# Patient Record
Sex: MALE | Race: ASIAN | NOT HISPANIC OR LATINO
[De-identification: names, ages, dates, MRNs, and addresses within clinical notes are randomized per-mention and may not be internally consistent; named-entity substitution may affect disease eponyms.]

---

## 2018-05-17 ENCOUNTER — RESULT REVIEW (OUTPATIENT)
Age: 73
End: 2018-05-17

## 2019-05-15 ENCOUNTER — INPATIENT (INPATIENT)
Facility: HOSPITAL | Age: 74
LOS: 3 days | Discharge: HOME | End: 2019-05-19
Attending: HOSPITALIST | Admitting: HOSPITALIST
Payer: MEDICARE

## 2019-05-15 VITALS
TEMPERATURE: 103 F | OXYGEN SATURATION: 96 % | RESPIRATION RATE: 18 BRPM | SYSTOLIC BLOOD PRESSURE: 130 MMHG | HEART RATE: 125 BPM | DIASTOLIC BLOOD PRESSURE: 80 MMHG

## 2019-05-15 DIAGNOSIS — A41.9 SEPSIS, UNSPECIFIED ORGANISM: ICD-10-CM

## 2019-05-15 LAB
ALBUMIN SERPL ELPH-MCNC: 4.4 G/DL — SIGNIFICANT CHANGE UP (ref 3.3–5)
ALP SERPL-CCNC: 56 U/L — SIGNIFICANT CHANGE UP (ref 40–120)
ALT FLD-CCNC: 23 U/L — SIGNIFICANT CHANGE UP (ref 4–41)
ANION GAP SERPL CALC-SCNC: 13 MMO/L — SIGNIFICANT CHANGE UP (ref 7–14)
APPEARANCE UR: CLEAR — SIGNIFICANT CHANGE UP
APTT BLD: 35.2 SEC — SIGNIFICANT CHANGE UP (ref 27.5–36.3)
AST SERPL-CCNC: 24 U/L — SIGNIFICANT CHANGE UP (ref 4–40)
B PERT DNA SPEC QL NAA+PROBE: NOT DETECTED — SIGNIFICANT CHANGE UP
BACTERIA # UR AUTO: NEGATIVE — SIGNIFICANT CHANGE UP
BASE EXCESS BLDV CALC-SCNC: -2.2 MMOL/L — SIGNIFICANT CHANGE UP
BASE EXCESS BLDV CALC-SCNC: 1.4 MMOL/L — SIGNIFICANT CHANGE UP
BASOPHILS # BLD AUTO: 0.05 K/UL — SIGNIFICANT CHANGE UP (ref 0–0.2)
BASOPHILS NFR BLD AUTO: 0.4 % — SIGNIFICANT CHANGE UP (ref 0–2)
BILIRUB SERPL-MCNC: 0.9 MG/DL — SIGNIFICANT CHANGE UP (ref 0.2–1.2)
BILIRUB UR-MCNC: NEGATIVE — SIGNIFICANT CHANGE UP
BLOOD GAS VENOUS - CREATININE: 1.19 MG/DL — SIGNIFICANT CHANGE UP (ref 0.5–1.3)
BLOOD GAS VENOUS - CREATININE: 1.25 MG/DL — SIGNIFICANT CHANGE UP (ref 0.5–1.3)
BLOOD UR QL VISUAL: SIGNIFICANT CHANGE UP
BUN SERPL-MCNC: 28 MG/DL — HIGH (ref 7–23)
C PNEUM DNA SPEC QL NAA+PROBE: NOT DETECTED — SIGNIFICANT CHANGE UP
CALCIUM SERPL-MCNC: 9.3 MG/DL — SIGNIFICANT CHANGE UP (ref 8.4–10.5)
CHLORIDE BLDV-SCNC: 106 MMOL/L — SIGNIFICANT CHANGE UP (ref 96–108)
CHLORIDE BLDV-SCNC: 110 MMOL/L — HIGH (ref 96–108)
CHLORIDE SERPL-SCNC: 104 MMOL/L — SIGNIFICANT CHANGE UP (ref 98–107)
CO2 SERPL-SCNC: 23 MMOL/L — SIGNIFICANT CHANGE UP (ref 22–31)
COLOR SPEC: YELLOW — SIGNIFICANT CHANGE UP
CREAT SERPL-MCNC: 1.26 MG/DL — SIGNIFICANT CHANGE UP (ref 0.5–1.3)
EOSINOPHIL # BLD AUTO: 0.11 K/UL — SIGNIFICANT CHANGE UP (ref 0–0.5)
EOSINOPHIL NFR BLD AUTO: 0.9 % — SIGNIFICANT CHANGE UP (ref 0–6)
FLUAV H1 2009 PAND RNA SPEC QL NAA+PROBE: NOT DETECTED — SIGNIFICANT CHANGE UP
FLUAV H1 RNA SPEC QL NAA+PROBE: NOT DETECTED — SIGNIFICANT CHANGE UP
FLUAV H3 RNA SPEC QL NAA+PROBE: NOT DETECTED — SIGNIFICANT CHANGE UP
FLUAV SUBTYP SPEC NAA+PROBE: NOT DETECTED — SIGNIFICANT CHANGE UP
FLUBV RNA SPEC QL NAA+PROBE: NOT DETECTED — SIGNIFICANT CHANGE UP
GAS PNL BLDV: 135 MMOL/L — LOW (ref 136–146)
GAS PNL BLDV: 137 MMOL/L — SIGNIFICANT CHANGE UP (ref 136–146)
GLUCOSE BLDV-MCNC: 177 MG/DL — HIGH (ref 70–99)
GLUCOSE BLDV-MCNC: 210 MG/DL — HIGH (ref 70–99)
GLUCOSE SERPL-MCNC: 175 MG/DL — HIGH (ref 70–99)
GLUCOSE UR-MCNC: >1000 — HIGH
HADV DNA SPEC QL NAA+PROBE: NOT DETECTED — SIGNIFICANT CHANGE UP
HCO3 BLDV-SCNC: 23 MMOL/L — SIGNIFICANT CHANGE UP (ref 20–27)
HCO3 BLDV-SCNC: 26 MMOL/L — SIGNIFICANT CHANGE UP (ref 20–27)
HCOV PNL SPEC NAA+PROBE: SIGNIFICANT CHANGE UP
HCT VFR BLD CALC: 49.6 % — SIGNIFICANT CHANGE UP (ref 39–50)
HCT VFR BLDV CALC: 43.6 % — SIGNIFICANT CHANGE UP (ref 39–51)
HCT VFR BLDV CALC: 49.2 % — SIGNIFICANT CHANGE UP (ref 39–51)
HGB BLD-MCNC: 16.4 G/DL — SIGNIFICANT CHANGE UP (ref 13–17)
HGB BLDV-MCNC: 14.2 G/DL — SIGNIFICANT CHANGE UP (ref 13–17)
HGB BLDV-MCNC: 16.1 G/DL — SIGNIFICANT CHANGE UP (ref 13–17)
HMPV RNA SPEC QL NAA+PROBE: NOT DETECTED — SIGNIFICANT CHANGE UP
HPIV1 RNA SPEC QL NAA+PROBE: NOT DETECTED — SIGNIFICANT CHANGE UP
HPIV2 RNA SPEC QL NAA+PROBE: NOT DETECTED — SIGNIFICANT CHANGE UP
HPIV3 RNA SPEC QL NAA+PROBE: NOT DETECTED — SIGNIFICANT CHANGE UP
HPIV4 RNA SPEC QL NAA+PROBE: NOT DETECTED — SIGNIFICANT CHANGE UP
HYALINE CASTS # UR AUTO: NEGATIVE — SIGNIFICANT CHANGE UP
IMM GRANULOCYTES NFR BLD AUTO: 0.3 % — SIGNIFICANT CHANGE UP (ref 0–1.5)
INR BLD: 1.04 — SIGNIFICANT CHANGE UP (ref 0.88–1.17)
KETONES UR-MCNC: NEGATIVE — SIGNIFICANT CHANGE UP
LACTATE BLDV-MCNC: 1.3 MMOL/L — SIGNIFICANT CHANGE UP (ref 0.5–2)
LACTATE BLDV-MCNC: 2.2 MMOL/L — HIGH (ref 0.5–2)
LEUKOCYTE ESTERASE UR-ACNC: SIGNIFICANT CHANGE UP
LYMPHOCYTES # BLD AUTO: 0.45 K/UL — LOW (ref 1–3.3)
LYMPHOCYTES # BLD AUTO: 3.5 % — LOW (ref 13–44)
MCHC RBC-ENTMCNC: 29.2 PG — SIGNIFICANT CHANGE UP (ref 27–34)
MCHC RBC-ENTMCNC: 33.1 % — SIGNIFICANT CHANGE UP (ref 32–36)
MCV RBC AUTO: 88.4 FL — SIGNIFICANT CHANGE UP (ref 80–100)
MONOCYTES # BLD AUTO: 0.62 K/UL — SIGNIFICANT CHANGE UP (ref 0–0.9)
MONOCYTES NFR BLD AUTO: 4.8 % — SIGNIFICANT CHANGE UP (ref 2–14)
NEUTROPHILS # BLD AUTO: 11.63 K/UL — HIGH (ref 1.8–7.4)
NEUTROPHILS NFR BLD AUTO: 90.1 % — HIGH (ref 43–77)
NITRITE UR-MCNC: NEGATIVE — SIGNIFICANT CHANGE UP
NRBC # FLD: 0 K/UL — SIGNIFICANT CHANGE UP (ref 0–0)
PCO2 BLDV: 37 MMHG — LOW (ref 41–51)
PCO2 BLDV: 38 MMHG — LOW (ref 41–51)
PH BLDV: 7.39 PH — SIGNIFICANT CHANGE UP (ref 7.32–7.43)
PH BLDV: 7.44 PH — HIGH (ref 7.32–7.43)
PH UR: 5.5 — SIGNIFICANT CHANGE UP (ref 5–8)
PLATELET # BLD AUTO: 200 K/UL — SIGNIFICANT CHANGE UP (ref 150–400)
PMV BLD: 10.4 FL — SIGNIFICANT CHANGE UP (ref 7–13)
PO2 BLDV: 53 MMHG — HIGH (ref 35–40)
PO2 BLDV: 69 MMHG — HIGH (ref 35–40)
POTASSIUM BLDV-SCNC: 3.6 MMOL/L — SIGNIFICANT CHANGE UP (ref 3.4–4.5)
POTASSIUM BLDV-SCNC: 4 MMOL/L — SIGNIFICANT CHANGE UP (ref 3.4–4.5)
POTASSIUM SERPL-MCNC: 4.4 MMOL/L — SIGNIFICANT CHANGE UP (ref 3.5–5.3)
POTASSIUM SERPL-SCNC: 4.4 MMOL/L — SIGNIFICANT CHANGE UP (ref 3.5–5.3)
PROT SERPL-MCNC: 7.4 G/DL — SIGNIFICANT CHANGE UP (ref 6–8.3)
PROT UR-MCNC: 10 — SIGNIFICANT CHANGE UP
PROTHROM AB SERPL-ACNC: 11.9 SEC — SIGNIFICANT CHANGE UP (ref 9.8–13.1)
RBC # BLD: 5.61 M/UL — SIGNIFICANT CHANGE UP (ref 4.2–5.8)
RBC # FLD: 13.5 % — SIGNIFICANT CHANGE UP (ref 10.3–14.5)
RBC CASTS # UR COMP ASSIST: SIGNIFICANT CHANGE UP (ref 0–?)
RSV RNA SPEC QL NAA+PROBE: NOT DETECTED — SIGNIFICANT CHANGE UP
RV+EV RNA SPEC QL NAA+PROBE: DETECTED — HIGH
SAO2 % BLDV: 86.9 % — HIGH (ref 60–85)
SAO2 % BLDV: 92.6 % — HIGH (ref 60–85)
SODIUM SERPL-SCNC: 140 MMOL/L — SIGNIFICANT CHANGE UP (ref 135–145)
SP GR SPEC: 1.03 — SIGNIFICANT CHANGE UP (ref 1–1.04)
SQUAMOUS # UR AUTO: SIGNIFICANT CHANGE UP
UROBILINOGEN FLD QL: NORMAL — SIGNIFICANT CHANGE UP
WBC # BLD: 12.9 K/UL — HIGH (ref 3.8–10.5)
WBC # FLD AUTO: 12.9 K/UL — HIGH (ref 3.8–10.5)
WBC UR QL: HIGH (ref 0–?)

## 2019-05-15 PROCEDURE — 99223 1ST HOSP IP/OBS HIGH 75: CPT | Mod: GC

## 2019-05-15 PROCEDURE — 71046 X-RAY EXAM CHEST 2 VIEWS: CPT | Mod: 26

## 2019-05-15 RX ORDER — PIPERACILLIN AND TAZOBACTAM 4; .5 G/20ML; G/20ML
3.38 INJECTION, POWDER, LYOPHILIZED, FOR SOLUTION INTRAVENOUS ONCE
Refills: 0 | Status: COMPLETED | OUTPATIENT
Start: 2019-05-15 | End: 2019-05-15

## 2019-05-15 RX ORDER — SODIUM CHLORIDE 9 MG/ML
2000 INJECTION INTRAMUSCULAR; INTRAVENOUS; SUBCUTANEOUS ONCE
Refills: 0 | Status: COMPLETED | OUTPATIENT
Start: 2019-05-15 | End: 2019-05-15

## 2019-05-15 RX ORDER — ACETAMINOPHEN 500 MG
650 TABLET ORAL ONCE
Refills: 0 | Status: COMPLETED | OUTPATIENT
Start: 2019-05-15 | End: 2019-05-15

## 2019-05-15 RX ORDER — VANCOMYCIN HCL 1 G
1000 VIAL (EA) INTRAVENOUS ONCE
Refills: 0 | Status: COMPLETED | OUTPATIENT
Start: 2019-05-15 | End: 2019-05-15

## 2019-05-15 RX ADMIN — SODIUM CHLORIDE 2000 MILLILITER(S): 9 INJECTION INTRAMUSCULAR; INTRAVENOUS; SUBCUTANEOUS at 21:02

## 2019-05-15 RX ADMIN — Medication 650 MILLIGRAM(S): at 21:01

## 2019-05-15 RX ADMIN — Medication 250 MILLIGRAM(S): at 21:35

## 2019-05-15 RX ADMIN — PIPERACILLIN AND TAZOBACTAM 200 GRAM(S): 4; .5 INJECTION, POWDER, LYOPHILIZED, FOR SOLUTION INTRAVENOUS at 21:02

## 2019-05-15 NOTE — ED PROVIDER NOTE - OBJECTIVE STATEMENT
73M with pertinent PMH of HTN, DM, TIA on AC p/w rigors and subjective fevers that began 2 hours prior to arrival. Underwent prostate biopsy 2 days ago for a nodule. Finished a 3 day course of prophylactic ciprofloxacin yesterday. Denies any other symptoms including HA, neck stiffness, cough, runny nose, chest pain, SOB, abdominal pain, N/V/D, dysuria, increased urinary frequency, hematuria, sick contacts, or travel history.

## 2019-05-15 NOTE — ED PROVIDER NOTE - PHYSICAL EXAMINATION
Gen: AAOx3, non-toxic  Head: NCAT  HEENT: EOMI, oral mucosa moist, normal conjunctiva  Lung: CTAB, no respiratory distress, no wheezes/rhonchi/rales B/L, speaking in full sentences  CV: RRR, no murmurs, rubs or gallops  Abd: soft, NTND, no guarding, no CVA tenderness  MSK: no visible deformities  Neuro: No focal sensory or motor deficits, normal CN exam   Skin: Warm, well perfused, no rash  Psych: normal affect.     ~Juan J Rodriguez PGY1

## 2019-05-15 NOTE — ED PROVIDER NOTE - ATTENDING CONTRIBUTION TO CARE
73M presents with fever and rigors. Had a prostate biopsy 2d ago, took cipro on days surrounding procedure. Symptoms started approx 2hrs prior to arrival. On exam well appearing, nad, mmm, reg tachycardia, lungs clear, abd soft NT/ND, 2+ pulses, no edema, no rash, alert, speech clear. Concern for bacteremia, given broad spectrum abx, IVF for tachycardia. Will admit.

## 2019-05-15 NOTE — ED ADULT NURSE REASSESSMENT NOTE - NS ED NURSE REASSESS COMMENT FT1
Received report from EDNA Meraz, AA&OX4. Pt currently denies any pain or discomfort. Pt in no acute distress. Will continue to monitor.

## 2019-05-15 NOTE — ED PROVIDER NOTE - CLINICAL SUMMARY MEDICAL DECISION MAKING FREE TEXT BOX
Fever with rigors in setting of recent prostate biopsy. No other symptoms. Pt well appearing and normotensive but tachy to 130 and febrile to 103. Full sepsis evaluation/treatment and admit.

## 2019-05-15 NOTE — ED ADULT TRIAGE NOTE - CHIEF COMPLAINT QUOTE
Pt. c/o trembling that started today. States he had a prostate biopsy 2 days ago. Febrile and tachy in triage. Denies any cp, sob, cough, n/v/d. Pmxh: HTN, DM

## 2019-05-15 NOTE — ED ADULT NURSE NOTE - OBJECTIVE STATEMENT
Break Shift RN: Pt received to rm 10 hx diabetes and HTN presenting with fever and trembling that began about 2hours ago. pt a&ox4 and ambulatory at baseline, skin intact, respirations even and unlabored, abd soft and non-distended, non-tender to palpation. Pt reports having biopsy of prostate done 2 days ago to check out "nodule". pt denies CP, SOB, dysuria, palpitations, n/v, cough, sick contacts, recent travel, or any other symptoms. 16g placed in left arm by EDNA Seay. Will continue to monitor. Break Shift RN: Pt received to rm 10 hx diabetes and HTN presenting with fever and trembling that began about 2hours ago. pt a&ox4 and ambulatory at baseline, skin intact, respirations even and unlabored, abd soft and non-distended, non-tender to palpation. Pt reports having biopsy of prostate done 2 days ago to check out "nodule" and reports having been on 3 days of Cipro beginning Sunday and finishing yesterday. pt denies CP, SOB, dysuria, palpitations, n/v, cough, sick contacts, recent travel, or any other symptoms. 16g placed in left arm by EDNA Seay. Will continue to monitor.

## 2019-05-15 NOTE — ED PROVIDER NOTE - NS ED ROS FT
GENERAL: +fever and shaking chills  EYES: no change in vision  HEENT: no trouble swallowing or speaking  CARDIAC: no chest pain or palpitations   PULMONARY: no cough or SOB  GI: no abdominal pain, nausea, vomiting, diarrhea, or constipation   : No changes in urination  SKIN: no rashes  NEURO: no headache, numbness, or weakness  MSK: No joint pain     ~Juan J Rodriguez PGY1

## 2019-05-15 NOTE — ED ADULT NURSE NOTE - NSIMPLEMENTINTERV_GEN_ALL_ED
Implemented All Universal Safety Interventions:  [place] to call system. Call bell, personal items and telephone within reach. Instruct patient to call for assistance. Room bathroom lighting operational. Non-slip footwear when patient is off stretcher. Physically safe environment: no spills, clutter or unnecessary equipment. Stretcher in lowest position, wheels locked, appropriate side rails in place.

## 2019-05-16 DIAGNOSIS — Z29.9 ENCOUNTER FOR PROPHYLACTIC MEASURES, UNSPECIFIED: ICD-10-CM

## 2019-05-16 DIAGNOSIS — B34.1 ENTEROVIRUS INFECTION, UNSPECIFIED: ICD-10-CM

## 2019-05-16 DIAGNOSIS — E11.65 TYPE 2 DIABETES MELLITUS WITH HYPERGLYCEMIA: ICD-10-CM

## 2019-05-16 DIAGNOSIS — E86.0 DEHYDRATION: ICD-10-CM

## 2019-05-16 DIAGNOSIS — I10 ESSENTIAL (PRIMARY) HYPERTENSION: ICD-10-CM

## 2019-05-16 DIAGNOSIS — A41.89 OTHER SPECIFIED SEPSIS: ICD-10-CM

## 2019-05-16 LAB
ANION GAP SERPL CALC-SCNC: 9 MMO/L — SIGNIFICANT CHANGE UP (ref 7–14)
ANISOCYTOSIS BLD QL: SLIGHT — SIGNIFICANT CHANGE UP
BASOPHILS # BLD AUTO: 0.05 K/UL — SIGNIFICANT CHANGE UP (ref 0–0.2)
BASOPHILS NFR BLD AUTO: 0.4 % — SIGNIFICANT CHANGE UP (ref 0–2)
BASOPHILS NFR SPEC: 0 % — SIGNIFICANT CHANGE UP (ref 0–2)
BLASTS # FLD: 0 % — SIGNIFICANT CHANGE UP (ref 0–0)
BUN SERPL-MCNC: 20 MG/DL — SIGNIFICANT CHANGE UP (ref 7–23)
CALCIUM SERPL-MCNC: 8 MG/DL — LOW (ref 8.4–10.5)
CHLORIDE SERPL-SCNC: 105 MMOL/L — SIGNIFICANT CHANGE UP (ref 98–107)
CO2 SERPL-SCNC: 22 MMOL/L — SIGNIFICANT CHANGE UP (ref 22–31)
CREAT SERPL-MCNC: 1.12 MG/DL — SIGNIFICANT CHANGE UP (ref 0.5–1.3)
EOSINOPHIL # BLD AUTO: 0.09 K/UL — SIGNIFICANT CHANGE UP (ref 0–0.5)
EOSINOPHIL NFR BLD AUTO: 0.6 % — SIGNIFICANT CHANGE UP (ref 0–6)
EOSINOPHIL NFR FLD: 0 % — SIGNIFICANT CHANGE UP (ref 0–6)
GIANT PLATELETS BLD QL SMEAR: PRESENT — SIGNIFICANT CHANGE UP
GLUCOSE SERPL-MCNC: 149 MG/DL — HIGH (ref 70–99)
HCT VFR BLD CALC: 45.2 % — SIGNIFICANT CHANGE UP (ref 39–50)
HGB BLD-MCNC: 14.8 G/DL — SIGNIFICANT CHANGE UP (ref 13–17)
IMM GRANULOCYTES NFR BLD AUTO: 0.4 % — SIGNIFICANT CHANGE UP (ref 0–1.5)
LYMPHOCYTES # BLD AUTO: 0.88 K/UL — LOW (ref 1–3.3)
LYMPHOCYTES # BLD AUTO: 6.2 % — LOW (ref 13–44)
LYMPHOCYTES NFR SPEC AUTO: 2.6 % — LOW (ref 13–44)
MACROCYTES BLD QL: SLIGHT — SIGNIFICANT CHANGE UP
MAGNESIUM SERPL-MCNC: 2.1 MG/DL — SIGNIFICANT CHANGE UP (ref 1.6–2.6)
MCHC RBC-ENTMCNC: 29.7 PG — SIGNIFICANT CHANGE UP (ref 27–34)
MCHC RBC-ENTMCNC: 32.7 % — SIGNIFICANT CHANGE UP (ref 32–36)
MCV RBC AUTO: 90.8 FL — SIGNIFICANT CHANGE UP (ref 80–100)
METAMYELOCYTES # FLD: 0 % — SIGNIFICANT CHANGE UP (ref 0–1)
METHOD TYPE: SIGNIFICANT CHANGE UP
MONOCYTES # BLD AUTO: 1.74 K/UL — HIGH (ref 0–0.9)
MONOCYTES NFR BLD AUTO: 12.3 % — SIGNIFICANT CHANGE UP (ref 2–14)
MONOCYTES NFR BLD: 11.5 % — HIGH (ref 2–9)
MYELOCYTES NFR BLD: 0 % — SIGNIFICANT CHANGE UP (ref 0–0)
NEUTROPHIL AB SER-ACNC: 84.1 % — HIGH (ref 43–77)
NEUTROPHILS # BLD AUTO: 11.33 K/UL — HIGH (ref 1.8–7.4)
NEUTROPHILS NFR BLD AUTO: 80.1 % — HIGH (ref 43–77)
NEUTS BAND # BLD: 0.9 % — SIGNIFICANT CHANGE UP (ref 0–6)
NRBC # FLD: 0 K/UL — SIGNIFICANT CHANGE UP (ref 0–0)
ORGANISM # SPEC MICROSCOPIC CNT: SIGNIFICANT CHANGE UP
ORGANISM # SPEC MICROSCOPIC CNT: SIGNIFICANT CHANGE UP
OTHER - HEMATOLOGY %: 0 — SIGNIFICANT CHANGE UP
PHOSPHATE SERPL-MCNC: 3.3 MG/DL — SIGNIFICANT CHANGE UP (ref 2.5–4.5)
PLATELET # BLD AUTO: 162 K/UL — SIGNIFICANT CHANGE UP (ref 150–400)
PLATELET COUNT - ESTIMATE: NORMAL — SIGNIFICANT CHANGE UP
PMV BLD: 10.1 FL — SIGNIFICANT CHANGE UP (ref 7–13)
POTASSIUM SERPL-MCNC: 4.1 MMOL/L — SIGNIFICANT CHANGE UP (ref 3.5–5.3)
POTASSIUM SERPL-SCNC: 4.1 MMOL/L — SIGNIFICANT CHANGE UP (ref 3.5–5.3)
PROMYELOCYTES # FLD: 0 % — SIGNIFICANT CHANGE UP (ref 0–0)
RBC # BLD: 4.98 M/UL — SIGNIFICANT CHANGE UP (ref 4.2–5.8)
RBC # FLD: 13.7 % — SIGNIFICANT CHANGE UP (ref 10.3–14.5)
SODIUM SERPL-SCNC: 136 MMOL/L — SIGNIFICANT CHANGE UP (ref 135–145)
SPECIMEN SOURCE: SIGNIFICANT CHANGE UP
SPECIMEN SOURCE: SIGNIFICANT CHANGE UP
VARIANT LYMPHS # BLD: 0.9 % — SIGNIFICANT CHANGE UP
WBC # BLD: 14.15 K/UL — HIGH (ref 3.8–10.5)
WBC # FLD AUTO: 14.15 K/UL — HIGH (ref 3.8–10.5)

## 2019-05-16 PROCEDURE — 12345: CPT | Mod: NC,GC

## 2019-05-16 RX ORDER — DEXTROSE 50 % IN WATER 50 %
25 SYRINGE (ML) INTRAVENOUS ONCE
Refills: 0 | Status: DISCONTINUED | OUTPATIENT
Start: 2019-05-16 | End: 2019-05-19

## 2019-05-16 RX ORDER — DEXTROSE 50 % IN WATER 50 %
15 SYRINGE (ML) INTRAVENOUS ONCE
Refills: 0 | Status: DISCONTINUED | OUTPATIENT
Start: 2019-05-16 | End: 2019-05-19

## 2019-05-16 RX ORDER — INSULIN LISPRO 100/ML
VIAL (ML) SUBCUTANEOUS AT BEDTIME
Refills: 0 | Status: DISCONTINUED | OUTPATIENT
Start: 2019-05-16 | End: 2019-05-19

## 2019-05-16 RX ORDER — LOSARTAN POTASSIUM 100 MG/1
100 TABLET, FILM COATED ORAL DAILY
Refills: 0 | Status: DISCONTINUED | OUTPATIENT
Start: 2019-05-16 | End: 2019-05-16

## 2019-05-16 RX ORDER — ASPIRIN AND DIPYRIDAMOLE 25; 200 MG/1; MG/1
1 CAPSULE, EXTENDED RELEASE ORAL
Refills: 0 | Status: DISCONTINUED | OUTPATIENT
Start: 2019-05-16 | End: 2019-05-19

## 2019-05-16 RX ORDER — INSULIN GLARGINE 100 [IU]/ML
20 INJECTION, SOLUTION SUBCUTANEOUS AT BEDTIME
Refills: 0 | Status: DISCONTINUED | OUTPATIENT
Start: 2019-05-17 | End: 2019-05-19

## 2019-05-16 RX ORDER — INSULIN LISPRO 100/ML
VIAL (ML) SUBCUTANEOUS
Refills: 0 | Status: DISCONTINUED | OUTPATIENT
Start: 2019-05-16 | End: 2019-05-19

## 2019-05-16 RX ORDER — SODIUM CHLORIDE 9 MG/ML
1000 INJECTION, SOLUTION INTRAVENOUS
Refills: 0 | Status: DISCONTINUED | OUTPATIENT
Start: 2019-05-16 | End: 2019-05-19

## 2019-05-16 RX ORDER — INSULIN GLARGINE 100 [IU]/ML
20 INJECTION, SOLUTION SUBCUTANEOUS ONCE
Refills: 0 | Status: COMPLETED | OUTPATIENT
Start: 2019-05-16 | End: 2019-05-16

## 2019-05-16 RX ORDER — ENOXAPARIN SODIUM 100 MG/ML
40 INJECTION SUBCUTANEOUS EVERY 24 HOURS
Refills: 0 | Status: DISCONTINUED | OUTPATIENT
Start: 2019-05-16 | End: 2019-05-19

## 2019-05-16 RX ORDER — ACETAMINOPHEN 500 MG
650 TABLET ORAL EVERY 6 HOURS
Refills: 0 | Status: DISCONTINUED | OUTPATIENT
Start: 2019-05-16 | End: 2019-05-19

## 2019-05-16 RX ORDER — INSULIN LISPRO 100/ML
10 VIAL (ML) SUBCUTANEOUS
Refills: 0 | Status: DISCONTINUED | OUTPATIENT
Start: 2019-05-16 | End: 2019-05-19

## 2019-05-16 RX ORDER — SODIUM CHLORIDE 9 MG/ML
1000 INJECTION INTRAMUSCULAR; INTRAVENOUS; SUBCUTANEOUS
Refills: 0 | Status: DISCONTINUED | OUTPATIENT
Start: 2019-05-16 | End: 2019-05-19

## 2019-05-16 RX ORDER — ATORVASTATIN CALCIUM 80 MG/1
40 TABLET, FILM COATED ORAL AT BEDTIME
Refills: 0 | Status: DISCONTINUED | OUTPATIENT
Start: 2019-05-16 | End: 2019-05-19

## 2019-05-16 RX ORDER — PIPERACILLIN AND TAZOBACTAM 4; .5 G/20ML; G/20ML
3.38 INJECTION, POWDER, LYOPHILIZED, FOR SOLUTION INTRAVENOUS EVERY 8 HOURS
Refills: 0 | Status: DISCONTINUED | OUTPATIENT
Start: 2019-05-16 | End: 2019-05-18

## 2019-05-16 RX ORDER — GLUCAGON INJECTION, SOLUTION 0.5 MG/.1ML
1 INJECTION, SOLUTION SUBCUTANEOUS ONCE
Refills: 0 | Status: DISCONTINUED | OUTPATIENT
Start: 2019-05-16 | End: 2019-05-19

## 2019-05-16 RX ORDER — DEXTROSE 50 % IN WATER 50 %
12.5 SYRINGE (ML) INTRAVENOUS ONCE
Refills: 0 | Status: DISCONTINUED | OUTPATIENT
Start: 2019-05-16 | End: 2019-05-19

## 2019-05-16 RX ADMIN — ASPIRIN AND DIPYRIDAMOLE 1 CAPSULE(S): 25; 200 CAPSULE, EXTENDED RELEASE ORAL at 08:08

## 2019-05-16 RX ADMIN — ASPIRIN AND DIPYRIDAMOLE 1 CAPSULE(S): 25; 200 CAPSULE, EXTENDED RELEASE ORAL at 18:16

## 2019-05-16 RX ADMIN — Medication 10 UNIT(S): at 13:24

## 2019-05-16 RX ADMIN — Medication 10 UNIT(S): at 18:17

## 2019-05-16 RX ADMIN — PIPERACILLIN AND TAZOBACTAM 25 GRAM(S): 4; .5 INJECTION, POWDER, LYOPHILIZED, FOR SOLUTION INTRAVENOUS at 18:16

## 2019-05-16 RX ADMIN — INSULIN GLARGINE 20 UNIT(S): 100 INJECTION, SOLUTION SUBCUTANEOUS at 04:35

## 2019-05-16 RX ADMIN — ATORVASTATIN CALCIUM 40 MILLIGRAM(S): 80 TABLET, FILM COATED ORAL at 22:36

## 2019-05-16 RX ADMIN — Medication 2: at 13:24

## 2019-05-16 RX ADMIN — Medication 650 MILLIGRAM(S): at 18:17

## 2019-05-16 RX ADMIN — Medication 10 UNIT(S): at 09:02

## 2019-05-16 RX ADMIN — ENOXAPARIN SODIUM 40 MILLIGRAM(S): 100 INJECTION SUBCUTANEOUS at 07:01

## 2019-05-16 RX ADMIN — PIPERACILLIN AND TAZOBACTAM 25 GRAM(S): 4; .5 INJECTION, POWDER, LYOPHILIZED, FOR SOLUTION INTRAVENOUS at 10:59

## 2019-05-16 RX ADMIN — SODIUM CHLORIDE 75 MILLILITER(S): 9 INJECTION INTRAMUSCULAR; INTRAVENOUS; SUBCUTANEOUS at 07:01

## 2019-05-16 NOTE — PROGRESS NOTE ADULT - PROBLEM SELECTOR PLAN 5
Reduced dose of Lantus and Humalog regimen while inpt:  Lantus 20 u (offset 3 hrs every day to return to the pt's schedule - takes at [facility])  Humalog 10u TID  A1c in am  DM diet Reduced dose of Lantus and Humalog regimen while inpt:  Lantus 20 u (offset 3 hrs every day to return to the pt's schedule - takes at MN)  Humalog 10u TID  f/u A1c in am  DM diet

## 2019-05-16 NOTE — H&P ADULT - NEGATIVE GENERAL GENITOURINARY SYMPTOMS
normal urinary frequency/no flank pain R/no dysuria/no flank pain L/no hematuria/no urinary hesitancy

## 2019-05-16 NOTE — H&P ADULT - PROBLEM SELECTOR PLAN 3
s/p Zosyn IV, and Vancomycin IV in ED  Digital prostate exam: not TTP, firm, not boggy  UA weakly (+) with trace of LE --> No urinary symptoms   Completed 3 day course of Cipro BID ppx post biopsy   Low suspicion of acute post procedural prostatitis given the above  Hold Abx for now  f/u UCx, BCx  Consider  c/s

## 2019-05-16 NOTE — PROGRESS NOTE ADULT - PROBLEM SELECTOR PLAN 1
Leukocytosis 12.9K, Tmax 103.2; Tachycardia 120s; Lactate 2.2-->1.3  Likely due to Rhino/Enterovirus infection; Low suspicion of acute prostatitis post-procedural;   monitor vs q4h  f/u BCx, UCx  Hold Abx Leukocytosis 12.9K, Tmax 103.2; Tachycardia 120s; Lactate 2.2-->1.3  Likely due to Rhino/Enterovirus infection; Low suspicion of acute prostatitis post-procedural however will need to r/o  monitor vs q4h  - blood cultures growing Gram negative rods   f/u BCx, UCx  - will start

## 2019-05-16 NOTE — H&P ADULT - PROBLEM SELECTOR PLAN 5
Reduced dose of Lantus and Humalog regimen while inpt:  Lantus 20 u (offset 3 hrs every day to return to the pt's schedule - takes at [facility])  Humalog 10u TID  A1c in am  DM diet

## 2019-05-16 NOTE — PROGRESS NOTE ADULT - PROBLEM SELECTOR PLAN 4
BUN: Scr ratio >20; Dry MM;  s/p 2L NS bolus in ED BUN: Scr ratio >20; Dry MM;  s/p 2L NS bolus in ED  - c/w NS @ 75

## 2019-05-16 NOTE — PROGRESS NOTE ADULT - SUBJECTIVE AND OBJECTIVE BOX
Andrew Powers MD REYMUNDO  Internal Medicine PGY-1  Pager [facility]: [phone]; [facility] [id number]    Patient is a 73y old  Male who presents with a chief complaint of Chills (16 May 2019 04:09)    SUBJECTIVE/OVERNIGHT EVENTS   No acute events overnight. Patient tolerating meals, without n/v. Reports having daily BM. Denies fevers, chills, SOB. ROS otherwise negative.     OBJECTIVE  Vital Signs Last 24 Hrs  T(C): 37.1 (16 May 2019 06:59), Max: 39.6 (15 May 2019 20:01)  T(F): 98.8 (16 May 2019 06:59), Max: 103.2 (15 May 2019 20:01)  HR: 84 (16 May 2019 06:59) (82 - 127)  BP: 120/61 (16 May 2019 06:59) (105/52 - 136/64)  BP(mean): --  RR: 17 (16 May 2019 06:59) (17 - 18)  SpO2: 97% (16 May 2019 06:59) (96% - 99%)    PHYSICAL EXAM:  GENERAL: NAD, well-developed  HEAD:  Atraumatic, Normocephalic  EYES: EOMI, conjunctiva and sclera clear  NECK: Supple, No JVD  CHEST/LUNG: Clear to auscultation bilaterally; No wheeze  HEART: Regular rate and rhythm; No murmurs, rubs, or gallops  ABDOMEN: Soft, Nontender, Nondistended; Bowel sounds present  EXTREMITIES:  2+ Peripheral Pulses, No clubbing, cyanosis, or edema  PSYCH: AAOx3  NEUROLOGY: non-focal  SKIN: No rashes or lesions    LABS                        14.8   14.15 )-----------( 162      ( 16 May 2019 05:45 )             45.2                         16.4   12.90 )-----------( 200      ( 15 May 2019 20:41 )             49.6     05-16    136  |  105  |  20  ----------------------------<  149<H>  4.1   |  22  |  1.12  05-15    140  |  104  |  28<H>  ----------------------------<  175<H>  4.4   |  23  |  1.26    Ca    8.0<L>      16 May 2019 05:45  Ca    9.3      15 May 2019 20:41  Phos  3.3       Mg     2.1         TPro  7.4  /  Alb  4.4  /  TBili  0.9  /  DBili  x   /  AST  24  /  ALT  23  /  AlkPhos  56  05-15    CAPILLARY BLOOD GLUCOSE  POCT Blood Glucose.: 149 mg/dL (16 May 2019 04:30)    PT/INR - ( 15 May 2019 20:41 )   PT: 11.9 SEC;   INR: 1.04     PTT - ( 15 May 2019 20:41 )  PTT:35.2 SEC    05-15-19 @ 22:23 - VBG - pH: 7.39  | pCO2: 37    | pO2: 69    | Lactate: 1.3    05-15-19 @ 20:41 - VBG - pH: 7.44  | pCO2: 38    | pO2: 53    | Lactate: 2.2      Urinalysis Basic - ( 15 May 2019 20:30 )  Color: YELLOW / Appearance: CLEAR / S.034 / pH: 5.5  Gluc: >1000 / Ketone: NEGATIVE  / Bili: NEGATIVE / Urobili: NORMAL   Blood: SMALL / Protein: 10 / Nitrite: NEGATIVE   Leuk Esterase: TRACE / RBC: 3-5 / WBC 11-25   Sq Epi: OCC / Non Sq Epi: x / Bacteria: NEGATIVE    MEDICATIONS  (STANDING):  atorvastatin 40 milliGRAM(s) Oral at bedtime  dextrose 5%. 1000 milliLiter(s) (50 mL/Hr) IV Continuous <Continuous>  dextrose 50% Injectable 12.5 Gram(s) IV Push once  dextrose 50% Injectable 25 Gram(s) IV Push once  dextrose 50% Injectable 25 Gram(s) IV Push once  dipyridamole 200 mG/aspirin 25 mG 1 Capsule(s) Oral two times a day  enoxaparin Injectable 40 milliGRAM(s) SubCutaneous every 24 hours  insulin lispro (HumaLOG) corrective regimen sliding scale   SubCutaneous three times a day before meals  insulin lispro (HumaLOG) corrective regimen sliding scale   SubCutaneous at bedtime  insulin lispro Injectable (HumaLOG) 10 Unit(s) SubCutaneous three times a day before meals  sodium chloride 0.9%. 1000 milliLiter(s) (75 mL/Hr) IV Continuous <Continuous>    MEDICATIONS  (PRN):  acetaminophen   Tablet .. 650 milliGRAM(s) Oral every 6 hours PRN Temp greater or equal to 38C (100.4F)  dextrose 40% Gel 15 Gram(s) Oral once PRN Blood Glucose LESS THAN 70 milliGRAM(s)/deciliter  glucagon  Injectable 1 milliGRAM(s) IntraMuscular once PRN Glucose LESS THAN 70 milligrams/deciliter Andrew Powers MD REYMUNDO  Internal Medicine PGY-1  Pager [facility]: [phone]; [facility] [id number]    Patient is a 73y old  Male who presents with a chief complaint of Chills (16 May 2019 04:09)    SUBJECTIVE/OVERNIGHT EVENTS   No acute events overnight. No reports nasal congestion, no cough, no dysuria no back pain. Denies fevers, chills, SOB. ROS otherwise negative.     OBJECTIVE  Vital Signs Last 24 Hrs  T(C): 37.1 (16 May 2019 06:59), Max: 39.6 (15 May 2019 20:01)  T(F): 98.8 (16 May 2019 06:59), Max: 103.2 (15 May 2019 20:01)  HR: 84 (16 May 2019 06:59) (82 - 127)  BP: 120/61 (16 May 2019 06:59) (105/52 - 136/64)  BP(mean): --  RR: 17 (16 May 2019 06:59) (17 - 18)  SpO2: 97% (16 May 2019 06:59) (96% - 99%)    PHYSICAL EXAM:  GENERAL: NAD, well-developed  HEAD:  Atraumatic, Normocephalic  EYES: EOMI, conjunctiva and sclera clear  NECK: Supple, No JVD  CHEST/LUNG: Clear to auscultation bilaterally; No wheeze  HEART: Regular rate and rhythm; No murmurs, rubs, or gallops  ABDOMEN: Soft, Nontender, Nondistended; Bowel sounds present  EXTREMITIES:  2+ Peripheral Pulses, No clubbing, cyanosis, or edema  PSYCH: AAOx3  NEUROLOGY: non-focal  SKIN: No rashes or lesions    LABS                        14.8   14.15 )-----------( 162      ( 16 May 2019 05:45 )             45.2                         16.4   12.90 )-----------( 200      ( 15 May 2019 20:41 )             49.6     05-16    136  |  105  |  20  ----------------------------<  149<H>  4.1   |  22  |  1.12  05-15    140  |  104  |  28<H>  ----------------------------<  175<H>  4.4   |  23  |  1.26    Ca    8.0<L>      16 May 2019 05:45  Ca    9.3      15 May 2019 20:41  Phos  3.3       Mg     2.1         TPro  7.4  /  Alb  4.4  /  TBili  0.9  /  DBili  x   /  AST  24  /  ALT  23  /  AlkPhos  56  05-15    CAPILLARY BLOOD GLUCOSE  POCT Blood Glucose.: 149 mg/dL (16 May 2019 04:30)    PT/INR - ( 15 May 2019 20:41 )   PT: 11.9 SEC;   INR: 1.04     PTT - ( 15 May 2019 20:41 )  PTT:35.2 SEC    05-15-19 @ 22:23 - VBG - pH: 7.39  | pCO2: 37    | pO2: 69    | Lactate: 1.3    05-15-19 @ 20:41 - VBG - pH: 7.44  | pCO2: 38    | pO2: 53    | Lactate: 2.2      Urinalysis Basic - ( 15 May 2019 20:30 )  Color: YELLOW / Appearance: CLEAR / S.034 / pH: 5.5  Gluc: >1000 / Ketone: NEGATIVE  / Bili: NEGATIVE / Urobili: NORMAL   Blood: SMALL / Protein: 10 / Nitrite: NEGATIVE   Leuk Esterase: TRACE / RBC: 3-5 / WBC 11-25   Sq Epi: OCC / Non Sq Epi: x / Bacteria: NEGATIVE    MEDICATIONS  (STANDING):  atorvastatin 40 milliGRAM(s) Oral at bedtime  dextrose 5%. 1000 milliLiter(s) (50 mL/Hr) IV Continuous <Continuous>  dextrose 50% Injectable 12.5 Gram(s) IV Push once  dextrose 50% Injectable 25 Gram(s) IV Push once  dextrose 50% Injectable 25 Gram(s) IV Push once  dipyridamole 200 mG/aspirin 25 mG 1 Capsule(s) Oral two times a day  enoxaparin Injectable 40 milliGRAM(s) SubCutaneous every 24 hours  insulin lispro (HumaLOG) corrective regimen sliding scale   SubCutaneous three times a day before meals  insulin lispro (HumaLOG) corrective regimen sliding scale   SubCutaneous at bedtime  insulin lispro Injectable (HumaLOG) 10 Unit(s) SubCutaneous three times a day before meals  sodium chloride 0.9%. 1000 milliLiter(s) (75 mL/Hr) IV Continuous <Continuous>    MEDICATIONS  (PRN):  acetaminophen   Tablet .. 650 milliGRAM(s) Oral every 6 hours PRN Temp greater or equal to 38C (100.4F)  dextrose 40% Gel 15 Gram(s) Oral once PRN Blood Glucose LESS THAN 70 milliGRAM(s)/deciliter  glucagon  Injectable 1 milliGRAM(s) IntraMuscular once PRN Glucose LESS THAN 70 milligrams/deciliter

## 2019-05-16 NOTE — H&P ADULT - PROBLEM SELECTOR PLAN 1
Leukocytosis 12.9K, Tmax 103.2; Tachycardia 120s; Lactate 2.2-->1.3  Likely due to Rhino/Enterovirus infection; Low suspicion of acute prostatitis post-procedural;   monitor vs q4h  f/u BCx, UCx  Hold Abx

## 2019-05-16 NOTE — PROGRESS NOTE ADULT - PROBLEM SELECTOR PLAN 3
s/p Zosyn IV, and Vancomycin IV in ED  Digital prostate exam: not TTP, firm, not boggy  UA weakly (+) with trace of LE --> No urinary symptoms   Completed 3 day course of Cipro BID ppx post biopsy   Low suspicion of acute post procedural prostatitis given the above  Hold Abx for now  f/u UCx, BCx  Consider  c/s s/p Zosyn IV, and Vancomycin IV in ED  Digital prostate exam: not TTP, firm, not boggy  UA weakly (+) with trace of LE --> No urinary symptoms   Completed 3 day course of Cipro BID ppx post biopsy   Initially low suspicion of acute post procedural prostatitis given the above  - however now with gram negative rods in blood  f/u UCx, BCx for speciation   Consider  c/s

## 2019-05-16 NOTE — H&P ADULT - NSICDXFAMILYHX_GEN_ALL_CORE_FT
FAMILY HISTORY:  Family history of prostate cancer in father, father  Family history of renal cancer, mother

## 2019-05-16 NOTE — ED ADULT NURSE REASSESSMENT NOTE - NS ED NURSE REASSESS COMMENT FT1
Report given to [facility]. Pt in no acute distress. [facility]

## 2019-05-16 NOTE — H&P ADULT - ASSESSMENT
74yo Male, ambulatory without assistive devices, with pertinent PMH of HTN, DM Type 2, TIA, recent prostate biopsy for a nodule confirmed by MRI a/w viral sepsis due to Rhino/Enterovirus, dehydration;

## 2019-05-16 NOTE — PROGRESS NOTE ADULT - ASSESSMENT
72yo Male, ambulatory without assistive devices, with pertinent PMH of HTN, DM Type 2, TIA, recent prostate biopsy for a nodule confirmed by MRI a/w viral sepsis due to Rhino/Enterovirus, dehydration; 72yo Male, ambulatory without assistive devices, with pertinent PMH of HTN, DM Type 2, TIA, recent prostate biopsy for a nodule confirmed by MRI, presenting with fevers, rigors a/w viral sepsis due to Rhino/Enterovirus, dehydration

## 2019-05-16 NOTE — H&P ADULT - NSHPSOCIALHISTORY_GEN_ALL_CORE
Former smoker, 7 years x 3 cigarettes/day   Social alcohol   No illicit drug use     Lives with wife  Retired

## 2019-05-17 ENCOUNTER — TRANSCRIPTION ENCOUNTER (OUTPATIENT)
Age: 74
End: 2019-05-17

## 2019-05-17 DIAGNOSIS — A41.51 SEPSIS DUE TO ESCHERICHIA COLI [E. COLI]: ICD-10-CM

## 2019-05-17 LAB
ANION GAP SERPL CALC-SCNC: 11 MMO/L — SIGNIFICANT CHANGE UP (ref 7–14)
BUN SERPL-MCNC: 19 MG/DL — SIGNIFICANT CHANGE UP (ref 7–23)
CALCIUM SERPL-MCNC: 7.9 MG/DL — LOW (ref 8.4–10.5)
CHLORIDE SERPL-SCNC: 106 MMOL/L — SIGNIFICANT CHANGE UP (ref 98–107)
CO2 SERPL-SCNC: 20 MMOL/L — LOW (ref 22–31)
CREAT SERPL-MCNC: 1.06 MG/DL — SIGNIFICANT CHANGE UP (ref 0.5–1.3)
GLUCOSE SERPL-MCNC: 134 MG/DL — HIGH (ref 70–99)
HBA1C BLD-MCNC: 7.2 % — HIGH (ref 4–5.6)
HCT VFR BLD CALC: 42.8 % — SIGNIFICANT CHANGE UP (ref 39–50)
HCV AB S/CO SERPL IA: 0.14 S/CO — SIGNIFICANT CHANGE UP (ref 0–0.99)
HCV AB SERPL-IMP: SIGNIFICANT CHANGE UP
HGB BLD-MCNC: 13.8 G/DL — SIGNIFICANT CHANGE UP (ref 13–17)
MAGNESIUM SERPL-MCNC: 1.9 MG/DL — SIGNIFICANT CHANGE UP (ref 1.6–2.6)
MCHC RBC-ENTMCNC: 28.9 PG — SIGNIFICANT CHANGE UP (ref 27–34)
MCHC RBC-ENTMCNC: 32.2 % — SIGNIFICANT CHANGE UP (ref 32–36)
MCV RBC AUTO: 89.5 FL — SIGNIFICANT CHANGE UP (ref 80–100)
NRBC # FLD: 0 K/UL — SIGNIFICANT CHANGE UP (ref 0–0)
ORGANISM # SPEC MICROSCOPIC CNT: SIGNIFICANT CHANGE UP
PHOSPHATE SERPL-MCNC: 2.3 MG/DL — LOW (ref 2.5–4.5)
PLATELET # BLD AUTO: 160 K/UL — SIGNIFICANT CHANGE UP (ref 150–400)
PMV BLD: 10.6 FL — SIGNIFICANT CHANGE UP (ref 7–13)
POTASSIUM SERPL-MCNC: 3.9 MMOL/L — SIGNIFICANT CHANGE UP (ref 3.5–5.3)
POTASSIUM SERPL-SCNC: 3.9 MMOL/L — SIGNIFICANT CHANGE UP (ref 3.5–5.3)
RBC # BLD: 4.78 M/UL — SIGNIFICANT CHANGE UP (ref 4.2–5.8)
RBC # FLD: 13.3 % — SIGNIFICANT CHANGE UP (ref 10.3–14.5)
SODIUM SERPL-SCNC: 137 MMOL/L — SIGNIFICANT CHANGE UP (ref 135–145)
SPECIMEN SOURCE: SIGNIFICANT CHANGE UP
WBC # BLD: 12.11 K/UL — HIGH (ref 3.8–10.5)
WBC # FLD AUTO: 12.11 K/UL — HIGH (ref 3.8–10.5)

## 2019-05-17 PROCEDURE — 99233 SBSQ HOSP IP/OBS HIGH 50: CPT | Mod: GC

## 2019-05-17 RX ORDER — SODIUM,POTASSIUM PHOSPHATES 278-250MG
1 POWDER IN PACKET (EA) ORAL
Refills: 0 | Status: COMPLETED | OUTPATIENT
Start: 2019-05-17 | End: 2019-05-17

## 2019-05-17 RX ADMIN — ASPIRIN AND DIPYRIDAMOLE 1 CAPSULE(S): 25; 200 CAPSULE, EXTENDED RELEASE ORAL at 06:38

## 2019-05-17 RX ADMIN — PIPERACILLIN AND TAZOBACTAM 25 GRAM(S): 4; .5 INJECTION, POWDER, LYOPHILIZED, FOR SOLUTION INTRAVENOUS at 11:04

## 2019-05-17 RX ADMIN — Medication 1 TABLET(S): at 09:38

## 2019-05-17 RX ADMIN — Medication 10 UNIT(S): at 09:39

## 2019-05-17 RX ADMIN — Medication 10 UNIT(S): at 13:30

## 2019-05-17 RX ADMIN — ENOXAPARIN SODIUM 40 MILLIGRAM(S): 100 INJECTION SUBCUTANEOUS at 06:38

## 2019-05-17 RX ADMIN — PIPERACILLIN AND TAZOBACTAM 25 GRAM(S): 4; .5 INJECTION, POWDER, LYOPHILIZED, FOR SOLUTION INTRAVENOUS at 02:08

## 2019-05-17 RX ADMIN — ASPIRIN AND DIPYRIDAMOLE 1 CAPSULE(S): 25; 200 CAPSULE, EXTENDED RELEASE ORAL at 18:34

## 2019-05-17 RX ADMIN — PIPERACILLIN AND TAZOBACTAM 25 GRAM(S): 4; .5 INJECTION, POWDER, LYOPHILIZED, FOR SOLUTION INTRAVENOUS at 19:43

## 2019-05-17 RX ADMIN — Medication 1 TABLET(S): at 13:30

## 2019-05-17 RX ADMIN — Medication 1 TABLET(S): at 22:01

## 2019-05-17 RX ADMIN — Medication 1 TABLET(S): at 18:34

## 2019-05-17 RX ADMIN — ATORVASTATIN CALCIUM 40 MILLIGRAM(S): 80 TABLET, FILM COATED ORAL at 22:00

## 2019-05-17 RX ADMIN — Medication 2: at 13:30

## 2019-05-17 RX ADMIN — Medication 2: at 18:34

## 2019-05-17 RX ADMIN — INSULIN GLARGINE 20 UNIT(S): 100 INJECTION, SOLUTION SUBCUTANEOUS at 22:00

## 2019-05-17 RX ADMIN — Medication 10 UNIT(S): at 18:34

## 2019-05-17 NOTE — PROGRESS NOTE ADULT - PROBLEM SELECTOR PLAN 5
Reduced dose of Lantus and Humalog regimen while inpt:  Lantus 20 u   Humalog 10u TID  ISS   A1c 7.2  DM diet

## 2019-05-17 NOTE — PROGRESS NOTE ADULT - ASSESSMENT
72yo Male, ambulatory without assistive devices, with pertinent PMH of HTN, DM Type 2, TIA, recent prostate biopsy for a nodule confirmed by MRI, presenting with fevers, rigors a/w viral sepsis due to Rhino/Enterovirus, dehydration, found to have E. coli bacteremia

## 2019-05-17 NOTE — PROGRESS NOTE ADULT - PROBLEM SELECTOR PLAN 3
s/p Zosyn IV, and Vancomycin IV in ED  Digital prostate exam: not TTP, firm, not boggy  UA weakly (+) with trace of LE --> No urinary symptoms   Completed 3 day course of Cipro BID ppx post biopsy   Initially low suspicion of acute post procedural prostatitis given the above  - however now with E.Coli in blood   f/u UCx, BCx for sensitivities   Consider  c/s

## 2019-05-17 NOTE — PROGRESS NOTE ADULT - SUBJECTIVE AND OBJECTIVE BOX
Andrew Powers MD REYMUNDO  Internal Medicine PGY-1  Pager [facility]: [phone]; [facility] [id number]    Patient is a 73y old  Male who presents with a chief complaint of Chills (16 May 2019 07:51)      SUBJECTIVE/OVERNIGHT EVENTS   Febrile overnight. Patient tolerating meals, without n/v. Reports having daily BM. Denies fevers, chills, SOB. ROS otherwise negative.     OBJECTIVE  Vital Signs Last 24 Hrs  T(C): 36.8 (17 May 2019 06:36), Max: 38.8 (16 May 2019 18:04)  T(F): 98.3 (17 May 2019 06:36), Max: 101.9 (16 May 2019 18:04)  HR: 85 (17 May 2019 06:36) (81 - 94)  BP: 127/62 (17 May 2019 06:36) (115/68 - 138/82)  BP(mean): --  RR: 18 (17 May 2019 06:36) (18 - 22)  SpO2: 98% (17 May 2019 06:36) (98% - 100%)    I&O's Summary      PHYSICAL EXAM:  GENERAL: NAD, well-developed  HEAD:  Atraumatic, Normocephalic  EYES: EOMI, conjunctiva and sclera clear  NECK: Supple, No JVD  CHEST/LUNG: Clear to auscultation bilaterally; No wheeze  HEART: Regular rate and rhythm; No murmurs, rubs, or gallops  ABDOMEN: Soft, Nontender, Nondistended; Bowel sounds present  EXTREMITIES:  2+ Peripheral Pulses, No clubbing, cyanosis, or edema  PSYCH: AAOx3  NEUROLOGY: non-focal  SKIN: No rashes or lesions    LABS                        13.8   12.11 )-----------( 160      ( 17 May 2019 05:41 )             42.8                         14.8   14.15 )-----------( 162      ( 16 May 2019 05:45 )             45.2         137  |  106  |  19  ----------------------------<  134<H>  3.9   |  20<L>  |  1.06  05-16    136  |  105  |  20  ----------------------------<  149<H>  4.1   |  22  |  1.12    Ca    7.9<L>      17 May 2019 05:41  Ca    8.0<L>      16 May 2019 05:45  Phos  2.3       Mg     1.9         TPro  7.4  /  Alb  4.4  /  TBili  0.9  /  DBili  x   /  AST  24  /  ALT  23  /  AlkPhos  56  05-15    CAPILLARY BLOOD GLUCOSE      POCT Blood Glucose.: 158 mg/dL (16 May 2019 22:33)  POCT Blood Glucose.: 103 mg/dL (16 May 2019 18:07)  POCT Blood Glucose.: 196 mg/dL (16 May 2019 12:40)  POCT Blood Glucose.: 134 mg/dL (16 May 2019 08:53)    PT/INR - ( 15 May 2019 20:41 )   PT: 11.9 SEC;   INR: 1.04          PTT - ( 15 May 2019 20:41 )  PTT:35.2 SEC              Urinalysis Basic - ( 15 May 2019 20:30 )    Color: YELLOW / Appearance: CLEAR / S.034 / pH: 5.5  Gluc: >1000 / Ketone: NEGATIVE  / Bili: NEGATIVE / Urobili: NORMAL   Blood: SMALL / Protein: 10 / Nitrite: NEGATIVE   Leuk Esterase: TRACE / RBC: 3-5 / WBC 11-25   Sq Epi: OCC / Non Sq Epi: x / Bacteria: NEGATIVE        RADIOLOGY & ADDITIONAL TESTS:    MEDICATIONS  (STANDING):  atorvastatin 40 milliGRAM(s) Oral at bedtime  dextrose 5%. 1000 milliLiter(s) (50 mL/Hr) IV Continuous <Continuous>  dextrose 50% Injectable 12.5 Gram(s) IV Push once  dextrose 50% Injectable 25 Gram(s) IV Push once  dextrose 50% Injectable 25 Gram(s) IV Push once  dipyridamole 200 mG/aspirin 25 mG 1 Capsule(s) Oral two times a day  enoxaparin Injectable 40 milliGRAM(s) SubCutaneous every 24 hours  insulin glargine Injectable (LANTUS) 20 Unit(s) SubCutaneous at bedtime  insulin lispro (HumaLOG) corrective regimen sliding scale   SubCutaneous three times a day before meals  insulin lispro (HumaLOG) corrective regimen sliding scale   SubCutaneous at bedtime  insulin lispro Injectable (HumaLOG) 10 Unit(s) SubCutaneous three times a day before meals  piperacillin/tazobactam IVPB. 3.375 Gram(s) IV Intermittent every 8 hours  potassium acid phosphate/sodium acid phosphate tablet (K-PHOS No. 2) 1 Tablet(s) Oral four times a day with meals  sodium chloride 0.9%. 1000 milliLiter(s) (75 mL/Hr) IV Continuous <Continuous>    MEDICATIONS  (PRN):  acetaminophen   Tablet .. 650 milliGRAM(s) Oral every 6 hours PRN Temp greater or equal to 38C (100.4F)  dextrose 40% Gel 15 Gram(s) Oral once PRN Blood Glucose LESS THAN 70 milliGRAM(s)/deciliter  glucagon  Injectable 1 milliGRAM(s) IntraMuscular once PRN Glucose LESS THAN 70 milligrams/deciliter Andrew Powers MD REYMUNDO  Internal Medicine PGY-1  Pager [facility]: [phone]; [facility] [id number]    Patient is a 73y old  Male who presents with a chief complaint of Chills (16 May 2019 07:51)    SUBJECTIVE/OVERNIGHT EVENTS   Febrile overnight. Patient tolerating meals, without n/v. Reports having daily BM. Denies fevers, chills, SOB. ROS otherwise negative.     OBJECTIVE  Vital Signs Last 24 Hrs  T(C): 36.8 (17 May 2019 06:36), Max: 38.8 (16 May 2019 18:04)  T(F): 98.3 (17 May 2019 06:36), Max: 101.9 (16 May 2019 18:04)  HR: 85 (17 May 2019 06:36) (81 - 94)  BP: 127/62 (17 May 2019 06:36) (115/68 - 138/82)  BP(mean): --  RR: 18 (17 May 2019 06:36) (18 - 22)  SpO2: 98% (17 May 2019 06:36) (98% - 100%)    PHYSICAL EXAM:  GENERAL: NAD, well-developed  HEAD:  Atraumatic, Normocephalic  EYES: EOMI, conjunctiva and sclera clear  NECK: Supple, No JVD  CHEST/LUNG: Clear to auscultation bilaterally; No wheeze  HEART: Regular rate and rhythm; No murmurs, rubs, or gallops  ABDOMEN: Soft, Nontender, Nondistended; Bowel sounds present  EXTREMITIES:  2+ Peripheral Pulses, No clubbing, cyanosis, or edema  PSYCH: AAOx3  NEUROLOGY: non-focal  SKIN: No rashes or lesions    LABS                        13.8   12.11 )-----------( 160      ( 17 May 2019 05:41 )             42.8                         14.8   14.15 )-----------( 162      ( 16 May 2019 05:45 )             45.2     -    137  |  106  |  19  ----------------------------<  134<H>  3.9   |  20<L>  |  1.06  -16    136  |  105  |  20  ----------------------------<  149<H>  4.1   |  22  |  1.12    Ca    7.9<L>      17 May 2019 05:41  Ca    8.0<L>      16 May 2019 05:45  Phos  2.3       Mg     1.9         TPro  7.4  /  Alb  4.4  /  TBili  0.9  /  DBili  x   /  AST  24  /  ALT  23  /  AlkPhos  56  05-15    CAPILLARY BLOOD GLUCOSE  POCT Blood Glucose.: 158 mg/dL (16 May 2019 22:33)  POCT Blood Glucose.: 103 mg/dL (16 May 2019 18:07)  POCT Blood Glucose.: 196 mg/dL (16 May 2019 12:40)  POCT Blood Glucose.: 134 mg/dL (16 May 2019 08:53)    PT/INR - ( 15 May 2019 20:41 )   PT: 11.9 SEC;   INR: 1.04     PTT - ( 15 May 2019 20:41 )  PTT:35.2 SEC    Culture - Blood (05.15.19 @ 21:07)    Culture - Blood:   EC^Escherichia coli    Specimen Source: BLOOD PERIPHERAL    Gram Stain Blood:   ***** CRITICAL RESULT *****  PERSON CALLED / READ-BACK: SATNAM MCALLISTER RN./Y  DATE / TIME CALLED: [id number]  CALLED BY: IZABEL HAHN^Gram Neg Rods  AFTER: 11 HOURS INCUBATION  BOTTLE: ANAEROBIC BOTTLE    Urinalysis Basic - ( 15 May 2019 20:30 )    Color: YELLOW / Appearance: CLEAR / S.034 / pH: 5.5  Gluc: >1000 / Ketone: NEGATIVE  / Bili: NEGATIVE / Urobili: NORMAL   Blood: SMALL / Protein: 10 / Nitrite: NEGATIVE   Leuk Esterase: TRACE / RBC: 3-5 / WBC 11-25   Sq Epi: OCC / Non Sq Epi: x / Bacteria: NEGATIVE    MEDICATIONS  (STANDING):  atorvastatin 40 milliGRAM(s) Oral at bedtime  dextrose 5%. 1000 milliLiter(s) (50 mL/Hr) IV Continuous <Continuous>  dextrose 50% Injectable 12.5 Gram(s) IV Push once  dextrose 50% Injectable 25 Gram(s) IV Push once  dextrose 50% Injectable 25 Gram(s) IV Push once  dipyridamole 200 mG/aspirin 25 mG 1 Capsule(s) Oral two times a day  enoxaparin Injectable 40 milliGRAM(s) SubCutaneous every 24 hours  insulin glargine Injectable (LANTUS) 20 Unit(s) SubCutaneous at bedtime  insulin lispro (HumaLOG) corrective regimen sliding scale   SubCutaneous three times a day before meals  insulin lispro (HumaLOG) corrective regimen sliding scale   SubCutaneous at bedtime  insulin lispro Injectable (HumaLOG) 10 Unit(s) SubCutaneous three times a day before meals  piperacillin/tazobactam IVPB. 3.375 Gram(s) IV Intermittent every 8 hours  potassium acid phosphate/sodium acid phosphate tablet (K-PHOS No. 2) 1 Tablet(s) Oral four times a day with meals  sodium chloride 0.9%. 1000 milliLiter(s) (75 mL/Hr) IV Continuous <Continuous>    MEDICATIONS  (PRN):  acetaminophen   Tablet .. 650 milliGRAM(s) Oral every 6 hours PRN Temp greater or equal to 38C (100.4F)  dextrose 40% Gel 15 Gram(s) Oral once PRN Blood Glucose LESS THAN 70 milliGRAM(s)/deciliter  glucagon  Injectable 1 milliGRAM(s) IntraMuscular once PRN Glucose LESS THAN 70 milligrams/deciliter

## 2019-05-17 NOTE — DISCHARGE NOTE PROVIDER - NSDCCPCAREPLAN_GEN_ALL_CORE_FT
PRINCIPAL DISCHARGE DIAGNOSIS  Diagnosis: Escherichia coli sepsis  Assessment and Plan of Treatment: You presented with fevers and chills due to an infection of your blood. This is a complicated related to your prostate biopsy. We treated you with antibiotics. Please continue to take your antibiotics as prescribed. and follow up with your doctor.

## 2019-05-17 NOTE — DISCHARGE NOTE PROVIDER - CARE PROVIDER_API CALL
Jamshid Rosales)  Internal Medicine  [id number] [place] [address]  Phone: [phone]  Fax: [phone]  Follow Up Time: 1 week

## 2019-05-17 NOTE — PROGRESS NOTE ADULT - PROBLEM SELECTOR PLAN 1
Leukocytosis 12.9K, Tmax 103.2; Tachycardia 120s; Lactate 2.2-->1.3  Found to have E.coli growing in BCx, continues to have fevers  suspicion of acute prostatitis post-procedural   monitor vs q4h  - blood cultures growing Gram negative rods   - f/u BCx, UCx for sensitivites  - if continues to spike fevers will broaden with meropenem

## 2019-05-17 NOTE — DISCHARGE NOTE PROVIDER - HOSPITAL COURSE
74yo Male PMH of HTN, DM Type 2, TIA p/w rigors and subjective fevers that began 2 hours prior to arrival. Underwent prostate biopsy 2 days prior to presentation for a nodule confirmed by MRI. The pt completed 6 doses of 500mg PO Cipro (3 days) for prophylactic measures. Reported no increased urinary frequency, hematuria or burning on urination and no straining to urinate.          On presentation:    Leukocytosis 12.9K, Tmax 103.2; Tachycardia 120s received 2L NR, with improvement in HR. Also received one dose of Vancomycin and Zosyn in the ED. The patient was admitted for workup of sepsis. Rectal exam was benign no prostatic tenderness. BCx then became positive for Gram neg rods, placed on Zosyn. The patient's course was complicated by recurrent fevers, Urine culutre and blood cultures both grew Ecoli sensitive to__. 72yo Male PMH of HTN, DM Type 2, TIA p/w rigors and subjective fevers that began 2 hours prior to arrival. Underwent prostate biopsy 2 days prior to presentation for a nodule confirmed by MRI. The pt completed 6 doses of 500mg PO Cipro (3 days) for prophylactic measures. Reported no increased urinary frequency, hematuria or burning on urination and no straining to urinate.          On presentation:    Leukocytosis 12.9K, Tmax 103.2; Tachycardia 120s received 2L NR, with improvement in HR. Also received one dose of Vancomycin and Zosyn in the ED. The patient was admitted for workup of sepsis. Rectal exam was benign no prostatic tenderness. BCx then became positive for Gram neg rods, placed on Zosyn. The patient's course was complicated by recurrent fevers, Urine culutre and blood cultures both grew Ecoli sensitive to ceftriaxone. The patient was switched to ceftriaxone for one day then discharged on Ceftin, once blood cultures were neg for 48 hours.

## 2019-05-18 LAB
-  AMIKACIN: SIGNIFICANT CHANGE UP
-  AMIKACIN: SIGNIFICANT CHANGE UP
-  AMPICILLIN/SULBACTAM: SIGNIFICANT CHANGE UP
-  AMPICILLIN/SULBACTAM: SIGNIFICANT CHANGE UP
-  AMPICILLIN: SIGNIFICANT CHANGE UP
-  AMPICILLIN: SIGNIFICANT CHANGE UP
-  AZTREONAM: SIGNIFICANT CHANGE UP
-  AZTREONAM: SIGNIFICANT CHANGE UP
-  CEFAZOLIN: SIGNIFICANT CHANGE UP
-  CEFAZOLIN: SIGNIFICANT CHANGE UP
-  CEFEPIME: SIGNIFICANT CHANGE UP
-  CEFEPIME: SIGNIFICANT CHANGE UP
-  CEFOXITIN: SIGNIFICANT CHANGE UP
-  CEFOXITIN: SIGNIFICANT CHANGE UP
-  CEFTAZIDIME: SIGNIFICANT CHANGE UP
-  CEFTAZIDIME: SIGNIFICANT CHANGE UP
-  CEFTRIAXONE: SIGNIFICANT CHANGE UP
-  CEFTRIAXONE: SIGNIFICANT CHANGE UP
-  CIPROFLOXACIN: SIGNIFICANT CHANGE UP
-  CIPROFLOXACIN: SIGNIFICANT CHANGE UP
-  ERTAPENEM: SIGNIFICANT CHANGE UP
-  ERTAPENEM: SIGNIFICANT CHANGE UP
-  GENTAMICIN: SIGNIFICANT CHANGE UP
-  GENTAMICIN: SIGNIFICANT CHANGE UP
-  IMIPENEM: SIGNIFICANT CHANGE UP
-  IMIPENEM: SIGNIFICANT CHANGE UP
-  LEVOFLOXACIN: SIGNIFICANT CHANGE UP
-  LEVOFLOXACIN: SIGNIFICANT CHANGE UP
-  MEROPENEM: SIGNIFICANT CHANGE UP
-  MEROPENEM: SIGNIFICANT CHANGE UP
-  NITROFURANTOIN: SIGNIFICANT CHANGE UP
-  PIPERACILLIN/TAZOBACTAM: SIGNIFICANT CHANGE UP
-  PIPERACILLIN/TAZOBACTAM: SIGNIFICANT CHANGE UP
-  TIGECYCLINE: SIGNIFICANT CHANGE UP
-  TIGECYCLINE: SIGNIFICANT CHANGE UP
-  TOBRAMYCIN: SIGNIFICANT CHANGE UP
-  TOBRAMYCIN: SIGNIFICANT CHANGE UP
-  TRIMETHOPRIM/SULFAMETHOXAZOLE: SIGNIFICANT CHANGE UP
-  TRIMETHOPRIM/SULFAMETHOXAZOLE: SIGNIFICANT CHANGE UP
ANION GAP SERPL CALC-SCNC: 11 MMO/L — SIGNIFICANT CHANGE UP (ref 7–14)
BACTERIA BLD CULT: SIGNIFICANT CHANGE UP
BACTERIA BLD CULT: SIGNIFICANT CHANGE UP
BACTERIA UR CULT: SIGNIFICANT CHANGE UP
BASOPHILS # BLD AUTO: 0.04 K/UL — SIGNIFICANT CHANGE UP (ref 0–0.2)
BASOPHILS NFR BLD AUTO: 0.5 % — SIGNIFICANT CHANGE UP (ref 0–2)
BUN SERPL-MCNC: 13 MG/DL — SIGNIFICANT CHANGE UP (ref 7–23)
CALCIUM SERPL-MCNC: 8.3 MG/DL — LOW (ref 8.4–10.5)
CHLORIDE SERPL-SCNC: 107 MMOL/L — SIGNIFICANT CHANGE UP (ref 98–107)
CO2 SERPL-SCNC: 21 MMOL/L — LOW (ref 22–31)
CREAT SERPL-MCNC: 0.94 MG/DL — SIGNIFICANT CHANGE UP (ref 0.5–1.3)
E COLI DNA BLD POS QL NAA+NON-PROBE: SIGNIFICANT CHANGE UP
EOSINOPHIL # BLD AUTO: 0.32 K/UL — SIGNIFICANT CHANGE UP (ref 0–0.5)
EOSINOPHIL NFR BLD AUTO: 4 % — SIGNIFICANT CHANGE UP (ref 0–6)
GLUCOSE SERPL-MCNC: 133 MG/DL — HIGH (ref 70–99)
HCT VFR BLD CALC: 42.8 % — SIGNIFICANT CHANGE UP (ref 39–50)
HGB BLD-MCNC: 14.3 G/DL — SIGNIFICANT CHANGE UP (ref 13–17)
IMM GRANULOCYTES NFR BLD AUTO: 0.2 % — SIGNIFICANT CHANGE UP (ref 0–1.5)
LYMPHOCYTES # BLD AUTO: 0.95 K/UL — LOW (ref 1–3.3)
LYMPHOCYTES # BLD AUTO: 11.7 % — LOW (ref 13–44)
MAGNESIUM SERPL-MCNC: 2.1 MG/DL — SIGNIFICANT CHANGE UP (ref 1.6–2.6)
MCHC RBC-ENTMCNC: 29.2 PG — SIGNIFICANT CHANGE UP (ref 27–34)
MCHC RBC-ENTMCNC: 33.4 % — SIGNIFICANT CHANGE UP (ref 32–36)
MCV RBC AUTO: 87.5 FL — SIGNIFICANT CHANGE UP (ref 80–100)
METHOD TYPE: SIGNIFICANT CHANGE UP
METHOD TYPE: SIGNIFICANT CHANGE UP
MONOCYTES # BLD AUTO: 1.2 K/UL — HIGH (ref 0–0.9)
MONOCYTES NFR BLD AUTO: 14.8 % — HIGH (ref 2–14)
NEUTROPHILS # BLD AUTO: 5.56 K/UL — SIGNIFICANT CHANGE UP (ref 1.8–7.4)
NEUTROPHILS NFR BLD AUTO: 68.8 % — SIGNIFICANT CHANGE UP (ref 43–77)
NRBC # FLD: 0 K/UL — SIGNIFICANT CHANGE UP (ref 0–0)
ORGANISM # SPEC MICROSCOPIC CNT: SIGNIFICANT CHANGE UP
ORGANISM # SPEC MICROSCOPIC CNT: SIGNIFICANT CHANGE UP
PHOSPHATE SERPL-MCNC: 2.9 MG/DL — SIGNIFICANT CHANGE UP (ref 2.5–4.5)
PLATELET # BLD AUTO: 170 K/UL — SIGNIFICANT CHANGE UP (ref 150–400)
PMV BLD: 10.4 FL — SIGNIFICANT CHANGE UP (ref 7–13)
POTASSIUM SERPL-MCNC: 4 MMOL/L — SIGNIFICANT CHANGE UP (ref 3.5–5.3)
POTASSIUM SERPL-SCNC: 4 MMOL/L — SIGNIFICANT CHANGE UP (ref 3.5–5.3)
RBC # BLD: 4.89 M/UL — SIGNIFICANT CHANGE UP (ref 4.2–5.8)
RBC # FLD: 13.2 % — SIGNIFICANT CHANGE UP (ref 10.3–14.5)
SODIUM SERPL-SCNC: 139 MMOL/L — SIGNIFICANT CHANGE UP (ref 135–145)
SPECIMEN SOURCE: SIGNIFICANT CHANGE UP
SPECIMEN SOURCE: SIGNIFICANT CHANGE UP
WBC # BLD: 8.09 K/UL — SIGNIFICANT CHANGE UP (ref 3.8–10.5)
WBC # FLD AUTO: 8.09 K/UL — SIGNIFICANT CHANGE UP (ref 3.8–10.5)

## 2019-05-18 PROCEDURE — 99233 SBSQ HOSP IP/OBS HIGH 50: CPT | Mod: GC

## 2019-05-18 RX ORDER — CEFTRIAXONE 500 MG/1
2 INJECTION, POWDER, FOR SOLUTION INTRAMUSCULAR; INTRAVENOUS EVERY 24 HOURS
Refills: 0 | Status: DISCONTINUED | OUTPATIENT
Start: 2019-05-19 | End: 2019-05-19

## 2019-05-18 RX ORDER — CEFTRIAXONE 500 MG/1
INJECTION, POWDER, FOR SOLUTION INTRAMUSCULAR; INTRAVENOUS
Refills: 0 | Status: DISCONTINUED | OUTPATIENT
Start: 2019-05-18 | End: 2019-05-19

## 2019-05-18 RX ORDER — CEFTRIAXONE 500 MG/1
2 INJECTION, POWDER, FOR SOLUTION INTRAMUSCULAR; INTRAVENOUS ONCE
Refills: 0 | Status: COMPLETED | OUTPATIENT
Start: 2019-05-18 | End: 2019-05-18

## 2019-05-18 RX ADMIN — Medication 10 UNIT(S): at 18:21

## 2019-05-18 RX ADMIN — ASPIRIN AND DIPYRIDAMOLE 1 CAPSULE(S): 25; 200 CAPSULE, EXTENDED RELEASE ORAL at 05:43

## 2019-05-18 RX ADMIN — ATORVASTATIN CALCIUM 40 MILLIGRAM(S): 80 TABLET, FILM COATED ORAL at 22:01

## 2019-05-18 RX ADMIN — PIPERACILLIN AND TAZOBACTAM 25 GRAM(S): 4; .5 INJECTION, POWDER, LYOPHILIZED, FOR SOLUTION INTRAVENOUS at 05:43

## 2019-05-18 RX ADMIN — CEFTRIAXONE 100 GRAM(S): 500 INJECTION, POWDER, FOR SOLUTION INTRAMUSCULAR; INTRAVENOUS at 12:32

## 2019-05-18 RX ADMIN — Medication 10 UNIT(S): at 09:21

## 2019-05-18 RX ADMIN — ENOXAPARIN SODIUM 40 MILLIGRAM(S): 100 INJECTION SUBCUTANEOUS at 06:45

## 2019-05-18 RX ADMIN — Medication 10 UNIT(S): at 13:20

## 2019-05-18 RX ADMIN — INSULIN GLARGINE 20 UNIT(S): 100 INJECTION, SOLUTION SUBCUTANEOUS at 22:01

## 2019-05-18 RX ADMIN — ASPIRIN AND DIPYRIDAMOLE 1 CAPSULE(S): 25; 200 CAPSULE, EXTENDED RELEASE ORAL at 18:21

## 2019-05-18 RX ADMIN — Medication 2: at 13:20

## 2019-05-18 NOTE — PROGRESS NOTE ADULT - PROBLEM SELECTOR PLAN 1
P/w fever, leukocytosis, tachycardia  Found to have E.coli growing in BCx, now afebrile  suspicion of acute prostatitis post-procedural   - blood cultures growing E Coli,  - f/u BCx, UCx for sensitivites, likely will result today  - if continues to spike fevers will broaden with meropenem P/w fever, leukocytosis, tachycardia  Found to have E.coli growing in BCx, now afebrile  suspicion of acute prostatitis post-procedural   - blood cultures growing E Coli,  - BCx, UCx for sensitivites back, will switch CTX 2 g q24 and switch to ceftin 250 mg BID for 7 days after - BCx (5/17)

## 2019-05-18 NOTE — PROGRESS NOTE ADULT - PROBLEM SELECTOR PLAN 4
BUN: Scr ratio >20; Dry MM;  s/p 2L NS bolus in ED  c/w oral hydration Reduced dose of Lantus and Humalog regimen while inpt:  Lantus 20 u   Humalog 10u TID  ISS   A1c 7.2  DM diet

## 2019-05-18 NOTE — PROGRESS NOTE ADULT - PROBLEM SELECTOR PLAN 3
s/p Zosyn IV, and Vancomycin IV in ED  Digital prostate exam: not TTP, firm, not boggy  UA weakly (+) with trace of LE --> No urinary symptoms   Completed 3 day course of Cipro BID ppx post biopsy   Initially low suspicion of acute post procedural prostatitis given the above  - however now with E.Coli in blood   f/u UCx, BCx for sensitivities   Consider  c/s, likely just outpatient f/u

## 2019-05-18 NOTE — PROGRESS NOTE ADULT - PROBLEM SELECTOR PLAN 5
Reduced dose of Lantus and Humalog regimen while inpt:  Lantus 20 u   Humalog 10u TID  ISS   A1c 7.2  DM diet Hold Irbesartan due to soft SBP  will continue to hold pt normotensive

## 2019-05-18 NOTE — PROGRESS NOTE ADULT - SUBJECTIVE AND OBJECTIVE BOX
John Graff MD PGY-2  Contact/Pager - [phone]      Patient is a 73y old  Male who presents with a chief complaint of Chills (17 May 2019 15:52)        SUBJECTIVE / OVERNIGHT EVENTS:      MEDICATIONS  (STANDING):  atorvastatin 40 milliGRAM(s) Oral at bedtime  dextrose 5%. 1000 milliLiter(s) (50 mL/Hr) IV Continuous <Continuous>  dextrose 50% Injectable 12.5 Gram(s) IV Push once  dextrose 50% Injectable 25 Gram(s) IV Push once  dextrose 50% Injectable 25 Gram(s) IV Push once  dipyridamole 200 mG/aspirin 25 mG 1 Capsule(s) Oral two times a day  enoxaparin Injectable 40 milliGRAM(s) SubCutaneous every 24 hours  insulin glargine Injectable (LANTUS) 20 Unit(s) SubCutaneous at bedtime  insulin lispro (HumaLOG) corrective regimen sliding scale   SubCutaneous three times a day before meals  insulin lispro (HumaLOG) corrective regimen sliding scale   SubCutaneous at bedtime  insulin lispro Injectable (HumaLOG) 10 Unit(s) SubCutaneous three times a day before meals  piperacillin/tazobactam IVPB. 3.375 Gram(s) IV Intermittent every 8 hours  sodium chloride 0.9%. 1000 milliLiter(s) (75 mL/Hr) IV Continuous <Continuous>    MEDICATIONS  (PRN):  acetaminophen   Tablet .. 650 milliGRAM(s) Oral every 6 hours PRN Temp greater or equal to 38C (100.4F)  dextrose 40% Gel 15 Gram(s) Oral once PRN Blood Glucose LESS THAN 70 milliGRAM(s)/deciliter  glucagon  Injectable 1 milliGRAM(s) IntraMuscular once PRN Glucose LESS THAN 70 milligrams/deciliter      Allergies    No Known Allergies    Intolerances          Vital Signs Last 24 Hrs  T(C): 36.8 (18 May 2019 05:31), Max: 37.2 (17 May 2019 21:08)  T(F): 98.3 (18 May 2019 05:31), Max: 98.9 (17 May 2019 21:08)  HR: 70 (18 May 2019 05:31) (70 - 85)  BP: 129/67 (18 May 2019 05:31) (123/61 - 154/73)  BP(mean): --  RR: 17 (18 May 2019 05:31) (17 - 18)  SpO2: 99% (18 May 2019 05:31) (99% - 100%)  CAPILLARY BLOOD GLUCOSE      POCT Blood Glucose.: 166 mg/dL (17 May 2019 21:58)  POCT Blood Glucose.: 157 mg/dL (17 May 2019 17:49)  POCT Blood Glucose.: 184 mg/dL (17 May 2019 12:54)  POCT Blood Glucose.: 121 mg/dL (17 May 2019 08:42)    I&O's Summary        PHYSICAL EXAM:  GENERAL: NAD, well-developed  HEAD:  AT, NC  EYES: EOMI, PERRLA, conjunctiva and sclera clear  ENMT: Airway patent. MMM. Good dentition, no lesions.  NECK: Supple, No JVD  CHEST/LUNG: CTABL; No wheezing, rhonci, or rales  HEART: RRR; Normal S1, S2. No murmurs, rubs, or gallops  ABDOMEN: Soft, NT, ND; Bowel sounds present. No organomegaly  EXTREMITIES:  2+ Peripheral Pulses, No clubbing, cyanosis, or edema  PSYCH: AAOx3  NEUROLOGY: non-focal  SKIN: Warm, dry, intact; No rashes or lesions      LABS:                        14.3   8.09  )-----------( 170      ( 18 May 2019 05:35 )             42.8     05-18    139  |  107  |  13  ----------------------------<  133<H>  4.0   |  21<L>  |  0.94    Ca    8.3<L>      18 May 2019 05:35  Phos  2.9     05-18  Mg     2.1     05-18                    Culture - Urine (collected 15 May 2019 21:10)  Source: URINE MIDSTREAM  Preliminary Report (17 May 2019 13:45):    EC^Escherichia coli    COLONY COUNT: 10,000-49,000 CFU/mL    Culture - Blood (collected 15 May 2019 21:07)  Source: BLOOD PERIPHERAL  Preliminary Report (17 May 2019 11:06):    EC^Escherichia coli    Culture - Blood (collected 15 May 2019 21:07)  Source: BLOOD VENOUS  Preliminary Report (16 May 2019 12:15):    ***Blood Panel PCR results on this specimen are available    approximately 3 hours after the Gram stain result***    Gram stain, PCR, and/or culture results may not always    correspond due to difference in methodologies    ------------------------------------------------------------    This PCR assay was performed using [employer].  The    following targets are tested for:  Enterococcus, vancomycin    resistant enterococci, Listeria monocytogenes,  coagulase    negative staphylococci, S. aureus, methicillin resistant S.    aureus, Streptococcus agalactiae (Group B), S. pneumoniae,    S. pyogenes (Group A), Acinetobacter baumannii, Enterobacter    cloacae, E. coli, Klebsiella oxytoca, K. pneumoniae, Proteus    sp., Serratia marcescens, Haemophilus influenzae, Neisseria    meningitidis, Pseudomonas aeruginosa, Candida albicans, C.    glabrata, C. krusei, C. parapsilosis, C. tropicalis and the    KPC resistance gene.    **NOTE: Due to technical problems, Proteus sp. will NOT be    reported as part of the BCID paneluntil further notice.  Organism: BLOOD CULTURE PCR  Escherichia coli (17 May 2019 11:03)  Organism: Escherichia coli (17 May 2019 11:03)  Organism: BLOOD CULTURE PCR (16 May 2019 12:15)          RADIOLOGY & ADDITIONAL TESTS:    Imaging Personally Reviewed:     Consultant(s) Notes Reviewed:     Care Discussed with Consultants/Other Providers: John Graff MD PGY-2  Contact/Pager - [phone]      Patient is a 73y old  Male who presents with a chief complaint of Chills (17 May 2019 15:52)        SUBJECTIVE / OVERNIGHT EVENTS: No acute events ON. Pt feels well, no acute symptoms. Denies abdominal pain, fevers/chills, N/V/dysuria.       MEDICATIONS  (STANDING):  atorvastatin 40 milliGRAM(s) Oral at bedtime  dextrose 5%. 1000 milliLiter(s) (50 mL/Hr) IV Continuous <Continuous>  dextrose 50% Injectable 12.5 Gram(s) IV Push once  dextrose 50% Injectable 25 Gram(s) IV Push once  dextrose 50% Injectable 25 Gram(s) IV Push once  dipyridamole 200 mG/aspirin 25 mG 1 Capsule(s) Oral two times a day  enoxaparin Injectable 40 milliGRAM(s) SubCutaneous every 24 hours  insulin glargine Injectable (LANTUS) 20 Unit(s) SubCutaneous at bedtime  insulin lispro (HumaLOG) corrective regimen sliding scale   SubCutaneous three times a day before meals  insulin lispro (HumaLOG) corrective regimen sliding scale   SubCutaneous at bedtime  insulin lispro Injectable (HumaLOG) 10 Unit(s) SubCutaneous three times a day before meals  piperacillin/tazobactam IVPB. 3.375 Gram(s) IV Intermittent every 8 hours  sodium chloride 0.9%. 1000 milliLiter(s) (75 mL/Hr) IV Continuous <Continuous>    MEDICATIONS  (PRN):  acetaminophen   Tablet .. 650 milliGRAM(s) Oral every 6 hours PRN Temp greater or equal to 38C (100.4F)  dextrose 40% Gel 15 Gram(s) Oral once PRN Blood Glucose LESS THAN 70 milliGRAM(s)/deciliter  glucagon  Injectable 1 milliGRAM(s) IntraMuscular once PRN Glucose LESS THAN 70 milligrams/deciliter      Allergies    No Known Allergies    Intolerances          Vital Signs Last 24 Hrs  T(C): 36.8 (18 May 2019 05:31), Max: 37.2 (17 May 2019 21:08)  T(F): 98.3 (18 May 2019 05:31), Max: 98.9 (17 May 2019 21:08)  HR: 70 (18 May 2019 05:31) (70 - 85)  BP: 129/67 (18 May 2019 05:31) (123/61 - 154/73)  BP(mean): --  RR: 17 (18 May 2019 05:31) (17 - 18)  SpO2: 99% (18 May 2019 05:31) (99% - 100%)  CAPILLARY BLOOD GLUCOSE      POCT Blood Glucose.: 166 mg/dL (17 May 2019 21:58)  POCT Blood Glucose.: 157 mg/dL (17 May 2019 17:49)  POCT Blood Glucose.: 184 mg/dL (17 May 2019 12:54)  POCT Blood Glucose.: 121 mg/dL (17 May 2019 08:42)    I&O's Summary        PHYSICAL EXAM:  GENERAL: NAD, well-developed  HEAD:  AT, NC  EYES: EOMI, PERRLA, conjunctiva and sclera clear  ENMT: Airway patent. MMM. Good dentition, no lesions.  NECK: Supple, No JVD  CHEST/LUNG: CTABL; No wheezing, rhonci, or rales  HEART: RRR; Normal S1, S2. No murmurs, rubs, or gallops  ABDOMEN: Soft, NT, ND; Bowel sounds present. No organomegaly  EXTREMITIES:  2+ Peripheral Pulses, No clubbing, cyanosis, or edema  PSYCH: AAOx3  NEUROLOGY: non-focal  SKIN: Warm, dry, intact; No rashes or lesions      LABS:                        14.3   8.09  )-----------( 170      ( 18 May 2019 05:35 )             42.8     05-18    139  |  107  |  13  ----------------------------<  133<H>  4.0   |  21<L>  |  0.94    Ca    8.3<L>      18 May 2019 05:35  Phos  2.9     05-18  Mg     2.1     05-18                    Culture - Urine (collected 15 May 2019 21:10)  Source: URINE MIDSTREAM  Preliminary Report (17 May 2019 13:45):    EC^Escherichia coli    COLONY COUNT: 10,000-49,000 CFU/mL    Culture - Blood (collected 15 May 2019 21:07)  Source: BLOOD PERIPHERAL  Preliminary Report (17 May 2019 11:06):    EC^Escherichia coli    Culture - Blood (collected 15 May 2019 21:07)  Source: BLOOD VENOUS  Preliminary Report (16 May 2019 12:15):    ***Blood Panel PCR results on this specimen are available    approximately 3 hours after the Gram stain result***    Gram stain, PCR, and/or culture results may not always    correspond due to difference in methodologies    ------------------------------------------------------------    This PCR assay was performed using [employer].  The    following targets are tested for:  Enterococcus, vancomycin    resistant enterococci, Listeria monocytogenes,  coagulase    negative staphylococci, S. aureus, methicillin resistant S.    aureus, Streptococcus agalactiae (Group B), S. pneumoniae,    S. pyogenes (Group A), Acinetobacter baumannii, Enterobacter    cloacae, E. coli, Klebsiella oxytoca, K. pneumoniae, Proteus    sp., Serratia marcescens, Haemophilus influenzae, Neisseria    meningitidis, Pseudomonas aeruginosa, Candida albicans, C.    glabrata, C. krusei, C. parapsilosis, C. tropicalis and the    KPC resistance gene.    **NOTE: Due to technical problems, Proteus sp. will NOT be    reported as part of the BCID paneluntil further notice.  Organism: BLOOD CULTURE PCR  Escherichia coli (17 May 2019 11:03)  Organism: Escherichia coli (17 May 2019 11:03)  Organism: BLOOD CULTURE PCR (16 May 2019 12:15)          RADIOLOGY & ADDITIONAL TESTS:    Imaging Personally Reviewed:     Consultant(s) Notes Reviewed:     Care Discussed with Consultants/Other Providers:

## 2019-05-18 NOTE — PROGRESS NOTE ADULT - PROBLEM SELECTOR PLAN 6
Hold Irbesartan due to soft SBP  will continue to hold pt normotensive Lovenox sc DVT ppx   - Dispo Home

## 2019-05-19 ENCOUNTER — TRANSCRIPTION ENCOUNTER (OUTPATIENT)
Age: 74
End: 2019-05-19

## 2019-05-19 VITALS
DIASTOLIC BLOOD PRESSURE: 70 MMHG | SYSTOLIC BLOOD PRESSURE: 149 MMHG | OXYGEN SATURATION: 100 % | RESPIRATION RATE: 18 BRPM | TEMPERATURE: 98 F | HEART RATE: 82 BPM

## 2019-05-19 LAB
ANION GAP SERPL CALC-SCNC: 10 MMO/L — SIGNIFICANT CHANGE UP (ref 7–14)
BASOPHILS # BLD AUTO: 0.04 K/UL — SIGNIFICANT CHANGE UP (ref 0–0.2)
BASOPHILS NFR BLD AUTO: 0.6 % — SIGNIFICANT CHANGE UP (ref 0–2)
BUN SERPL-MCNC: 15 MG/DL — SIGNIFICANT CHANGE UP (ref 7–23)
CALCIUM SERPL-MCNC: 8.7 MG/DL — SIGNIFICANT CHANGE UP (ref 8.4–10.5)
CHLORIDE SERPL-SCNC: 105 MMOL/L — SIGNIFICANT CHANGE UP (ref 98–107)
CO2 SERPL-SCNC: 24 MMOL/L — SIGNIFICANT CHANGE UP (ref 22–31)
CREAT SERPL-MCNC: 0.86 MG/DL — SIGNIFICANT CHANGE UP (ref 0.5–1.3)
EOSINOPHIL # BLD AUTO: 0.44 K/UL — SIGNIFICANT CHANGE UP (ref 0–0.5)
EOSINOPHIL NFR BLD AUTO: 6.9 % — HIGH (ref 0–6)
GLUCOSE SERPL-MCNC: 138 MG/DL — HIGH (ref 70–99)
HCT VFR BLD CALC: 43.7 % — SIGNIFICANT CHANGE UP (ref 39–50)
HGB BLD-MCNC: 14.8 G/DL — SIGNIFICANT CHANGE UP (ref 13–17)
IMM GRANULOCYTES NFR BLD AUTO: 0.3 % — SIGNIFICANT CHANGE UP (ref 0–1.5)
LYMPHOCYTES # BLD AUTO: 0.97 K/UL — LOW (ref 1–3.3)
LYMPHOCYTES # BLD AUTO: 15.3 % — SIGNIFICANT CHANGE UP (ref 13–44)
MAGNESIUM SERPL-MCNC: 1.9 MG/DL — SIGNIFICANT CHANGE UP (ref 1.6–2.6)
MCHC RBC-ENTMCNC: 29.4 PG — SIGNIFICANT CHANGE UP (ref 27–34)
MCHC RBC-ENTMCNC: 33.9 % — SIGNIFICANT CHANGE UP (ref 32–36)
MCV RBC AUTO: 86.7 FL — SIGNIFICANT CHANGE UP (ref 80–100)
MONOCYTES # BLD AUTO: 0.85 K/UL — SIGNIFICANT CHANGE UP (ref 0–0.9)
MONOCYTES NFR BLD AUTO: 13.4 % — SIGNIFICANT CHANGE UP (ref 2–14)
NEUTROPHILS # BLD AUTO: 4.02 K/UL — SIGNIFICANT CHANGE UP (ref 1.8–7.4)
NEUTROPHILS NFR BLD AUTO: 63.5 % — SIGNIFICANT CHANGE UP (ref 43–77)
NRBC # FLD: 0 K/UL — SIGNIFICANT CHANGE UP (ref 0–0)
PHOSPHATE SERPL-MCNC: 3.1 MG/DL — SIGNIFICANT CHANGE UP (ref 2.5–4.5)
PLATELET # BLD AUTO: 187 K/UL — SIGNIFICANT CHANGE UP (ref 150–400)
PMV BLD: 9.9 FL — SIGNIFICANT CHANGE UP (ref 7–13)
POTASSIUM SERPL-MCNC: 4 MMOL/L — SIGNIFICANT CHANGE UP (ref 3.5–5.3)
POTASSIUM SERPL-SCNC: 4 MMOL/L — SIGNIFICANT CHANGE UP (ref 3.5–5.3)
RBC # BLD: 5.04 M/UL — SIGNIFICANT CHANGE UP (ref 4.2–5.8)
RBC # FLD: 12.9 % — SIGNIFICANT CHANGE UP (ref 10.3–14.5)
SODIUM SERPL-SCNC: 139 MMOL/L — SIGNIFICANT CHANGE UP (ref 135–145)
WBC # BLD: 6.34 K/UL — SIGNIFICANT CHANGE UP (ref 3.8–10.5)
WBC # FLD AUTO: 6.34 K/UL — SIGNIFICANT CHANGE UP (ref 3.8–10.5)

## 2019-05-19 PROCEDURE — 99239 HOSP IP/OBS DSCHRG MGMT >30: CPT

## 2019-05-19 RX ADMIN — CEFTRIAXONE 100 GRAM(S): 500 INJECTION, POWDER, FOR SOLUTION INTRAMUSCULAR; INTRAVENOUS at 11:33

## 2019-05-19 RX ADMIN — Medication 4: at 13:16

## 2019-05-19 RX ADMIN — Medication 10 UNIT(S): at 09:19

## 2019-05-19 RX ADMIN — ENOXAPARIN SODIUM 40 MILLIGRAM(S): 100 INJECTION SUBCUTANEOUS at 06:13

## 2019-05-19 RX ADMIN — Medication 10 UNIT(S): at 13:17

## 2019-05-19 RX ADMIN — ASPIRIN AND DIPYRIDAMOLE 1 CAPSULE(S): 25; 200 CAPSULE, EXTENDED RELEASE ORAL at 06:14

## 2019-05-19 NOTE — PROGRESS NOTE ADULT - SUBJECTIVE AND OBJECTIVE BOX
Andrew Powers MD REYMUNDO  Internal Medicine PGY-1  Pager [facility]: [phone]; [facility] [id number]    Patient is a 73y old  Male who presents with a chief complaint of Chills (18 May 2019 08:07)    SUBJECTIVE/OVERNIGHT EVENTS   No acute events overnight. Patient tolerating meals, without n/v. Reports having daily BM. Denies fevers, chills, SOB. ROS otherwise negative.     OBJECTIVE  Vital Signs Last 24 Hrs  T(C): 36.9 (19 May 2019 06:11), Max: 36.9 (19 May 2019 06:11)  T(F): 98.4 (19 May 2019 06:11), Max: 98.4 (19 May 2019 06:11)  HR: 77 (19 May 2019 06:11) (72 - 82)  BP: 132/77 (19 May 2019 06:11) (132/77 - 149/81)  BP(mean): --  RR: 17 (19 May 2019 06:11) (16 - 18)  SpO2: 100% (19 May 2019 06:11) (100% - 100%)    PHYSICAL EXAM:  GENERAL: NAD, well-developed  HEAD:  Atraumatic, Normocephalic  EYES: EOMI, conjunctiva and sclera clear  NECK: Supple, No JVD  CHEST/LUNG: Clear to auscultation bilaterally; No wheeze  HEART: Regular rate and rhythm; No murmurs, rubs, or gallops  ABDOMEN: Soft, Nontender, Nondistended; Bowel sounds present  EXTREMITIES:  2+ Peripheral Pulses, No clubbing, cyanosis, or edema  PSYCH: AAOx3  NEUROLOGY: non-focal  SKIN: No rashes or lesions    LABS                        14.8   6.34  )-----------( 187      ( 19 May 2019 05:51 )             43.7                         14.3   8.09  )-----------( 170      ( 18 May 2019 05:35 )             42.8     05-19    139  |  105  |  15  ----------------------------<  138<H>  4.0   |  24  |  0.86  05-18    139  |  107  |  13  ----------------------------<  133<H>  4.0   |  21<L>  |  0.94    Ca    8.7      19 May 2019 05:51  Ca    8.3<L>      18 May 2019 05:35  Phos  3.1     05-19  Mg     1.9     05-19    CAPILLARY BLOOD GLUCOSE  POCT Blood Glucose.: 127 mg/dL (19 May 2019 08:41)  POCT Blood Glucose.: 203 mg/dL (18 May 2019 22:00)  POCT Blood Glucose.: 97 mg/dL (18 May 2019 18:02)  POCT Blood Glucose.: 188 mg/dL (18 May 2019 12:41)    MEDICATIONS  (STANDING):  atorvastatin 40 milliGRAM(s) Oral at bedtime  cefTRIAXone   IVPB      cefTRIAXone   IVPB 2 Gram(s) IV Intermittent every 24 hours  dextrose 5%. 1000 milliLiter(s) (50 mL/Hr) IV Continuous <Continuous>  dextrose 50% Injectable 12.5 Gram(s) IV Push once  dextrose 50% Injectable 25 Gram(s) IV Push once  dextrose 50% Injectable 25 Gram(s) IV Push once  dipyridamole 200 mG/aspirin 25 mG 1 Capsule(s) Oral two times a day  enoxaparin Injectable 40 milliGRAM(s) SubCutaneous every 24 hours  insulin glargine Injectable (LANTUS) 20 Unit(s) SubCutaneous at bedtime  insulin lispro (HumaLOG) corrective regimen sliding scale   SubCutaneous three times a day before meals  insulin lispro (HumaLOG) corrective regimen sliding scale   SubCutaneous at bedtime  insulin lispro Injectable (HumaLOG) 10 Unit(s) SubCutaneous three times a day before meals  sodium chloride 0.9%. 1000 milliLiter(s) (75 mL/Hr) IV Continuous <Continuous>    MEDICATIONS  (PRN):  acetaminophen   Tablet .. 650 milliGRAM(s) Oral every 6 hours PRN Temp greater or equal to 38C (100.4F)  dextrose 40% Gel 15 Gram(s) Oral once PRN Blood Glucose LESS THAN 70 milliGRAM(s)/deciliter  glucagon  Injectable 1 milliGRAM(s) IntraMuscular once PRN Glucose LESS THAN 70 milligrams/deciliter

## 2019-05-19 NOTE — PROGRESS NOTE ADULT - ATTENDING COMMENTS
73M h/o HTN, DM2, TIA, recent prostate bx 2 days ago, p/w fever/rigor, sepsis d/t GNR bacteremia likely related to prostate bx, also has URI (rhino/enterovirus on RVP).   Pt feels better today, states he took cipro after the prostate biopsy for few days.  Pt was febrile to 101.9F last night, afebrile this am    Assessment/plan:  # Sepsis likely related to prostate biopsy: s/p fluids, blood cx (5/15) E. coli, f/u sensitivity, started zosyn, repeat blood cx today   outpt f/u private urologist   if continues to spike fever on zosyn, will switch abx to meropenem or ertapenem  # URI: rhino/enterovirus, supportive care  # HTN/DM: c/w home meds  # dispo: 1-2 days pending culture result and sens study
73M h/o HTN, DM2, TIA, recent prostate bx 2 days ago, p/w fever/rigor, sepsis d/t e.coli bacteremia likely related to prostate bx, also has URI (rhino/enterovirus on RVP).   Pt feels well, no fever.    Assessment/plan:  # Sepsis likely related to prostate biopsy: s/p fluids, blood cx (5/15) E. coli, resistant to cipro but sensitive to ceftriaxone,   d/c home on ceftin 250 mg bid x7 more days  repeat blood cx 5/17 neg x48h, outpt f/u private urologist   # URI: rhino/enterovirus, supportive care  # HTN/DM: c/w home meds  # dispo:  d/c home today  Time spent on discharge 35 minutes coordinating discharge plan and discussing with patient and family.
73M h/o HTN, DM2, TIA, recent prostate bx 2 days ago, p/w fever/rigor, sepsis d/t GNR bacteremia likely related to prostate bx, also has URI (rhino/enterovirus on RVP).   Pt feels better today, states he took cipro after the prostate biopsy for few days.    Assessment/plan:  # Sepsis likely related to prostate biopsy: s/p fluids, blood cx (5/15) E. coli, f/u sensitivity, start zosyn, outpt f/u private urologist   # URI: rhino/enterovirus, supportive care  # HTN/DM: c/w home meds  # dispo: 1-2 days pending culture result and sens study
73M h/o HTN, DM2, TIA, recent prostate bx 2 days ago, p/w fever/rigor, sepsis d/t e.coli bacteremia likely related to prostate bx, also has URI (rhino/enterovirus on RVP).   Pt feels well, no fever.    Assessment/plan:  # Sepsis likely related to prostate biopsy: s/p fluids, blood cx (5/15) E. coli, resistant to cipro but sensitive to ceftriaxone,   deescalate abx from zosyn to Ceftriaxone, repeat blood cx 5/17 neg x24h  if remains negative by tomorrow, discharge home on ceftin 250 mg bid x7 more days, outpt f/u private urologist   # URI: rhino/enterovirus, supportive care  # HTN/DM: c/w home meds  # dispo:  d/c home tomorrow if repeat blood cx remains neg

## 2019-05-19 NOTE — PROGRESS NOTE ADULT - PROBLEM SELECTOR PLAN 1
P/w fever, leukocytosis, tachycardia  Found to have E.coli growing in BCx, now afebrile  suspicion of acute prostatitis post-procedural   - blood cultures growing E Coli,  - BCx, UCx for sensitivites back, will switch CTX 2 g q24 and switch to ceftin 250 mg BID for 7 days after - BCx (5/17) P/w fever, leukocytosis, tachycardia  Found to have E.coli growing in BCx, now afebrile  suspicion of acute prostatitis post-procedural   - blood cultures growing E Coli,  - BCx, UCx for sensitivites back, switch CTX 2 g q24 and switch to ceftin 250 mg BID for 7 days after - BCx (5/17)

## 2019-05-19 NOTE — DISCHARGE NOTE NURSING/CASE MANAGEMENT/SOCIAL WORK - NSDCDPATPORTLINK_GEN_ALL_CORE
You can access the [facility] Portal, offered by [facility], by registering with the following website: [email or web address]

## 2019-05-19 NOTE — PROGRESS NOTE ADULT - PROBLEM SELECTOR PLAN 2
Supportive care  Tylenol PRN

## 2019-05-20 RX ORDER — CEFUROXIME AXETIL 250 MG
1 TABLET ORAL
Qty: 14 | Refills: 0
Start: 2019-05-20 | End: 2019-05-26

## 2019-05-22 LAB
BACTERIA BLD CULT: SIGNIFICANT CHANGE UP
BACTERIA BLD CULT: SIGNIFICANT CHANGE UP

## 2019-06-23 ENCOUNTER — EMERGENCY (EMERGENCY)
Facility: HOSPITAL | Age: 74
LOS: 1 days | Discharge: HOME | End: 2019-06-23
Attending: EMERGENCY MEDICINE | Admitting: EMERGENCY MEDICINE
Payer: MEDICARE

## 2019-06-23 VITALS
SYSTOLIC BLOOD PRESSURE: 152 MMHG | HEART RATE: 86 BPM | RESPIRATION RATE: 17 BRPM | TEMPERATURE: 98 F | DIASTOLIC BLOOD PRESSURE: 70 MMHG | OXYGEN SATURATION: 100 %

## 2019-06-23 PROCEDURE — 99283 EMERGENCY DEPT VISIT LOW MDM: CPT | Mod: GC

## 2019-06-23 RX ORDER — CEPHALEXIN 500 MG
500 CAPSULE ORAL ONCE
Refills: 0 | Status: COMPLETED | OUTPATIENT
Start: 2019-06-23 | End: 2019-06-23

## 2019-06-23 RX ORDER — CEPHALEXIN 500 MG
1 CAPSULE ORAL
Qty: 21 | Refills: 0
Start: 2019-06-23 | End: 2019-06-29

## 2019-06-23 RX ORDER — TETANUS TOXOID, REDUCED DIPHTHERIA TOXOID AND ACELLULAR PERTUSSIS VACCINE, ADSORBED 5; 2.5; 8; 8; 2.5 [IU]/.5ML; [IU]/.5ML; UG/.5ML; UG/.5ML; UG/.5ML
0.5 SUSPENSION INTRAMUSCULAR ONCE
Refills: 0 | Status: DISCONTINUED | OUTPATIENT
Start: 2019-06-23 | End: 2019-06-27

## 2019-06-23 RX ADMIN — Medication 500 MILLIGRAM(S): at 16:08

## 2019-06-23 NOTE — ED ADULT TRIAGE NOTE - CHIEF COMPLAINT QUOTE
Pt was cutting meat yesterday and took off a part of his skin to 3rd digit to Lt hand, denies pain, no active bleeding, no ac use.

## 2019-06-23 NOTE — ED PROVIDER NOTE - ATTENDING CONTRIBUTION TO CARE
Dr. Jean: I have personally performed a face to face bedside history and physical examination of this patient. I have discussed the history, examination, review of systems, assessment and plan of management with the resident. I have reviewed the electronic medical record and amended it to reflect my history, review of systems, physical exam, assessment and plan.     Attending Exam - Dr. Jean: GEN: well appearing, NAD  HEENT: +PERRL, EOMI  RESP: CTAB, no signs of respiratory distress CV: s1s2 RRR ABD: soft/non tender/non distended  MSK: no deformities / swelling, normal range of motion, spine grossly normal NEURO: alert, non focal exam SKIN: normal color / temperature / condition,  L 3rd digit with 1cm oval avulsion to dorsal aspect of distal finger. Skin previously reattached and appears to be anchored. Good cap refill. No erythema/edema.

## 2019-06-23 NOTE — ED PROVIDER NOTE - NSFOLLOWUPINSTRUCTIONS_ED_ALL_ED_FT
- Follow up with your doctor within 2-3 days.   - Follow up with Dr Mcdonald within next 3 days.   - Take keflex 500mg three times a day for 7 days.   - Return to the ED for any new or worsening symptoms.     Laceration    A laceration is a cut that goes through all of the layers of the skin and into the tissue that is right under the skin. Some lacerations heal on their own. Others need to be closed with skin adhesive strips, skin glue, stitches (sutures), or staples. Proper laceration care minimizes the risk of infection and helps the laceration to heal better.  If non-absorbable stitches or staples have been placed, they must be taken out within the time frame instructed by your healthcare provider.    SEEK IMMEDIATE MEDICAL CARE IF YOU HAVE ANY OF THE FOLLOWING SYMPTOMS: swelling around the wound, worsening pain, drainage from the wound, red streaking going away from your wound, inability to move finger or toe near the laceration, or discoloration of skin near the laceration.

## 2019-06-23 NOTE — ED PROVIDER NOTE - PHYSICAL EXAMINATION
Gen: Well appearing in NAD  Head: NC/AT  Neck: trachea midline  Resp:  No distress  Ext: no deformities  Neuro:  A&O appears non focal  Skin:  L 3rd digit with 1cm oval laceration to dorsal aspect of distal finger. Skin reattached. Good cap refill. No erythema/edema.   Psych:  Normal affect and mood

## 2019-06-23 NOTE — ED PROVIDER NOTE - OBJECTIVE STATEMENT
73M h/o HTN, DM, TIA p/w laceration to L middle finger. Was cutting raw chicken last night and sliced the skin off his finger. Pt placed the skin back and put a bandaid on, skin has reattached. Denies fever, drainage. Pt came bec he was recently septic and was concerned.

## 2019-06-23 NOTE — ED PROVIDER NOTE - CARE PROVIDER_API CALL
Jose Mcdonald)  Orthopaedic Surgery; Surgery of the Hand  [address], [address]  [place] [address]  Phone: [phone]  Fax: [phone]  Follow Up Time: 1-3 Days

## 2020-01-21 ENCOUNTER — APPOINTMENT (OUTPATIENT)
Dept: UROLOGY | Facility: CLINIC | Age: 75
End: 2020-01-21

## 2020-06-22 ENCOUNTER — EMERGENCY (EMERGENCY)
Facility: HOSPITAL | Age: 75
LOS: 1 days | Discharge: HOME | End: 2020-06-22
Admitting: EMERGENCY MEDICINE
Payer: MEDICARE

## 2020-06-22 VITALS
SYSTOLIC BLOOD PRESSURE: 154 MMHG | OXYGEN SATURATION: 100 % | TEMPERATURE: 99 F | RESPIRATION RATE: 18 BRPM | DIASTOLIC BLOOD PRESSURE: 83 MMHG | HEART RATE: 80 BPM

## 2020-06-22 PROCEDURE — 71046 X-RAY EXAM CHEST 2 VIEWS: CPT | Mod: 26

## 2020-06-22 PROCEDURE — 99283 EMERGENCY DEPT VISIT LOW MDM: CPT

## 2020-06-22 NOTE — ED PROVIDER NOTE - SKIN NEGATIVE STATEMENT, MLM
Anesthesia Orthopedics Orthopedics Orthopedics Orthopedics no abrasions, no jaundice, no lesions, no pruritis, and no rashes.

## 2020-06-22 NOTE — ED PROVIDER NOTE - CLINICAL SUMMARY MEDICAL DECISION MAKING FREE TEXT BOX
Patient presenting with episode of blood tinged mucous after coughing violently in attempt to expel foreign body in throat. patient will appearing, talking in full sentences, no signs of respiratory distress, vital stable. symptoms likely do to rupture of small capillaries for violent coughing. plan for reassurance and cxr to r/o other pathology

## 2020-06-22 NOTE — ED ADULT NURSE NOTE - CHIEF COMPLAINT QUOTE
pt c/o " feeling something in my throat while I was sleeping. i tried to pulled it out with my finger but nothing was there. I started coughing and only blood came up. I don't recall putting anything in my mouth". respirations equal and unlabored. pt able to complete full sentences without difficulty. no bleeding noted. PMH DM,HTN

## 2020-06-22 NOTE — ED PROVIDER NOTE - OBJECTIVE STATEMENT
Patient is a 73 y/o M with hx of HTN, DM presenting with c/o foreign body sensation and episode of coughing blood. He reports waking from his sleep feeling as if something was stuck is his throat, he placed to fingers in his mouth in attempt to remove object, but was unsuccessful because the object was slippery and he swallowed it. He then attempted to cough violently to expel object when he saw a spec of blood in his saliva. He denies pain with swallowing, sore throat, cough, nasal congestion, chest pain, shortness of breath, abdominal pain, n/v, URI like symptoms

## 2020-06-22 NOTE — ED ADULT TRIAGE NOTE - CHIEF COMPLAINT QUOTE
pt c/o " feeling something in my throat while I was sleeping. i tried to pulled it out but nothing was there. I started coughing and only blood came up. I don't recall putting anything in my mouth". respirations equal and unlabored. pt able to complete full sentences without difficulty. no bleeding noted. PMH DM,HTN pt c/o " feeling something in my throat while I was sleeping. i tried to pulled it out with my finger but nothing was there. I started coughing and only blood came up. I don't recall putting anything in my mouth". respirations equal and unlabored. pt able to complete full sentences without difficulty. no bleeding noted. PMH DM,HTN

## 2020-06-22 NOTE — ED PROVIDER NOTE - ENMT, MLM
Airway patent, Nasal mucosa clear. Mouth with normal mucosa. Throat has no vesicles, no oropharyngeal exudates and uvula is midline. no foreign body in thought

## 2020-06-22 NOTE — ED PROVIDER NOTE - NSFOLLOWUPINSTRUCTIONS_ED_ALL_ED_FT
You were evaluated for coughing up blood. Your chest xray is negative for any abnormal findings and your symptoms were likely due to from violent coughing. You are clinically stable for discharge. Return to the ED if your symptoms worsen or do not improve. Please follow up with your PCP within 1 week.

## 2020-06-22 NOTE — ED ADULT NURSE NOTE - OBJECTIVE STATEMENT
Received patient to intake room 7 c/o foreign body in throat. pt a&ox4, ambulatory with limp noted at baseline, stating he was sleeping and thought something got stuck in his throat but coughed up blood after sticking finger down throat to see if anything was stuck. Patient speaking in full sentences, does not appears in any respiratory distress at this time. PA Smartt at bedside for evaluation, patient voice not hoarse at this time. Breathing equal and unlabored, pt walked to x-ray. awaiting disposition, no IV access at this time.

## 2020-06-22 NOTE — ED PROVIDER NOTE - MDM ORDERS SUBMITTED SELECTION
Patient presented with Tmax: 103 HR: 119 RR: 20, WBC: 15.2 with cellulitis/osteomyelitis of 2nd right digit. Lactate normal. Foot xray shows **. Unclear if purulent cellulitis vs osteomyelitis.  S/p Vanc/Zosyn and Clindamycin x1 in ED.  s/p 3.5 L IV NS (30 CC/KG)  -F/u Podiatry recs  -F/u Blood culture, swab culture, abscess culture.  -F/u ESR, CRP  -Trend WBC  -Monitor hemodynamics Imaging Studies Likely due to infection of toe. Patient presented with Tmax: 103 HR: 119 RR: 20, WBC: 15.2 with cellulitis/osteomyelitis of 2nd right digit. Lactate normal. 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 Third digit abnormality (possible fracture?). Hyperlucency of 2nd middle phalange. Must consider purulent cellulitis vs osteomyelitis along with nec fasc. S/p Vanc/Zosyn and Clindamycin x1 in ED.  -Continue Vanc 1750mg q12h/Zosyn 4.5 q6h/Clindamycin 600mg q8h. Please get vancomcyin trough before the 4th dose. s/p 3.5 L IV NS (30 CC/KG).   -F/u Podiatry recs. Possible OR tonight or tomorrow, they will make the decision based on clinical status.  -F/u Blood culture, swab culture, abscess culture.  -F/u CRP. ESR: 86  -Trend WBC  -Monitor hemodynamics holding home lisinopril given sepsis. Restart as tolerated.

## 2020-06-22 NOTE — ED PROVIDER NOTE - PATIENT PORTAL LINK FT
You can access the FollowMyHealth Patient Portal offered by [facility] by registering at the following website: [email or web address]. By joining [employer]’s FollowMyHealth portal, you will also be able to view your health information using other applications (apps) compatible with our system.

## 2021-09-03 ENCOUNTER — APPOINTMENT (OUTPATIENT)
Dept: ULTRASOUND IMAGING | Facility: CLINIC | Age: 76
End: 2021-09-03
Payer: MEDICARE

## 2021-09-03 ENCOUNTER — OUTPATIENT (OUTPATIENT)
Dept: OUTPATIENT SERVICES | Facility: HOSPITAL | Age: 76
LOS: 1 days | End: 2021-09-03
Payer: MEDICARE

## 2021-09-03 DIAGNOSIS — K30 FUNCTIONAL DYSPEPSIA: ICD-10-CM

## 2021-09-03 PROCEDURE — 76700 US EXAM ABDOM COMPLETE: CPT | Mod: 26

## 2021-09-03 PROCEDURE — 76857 US EXAM PELVIC LIMITED: CPT | Mod: 26

## 2021-09-03 PROCEDURE — 76700 US EXAM ABDOM COMPLETE: CPT

## 2021-09-03 PROCEDURE — 76857 US EXAM PELVIC LIMITED: CPT

## 2021-09-06 ENCOUNTER — TRANSCRIPTION ENCOUNTER (OUTPATIENT)
Age: 76
End: 2021-09-06

## 2022-05-17 NOTE — PROGRESS NOTE ADULT - PROBLEM SELECTOR PLAN 7
Social Determinants of Health     Financial Resource Strain:     Difficulty of Paying Living Expenses: Not on file   Food Insecurity:     Worried About Running Out of Food in the Last Year: Not on file    Sindi of Food in the Last Year: Not on file   Transportation Needs:     Lack of Transportation (Medical): Not on file    Lack of Transportation (Non-Medical):  Not on file   Physical Activity:     Days of Exercise per Week: Not on file    Minutes of Exercise per Session: Not on file   Stress:     Feeling of Stress : Not on file   Social Connections:     Frequency of Communication with Friends and Family: Not on file    Frequency of Social Gatherings with Friends and Family: Not on file    Attends Jain Services: Not on file    Active Member of [address] [place] [address] [employer] or Organizations: Not on file    Attends Club or Organization Meetings: Not on file    Marital Status: Not on file   Intimate Partner Violence:     Fear of Current or Ex-Partner: Not on file    Emotionally Abused: Not on file    Physically Abused: Not on file    Sexually Abused: Not on file   Housing Stability:     Unable to Pay for Housing in the Last Year: Not on file    Number of [place] in the Last Year: Not on file    Unstable Housing in the Last Year: Not on file     Family History:       Problem Relation Age of Onset    High Blood Pressure Mother     Miscarriages / Stillbirths Mother     Anemia Sister     High Blood Pressure Maternal Uncle     Arthritis Maternal Grandmother     High Cholesterol Maternal Grandfather     Diabetes Paternal Grandfather      Medications Prior to Admission:  Medications Prior to Admission: Prenatal MV-Min-Fe Fum-FA-DHA (PRENATAL 1 PO), Take 1 each by mouth daily  aspirin 81 MG EC tablet, Take 81 mg by mouth daily    REVIEW OF SYSTEMS:    CONSTITUTIONAL:  negative  RESPIRATORY:  negative  CARDIOVASCULAR:  negative  GASTROINTESTINAL:  negative  ALLERGIC/IMMUNOLOGIC:  negative  NEUROLOGICAL: negative  BEHAVIOR/PSYCH:  negative    PHYSICAL EXAM:  Blood pressure 139/73, pulse 106, temperature 98.3 °F (36.8 °C), temperature source Oral, resp. rate 16, height 5' 4\" (1.626 m), weight 243 lb (110.2 kg), SpO2 98 %. General appearance:  awake, alert, cooperative, no apparent distress, and appears stated age  Neurologic:  Awake, alert, oriented to name, place and time. Lungs:  No increased work of breathing, good air exchange  Abdomen:  Soft, non tender, gravid, consistent with her gestational age,   Fetal heart rate:    Baseline Heart Rate:  140        Accelerations:  present       Long Term Variability:  moderate       Decelerations:  absent       Pelvis:  Adequate pelvis  Cervix: not checked    Contraction frequency:  Not beth    Membranes:  Intact    Lab Results   Component Value Date    WBC 6.6 2022    HGB 11.3 (L) 2022    HCT 34.4 (L) 2022    MCV 90.1 2022     2022     Lab Results   Component Value Date     (L) 2022    K 3.8 2022     2022    CO2 19 (L) 2022    BUN 5 (L) 2022    CREATININE 0.4 (L) 2022    GLUCOSE 107 (H) 2022    CALCIUM 8.7 2022    PROT 5.6 (L) 2022    LABALBU 3.4 2022    BILITOT 0.9 2022    ALKPHOS 121 2022    AST 19 2022    ALT 10 2022    LABGLOM >60 2022    GFRAA >60 2022       Lab Results   Component Value Date    URICACID 3.9 2022     Protein / creatinine ratio, urine [[id number]] (Abnormal)    Collected: 22 1119    Updated: 22 1226    Specimen Source: Urine, clean catch     Urine Total Protein 9.0 MG/DL    Creatinine, Ur 35.1 MG/DL    Prot/Creat Ratio, Ur 0.3 High     Comment: NORMAL:    <0.2   NEPHROTIC: >3.5             ASSESSMENT AND PLAN:    22 yo  @ 36w5d  1. Preeclampsia: one severe range BP in office, however repeats are mostly normal with one mild range so far.  Diagnosis is based on at least 2 mild range BPs 5 days apart in the office and a UPCR of 0.3. I am uncertain if the severe range BP in the office was true or not. Will admit for steroids, serial BPs, 24 hour urine. If patient has any severe range BPs will deliver. If she remains in the normal to mild range with BPs will plan on delivery @ 37 wks.    2. Cat 1 [place] MD Lovenox sc DVT ppx  - Dispo Home Lovenox sc DVT ppx   - Dispo Home

## 2022-08-04 ENCOUNTER — NON-APPOINTMENT (OUTPATIENT)
Age: 77
End: 2022-08-04

## 2022-08-04 ENCOUNTER — APPOINTMENT (OUTPATIENT)
Dept: OPHTHALMOLOGY | Facility: CLINIC | Age: 77
End: 2022-08-04

## 2022-08-04 ENCOUNTER — EMERGENCY (EMERGENCY)
Facility: HOSPITAL | Age: 77
LOS: 1 days | Discharge: HOME | End: 2022-08-04
Attending: EMERGENCY MEDICINE | Admitting: EMERGENCY MEDICINE

## 2022-08-04 VITALS
OXYGEN SATURATION: 100 % | RESPIRATION RATE: 18 BRPM | HEART RATE: 98 BPM | SYSTOLIC BLOOD PRESSURE: 132 MMHG | HEIGHT: 65 IN | DIASTOLIC BLOOD PRESSURE: 77 MMHG | TEMPERATURE: 98 F

## 2022-08-04 LAB
ALBUMIN SERPL ELPH-MCNC: 4 G/DL — SIGNIFICANT CHANGE UP (ref 3.3–5)
ALP SERPL-CCNC: 70 U/L — SIGNIFICANT CHANGE UP (ref 40–120)
ALT FLD-CCNC: 28 U/L — SIGNIFICANT CHANGE UP (ref 4–41)
ANION GAP SERPL CALC-SCNC: 12 MMOL/L — SIGNIFICANT CHANGE UP (ref 7–14)
AST SERPL-CCNC: 25 U/L — SIGNIFICANT CHANGE UP (ref 4–40)
BILIRUB SERPL-MCNC: 0.4 MG/DL — SIGNIFICANT CHANGE UP (ref 0.2–1.2)
BUN SERPL-MCNC: 21 MG/DL — SIGNIFICANT CHANGE UP (ref 7–23)
CALCIUM SERPL-MCNC: 9.6 MG/DL — SIGNIFICANT CHANGE UP (ref 8.4–10.5)
CHLORIDE SERPL-SCNC: 103 MMOL/L — SIGNIFICANT CHANGE UP (ref 98–107)
CO2 SERPL-SCNC: 27 MMOL/L — SIGNIFICANT CHANGE UP (ref 22–31)
CREAT SERPL-MCNC: 1.07 MG/DL — SIGNIFICANT CHANGE UP (ref 0.5–1.3)
CRP SERPL-MCNC: 4 MG/L — SIGNIFICANT CHANGE UP
EGFR: 72 ML/MIN/1.73M2 — SIGNIFICANT CHANGE UP
ERYTHROCYTE [SEDIMENTATION RATE] IN BLOOD: 20 MM/HR — HIGH (ref 1–15)
GLUCOSE SERPL-MCNC: 227 MG/DL — HIGH (ref 70–99)
POTASSIUM SERPL-MCNC: 4.5 MMOL/L — SIGNIFICANT CHANGE UP (ref 3.5–5.3)
POTASSIUM SERPL-SCNC: 4.5 MMOL/L — SIGNIFICANT CHANGE UP (ref 3.5–5.3)
PROT SERPL-MCNC: 7.7 G/DL — SIGNIFICANT CHANGE UP (ref 6–8.3)
SODIUM SERPL-SCNC: 142 MMOL/L — SIGNIFICANT CHANGE UP (ref 135–145)

## 2022-08-04 PROCEDURE — 92250 FUNDUS PHOTOGRAPHY W/I&R: CPT | Mod: PD

## 2022-08-04 PROCEDURE — 92004 COMPRE OPH EXAM NEW PT 1/>: CPT | Mod: PD

## 2022-08-04 PROCEDURE — 92286 ANT SGM IMG I&R SPECLR MIC: CPT

## 2022-08-04 PROCEDURE — 99220: CPT | Mod: FS

## 2022-08-04 NOTE — ED CDU PROVIDER INITIAL DAY NOTE - OBJECTIVE STATEMENT
76-year-old male history of hypertension hyperlipidemia and diabetes presents with acute on chronicRight-sided headache as well as eye pain.  Patient was evaluated Dr. Bryson and sent to the emergency department for concern of giant cell arteritis.  Patient denies any visual changes states he has a history of a pinched nerve is treated with some cream has been improving his symptoms.As per patient's ophthalmologistSent in for empiric treatment MRI/MRA imaging.  Patient otherwise well-appearing in no acute changes no double vision no diplopia no no eye pain no worsening pain in dark room.  Patient otherwise hemodynamically stable.    CDU NANETTE Huff Note----  ED Provider HPI as above reviewed.  Pt is a 75 yo male, PMH DM, TIA, HTN, hyperlipidemia, presenting to the ED at direction of outpatient ophthalmologist Dr. Bryson for further evaluation of right sided headaches / eye pain x 1 month.  In the ED, VSS, pt afebrile.  ESR 20, CRP 4.0; WBC 10.17; CMP mildly hemolyzed but grossly unremarkable; serum glucose 227.  COVID: NotDetected.  Ophthalmology was consulted; advised MRI head/orbits; pt was dispo'd to CDU accordingly.

## 2022-08-04 NOTE — ED CDU PROVIDER INITIAL DAY NOTE - NSICDXPASTMEDICALHX_GEN_ALL_CORE_FT
PAST MEDICAL HISTORY:  Diabetes     HTN (hypertension)     Hyperlipidemia     TIA (transient ischemic attack)

## 2022-08-04 NOTE — ED PROVIDER NOTE - CLINICAL SUMMARY MEDICAL DECISION MAKING FREE TEXT BOX
Patient presents with concern for giant cell arteritis as per patient's ophthalmologist.  Patient valid by ophthalmology here and do not agree with assessment and discussed care with Dr. Bryson.  Recommend MRI/MRA and observation unit.  Patient as per ophthalmology team no recommendation for steroids at this time.  Patient to be evaluated by ophthalmology in the morning after imaging.

## 2022-08-04 NOTE — ED ADULT NURSE NOTE - NS_NURSE_RECEIVING_PHYS_ED_ALL_ED_FT
Patient Name: Deja Benton  Specialist or PCP: Ronen Choe  Symptoms: Severe ankle swelling and skin peeling due to possible allergic reaction to medication  Best Availability: Transfer to RN  Callback Number: [phone]    Thank you,  Eryn Loaiza     MD Mackey

## 2022-08-04 NOTE — ED PROVIDER NOTE - PROGRESS NOTE DETAILS
Evaluated by ophthalmology negative for concern for GSA secondary to no tenderness over temporal no vision changes as well as negative for inflammatory markers recommend MRI MRAs for possible ischemia of orbits.  Patient medically stable no vision changes stable for CDU. Ophthalmology recommends neurology for headaches patients do not usually have standard of care to get evaluated by Ortho neuro for headaches as long as nonconcerning for acute headache thunderclap or concern for ischemia.  MRI/MRA as well answer questions on inpatient side for any possible need for neurology at that time.  To be reassessed

## 2022-08-04 NOTE — CHART NOTE - NSCHARTNOTEFT_GEN_A_CORE
75yo male PMH/POH DM, HTN, stroke, CE/IOL OU sent in by Dr. Noble for further evaluation of right sided headache with surrounding eye pain of one month duration.   Per Dr. Noble's note:  VA 20/20 OD, 20/25 OS  Disc edema with hyperemia and disc hemorrhages on fundus exam OD. CDR 0.3.    ESR and CRP wnl.     Ddx: NAION vs. mass. Less likely AAION/GCA given normal inflammatory markers.     Recommend  - MRI brain and orbits w/w/o con  - neurology consult for headaches    Discussed with Dr. Cam, neuro ophthalmology attending. 75yo male PMH/POH DM, HTN, stroke, CE/IOL OU sent in by Dr. Noble for further evaluation of right sided headache with surrounding eye pain of one month duration.   Per Dr. Noble's note:  VA 20/20 OD, 20/25 OS  Disc edema with hyperemia and disc hemorrhages on fundus exam OD. CDR 0.3.    ESR and CRP wnl.     Ddx: NAION vs. mass. Less likely AAION/GCA given normal inflammatory markers.     Recommend  - MRI brain and orbits w/w/o con  - neurology consult for headaches    Discussed with Dr. Cam, neuro ophthalmology attending.  Discussed with Dr. Noble. 75yo male PMH/POH DM, HTN, stroke, CE/IOL OU sent in by Dr. Noble for further evaluation of right sided headache with surrounding eye pain of one month duration.   Per Dr. Noble's note:  VA 20/20 OD, 20/25 OS  Disc edema with hyperemia and disc hemorrhages on fundus exam OD. CDR 0.3 OS.    ESR and CRP wnl.     Ddx: NAION vs. mass. Less likely AAION/GCA given normal inflammatory markers.     Recommend  - MRI brain and orbits w/w/o con  - neurology consult for headaches    Discussed with Dr. Cam, neuro ophthalmology attending.  Discussed with Dr. Noble. 75yo male PMH/POH DM, HTN, stroke, CE/IOL OU sent in by Dr. Noble for further evaluation of right sided headache with surrounding eye pain of one month duration. Rest of GCA symptoms negative.   Per Dr. Noble's note:  VA 20/20 OD, 20/25 OS  No APD. CVF full.   Disc edema with hyperemia and disc hemorrhages on fundus exam OD. CDR 0.3 OS.    ESR and CRP wnl.     Ddx: NAION vs. mass. Less likely AAION/GCA given normal inflammatory markers.     Recommend  - MRI brain and orbits w/w/o con  - neurology consult for headaches    Discussed with Dr. Cam, neuro ophthalmology attending.  Discussed with Dr. Noble.

## 2022-08-04 NOTE — ED PROVIDER NOTE - OBJECTIVE STATEMENT
76-year-old male history of hypertension hyperlipidemia and diabetes presents with acute on chronicRight-sided headache as well as eye pain.  Patient was evaluated Dr. Bryson and sent to the emergency department for concern of giant cell arteritis.  Patient denies any visual changes states he has a history of a pinched nerve is treated with some cream has been improving his symptoms.As per patient's ophthalmologistSent in for empiric treatment MRI/MRA imaging.  Patient otherwise well-appearing in no acute changes no double vision no diplopia no no eye pain no worsening pain in dark room.  Patient otherwise hemodynamically stable.

## 2022-08-04 NOTE — ED ADULT NURSE NOTE - OBJECTIVE STATEMENT
Pt AOX4 c/o pain in Right temple area and around right eye. No trauma to area, no vision changes reported. Pt walks at home with a cane. Labs drawn and sent, 20G IV  placed Right AC.

## 2022-08-04 NOTE — ED CDU PROVIDER INITIAL DAY NOTE - PHYSICAL EXAMINATION
CONSTITUTIONAL:  Well appearing, awake, alert, oriented to person, place, time/situation and in no apparent distress.  Pt. is objectively comfortable appearing and verbalizing in full, clear, effortless sentences.  ENMT: NC/AT.  Airway patent.  Nasal mucosa clear.  Moist mucous membranes.  Neck supple.  EYES:  Clear OU.  Pt s/p full exam by Optho in ED.  CARDIAC:  Normal rate, regular rhythm.  Heart sounds S1 S2.  No murmurs, gallops, or rubs.  RESPIRATORY:  Breath sounds clear and equal bilaterally.  No wheezes, no rales, no rhonchi.  GASTROINTESTINAL:  Abdomen soft, non-distended, non-tender.  No rebound, no guarding.  NEUROLOGICAL:  Alert and oriented to person/place/time/situation.  No focal deficits; no tremors noted.   MUSCULOSKELETAL:  Range of motion is not limited.  Gait stable.    SKIN:  Skin color unremarkable.  Skin warm, dry, and intact.    PSYCHIATRIC:  Alert and oriented to person/place/time/situation.  Mood and affect WNL.  No apparent risk to self or others.

## 2022-08-04 NOTE — ED CDU PROVIDER INITIAL DAY NOTE - MEDICAL DECISION MAKING DETAILS
MRI head/orbits as per Ophthalmology recommendations, supportive care, general observation care / monitoring.

## 2022-08-04 NOTE — ED CDU PROVIDER INITIAL DAY NOTE - ATTENDING APP SHARED VISIT CONTRIBUTION OF CARE
I have made the decision to admit this patient to the CDU as documented in my Provider note I have reviewed the note  written by Ashley Huff  [facility], on that visit day. I have supervised and participated as necessary in the performance of procedures indicated for patient management and was available at all phases of the patient´s visit when needed.    Please see my provider note for the details of my decision to admit  Vital Signs Stable     PE unchanged at time of admission  Patient admitted for  MRI head/orbits as per Ophthalmology recommendations  Symptomatic tx   reassess in AM

## 2022-08-04 NOTE — ED ADULT TRIAGE NOTE - CHIEF COMPLAINT QUOTE
PT sent in for eval from opthomolagist office right sided headache and right eye pain x 1 month. Denies visual changes, trauma, injury to area. PHX: DM2, HTN, TIA, BPH.

## 2022-08-05 ENCOUNTER — INPATIENT (INPATIENT)
Facility: HOSPITAL | Age: 77
LOS: 4 days | Discharge: NURSING, REHAB OR LONG-TERM CARE | DRG: 20 | End: 2022-08-10
Attending: NEUROLOGICAL SURGERY | Admitting: NEUROLOGICAL SURGERY
Payer: MEDICARE

## 2022-08-05 ENCOUNTER — APPOINTMENT (OUTPATIENT)
Dept: NEUROSURGERY | Facility: HOSPITAL | Age: 77
End: 2022-08-05

## 2022-08-05 VITALS
SYSTOLIC BLOOD PRESSURE: 154 MMHG | RESPIRATION RATE: 18 BRPM | HEART RATE: 83 BPM | OXYGEN SATURATION: 97 % | HEIGHT: 65 IN | TEMPERATURE: 98 F | DIASTOLIC BLOOD PRESSURE: 83 MMHG | WEIGHT: 149.91 LBS

## 2022-08-05 VITALS
TEMPERATURE: 98 F | HEART RATE: 78 BPM | RESPIRATION RATE: 16 BRPM | DIASTOLIC BLOOD PRESSURE: 98 MMHG | OXYGEN SATURATION: 100 % | SYSTOLIC BLOOD PRESSURE: 155 MMHG

## 2022-08-05 DIAGNOSIS — I10 ESSENTIAL (PRIMARY) HYPERTENSION: ICD-10-CM

## 2022-08-05 DIAGNOSIS — S06.5X9A TRAUMATIC SUBDURAL HEMORRHAGE WITH LOSS OF CONSCIOUSNESS OF UNSPECIFIED DURATION, INITIAL ENCOUNTER: ICD-10-CM

## 2022-08-05 DIAGNOSIS — E11.9 TYPE 2 DIABETES MELLITUS WITHOUT COMPLICATIONS: ICD-10-CM

## 2022-08-05 LAB
ANION GAP SERPL CALC-SCNC: 9 MMOL/L — SIGNIFICANT CHANGE UP (ref 7–14)
APTT BLD: 33.6 SEC — SIGNIFICANT CHANGE UP (ref 27.5–35.5)
BASOPHILS # BLD AUTO: 0.05 K/UL — SIGNIFICANT CHANGE UP (ref 0–0.2)
BASOPHILS NFR BLD AUTO: 0.5 % — SIGNIFICANT CHANGE UP (ref 0–2)
BLD GP AB SCN SERPL QL: NEGATIVE — SIGNIFICANT CHANGE UP
BUN SERPL-MCNC: 23 MG/DL — SIGNIFICANT CHANGE UP (ref 7–23)
CALCIUM SERPL-MCNC: 9.2 MG/DL — SIGNIFICANT CHANGE UP (ref 8.4–10.5)
CHLORIDE SERPL-SCNC: 106 MMOL/L — SIGNIFICANT CHANGE UP (ref 98–107)
CHOLEST SERPL-MCNC: 125 MG/DL — SIGNIFICANT CHANGE UP
CO2 SERPL-SCNC: 28 MMOL/L — SIGNIFICANT CHANGE UP (ref 22–31)
CREAT SERPL-MCNC: 0.91 MG/DL — SIGNIFICANT CHANGE UP (ref 0.5–1.3)
EGFR: 87 ML/MIN/1.73M2 — SIGNIFICANT CHANGE UP
EOSINOPHIL # BLD AUTO: 0.13 K/UL — SIGNIFICANT CHANGE UP (ref 0–0.5)
EOSINOPHIL NFR BLD AUTO: 1.3 % — SIGNIFICANT CHANGE UP (ref 0–6)
FLUAV AG NPH QL: SIGNIFICANT CHANGE UP
FLUBV AG NPH QL: SIGNIFICANT CHANGE UP
GLUCOSE BLDC GLUCOMTR-MCNC: 135 MG/DL — HIGH (ref 70–99)
GLUCOSE SERPL-MCNC: 146 MG/DL — HIGH (ref 70–99)
HCT VFR BLD CALC: 49.1 % — SIGNIFICANT CHANGE UP (ref 39–50)
HDLC SERPL-MCNC: 47 MG/DL — SIGNIFICANT CHANGE UP
HGB BLD-MCNC: 15.7 G/DL — SIGNIFICANT CHANGE UP (ref 13–17)
IANC: 7.36 K/UL — SIGNIFICANT CHANGE UP (ref 1.8–7.4)
IMM GRANULOCYTES NFR BLD AUTO: 0.4 % — SIGNIFICANT CHANGE UP (ref 0–1.5)
INR BLD: 1.08 RATIO — SIGNIFICANT CHANGE UP (ref 0.88–1.16)
LIPID PNL WITH DIRECT LDL SERPL: 62 MG/DL — SIGNIFICANT CHANGE UP
LYMPHOCYTES # BLD AUTO: 1.43 K/UL — SIGNIFICANT CHANGE UP (ref 1–3.3)
LYMPHOCYTES # BLD AUTO: 14.1 % — SIGNIFICANT CHANGE UP (ref 13–44)
MCHC RBC-ENTMCNC: 29.6 PG — SIGNIFICANT CHANGE UP (ref 27–34)
MCHC RBC-ENTMCNC: 32 GM/DL — SIGNIFICANT CHANGE UP (ref 32–36)
MCV RBC AUTO: 92.5 FL — SIGNIFICANT CHANGE UP (ref 80–100)
MONOCYTES # BLD AUTO: 1.1 K/UL — HIGH (ref 0–0.9)
MONOCYTES NFR BLD AUTO: 10.9 % — SIGNIFICANT CHANGE UP (ref 2–14)
NEUTROPHILS # BLD AUTO: 7.36 K/UL — SIGNIFICANT CHANGE UP (ref 1.8–7.4)
NEUTROPHILS NFR BLD AUTO: 72.8 % — SIGNIFICANT CHANGE UP (ref 43–77)
NON HDL CHOLESTEROL: 78 MG/DL — SIGNIFICANT CHANGE UP
NRBC # BLD: 0 /100 WBCS — SIGNIFICANT CHANGE UP
NRBC # FLD: 0 K/UL — SIGNIFICANT CHANGE UP
PA ADP PRP-ACNC: 212 PRU — SIGNIFICANT CHANGE UP (ref 194–417)
PLATELET # BLD AUTO: 272 K/UL — SIGNIFICANT CHANGE UP (ref 150–400)
PLATELET RESPONSE ASPIRIN RESULT: 521 ARU — SIGNIFICANT CHANGE UP (ref 350–700)
POTASSIUM SERPL-MCNC: 4.8 MMOL/L — SIGNIFICANT CHANGE UP (ref 3.5–5.3)
POTASSIUM SERPL-SCNC: 4.8 MMOL/L — SIGNIFICANT CHANGE UP (ref 3.5–5.3)
PROTHROM AB SERPL-ACNC: 12.5 SEC — SIGNIFICANT CHANGE UP (ref 10.5–13.4)
RBC # BLD: 5.31 M/UL — SIGNIFICANT CHANGE UP (ref 4.2–5.8)
RBC # FLD: 13.1 % — SIGNIFICANT CHANGE UP (ref 10.3–14.5)
RH IG SCN BLD-IMP: POSITIVE — SIGNIFICANT CHANGE UP
RSV RNA NPH QL NAA+NON-PROBE: SIGNIFICANT CHANGE UP
SARS-COV-2 RNA SPEC QL NAA+PROBE: SIGNIFICANT CHANGE UP
SARS-COV-2 RNA SPEC QL NAA+PROBE: SIGNIFICANT CHANGE UP
SODIUM SERPL-SCNC: 143 MMOL/L — SIGNIFICANT CHANGE UP (ref 135–145)
TRIGL SERPL-MCNC: 81 MG/DL — SIGNIFICANT CHANGE UP
WBC # BLD: 10.11 K/UL — SIGNIFICANT CHANGE UP (ref 3.8–10.5)
WBC # FLD AUTO: 10.11 K/UL — SIGNIFICANT CHANGE UP (ref 3.8–10.5)

## 2022-08-05 PROCEDURE — 93010 ELECTROCARDIOGRAM REPORT: CPT

## 2022-08-05 PROCEDURE — 99223 1ST HOSP IP/OBS HIGH 75: CPT

## 2022-08-05 PROCEDURE — 36223 PLACE CATH CAROTID/INOM ART: CPT | Mod: RT

## 2022-08-05 PROCEDURE — 75894 X-RAYS TRANSCATH THERAPY: CPT | Mod: 26

## 2022-08-05 PROCEDURE — 36227 PLACE CATH XTRNL CAROTID: CPT | Mod: LT

## 2022-08-05 PROCEDURE — 99291 CRITICAL CARE FIRST HOUR: CPT | Mod: FT,25

## 2022-08-05 PROCEDURE — 61624 TCAT PERM OCCLS/EMBOLJ CNS: CPT

## 2022-08-05 PROCEDURE — 70543 MRI ORBT/FAC/NCK W/O &W/DYE: CPT | Mod: 26,ME

## 2022-08-05 PROCEDURE — 99217: CPT

## 2022-08-05 PROCEDURE — 36224 PLACE CATH CAROTD ART: CPT | Mod: LT

## 2022-08-05 PROCEDURE — 70450 CT HEAD/BRAIN W/O DYE: CPT | Mod: 26,MA

## 2022-08-05 PROCEDURE — G1004: CPT

## 2022-08-05 PROCEDURE — 70553 MRI BRAIN STEM W/O & W/DYE: CPT | Mod: 26,MD

## 2022-08-05 RX ORDER — ASPIRIN AND DIPYRIDAMOLE 25; 200 MG/1; MG/1
1 CAPSULE, EXTENDED RELEASE ORAL
Qty: 0 | Refills: 0 | DISCHARGE

## 2022-08-05 RX ORDER — INSULIN LISPRO 100/ML
VIAL (ML) SUBCUTANEOUS EVERY 6 HOURS
Refills: 0 | Status: DISCONTINUED | OUTPATIENT
Start: 2022-08-05 | End: 2022-08-06

## 2022-08-05 RX ORDER — ATORVASTATIN CALCIUM 80 MG/1
1 TABLET, FILM COATED ORAL
Qty: 0 | Refills: 0 | DISCHARGE

## 2022-08-05 RX ORDER — METOCLOPRAMIDE HCL 10 MG
10 TABLET ORAL ONCE
Refills: 0 | Status: COMPLETED | OUTPATIENT
Start: 2022-08-05 | End: 2022-08-05

## 2022-08-05 RX ORDER — ACETAMINOPHEN 500 MG
650 TABLET ORAL EVERY 6 HOURS
Refills: 0 | Status: DISCONTINUED | OUTPATIENT
Start: 2022-08-05 | End: 2022-08-10

## 2022-08-05 RX ORDER — POLYETHYLENE GLYCOL 3350 17 G/17G
17 POWDER, FOR SOLUTION ORAL DAILY
Refills: 0 | Status: DISCONTINUED | OUTPATIENT
Start: 2022-08-05 | End: 2022-08-10

## 2022-08-05 RX ORDER — LEVETIRACETAM 250 MG/1
500 TABLET, FILM COATED ORAL EVERY 12 HOURS
Refills: 0 | Status: DISCONTINUED | OUTPATIENT
Start: 2022-08-05 | End: 2022-08-06

## 2022-08-05 RX ORDER — SODIUM CHLORIDE 9 MG/ML
1000 INJECTION INTRAMUSCULAR; INTRAVENOUS; SUBCUTANEOUS
Refills: 0 | Status: DISCONTINUED | OUTPATIENT
Start: 2022-08-05 | End: 2022-08-06

## 2022-08-05 RX ORDER — INSULIN LISPRO 100/ML
15 VIAL (ML) SUBCUTANEOUS
Qty: 0 | Refills: 0 | DISCHARGE

## 2022-08-05 RX ORDER — ACETAMINOPHEN 500 MG
1000 TABLET ORAL ONCE
Refills: 0 | Status: COMPLETED | OUTPATIENT
Start: 2022-08-05 | End: 2022-08-05

## 2022-08-05 RX ORDER — ATORVASTATIN CALCIUM 80 MG/1
20 TABLET, FILM COATED ORAL AT BEDTIME
Refills: 0 | Status: DISCONTINUED | OUTPATIENT
Start: 2022-08-05 | End: 2022-08-10

## 2022-08-05 RX ORDER — ATORVASTATIN CALCIUM 80 MG/1
40 TABLET, FILM COATED ORAL AT BEDTIME
Refills: 0 | Status: DISCONTINUED | OUTPATIENT
Start: 2022-08-05 | End: 2022-08-05

## 2022-08-05 RX ORDER — ALBUTEROL 90 UG/1
2 AEROSOL, METERED ORAL EVERY 6 HOURS
Refills: 0 | Status: DISCONTINUED | OUTPATIENT
Start: 2022-08-05 | End: 2022-08-10

## 2022-08-05 RX ORDER — SENNA PLUS 8.6 MG/1
1 TABLET ORAL DAILY
Refills: 0 | Status: DISCONTINUED | OUTPATIENT
Start: 2022-08-05 | End: 2022-08-10

## 2022-08-05 RX ORDER — LOSARTAN POTASSIUM 100 MG/1
100 TABLET, FILM COATED ORAL DAILY
Refills: 0 | Status: DISCONTINUED | OUTPATIENT
Start: 2022-08-05 | End: 2022-08-10

## 2022-08-05 RX ORDER — EMPAGLIFLOZIN 10 MG/1
1 TABLET, FILM COATED ORAL
Qty: 0 | Refills: 0 | DISCHARGE

## 2022-08-05 RX ADMIN — Medication 1000 MILLIGRAM(S): at 06:30

## 2022-08-05 RX ADMIN — Medication 400 MILLIGRAM(S): at 06:04

## 2022-08-05 RX ADMIN — Medication 10 MILLIGRAM(S): at 10:00

## 2022-08-05 RX ADMIN — SODIUM CHLORIDE 75 MILLILITER(S): 9 INJECTION INTRAMUSCULAR; INTRAVENOUS; SUBCUTANEOUS at 11:46

## 2022-08-05 RX ADMIN — SODIUM CHLORIDE 75 MILLILITER(S): 9 INJECTION INTRAMUSCULAR; INTRAVENOUS; SUBCUTANEOUS at 22:30

## 2022-08-05 RX ADMIN — LEVETIRACETAM 400 MILLIGRAM(S): 250 TABLET, FILM COATED ORAL at 11:46

## 2022-08-05 NOTE — ED PROVIDER NOTE - PROGRESS NOTE DETAILS
neurosurgery PA at bedside d/w nsgx PA, will send plt response labs and start on Keppra. CTH reportedly similar to MRI at [facility], pending nsgx PA discussion with attending to determine floor vs ICU admission and if DDAVP recommended. -Bandar Ryan PA-C neurosurgery resident at bedside, not advising DDAVP at this time, requesting admission to the neurosurgical floor. pt understands and aware of plan, all questions answered from ED perspective, pt GCS 15, no focal neuro deficits on exam. -Bandar Ryan PA-C

## 2022-08-05 NOTE — H&P ADULT - NSHPPHYSICALEXAM_GEN_ALL_CORE
T(C): 36.6 (08-05-22 @ 14:24), Max: 37 (08-04-22 @ 22:24)  HR: 77 (08-05-22 @ 14:24) (77 - 98)  BP: 145/91 (08-05-22 @ 14:24) (130/90 - 157/99)  RR: 16 (08-05-22 @ 14:24) (16 - 19)  SpO2: 99% (08-05-22 @ 14:24) (97% - 100%)    CONSTITUTIONAL: Well groomed, no apparent distress    EYES: PERRLA and symmetric, EOMI, No conjunctival or scleral injection, non-icteric    ENMT: Oral mucosa with moist membranes. No external nasal lesions; nasal mucosa not inflamed; normal dentition; no pharyngeal injection or exudates.  	NECK: Supple, symmetric and without tracheal deviation; thyroid gland not enlarged and without palpable masses    RESPIRATORY: No respiratory distress, no use of accessory muscles; CTA b/l, no wheezes, rales or rhonchi, no dullness or hyperresonance to percussion, no tactile fremitus, no subcutaneous emphysema    CARDIOVASCULAR: RRRR, +S1S2, no murmurs, no rubs, no gallops; no JVD; no peripheral edema  	Vascular: no carotid bruits; no abdominal bruit; carotid pulse palpable, radial pulse palpable, femoral pulse palpable, dorsalis pedis pulse palpable, posterior tibialis pulse palpable    GASTROINTESTINAL: Soft, non tender, distended, no rebound, no guarding; No palpable masses; no hepatosplenomegaly; no hernia palpated;    LYMPHATIC: No cervical LAD or tenderness; no axillary LAD or tenderness; no inguinal LAD or tenderness    SKIN: No rashes or ulcers noted; no subcutaneous nodules or induration palpable    NEUROLOGIC: AOX3, PERRL, EOMI, following commands, no drifts, uppers 5/5, lowers 5/5

## 2022-08-05 NOTE — ED PROVIDER NOTE - OBJECTIVE STATEMENT
76y Male with PMHx of HTN, DM, HLD, TIA, on Aggrenox transferred to [facility] from [facility] for subdural hematoma. As per chart review pt presented to [facility] ER from outpatient optho Dr. Bryson for right sided headache and eye pain x 1 month. In ER, labs within normal limits. Patient seen by optho, concern for optic neuritis - plan for MR in ER and CDU for results and neuro consult. Patient symptomatic until over night, neuro consulted at [facility], and MR showing "There is evidence of bilateral subdural hematomas identified. This is more prominent on the left side than the right. The left subdural hematoma involves the left temporal frontal parietal region and measures  approximately 1.3 cm widest diameter. The subdural hematoma on the right side involves right temporal region measures approximately 0.7 cm widest diameter. There is localized mass effect seen involving the left cerebral hemisphere. Mass effect on the left lateral ventricle is seen." Pt transferred to [facility] for neurosurgical intervention.

## 2022-08-05 NOTE — H&P ADULT - ASSESSMENT
ASSESSMENT AND PLAN: 75yo RT handed M with hx of DM-II with Neuropathy, TIAs, HTN, and HLD presents to [facility] ED for rt sided headaches for the past 1 month. Patient was followed by Opthalmology Dr. Bowers as an outpatient for papilledema and was referred to come to the [facility] ED for further evaluation. Optho states it is less likely GCA given lack of clinical symptoms and negative inflammatory markers. Patient had MRI in CDU [facility] and was noted to have acute on chronic Left sided SDH and Right frontotemporal SDH. Patient was follows with outpatient Neurologist and was seen last week for trigger point injections. Patient denies numbness, tingling, fever and chills.     Patient transferred to [facility] for possible MMAE with Dr. Isabel.     NEURO:   - Admit to [facility] under Dr. Isabel  - Poss MMAE, keep NPO for pre-op (Hospitalist called for clearance)  - CT Brain done arrival in the ED - reveals unchanged left side subacute SDH  - Keppra for seizure ppx  - Pain control w/ tylenol prn  - Was on Aggrenox per TIA which he last took 2 days ago as a daily dose, NOT BID - will hold in the setting of SDH (will get TEG and ARU)  - PT/OT evaluation    PULM:   - On room air, O2Sat>97%  - Incentive spirometry  - Was on duonebs at home, will continue while inpatient as needed    CV:  - EKG done, Hospitalist for clearance  - -150  - Was on home dose irbesartan, will start Losartan     ENDO:   - Hx of DM2, A1c 6.8  - ISS q 6 while NPO, CCD diet when able to eat    HEME/ONC:             8/5 CBC stable         DVT ppx: Hold in the setting of SDH, SCDs, [place] - P    RENAL:   - NS@75  - 8/5 BMP stable    ID:   - Afebrile  - 8/4 COVID neg    GI:    - NPO for poss MMAE  - Senna and Miralax for bowel regimen    Spoke with wife Glory Guallpa at the bedside, answered questions from both patient and family

## 2022-08-05 NOTE — CONSULT NOTE ADULT - ASSESSMENT
77yo M with hx of DM-II with Neuropathy, TIAs, HTN, and HLD was sent to ED by his ophthalmologist for papilledema and HA x 1 month for further evaluation. Patient had MRI in CDU [facility] and was noted to have acute on chronic Left sided SDH and Right frontotemporal SDH.

## 2022-08-05 NOTE — ED CDU PROVIDER SUBSEQUENT DAY NOTE - ATTENDING APP SHARED VISIT CONTRIBUTION OF CARE
Patient initially admitted for control of presumed HA of opthalmologic origin, but MRI results show MR Bilateral SDH without precedent trauma; no change in patients clinical status. No focal neuro deficit.   Continue current care BP symptom control patient awaits  transfer to [facility] for neurosurgical intervention.

## 2022-08-05 NOTE — CONSULT NOTE ADULT - PROBLEM SELECTOR RECOMMENDATION 2
if NPO tonight, can start Lantus 15 units. Takes 26 at home. Once he starts on a diet, will need to add Humalog 15 units before each meal, 3x/day.

## 2022-08-05 NOTE — H&P ADULT - ATTENDING COMMENTS
Chronic SDH, presenting with headaches, enrolled into [facility]. Randomized into MMA embo group. Of note, patient has chronic gait instability and lower extremity numbness/tingling likely due to long-standing diabetic neuropathy. These two issues are most likely not related to the SDH.

## 2022-08-05 NOTE — ED PROVIDER NOTE - ATTENDING APP SHARED VISIT CONTRIBUTION OF CARE
pt is 77 y/o male transferred from [facility] for sdh seen on mri brain in their cdu getting a work up for headaches and visual changes. NEUROLOGICAL: Alert and oriented, no focal deficits, no motor or sensory deficits. CN II-XII intact, 5/5 strength. Gait intact, FTN intact, tandem gait with difficulty. [place] neg. No nystagmus. gcs 15, seen by NS and myself for CT head now if unchanged from mri NS floor bed, bp 150s, load on keppra, no reversal at this time.

## 2022-08-05 NOTE — ED CDU PROVIDER SUBSEQUENT DAY NOTE - MEDICAL DECISION MAKING DETAILS
76y Male with PMHx of HTN, DM, HLD, TIA presents to the ER from outpatient optho Dr. Bryson for right sided headache and eye pain x 1 month. In ER, labs within normal limits. Patient seen by optho, concern for optic neuritis - plan for MR in ER and CDU for results and neuro consult. Patient symptomatic until over night, spoke with Neuro, MR showing Bilateral subdural hematomas are identified as described above. Denies trauma. No focal neuro deficit. Plan for transfer to [facility] for neurosurgical intervention.

## 2022-08-05 NOTE — CONSULT NOTE ADULT - SUBJECTIVE AND OBJECTIVE BOX
75yo RT handed M with hx of DM-II with Neuropathy, TIAs, HTN, and HLD presents to [facility] ED for rt sided headaches for the past 1 month. Patient was followed by Opthalmology Dr. Bowers as an outpatient for papilledema and was referred to come to the ED for further evaluation. Optho states it is less likely GCA given lack of clinical symptoms and negative inflammatory markers. Patient had MRI in CDU and was noted to have acute on chronic Left sided SDH and Right frontotemporal SDH. Patient was follows with outpatient Neurologist and was seen last week for trigger point injections. Patient denies numbness, tingling, fever and chills.         PAST MEDICAL & SURGICAL HISTORY:  Diabetes      TIA (transient ischemic attack)      HTN (hypertension)      Hyperlipidemia      No significant past surgical history        FAMILY HISTORY:  Family history of renal cancer  mother    Family history of prostate cancer in father  father      Home Medications:  aspirin-dipyridamole 25 mg-200 mg oral capsule, extended release: 1 cap(s) orally 2 times a day (05 Aug 2022 02:57)  atorvastatin 40 mg oral tablet: 1 tab(s) orally once a day (05 Aug 2022 02:57)  HumaLOG 100 units/mL injectable solution: 15 unit(s) injectable 3 times a day (with meals) (05 Aug 2022 02:57)  irbesartan 300 mg oral tablet: 1 tab(s) orally once a day (05 Aug 2022 02:57)  Jardiance 25 mg oral tablet: 1 tab(s) orally once a day (in the morning) (05 Aug 2022 02:57)  Lantus 100 units/mL subcutaneous solution: 26 unit(s) subcutaneous once a day (at bedtime) (05 Aug 2022 02:57)      MEDICATIONS  (STANDING):    MEDICATIONS  (PRN):    Vital Signs Last 24 Hrs  T(C): 36.8 (05 Aug 2022 05:30), Max: 37 (04 Aug 2022 22:24)  T(F): 98.2 (05 Aug 2022 05:30), Max: 98.6 (04 Aug 2022 22:24)  HR: 80 (05 Aug 2022 05:30) (80 - 98)  BP: 130/90 (05 Aug 2022 05:30) (130/90 - 157/99)  BP(mean): --  RR: 16 (05 Aug 2022 05:30) (16 - 19)  SpO2: 100% (05 Aug 2022 05:30) (99% - 100%)    Parameters below as of 05 Aug 2022 05:30  Patient On (Oxygen Delivery Method): room air        PHYSICAL EXAM:  Awake Alert Oriented x 3 No distress, Speech is clear  PERRL, EOMI, Tongue midline, No facial drop  Motor:             RUE 5/5        LUE 5/5             RLE 5/5         LLE 5/5  Sensory intac to light touch  No dysmetria  No drift    LABS:                            15.7   10.11 )-----------( 272      ( 05 Aug 2022 06:37 )             49.1     08-05    143  |  106  |  23  ----------------------------<  146<H>  4.8   |  28  |  0.91    Ca    9.2      05 Aug 2022 06:37    TPro  7.7  /  Alb  4.0  /  TBili  0.4  /  DBili  x   /  AST  25  /  ALT  28  /  AlkPhos  70  08-04      RADIOLOGY:  < from: MR Head w/wo IV Cont (08.05.22 @ 02:10) >      INTERPRETATION:  LEFT convexity subdural hemorrhage measures up to 13 mm   in maximum depth with mild mass effect on the left cerebral hemisphrere.  RIGHT frontotemporal subdural hemorrhage measures up to 4 mm in maximum   depth.    Rightward midline shiftup to 3 mm.    No acute infarct. No acute parenchymal hemorrhage.    Dr. Ritchie discussed these findings with Ashley FITZPATRICK at 5:45 AM   on 8/5/2022.    < end of copied text >

## 2022-08-05 NOTE — CONSULT NOTE ADULT - SUBJECTIVE AND OBJECTIVE BOX
MRN-[id number]  Patient is a 76y old  Male who presents with a chief complaint of   HPI:  Patient is a 77yo RT handed M with hx of DM-II, TIAs, HTN, and HLD presents to [facility] ED for rt sided headaches for the past 1 month. Patient was followed by Opthalmology as an outpatient for papilledema and was referred to come to the ED for further evaluation. Optho states it is less likely GCA given lack of clinical symptoms and negative inflammatory markers. Patient had MRI in CDU and was noted to have acute on chronic Left sided SDH and Right frontotemporal SDH.     PAST MEDICAL & SURGICAL HISTORY:  Diabetes      TIA (transient ischemic attack)      HTN (hypertension)      Hyperlipidemia      No significant past surgical history        FAMILY HISTORY:  Family history of renal cancer  mother    Family history of prostate cancer in father  father      Social Hx:  Nonsmoker, no drug or alcohol use    Home Medications:  aspirin-dipyridamole 25 mg-200 mg oral capsule, extended release: 1 cap(s) orally 2 times a day (05 Aug 2022 02:57)  atorvastatin 40 mg oral tablet: 1 tab(s) orally once a day (05 Aug 2022 02:57)  HumaLOG 100 units/mL injectable solution: 15 unit(s) injectable 3 times a day (with meals) (05 Aug 2022 02:57)  irbesartan 300 mg oral tablet: 1 tab(s) orally once a day (05 Aug 2022 02:57)  Jardiance 25 mg oral tablet: 1 tab(s) orally once a day (in the morning) (05 Aug 2022 02:57)  Lantus 100 units/mL subcutaneous solution: 26 unit(s) subcutaneous once a day (at bedtime) (05 Aug 2022 02:57)    MEDICATIONS  (STANDING):  acetaminophen   IVPB .. 1000 milliGRAM(s) IV Intermittent once    MEDICATIONS  (PRN):    Allergies  No Known Allergies    Intolerances      REVIEW OF SYSTEMS  General:		  Ophthalmologic:  Respiratory and Thorax:	  Cardiovascular:	  Gastrointestinal:		  Musculoskeletal:	  Neurological:		    ROS: Pertinent positives in HPI, all other ROS were reviewed and are negative.      Vital Signs Last 24 Hrs  T(C): 36.8 (05 Aug 2022 01:57), Max: 37 (04 Aug 2022 22:24)  T(F): 98.3 (05 Aug 2022 01:57), Max: 98.6 (04 Aug 2022 22:24)  HR: 85 (05 Aug 2022 01:57) (85 - 98)  BP: 157/99 (05 Aug 2022 01:57) (132/77 - 157/99)  BP(mean): --  RR: 19 (05 Aug 2022 01:57) (18 - 19)  SpO2: 100% (05 Aug 2022 01:57) (99% - 100%)    Parameters below as of 05 Aug 2022 01:57  Patient On (Oxygen Delivery Method): room air        GENERAL EXAM:  Constitutional: awake and alert. NAD  HEENT: PERRL, EOMI  Musculoskeletal: no joint swelling/tenderness, no abnormal movements  Skin: no rashes    NEUROLOGICAL EXAM:  MS: AAOX3, fluent, attends b/l; recent and remote memory intact; normal attention, language and fund of knowledge.   CN: VFF, EOMI, PERRL, no DG, no APD,  V1-3 intact, no facial asymmetry, t/p midline, SCM/trap intact.  Eyes-Fundi: no papilledema.  Motor: Strength: 5/5 4x. Tone: normal. Bulk: normal. DTR 2+ symm.  Plantar flex b/l. Sensation: intact to LT/PP/Vibration/Position/Temperature 4x.   Coordination: intact 4x.   Gait:  Romberg negative, pull test negative; walks with narrow base, pivots in 2 steps.    NIHSS  mRS    Labs:   cbc                      15.8   10.17 )-----------( 277      ( 04 Aug 2022 18:30 )             49.0     [id number]    142  |  103  |  21  ----------------------------<  227<H>  4.5   |  27  |  1.07    Ca    9.6      04 Aug 2022 18:30    TPro  7.7  /  Alb  4.0  /  TBili  0.4  /  DBili  x   /  AST  25  /  ALT  28  /  AlkPhos  70  08-04      LFTsLIVER FUNCTIONS - ( 04 Aug 2022 18:30 )  Alb: 4.0 g/dL / Pro: 7.7 g/dL / ALK PHOS: 70 U/L / ALT: 28 U/L / AST: 25 U/L / GGT: x             Radiology:  MRI head prelim read:   INTERPRETATION: LEFT convexity subdural hemorrhage measures up to 13 mm in maximum depth with mild mass effect on the left cerebral hemisphrere.  RIGHT frontotemporal subdural hemorrhage measures up to 4 mm in maximum depth.    Rightward midline shift up to 3 mm.    No acute infarct. No acute parenchymal hemorrhage.     MRN-[id number]  Patient is a 76y old  Male who presents with a chief complaint of   HPI:  Patient is a 75yo RT handed M with hx of DM-II with Neuropathy, TIAs, HTN, and HLD presents to [facility] ED for rt sided headaches for the past 1 month. Patient was followed by Opthalmology Dr. Bowers as an outpatient for papilledema and was referred to come to the ED for further evaluation. Optho states it is less likely GCA given lack of clinical symptoms and negative inflammatory markers. Patient had MRI in CDU and was noted to have acute on chronic Left sided SDH and Right frontotemporal SDH. Patient was follows with outpatient Neurologist and was seen last week for trigger point injections. Patient denies numbness, tingling, fever and chills.   NIHSS 0   MRS 0     PAST MEDICAL & SURGICAL HISTORY:  Diabetes      TIA (transient ischemic attack)      HTN (hypertension)      Hyperlipidemia      No significant past surgical history        FAMILY HISTORY:  Family history of renal cancer  mother    Family history of prostate cancer in father  father      Social Hx:  Nonsmoker, no drug or alcohol use    Home Medications:  aspirin-dipyridamole 25 mg-200 mg oral capsule, extended release: 1 cap(s) orally 2 times a day (05 Aug 2022 02:57)  atorvastatin 40 mg oral tablet: 1 tab(s) orally once a day (05 Aug 2022 02:57)  HumaLOG 100 units/mL injectable solution: 15 unit(s) injectable 3 times a day (with meals) (05 Aug 2022 02:57)  irbesartan 300 mg oral tablet: 1 tab(s) orally once a day (05 Aug 2022 02:57)  Jardiance 25 mg oral tablet: 1 tab(s) orally once a day (in the morning) (05 Aug 2022 02:57)  Lantus 100 units/mL subcutaneous solution: 26 unit(s) subcutaneous once a day (at bedtime) (05 Aug 2022 02:57)    MEDICATIONS  (STANDING):  acetaminophen   IVPB .. 1000 milliGRAM(s) IV Intermittent once    MEDICATIONS  (PRN):    Allergies  No Known Allergies    Intolerances      REVIEW OF SYSTEMS  General: Denies fevers and chills		  Ophthalmologic: Denies blurred vision   Respiratory and Thorax:	Denies sob   Cardiovascular:	Denies CP  Gastrointestinal:	Denies N, V 	  Musculoskeletal:	Denies muscle pain   Neurological:	Mentions headaches 	    ROS: Pertinent positives in HPI, all other ROS were reviewed and are negative.      Vital Signs Last 24 Hrs  T(C): 36.8 (05 Aug 2022 01:57), Max: 37 (04 Aug 2022 22:24)  T(F): 98.3 (05 Aug 2022 01:57), Max: 98.6 (04 Aug 2022 22:24)  HR: 85 (05 Aug 2022 01:57) (85 - 98)  BP: 157/99 (05 Aug 2022 01:57) (132/77 - 157/99)  BP(mean): --  RR: 19 (05 Aug 2022 01:57) (18 - 19)  SpO2: 100% (05 Aug 2022 01:57) (99% - 100%)    Parameters below as of 05 Aug 2022 01:57  Patient On (Oxygen Delivery Method): room air        GENERAL EXAM:  Constitutional: awake and alert. NAD  HEENT: PERRL, EOMI  Musculoskeletal: no joint swelling/tenderness, no abnormal movements  Skin: no rashes    NEUROLOGICAL EXAM:  MS: AAOX3, fluent, attends b/l; recent and remote memory intact; normal attention, language and fund of knowledge.   CN: VFF, EOMI, PERRL,   V1-3 intact, no facial asymmetry, t/p midline, SCM/trap intact.  Motor: Strength: 5/5 4x.   Tone: normal. Bulk: normal.    Plantar flex b/l.   Sensation: intact  4x.   Coordination: intact 4x.   Gait:  Deferred    Labs:   cbc                      15.8   10.17 )-----------( 277      ( 04 Aug 2022 18:30 )             49.0     [id number]    142  |  103  |  21  ----------------------------<  227<H>  4.5   |  27  |  1.07    Ca    9.6      04 Aug 2022 18:30    TPro  7.7  /  Alb  4.0  /  TBili  0.4  /  DBili  x   /  AST  25  /  ALT  28  /  AlkPhos  70  08-04      LFTsLIVER FUNCTIONS - ( 04 Aug 2022 18:30 )  Alb: 4.0 g/dL / Pro: 7.7 g/dL / ALK PHOS: 70 U/L / ALT: 28 U/L / AST: 25 U/L / GGT: x             Radiology:  MRI head prelim read:   INTERPRETATION: LEFT convexity subdural hemorrhage measures up to 13 mm in maximum depth with mild mass effect on the left cerebral hemisphrere.  RIGHT frontotemporal subdural hemorrhage measures up to 4 mm in maximum depth.    Rightward midline shift up to 3 mm.    No acute infarct. No acute parenchymal hemorrhage.

## 2022-08-05 NOTE — PATIENT PROFILE ADULT - FUNCTIONAL ASSESSMENT - BASIC MOBILITY 6.
4-calculated by average/Not able to assess (calculate score using [facility] averaging method)

## 2022-08-05 NOTE — H&P ADULT - NSHPPOAPRESSUREULCER_GEN_ALL_CORE
77 year old female with PMH of ADHD, anxiety, asthma, GERD, hypothyroidism, fibromyalgia POD #11 s/p laparoscopic cholecystectomy and extensive lysis of adhesions.  Post-GI ERCP for stent placement of suspected duct of Luschka.  Post-IR drain placement in RUQ for drainage of biloma.  Repeat CT on 3/6 revealed persistent fluid collections. Post-IR drain placement in RLQ on 3/8 - cultures sent.  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    - Continue low fat diet  - Continue pain control  - Continue IVF  - Encourage OOB/ambulation with assistance  - Encourage incentive spirometry  - Monitor bowel function/serial abdominal exams/monitor ostomy output & care  - Daily labs  - Monitor drain outputs  - DVT Prophylaxis with Lovenox  - Follow up cultures  - Above to be discussed with .     Surgical Team  Spectralink: [id number] 77 year old female with PMH of ADHD, anxiety, asthma, GERD, hypothyroidism, fibromyalgia POD #11 s/p laparoscopic cholecystectomy and extensive lysis of adhesions.  Post-GI ERCP for stent placement of suspected duct of Luschka.  Post-IR drain placement in RUQ for drainage of biloma.  Repeat CT on 3/6 revealed persistent fluid collections. Post-IR drain placement in RLQ on 3/8 - cultures sent.  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     no

## 2022-08-05 NOTE — ED CDU PROVIDER SUBSEQUENT DAY NOTE - NS ED ROS FT
Constitutional: (-) Fever, (-) Chills  Skin: (-) Rashes  Eyes: (+)Pain, (-) Visual changes, (-) Discharge, (-) Redness  Ears: (-) Hearing loss, (-)Tinnitus, (-) Ear pain  Nose: (-) Nasal congestion, (-) Runny nose  Mouth/Throat: (-) Sore throat  CV: (-) Chest pain  Resp: (-) Cough, (-) Shortness of breath, (-) Dyspnea on Exertion, (-) Wheezing  GI: (-) Abdominal pain, (-) Nausea, (-) Vomiting, (-) Diarrhea  : (-) Dysuria   MSK: (-) Myalgias  Neuro: (+) Headache, (-)Confusion, (-)LOC

## 2022-08-05 NOTE — ED PROVIDER NOTE - NS ED ATTENDING STATEMENT MOD
This was a shared visit with the CHELA. I reviewed and verified the documentation and independently performed the documented: I have personally provided the amount of critical care time documented below excluding time spent on separate procedures.

## 2022-08-05 NOTE — ED CDU PROVIDER DISPOSITION NOTE - ATTENDING CONTRIBUTION TO CARE
Patient currently without change in status symptomatic treatment provided for HA awaits transfer to [facility]

## 2022-08-05 NOTE — ED ADULT NURSE NOTE - OBJECTIVE STATEMENT
Pt presented to ed transferred to [facility] from [facility] for c/o subdural hematoma. pt reports having RT eye pain for past 1 month. pt denies nausea, vomiting, fever, chills, sob, chest pain, dizziness. Will continue to monitor.

## 2022-08-05 NOTE — ED PROVIDER NOTE - CRITICAL CARE ATTENDING CONTRIBUTION TO CARE
pt is 75 y/o male transferred from [facility] for sdh seen on mri brain in their cdu getting a work up for headaches and visual changes. NEUROLOGICAL: Alert and oriented, no focal deficits, no motor or sensory deficits. CN II-XII intact, 5/5 strength. Gait intact, FTN intact, tandem gait with difficulty. Phoenix-Hallpike neg. No nystagmus. gcs 15, seen by NS and myself for CT head now if unchanged from mri NS floor bed, bp 150s, load on keppra, no reversal at this time.    I agree with the plan and work up of the ACP on this case.

## 2022-08-05 NOTE — CONSULT NOTE ADULT - ASSESSMENT
Patient is a 77yo RT handed M with hx of DM-II with Neuropathy, TIAs, HTN, and HLD presents to [facility] ED for rt sided headaches for the past 1 month. Patient was followed by Opthalmology Dr. Santamaria as an outpatient for papilledema and was referred to come to the ED for further evaluation. Patient was evaluated by in house optho and was evaluated to be less likely GCA symptoms given clinical findings. MRI head completed noted to show Rt Frontal SDH and Left cerebral hemisphere SDH with 3mm midline shift. Headaches will improve once blood is reabsorbed.       Impression:   Rt sided headaches 2/2 to acute on chronic Rt frontal SDH and LEft cerebellar hemisphere SDH with midline shift.   Recommendations:   Keep SBP less than 160   Avoid AC/AP   Correction of electrolytes as needed   Headache- IV tylenol,  2mg IV tylenol   may consider 15mg Toradol, if headache does not improve.   Neurochecks  [facility] following   Neurosurgery consulted    Case to be discussed with Neurology Attending, Dr. Smart.    Patient is a 77yo RT handed M with hx of DM-II with Neuropathy, TIAs, HTN, and HLD presents to [facility] ED for rt sided headaches for the past 1 month. Patient was followed by Opthalmology Dr. Santamaria as an outpatient for papilledema and was referred to come to the ED for further evaluation. Patient was evaluated by in house optho and was evaluated to be less likely GCA symptoms given clinical findings. MRI head completed noted to show Rt Frontal SDH and Left cerebral hemisphere SDH with 3mm midline shift. Headaches will improve once blood is reabsorbed.       Impression:   Rt sided headaches 2/2 to acute on chronic Rt frontal SDH and LEft cerebellar hemisphere SDH with midline shift.   Recommendations:   Keep SBP less than 160   Avoid AC/AP   Correction of electrolytes as needed   Headache- IV tylenol,  2mg IV magnesium  Neurochecks  Optho following   Neurosurgery consulted    Case to be discussed with Neurology Attending, Dr. Smart.    Patient is a 75yo RT handed M with hx of DM-II with Neuropathy, TIAs, HTN, and HLD presents to [facility] ED for rt sided headaches for the past 1 month. Patient was followed by Opthalmology Dr. Santamaria as an outpatient for papilledema and was referred to come to the ED for further evaluation. Patient was evaluated by in house optho and was evaluated to be less likely GCA symptoms given clinical findings. MRI head completed noted to show Rt Frontal SDH and Left cerebral hemisphere SDH with 3mm midline shift. Headaches will improve once blood is reabsorbed.       Impression:   Rt sided headaches 2/2 to acute on chronic Rt frontal SDH and LEft cerebellar hemisphere SDH with midline shift.   Recommendations:   Keep SBP less than 160   Avoid AC/AP   Correction of electrolytes as needed   Headache- IV tylenol,  2mg IV magnesium  Neurochecks  Optho following   Neurosurgery consulted    Case to be discussed with Neurology Attending, Dr. Edward.

## 2022-08-05 NOTE — CONSULT NOTE ADULT - PROBLEM SELECTOR RECOMMENDATION 9
pt is medically optimized for planned procedure. no nsaids, herbs, vit d/w pt. monitor clinically per neurosurg

## 2022-08-05 NOTE — CONSULT NOTE ADULT - SUBJECTIVE AND OBJECTIVE BOX
Dewayne Durangosiapricila  contact via pager or TEAMS  Office [phone]    HPI:  77yo RT handed M with hx of DM-II with Neuropathy, TIAs, HTN, and HLD presents [facility] ED for rt sided headaches for the past 1 month. Patient was followed by Opthalmology Dr. Bowers as an outpatient for papilledema and was referred to come to the [facility] ED for further evaluation. Optho states it is less likely GCA given lack of clinical symptoms and negative inflammatory markers. 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 Patient was follows with outpatient Neurologist and was seen last week for trigger point injections. Patient denies numbness, tingling, fever and chills.     Patient transferred to [facility] for possible MMAE with Dr. Isabel. Per patient he takes Aggrenox but only once a day, and last taken 2 days ago for history of TIA. He states that he does not take any other AP/AC, and this is confirmed by his wife Glory at the bedside.  (05 Aug 2022 11:21)      PAST MEDICAL & SURGICAL HISTORY:  Diabetes      TIA (transient ischemic attack)      HTN (hypertension)      Hyperlipidemia      No significant past surgical history          Review of Systems:   CONSTITUTIONAL: No fever, weight loss, or fatigue  EYES: No eye pain, visual disturbances, or discharge  ENMT:  No difficulty hearing, tinnitus, vertigo; No sinus or throat pain  NECK: No pain or stiffness  BREASTS: No pain, masses, or nipple discharge  RESPIRATORY: No cough, wheezing, chills or hemoptysis; No shortness of breath  CARDIOVASCULAR: No chest pain, palpitations, dizziness, or leg swelling  GASTROINTESTINAL: No abdominal or epigastric pain. No nausea, vomiting, or hematemesis; No diarrhea or constipation. No melena or hematochezia.  GENITOURINARY: No dysuria, frequency, hematuria, or incontinence  NEUROLOGICAL: No headaches, memory loss, loss of strength, numbness, or tremors  SKIN: No itching, burning, rashes, or lesions   LYMPH NODES: No enlarged glands  ENDOCRINE: No heat or cold intolerance; No hair loss  MUSCULOSKELETAL: No joint pain or swelling; No muscle, back, or extremity pain  PSYCHIATRIC: No depression, anxiety, mood swings, or difficulty sleeping  HEME/LYMPH: No easy bruising, or bleeding gums  ALLERY AND IMMUNOLOGIC: No hives or eczema    Allergies    No Known Allergies    Intolerances        Social History:     FAMILY HISTORY:  Family history of renal cancer  mother    Family history of prostate cancer in father  father        MEDICATIONS  (STANDING):  levETIRAcetam  IVPB 500 milliGRAM(s) IV Intermittent every 12 hours  sodium chloride 0.9%. 1000 milliLiter(s) (75 mL/Hr) IV Continuous <Continuous>    MEDICATIONS  (PRN):  acetaminophen     Tablet .. 650 milliGRAM(s) Oral every 6 hours PRN Temp greater or equal to 38C (100.4F), Mild Pain (1 - 3)      Vital Signs Last 24 Hrs  T(C): 36.4 (05 Aug 2022 11:22), Max: 37 (04 Aug 2022 22:24)  T(F): 97.6 (05 Aug 2022 11:22), Max: 98.6 (04 Aug 2022 22:24)  HR: 78 (05 Aug 2022 11:22) (78 - 98)  BP: 132/84 (05 Aug 2022 11:22) (130/90 - 157/99)  BP(mean): --  RR: 18 (05 Aug 2022 11:22) (16 - 19)  SpO2: 98% (05 Aug 2022 11:22) (97% - 100%)  CAPILLARY BLOOD GLUCOSE      POCT Blood Glucose.: 136 mg/dL (05 Aug 2022 07:39)  POCT Blood Glucose.: 217 mg/dL (05 Aug 2022 00:37)  POCT Blood Glucose.: 236 mg/dL (04 Aug 2022 16:25)    I&O's Summary      PHYSICAL EXAM:  GENERAL: NAD, well-developed  HEAD:  Atraumatic, Normocephalic  EYES: EOMI, PERRLA, conjunctiva and sclera clear  NECK: Supple, No JVD  CHEST/LUNG: Clear to auscultation bilaterally; No wheeze  HEART: Regular rate and rhythm; No murmurs, rubs, or gallops  ABDOMEN: Soft, Nontender, Nondistended; Bowel sounds present  EXTREMITIES:  2+ Peripheral Pulses, No clubbing, cyanosis, or edema  PSYCH: AAOx3  NEUROLOGY: non-focal  SKIN: No rashes or lesions    LABS:                        15.7   10.11 )-----------( 272      ( 05 Aug 2022 06:37 )             49.1     08-05    143  |  106  |  23  ----------------------------<  146<H>  4.8   |  28  |  0.91    Ca    9.2      05 Aug 2022 06:37    TPro  7.7  /  Alb  4.0  /  TBili  0.4  /  DBili  x   /  AST  25  /  ALT  28  /  AlkPhos  70  08-04    PT/INR - ( 05 Aug 2022 11:40 )   PT: 12.5 sec;   INR: 1.08 ratio         PTT - ( 05 Aug 2022 11:40 )  PTT:33.6 sec          RADIOLOGY & ADDITIONAL TESTS:    Imaging Personally Reviewed:    Consultant(s) Notes Reviewed:      Care Discussed with Consultants/Other Providers:   Dewayne Duranjohannajavi  contact via pager or TEAMS  Office [phone]    HPI:  77yo M with hx of DM-II with Neuropathy, TIAs, HTN, and HLD was sent to ED by his ophthalmologist for papilledema and HA x 1 month for further evaluation. Patient had MRI in [facility] and was noted to have acute on chronic Left sided SDH and Right frontotemporal SDH. Patient transferred to [facility] for possible MMAE with Dr. Isabel. Per patient he takes Aggrenox but only once a day, and last taken 2 days ago for history of TIA. Pt reports that he fell 1 mo ago after tripping over something- landed onto his buttocks with no head trauma. Never had cp/sob/light-headedness or LOC. No f/c/r.     PAST MEDICAL & SURGICAL HISTORY:  Diabetes      TIA (transient ischemic attack)      HTN (hypertension)      Hyperlipidemia      Cervical disc disease          Review of Systems:   CONSTITUTIONAL: No fever, weight loss, or fatigue  EYES: No eye pain, visual disturbances, or discharge  ENMT:  No difficulty hearing, tinnitus, vertigo; No sinus or throat pain  NECK: No pain or stiffness  BREASTS: No pain, masses, or nipple discharge  RESPIRATORY: No cough, wheezing, chills or hemoptysis; No shortness of breath  CARDIOVASCULAR: No chest pain, palpitations, dizziness, or leg swelling  GASTROINTESTINAL: No abdominal or epigastric pain. No nausea, vomiting, or hematemesis; No diarrhea or constipation. No melena or hematochezia.  GENITOURINARY: No dysuria, frequency, hematuria, or incontinence  NEUROLOGICAL: No headaches, memory loss, loss of strength, numbness, or tremors  SKIN: No itching, burning, rashes, or lesions   LYMPH NODES: No enlarged glands  ENDOCRINE: No heat or cold intolerance; No hair loss  MUSCULOSKELETAL: No joint pain or swelling; No muscle, back, or extremity pain  PSYCHIATRIC: No depression, anxiety, mood swings, or difficulty sleeping  HEME/LYMPH: No easy bruising, or bleeding gums  ALLERY AND IMMUNOLOGIC: No hives or eczema    Allergies    No Known Allergies    Intolerances        Social History:     FAMILY HISTORY:  Family history of renal cancer  mother    Family history of prostate cancer in father  father        MEDICATIONS  (STANDING):  levETIRAcetam  IVPB 500 milliGRAM(s) IV Intermittent every 12 hours  sodium chloride 0.9%. 1000 milliLiter(s) (75 mL/Hr) IV Continuous <Continuous>    MEDICATIONS  (PRN):  acetaminophen     Tablet .. 650 milliGRAM(s) Oral every 6 hours PRN Temp greater or equal to 38C (100.4F), Mild Pain (1 - 3)      Vital Signs Last 24 Hrs  T(C): 36.4 (05 Aug 2022 11:22), Max: 37 (04 Aug 2022 22:24)  T(F): 97.6 (05 Aug 2022 11:22), Max: 98.6 (04 Aug 2022 22:24)  HR: 78 (05 Aug 2022 11:22) (78 - 98)  BP: 132/84 (05 Aug 2022 11:22) (130/90 - 157/99)  BP(mean): --  RR: 18 (05 Aug 2022 11:22) (16 - 19)  SpO2: 98% (05 Aug 2022 11:22) (97% - 100%)  CAPILLARY BLOOD GLUCOSE      POCT Blood Glucose.: 136 mg/dL (05 Aug 2022 07:39)  POCT Blood Glucose.: 217 mg/dL (05 Aug 2022 00:37)  POCT Blood Glucose.: 236 mg/dL (04 Aug 2022 16:25)    I&O's Summary      PHYSICAL EXAM:  GENERAL: NAD, well-developed  HEAD:  Atraumatic, Normocephalic  EYES: EOMI, PERRLA, conjunctiva and sclera clear  NECK: Supple, No JVD  CHEST/LUNG: Clear to auscultation bilaterally; No wheeze  HEART: Regular rate and rhythm; No murmurs, rubs, or gallops  ABDOMEN: Soft, Nontender, Nondistended; Bowel sounds present  EXTREMITIES:  2+ Peripheral Pulses, No clubbing, cyanosis, or edema  PSYCH: AAOx3  NEUROLOGY: non-focal  SKIN: No rashes or lesions    LABS:                        15.7   10.11 )-----------( 272      ( 05 Aug 2022 06:37 )             49.1     08-05    143  |  106  |  23  ----------------------------<  146<H>  4.8   |  28  |  0.91    Ca    9.2      05 Aug 2022 06:37    TPro  7.7  /  Alb  4.0  /  TBili  0.4  /  DBili  x   /  AST  25  /  ALT  28  /  AlkPhos  70  08-04    PT/INR - ( 05 Aug 2022 11:40 )   PT: 12.5 sec;   INR: 1.08 ratio         PTT - ( 05 Aug 2022 11:40 )  PTT:33.6 sec          RADIOLOGY & ADDITIONAL TESTS:    Imaging Personally Reviewed:    Consultant(s) Notes Reviewed:      Care Discussed with Consultants/Other Providers:   Dewayne Dooley  contact via pager or TEAMS  Office [phone]    HPI:  77yo M with hx of DM-II with Neuropathy, TIAs, HTN, and HLD was sent to ED by his ophthalmologist for papilledema and HA x 1 month for further evaluation. Patient had MRI in [facility] and was noted to have acute on chronic Left sided SDH and Right frontotemporal SDH. Patient transferred to [facility] for possible MMAE with Dr. Isabel. Per patient he takes Aggrenox but only once a day, and last taken 2 days ago for history of TIA. Pt reports that he fell 1 mo ago after tripping over something- landed onto his buttocks with no head trauma. Never had cp/sob/light-headedness or LOC. No f/c/r. Pt go walk up >2 flights of stairs with no typical or atypical isch sx.    PAST MEDICAL & SURGICAL HISTORY:  Diabetes      TIA (transient ischemic attack)      HTN (hypertension)      Hyperlipidemia      Cervical disc disease          Review of Systems:   CONSTITUTIONAL: No fever, weight loss, or fatigue  EYES: No eye pain, visual disturbances, or discharge  ENMT:  No difficulty hearing, tinnitus, vertigo; No sinus or throat pain  NECK: No pain or stiffness  BREASTS: No pain, masses, or nipple discharge  RESPIRATORY: No cough, wheezing, chills or hemoptysis; No shortness of breath  CARDIOVASCULAR: No chest pain, palpitations, dizziness, or leg swelling  GASTROINTESTINAL: No abdominal or epigastric pain. No nausea, vomiting, or hematemesis; No diarrhea or constipation. No melena or hematochezia.  GENITOURINARY: No dysuria, frequency, hematuria, or incontinence  NEUROLOGICAL: No headaches, memory loss, loss of strength, numbness, or tremors  SKIN: No itching, burning, rashes, or lesions   LYMPH NODES: No enlarged glands  ENDOCRINE: No heat or cold intolerance; No hair loss  MUSCULOSKELETAL: No joint pain or swelling; No muscle, back, or extremity pain  PSYCHIATRIC: No depression, anxiety, mood swings, or difficulty sleeping  HEME/LYMPH: No easy bruising, or bleeding gums  ALLERY AND IMMUNOLOGIC: No hives or eczema    Allergies    No Known Allergies      Social History:   no smoking, etoh or illegal drugs    FAMILY HISTORY:  Mother  of "old age" at 92.    Father  of prostate ca at 86.        MEDICATIONS  (STANDING):  levETIRAcetam  IVPB 500 milliGRAM(s) IV Intermittent every 12 hours  sodium chloride 0.9%. 1000 milliLiter(s) (75 mL/Hr) IV Continuous <Continuous>    MEDICATIONS  (PRN):  acetaminophen     Tablet .. 650 milliGRAM(s) Oral every 6 hours PRN Temp greater or equal to 38C (100.4F), Mild Pain (1 - 3)      Vital Signs Last 24 Hrs  T(C): 36.4 (05 Aug 2022 11:22), Max: 37 (04 Aug 2022 22:24)  T(F): 97.6 (05 Aug 2022 11:22), Max: 98.6 (04 Aug 2022 22:24)  HR: 78 (05 Aug 2022 11:) (78 - 98)  BP: 132/84 (05 Aug 2022 11:22) (130/90 - 157/99)  BP(mean): --  RR: 18 (05 Aug 2022 11:22) (16 - 19)  SpO2: 98% (05 Aug 2022 11:) (97% - 100%)  CAPILLARY BLOOD GLUCOSE      POCT Blood Glucose.: 136 mg/dL (05 Aug 2022 07:39)  POCT Blood Glucose.: 217 mg/dL (05 Aug 2022 00:37)  POCT Blood Glucose.: 236 mg/dL (04 Aug 2022 16:25)    I&O's Summary      PHYSICAL EXAM:  GENERAL: NAD, well-developed  HEAD:  Atraumatic, Normocephalic  EYES: EOMI, PERRLA, conjunctiva and sclera clear  NECK: Supple, No JVD  CHEST/LUNG: Clear to auscultation bilaterally; No wheeze  HEART: Regular rate and rhythm; No murmurs, rubs, or gallops  ABDOMEN: Soft, Nontender, Nondistended; Bowel sounds present  EXTREMITIES:  2+ Peripheral Pulses, No clubbing, cyanosis, or edema  PSYCH: AAOx3  NEUROLOGY: non-focal  SKIN: No rashes or lesions    LABS:                        15.7   10.11 )-----------( 272      ( 05 Aug 2022 06:37 )             49.1     08-05    143  |  106  |  23  ----------------------------<  146<H>  4.8   |  28  |  0.91    Ca    9.2      05 Aug 2022 06:37    TPro  7.7  /  Alb  4.0  /  TBili  0.4  /  DBili  x   /  AST  25  /  ALT  28  /  AlkPhos  70  08-04    PT/INR - ( 05 Aug 2022 11:40 )   PT: 12.5 sec;   INR: 1.08 ratio         PTT - ( 05 Aug 2022 11:40 )  PTT:33.6 sec      EKG reviewed by me: DOMINIC velez RBNAHUN.    RADIOLOGY & ADDITIONAL TESTS:     Imaging Personally Reviewed: < from: MR Head w/wo IV Cont (22 @ 02:10) >  IMPRESSION: Bilateral subdural hematomas are identified as described   above.    < end of copied text >      Consultant(s) Notes Reviewed:      Care Discussed with Consultants/Other Providers: neurosurg

## 2022-08-05 NOTE — ED CDU PROVIDER DISPOSITION NOTE - CLINICAL COURSE
76y Male with PMHx of HTN, DM, HLD, TIA presents to the ER from outpatient optho Dr. Bryson for right sided headache and eye pain x 1 month. In ER, labs within normal limits. Patient seen by optho, concern for optic neuritis - plan for MR in ER and [facility]U for results and neuro consult. Patient symptomatic until over night, spoke with Neuro, MR showing Bilateral subdural hematomas are identified as described   above. Denies trauma. No focal neuro deficit. Plan for transfer to [facility] for neurosurgical intervention.

## 2022-08-05 NOTE — H&P ADULT - NSHPLABSRESULTS_GEN_ALL_CORE
15.7   10.11 )-----------( 272      ( 05 Aug 2022 06:37 )             49.1   08-05    143  |  106  |  23  ----------------------------<  146<H>  4.8   |  28  |  0.91    Ca    9.2      05 Aug 2022 06:37    TPro  7.7  /  Alb  4.0  /  TBili  0.4  /  DBili  x   /  AST  25  /  ALT  28  /  AlkPhos  70  08-04  PT/INR - ( 05 Aug 2022 11:40 )   PT: 12.5 sec;   INR: 1.08 ratio         PTT - ( 05 Aug 2022 11:40 )  PTT:33.6 sec    < from: CT Head No Cont (08.05.22 @ 10:51) >    INTERPRETATION:  Clinical indication: Follow-up subdural hematoma    Multiple axial sections were performed from base of vertex without   contrast enhancement. Coronal and sagittal destructions were performed as   well    This exam compared prior head CT performed on January 11, 2015 and prior   brain MRI performed on August 15, 2022.    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 This finding was   present on the prior MRI and currently measures approximately 1.3 cm and   widest diameter.    There is localized mass effect seen consisting of sulcal effacement. Mass   effect on left lateral ventricle is seen.    No significant shift or herniation is seen    Evaluation of the osseous structures with the appropriate window appears   normal    The visualized paraspinous sinuses mastoid and middle ear regions appear   clear.    Impression: Left-sided subdural hematoma is again identified .    --- End of Report ---    SARAH NEWBERRY MD; Attending Radiologist  This document has been electronically signed. Aug  5 2022 11:16AM    < end of copied text >

## 2022-08-05 NOTE — CONSULT NOTE ADULT - ATTENDING COMMENTS
Chronic SDH will transfer to [facility] to discuss MMA embolization
VASCULAR NEUROLOGY ATTENDING  The patient is seen and examined the history and imaging are reviewed. I agree with the resident note unless otherwise noted. L>R acute on chronic SDH. Neuro exam unremarkable. Mild HA. Possible prior trauma. Serial CT. No objection to MMA evaluation. Pain control. Discussed with patient at length.

## 2022-08-05 NOTE — H&P ADULT - HISTORY OF PRESENT ILLNESS
75yo RT handed M with hx of DM-II with Neuropathy, TIAs, HTN, and HLD presents to [facility] ED for rt sided headaches for the past 1 month. Patient was followed by Opthalmology Dr. Bowers as an outpatient for papilledema and was referred to come to the [facility] ED for further evaluation. Optho states it is less likely GCA given lack of clinical symptoms and negative inflammatory markers. Patient had MRI in [facility] and was noted to have acute on chronic Left sided SDH and Right frontotemporal SDH. Patient was follows with outpatient Neurologist and was seen last week for trigger point injections. Patient denies numbness, tingling, fever and chills.     Patient transferred to [facility] for possible MMAE with Dr. Isabel. Per patient he takes Aggrenox but only once a day, and last taken 2 days ago for history of TIA. He states that he does not take any other AP/AC, and this is confirmed by his wife Glory at the bedside.  75yo RT handed M with hx of DM-II with Neuropathy, TIAs, HTN, and HLD presents to [facility] ED for rt sided headaches for the past 1 month. Patient was followed by Opthalmology Dr. Bowers as an outpatient for papilledema and was referred to come to the [facility] ED for further evaluation. Optho states it is less likely GCA given lack of clinical symptoms and negative inflammatory markers. Patient had MRI in [facility] and was noted to have acute on chronic Left sided SDH and Right frontotemporal SDH. Patient was follows with outpatient Neurologist and was seen last week for trigger point injections. Patient denies numbness, tingling, fever and chills.     Patient transferred to [facility] for possible MMAE with Dr. Isabel. Per patient he takes Aggrenox but only once a day, and last taken 2 days ago for history of TIA. He states that he does not take any other AP/AC, and this is confirmed by his wife Glory at the bedside.     Exam on arrival: AOx3, pupil 3mm reactive b/l, speech fluent, following commands, no drifts, uppers 5/5, lowers 5/5

## 2022-08-05 NOTE — CHART NOTE - NSCHARTNOTEFT_GEN_A_CORE
Interventional Neuro- Radiology   Procedure Note      Procedure: Selective Cerebral Angiography and MMA Embolization  Pre- Procedure Diagnosis: L SDH  Post- Procedure Diagnosis: bk embolization of L MMA    : Dr. Chalo MD  Fellow: Dr. Peña  Physician Assistant: Breanna Moreno PA-C    RN: Estrella Abrams: Vitor    Anesthesia: Dr. Parvez MD   (general anesthesia)    I/Os:  Fluids: 400 cc  [place]ano: DTV  Contrast: 57 cc  Estimated Blood Loss: <10cc    Preliminary Report:  Under general anesthesia, using a 5Fr 90 arrowflex sheath to the right/ left/ bilateral groin/right radial artery examination of left vertebral artery/ left internal carotid artery/ left external carotid artey/ right vertebral artery/ right internal carotid artery/ right external carotid artery via selective cerebral angiography demonstrates bk embolization of L MMA. (Official note to follow).    Patient tolerated procedure well, vital signs stable, hemodynamically stable, no change in neurological status compared to baseline. Results discussed with neurosurgery/ patient and their family. Groin sheath d/c'ed, manual compression held to hemostasis, no active bleeding, no hematoma, vascade closure device applied, quick clot and safeguard balloon dressing applied at 18:30h. Patient transferred to IR recovery for further care/ monitoring.     Breanna Moreno PA-C

## 2022-08-05 NOTE — CHART NOTE - NSCHARTNOTEFT_GEN_A_CORE
Interventional Neuro Radiology  Pre-Procedure Note    This is a 76y ____ hand dominant Male      HPI:  77yo RT handed M with hx of DM-II with Neuropathy, TIAs, HTN, and HLD presents to [facility] ED for rt sided headaches for the past 1 month. Patient was followed by Opthalmology Dr. Bowers as an outpatient for papilledema and was referred to come to the [facility] ED for further evaluation. Optho states it is less likely GCA given lack of clinical symptoms and negative inflammatory markers. 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 Patient was follows with outpatient Neurologist and was seen last week for trigger point injections. Patient denies numbness, tingling, fever and chills.     Patient transferred to [facility] for possible MMAE with Dr. Isabel. Per patient he takes Aggrenox but only once a day, and last taken 2 days ago for history of TIA. He states that he does not take any other AP/AC, and this is confirmed by his wife Glory at the bedside.     Exam on arrival: AOx3, pupil 3mm reactive b/l, speech fluent, following commands, no drifts, uppers 5/5, lowers 5/5 (05 Aug 2022 11:21)      Neuro Exam: Awake and alert, oriented x3, fluent, normal naming and repetition, follows 3 step commands. Extraocular movements intact, no nystagmus, visual fields full, face symmetric, tongue midline. No drift, 5/5 power x 4 extremities. Normal sensation to LT. Normal finger-to-nose and rapid alternating movements.    PAST MEDICAL & SURGICAL HISTORY:  Diabetes      TIA (transient ischemic attack)      HTN (hypertension)      Hyperlipidemia      No significant past surgical history          Social History:   Denies tobacco use    FAMILY HISTORY:  Family history of renal cancer  mother    Family history of prostate cancer in father  father      No pertinent family history    Allergies: No Known Allergies      Current Medications: acetaminophen     Tablet .. 650 milliGRAM(s) Oral every 6 hours PRN  ALBUTerol    90 MICROgram(s) HFA Inhaler 2 Puff(s) Inhalation every 6 hours PRN  atorvastatin 20 milliGRAM(s) Oral at bedtime  insulin lispro (ADMELOG) corrective regimen sliding scale   SubCutaneous every 6 hours  levETIRAcetam  IVPB 500 milliGRAM(s) IV Intermittent every 12 hours  losartan 100 milliGRAM(s) Oral daily  polyethylene glycol 3350 17 Gram(s) Oral daily  senna 1 Tablet(s) Oral daily  sodium chloride 0.9%. 1000 milliLiter(s) IV Continuous <Continuous>      Labs:                         15.7   10.11 )-----------( 272      ( 05 Aug 2022 06:37 )             49.1       08-05    143  |  106  |  23  ----------------------------<  146<H>  4.8   |  28  |  0.91    Ca    9.2      05 Aug 2022 06:37    TPro  7.7  /  Alb  4.0  /  TBili  0.4  /  DBili  x   /  AST  25  /  ALT  28  /  AlkPhos  70  08-04        HCG:     Blood Bank: 08-05-22  A  --  Positive      Assessment/Plan:   This is a 76y ____ hand dominant Male  presents with ______. Patient presents to neuro-IR for selective cerebral angiography. Procedure/ risks/ benefits/ goals/ alternatives were explained. Risks include but are not limited to stroke/ vessel injury/ hemorrhage/ groin hematoma. All questions answered. Informed content obtained from patient____. Consent placed in chart. Interventional Neuro Radiology  Pre-Procedure Note    This is a 75yo RT handed M with hx of DM-II with Neuropathy, TIAs, HTN, and HLD presents to [facility] ED for rt sided headaches for the past 1 month. Patient was followed by Opthalmology Dr. Bowers as an outpatient for papilledema and was referred to come to the [facility] ED for further evaluation. Optho states it is less likely GCA given lack of clinical symptoms and negative inflammatory markers. 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 Patient was follows with outpatient Neurologist and was seen last week for trigger point injections. Patient denies numbness, tingling, fever and chills.     Patient transferred to [facility] for possible MMAE with Dr. Isabel. Per patient he takes Aggrenox but only once a day, and last taken 2 days ago for history of TIA. He states that he does not take any other AP/AC, and this is confirmed by his wife Glory at the bedside.         Neuro Exam: AOx3, pupil 3mm reactive b/l, speech fluent, following commands, no drifts, uppers 5/5, lowers 5/5    PAST MEDICAL & SURGICAL HISTORY:  Diabetes      TIA (transient ischemic attack)      HTN (hypertension)      Hyperlipidemia      No significant past surgical history          Social History:   Denies tobacco use    FAMILY HISTORY:  Family history of renal cancer  mother    Family history of prostate cancer in father  father      No pertinent family history    Allergies: No Known Allergies      Current Medications: acetaminophen     Tablet .. 650 milliGRAM(s) Oral every 6 hours PRN  ALBUTerol    90 MICROgram(s) HFA Inhaler 2 Puff(s) Inhalation every 6 hours PRN  atorvastatin 20 milliGRAM(s) Oral at bedtime  insulin lispro (ADMELOG) corrective regimen sliding scale   SubCutaneous every 6 hours  levETIRAcetam  IVPB 500 milliGRAM(s) IV Intermittent every 12 hours  losartan 100 milliGRAM(s) Oral daily  polyethylene glycol 3350 17 Gram(s) Oral daily  senna 1 Tablet(s) Oral daily  sodium chloride 0.9%. 1000 milliLiter(s) IV Continuous <Continuous>      Labs:                         15.7   10.11 )-----------( 272      ( 05 Aug 2022 06:37 )             49.1       08-05    143  |  106  |  23  ----------------------------<  146<H>  4.8   |  28  |  0.91    Ca    9.2      05 Aug 2022 06:37    TPro  7.7  /  Alb  4.0  /  TBili  0.4  /  DBili  x   /  AST  25  /  ALT  28  /  AlkPhos  70  08-04          Blood Bank: 08-05-22  A  --  Positive      Assessment/Plan:   This is a 77y/o Male who presents with SDH. Patient presents to neuro-IR for selective cerebral angiography and MMA embolization. Procedure/ risks/ benefits/ goals/ alternatives were explained. Risks include but are not limited to stroke/ vessel injury/ hemorrhage/ groin hematoma. All questions answered. Informed content obtained from patient. Consent placed in chart.    Breanna Moreno PA-C

## 2022-08-05 NOTE — CHART NOTE - NSCHARTNOTEFT_GEN_A_CORE
CAPRINI SCORE [CLOT] Score on Admission for     AGE RELATED RISK FACTORS                                                       MOBILITY RELATED FACTORS  [ ] Age 41-60 years                                            (1 Point)                  [ ] Bed rest                                                        (1 Point)  [ ] Age: 61-74 years                                           (2 Points)                 [ ] Plaster cast                                                   (2 Points)  [x ] Age= 75 years                                              (3 Points)                 [ ] Bed bound for more than 72 hours                 (2 Points)    DISEASE RELATED RISK FACTORS                                               GENDER SPECIFIC FACTORS  [ ] Edema in the lower extremities                       (1 Point)                  [ ] Pregnancy                                                     (1 Point)  [ ] Varicose veins                                               (1 Point)                  [ ] Post-partum < 6 weeks                                   (1 Point)             [ x] BMI > 25 Kg/m2                                            (1 Point)                  [ ] Hormonal therapy  or oral contraception          (1 Point)                 [ ] Sepsis (in the previous month)                        (1 Point)                  [ ] History of pregnancy complications                 (1 point)  [ ] Pneumonia or serious lung disease                                               [ ] Unexplained or recurrent                     (1 Point)           (in the previous month)                               (1 Point)  [ ] Abnormal pulmonary function test                     (1 Point)                 SURGERY RELATED RISK FACTORS (include planned surgeries)  [ ] Acute myocardial infarction                              (1 Point)                 [ ]  Section                                             (1 Point)  [ ] Congestive heart failure (in the previous month)  (1 Point)               [ ] Minor surgery                                                  (1 Point)   [ ] Inflammatory bowel disease                             (1 Point)                 [ ] Arthroscopic surgery                                        (2 Points)  [ ] Central venous access                                      (2 Points)                [ ] General surgery lasting more than 45 minutes   (2 Points)       [ ] Stroke (in the previous month)                          (5 Points)               [ ] Elective arthroplasty                                         (5 Points)            [ ] Current or past malignancy                                (2 Points)                                                                                                     HEMATOLOGY RELATED FACTORS                                                 TRAUMA RELATED RISK FACTORS  [ ] Prior episodes of VTE                                     (3 Points)                [ ] Fracture of the hip, pelvis, or leg                       (5 Points)  [ ] Positive family history for VTE                         (3 Points)                 [ ] Acute spinal cord injury (in the previous month)  (5 Points)  [ ] Prothrombin 48674 A                                     (3 Points)                 [ ] Paralysis  (less than 1 month)                             (5 Points)  [ ] Factor V Leiden                                             (3 Points)                  [ ] Multiple Trauma within 1 month                        (5 Points)  [ ] Lupus anticoagulants                                     (3 Points)                                                           [ ] Anticardiolipin antibodies                               (3 Points)                                                       [ ] High homocysteine in the blood                      (3 Points)                                             [ ] Other congenital or acquired thrombophilia      (3 Points)                                                [ ] Heparin induced thrombocytopenia                  (3 Points)                                          Total Score [       4   ]    Risk:  Very low 0   Low 1 to 2   Moderate 3 to 4   High =5       VTE Prophylasix Recommednations:  [ x] mechanical pneumatic compression devices                                      [ ] contraindicated: _____________________  [ ] chemo prophylasix                                                                                   [ ] contraindicated _____________________    **** HIGH LIKELIHOOD DVT PRESENT ON ADMISSION  [x ] (please order LE dopplers within 24 hours of admission)

## 2022-08-05 NOTE — ED CDU PROVIDER SUBSEQUENT DAY NOTE - HISTORY
77 yo male, PMH DM, TIA, HTN, hyperlipidemia, presenting to the ED at direction of outpatient ophthalmologist Dr. Bryson for further evaluation of right sided headaches / eye pain x 1 month.  In the ED, VSS, pt afebrile.  ESR 20, CRP 4.0; WBC 10.17; CMP mildly hemolyzed but grossly unremarkable; serum glucose 227.  COVID: NotDetected.  Ophthalmology was consulted; advised MRI head/orbits; pt was dispo'd to CDU accordingly.  In the interim, pt objectively noted to be resting comfortably; pt has been clinically stable; no issues until ~0525 am when pt c/o headache, consistent with same headaches he has been having x 1 month.  Tylenol IV ordered and Neuro consult called (Neuro was not called in ED as per ED attg. d/t unremarkable Neuro exam with no active headache); Neuro resident was scanning through MRI images as case was being discussed (MRI head/orbits was still pending radiology read (prelim or official) at that time); stated concern for subdural hemorrhage on imaging.  I immediately called radiology and informed the radiology resident who promptly spoke with radiology attending and called me back with prelim verbal read of LEFT subdural hemorrhage; I immediately discussed this with Neurology and promptly called Neurosurgery to consult as well.  Shortly thereafter, prelim radiology read was posted commenting on both RIGHT AND LEFT subdural hemorrhages.  Neurology actively at bedside discussing findings with pt; pt medically / neurologically stable at this time.

## 2022-08-05 NOTE — ED PROVIDER NOTE - NEUROLOGICAL, MLM
Assessment/Plan   Diagnoses and all orders for this visit:    Acute pain of right knee    Effusion of right knee    Locking of right knee    - MRI right knee - attn lateral meniscus  - Ice, NSAIDs as needed  - Follow up with Dr Norm Arredondo or Dr Aelxa Joyce after the MRI      Subjective   Patient ID: Bria Moulton is a 34 y o  male  Vitals:    03/04/21 0803   BP: 130/75   Pulse: 80     28yo male comes in for an evaluation of his right knee  He has been having pain for 2 weeks with no specific injury  Yesterday, while trying to climb a ladder, his knee locked 3 times  The knee locked in a fully flexed position and he had to hop down the ladder  He was able to keep moving it until he heard a pop and then had motion again  This locking and subsequent popping happened 2 more times after that  He has had swelling and pain since then  He has has a history of a meniscus tear in the past that gave him the same symptoms  He has a history of a arthroscopic meniscal surger (unknown medial or lateral), by Dr Stephany Maharaj in 2011  The following portions of the patient's history were reviewed and updated as appropriate: allergies, current medications, past family history, past medical history, past social history, past surgical history and problem list     Review of Systems  Ortho Exam  History reviewed  No pertinent past medical history    Past Surgical History:   Procedure Laterality Date    ANKLE SURGERY Bilateral     KNEE ARTHROSCOPY Right     KNEE SURGERY Right     NV REVISE MEDIAN N/CARPAL TUNNEL SURG Right 9/20/2018    Procedure: RELEASE CARPAL TUNNEL;  Surgeon: Tommy Ladd MD;  Location:  MAIN OR;  Service: Orthopedics    SYNOVECTOMY Right 9/20/2018    Procedure: TENOSYNOVECTOMY Flexor digitorum superficialis to index, long, ring and small finger; Flexor digitorum profundus to index, long, ring and small finger; Flexor pollicis longus;  Surgeon: Tommy Ladd MD;  Location: [facility];  Service: Orthopedics    TONSILLECTOMY AND ADENOIDECTOMY       Family History   Problem Relation Age of Onset    No Known Problems Mother     No Known Problems Father      Social History     Occupational History    Not on file   Tobacco Use    Smoking status: Never Smoker    Smokeless tobacco: Never Used   Substance and Sexual Activity    Alcohol use: Yes     Alcohol/week: 3 0 standard drinks     Types: 3 Cans of beer per week     Comment: social    Drug use: No    Sexual activity: Not on file       Review of Systems   Constitutional: Negative  HENT: Negative  Eyes: Negative  Respiratory: Negative  Cardiovascular: Negative  Gastrointestinal: Negative  Endocrine: Negative  Genitourinary: Negative  Musculoskeletal: As below      Allergic/Immunologic: Negative  Neurological: Negative  Hematological: Negative  Psychiatric/Behavioral: Negative  Objective   Physical Exam    · Constitutional: Awake, Alert, Oriented  · Eyes: EOMI  · Psych: Mood and affect appropriate  · Heart: regular rate and rhythm  · Lungs: No audible wheezing  · Abdomen: soft  · Lymph: no lymphedema   right Knee:  - Appearance   Swelling: mild, no discoloration, no deformity, no ecchymosis and no erythema  - Effusion   moderate  - Palpation   + Tenderness lateral joint line  and No tenderness about the medial / lateral joint line, patella, patellar tendon, MCL, LCL, hamstrings, or medial / lateral tibial plateau   - ROM   Extension: 5 and Flexion: 90  - Special Tests   Positive Sneha test   Lachman's Test negative, Anterior Drawer Test negative, Posterior Drawer Test negative, Valgus Stress Test negative, Varus Stress Test negative and Patellar apprehension negative  - Motor   normal 5/5 in all planes  - NVI distally    I have personally reviewed pertinent films in PACS and my interpretation is no acute displaced fracture  Alert and oriented, no focal deficits, no motor or sensory deficits.

## 2022-08-05 NOTE — CONSULT NOTE ADULT - ASSESSMENT
77yo RT handed M with hx of DM-II with Neuropathy, TIAs, HTN, and HLD presents to [facility] ED for rt sided headaches for the past 1 month. Patient was followed by Opthalmology Dr. Santamaria as an outpatient for papilledema and was referred to come to the ED for further evaluation. Patient was evaluated by in house optho and was evaluated to be less likely GCA symptoms given clinical findings. MRI head completed noted to show Rt Frontal SDH and Left cerebral hemisphere SDH with 3mm midline shift.   Opathlmology consult showed right sided papilledema     Impression:   Rt sided headaches 2/2 to acute on chronic Rt frontal SDH and LEft cerebellar hemisphere SDH with midline shift.         Plan:    - No acute neurosurgical intervention  - Case d/w Dr. Isabel, transfer to [facility] for MMA embolization and further workup  - Hold Aggrenox   - D/w Patient

## 2022-08-06 LAB
GLUCOSE BLDC GLUCOMTR-MCNC: 146 MG/DL — HIGH (ref 70–99)
GLUCOSE BLDC GLUCOMTR-MCNC: 147 MG/DL — HIGH (ref 70–99)
GLUCOSE BLDC GLUCOMTR-MCNC: 193 MG/DL — HIGH (ref 70–99)
GLUCOSE BLDC GLUCOMTR-MCNC: 243 MG/DL — HIGH (ref 70–99)

## 2022-08-06 PROCEDURE — 70450 CT HEAD/BRAIN W/O DYE: CPT | Mod: 26

## 2022-08-06 PROCEDURE — 93970 EXTREMITY STUDY: CPT | Mod: 26

## 2022-08-06 PROCEDURE — 99232 SBSQ HOSP IP/OBS MODERATE 35: CPT

## 2022-08-06 PROCEDURE — 99232 SBSQ HOSP IP/OBS MODERATE 35: CPT | Mod: FS

## 2022-08-06 RX ORDER — INSULIN LISPRO 100/ML
VIAL (ML) SUBCUTANEOUS
Refills: 0 | Status: DISCONTINUED | OUTPATIENT
Start: 2022-08-06 | End: 2022-08-10

## 2022-08-06 RX ORDER — LEVETIRACETAM 250 MG/1
500 TABLET, FILM COATED ORAL
Refills: 0 | Status: DISCONTINUED | OUTPATIENT
Start: 2022-08-06 | End: 2022-08-10

## 2022-08-06 RX ORDER — SODIUM CHLORIDE 9 MG/ML
1000 INJECTION, SOLUTION INTRAVENOUS
Refills: 0 | Status: DISCONTINUED | OUTPATIENT
Start: 2022-08-06 | End: 2022-08-10

## 2022-08-06 RX ORDER — INSULIN LISPRO 100/ML
4 VIAL (ML) SUBCUTANEOUS
Refills: 0 | Status: DISCONTINUED | OUTPATIENT
Start: 2022-08-06 | End: 2022-08-09

## 2022-08-06 RX ORDER — DEXTROSE 50 % IN WATER 50 %
25 SYRINGE (ML) INTRAVENOUS ONCE
Refills: 0 | Status: DISCONTINUED | OUTPATIENT
Start: 2022-08-06 | End: 2022-08-10

## 2022-08-06 RX ORDER — DEXTROSE 50 % IN WATER 50 %
12.5 SYRINGE (ML) INTRAVENOUS ONCE
Refills: 0 | Status: DISCONTINUED | OUTPATIENT
Start: 2022-08-06 | End: 2022-08-10

## 2022-08-06 RX ORDER — INSULIN GLARGINE 100 [IU]/ML
10 INJECTION, SOLUTION SUBCUTANEOUS ONCE
Refills: 0 | Status: COMPLETED | OUTPATIENT
Start: 2022-08-06 | End: 2022-08-06

## 2022-08-06 RX ORDER — DEXTROSE 50 % IN WATER 50 %
15 SYRINGE (ML) INTRAVENOUS ONCE
Refills: 0 | Status: DISCONTINUED | OUTPATIENT
Start: 2022-08-06 | End: 2022-08-10

## 2022-08-06 RX ORDER — GLUCAGON INJECTION, SOLUTION 0.5 MG/.1ML
1 INJECTION, SOLUTION SUBCUTANEOUS ONCE
Refills: 0 | Status: DISCONTINUED | OUTPATIENT
Start: 2022-08-06 | End: 2022-08-10

## 2022-08-06 RX ORDER — INSULIN LISPRO 100/ML
VIAL (ML) SUBCUTANEOUS AT BEDTIME
Refills: 0 | Status: DISCONTINUED | OUTPATIENT
Start: 2022-08-06 | End: 2022-08-10

## 2022-08-06 RX ORDER — INSULIN GLARGINE 100 [IU]/ML
10 INJECTION, SOLUTION SUBCUTANEOUS AT BEDTIME
Refills: 0 | Status: DISCONTINUED | OUTPATIENT
Start: 2022-08-06 | End: 2022-08-09

## 2022-08-06 RX ADMIN — LOSARTAN POTASSIUM 100 MILLIGRAM(S): 100 TABLET, FILM COATED ORAL at 05:53

## 2022-08-06 RX ADMIN — ATORVASTATIN CALCIUM 20 MILLIGRAM(S): 80 TABLET, FILM COATED ORAL at 21:19

## 2022-08-06 RX ADMIN — LEVETIRACETAM 500 MILLIGRAM(S): 250 TABLET, FILM COATED ORAL at 18:56

## 2022-08-06 RX ADMIN — Medication 1: at 16:33

## 2022-08-06 RX ADMIN — SENNA PLUS 1 TABLET(S): 8.6 TABLET ORAL at 12:23

## 2022-08-06 RX ADMIN — ATORVASTATIN CALCIUM 20 MILLIGRAM(S): 80 TABLET, FILM COATED ORAL at 01:40

## 2022-08-06 RX ADMIN — LEVETIRACETAM 400 MILLIGRAM(S): 250 TABLET, FILM COATED ORAL at 05:46

## 2022-08-06 RX ADMIN — Medication 4 UNIT(S): at 12:20

## 2022-08-06 RX ADMIN — Medication 4 UNIT(S): at 17:00

## 2022-08-06 RX ADMIN — INSULIN GLARGINE 10 UNIT(S): 100 INJECTION, SOLUTION SUBCUTANEOUS at 21:20

## 2022-08-06 RX ADMIN — POLYETHYLENE GLYCOL 3350 17 GRAM(S): 17 POWDER, FOR SOLUTION ORAL at 12:23

## 2022-08-06 RX ADMIN — INSULIN GLARGINE 10 UNIT(S): 100 INJECTION, SOLUTION SUBCUTANEOUS at 12:21

## 2022-08-06 NOTE — PHYSICAL THERAPY INITIAL EVALUATION ADULT - IMPAIRMENTS CONTRIBUTING IMPAIRED BED MOBILITY, REHAB EVAL
impaired balance/impaired coordination/impaired postural control/decreased sensation/impaired sensory feedback/decreased strength

## 2022-08-06 NOTE — PHYSICAL THERAPY INITIAL EVALUATION ADULT - STRENGTHENING, PT EVAL
[Feeling Tired] : feeling tired [As Noted in HPI] : as noted in HPI [Shortness Of Breath] : shortness of breath [Negative] : Heme/Lymph [FreeTextEntry2] : Probable snoring [FreeTextEntry5] : Shortness of breath, being investigated for cardiac amyloidosis [FreeTextEntry9] : Chronic low back pain, recently exacerbated [de-identified] : Pruritus  [de-identified] : Episodes of fatigue, mental fogginess and hunger, related to "reactive hypoglycemia" GOAL: pt will be able to perform all functional mobility independently within 4 weeks

## 2022-08-06 NOTE — PHYSICAL THERAPY INITIAL EVALUATION ADULT - ADDITIONAL COMMENTS
Pt lives with his wife in a private home, 6 entry steps (+ rail), 14 steps inside (+ rail). Prior to admission pt was independent with all functional mobility including community ambulation with straight cane. Pt can perform most ADL's independently (requires assist from spouse for bathing). Pt has a tub/shower combo, no grab bars, fixed shower head, & standard toilet seat height. Pt endorses 8 falls in the last 6 months.

## 2022-08-06 NOTE — PHYSICAL THERAPY INITIAL EVALUATION ADULT - BALANCE DISTURBANCE, IDENTIFIED IMPAIRMENT CONTRIBUTE, REHAB EVAL
impaired coordination/impaired postural control/decreased sensation/impaired sensory feedback/decreased strength

## 2022-08-06 NOTE — PROGRESS NOTE ADULT - PROBLEM SELECTOR PLAN 2
A1c 6.8, and has required minimal insulin (albeit has  been npo), overall diabetes seem to be well controlled based off a1c.   -would start low with insulin, agree with lantus 10 and humalog 4 TID. will uptitrate accordingly  -if patient is to be discharged on 8/6, would discharge on lantus 18 (takes 26 at home) and humalog 6 TID (takes 20 at home) with followup with PCP within 5-7 days  -can continue ozempic at home

## 2022-08-06 NOTE — PROGRESS NOTE ADULT - ASSESSMENT
Patient seen and examined at bedside s/p cerebral angiogram for Left middle meningeal artery embolization, recovering well  -PACU Floor, cleared after 4 hours.  -Q4 neurochecks  -Q4 vitals  -Pain control  -Advance diet as tolerated  -PT/OT  -CT in AM  -CANNOT GET STEROIDS

## 2022-08-06 NOTE — PROGRESS NOTE ADULT - ASSESSMENT
77yo M with hx of DM-II with Neuropathy, TIAs, HTN, and HLD was sent to ED by his ophthalmologist for papilledema and HA x 1 month for further evaluation. Patient had MRI in [facility] and was noted to have acute on chronic Left sided SDH and Right frontotemporal SDH.

## 2022-08-06 NOTE — PROGRESS NOTE ADULT - SUBJECTIVE AND OBJECTIVE BOX
[facility] Division of Hospital Medicine  Lico Randhawa MD  Available via MS Teams  Pager: [phone]    SUBJECTIVE / OVERNIGHT EVENTS:  -No acute events overnight, denies any abdominal pain, nausea, vomiting. Is in good spirits and wishes to go home.     MEDICATIONS  (STANDING):  atorvastatin 20 milliGRAM(s) Oral at bedtime  dextrose 5%. 1000 milliLiter(s) (50 mL/Hr) IV Continuous <Continuous>  dextrose 5%. 1000 milliLiter(s) (100 mL/Hr) IV Continuous <Continuous>  dextrose 50% Injectable 25 Gram(s) IV Push once  dextrose 50% Injectable 12.5 Gram(s) IV Push once  dextrose 50% Injectable 25 Gram(s) IV Push once  glucagon  Injectable 1 milliGRAM(s) IntraMuscular once  insulin glargine Injectable (LANTUS) 10 Unit(s) SubCutaneous at bedtime  insulin glargine Injectable (LANTUS) 10 Unit(s) SubCutaneous once  insulin lispro (ADMELOG) corrective regimen sliding scale   SubCutaneous three times a day before meals  insulin lispro (ADMELOG) corrective regimen sliding scale   SubCutaneous at bedtime  insulin lispro Injectable (ADMELOG) 4 Unit(s) SubCutaneous three times a day before meals  levETIRAcetam 500 milliGRAM(s) Oral two times a day  losartan 100 milliGRAM(s) Oral daily  polyethylene glycol 3350 17 Gram(s) Oral daily  senna 1 Tablet(s) Oral daily    MEDICATIONS  (PRN):  acetaminophen     Tablet .. 650 milliGRAM(s) Oral every 6 hours PRN Temp greater or equal to 38C (100.4F), Mild Pain (1 - 3)  ALBUTerol    90 MICROgram(s) HFA Inhaler 2 Puff(s) Inhalation every 6 hours PRN Shortness of Breath and/or Wheezing  dextrose Oral Gel 15 Gram(s) Oral once PRN Blood Glucose LESS THAN 70 milliGRAM(s)/deciliter      I&O's Summary      PHYSICAL EXAM:  Vital Signs Last 24 Hrs  T(C): 36.7 (06 Aug 2022 05:46), Max: 36.8 (05 Aug 2022 23:00)  T(F): 98 (06 Aug 2022 05:46), Max: 98.2 (05 Aug 2022 23:00)  HR: 87 (06 Aug 2022 05:46) (77 - 90)  BP: 149/81 (06 Aug 2022 05:46) (122/68 - 169/91)  BP(mean): 105 (05 Aug 2022 22:00) (97 - 122)  RR: 18 (06 Aug 2022 05:46) (12 - 18)  SpO2: 98% (06 Aug 2022 05:46) (97% - 100%)    Parameters below as of 06 Aug 2022 05:46  Patient On (Oxygen Delivery Method): room air      PHYSICAL EXAM:  GENERAL: NAD, well-developed  HEAD:  Atraumatic, Normocephalic  EYES: EOMI, PERRLA, conjunctiva and sclera clear  NECK: Supple, No JVD  CHEST/LUNG: Clear to auscultation bilaterally; No wheeze  HEART: Regular rate and rhythm; No murmurs, rubs, or gallops  ABDOMEN: Soft, Nontender, Nondistended; Bowel sounds present  EXTREMITIES:  2+ Peripheral Pulses, No clubbing, cyanosis, or edema  PSYCH: AAOx3  NEUROLOGY: non-focal  SKIN: No rashes or lesions    LABS:                        15.7   10.11 )-----------( 272      ( 05 Aug 2022 06:37 )             49.1     08-05    143  |  106  |  23  ----------------------------<  146<H>  4.8   |  28  |  0.91    Ca    9.2      05 Aug 2022 06:37    TPro  7.7  /  Alb  4.0  /  TBili  0.4  /  DBili  x   /  AST  25  /  ALT  28  /  AlkPhos  70  08-04    PT/INR - ( 05 Aug 2022 11:40 )   PT: 12.5 sec;   INR: 1.08 ratio         PTT - ( 05 Aug 2022 11:40 )  PTT:33.6 sec          COVID-19 PCR: NotDetec (04 Aug 2022 19:10)      RADIOLOGY & ADDITIONAL TESTS:  New Results Reviewed Today:   New Imaging Personally Reviewed Today:  New Electrocardiogram Personally Reviewed Today:  Prior or Outpatient Records Reviewed Today:    COMMUNICATION:  Care Discussed with Consultants/Other Providers and Details of Discussion: NSGY  Discussions with Patient/Family:  PCP Communication:

## 2022-08-06 NOTE — PHYSICAL THERAPY INITIAL EVALUATION ADULT - IMPAIRMENTS CONTRIBUTING TO GAIT DEVIATIONS, PT EVAL
ataxic/impaired balance/impaired coordination/impaired postural control/decreased sensation/impaired sensory feedback/decreased strength

## 2022-08-06 NOTE — PHYSICAL THERAPY INITIAL EVALUATION ADULT - PERTINENT HX OF CURRENT PROBLEM, REHAB EVAL
Pt is a 77yo M originally presented to [facility] for R sided headaches x 1 month. 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 Pt transferred to [facility] for possible IR embolization. Pt is now s/p middle meningeal artery embolization on 8/5. No post-op complications.

## 2022-08-06 NOTE — PHYSICAL THERAPY INITIAL EVALUATION ADULT - BALANCE TRAINING, PT EVAL
GOAL: pt will be able to independently sit at edge of bed while performing UE manipulation without loss of balance within 4 weeks. pt will be able to independently ambulate 300ft with use of straight cane without loss of balance within 4 weeks

## 2022-08-06 NOTE — PROGRESS NOTE ADULT - SUBJECTIVE AND OBJECTIVE BOX
Patient seen and examined s/p cerebral angiogram for Left middle meningeal artery embolization.     AOx3, PERRL, EOMI, VFF, no facial/drift, MUHAMMAD 5/5, SILT, Right groin soft    T(C): 36.7 (08-06-22 @ 00:00), Max: 36.8 (08-05-22 @ 01:57)  HR: 87 (08-06-22 @ 00:00) (77 - 90)  BP: 164/88 (08-06-22 @ 00:00) (130/90 - 169/91)  RR: 18 (08-06-22 @ 00:00) (12 - 19)  SpO2: 98% (08-06-22 @ 00:00) (97% - 100%)                          15.7   10.11 )-----------( 272      ( 05 Aug 2022 06:37 )             49.1     08-05    143  |  106  |  23  ----------------------------<  146<H>  4.8   |  28  |  0.91    Ca    9.2      05 Aug 2022 06:37    TPro  7.7  /  Alb  4.0  /  TBili  0.4  /  DBili  x   /  AST  25  /  ALT  28  /  AlkPhos  70  08-04    PT/INR - ( 05 Aug 2022 11:40 )   PT: 12.5 sec;   INR: 1.08 ratio         PTT - ( 05 Aug 2022 11:40 )  PTT:33.6 sec        CAPILLARY BLOOD GLUCOSE      POCT Blood Glucose.: 135 mg/dL (05 Aug 2022 19:38)  POCT Blood Glucose.: 136 mg/dL (05 Aug 2022 07:39)

## 2022-08-06 NOTE — PHYSICAL THERAPY INITIAL EVALUATION ADULT - DIAGNOSIS, PT EVAL
Impaired coordination, impaired sensation, impaired proprioception, decreased strength, impaired balance, impaired functional mobility

## 2022-08-06 NOTE — PROGRESS NOTE ADULT - SUBJECTIVE AND OBJECTIVE BOX
SUBJECTIVE:   Doing well. No new complaints. Multiple falls at home   OVERNIGHT EVENTS: none    Vital Signs Last 24 Hrs  T(C): 36.7 (06 Aug 2022 05:46), Max: 36.8 (05 Aug 2022 23:00)  T(F): 98 (06 Aug 2022 05:46), Max: 98.2 (05 Aug 2022 23:00)  HR: 87 (06 Aug 2022 05:46) (77 - 90)  BP: 149/81 (06 Aug 2022 05:46) (122/68 - 169/91)  BP(mean): 105 (05 Aug 2022 22:00) (97 - 122)  RR: 18 (06 Aug 2022 05:46) (12 - 18)  SpO2: 98% (06 Aug 2022 05:46) (97% - 100%)    Parameters below as of 06 Aug 2022 05:46  Patient On (Oxygen Delivery Method): room air        PHYSICAL EXAM:    Constitutional: No Acute Distress     Neurological: AOx3, Speech clear Following Commands, Moving all Extremities 5/5 No drift R groin no swelling or eccymosis        Pulmonary: Clear to Auscultation, No rales, No rhonchi, No wheezes     Cardiovascular: S1, S2, Regular rate and rhythm     Gastrointestinal: Soft, Non-tender, Non-distended     Extremities: No calf tenderness         LABS:                        15.7   10.11 )-----------( 272      ( 05 Aug 2022 06:37 )             49.1    08-05    143  |  106  |  23  ----------------------------<  146<H>  4.8   |  28  |  0.91    Ca    9.2      05 Aug 2022 06:37    TPro  7.7  /  Alb  4.0  /  TBili  0.4  /  DBili  x   /  AST  25  /  ALT  28  /  AlkPhos  70  08-04  PT/INR - ( 05 Aug 2022 11:40 )   PT: 12.5 sec;   INR: 1.08 ratio         PTT - ( 05 Aug 2022 11:40 )  PTT:33.6 sec    IMAGIN/5/22 CT head New left sided middle meningeal artery embolization material since 2022. No change in left frontal parietal subacute to chronic subdural hematoma with mass effect and mild midline shift to the right.  8/5 LE duplex lower extremities negative for DVT     MEDICATIONS:    acetaminophen     Tablet .. 650 milliGRAM(s) Oral every 6 hours PRN Temp greater or equal to 38C (100.4F), Mild Pain (1 - 3)  levETIRAcetam 500 milliGRAM(s) Oral two times a day  losartan 100 milliGRAM(s) Oral daily  ALBUTerol    90 MICROgram(s) HFA Inhaler 2 Puff(s) Inhalation every 6 hours PRN Shortness of Breath and/or Wheezing  polyethylene glycol 3350 17 Gram(s) Oral daily  senna 1 Tablet(s) Oral daily  atorvastatin 20 milliGRAM(s) Oral at bedtime  insulin lispro (ADMELOG) corrective regimen sliding scale   SubCutaneous every 6 hours      DIET:

## 2022-08-06 NOTE — PHYSICAL THERAPY INITIAL EVALUATION ADULT - IMPAIRMENTS FOUND, PT EVAL
bed mobility, transfers/gait, locomotion, and balance/muscle strength/neuromotor development and sensory integration

## 2022-08-07 LAB
GLUCOSE BLDC GLUCOMTR-MCNC: 128 MG/DL — HIGH (ref 70–99)
GLUCOSE BLDC GLUCOMTR-MCNC: 144 MG/DL — HIGH (ref 70–99)
GLUCOSE BLDC GLUCOMTR-MCNC: 159 MG/DL — HIGH (ref 70–99)
GLUCOSE BLDC GLUCOMTR-MCNC: 198 MG/DL — HIGH (ref 70–99)
GLUCOSE BLDC GLUCOMTR-MCNC: 211 MG/DL — HIGH (ref 70–99)

## 2022-08-07 PROCEDURE — 99232 SBSQ HOSP IP/OBS MODERATE 35: CPT

## 2022-08-07 PROCEDURE — 72141 MRI NECK SPINE W/O DYE: CPT | Mod: 26

## 2022-08-07 RX ADMIN — LOSARTAN POTASSIUM 100 MILLIGRAM(S): 100 TABLET, FILM COATED ORAL at 05:49

## 2022-08-07 RX ADMIN — Medication 1: at 11:45

## 2022-08-07 RX ADMIN — Medication 2: at 17:13

## 2022-08-07 RX ADMIN — Medication 4 UNIT(S): at 08:00

## 2022-08-07 RX ADMIN — ATORVASTATIN CALCIUM 20 MILLIGRAM(S): 80 TABLET, FILM COATED ORAL at 22:04

## 2022-08-07 RX ADMIN — Medication 4 UNIT(S): at 17:13

## 2022-08-07 RX ADMIN — LEVETIRACETAM 500 MILLIGRAM(S): 250 TABLET, FILM COATED ORAL at 05:49

## 2022-08-07 RX ADMIN — Medication 4 UNIT(S): at 11:45

## 2022-08-07 RX ADMIN — Medication 5 MILLIGRAM(S): at 17:14

## 2022-08-07 RX ADMIN — INSULIN GLARGINE 10 UNIT(S): 100 INJECTION, SOLUTION SUBCUTANEOUS at 22:04

## 2022-08-07 RX ADMIN — LEVETIRACETAM 500 MILLIGRAM(S): 250 TABLET, FILM COATED ORAL at 17:13

## 2022-08-07 NOTE — OCCUPATIONAL THERAPY INITIAL EVALUATION ADULT - PERTINENT HX OF CURRENT PROBLEM, REHAB EVAL
Patient is a 76 year old M with hx of DM-II with Neuropathy, TIAs, HTN, and HLD was sent to ED by his ophthalmologist for papilledema and HA x 1 month for further evaluation. Patient had MRI in [facility] and was noted to have acute on chronic Left sided SDH and Right frontotemporal SDH, s/p cerebral angiogram for Left middle meningeal artery embolization.

## 2022-08-07 NOTE — PROGRESS NOTE ADULT - ASSESSMENT
76yr  RT handed M with hx of DM-II with Neuropathy, TIAs on Aggrenox , HTN, and HLD presents to [facility] ED for rt sided headaches for the past 1 month. Patient was followed by Opthalmology Dr. Bowers as an outpatient for papilledema and was referred to come to the [facility] ED for further evaluation. Optho states it is less likely GCA given lack of clinical symptoms and negative inflammatory markers. Patient had MRI in [facility] and was noted to have acute on chronic Left sided SDH and Right frontotemporal SDH. Patient was follows with outpatient Neurologist and was seen last week for C5-C6  trigger point injections. 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     Plan    Neuro stable. CT head today reviewed By Dr Isabel. Hold off Aggrenox until cleared by Dr Isabel outpatient  Vitals stable On Losartan 100   Type 2 DM- Start Lantus 10U now & 4U premeal. Change to diabetic diet, HgBA1C 6.8. Will d/w Hospiatlist discharge recs for home   PT eval  Possible d/c home later today         NEURO:  CARDIOVASCULAR:  PULMONARY:  RENAL:  GI:  HEME:  ID:  ENDO:    DVT PROPHYLAXIS:  [] Venodynes                                [] Heparin/Lovenox    FALL RISK:  [] Low Risk                                    [] Impulsive 76yr  RT handed M with hx of DM-II with Neuropathy, TIAs on Aggrenox , HTN, and HLD presents to [facility] ED for rt sided headaches for the past 1 month. Patient was followed by Opthalmology Dr. Bowers as an outpatient for papilledema and was referred to come to the [facility] ED for further evaluation. Optho states it is less likely GCA given lack of clinical symptoms and negative inflammatory markers. Patient had MRI in [facility] and was noted to have acute on chronic Left sided SDH and Right frontotemporal SDH. Patient was follows with outpatient Neurologist and was seen last week for C5-C6  trigger point injections. He is post procedure day # 2 s/p cerebral angio/ bk embolization of Left  MMA 8/5/22.     Plan    Neuro stable.  C/w neuro checks q 4 hrs  C/w Keppra 500 mg Bid   CT head today reviewed By Dr Isabel. Hold off Aggrenox until cleared by Dr Isabel outpatient  F/u C-spine MRI today for cervical stenosis    CV  Vitals stable On Losartan 100   C/w Atorvastatin    Pulmonary   RA  C/w Albuterol  IS as tolerated    GI  Tolerating CCD diet  Bowel regimen: Miralax/Senna    RENAL:  IVL  Voiding     HEME:  H/H stable    ID:  Afebrile    ENDO:  Type 2 DM- Start Lantus 10U now & 4U premeal. Change to diabetic diet,   HgBA1C 6.8. Will d/w Hospiatlist discharge recs for home     DVT PROPHYLAXIS:  [x] Venodynes                                [x] Heparin/Lovenox    FALL RISK:  [x] Low Risk                                    [] Impulsive    Will discuss with Dr Chalo Isabel [phone]

## 2022-08-07 NOTE — OCCUPATIONAL THERAPY INITIAL EVALUATION ADULT - BALANCE TRAINING, PT EVAL
Pt will display good static/dynamic balance while standing at the sink for Activities of Daily Living within 2 weeks.

## 2022-08-07 NOTE — PROGRESS NOTE ADULT - SUBJECTIVE AND OBJECTIVE BOX
77yo RT handed M with hx of DM-II with Neuropathy, TIAs, HTN, and HLD presents to [facility] ED for rt sided headaches for the past 1 month. Patient was followed by Opthalmology Dr. Bowers as an outpatient for papilledema and was referred to come to the [facility] ED for further evaluation. Optho states it is less likely GCA given lack of clinical symptoms and negative inflammatory markers. Patient had MRI in [facility] and was noted to have acute on chronic Left sided SDH and Right frontotemporal SDH. Patient was follows with outpatient Neurologist and was seen last week for trigger point injections. Patient denies numbness, tingling, fever and chills.     Patient transferred to [facility] for possible MMAE with Dr. Isabel. Per patient he takes Aggrenox but only once a day, and last taken 2 days ago for history of TIA. He states that he does not take any other AP/AC, and this is confirmed by his wife Glory at the bedside.      Post=-op day # 2 s/p cerebral angio/ bk embolization of Left  MMA            PAST MEDICAL & SURGICAL HISTORY:  Diabetes      TIA (transient ischemic attack)      HTN (hypertension)      Hyperlipidemia      No significant past surgical history        Vital Signs Last 24 Hrs  T(C): 37.2 (07 Aug 2022 04:58), Max: 37.2 (07 Aug 2022 04:58)  T(F): 98.9 (07 Aug 2022 04:58), Max: 98.9 (07 Aug 2022 04:58)  HR: 93 (07 Aug 2022 04:58) (76 - 100)  BP: 145/81 (07 Aug 2022 04:58) (138/85 - 148/79)  BP(mean): --  RR: 18 (07 Aug 2022 04:58) (18 - 18)  SpO2: 97% (07 Aug 2022 04:58) (96% - 98%)    Parameters below as of 07 Aug 2022 04:58  Patient On (Oxygen Delivery Method): room air             PT/INR - ( 05 Aug 2022 11:40 )   PT: 12.5 sec;   INR: 1.08 ratio         PTT - ( 05 Aug 2022 11:40 )  PTT:33.6 sec   Stroke Core Measures      DRAIN OUTPUT:     NEUROIMAGING:     PHYSICAL EXAM:    General: No Acute Distress     Neurological: Awake, alert oriented to person, place and time, Following Commands, PERRL, EOMI, Face Symmetrical, Speech Fluent, Moving all extremities, Muscle Strength normal in all four extremities, No Drift, Sensation to Light Touch Intact    Pulmonary: Clear to Auscultation, No Rales, No Rhonchi, No Wheezes     Cardiovascular: S1, S2, Regular Rate and Rhythm     Gastrointestinal: Soft, Nontender, Nondistended     Incision:     MEDICATIONS:   Antibiotics:    Neuro:  acetaminophen     Tablet .. 650 milliGRAM(s) Oral every 6 hours PRN Temp greater or equal to 38C (100.4F), Mild Pain (1 - 3)  levETIRAcetam 500 milliGRAM(s) Oral two times a day    Anticoagulation:    Cardiology:  losartan 100 milliGRAM(s) Oral daily    Endo:   atorvastatin 20 milliGRAM(s) Oral at bedtime  dextrose 50% Injectable 25 Gram(s) IV Push once  dextrose 50% Injectable 12.5 Gram(s) IV Push once  dextrose 50% Injectable 25 Gram(s) IV Push once  dextrose Oral Gel 15 Gram(s) Oral once PRN  glucagon  Injectable 1 milliGRAM(s) IntraMuscular once  insulin glargine Injectable (LANTUS) 10 Unit(s) SubCutaneous at bedtime  insulin lispro (ADMELOG) corrective regimen sliding scale   SubCutaneous three times a day before meals  insulin lispro (ADMELOG) corrective regimen sliding scale   SubCutaneous at bedtime  insulin lispro Injectable (ADMELOG) 4 Unit(s) SubCutaneous three times a day before meals    Pulm:  ALBUTerol    90 MICROgram(s) HFA Inhaler 2 Puff(s) Inhalation every 6 hours PRN    GI/:  polyethylene glycol 3350 17 Gram(s) Oral daily  senna 1 Tablet(s) Oral daily    Other:  dextrose 5%. 1000 milliLiter(s) IV Continuous <Continuous>  dextrose 5%. 1000 milliLiter(s) IV Continuous <Continuous>  ALBUTerol    90 MICROgram(s) HFA Inhaler 2 Puff(s) Inhalation every 6 hours PRN   polyethylene glycol 3350 17 Gram(s) Oral daily  senna 1 Tablet(s) Oral daily         75yo RT handed M with hx of DM-II with Neuropathy, TIAs, HTN, and HLD presents to [facility] ED for rt sided headaches for the past 1 month. Patient was followed by Opthalmology Dr. Bowers as an outpatient for papilledema and was referred to come to the [facility] ED for further evaluation. Optho states it is less likely GCA given lack of clinical symptoms and negative inflammatory markers. Patient had MRI in [facility] and was noted to have acute on chronic Left sided SDH and Right frontotemporal SDH. Patient was follows with outpatient Neurologist and was seen last week for trigger point injections. Patient denies numbness, tingling, fever and chills.     Patient transferred to [facility] for possible MMAE with Dr. Isabel. Per patient he takes Aggrenox but only once a day, and last taken 2 days ago for history of TIA. He states that he does not take any other AP/AC, and this is confirmed by his wife Glory at the bedside.     Post- op day # 2 s/p cerebral angio/ bk embolization of Left  MMA      Vital Signs Last 24 Hrs  T(C): 37.2 (07 Aug 2022 04:58), Max: 37.2 (07 Aug 2022 04:58)  T(F): 98.9 (07 Aug 2022 04:58), Max: 98.9 (07 Aug 2022 04:58)  HR: 93 (07 Aug 2022 04:58) (76 - 100)  BP: 145/81 (07 Aug 2022 04:58) (138/85 - 148/79)  BP(mean): --  RR: 18 (07 Aug 2022 04:58) (18 - 18)  SpO2: 97% (07 Aug 2022 04:58) (96% - 98%)    Parameters below as of 07 Aug 2022 04:58  Patient On (Oxygen Delivery Method): room air             PT/INR - ( 05 Aug 2022 11:40 )   PT: 12.5 sec;   INR: 1.08 ratio         PTT - ( 05 Aug 2022 11:40 )  PTT:33.6 sec   Stroke Core Measures      DRAIN OUTPUT:     NEUROIMAGING:     PHYSICAL EXAM:    General: No Acute Distress     Neurological: Awake, alert oriented to person, place and time, Following Commands, PERRL, EOMI, Face Symmetrical, Speech Fluent, Moving all extremities, Muscle Strength normal in all four extremities, No Drift, Sensation to Light Touch Intact    Pulmonary: Clear to Auscultation, No Rales, No Rhonchi, No Wheezes     Cardiovascular: S1, S2, Regular Rate and Rhythm     Gastrointestinal: Soft, Nontender, Nondistended     Incision:     MEDICATIONS:   Antibiotics:    Neuro:  acetaminophen     Tablet .. 650 milliGRAM(s) Oral every 6 hours PRN Temp greater or equal to 38C (100.4F), Mild Pain (1 - 3)  levETIRAcetam 500 milliGRAM(s) Oral two times a day    Anticoagulation:    Cardiology:  losartan 100 milliGRAM(s) Oral daily    Endo:   atorvastatin 20 milliGRAM(s) Oral at bedtime  dextrose 50% Injectable 25 Gram(s) IV Push once  dextrose 50% Injectable 12.5 Gram(s) IV Push once  dextrose 50% Injectable 25 Gram(s) IV Push once  dextrose Oral Gel 15 Gram(s) Oral once PRN  glucagon  Injectable 1 milliGRAM(s) IntraMuscular once  insulin glargine Injectable (LANTUS) 10 Unit(s) SubCutaneous at bedtime  insulin lispro (ADMELOG) corrective regimen sliding scale   SubCutaneous three times a day before meals  insulin lispro (ADMELOG) corrective regimen sliding scale   SubCutaneous at bedtime  insulin lispro Injectable (ADMELOG) 4 Unit(s) SubCutaneous three times a day before meals    Pulm:  ALBUTerol    90 MICROgram(s) HFA Inhaler 2 Puff(s) Inhalation every 6 hours PRN    GI/:  polyethylene glycol 3350 17 Gram(s) Oral daily  senna 1 Tablet(s) Oral daily    Other:  dextrose 5%. 1000 milliLiter(s) IV Continuous <Continuous>  dextrose 5%. 1000 milliLiter(s) IV Continuous <Continuous>  ALBUTerol    90 MICROgram(s) HFA Inhaler 2 Puff(s) Inhalation every 6 hours PRN   polyethylene glycol 3350 17 Gram(s) Oral daily  senna 1 Tablet(s) Oral daily

## 2022-08-07 NOTE — PROGRESS NOTE ADULT - PROBLEM SELECTOR PLAN 2
A1c 6.8, and has required minimal insulin (albeit has  been npo), overall diabetes seem to be well controlled based off a1c.   -c/w lantus 10 and humalog 4 TID. will uptitrate accordingly  -home insulin regimen to be determined  -followup with PCP within 5-7 days (Dr. Sven Avalos) and Endocrinologist (Dr. Boss)  -can continue ozempic at home

## 2022-08-07 NOTE — PROGRESS NOTE ADULT - SUBJECTIVE AND OBJECTIVE BOX
[facility] Division of Hospital Medicine  Lico Randhawa MD  Available via MS Teams  Pager: [phone]    SUBJECTIVE / OVERNIGHT EVENTS:  -No acute events overnight, denies abdominal pain, nausea, vomiting, headaches, chest pain. Wishes to go home soon.     MEDICATIONS  (STANDING):  atorvastatin 20 milliGRAM(s) Oral at bedtime  dextrose 5%. 1000 milliLiter(s) (50 mL/Hr) IV Continuous <Continuous>  dextrose 5%. 1000 milliLiter(s) (100 mL/Hr) IV Continuous <Continuous>  dextrose 50% Injectable 25 Gram(s) IV Push once  dextrose 50% Injectable 12.5 Gram(s) IV Push once  dextrose 50% Injectable 25 Gram(s) IV Push once  glucagon  Injectable 1 milliGRAM(s) IntraMuscular once  insulin glargine Injectable (LANTUS) 10 Unit(s) SubCutaneous at bedtime  insulin lispro (ADMELOG) corrective regimen sliding scale   SubCutaneous three times a day before meals  insulin lispro (ADMELOG) corrective regimen sliding scale   SubCutaneous at bedtime  insulin lispro Injectable (ADMELOG) 4 Unit(s) SubCutaneous three times a day before meals  levETIRAcetam 500 milliGRAM(s) Oral two times a day  losartan 100 milliGRAM(s) Oral daily  polyethylene glycol 3350 17 Gram(s) Oral daily  senna 1 Tablet(s) Oral daily    MEDICATIONS  (PRN):  acetaminophen     Tablet .. 650 milliGRAM(s) Oral every 6 hours PRN Temp greater or equal to 38C (100.4F), Mild Pain (1 - 3)  ALBUTerol    90 MICROgram(s) HFA Inhaler 2 Puff(s) Inhalation every 6 hours PRN Shortness of Breath and/or Wheezing  dextrose Oral Gel 15 Gram(s) Oral once PRN Blood Glucose LESS THAN 70 milliGRAM(s)/deciliter      I&O's Summary    07 Aug 2022 07:01  -  07 Aug 2022 10:54  --------------------------------------------------------  IN: 120 mL / OUT: 0 mL / NET: 120 mL        PHYSICAL EXAM:  Vital Signs Last 24 Hrs  T(C): 36.8 (07 Aug 2022 09:18), Max: 37.2 (07 Aug 2022 04:58)  T(F): 98.2 (07 Aug 2022 09:18), Max: 98.9 (07 Aug 2022 04:58)  HR: 93 (07 Aug 2022 09:18) (76 - 100)  BP: 119/70 (07 Aug 2022 09:18) (119/70 - 148/79)  BP(mean): --  RR: 17 (07 Aug 2022 09:18) (17 - 18)  SpO2: 97% (07 Aug 2022 09:18) (96% - 98%)    Parameters below as of 07 Aug 2022 09:18  Patient On (Oxygen Delivery Method): room air      PHYSICAL EXAM:  GENERAL: NAD, well-developed  HEAD:  Atraumatic, Normocephalic  EYES: EOMI, PERRLA, conjunctiva and sclera clear  NECK: Supple, No JVD  CHEST/LUNG: Clear to auscultation bilaterally; No wheeze  HEART: Regular rate and rhythm; No murmurs, rubs, or gallops  ABDOMEN: Soft, Nontender, Nondistended; Bowel sounds present  EXTREMITIES:  2+ Peripheral Pulses, No clubbing, cyanosis, or edema  PSYCH: AAOx3  NEUROLOGY: non-focal  SKIN: No rashes or lesions      LABS:  PT/INR - ( 05 Aug 2022 11:40 )   PT: 12.5 sec;   INR: 1.08 ratio         PTT - ( 05 Aug 2022 11:40 )  PTT:33.6 sec          COVID-19 PCR: NotDetec (04 Aug 2022 19:10)      RADIOLOGY & ADDITIONAL TESTS:  New Results Reviewed Today:   New Imaging Personally Reviewed Today:  New Electrocardiogram Personally Reviewed Today:  Prior or Outpatient Records Reviewed Today:    COMMUNICATION:  Care Discussed with Consultants/Other Providers and Details of Discussion:  Discussions with Patient/Family:  PCP Communication:

## 2022-08-07 NOTE — PROGRESS NOTE ADULT - ASSESSMENT
75yo M with hx of DM-II with Neuropathy, TIAs, HTN, and HLD was sent to ED by his ophthalmologist for papilledema and HA x 1 month for further evaluation. Patient had MRI in [facility] and was noted to have acute on chronic Left sided SDH and Right frontotemporal SDH, s/p cerebral angiogram for Left middle meningeal artery embolization.

## 2022-08-08 ENCOUNTER — TRANSCRIPTION ENCOUNTER (OUTPATIENT)
Age: 77
End: 2022-08-08

## 2022-08-08 LAB
GLUCOSE BLDC GLUCOMTR-MCNC: 166 MG/DL — HIGH (ref 70–99)
GLUCOSE BLDC GLUCOMTR-MCNC: 174 MG/DL — HIGH (ref 70–99)
GLUCOSE BLDC GLUCOMTR-MCNC: 224 MG/DL — HIGH (ref 70–99)
GLUCOSE BLDC GLUCOMTR-MCNC: 260 MG/DL — HIGH (ref 70–99)
SARS-COV-2 RNA SPEC QL NAA+PROBE: SIGNIFICANT CHANGE UP

## 2022-08-08 PROCEDURE — 99232 SBSQ HOSP IP/OBS MODERATE 35: CPT

## 2022-08-08 PROCEDURE — 99222 1ST HOSP IP/OBS MODERATE 55: CPT

## 2022-08-08 PROCEDURE — 99232 SBSQ HOSP IP/OBS MODERATE 35: CPT | Mod: FS

## 2022-08-08 RX ADMIN — Medication 1: at 17:04

## 2022-08-08 RX ADMIN — Medication 4 UNIT(S): at 08:13

## 2022-08-08 RX ADMIN — LEVETIRACETAM 500 MILLIGRAM(S): 250 TABLET, FILM COATED ORAL at 17:04

## 2022-08-08 RX ADMIN — Medication 4 UNIT(S): at 12:06

## 2022-08-08 RX ADMIN — ATORVASTATIN CALCIUM 20 MILLIGRAM(S): 80 TABLET, FILM COATED ORAL at 21:46

## 2022-08-08 RX ADMIN — LOSARTAN POTASSIUM 100 MILLIGRAM(S): 100 TABLET, FILM COATED ORAL at 05:15

## 2022-08-08 RX ADMIN — Medication 3: at 12:06

## 2022-08-08 RX ADMIN — INSULIN GLARGINE 10 UNIT(S): 100 INJECTION, SOLUTION SUBCUTANEOUS at 21:46

## 2022-08-08 RX ADMIN — Medication 4 UNIT(S): at 17:05

## 2022-08-08 RX ADMIN — LEVETIRACETAM 500 MILLIGRAM(S): 250 TABLET, FILM COATED ORAL at 05:15

## 2022-08-08 RX ADMIN — Medication 1: at 08:12

## 2022-08-08 RX ADMIN — Medication 5 MILLIGRAM(S): at 05:16

## 2022-08-08 NOTE — DIETITIAN INITIAL EVALUATION ADULT - PERTINENT LABORATORY DATA
CAPILLARY BLOOD GLUCOSE  POCT Blood Glucose.: 260 mg/dL (08 Aug 2022 11:22)  POCT Blood Glucose.: 174 mg/dL (08 Aug 2022 07:30)  POCT Blood Glucose.: 159 mg/dL (07 Aug 2022 21:08)  POCT Blood Glucose.: 211 mg/dL (07 Aug 2022 16:40)    A1C with Estimated Average Glucose Result: 6.8 % (08-04-22 @ 18:30)

## 2022-08-08 NOTE — DIETITIAN INITIAL EVALUATION ADULT - NSFNSGIIOFT_GEN_A_CORE
Denies nausea, vomiting, constipation, diarrhea. Reports last BM 8/8. Pt currently on bowel regimen (miralax, dulcolax, senna).

## 2022-08-08 NOTE — DIETITIAN INITIAL EVALUATION ADULT - ADD RECOMMEND
1) Continue Consistent Carbohydrate diet.   2) Recommend Glucerna 2x/day.  3) Monitor PO intake, GI tolerance, skin integrity, labs, weight, and bowel movement regularity.   4) Honor food preferences as feasible. Assist with meals PRN and encourage PO intake.  5) RD remains available upon request and will follow-up per protocol.

## 2022-08-08 NOTE — DIETITIAN INITIAL EVALUATION ADULT - NSFNSADHERENCEPTAFT_GEN_A_CORE
Pt noted with hx of DM2. Reports taking Farxiga, Lantus, and Humalog PTA. Reports checking his blood glucose 4x/day with typical range 130-160 mg/dl. A1c 6.8% indicates good glycemic control.

## 2022-08-08 NOTE — PROGRESS NOTE ADULT - ASSESSMENT
75yo M with hx of DM-II with Neuropathy, TIAs, HTN, and HLD was sent to ED by his ophthalmologist for papilledema and HA x 1 month for further evaluation. Patient had MRI in [facility] and was noted to have acute on chronic Left sided SDH and Right frontotemporal SDH, s/p cerebral angiogram for Left middle meningeal artery embolization.

## 2022-08-08 NOTE — DISCHARGE NOTE PROVIDER - NSDCMRMEDTOKEN_GEN_ALL_CORE_FT
aspirin-dipyridamole 25 mg-200 mg oral capsule, extended release: 1 cap(s) orally once a day  atorvastatin 20 mg oral tablet: 1 tab(s) orally once a day  Farxiga 10 mg oral tablet: 1 tab(s) orally once a day  HumaLOG 100 units/mL injectable solution: 20 unit(s) injectable 3 times a day (with meals)  irbesartan 300 mg oral tablet: 1 tab(s) orally once a day  Lantus 100 units/mL subcutaneous solution: 26 unit(s) subcutaneous once a day (at bedtime)  Ozempic:    acetaminophen 325 mg oral tablet: 2 tab(s) orally every 6 hours, As needed, T Mild- moderate  Pain   atorvastatin 20 mg oral tablet: 1 tab(s) orally once a day  HumaLOG 100 units/mL injectable solution: 5 unit(s) injectable 3 times a day (with meals)  insulin glargine 100 units/mL subcutaneous solution: 12 unit(s) subcutaneous once a day (at bedtime)  irbesartan 300 mg oral tablet: 1 tab(s) orally once a day  levETIRAcetam 500 mg oral tablet: 1 tab(s) orally 2 times a day  polyethylene glycol 3350 oral powder for reconstitution: 17 gram(s) orally once a day

## 2022-08-08 NOTE — DISCHARGE NOTE PROVIDER - HOSPITAL COURSE
76yr  RT handed M with hx of DM-II with Neuropathy, TIAs on Aggrenox , HTN, and HLD presents to [facility] ED for Right sided headaches for the past 1 month. Patient was followed by Opthalmology Dr. Bowers as an outpatient for papilledema and was referred to come to the [facility] ED for further evaluation. Opthalmology  evaluated for GCA, Right disc edema and peripapillary hemorrhages less likely  given lack of clinical symptoms and negative inflammatory markers. MRI orbits neg for orbital mass or abnormal optic nerve signal. MRI brain  noted to have acute on chronic Left sided SDH and Right frontotemporal SDH. Patient was follows with outpatient Neurologist and was seen last week for C5-C6  trigger point injections. 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 Post procedure CT head 8/6/22 stable.  Patient with h/o multiple falls at home/ ataxic gait MRI C spine 8/7/22 MR C spine- C5- C6 mild spinal canal stenosis with ventral cord impingement. There is mild flattening of the ventral cord at C5-C6, without anibal cord compression, cord edema . Outpatient follow up with spine surgeon . Evaluated by PT/OT & recommended for acute rehab. Discharged to rehab  in stable condition

## 2022-08-08 NOTE — DISCHARGE NOTE PROVIDER - CARE PROVIDER_API CALL
Jamal Isabel (MD; MS)  Unallocated  [address] [address]  Phone: [phone]  Fax: [phone]  Follow Up Time:     Mele Powers)  [facility]  [address] [place] [address]  Phone: [phone]  Fax: [phone]  Follow Up Time:

## 2022-08-08 NOTE — DIETITIAN INITIAL EVALUATION ADULT - OTHER INFO
Reports  lbs for the past 20 years. Denies recent wt changes.   Dosing wt: 150 lbs (08-05)  Wt history per chart: 152 lb (12/29/21), 151 lbs (10/27/21), 150 lbs (07/26/21). Wt appears stable, consistent with pt report. RD to continue to monitor weight trends as able/available.     Per chart, pt currently ordered for lantus, admelog SSI, and atorvastatin in-house.    Pt made aware RD remains available.

## 2022-08-08 NOTE — DISCHARGE NOTE PROVIDER - NSDCCPCAREPLAN_GEN_ALL_CORE_FT
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  Hold Aggrenox for now  Repeat CT head & follow up with Dr Isabel in 10-14 days   Discuss with Dr Isabel timing to restart Asggrenox on follow up visit   Daily PT/ ambulation. DVT chemo prophylaxis held         SECONDARY DISCHARGE DIAGNOSES  Diagnosis: HTN (hypertension)  Assessment and Plan of Treatment: Fanny Luo    Diagnosis: Diabetes mellitus, type 2  Assessment and Plan of Treatment: HgB A1C 6.8. Decreased insulin doses. Discharge from rehab on Lantus 15Units Q HS, Premeal Humalog 5u TID & Ozempic   IF FINGER STICK BLOOD SUAGR <100 or 180 < consecutively call endocrinologist office for insulin adjustment

## 2022-08-08 NOTE — PROGRESS NOTE ADULT - ASSESSMENT
76yr  RT handed M with hx of DM-II with Neuropathy, TIAs on Aggrenox , HTN, and HLD presents to [facility] ED for rt sided headaches for the past 1 month. Patient was followed by Opthalmology Dr. Bowers as an outpatient for papilledema and was referred to come to the [facility] ED for further evaluation. Optho states it is less likely GCA given lack of clinical symptoms and negative inflammatory markers. Patient had MRI in [facility] and was noted to have acute on chronic Left sided SDH and Right frontotemporal SDH. Patient was follows with outpatient Neurologist and was seen last week for C5-C6  trigger point injections. He is post procedure day # 2 s/p cerebral angio/ bk embolization of Left  MMA 8/5/22.     Plan    Neuro stable.  C/w neuro checks q 4 hrs  C/w Keppra 500 mg Bid   CT head today reviewed By Dr Isabel. Hold off Aggrenox until cleared by Dr Isabel outpatient  F/u C-spine MRI today for cervical stenosis    CV  Vitals stable On Losartan 100   C/w Atorvastatin    Pulmonary   RA  C/w Albuterol  IS as tolerated    GI  Tolerating CCD diet  Bowel regimen: Miralax/Senna    RENAL:  IVL  Voiding     HEME:  H/H stable    ID:  Afebrile    ENDO:  Type 2 DM- Start Lantus 10U now & 4U premeal. Change to diabetic diet,   HgBA1C 6.8. Will d/w Hospiatlist discharge recs for home     DVT PROPHYLAXIS:  [x] Venodynes                                [x] Heparin/Lovenox    FALL RISK:  [x] Low Risk                                    [] Impulsive    Will discuss with Dr Chalo Isabel [id number] 76yr  RT handed M with hx of DM-II with Neuropathy, TIAs on Aggrenox , HTN, and HLD presents to [facility] ED for rt sided headaches for the past 1 month. Patient was followed by Opthalmology Dr. Bowers as an outpatient for papilledema and was referred to come to the [facility] ED for further evaluation. Optho states it is less likely GCA given lack of clinical symptoms and negative inflammatory markers. Patient had MRI in [facility] and was noted to have acute on chronic Left sided SDH and Right frontotemporal SDH. Patient was follows with outpatient Neurologist and was seen last week for C5-C6  trigger point injections. He is post procedure day # 3 s/p cerebral angio/ bk embolization of Left  MMA 8/5/22.     Plan    Neuro stable.  C/w neuro checks q 4 hrs  C/w Keppra 500 mg Bid   CT head today reviewed By Dr Isabel. Hold off Aggrenox until cleared by Dr Isabel outpatient visit  F/u C-spine MRI result reviewed, patient will be followed by Dr Powers as out patient     CV  Vitals stable On Losartan 100   C/w Atorvastatin    Pulmonary   RA  C/w Albuterol  IS as tolerated    GI  Tolerating CCD diet  Bowel regimen: Miralax/Senna    RENAL:  IVL  Voiding     HEME:  H/H stable    ID:  Afebrile    ENDO:  Type 2 DM- Start Lantus 10U now & 4U premeal. Change to diabetic diet,   HgBA1C 6.8.    Will d/c on current regimen    DVT PROPHYLAXIS:  [x] Venodynes                                [x] Heparin/Lovenox    FALL RISK:  [x] Low Risk                                    [] Impulsive    Will discuss with Dr Chalo Isabel [phone]

## 2022-08-08 NOTE — DIETITIAN INITIAL EVALUATION ADULT - PERTINENT MEDS FT
MEDICATIONS  (STANDING):  atorvastatin 20 milliGRAM(s) Oral at bedtime  bisacodyl 5 milliGRAM(s) Oral every 12 hours  dextrose 5%. 1000 milliLiter(s) (50 mL/Hr) IV Continuous <Continuous>  dextrose 5%. 1000 milliLiter(s) (100 mL/Hr) IV Continuous <Continuous>  dextrose 50% Injectable 25 Gram(s) IV Push once  dextrose 50% Injectable 12.5 Gram(s) IV Push once  dextrose 50% Injectable 25 Gram(s) IV Push once  glucagon  Injectable 1 milliGRAM(s) IntraMuscular once  insulin glargine Injectable (LANTUS) 10 Unit(s) SubCutaneous at bedtime  insulin lispro (ADMELOG) corrective regimen sliding scale   SubCutaneous three times a day before meals  insulin lispro (ADMELOG) corrective regimen sliding scale   SubCutaneous at bedtime  insulin lispro Injectable (ADMELOG) 4 Unit(s) SubCutaneous three times a day before meals  levETIRAcetam 500 milliGRAM(s) Oral two times a day  losartan 100 milliGRAM(s) Oral daily  polyethylene glycol 3350 17 Gram(s) Oral daily  senna 1 Tablet(s) Oral daily    MEDICATIONS  (PRN):  acetaminophen     Tablet .. 650 milliGRAM(s) Oral every 6 hours PRN Temp greater or equal to 38C (100.4F), Mild Pain (1 - 3)  ALBUTerol    90 MICROgram(s) HFA Inhaler 2 Puff(s) Inhalation every 6 hours PRN Shortness of Breath and/or Wheezing  dextrose Oral Gel 15 Gram(s) Oral once PRN Blood Glucose LESS THAN 70 milliGRAM(s)/deciliter

## 2022-08-08 NOTE — DISCHARGE NOTE PROVIDER - NSDCFUSCHEDAPPT_GEN_ALL_CORE_FT
Seven Vargas  [facility]  VASCULAR [address]  Scheduled Appointment: 08/30/2022    [facility]  VASCULAR [address]  Scheduled Appointment: 08/30/2022    JADE OJEDA  [facility]  OPHTHALM [phone]  Scheduled Appointment: 09/01/2022

## 2022-08-08 NOTE — PROGRESS NOTE ADULT - SUBJECTIVE AND OBJECTIVE BOX
HPI:  75yo RT handed M with hx of DM-II with Neuropathy, TIAs, HTN, and HLD presents to [facility] ED for rt sided headaches for the past 1 month. Patient was followed by Opthalmology Dr. Bowers as an outpatient for papilledema and was referred to come to the [facility] ED for further evaluation. Optho states it is less likely GCA given lack of clinical symptoms and negative inflammatory markers. Patient had MRI in [facility] and was noted to have acute on chronic Left sided SDH and Right frontotemporal SDH. Patient was follows with outpatient Neurologist and was seen last week for trigger point injections. Patient denies numbness, tingling, fever and chills.     Patient transferred to [facility] for possible MMAE with Dr. Isabel. Per patient he takes Aggrenox but only once a day, and last taken 2 days ago for history of TIA. He states that he does not take any other AP/AC, and this is confirmed by his wife Glory at the bedside.     Exam on arrival: AOx3, pupil 3mm reactive b/l, speech fluent, following commands, no drifts, uppers 5/5, lowers 5/5 (05 Aug 2022 11:21)      PAST MEDICAL & SURGICAL HISTORY:  Diabetes      TIA (transient ischemic attack)      HTN (hypertension)      Hyperlipidemia      No significant past surgical history        Vital Signs Last 24 Hrs  T(C): 37.2 (08 Aug 2022 05:00), Max: 37.2 (07 Aug 2022 17:22)  T(F): 98.9 (08 Aug 2022 05:00), Max: 98.9 (07 Aug 2022 17:22)  HR: 87 (08 Aug 2022 05:00) (83 - 97)  BP: 138/86 (08 Aug 2022 05:00) (119/70 - 147/85)  BP(mean): --  RR: 18 (08 Aug 2022 05:00) (17 - 18)  SpO2: 98% (08 Aug 2022 05:00) (96% - 98%)    Parameters below as of 08 Aug 2022 05:00  Patient On (Oxygen Delivery Method): room air                Stroke Core Measures      DRAIN OUTPUT:     NEUROIMAGING:     PHYSICAL EXAM:    General: No Acute Distress     Neurological: Awake, alert oriented to person, place and time, Following Commands, PERRL, EOMI, Face Symmetrical, Speech Fluent, Moving all extremities, Muscle Strength normal in all four extremities, No Drift, Sensation to Light Touch Intact    Pulmonary: Clear to Auscultation, No Rales, No Rhonchi, No Wheezes     Cardiovascular: S1, S2, Regular Rate and Rhythm     Gastrointestinal: Soft, Nontender, Nondistended     Incision:     MEDICATIONS:   Antibiotics:    Neuro:  acetaminophen     Tablet .. 650 milliGRAM(s) Oral every 6 hours PRN Temp greater or equal to 38C (100.4F), Mild Pain (1 - 3)  levETIRAcetam 500 milliGRAM(s) Oral two times a day    Anticoagulation:    Cardiology:  losartan 100 milliGRAM(s) Oral daily    Endo:   atorvastatin 20 milliGRAM(s) Oral at bedtime  dextrose 50% Injectable 25 Gram(s) IV Push once  dextrose 50% Injectable 12.5 Gram(s) IV Push once  dextrose 50% Injectable 25 Gram(s) IV Push once  dextrose Oral Gel 15 Gram(s) Oral once PRN  glucagon  Injectable 1 milliGRAM(s) IntraMuscular once  insulin glargine Injectable (LANTUS) 10 Unit(s) SubCutaneous at bedtime  insulin lispro (ADMELOG) corrective regimen sliding scale   SubCutaneous three times a day before meals  insulin lispro (ADMELOG) corrective regimen sliding scale   SubCutaneous at bedtime  insulin lispro Injectable (ADMELOG) 4 Unit(s) SubCutaneous three times a day before meals    Pulm:  ALBUTerol    90 MICROgram(s) HFA Inhaler 2 Puff(s) Inhalation every 6 hours PRN    GI/:  bisacodyl 5 milliGRAM(s) Oral every 12 hours  polyethylene glycol 3350 17 Gram(s) Oral daily  senna 1 Tablet(s) Oral daily    Other:  dextrose 5%. 1000 milliLiter(s) IV Continuous <Continuous>  dextrose 5%. 1000 milliLiter(s) IV Continuous <Continuous>  ALBUTerol    90 MICROgram(s) HFA Inhaler 2 Puff(s) Inhalation every 6 hours PRN   bisacodyl 5 milliGRAM(s) Oral every 12 hours  polyethylene glycol 3350 17 Gram(s) Oral daily  senna 1 Tablet(s) Oral daily            75yo RT handed M with hx of DM-II with Neuropathy, TIAs, HTN, and HLD presents to [facility] ED for rt sided headaches for the past 1 month. Patient was followed by Opthalmology Dr. Bowers as an outpatient for papilledema and was referred to come to the [facility] ED for further evaluation. Optho states it is less likely GCA given lack of clinical symptoms and negative inflammatory markers. Patient had MRI in [facility] and was noted to have acute on chronic Left sided SDH and Right frontotemporal SDH. Patient was follows with outpatient Neurologist and was seen last week for trigger point injections. Patient denies numbness, tingling, fever and chills.     Patient transferred to [facility] for possible MMAE with Dr. Isabel. Per patient he takes Aggrenox but only once a day, and last taken 2 days ago for history of TIA. He states that he does not take any other AP/AC, and this is confirmed by his wife Glory at the bedside.     Post- op day # 3 s/p cerebral angio/ bk embolization of Left  MMA          Vital Signs Last 24 Hrs  T(C): 37.2 (08 Aug 2022 05:00), Max: 37.2 (07 Aug 2022 17:22)  T(F): 98.9 (08 Aug 2022 05:00), Max: 98.9 (07 Aug 2022 17:22)  HR: 87 (08 Aug 2022 05:00) (83 - 97)  BP: 138/86 (08 Aug 2022 05:00) (119/70 - 147/85)  BP(mean): --  RR: 18 (08 Aug 2022 05:00) (17 - 18)  SpO2: 98% (08 Aug 2022 05:00) (96% - 98%)    Parameters below as of 08 Aug 2022 05:00  Patient On (Oxygen Delivery Method): room air                Stroke Core Measures      DRAIN OUTPUT:     NEUROIMAGING: < from: MR Cervical Spine No Cont (08.07.22 @ 15:59) >  No MRI evidence of traumatic injury to the cervical spinal column or   cervical cord.    Multilevel cervical spondylosis and degenerative disc disease.    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  There is mild flattening of the   ventral cord at C5-C6, without anibal cord compression, cord edema,   myelomalacia or other focal cord lesion.         PHYSICAL EXAM:    General: No Acute Distress     Neurological: Awake, alert oriented to person, place and time, Following Commands, PERRL, EOMI, Face Symmetrical, Speech Fluent, Moving all extremities, Muscle Strength normal in all four extremities, No Drift, Sensation to Light Touch Intact    Pulmonary: Clear to Auscultation, No Rales, No Rhonchi, No Wheezes     Cardiovascular: S1, S2, Regular Rate and Rhythm     Gastrointestinal: Soft, Nontender, Nondistended     Incision:     MEDICATIONS:   Antibiotics:    Neuro:  acetaminophen     Tablet .. 650 milliGRAM(s) Oral every 6 hours PRN Temp greater or equal to 38C (100.4F), Mild Pain (1 - 3)  levETIRAcetam 500 milliGRAM(s) Oral two times a day    Anticoagulation:    Cardiology:  losartan 100 milliGRAM(s) Oral daily    Endo:   atorvastatin 20 milliGRAM(s) Oral at bedtime  dextrose 50% Injectable 25 Gram(s) IV Push once  dextrose 50% Injectable 12.5 Gram(s) IV Push once  dextrose 50% Injectable 25 Gram(s) IV Push once  dextrose Oral Gel 15 Gram(s) Oral once PRN  glucagon  Injectable 1 milliGRAM(s) IntraMuscular once  insulin glargine Injectable (LANTUS) 10 Unit(s) SubCutaneous at bedtime  insulin lispro (ADMELOG) corrective regimen sliding scale   SubCutaneous three times a day before meals  insulin lispro (ADMELOG) corrective regimen sliding scale   SubCutaneous at bedtime  insulin lispro Injectable (ADMELOG) 4 Unit(s) SubCutaneous three times a day before meals    Pulm:  ALBUTerol    90 MICROgram(s) HFA Inhaler 2 Puff(s) Inhalation every 6 hours PRN    GI/:  bisacodyl 5 milliGRAM(s) Oral every 12 hours  polyethylene glycol 3350 17 Gram(s) Oral daily  senna 1 Tablet(s) Oral daily    Other:  dextrose 5%. 1000 milliLiter(s) IV Continuous <Continuous>  dextrose 5%. 1000 milliLiter(s) IV Continuous <Continuous>  ALBUTerol    90 MICROgram(s) HFA Inhaler 2 Puff(s) Inhalation every 6 hours PRN   bisacodyl 5 milliGRAM(s) Oral every 12 hours  polyethylene glycol 3350 17 Gram(s) Oral daily  senna 1 Tablet(s) Oral daily            75yo RT handed M with hx of DM-II with Neuropathy, TIAs, HTN, and HLD presents to [facility] ED for rt sided headaches for the past 1 month. Patient was followed by Opthalmology Dr. Bowers as an outpatient for papilledema and was referred to come to the [facility] ED for further evaluation. Optho states it is less likely GCA given lack of clinical symptoms and negative inflammatory markers. Patient had MRI in [facility] and was noted to have acute on chronic Left sided SDH and Right frontotemporal SDH. Patient was follows with outpatient Neurologist and was seen last week for trigger point injections. Patient denies numbness, tingling, fever and chills.     Patient transferred to [facility] for possible MMAE with Dr. Isabel. Per patient he takes Aggrenox but only once a day, and last taken 2 days ago for history of TIA. He states that he does not take any other AP/AC, and this is confirmed by his wife Glory at the bedside.     Post- op day # 3 s/p cerebral angio/ bk embolization of Left  MMA          Vital Signs Last 24 Hrs  T(C): 37.2 (08 Aug 2022 05:00), Max: 37.2 (07 Aug 2022 17:22)  T(F): 98.9 (08 Aug 2022 05:00), Max: 98.9 (07 Aug 2022 17:22)  HR: 87 (08 Aug 2022 05:00) (83 - 97)  BP: 138/86 (08 Aug 2022 05:00) (119/70 - 147/85)  BP(mean): --  RR: 18 (08 Aug 2022 05:00) (17 - 18)  SpO2: 98% (08 Aug 2022 05:00) (96% - 98%)    Parameters below as of 08 Aug 2022 05:00  Patient On (Oxygen Delivery Method): room air                Stroke Core Measures      DRAIN OUTPUT:     NEUROIMAGING: < from: MR Cervical Spine No Cont (08.07.22 @ 15:59) >  No MRI evidence of traumatic injury to the cervical spinal column or   cervical cord.    Multilevel cervical spondylosis and degenerative disc disease.    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  There is mild flattening of the   ventral cord at C5-C6, without anibal cord compression, cord edema,   myelomalacia or other focal cord lesion.         PHYSICAL EXAM:    General: No Acute Distress     Neurological: Awake, alert oriented to person, place and time, Following Commands, PERRL, EOMI, Face Symmetrical, Speech Fluent, Moving all extremities, Muscle Strength normal in all four extremities, No Drift, Sensation to Light Touch Intact    Pulmonary: Clear to Auscultation, No Rales, No Rhonchi, No Wheezes     Cardiovascular: S1, S2, Regular Rate and Rhythm     Gastrointestinal: Soft, Nontender, Nondistended     Incision:     MEDICATIONS:   Antibiotics:    Neuro:  acetaminophen     Tablet .. 650 milliGRAM(s) Oral every 6 hours PRN Temp greater or equal to 38C (100.4F), Mild Pain (1 - 3)  levETIRAcetam 500 milliGRAM(s) Oral two times a day    Anticoagulation:    Cardiology:  losartan 100 milliGRAM(s) Oral daily    Endo:   atorvastatin 20 milliGRAM(s) Oral at bedtime  dextrose 50% Injectable 25 Gram(s) IV Push once  dextrose 50% Injectable 12.5 Gram(s) IV Push once  dextrose 50% Injectable 25 Gram(s) IV Push once  dextrose Oral Gel 15 Gram(s) Oral once PRN  glucagon  Injectable 1 milliGRAM(s) IntraMuscular once  insulin glargine Injectable (LANTUS) 10 Unit(s) SubCutaneous at bedtime  insulin lispro (ADMELOG) corrective regimen sliding scale   SubCutaneous three times a day before meals  insulin lispro (ADMELOG) corrective regimen sliding scale   SubCutaneous at bedtime  insulin lispro Injectable (ADMELOG) 4 Unit(s) SubCutaneous three times a day before meals    Pulm:  ALBUTerol    90 MICROgram(s) HFA Inhaler 2 Puff(s) Inhalation every 6 hours PRN    GI/:  bisacodyl 5 milliGRAM(s) Oral every 12 hours  polyethylene glycol 3350 17 Gram(s) Oral daily  senna 1 Tablet(s) Oral daily    Other:  dextrose 5%. 1000 milliLiter(s) IV Continuous <Continuous>  dextrose 5%. 1000 milliLiter(s) IV Continuous <Continuous>  ALBUTerol    90 MICROgram(s) HFA Inhaler 2 Puff(s) Inhalation every 6 hours PRN   bisacodyl 5 milliGRAM(s) Oral every 12 hours  polyethylene glycol 3350 17 Gram(s) Oral daily  senna 1 Tablet(s) Oral daily

## 2022-08-08 NOTE — DIETITIAN INITIAL EVALUATION ADULT - REASON
Nutrition-focused physical examination not indicated at this time as pt reporting good PO intake in-house/PTA and stable wt hx. No overt signs of fat/muscle wasting visually observed.

## 2022-08-08 NOTE — PROGRESS NOTE ADULT - SUBJECTIVE AND OBJECTIVE BOX
Dewayne Dooley   contact via pager or TEAMS        CC: Patient is a 76y old  Male who presents with a chief complaint of left sided acute on chronic SDH (08 Aug 2022 10:03)      SUBJECTIVE / OVERNIGHT EVENTS:    MEDICATIONS  (STANDING):  atorvastatin 20 milliGRAM(s) Oral at bedtime  bisacodyl 5 milliGRAM(s) Oral every 12 hours  dextrose 5%. 1000 milliLiter(s) (50 mL/Hr) IV Continuous <Continuous>  dextrose 5%. 1000 milliLiter(s) (100 mL/Hr) IV Continuous <Continuous>  dextrose 50% Injectable 25 Gram(s) IV Push once  dextrose 50% Injectable 12.5 Gram(s) IV Push once  dextrose 50% Injectable 25 Gram(s) IV Push once  glucagon  Injectable 1 milliGRAM(s) IntraMuscular once  insulin glargine Injectable (LANTUS) 10 Unit(s) SubCutaneous at bedtime  insulin lispro (ADMELOG) corrective regimen sliding scale   SubCutaneous three times a day before meals  insulin lispro (ADMELOG) corrective regimen sliding scale   SubCutaneous at bedtime  insulin lispro Injectable (ADMELOG) 4 Unit(s) SubCutaneous three times a day before meals  levETIRAcetam 500 milliGRAM(s) Oral two times a day  losartan 100 milliGRAM(s) Oral daily  polyethylene glycol 3350 17 Gram(s) Oral daily  senna 1 Tablet(s) Oral daily    MEDICATIONS  (PRN):  acetaminophen     Tablet .. 650 milliGRAM(s) Oral every 6 hours PRN Temp greater or equal to 38C (100.4F), Mild Pain (1 - 3)  ALBUTerol    90 MICROgram(s) HFA Inhaler 2 Puff(s) Inhalation every 6 hours PRN Shortness of Breath and/or Wheezing  dextrose Oral Gel 15 Gram(s) Oral once PRN Blood Glucose LESS THAN 70 milliGRAM(s)/deciliter      Vital Signs Last 24 Hrs  T(C): 37.1 (08 Aug 2022 08:01), Max: 37.2 (07 Aug 2022 17:22)  T(F): 98.7 (08 Aug 2022 08:01), Max: 98.9 (07 Aug 2022 17:22)  HR: 80 (08 Aug 2022 08:01) (80 - 97)  BP: 132/82 (08 Aug 2022 08:01) (128/75 - 147/85)  BP(mean): --  RR: 18 (08 Aug 2022 08:01) (18 - 18)  SpO2: 98% (08 Aug 2022 08:01) (96% - 98%)  CAPILLARY BLOOD GLUCOSE      POCT Blood Glucose.: 174 mg/dL (08 Aug 2022 07:30)  POCT Blood Glucose.: 159 mg/dL (07 Aug 2022 21:08)  POCT Blood Glucose.: 211 mg/dL (07 Aug 2022 16:40)  POCT Blood Glucose.: 198 mg/dL (07 Aug 2022 11:11)    I&O's Summary    07 Aug 2022 07:01  -  08 Aug 2022 07:00  --------------------------------------------------------  IN: 480 mL / OUT: 0 mL / NET: 480 mL      tele:    PHYSICAL EXAM:    GENERAL: NAD   HEENT: EOMI, PERRL  PULM: Clear to auscultation bilaterally  CV: Regular rate and rhythm; nl S1, S2; No murmurs, rubs, or gallops  ABDOMEN: Soft, Nontender, Nondistended; Bowel sounds present  EXTREMITIES/MSK:  No edema, calf tenderness   PSYCH: AAOx3  NEUROLOGY: non-focal          LABS:                      RADIOLOGY & ADDITIONAL TESTS:    Imaging Personally Reviewed:    Consultant(s) Notes Reviewed:      Care Discussed with Consultants/Other Providers:   Dewayne Dooley   contact via pager or TEAMS        CC: Patient is a 76y old  Male who presents with a chief complaint of left sided acute on chronic SDH (08 Aug 2022 10:03)      SUBJECTIVE / OVERNIGHT EVENTS: pt feels good. ROS neg.     MEDICATIONS  (STANDING):  atorvastatin 20 milliGRAM(s) Oral at bedtime  bisacodyl 5 milliGRAM(s) Oral every 12 hours  dextrose 5%. 1000 milliLiter(s) (50 mL/Hr) IV Continuous <Continuous>  dextrose 5%. 1000 milliLiter(s) (100 mL/Hr) IV Continuous <Continuous>  dextrose 50% Injectable 25 Gram(s) IV Push once  dextrose 50% Injectable 12.5 Gram(s) IV Push once  dextrose 50% Injectable 25 Gram(s) IV Push once  glucagon  Injectable 1 milliGRAM(s) IntraMuscular once  insulin glargine Injectable (LANTUS) 10 Unit(s) SubCutaneous at bedtime  insulin lispro (ADMELOG) corrective regimen sliding scale   SubCutaneous three times a day before meals  insulin lispro (ADMELOG) corrective regimen sliding scale   SubCutaneous at bedtime  insulin lispro Injectable (ADMELOG) 4 Unit(s) SubCutaneous three times a day before meals  levETIRAcetam 500 milliGRAM(s) Oral two times a day  losartan 100 milliGRAM(s) Oral daily  polyethylene glycol 3350 17 Gram(s) Oral daily  senna 1 Tablet(s) Oral daily    MEDICATIONS  (PRN):  acetaminophen     Tablet .. 650 milliGRAM(s) Oral every 6 hours PRN Temp greater or equal to 38C (100.4F), Mild Pain (1 - 3)  ALBUTerol    90 MICROgram(s) HFA Inhaler 2 Puff(s) Inhalation every 6 hours PRN Shortness of Breath and/or Wheezing  dextrose Oral Gel 15 Gram(s) Oral once PRN Blood Glucose LESS THAN 70 milliGRAM(s)/deciliter      Vital Signs Last 24 Hrs  T(C): 37.1 (08 Aug 2022 08:01), Max: 37.2 (07 Aug 2022 17:22)  T(F): 98.7 (08 Aug 2022 08:01), Max: 98.9 (07 Aug 2022 17:22)  HR: 80 (08 Aug 2022 08:01) (80 - 97)  BP: 132/82 (08 Aug 2022 08:01) (128/75 - 147/85)  BP(mean): --  RR: 18 (08 Aug 2022 08:01) (18 - 18)  SpO2: 98% (08 Aug 2022 08:01) (96% - 98%)  CAPILLARY BLOOD GLUCOSE      POCT Blood Glucose.: 174 mg/dL (08 Aug 2022 07:30)  POCT Blood Glucose.: 159 mg/dL (07 Aug 2022 21:08)  POCT Blood Glucose.: 211 mg/dL (07 Aug 2022 16:40)  POCT Blood Glucose.: 198 mg/dL (07 Aug 2022 11:11)    I&O's Summary    07 Aug 2022 07:01  -  08 Aug 2022 07:00  --------------------------------------------------------  IN: 480 mL / OUT: 0 mL / NET: 480 mL          PHYSICAL EXAM:    GENERAL: NAD   HEENT: EOMI, PERRL  PULM: Clear to auscultation bilaterally  CV: Regular rate and rhythm; nl S1, S2; No murmurs, rubs, or gallops  ABDOMEN: Soft, Nontender, Nondistended; Bowel sounds present  EXTREMITIES/MSK:  No edema, calf tenderness   PSYCH: AAOx3  NEUROLOGY: non-focal          LABS:                      RADIOLOGY & ADDITIONAL TESTS:    Imaging Personally Reviewed:    Consultant(s) Notes Reviewed:      Care Discussed with Consultants/Other Providers: neurosurg

## 2022-08-08 NOTE — CONSULT NOTE ADULT - SUBJECTIVE AND OBJECTIVE BOX
HPI:  77yo RT handed M with hx of DM-II with Neuropathy, TIAs, HTN, and HLD presents to [facility] ED for rt sided headaches for the past 1 month. Patient was followed by Opthalmology Dr. Bowers as an outpatient for papilledema and was referred to come to the [facility] ED for further evaluation. Optho states it is less likely GCA given lack of clinical symptoms and negative inflammatory markers. Patient had MRI in CDU [facility] and was noted to have acute on chronic Left sided SDH and Right frontotemporal SDH. Patient was follows with outpatient Neurologist and was seen last week for trigger point injections. Patient denies numbness, tingling, fever and chills.     Patient transferred to [facility] for possible MMAE with Dr. Isabel. Per patient he takes Aggrenox but only once a day, and last taken 2 days ago for history of TIA. He states that he does not take any other AP/AC, and this is confirmed by his wife Glory at the bedside.     Exam on arrival: AOx3, pupil 3mm reactive b/l, speech fluent, following commands, no drifts, uppers 5/5, lowers 5/5 (05 Aug 2022 11:21)    Patient was admitted on 8/5, s/p cerebral angiography, L MMA embolization for SDH, MRI cervical spine with multilevel cervical spondylosis. Patient seen today, complains of neck pain, reports loss of balance, 8 falls since January.     REVIEW OF SYSTEMS  Constitutional - No fever, No weight loss, No fatigue  HEENT - No eye pain, No visual disturbances, No difficulty hearing, No tinnitus, No vertigo, + neck pain  Respiratory - No cough, No wheezing, No shortness of breath  Cardiovascular - No chest pain, No palpitations  Gastrointestinal - No abdominal pain, No nausea, No vomiting, No diarrhea, No constipation  Genitourinary - +urgency  Psychiatric - No depression, No anxiety    VITALS  T(C): 37.1 (08-08-22 @ 08:01), Max: 37.2 (08-07-22 @ 17:22)  HR: 80 (08-08-22 @ 08:01) (80 - 97)  BP: 132/82 (08-08-22 @ 08:01) (128/75 - 147/85)  RR: 18 (08-08-22 @ 08:01) (18 - 18)  SpO2: 98% (08-08-22 @ 08:01) (96% - 98%)  Wt(kg): --    PAST MEDICAL & SURGICAL HISTORY  Diabetes    TIA (transient ischemic attack)    HTN (hypertension)    Hyperlipidemia    No significant past surgical history        SOCIAL HISTORY  Smoking - Denied  EtOH - Denied   Drugs - Denied    FUNCTIONAL HISTORY  Lives with wife, private home, 6 steps to enter, 14 steps inside  Independent ADLs, used cane PTA     CURRENT FUNCTIONAL STATUS  8/6 PT  bed mobility mod assist   transfers mod assist with SC  gait mod assist with SC x 10 feet, contact guard with RW x 25 feet   ataxic gait, loss of balance     8/7 OT  transfers min assist with RW        FAMILY HISTORY   Family history of renal cancer    Family history of prostate cancer in father        RECENT LABS/IMAGING      < from: MR Cervical Spine No Cont (08.07.22 @ 15:59) >    IMPRESSION:  No MRI evidence of traumatic injury to the cervical spinal column or   cervical cord.    Multilevel cervical spondylosis and degenerative disc disease.    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  There is mild flattening of the   ventral cord at C5-C6, without anibal cord compression, cord edema,   myelomalacia or other focal cord lesion.      < end of copied text >            < from: MR Head w/wo IV Cont (08.05.22 @ 02:10) >    IMPRESSION: Bilateral subdural hematomas are identified as described   above.    Evaluation of the orbits appear unremarkable.      < end of copied text >    ALLERGIES  No Known Allergies      MEDICATIONS   acetaminophen     Tablet .. 650 milliGRAM(s) Oral every 6 hours PRN  ALBUTerol    90 MICROgram(s) HFA Inhaler 2 Puff(s) Inhalation every 6 hours PRN  atorvastatin 20 milliGRAM(s) Oral at bedtime  bisacodyl 5 milliGRAM(s) Oral every 12 hours  dextrose 5%. 1000 milliLiter(s) IV Continuous <Continuous>  dextrose 5%. 1000 milliLiter(s) IV Continuous <Continuous>  dextrose 50% Injectable 25 Gram(s) IV Push once  dextrose 50% Injectable 12.5 Gram(s) IV Push once  dextrose 50% Injectable 25 Gram(s) IV Push once  dextrose Oral Gel 15 Gram(s) Oral once PRN  glucagon  Injectable 1 milliGRAM(s) IntraMuscular once  insulin glargine Injectable (LANTUS) 10 Unit(s) SubCutaneous at bedtime  insulin lispro (ADMELOG) corrective regimen sliding scale   SubCutaneous three times a day before meals  insulin lispro (ADMELOG) corrective regimen sliding scale   SubCutaneous at bedtime  insulin lispro Injectable (ADMELOG) 4 Unit(s) SubCutaneous three times a day before meals  levETIRAcetam 500 milliGRAM(s) Oral two times a day  losartan 100 milliGRAM(s) Oral daily  polyethylene glycol 3350 17 Gram(s) Oral daily  senna 1 Tablet(s) Oral daily      ----------------------------------------------------------------------------------------  PHYSICAL EXAM  Constitutional - NAD, Comfortable, sitting in chair   Chest - Breathing comfortably  Cardiovascular - S1S2   Abdomen - Soft   Extremities - No C/C/E, No calf tenderness   Neurologic Exam -                    Cognitive - Awake, Alert, AAO to self, place, date, year, situation     Communication - Fluent, No dysarthria        Motor -                     LEFT    UE - ShAB 5/5, EF 5/5, EE 5/5, WE 5/5,  5/5                    RIGHT UE - ShAB 5/5, EF 5/5, EE 5/5, WE 5/5,  5/5                    LEFT    LE - HF 5/5, KE 5/5, DF 5/5, PF 5/5                    RIGHT LE - HF 5/5, KE 5/5, DF 5/5, PF 5/5        Sensory - Intact to LT     Psychiatric - Mood stable, Affect WNL  ----------------------------------------------------------------------------------------  ASSESSMENT/PLAN  76yMale h/o DM, HTN, HLD with functional deficits after cerebral angio/ bk embolization of Left  MMA 8/5/22 for L SDH  on Keppra for seizure prophylaxis   MRI cervical spine with spondylosis, stenosis   Pain - Tylenol  DVT PPX - SCDs  Rehab - Will continue to follow for ongoing rehab needs and recommendations.   continue bedside therapy    Recommend ACUTE inpatient rehabilitation for the functional deficits consisting of 3 hours of therapy/day & 24 hour RN/daily PMR physician for comorbid medical management. Patient will be able to tolerate 3 hours a day.

## 2022-08-08 NOTE — PROGRESS NOTE ADULT - PROBLEM SELECTOR PLAN 2
A1c 6.8.  -pt is eating less here, compared to at home. c/w lantus 10 and humalog 4 TID. will uptitrate accordingly  -home Lantus can be decreased to 20 units daily on discharge if patient is able to maintain home diet.  -followup with (Dr. Sven Avalos) and Endocrinologist (Dr. Boss) within 3 days of discharge. Pt was told to call his Endocrinologist with fs to see if Insulin needs to be adjusted.  -can continue ozempic at home (recently, dose was decreased)

## 2022-08-08 NOTE — DIETITIAN INITIAL EVALUATION ADULT - PERSON TAUGHT/METHOD
-Discussed importance of adequate protein-energy consumption to meet estimated nutrient needs. Encouraged intake of meals and oral nutrition supplements as tolerated.   -Reviewed education on Nutrition Therapy for Type 2 Diabetes. Emphasis on importance of pairing carbohydrates with protein for glycemic control; choosing whole grains vs refined carbohydrates; limiting refined sugars, portion sizes. Good comprehension noted. Pt made aware RD to remain available for any questions./verbal instruction/patient instructed

## 2022-08-09 LAB
ANION GAP SERPL CALC-SCNC: 9 MMOL/L — SIGNIFICANT CHANGE UP (ref 5–17)
BUN SERPL-MCNC: 19 MG/DL — SIGNIFICANT CHANGE UP (ref 7–23)
CALCIUM SERPL-MCNC: 9.1 MG/DL — SIGNIFICANT CHANGE UP (ref 8.4–10.5)
CHLORIDE SERPL-SCNC: 101 MMOL/L — SIGNIFICANT CHANGE UP (ref 96–108)
CO2 SERPL-SCNC: 26 MMOL/L — SIGNIFICANT CHANGE UP (ref 22–31)
CREAT SERPL-MCNC: 1.07 MG/DL — SIGNIFICANT CHANGE UP (ref 0.5–1.3)
EGFR: 72 ML/MIN/1.73M2 — SIGNIFICANT CHANGE UP
GLUCOSE BLDC GLUCOMTR-MCNC: 173 MG/DL — HIGH (ref 70–99)
GLUCOSE BLDC GLUCOMTR-MCNC: 213 MG/DL — HIGH (ref 70–99)
GLUCOSE BLDC GLUCOMTR-MCNC: 216 MG/DL — HIGH (ref 70–99)
GLUCOSE BLDC GLUCOMTR-MCNC: 233 MG/DL — HIGH (ref 70–99)
GLUCOSE BLDC GLUCOMTR-MCNC: 257 MG/DL — HIGH (ref 70–99)
GLUCOSE SERPL-MCNC: 217 MG/DL — HIGH (ref 70–99)
HCT VFR BLD CALC: 49.1 % — SIGNIFICANT CHANGE UP (ref 39–50)
HGB BLD-MCNC: 16.1 G/DL — SIGNIFICANT CHANGE UP (ref 13–17)
MCHC RBC-ENTMCNC: 29.6 PG — SIGNIFICANT CHANGE UP (ref 27–34)
MCHC RBC-ENTMCNC: 32.8 GM/DL — SIGNIFICANT CHANGE UP (ref 32–36)
MCV RBC AUTO: 90.3 FL — SIGNIFICANT CHANGE UP (ref 80–100)
NRBC # BLD: 0 /100 WBCS — SIGNIFICANT CHANGE UP (ref 0–0)
PLATELET # BLD AUTO: 250 K/UL — SIGNIFICANT CHANGE UP (ref 150–400)
POTASSIUM SERPL-MCNC: 4.3 MMOL/L — SIGNIFICANT CHANGE UP (ref 3.5–5.3)
POTASSIUM SERPL-SCNC: 4.3 MMOL/L — SIGNIFICANT CHANGE UP (ref 3.5–5.3)
RBC # BLD: 5.44 M/UL — SIGNIFICANT CHANGE UP (ref 4.2–5.8)
RBC # FLD: 12.7 % — SIGNIFICANT CHANGE UP (ref 10.3–14.5)
SARS-COV-2 RNA SPEC QL NAA+PROBE: SIGNIFICANT CHANGE UP
SODIUM SERPL-SCNC: 136 MMOL/L — SIGNIFICANT CHANGE UP (ref 135–145)
WBC # BLD: 13.17 K/UL — HIGH (ref 3.8–10.5)
WBC # FLD AUTO: 13.17 K/UL — HIGH (ref 3.8–10.5)

## 2022-08-09 PROCEDURE — 99231 SBSQ HOSP IP/OBS SF/LOW 25: CPT | Mod: FS

## 2022-08-09 PROCEDURE — 99233 SBSQ HOSP IP/OBS HIGH 50: CPT | Mod: FS

## 2022-08-09 PROCEDURE — 99232 SBSQ HOSP IP/OBS MODERATE 35: CPT

## 2022-08-09 RX ORDER — INSULIN LISPRO 100/ML
5 VIAL (ML) SUBCUTANEOUS
Refills: 0 | Status: DISCONTINUED | OUTPATIENT
Start: 2022-08-09 | End: 2022-08-10

## 2022-08-09 RX ORDER — INSULIN GLARGINE 100 [IU]/ML
12 INJECTION, SOLUTION SUBCUTANEOUS AT BEDTIME
Refills: 0 | Status: DISCONTINUED | OUTPATIENT
Start: 2022-08-09 | End: 2022-08-10

## 2022-08-09 RX ADMIN — Medication 2: at 17:35

## 2022-08-09 RX ADMIN — LEVETIRACETAM 500 MILLIGRAM(S): 250 TABLET, FILM COATED ORAL at 17:37

## 2022-08-09 RX ADMIN — Medication 4 UNIT(S): at 12:16

## 2022-08-09 RX ADMIN — LOSARTAN POTASSIUM 100 MILLIGRAM(S): 100 TABLET, FILM COATED ORAL at 05:31

## 2022-08-09 RX ADMIN — Medication 5 UNIT(S): at 17:35

## 2022-08-09 RX ADMIN — ATORVASTATIN CALCIUM 20 MILLIGRAM(S): 80 TABLET, FILM COATED ORAL at 21:58

## 2022-08-09 RX ADMIN — Medication 2: at 12:15

## 2022-08-09 RX ADMIN — Medication 4 UNIT(S): at 08:29

## 2022-08-09 RX ADMIN — LEVETIRACETAM 500 MILLIGRAM(S): 250 TABLET, FILM COATED ORAL at 05:31

## 2022-08-09 RX ADMIN — INSULIN GLARGINE 12 UNIT(S): 100 INJECTION, SOLUTION SUBCUTANEOUS at 21:59

## 2022-08-09 RX ADMIN — Medication 1: at 08:28

## 2022-08-09 RX ADMIN — Medication 5 MILLIGRAM(S): at 05:31

## 2022-08-09 NOTE — PROGRESS NOTE ADULT - SUBJECTIVE AND OBJECTIVE BOX
SUBJECTIVE:   Patient seen & examined . Ambulating hallways   OVERNIGHT EVENTS: none    Vital Signs Last 24 Hrs  T(C): 36.6 (09 Aug 2022 13:), Max: 37.1 (09 Aug 2022 01:16)  T(F): 97.9 (09 Aug 2022 13:), Max: 98.8 (09 Aug 2022 10:15)  HR: 98 (09 Aug 2022 13:22) (89 - 102)  BP: 130/81 (09 Aug 2022 13:22) (125/76 - 142/83)  RR: 18 (09 Aug 2022 13:22) (17 - 18)  SpO2: 97% (09 Aug 2022 13:22) (97% - 98%)    Parameters below as of 09 Aug 2022 13:22  Patient On (Oxygen Delivery Method): room air        PHYSICAL EXAM:    Constitutional: No Acute Distress     Neurological: AOx3, Speech clear Following Commands, Moving all Extremities 5/5 No drift R groin no swelling or eccymosis        Pulmonary: Clear to Auscultation, No rales, No rhonchi, No wheezes     Cardiovascular: S1, S2, Regular rate and rhythm       LABS:                        16.1   13.17 )-----------( 250      ( 09 Aug 2022 13:23 )             49.1    -    136  |  101  |  19  ----------------------------<  217<H>  4.3   |  26  |  1.07    Ca    9.1      09 Aug 2022 13:23        IMAGIN/7/22 MR C spine- C5- C6 mild spinal canal stenosis with ventral cord impingement. There is mild flattening of the ventral cord at C5-C6, without anibal cord compression, cord edema       MEDICATIONS:    acetaminophen     Tablet .. 650 milliGRAM(s) Oral every 6 hours PRN Temp greater or equal to 38C (100.4F), Mild Pain (1 - 3)  levETIRAcetam 500 milliGRAM(s) Oral two times a day  losartan 100 milliGRAM(s) Oral daily  ALBUTerol    90 MICROgram(s) HFA Inhaler 2 Puff(s) Inhalation every 6 hours PRN Shortness of Breath and/or Wheezing  bisacodyl 5 milliGRAM(s) Oral every 12 hours  polyethylene glycol 3350 17 Gram(s) Oral daily  senna 1 Tablet(s) Oral daily  atorvastatin 20 milliGRAM(s) Oral at bedtime  insulin glargine Injectable (LANTUS) 12 Unit(s) SubCutaneous at bedtime  insulin lispro (ADMELOG) corrective regimen sliding scale   SubCutaneous three times a day before meals  insulin lispro (ADMELOG) corrective regimen sliding scale   SubCutaneous at bedtime  insulin lispro Injectable (ADMELOG) 5 Unit(s) SubCutaneous three times a day before meals      DIET:

## 2022-08-09 NOTE — CONSULT NOTE ADULT - ATTENDING COMMENTS
Right optic disc edema and peripapillary hemorrhages Rule out GCA  -HA symptoms improving   Pt describes some blurred vision OD but no darkening of vision or vision loss  -No other GCA symptoms  -Normal Afferent visual function (VA, CVF, Color vision)  -ESR/ CRP WNL  -MRI orbits neg for orbital mass or abnormal signal in the optic nerves  -Very low suspicion for GCA at this time, would not recommend high dose steroids    Recommend follow up with ophthalmology after discharge for HVF and OCT RNFL

## 2022-08-09 NOTE — CONSULT NOTE ADULT - ASSESSMENT
Assessment and Recommendations:  76y male with a past medical history/ocular history of T2DM, TIA, HTN, HLD consulted for right sided headache with surrounding eye pain for 1 month, found to have *** and bilateral subdural hematoma with localized mass effect. Low suspicion of GCA as patient denies any scalp tenderness, shoulder or hip pain, jaw claudication, no transient vision loss, ESR, CRP within normal limits for age range. VA on exam today 20/25 OD, 20/30 PH 20/20-2 OS, IOP within normal limits, PERRLA with no APD, EOMI. Dilated fundus exam showing elevated blood vessels with surrounding area of fibrosis            Seen and discussed with ***.    Outpatient Follow-up: Patient should follow-up with his/her ophthalmologist or with [facility] Department of Ophthalmology within 1 week of after discharge at:    [address]  [place] [address]  [phone]    Andrew Cunningham MD, PGY2  Also available on Microsoft Teams     Assessment and Recommendations:  76y male with a past medical history/ocular history of T2DM, TIA, HTN, HLD consulted for right sided headache with surrounding eye pain for 1 month, found to have bilateral subdural hematoma with localized mass effect, unlikely causing visual symptoms, which have since resolved. Low suspicion of GCA as patient denies any scalp tenderness, shoulder or hip pain, jaw claudication, no transient vision loss, ESR, CRP within normal limits for age range. MRI brain with bilateral subdural hematoma, but normal optic nerve structure and no orbital anomaly noted. VA on exam today 20/25 OD, 20/30 PH 20/20-2 OS, IOP within normal limits, PERRLA with no APD, EOMI. Dilated fundus exam showing elevated blood vessels with surrounding area of fibrosis/heme around disc but no edema of the optic nerve head.     1) R/o GCA vs NAION  - no acute or transient vision loss in either eye  - VA 20/25 OD 20/30 PH 20/20-2 OS  - no APD appreciated, no pallor to optic discs on dilated exam, no cupping  - few hemorrhages around optic nerve right eye with elevated blood vessels ?fibrosis, but no optic nerve head edema  - color vision full bilaterally, IOP within normal limits bilaterally  - ESR/CRP within normal limits, MRI brain no specific ocular findings (bilateral hematoma not likely to cause patients eye pain or vision changes  - based on these findings and visual examination today, low suspicion for GCA or NAION  - patient can follow up on outpatient basis with [address] [place] [address] and neuroophthalmology or outpatient Dr Bell     Seen and discussed with Dr. Keller, neuroophthalmology    Outpatient Follow-up: Patient should follow-up with his/her ophthalmologist or with [facility] Department of Ophthalmology within 1 week of after discharge at:    [address]  [place] [address]  [phone]    Andrew Cunningham MD, PGY2  Also available on Microsoft Teams     Assessment and Recommendations:  76y male with a past medical history/ocular history of T2DM, TIA, HTN, HLD consulted for right sided headache with surrounding eye pain for 1 month, found to have mild right disc edema with peripapillary hemorrhages referred for rule out GCA. Neuro imaging  showed bilateral subdural hematoma, but no orbital mass or abnormal optic nerve signal/ enhancement. ESR and CRP normal and headache symptoms improved.   Low suspicion of GCA given improvement in headache, no other GCA symptoms, ESR, CRP within normal limits for age range normal afferent visual function, and normal MRI orbits,.      1)  Right disc edema and peripapillary hemorrhages R/O GCA  -HA symptoms improving  -Pt describes some blurring of vision but no darkening of vision/ vision loss  - No other GCA symptoms   -Normal Afferent visual function on inpatient exam (Visual acuity, Confrontation fields, Color vision)  - ESR/CRP within normal limits  -MRI orbits neg for orbital mass or abnormal optic nerve signal. MRI brain shows subdural hematomas (pt has hx of head trauma)  - Very low suspicion for GCA at this time. Would not recommend high dose steroids     Recommend follow up with ophthalmology after discharge for visual field testing and OCT RNFL    Seen and discussed with Dr. Keller, neuroophthalmology    Outpatient Follow-up: Patient should follow-up with ophthalmology 1 week of after discharge at:    [address]  [place] [address]  [phone]    Andrew Cunningham MD, PGY2  Also available on Microsoft Teams

## 2022-08-09 NOTE — PROGRESS NOTE ADULT - ASSESSMENT
76yr  RT handed M with hx of DM-II with Neuropathy, TIAs on Aggrenox , HTN, and HLD presents to [facility] ED for rt sided headaches for the past 1 month. Patient was followed by Opthalmology Dr. Bowers as an outpatient for papilledema and was referred to come to the [facility] ED for further evaluation. Optho states it is less likely GCA given lack of clinical symptoms and negative inflammatory markers. Patient had MRI in CDU [facility] and was noted to have acute on chronic Left sided SDH and Right frontotemporal SDH. Patient was follows with outpatient Neurologist and was seen last week for C5-C6  trigger point injections. 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 Post procedure CT head 8/6 stable    Plan    Neuro stable . Keppra for seizure ppx Outpt f/u with Dr Powers for C5-C6 stenosis  Vitals stable    Type 2 DM BS 200s. lantus dose increased to 12U& premeal Humalog 5U   Hold DVT chemo ppx   awaiting acute rehab placement

## 2022-08-09 NOTE — PROGRESS NOTE ADULT - SUBJECTIVE AND OBJECTIVE BOX
Dewayne Dooley   contact via pager or TEAMS        CC: Patient is a 76y old  Male who presents with a chief complaint of left sided acute on chronic SDH (09 Aug 2022 09:05)      SUBJECTIVE / OVERNIGHT EVENTS:    MEDICATIONS  (STANDING):  atorvastatin 20 milliGRAM(s) Oral at bedtime  bisacodyl 5 milliGRAM(s) Oral every 12 hours  dextrose 5%. 1000 milliLiter(s) (100 mL/Hr) IV Continuous <Continuous>  dextrose 5%. 1000 milliLiter(s) (50 mL/Hr) IV Continuous <Continuous>  dextrose 50% Injectable 25 Gram(s) IV Push once  dextrose 50% Injectable 12.5 Gram(s) IV Push once  dextrose 50% Injectable 25 Gram(s) IV Push once  glucagon  Injectable 1 milliGRAM(s) IntraMuscular once  insulin glargine Injectable (LANTUS) 10 Unit(s) SubCutaneous at bedtime  insulin lispro (ADMELOG) corrective regimen sliding scale   SubCutaneous three times a day before meals  insulin lispro (ADMELOG) corrective regimen sliding scale   SubCutaneous at bedtime  insulin lispro Injectable (ADMELOG) 4 Unit(s) SubCutaneous three times a day before meals  levETIRAcetam 500 milliGRAM(s) Oral two times a day  losartan 100 milliGRAM(s) Oral daily  polyethylene glycol 3350 17 Gram(s) Oral daily  senna 1 Tablet(s) Oral daily    MEDICATIONS  (PRN):  acetaminophen     Tablet .. 650 milliGRAM(s) Oral every 6 hours PRN Temp greater or equal to 38C (100.4F), Mild Pain (1 - 3)  ALBUTerol    90 MICROgram(s) HFA Inhaler 2 Puff(s) Inhalation every 6 hours PRN Shortness of Breath and/or Wheezing  dextrose Oral Gel 15 Gram(s) Oral once PRN Blood Glucose LESS THAN 70 milliGRAM(s)/deciliter      Vital Signs Last 24 Hrs  T(C): 36.8 (09 Aug 2022 05:13), Max: 37.1 (08 Aug 2022 12:50)  T(F): 98.3 (09 Aug 2022 05:13), Max: 98.7 (08 Aug 2022 12:50)  HR: 93 (09 Aug 2022 05:13) (82 - 102)  BP: 125/76 (09 Aug 2022 05:13) (125/76 - 142/83)  BP(mean): --  RR: 18 (09 Aug 2022 05:13) (17 - 18)  SpO2: 97% (09 Aug 2022 05:13) (97% - 98%)  CAPILLARY BLOOD GLUCOSE      POCT Blood Glucose.: 173 mg/dL (09 Aug 2022 07:35)  POCT Blood Glucose.: 224 mg/dL (08 Aug 2022 21:29)  POCT Blood Glucose.: 166 mg/dL (08 Aug 2022 16:24)  POCT Blood Glucose.: 260 mg/dL (08 Aug 2022 11:22)    I&O's Summary    tele:    PHYSICAL EXAM:    GENERAL: NAD   HEENT: EOMI, PERRL  PULM: Clear to auscultation bilaterally  CV: Regular rate and rhythm; nl S1, S2; No murmurs, rubs, or gallops  ABDOMEN: Soft, Nontender, Nondistended; Bowel sounds present  EXTREMITIES/MSK:  No edema, calf tenderness   PSYCH: AAOx3  NEUROLOGY: non-focal          LABS:                      RADIOLOGY & ADDITIONAL TESTS:    Imaging Personally Reviewed:    Consultant(s) Notes Reviewed:      Care Discussed with Consultants/Other Providers:   Dewayne Dooley   contact via pager or TEAMS        CC: Patient is a 76y old  Male who presents with a chief complaint of left sided acute on chronic SDH (09 Aug 2022 09:05)      SUBJECTIVE / OVERNIGHT EVENTS: denies any complaints on ROS. feels good.    MEDICATIONS  (STANDING):  atorvastatin 20 milliGRAM(s) Oral at bedtime  bisacodyl 5 milliGRAM(s) Oral every 12 hours  dextrose 5%. 1000 milliLiter(s) (100 mL/Hr) IV Continuous <Continuous>  dextrose 5%. 1000 milliLiter(s) (50 mL/Hr) IV Continuous <Continuous>  dextrose 50% Injectable 25 Gram(s) IV Push once  dextrose 50% Injectable 12.5 Gram(s) IV Push once  dextrose 50% Injectable 25 Gram(s) IV Push once  glucagon  Injectable 1 milliGRAM(s) IntraMuscular once  insulin glargine Injectable (LANTUS) 10 Unit(s) SubCutaneous at bedtime  insulin lispro (ADMELOG) corrective regimen sliding scale   SubCutaneous three times a day before meals  insulin lispro (ADMELOG) corrective regimen sliding scale   SubCutaneous at bedtime  insulin lispro Injectable (ADMELOG) 4 Unit(s) SubCutaneous three times a day before meals  levETIRAcetam 500 milliGRAM(s) Oral two times a day  losartan 100 milliGRAM(s) Oral daily  polyethylene glycol 3350 17 Gram(s) Oral daily  senna 1 Tablet(s) Oral daily    MEDICATIONS  (PRN):  acetaminophen     Tablet .. 650 milliGRAM(s) Oral every 6 hours PRN Temp greater or equal to 38C (100.4F), Mild Pain (1 - 3)  ALBUTerol    90 MICROgram(s) HFA Inhaler 2 Puff(s) Inhalation every 6 hours PRN Shortness of Breath and/or Wheezing  dextrose Oral Gel 15 Gram(s) Oral once PRN Blood Glucose LESS THAN 70 milliGRAM(s)/deciliter      Vital Signs Last 24 Hrs  T(C): 36.8 (09 Aug 2022 05:13), Max: 37.1 (08 Aug 2022 12:50)  T(F): 98.3 (09 Aug 2022 05:13), Max: 98.7 (08 Aug 2022 12:50)  HR: 93 (09 Aug 2022 05:13) (82 - 102)  BP: 125/76 (09 Aug 2022 05:13) (125/76 - 142/83)  BP(mean): --  RR: 18 (09 Aug 2022 05:13) (17 - 18)  SpO2: 97% (09 Aug 2022 05:13) (97% - 98%)  CAPILLARY BLOOD GLUCOSE      POCT Blood Glucose.: 173 mg/dL (09 Aug 2022 07:35)  POCT Blood Glucose.: 224 mg/dL (08 Aug 2022 21:29)  POCT Blood Glucose.: 166 mg/dL (08 Aug 2022 16:24)  POCT Blood Glucose.: 260 mg/dL (08 Aug 2022 11:22)    I&O's Summary        PHYSICAL EXAM:    GENERAL: NAD   HEENT: EOMI, PERRL  PULM: Clear to auscultation bilaterally  CV: Regular rate and rhythm; nl S1, S2; No murmurs, rubs, or gallops  ABDOMEN: Soft, Nontender, Nondistended; Bowel sounds present  EXTREMITIES/MSK:  No edema, calf tenderness   PSYCH: AAOx3  NEUROLOGY: non-focal          LABS:                      RADIOLOGY & ADDITIONAL TESTS:    Imaging Personally Reviewed:    Consultant(s) Notes Reviewed:      Care Discussed with Consultants/Other Providers: neurosurg

## 2022-08-09 NOTE — CONSULT NOTE ADULT - SUBJECTIVE AND OBJECTIVE BOX
[facility] DEPARTMENT OF OPHTHALMOLOGY - INITIAL ADULT CONSULT  -----------------------------------------------------------------------------------------------------------------  Andrew Cunningham MD, PGY2  Available on "[employer] Teams  -----------------------------------------------------------------------------------------------------------------    HPI:  77yo M with hx of DM-II with Neuropathy, TIAs, HTN, and HLD presented to [facility] ED for rt sided headaches for the past 1 month. 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 Optho states it is less likely GCA given lack of clinical symptoms and negative inflammatory markers. Patient had MRI in [facility] and was noted to have acute on chronic Left sided SDH and Right frontotemporal SDH. Patient was follows with outpatient Neurologist and was seen last week for trigger point injections. Patient denies numbness, tingling, fever and chills.     Patient transferred to [facility] for possible MMAE with Dr. Isabel.    Today patient denying any headaches. States that his symptoms have overall improved, but continues to see some blurriness in his right eye. Denies any eye pain, redness, discharge, sudden vision loss, any dark curtains, flashes or floaters.    Past Medical History: T2DM, TIA, HTN, HLD  Past Ocular History:   Drops: none  Medications: aspirin, insulin, farxiga, atorvastatin, donepezil, pantoprazole  Allergies: NKDA  Family History: denies  Surgical History: eyelid surgery OU (revision OD), PCIOL OU  Outpatient Ophthalmologist: Dr. Bell      Review of Systems:  Constitutional: headache, right sided but improved No fever, chills  Eyes: blurry vision OD > OS, denies flashes, floaters, FBS, erythema, discharge, double vision, OU  Neuro: No tremors  Cardiovascular: No chest pain, palpitations  Respiratory: No SOB, no cough  GI: No nausea, vomiting, abdominal pain    Vital Signs: T(C): 36.8 (08-09-22 @ 05:13)  T(F): 98.3 (08-09-22 @ 05:13), Max: 98.7 (08-08-22 @ 12:50)  HR: 93 (08-09-22 @ 05:13) (82 - 102)  BP: 125/76 (08-09-22 @ 05:13) (125/76 - 142/83)  RR:  (17 - 18)  SpO2:  (97% - 98%)  Wt(kg): --  AAOx3    Ophthalmology Exam:  Visual acuity (cc): 20/25 OD. 20/30 PH 20/20-2 OS.  Pupils: PERRL OU, no APD  Intraocular Pressure:  Ttono 15, 17  Extraocular movements (EOMs): Full OU, no pain, no diplopia.  Confrontational Visual Field (CVF): Full OU.  Color Plates: 12/12 OU.    Pen Light Exam (PLE)  External: Normal OU.  Lids/Lashes/Lacrimal Ducts: Flat OU.  Sclera/Conjunctiva: White and quiet OU.  Cornea: Clear OU.  Anterior Chamber: Deep and formed OU.    Iris: Flat OU.  Lens: PCIOL OU.    Fundus Exam: dilated with 1% tropicamide and 2.5% phenylephrine  Approval obtained from primary team for dilation  Patient aware that pupils can remained dilated for at least 4-6 hours.  Exam performed with 20 D lens    Vitreous: wnl OU  Disc, cup/disc: elevated vessels with surrounding fibrosis, sharp distinct margins temporally, CDR appears 0.3 OD, sharp and distinct margin 0.3 OS  Macula: wnl OU  Vessels: tortuous OU  Periphery: wnl OU    Labs/Imaging:  < from: CT Head No Cont (08.06.22 @ 08:38) >  IMPRESSION: New left sided middle meningeal artery embolization material   since 8/5/2022. No change in left frontal parietal subacute to chronic   subdural hematoma with mass effect and mild midline shift to the right.    < end of copied text >    < from: MR Orbits w/wo IV Cont (08.05.22 @ 02:10) >  Evaluation of the orbits appear unremarkable.    < end of copied text >  < from: MR Orbits w/wo IV Cont (08.05.22 @ 02:10) >  There is evidence of bilateral subdural hematomas identified. This is   more prominent on the left side than the right. The left subdural   hematoma involves the left temporal frontal parietal region and measures   approximately 1.3 cm widest diameter. The subdural hematoma on the right   side involves right temporal region measures approximately 0.7 cm widest   diameter. There is localized mass effect seen involving the left cerebral   hemisphere. Mass effect on the left lateral ventricle is seen.    < end of copied text >  < from: MR Orbits w/wo IV Cont (08.05.22 @ 02:10) >  oth globes appear normal and symmetric.    The patient is status post bilateral cataract surgery.    Both optic nerves demonstrate normal signal and caliber with no abnormal   enhancing component identified.    Evaluation of the rest of the intraorbital soft tissue structures appear   normal    Evaluation of the optic chiasm appears unremarkable.    < end of copied text >     [facility] DEPARTMENT OF OPHTHALMOLOGY - INITIAL ADULT CONSULT  -----------------------------------------------------------------------------------------------------------------  Andrew Cunningham MD, PGY2  Available on [employer] Teams  -----------------------------------------------------------------------------------------------------------------    HPI:  75yo M with hx of DM-II with Neuropathy, TIAs, HTN, and HLD presented to [facility] ED for rt sided headaches for the past 1 month. 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 Optho states it is less likely GCA given lack of clinical symptoms and negative inflammatory markers. Patient had MRI in [facility] and was noted to have acute on chronic Left sided SDH and Right frontotemporal SDH. Patient was follows with outpatient Neurologist and was seen last week for trigger point injections. Patient denies numbness, tingling, fever and chills.     Patient transferred to [facility] for possible MMAE with Dr. Isabel.    Today patient denying any headaches. States that his symptoms have overall improved, but continues to see some blurriness in his right eye. Denies any eye pain, redness, discharge, sudden vision loss, any dark curtains, flashes or floaters.    Past Medical History: T2DM, TIA, HTN, HLD  Past Ocular History:   Drops: none  Medications: aspirin, insulin, farxiga, atorvastatin, donepezil, pantoprazole  Allergies: NKDA  Family History: denies  Surgical History: eyelid surgery OU (revision OD), PCIOL OU  Outpatient Ophthalmologist: Dr. Bell      Review of Systems:  Constitutional: headache, right sided but improved No fever, chills  Eyes: blurry vision OD > OS, denies flashes, floaters, FBS, erythema, discharge, double vision, OU  Neuro: No tremors  Cardiovascular: No chest pain, palpitations  Respiratory: No SOB, no cough  GI: No nausea, vomiting, abdominal pain    Vital Signs: T(C): 36.8 (08-09-22 @ 05:13)  T(F): 98.3 (08-09-22 @ 05:13), Max: 98.7 (08-08-22 @ 12:50)  HR: 93 (08-09-22 @ 05:13) (82 - 102)  BP: 125/76 (08-09-22 @ 05:13) (125/76 - 142/83)  RR:  (17 - 18)  SpO2:  (97% - 98%)  Wt(kg): --  AAOx3    Ophthalmology Exam:  Visual acuity (cc): 20/25 OD. 20/30 PH 20/20-2 OS.  Pupils: PERRL OU, no APD  Intraocular Pressure:  Ttono 15, 17  Extraocular movements (EOMs): Full OU, no pain, no diplopia.  Confrontational Visual Field (CVF): Full OU.  Color Plates: 12/12 OU.    Pen Light Exam (PLE)  External: Normal OU.  Lids/Lashes/Lacrimal Ducts: Flat OU.  Sclera/Conjunctiva: White and quiet OU.  Cornea: Clear OU.  Anterior Chamber: Deep and formed OU.    Iris: Flat OU.  Lens: PCIOL OU.    Fundus Exam: dilated with 1% tropicamide and 2.5% phenylephrine  Approval obtained from primary team for dilation  Patient aware that pupils can remained dilated for at least 4-6 hours.  Exam performed with 20 D lens    Vitreous: wnl OU  Disc, cup/disc:   ODsmall full disc with temporal peripapillary NFL hemorrhage, elevated peripapillary RNFL temporally, but clear disc margins on indirect exam C/D 0.1  OS:  sharp and distinct margin 0.1  Macula: wnl OU  Vessels: tortuous OU  Periphery: wnl OU    Labs/Imaging:  < from: CT Head No Cont (08.06.22 @ 08:38) >  IMPRESSION: New left sided middle meningeal artery embolization material   since 8/5/2022. No change in left frontal parietal subacute to chronic   subdural hematoma with mass effect and mild midline shift to the right.    < end of copied text >    < from: MR Orbits w/wo IV Cont (08.05.22 @ 02:10) >  Evaluation of the orbits appear unremarkable.    < end of copied text >  < from: MR Orbits w/wo IV Cont (08.05.22 @ 02:10) >  There is evidence of bilateral subdural hematomas identified. This is   more prominent on the left side than the right. The left subdural   hematoma involves the left temporal frontal parietal region and measures   approximately 1.3 cm widest diameter. The subdural hematoma on the right   side involves right temporal region measures approximately 0.7 cm widest   diameter. There is localized mass effect seen involving the left cerebral   hemisphere. Mass effect on the left lateral ventricle is seen.    < end of copied text >  < from: MR Orbits w/wo IV Cont (08.05.22 @ 02:10) >  oth globes appear normal and symmetric.    The patient is status post bilateral cataract surgery.    Both optic nerves demonstrate normal signal and caliber with no abnormal   enhancing component identified.    Evaluation of the rest of the intraorbital soft tissue structures appear   normal    Evaluation of the optic chiasm appears unremarkable.    < end of copied text >     [facility] DEPARTMENT OF OPHTHALMOLOGY - INITIAL ADULT CONSULT  -----------------------------------------------------------------------------------------------------------------  Andrew Cunningham MD, PGY2  Available on [employer] Teams  -----------------------------------------------------------------------------------------------------------------    HPI:  77yo M with hx of DM-II with Neuropathy, TIAs, HTN, and HLD presented to [facility] ED for rt sided headaches for the past 1 month. Patient was seen by Opthalmology Dr. Tabor as an outpatient and noted to have right optic nerve swelling and disc hemorrhages and was referred to come to the [facility] ED for further evaluation/ GCA workup. Optho states it is less likely GCA given lack of clinical symptoms and negative inflammatory markers. Patient had MRI in [facility] and was noted to have acute on chronic Left sided SDH and Right frontotemporal SDH. Patient was follows with outpatient Neurologist and was seen last week for trigger point injections. Patient denies numbness, tingling, fever and chills.     Patient transferred to [facility] for possible MMAE with Dr. Isabel.    Today patient denying any headaches. States that his symptoms have overall improved, but continues to see some blurriness in his right eye. Denies any eye pain, redness, discharge, sudden vision loss, any dark curtains, flashes or floaters.    Past Medical History: T2DM, TIA, HTN, HLD  Past Ocular History:   Drops: none  Medications: aspirin, insulin, farxiga, atorvastatin, donepezil, pantoprazole  Allergies: NKDA  Family History: denies  Surgical History: eyelid surgery OU (revision OD), PCIOL OU  Outpatient Ophthalmologist: Dr. eBll      Review of Systems:  Constitutional: headache, right sided but improved No fever, chills  Eyes: blurry vision OD > OS, denies flashes, floaters, FBS, erythema, discharge, double vision, OU  Neuro: No tremors  Cardiovascular: No chest pain, palpitations  Respiratory: No SOB, no cough  GI: No nausea, vomiting, abdominal pain    Vital Signs: T(C): 36.8 (08-09-22 @ 05:13)  T(F): 98.3 (08-09-22 @ 05:13), Max: 98.7 (08-08-22 @ 12:50)  HR: 93 (08-09-22 @ 05:13) (82 - 102)  BP: 125/76 (08-09-22 @ 05:13) (125/76 - 142/83)  RR:  (17 - 18)  SpO2:  (97% - 98%)  Wt(kg): --  AAOx3    Ophthalmology Exam:  Visual acuity (cc): 20/25 OD. 20/30 PH 20/20-2 OS.  Pupils: PERRL OU, no APD  Intraocular Pressure:  Ttono 15, 17  Extraocular movements (EOMs): Full OU, no pain, no diplopia.  Confrontational Visual Field (CVF): Full OU.  Color Plates: 12/12 OU.    Pen Light Exam (PLE)  External: Normal OU.  Lids/Lashes/Lacrimal Ducts: Flat OU.  Sclera/Conjunctiva: White and quiet OU.  Cornea: Clear OU.  Anterior Chamber: Deep and formed OU.    Iris: Flat OU.  Lens: PCIOL OU.    Fundus Exam: dilated with 1% tropicamide and 2.5% phenylephrine  Approval obtained from primary team for dilation  Patient aware that pupils can remained dilated for at least 4-6 hours.  Exam performed with 20 D lens    Vitreous: wnl OU  Disc, cup/disc:   OD: small full disc with temporal peripapillary NFL hemorrhage, elevated peripapillary RNFL temporally C/D 0.1 on dilated indirect exam  OS: normal disc  sharp and distinct margin 0.1  Macula: wnl OU  Vessels: tortuous OU  Periphery: wnl OU    Labs/Imaging:  < from: CT Head No Cont (08.06.22 @ 08:38) >  IMPRESSION: New left sided middle meningeal artery embolization material   since 8/5/2022. No change in left frontal parietal subacute to chronic   subdural hematoma with mass effect and mild midline shift to the right.    < end of copied text >    < from: MR Orbits w/wo IV Cont (08.05.22 @ 02:10) >  Evaluation of the orbits appear unremarkable.    < end of copied text >  < from: MR Orbits w/wo IV Cont (08.05.22 @ 02:10) >  There is evidence of bilateral subdural hematomas identified. This is   more prominent on the left side than the right. The left subdural   hematoma involves the left temporal frontal parietal region and measures   approximately 1.3 cm widest diameter. The subdural hematoma on the right   side involves right temporal region measures approximately 0.7 cm widest   diameter. There is localized mass effect seen involving the left cerebral   hemisphere. Mass effect on the left lateral ventricle is seen.    < end of copied text >  < from: MR Orbits w/wo IV Cont (08.05.22 @ 02:10) >  oth globes appear normal and symmetric.    The patient is status post bilateral cataract surgery.    Both optic nerves demonstrate normal signal and caliber with no abnormal   enhancing component identified.    Evaluation of the rest of the intraorbital soft tissue structures appear   normal    Evaluation of the optic chiasm appears unremarkable.    < end of copied text >

## 2022-08-09 NOTE — PROGRESS NOTE ADULT - ASSESSMENT
77yo M with hx of DM-II with Neuropathy, TIAs, HTN, and HLD was sent to ED by his ophthalmologist for papilledema and HA x 1 month for further evaluation. Patient had MRI in [facility] and was noted to have acute on chronic Left sided SDH and Right frontotemporal SDH, s/p cerebral angiogram for Left middle meningeal artery embolization.

## 2022-08-09 NOTE — PROGRESS NOTE ADULT - PROBLEM SELECTOR PLAN 2
A1c 6.8.  -pt is eating less here, compared to at home. increase to lantus 12 and humalog 5 TID. will uptitrate accordingly  -home Lantus can be decreased to 22 units daily on discharge if patient is able to maintain home diet.  -followup with (Dr. Sven Avalos) and Endocrinologist (Dr. Bsos) within 3 days of discharge. Pt was told to call his Endocrinologist with fs to see if Insulin needs to be adjusted.  -can continue ozempic at home (recently, dose was decreased)

## 2022-08-10 ENCOUNTER — TRANSCRIPTION ENCOUNTER (OUTPATIENT)
Age: 77
End: 2022-08-10

## 2022-08-10 VITALS
HEART RATE: 86 BPM | SYSTOLIC BLOOD PRESSURE: 124 MMHG | OXYGEN SATURATION: 98 % | TEMPERATURE: 98 F | RESPIRATION RATE: 18 BRPM | DIASTOLIC BLOOD PRESSURE: 70 MMHG

## 2022-08-10 PROBLEM — E78.5 HYPERLIPIDEMIA, UNSPECIFIED: Chronic | Status: ACTIVE | Noted: 2022-08-05

## 2022-08-10 LAB
GLUCOSE BLDC GLUCOMTR-MCNC: 161 MG/DL — HIGH (ref 70–99)
GLUCOSE BLDC GLUCOMTR-MCNC: 216 MG/DL — HIGH (ref 70–99)

## 2022-08-10 PROCEDURE — 36415 COLL VENOUS BLD VENIPUNCTURE: CPT

## 2022-08-10 PROCEDURE — C9399: CPT

## 2022-08-10 PROCEDURE — C1887: CPT

## 2022-08-10 PROCEDURE — C1760: CPT

## 2022-08-10 PROCEDURE — 97110 THERAPEUTIC EXERCISES: CPT

## 2022-08-10 PROCEDURE — 61626 TCAT PERM OCCLS/EMBOL NONCNS: CPT

## 2022-08-10 PROCEDURE — 86901 BLOOD TYPING SEROLOGIC RH(D): CPT

## 2022-08-10 PROCEDURE — C1894: CPT

## 2022-08-10 PROCEDURE — 85610 PROTHROMBIN TIME: CPT

## 2022-08-10 PROCEDURE — 70450 CT HEAD/BRAIN W/O DYE: CPT | Mod: MA

## 2022-08-10 PROCEDURE — 75984 XRAY CONTROL CATHETER CHANGE: CPT

## 2022-08-10 PROCEDURE — 75898 FOLLOW-UP ANGIOGRAPHY: CPT

## 2022-08-10 PROCEDURE — 82962 GLUCOSE BLOOD TEST: CPT

## 2022-08-10 PROCEDURE — 86850 RBC ANTIBODY SCREEN: CPT

## 2022-08-10 PROCEDURE — C1889: CPT

## 2022-08-10 PROCEDURE — 99231 SBSQ HOSP IP/OBS SF/LOW 25: CPT | Mod: FS

## 2022-08-10 PROCEDURE — U0005: CPT

## 2022-08-10 PROCEDURE — 85730 THROMBOPLASTIN TIME PARTIAL: CPT

## 2022-08-10 PROCEDURE — 99232 SBSQ HOSP IP/OBS MODERATE 35: CPT

## 2022-08-10 PROCEDURE — 97166 OT EVAL MOD COMPLEX 45 MIN: CPT

## 2022-08-10 PROCEDURE — 85576 BLOOD PLATELET AGGREGATION: CPT

## 2022-08-10 PROCEDURE — 97116 GAIT TRAINING THERAPY: CPT

## 2022-08-10 PROCEDURE — 97163 PT EVAL HIGH COMPLEX 45 MIN: CPT

## 2022-08-10 PROCEDURE — U0003: CPT

## 2022-08-10 PROCEDURE — 80048 BASIC METABOLIC PNL TOTAL CA: CPT

## 2022-08-10 PROCEDURE — 86900 BLOOD TYPING SEROLOGIC ABO: CPT

## 2022-08-10 PROCEDURE — C1769: CPT

## 2022-08-10 PROCEDURE — 36223 PLACE CATH CAROTID/INOM ART: CPT | Mod: 59

## 2022-08-10 PROCEDURE — 36224 PLACE CATH CAROTD ART: CPT

## 2022-08-10 PROCEDURE — 36227 PLACE CATH XTRNL CAROTID: CPT

## 2022-08-10 PROCEDURE — 99291 CRITICAL CARE FIRST HOUR: CPT

## 2022-08-10 PROCEDURE — 87637 SARSCOV2&INF A&B&RSV AMP PRB: CPT

## 2022-08-10 PROCEDURE — 72141 MRI NECK SPINE W/O DYE: CPT

## 2022-08-10 PROCEDURE — 93970 EXTREMITY STUDY: CPT

## 2022-08-10 PROCEDURE — 85027 COMPLETE CBC AUTOMATED: CPT

## 2022-08-10 PROCEDURE — 96374 THER/PROPH/DIAG INJ IV PUSH: CPT

## 2022-08-10 RX ORDER — ASPIRIN AND DIPYRIDAMOLE 25; 200 MG/1; MG/1
1 CAPSULE, EXTENDED RELEASE ORAL
Qty: 0 | Refills: 0 | DISCHARGE

## 2022-08-10 RX ORDER — INSULIN GLARGINE 100 [IU]/ML
12 INJECTION, SOLUTION SUBCUTANEOUS
Qty: 0 | Refills: 0 | DISCHARGE
Start: 2022-08-10

## 2022-08-10 RX ORDER — INSULIN LISPRO 100/ML
20 VIAL (ML) SUBCUTANEOUS
Qty: 0 | Refills: 0 | DISCHARGE

## 2022-08-10 RX ORDER — POLYETHYLENE GLYCOL 3350 17 G/17G
17 POWDER, FOR SOLUTION ORAL
Qty: 0 | Refills: 0 | DISCHARGE
Start: 2022-08-10

## 2022-08-10 RX ORDER — DAPAGLIFLOZIN 10 MG/1
1 TABLET, FILM COATED ORAL
Qty: 0 | Refills: 0 | DISCHARGE

## 2022-08-10 RX ORDER — INSULIN GLARGINE 100 [IU]/ML
26 INJECTION, SOLUTION SUBCUTANEOUS
Qty: 0 | Refills: 0 | DISCHARGE

## 2022-08-10 RX ORDER — ACETAMINOPHEN 500 MG
2 TABLET ORAL
Qty: 0 | Refills: 0 | DISCHARGE
Start: 2022-08-10

## 2022-08-10 RX ORDER — INSULIN LISPRO 100/ML
15 VIAL (ML) SUBCUTANEOUS
Qty: 0 | Refills: 0 | DISCHARGE

## 2022-08-10 RX ORDER — INSULIN GLARGINE 100 [IU]/ML
26 INJECTION, SOLUTION SUBCUTANEOUS
Qty: 0 | Refills: 0 | DISCHARGE
Start: 2022-08-10

## 2022-08-10 RX ORDER — LEVETIRACETAM 250 MG/1
1 TABLET, FILM COATED ORAL
Qty: 0 | Refills: 0 | DISCHARGE
Start: 2022-08-10

## 2022-08-10 RX ORDER — SEMAGLUTIDE 0.68 MG/ML
0 INJECTION, SOLUTION SUBCUTANEOUS
Qty: 0 | Refills: 0 | DISCHARGE

## 2022-08-10 RX ADMIN — LOSARTAN POTASSIUM 100 MILLIGRAM(S): 100 TABLET, FILM COATED ORAL at 05:13

## 2022-08-10 RX ADMIN — LEVETIRACETAM 500 MILLIGRAM(S): 250 TABLET, FILM COATED ORAL at 05:12

## 2022-08-10 RX ADMIN — Medication 5 UNIT(S): at 08:04

## 2022-08-10 RX ADMIN — Medication 1: at 08:04

## 2022-08-10 RX ADMIN — Medication 5 UNIT(S): at 11:53

## 2022-08-10 RX ADMIN — SENNA PLUS 1 TABLET(S): 8.6 TABLET ORAL at 11:53

## 2022-08-10 RX ADMIN — Medication 2: at 11:52

## 2022-08-10 RX ADMIN — POLYETHYLENE GLYCOL 3350 17 GRAM(S): 17 POWDER, FOR SOLUTION ORAL at 11:53

## 2022-08-10 RX ADMIN — Medication 5 MILLIGRAM(S): at 05:12

## 2022-08-10 NOTE — PROGRESS NOTE ADULT - PROBLEM SELECTOR PLAN 3
stable  -cont Losartan.

## 2022-08-10 NOTE — PROGRESS NOTE ADULT - ASSESSMENT
75yo M with hx of DM-II with Neuropathy, TIAs, HTN, and HLD was sent to ED by his ophthalmologist for papilledema and HA x 1 month for further evaluation. Patient had MRI in [facility] and was noted to have acute on chronic Left sided SDH and Right frontotemporal SDH, s/p cerebral angiogram for Left middle meningeal artery embolization.

## 2022-08-10 NOTE — PROGRESS NOTE ADULT - PROBLEM SELECTOR PROBLEM 2
Diabetes mellitus, type 2

## 2022-08-10 NOTE — PROGRESS NOTE ADULT - PROVIDER SPECIALTY LIST ADULT
Neurosurgery
Hospitalist
Neurosurgery
Neurosurgery
Hospitalist
Hospitalist

## 2022-08-10 NOTE — PROGRESS NOTE ADULT - SUBJECTIVE AND OBJECTIVE BOX
Dewayne Dooley   contact via pager or TEAMS        CC: Patient is a 76y old  Male who presents with a chief complaint of left sided acute on chronic SDH (09 Aug 2022 16:28)      SUBJECTIVE / OVERNIGHT EVENTS:    MEDICATIONS  (STANDING):  atorvastatin 20 milliGRAM(s) Oral at bedtime  bisacodyl 5 milliGRAM(s) Oral every 12 hours  dextrose 5%. 1000 milliLiter(s) (50 mL/Hr) IV Continuous <Continuous>  dextrose 5%. 1000 milliLiter(s) (100 mL/Hr) IV Continuous <Continuous>  dextrose 50% Injectable 25 Gram(s) IV Push once  dextrose 50% Injectable 12.5 Gram(s) IV Push once  dextrose 50% Injectable 25 Gram(s) IV Push once  glucagon  Injectable 1 milliGRAM(s) IntraMuscular once  insulin glargine Injectable (LANTUS) 12 Unit(s) SubCutaneous at bedtime  insulin lispro (ADMELOG) corrective regimen sliding scale   SubCutaneous three times a day before meals  insulin lispro (ADMELOG) corrective regimen sliding scale   SubCutaneous at bedtime  insulin lispro Injectable (ADMELOG) 5 Unit(s) SubCutaneous three times a day before meals  levETIRAcetam 500 milliGRAM(s) Oral two times a day  losartan 100 milliGRAM(s) Oral daily  polyethylene glycol 3350 17 Gram(s) Oral daily  senna 1 Tablet(s) Oral daily    MEDICATIONS  (PRN):  acetaminophen     Tablet .. 650 milliGRAM(s) Oral every 6 hours PRN Temp greater or equal to 38C (100.4F), Mild Pain (1 - 3)  ALBUTerol    90 MICROgram(s) HFA Inhaler 2 Puff(s) Inhalation every 6 hours PRN Shortness of Breath and/or Wheezing  dextrose Oral Gel 15 Gram(s) Oral once PRN Blood Glucose LESS THAN 70 milliGRAM(s)/deciliter      Vital Signs Last 24 Hrs  T(C): 36.7 (10 Aug 2022 05:58), Max: 37.4 (10 Aug 2022 01:00)  T(F): 98.1 (10 Aug 2022 05:58), Max: 99.3 (10 Aug 2022 01:00)  HR: 88 (10 Aug 2022 05:58) (86 - 110)  BP: 119/63 (10 Aug 2022 05:58) (119/63 - 134/81)  BP(mean): --  RR: 18 (10 Aug 2022 05:58) (18 - 18)  SpO2: 97% (10 Aug 2022 05:58) (96% - 98%)  CAPILLARY BLOOD GLUCOSE      POCT Blood Glucose.: 161 mg/dL (10 Aug 2022 07:37)  POCT Blood Glucose.: 216 mg/dL (09 Aug 2022 21:36)  POCT Blood Glucose.: 233 mg/dL (09 Aug 2022 17:33)  POCT Blood Glucose.: 257 mg/dL (09 Aug 2022 16:05)  POCT Blood Glucose.: 213 mg/dL (09 Aug 2022 11:56)    I&O's Summary    tele:    PHYSICAL EXAM:    GENERAL: NAD   HEENT: EOMI, PERRL  PULM: Clear to auscultation bilaterally  CV: Regular rate and rhythm; nl S1, S2; No murmurs, rubs, or gallops  ABDOMEN: Soft, Nontender, Nondistended; Bowel sounds present  EXTREMITIES/MSK:  No edema, calf tenderness   PSYCH: AAOx3  NEUROLOGY: non-focal          LABS:                        16.1   13.17 )-----------( 250      ( 09 Aug 2022 13:23 )             49.1     08-09    136  |  101  |  19  ----------------------------<  217<H>  4.3   |  26  |  1.07    Ca    9.1      09 Aug 2022 13:23                  RADIOLOGY & ADDITIONAL TESTS:    Imaging Personally Reviewed:    Consultant(s) Notes Reviewed:      Care Discussed with Consultants/Other Providers:   Dewayne Dooley   contact via pager or TEAMS        CC: Patient is a 76y old  Male who presents with a chief complaint of left sided acute on chronic SDH (09 Aug 2022 16:28)      SUBJECTIVE / OVERNIGHT EVENTS: feels good. denies complaints on ROS.     MEDICATIONS  (STANDING):  atorvastatin 20 milliGRAM(s) Oral at bedtime  bisacodyl 5 milliGRAM(s) Oral every 12 hours  dextrose 5%. 1000 milliLiter(s) (50 mL/Hr) IV Continuous <Continuous>  dextrose 5%. 1000 milliLiter(s) (100 mL/Hr) IV Continuous <Continuous>  dextrose 50% Injectable 25 Gram(s) IV Push once  dextrose 50% Injectable 12.5 Gram(s) IV Push once  dextrose 50% Injectable 25 Gram(s) IV Push once  glucagon  Injectable 1 milliGRAM(s) IntraMuscular once  insulin glargine Injectable (LANTUS) 12 Unit(s) SubCutaneous at bedtime  insulin lispro (ADMELOG) corrective regimen sliding scale   SubCutaneous three times a day before meals  insulin lispro (ADMELOG) corrective regimen sliding scale   SubCutaneous at bedtime  insulin lispro Injectable (ADMELOG) 5 Unit(s) SubCutaneous three times a day before meals  levETIRAcetam 500 milliGRAM(s) Oral two times a day  losartan 100 milliGRAM(s) Oral daily  polyethylene glycol 3350 17 Gram(s) Oral daily  senna 1 Tablet(s) Oral daily    MEDICATIONS  (PRN):  acetaminophen     Tablet .. 650 milliGRAM(s) Oral every 6 hours PRN Temp greater or equal to 38C (100.4F), Mild Pain (1 - 3)  ALBUTerol    90 MICROgram(s) HFA Inhaler 2 Puff(s) Inhalation every 6 hours PRN Shortness of Breath and/or Wheezing  dextrose Oral Gel 15 Gram(s) Oral once PRN Blood Glucose LESS THAN 70 milliGRAM(s)/deciliter      Vital Signs Last 24 Hrs  T(C): 36.7 (10 Aug 2022 05:58), Max: 37.4 (10 Aug 2022 01:00)  T(F): 98.1 (10 Aug 2022 05:58), Max: 99.3 (10 Aug 2022 01:00)  HR: 88 (10 Aug 2022 05:58) (86 - 110)  BP: 119/63 (10 Aug 2022 05:58) (119/63 - 134/81)  BP(mean): --  RR: 18 (10 Aug 2022 05:58) (18 - 18)  SpO2: 97% (10 Aug 2022 05:58) (96% - 98%)  CAPILLARY BLOOD GLUCOSE      POCT Blood Glucose.: 161 mg/dL (10 Aug 2022 07:37)  POCT Blood Glucose.: 216 mg/dL (09 Aug 2022 21:36)  POCT Blood Glucose.: 233 mg/dL (09 Aug 2022 17:33)  POCT Blood Glucose.: 257 mg/dL (09 Aug 2022 16:05)  POCT Blood Glucose.: 213 mg/dL (09 Aug 2022 11:56)    I&O's Summary        PHYSICAL EXAM:    GENERAL: NAD   HEENT: EOMI, PERRL  PULM: Clear to auscultation bilaterally  CV: Regular rate and rhythm; nl S1, S2; No murmurs, rubs, or gallops  ABDOMEN: Soft, Nontender, Nondistended; Bowel sounds present  EXTREMITIES/MSK:  No edema, calf tenderness   PSYCH: AAOx3  NEUROLOGY: non-focal          LABS:                        16.1   13.17 )-----------( 250      ( 09 Aug 2022 13:23 )             49.1     08-09    136  |  101  |  19  ----------------------------<  217<H>  4.3   |  26  |  1.07    Ca    9.1      09 Aug 2022 13:23                  RADIOLOGY & ADDITIONAL TESTS:    Imaging Personally Reviewed:    Consultant(s) Notes Reviewed:      Care Discussed with Consultants/Other Providers: neurosurg

## 2022-08-10 NOTE — PROGRESS NOTE ADULT - REASON FOR ADMISSION
left sided acute on chronic SDH

## 2022-08-10 NOTE — PROGRESS NOTE ADULT - PROBLEM SELECTOR PLAN 1
s/p cerebral angiogram for Left middle meningeal artery embolization.   -CT head 8/6 noted  -pt/ot
s/p cerebral angiogram for Left middle meningeal artery embolization.   -pt/ot
s/p cerebral angiogram for Left middle meningeal artery embolization.   -CT head 8/6 noted  -pt/ot
s/p cerebral angiogram for Left middle meningeal artery embolization.   -f/u CT head 8/6 AM
s/p cerebral angiogram for Left middle meningeal artery embolization.   -pt/ot

## 2022-08-10 NOTE — DISCHARGE NOTE NURSING/CASE MANAGEMENT/SOCIAL WORK - NSDCPEFALRISK_GEN_ALL_CORE
For information on Fall & Injury Prevention, visit: [email or web address] OR  [email or web address] OR  https://www.cdc.gov/steadi/patient.html

## 2022-08-10 NOTE — DISCHARGE NOTE NURSING/CASE MANAGEMENT/SOCIAL WORK - PATIENT PORTAL LINK FT
You can access the FollowMyHealth Patient Portal offered by [facility] by registering at the following website: [email or web address]. By joining [employer]’s FollowMyHealth portal, you will also be able to view your health information using other applications (apps) compatible with our system.

## 2022-08-10 NOTE — PROGRESS NOTE ADULT - PROBLEM SELECTOR PLAN 2
A1c 6.8.  -pt is eating less here, compared to at home. increased lantus to 12 and humalog 5 TID. will uptitrate accordingly  -home Lantus can be decreased to 20 units daily on discharge if patient is able to maintain home diet.  -followup with (Dr. Sven Avalos) and Endocrinologist (Dr. Boss) within 3 days of discharge. Pt was told to call his Endocrinologist with fs to see if Insulin needs to be adjusted.  -can continue ozempic at home (recently, dose was decreased) A1c 6.8.  -pt is eating less here, compared to at home. increased lantus to 12 and humalog 5 TID. will uptitrate accordingly  -home Lantus dose will need to be adjusted at rehab. If patient starts eating closer to home amount the Lantus will need to be titrated up. Otherwise, patient can be increased to Lantus 15 units daily on discharge and he can titrate it up in conjunction with his outpatient Endocrinologist. Check fs 3x/day before meals and call your Endocrinologist for fs <100 or >150. cont current Humalog dose.  -followup with (Dr. Sven Avalos) and Endocrinologist (Dr. Boss) within 3 days of discharge. Pt was told to call his Endocrinologist with fs to adjust Insulin.  -can continue ozempic at home (recently, dose was decreased)

## 2022-08-21 ENCOUNTER — EMERGENCY (EMERGENCY)
Facility: HOSPITAL | Age: 77
LOS: 1 days | Discharge: HOME | End: 2022-08-21
Attending: EMERGENCY MEDICINE
Payer: MEDICARE

## 2022-08-21 VITALS
TEMPERATURE: 99 F | RESPIRATION RATE: 18 BRPM | SYSTOLIC BLOOD PRESSURE: 147 MMHG | HEART RATE: 110 BPM | HEIGHT: 65 IN | OXYGEN SATURATION: 100 % | DIASTOLIC BLOOD PRESSURE: 87 MMHG

## 2022-08-21 VITALS
DIASTOLIC BLOOD PRESSURE: 87 MMHG | HEART RATE: 82 BPM | SYSTOLIC BLOOD PRESSURE: 156 MMHG | OXYGEN SATURATION: 99 % | RESPIRATION RATE: 18 BRPM

## 2022-08-21 LAB
ALBUMIN SERPL ELPH-MCNC: 3.8 G/DL — SIGNIFICANT CHANGE UP (ref 3.3–5)
ALP SERPL-CCNC: 74 U/L — SIGNIFICANT CHANGE UP (ref 40–120)
ALT FLD-CCNC: 27 U/L — SIGNIFICANT CHANGE UP (ref 10–45)
ANION GAP SERPL CALC-SCNC: 13 MMOL/L — SIGNIFICANT CHANGE UP (ref 5–17)
APPEARANCE UR: CLEAR — SIGNIFICANT CHANGE UP
APTT BLD: 31.6 SEC — SIGNIFICANT CHANGE UP (ref 27.5–35.5)
AST SERPL-CCNC: 25 U/L — SIGNIFICANT CHANGE UP (ref 10–40)
BASOPHILS # BLD AUTO: 0.03 K/UL — SIGNIFICANT CHANGE UP (ref 0–0.2)
BASOPHILS NFR BLD AUTO: 0.3 % — SIGNIFICANT CHANGE UP (ref 0–2)
BILIRUB SERPL-MCNC: 0.3 MG/DL — SIGNIFICANT CHANGE UP (ref 0.2–1.2)
BILIRUB UR-MCNC: NEGATIVE — SIGNIFICANT CHANGE UP
BLD GP AB SCN SERPL QL: NEGATIVE — SIGNIFICANT CHANGE UP
BUN SERPL-MCNC: 21 MG/DL — SIGNIFICANT CHANGE UP (ref 7–23)
CALCIUM SERPL-MCNC: 8.5 MG/DL — SIGNIFICANT CHANGE UP (ref 8.4–10.5)
CHLORIDE SERPL-SCNC: 99 MMOL/L — SIGNIFICANT CHANGE UP (ref 96–108)
CO2 SERPL-SCNC: 22 MMOL/L — SIGNIFICANT CHANGE UP (ref 22–31)
COLOR SPEC: SIGNIFICANT CHANGE UP
CREAT SERPL-MCNC: 0.93 MG/DL — SIGNIFICANT CHANGE UP (ref 0.5–1.3)
DIFF PNL FLD: NEGATIVE — SIGNIFICANT CHANGE UP
EGFR: 85 ML/MIN/1.73M2 — SIGNIFICANT CHANGE UP
EOSINOPHIL # BLD AUTO: 0.2 K/UL — SIGNIFICANT CHANGE UP (ref 0–0.5)
EOSINOPHIL NFR BLD AUTO: 2 % — SIGNIFICANT CHANGE UP (ref 0–6)
FLUAV AG NPH QL: SIGNIFICANT CHANGE UP
FLUBV AG NPH QL: SIGNIFICANT CHANGE UP
GLUCOSE SERPL-MCNC: 273 MG/DL — HIGH (ref 70–99)
GLUCOSE UR QL: ABNORMAL
HCT VFR BLD CALC: 42.5 % — SIGNIFICANT CHANGE UP (ref 39–50)
HGB BLD-MCNC: 14.2 G/DL — SIGNIFICANT CHANGE UP (ref 13–17)
IMM GRANULOCYTES NFR BLD AUTO: 0.3 % — SIGNIFICANT CHANGE UP (ref 0–1.5)
INR BLD: 1.05 RATIO — SIGNIFICANT CHANGE UP (ref 0.88–1.16)
KETONES UR-MCNC: NEGATIVE — SIGNIFICANT CHANGE UP
LEUKOCYTE ESTERASE UR-ACNC: NEGATIVE — SIGNIFICANT CHANGE UP
LYMPHOCYTES # BLD AUTO: 1.77 K/UL — SIGNIFICANT CHANGE UP (ref 1–3.3)
LYMPHOCYTES # BLD AUTO: 17.3 % — SIGNIFICANT CHANGE UP (ref 13–44)
MCHC RBC-ENTMCNC: 30 PG — SIGNIFICANT CHANGE UP (ref 27–34)
MCHC RBC-ENTMCNC: 33.4 GM/DL — SIGNIFICANT CHANGE UP (ref 32–36)
MCV RBC AUTO: 89.7 FL — SIGNIFICANT CHANGE UP (ref 80–100)
MONOCYTES # BLD AUTO: 0.89 K/UL — SIGNIFICANT CHANGE UP (ref 0–0.9)
MONOCYTES NFR BLD AUTO: 8.7 % — SIGNIFICANT CHANGE UP (ref 2–14)
NEUTROPHILS # BLD AUTO: 7.31 K/UL — SIGNIFICANT CHANGE UP (ref 1.8–7.4)
NEUTROPHILS NFR BLD AUTO: 71.4 % — SIGNIFICANT CHANGE UP (ref 43–77)
NITRITE UR-MCNC: NEGATIVE — SIGNIFICANT CHANGE UP
NRBC # BLD: 0 /100 WBCS — SIGNIFICANT CHANGE UP (ref 0–0)
PH UR: 6 — SIGNIFICANT CHANGE UP (ref 5–8)
PLATELET # BLD AUTO: 283 K/UL — SIGNIFICANT CHANGE UP (ref 150–400)
POTASSIUM SERPL-MCNC: 4.7 MMOL/L — SIGNIFICANT CHANGE UP (ref 3.5–5.3)
POTASSIUM SERPL-SCNC: 4.7 MMOL/L — SIGNIFICANT CHANGE UP (ref 3.5–5.3)
PROT SERPL-MCNC: 6.8 G/DL — SIGNIFICANT CHANGE UP (ref 6–8.3)
PROT UR-MCNC: NEGATIVE — SIGNIFICANT CHANGE UP
PROTHROM AB SERPL-ACNC: 12.2 SEC — SIGNIFICANT CHANGE UP (ref 10.5–13.4)
RBC # BLD: 4.74 M/UL — SIGNIFICANT CHANGE UP (ref 4.2–5.8)
RBC # FLD: 12.3 % — SIGNIFICANT CHANGE UP (ref 10.3–14.5)
RH IG SCN BLD-IMP: POSITIVE — SIGNIFICANT CHANGE UP
RSV RNA NPH QL NAA+NON-PROBE: SIGNIFICANT CHANGE UP
SARS-COV-2 RNA SPEC QL NAA+PROBE: SIGNIFICANT CHANGE UP
SODIUM SERPL-SCNC: 134 MMOL/L — LOW (ref 135–145)
SP GR SPEC: 1.04 — HIGH (ref 1.01–1.02)
TROPONIN T, HIGH SENSITIVITY RESULT: 14 NG/L — SIGNIFICANT CHANGE UP (ref 0–51)
TROPONIN T, HIGH SENSITIVITY RESULT: 16 NG/L — SIGNIFICANT CHANGE UP (ref 0–51)
UROBILINOGEN FLD QL: NEGATIVE — SIGNIFICANT CHANGE UP
WBC # BLD: 10.23 K/UL — SIGNIFICANT CHANGE UP (ref 3.8–10.5)
WBC # FLD AUTO: 10.23 K/UL — SIGNIFICANT CHANGE UP (ref 3.8–10.5)

## 2022-08-21 PROCEDURE — 93005 ELECTROCARDIOGRAM TRACING: CPT

## 2022-08-21 PROCEDURE — 85610 PROTHROMBIN TIME: CPT

## 2022-08-21 PROCEDURE — 82962 GLUCOSE BLOOD TEST: CPT

## 2022-08-21 PROCEDURE — 82803 BLOOD GASES ANY COMBINATION: CPT

## 2022-08-21 PROCEDURE — 86900 BLOOD TYPING SEROLOGIC ABO: CPT

## 2022-08-21 PROCEDURE — 36415 COLL VENOUS BLD VENIPUNCTURE: CPT

## 2022-08-21 PROCEDURE — 82947 ASSAY GLUCOSE BLOOD QUANT: CPT

## 2022-08-21 PROCEDURE — 70496 CT ANGIOGRAPHY HEAD: CPT | Mod: MA

## 2022-08-21 PROCEDURE — 87637 SARSCOV2&INF A&B&RSV AMP PRB: CPT

## 2022-08-21 PROCEDURE — 99291 CRITICAL CARE FIRST HOUR: CPT | Mod: FT,25

## 2022-08-21 PROCEDURE — 93010 ELECTROCARDIOGRAM REPORT: CPT

## 2022-08-21 PROCEDURE — 83605 ASSAY OF LACTIC ACID: CPT

## 2022-08-21 PROCEDURE — 86901 BLOOD TYPING SEROLOGIC RH(D): CPT

## 2022-08-21 PROCEDURE — 84132 ASSAY OF SERUM POTASSIUM: CPT

## 2022-08-21 PROCEDURE — 84295 ASSAY OF SERUM SODIUM: CPT

## 2022-08-21 PROCEDURE — 71045 X-RAY EXAM CHEST 1 VIEW: CPT | Mod: 26

## 2022-08-21 PROCEDURE — 99291 CRITICAL CARE FIRST HOUR: CPT

## 2022-08-21 PROCEDURE — 85014 HEMATOCRIT: CPT

## 2022-08-21 PROCEDURE — 86850 RBC ANTIBODY SCREEN: CPT

## 2022-08-21 PROCEDURE — 70450 CT HEAD/BRAIN W/O DYE: CPT | Mod: MA

## 2022-08-21 PROCEDURE — 82330 ASSAY OF CALCIUM: CPT

## 2022-08-21 PROCEDURE — 70498 CT ANGIOGRAPHY NECK: CPT | Mod: 26,MA

## 2022-08-21 PROCEDURE — 82435 ASSAY OF BLOOD CHLORIDE: CPT

## 2022-08-21 PROCEDURE — 81003 URINALYSIS AUTO W/O SCOPE: CPT

## 2022-08-21 PROCEDURE — 85018 HEMOGLOBIN: CPT

## 2022-08-21 PROCEDURE — 70498 CT ANGIOGRAPHY NECK: CPT | Mod: MA

## 2022-08-21 PROCEDURE — 85730 THROMBOPLASTIN TIME PARTIAL: CPT

## 2022-08-21 PROCEDURE — 85025 COMPLETE CBC W/AUTO DIFF WBC: CPT

## 2022-08-21 PROCEDURE — 70496 CT ANGIOGRAPHY HEAD: CPT | Mod: 26,MA

## 2022-08-21 PROCEDURE — 71045 X-RAY EXAM CHEST 1 VIEW: CPT

## 2022-08-21 PROCEDURE — 84484 ASSAY OF TROPONIN QUANT: CPT

## 2022-08-21 PROCEDURE — 80053 COMPREHEN METABOLIC PANEL: CPT

## 2022-08-21 RX ORDER — LEVETIRACETAM 250 MG/1
1 TABLET, FILM COATED ORAL
Qty: 60 | Refills: 0
Start: 2022-08-21 | End: 2022-09-19

## 2022-08-21 NOTE — CONSULT NOTE ADULT - ASSESSMENT
75yo RT handed M with hx of DM-II with Neuropathy, TIAs, HTN, and HLD, recent L MMA embolization (8/5/22) presented as a code stroke with LKW 1:50 PM 8/21/22. Around 2PM, per wife, patient began having speech that was difficult to understand, lasting for about 60 seconds. The wife noticed that the left side of the patient's lip got twisted while he was trying to talk. Patient remembers feeling a sensation of trying to make sense of his speech, but unable to. Patient has endorsed b/l headaches since MMA embolization. On NIHSS 0, mRS 1. Walks with a cane. CTH shows stable findings of b/l SDH's with no interval change. CTA shows no LVO. Not a tpa candidate given no disabling neurological deficit. Not a thrombectomy candidate given no LVO. Denies weakness, numbness, visual disturbance, shaking, chest pain, SOB. Has upcoming appt with Dr. Isabel 8/24. Takes Keppra 500 mg bid. Denies taking AC/AP.     Impression: Transient episode of dysarthria, could be TIA. Could have been focal seizure given description of corner of lip getting twisted.     Recommendations  [] Inc Keppra to 750 mg bid  [] F/u with Dr. Isabel outpatient 8/24.    Case d/w stroke fellow under supervision of stroke attending.

## 2022-08-21 NOTE — ED ADULT NURSE NOTE - OBJECTIVE STATEMENT
pt is a 77yo male PMH HTN, HLD, DM presenting to the ED complaining of slurred speech. per pt wife, pt began experiencing slurred speech at 2PM, lasting for around 1 minute before returning to baseline. pt states he had a L MMA at [facility] a few weeks ago after CT scan showed bilateral subdural head bleeds. pt endorsing intermittent "tension" headaches for the past month, localizes pain to the right side of the head. pt denies taking any blood thinners, denies any vision changes, denies any one sided weakness. pt A&Ox3 gross neuro intact, no difficulty speaking in complete sentences, s1s2 heart sounds heard, pulses x 4, patel x4, abdomen soft nontender nondistended, skin intact. pt denies chest pain, sob, n/v/d, abdominal pain, f/c, urinary symptoms, hematuria

## 2022-08-21 NOTE — ED PROVIDER NOTE - CARE PROVIDER_API CALL
Jamla Isabel (MD; MS)  Unallocated  [facility], [address] [place] [id number]  Phone: [phone]  Fax: [phone]  Follow Up Time:

## 2022-08-21 NOTE — ED PROVIDER NOTE - NSFOLLOWUPINSTRUCTIONS_ED_ALL_ED_FT
You were seen in the Emergency Department for difficulty with speech.    Your keppra has been increased to 750mg twice a day.    Follow up with Dr. Isabel's office on 8/24.    If you have fever, chills, nausea, vomiting, new or worsening pain, or if you have any new symptoms return to the Emergency Department.

## 2022-08-21 NOTE — ED PROVIDER NOTE - OBJECTIVE STATEMENT
76yr M hx of HTN DM (on ins) w recent diagnosis of acute on chronic SAH s/p left MMA embolization dc on 10th presented w recurrence of headaches frontally, and speech disturbances noticed by wife. last episode lasted for a few minutes and resolved by 1:50pm. hyperglycemic on arrival. code stroke called.  denies f/c/cough, cp. sob, abd pain, n/v, diarrhea. denies taking asa or NSAIDs since dc.  is left handed.

## 2022-08-21 NOTE — ED PROVIDER NOTE - PATIENT PORTAL LINK FT
You can access the FollowMyHealth Patient Portal offered by [facility] by registering at the following website: [email or web address]. By joining [employer]’s FollowMyHealth portal, you will also be able to view your health information using other applications (apps) compatible with our system.

## 2022-08-21 NOTE — ED PROVIDER NOTE - CLINICAL SUMMARY MEDICAL DECISION MAKING FREE TEXT BOX
76yr M hx of DM HTN recent SAH s/p left MMA embolization 10 days prior p/w intermittent neuro sx, now resolved code stroke called. exam as above. NIHSS of 0 presently. discussed with stroke team opted for neuro imaging labs, rule out rebleed vs stroke vs infectious etiology.

## 2022-08-21 NOTE — ED PROVIDER NOTE - PROGRESS NOTE DETAILS
Rojas, PGY3: Patient reassessed. Speaking clearly, ambulating unassisted. Feels comfortable going home. Discussed medication changes and follow up with neuro.

## 2022-08-21 NOTE — CONSULT NOTE ADULT - SUBJECTIVE AND OBJECTIVE BOX
Neurology  Consult Note  08-21-22    Name:  SOTO WHITNEY; 76y (1945)    Chief Complaint: dysarthria   HPI: 75yo RT handed M with hx of DM-II with Neuropathy, TIAs, HTN, and HLD, recent L MMA embolization (8/5/22) presented as a code stroke with LKW 1:50 PM 8/21/22. Around 2PM, per wife, patient began having speech that was difficult to understand, lasting for about 60 seconds. The wife noticed that the left side of the patient's lip got twisted while he was trying to talk. Patient remembers feeling a sensation of trying to make sense of his speech, but unable to. Patient has endorsed b/l headaches since MMA embolization. On NIHSS 0, mRS 1. Walks with a cane. CTH shows stable findings of b/l SDH's with no interval change. CTA shows no LVO. Not a tpa candidate given no disabling neurological deficit. Not a thrombectomy candidate given no LVO. Denies weakness, numbness, visual disturbance, shaking, chest pain, SOB. Has upcoming appt with Dr. Isabel 8/24. Takes Keppra 500 mg bid. Denies taking AC/AP.     Review of Systems:  As states in HPI.    PMHx:   Diabetes    TIA (transient ischemic attack)    HTN (hypertension)    Hyperlipidemia      PFHx:   Family history of renal cancer    Family history of prostate cancer in father      PSuHx:   No significant past surgical history      Medications:  MEDICATIONS  (STANDING):    MEDICATIONS  (PRN):    Vitals:  T(C): 36.9 (08-21-22 @ 16:18), Max: 37.1 (08-21-22 @ 16:10)  HR: 88 (08-21-22 @ 16:18) (88 - 110)  BP: 132/65 (08-21-22 @ 16:18) (132/65 - 147/87)  RR: 18 (08-21-22 @ 16:18) (18 - 18)  SpO2: 99% (08-21-22 @ 16:18) (99% - 100%)    Labs:                        14.2   10.23 )-----------( 283      ( 21 Aug 2022 16:27 )             42.5     08-21    134<L>  |  99  |  21  ----------------------------<  273<H>  4.7   |  22  |  0.93    Ca    8.5      21 Aug 2022 16:27    TPro  6.8  /  Alb  3.8  /  TBili  0.3  /  DBili  x   /  AST  25  /  ALT  27  /  AlkPhos  74  08-21    CAPILLARY BLOOD GLUCOSE  244 (21 Aug 2022 17:57)      POCT Blood Glucose.: 244 mg/dL (21 Aug 2022 16:09)    LIVER FUNCTIONS - ( 21 Aug 2022 16:27 )  Alb: 3.8 g/dL / Pro: 6.8 g/dL / ALK PHOS: 74 U/L / ALT: 27 U/L / AST: 25 U/L / GGT: x             PT/INR - ( 21 Aug 2022 16:27 )   PT: 12.2 sec;   INR: 1.05 ratio         PTT - ( 21 Aug 2022 16:27 )  PTT:31.6 sec    PHYSICAL EXAMINATION:  General: Well-developed, well nourished, in no acute distress.  Eyes: Conjunctiva and sclera clear.  Neurologic:  - Mental Status:  Alert, awake, oriented to person, place, and time; Speech is fluent with intact naming, repetition, and comprehension; Good overall fund of knowledge  - Cranial Nerves II-XII:    II:  Visual fields are full to confrontation; Pupils are equal, round, and reactive to light  III, IV, VI:  Extraocular movements are intact without nystagmus.  V:  Facial sensation is intact in the V1-V3 distribution bilaterally.  VII:  Face is symmetric with normal eye closure and smile  VIII:  Hearing is intact to finger rub.  IX, X:  Uvula is midline and soft palate rises symmetrically  XI:  Head turning and shoulder shrug are intact.  XII:  Tongue protudes in the midline.  - Motor:  Strength is 5/5 throughout.  There is no pronator drift.  Normal muscle bulk and tone throughout.  - Reflexes:  2+ and symmetric at the biceps, triceps, brachioradialis, knees, and ankles.  Plantar responses flexor.  - Sensory:  Intact throughout to light touch.   - Coordination:  Finger-nose-finger without dysmetria.   - Gait:   Due to concern for safety, did not assess.     Radiology:  < from: CT Angio Brain Stroke Protocol  w/ IV Cont (08.21.22 @ 16:40) >  CT angiography neck: No hemodynamically significant stenosis by NASCET   criteria. No vascular dissection.    CT angiography brain: No major vessel occlusion or proximal stenosis. No   evidence of aneurysm or other vascular malformation.    Left middle meningeal artery embolization.    < end of copied text >  < from: CT Brain Stroke Protocol (08.21.22 @ 16:40) >  No CT evidence of acute intraparenchymal hemorrhage or midline shift.    Unchanged appearing subacute to chronic left frontoparietal subdural   hematoma with mild mass effect on the left lateral ventricle. Miniscule   right temporal convexity subdural hematoma. Status post left middle   meningeal artery embolization.    Additional unchanged imaging findings for which normal pressure   hydrocephalus can be considered. Clinical correlation is report for this   diagnosis.    < end of copied text >

## 2022-08-21 NOTE — ED ADULT NURSE REASSESSMENT NOTE - NS ED NURSE REASSESS COMMENT FT1
pt resting comfortably in stretcher, well-appearing, no acute distress, denies complaints at this time. pt wife at bedside.

## 2022-08-21 NOTE — ED PROVIDER NOTE - PHYSICAL EXAMINATION
alert and awake and oriented , no acute distress  neuro exam normal in detail  clear lungs  S1-2  soft no tender abd  well perfused  mild pitting symmetric edema low ext

## 2022-08-23 NOTE — HISTORY OF PRESENT ILLNESS
[FreeTextEntry1] : SOTO WHITNEY is a 76 year old male with PMH of HTN, HLD, DM 2, diabetic neuropathy, TIAs on Aggrenox, who presented to [facility] ED for right sided headaches for 1 month. Patient was followed by ophthalmology Dr. Santamaria for papilledema and was referred to come to [facility] ED for further evaluation. Ophthalmology evaluated for giant cell arteritis, right disc edema and peripapillary hemorrhages less likely given lack of clinical symptoms and negative inflammatory markers. MRI orbits negative for orbital mass or abnormal optic nerve signal. 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 He was transferred to [facility] on 8/5 for possible neurosurgical intervention for SDH. He was enrolled into the EMBOLISE trial and randomized into the interventional arm. On 8/5/22, he underwent Left MMA embolization. Patient with h/o multiple falls at home, ataxic gait, instructed to follow up with Dr. 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 Discharged on Keppra 500mg BID for seizure prophylaxis. Recently presented to [facility] ED on 8/21 as code stroke with transient episode of dysarthria, likely TIA. No neurovascular intervention given no large vessel occlusion seen on imaging, no TPA given no disabling neurological deficit. Could have been focal seizure given description of corner of lip getting twisted per wife. Keppra was increased to 750mg BID. \par CT scan 8/21 shows …..Again seen is a moderate-sized left frontoparietal subacute to chronic subdural hematoma which appears largely unchanged currently measuring 1.2 cm at its largest diameter, previously also 1.2 cm when measured in a similar fashion. There is still sulcal effacement and mild effacement of the left lateral ventricle which also appears unchanged. No significant midline shift.\par No CT evidence of acute intraparenchymal hemorrhage or midline shift.\par Unchanged appearing subacute to chronic left frontoparietal subdural hematoma with mild mass effect on the left lateral ventricle. Miniscule right temporal convexity subdural hematoma. Status post left middle meningeal artery embolization.\par

## 2022-08-23 NOTE — REASON FOR VISIT
[Home] : at home, [unfilled] , at the time of the visit. [Medical Office: ([facility])___] : at the medical office located in  [Verbal consent obtained from patient] : the patient, [unfilled] [Post Hospitalization] : a post hospitalization visit [FreeTextEntry1] : s/p Left MMA embolization 8/5/22\par \par EMBOLISE trial, randomized into the interventional arm

## 2022-08-23 NOTE — ASSESSMENT
[FreeTextEntry1] : IMPRESSION: \par 76M with PMH of HTN, HLD, DM 2, diabetic neuropathy, TIAs on Aggrenox, p/w R sided HA, found to have acute on chronic L SDH, R frontotemporal SDH, enrolled into EMBOLISE trial, randomized into intervention arm s/p L MMA embo 8/5. Recently presented to [facility] ED 8/21 as code stroke for transient episode of dysarthria, no LVO, no thrombectomy or tPA. Keppra increased to 750mg BID. CT scan 8/21 shows stable L subacute to chronic SDH. \par \par \par \par PLAN:

## 2022-08-24 ENCOUNTER — APPOINTMENT (OUTPATIENT)
Dept: NEUROSURGERY | Facility: CLINIC | Age: 77
End: 2022-08-24

## 2022-08-24 VITALS
WEIGHT: 145 LBS | OXYGEN SATURATION: 97 % | DIASTOLIC BLOOD PRESSURE: 73 MMHG | HEART RATE: 96 BPM | SYSTOLIC BLOOD PRESSURE: 122 MMHG | BODY MASS INDEX: 24.16 KG/M2 | HEIGHT: 65 IN

## 2022-08-24 PROCEDURE — 99214 OFFICE O/P EST MOD 30 MIN: CPT

## 2022-08-24 NOTE — PHYSICAL EXAM
[Person] : oriented to person [Place] : oriented to place [Time] : oriented to time [Motor Tone] : muscle tone was normal in all four extremities [Motor Strength] : muscle strength was normal in all four extremities

## 2022-08-25 NOTE — REASON FOR VISIT
[Post Hospitalization] : a post hospitalization visit [Spouse] : spouse [FreeTextEntry1] : s/p Left MMA embolization 8/5/22\par \par EMBOLISE trial, randomized into the interventional arm

## 2022-08-25 NOTE — HISTORY OF PRESENT ILLNESS
[FreeTextEntry1] : SOTO WHITNEY is a 76 year old male with PMH of HTN, HLD, DM 2, diabetic neuropathy, TIAs (diagnosed by neurologist in the past) on Aggrenox, who presented to [facility] ED for right sided headaches for 1 month. Patient was followed by ophthalmology Dr. Santamaria for papilledema and was referred to come to [facility] ED for further evaluation. Ophthalmology evaluated for giant cell arteritis, right disc edema and peripapillary hemorrhages less likely given lack of clinical symptoms and negative inflammatory markers. MRI orbits negative for orbital mass or abnormal optic nerve signal. 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 He was transferred to [facility] on 8/5 for possible neurosurgical intervention for SDH. He was enrolled into the EMBOLISE trial and randomized into the interventional arm. On 8/5/22, he underwent Left MMA embolization. Patient with h/o multiple falls at home, ataxic gait, instructed to follow up with Dr. 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 Discharged on Keppra 500mg BID for seizure prophylaxis. \par \par Recently presented to [facility] ED on 8/21 as code stroke with transient episode of dysarthria, likely TIA. No neurovascular intervention given no large vessel occlusion seen on imaging, no tPA given no disabling neurological deficit. Could have been focal seizure given description of corner of lip getting twisted on the left side per wife. Denies noticing drooping of right side of mouth. Keppra was increased to 750mg BID. \par \par CT scan 8/21 shows stable left frontoparietal subacute to chronic SDH, slightly decreased in size, measuring 10mm, previously 12mm, no acute hemorrhage. \par \par Today, he presents with cane for assistance. Aggrenox is still currently held. He states he has mild occasional headaches but have overall improved. Reports gait instability which has been ongoing and chronic likely due to spinal issue. Denies other acute neurologic symptoms of motor, sensory, speech, or visual abnormalities.

## 2022-09-07 ENCOUNTER — APPOINTMENT (OUTPATIENT)
Dept: NEUROSURGERY | Facility: CLINIC | Age: 77
End: 2022-09-07

## 2022-09-07 PROCEDURE — 99443: CPT | Mod: 95

## 2022-09-19 ENCOUNTER — APPOINTMENT (OUTPATIENT)
Dept: VASCULAR SURGERY | Facility: CLINIC | Age: 77
End: 2022-09-19

## 2022-09-19 VITALS
TEMPERATURE: 98.1 F | WEIGHT: 150 LBS | SYSTOLIC BLOOD PRESSURE: 127 MMHG | HEART RATE: 88 BPM | BODY MASS INDEX: 24.99 KG/M2 | DIASTOLIC BLOOD PRESSURE: 79 MMHG | HEIGHT: 65 IN

## 2022-09-19 DIAGNOSIS — I83.893 VARICOSE VEINS OF BILATERAL LOWER EXTREMITIES WITH OTHER COMPLICATIONS: ICD-10-CM

## 2022-09-19 DIAGNOSIS — M79.2 NEURALGIA AND NEURITIS, UNSPECIFIED: ICD-10-CM

## 2022-09-19 DIAGNOSIS — I87.2 VENOUS INSUFFICIENCY (CHRONIC) (PERIPHERAL): ICD-10-CM

## 2022-09-19 PROCEDURE — 93970 EXTREMITY STUDY: CPT

## 2022-09-19 PROCEDURE — 99204 OFFICE O/P NEW MOD 45 MIN: CPT

## 2022-09-19 NOTE — DATA REVIEWED
[FreeTextEntry1] : 9/19/2022 Venous Doppler Lázaro LE  no acute dvt svt \par                            RLE  no sono evidence of reflux \par                            LLE  no sono evidence of reflux \par                                    \par \par \par

## 2022-09-19 NOTE — HISTORY OF PRESENT ILLNESS
[FreeTextEntry1] : Pt was referred by Dr Sotomayor\par \par Pt c/o  bilateral leg pain swelling heaviness and discomfort worse by the end of the day\par Onset 1 years ago\par Intensity moderate \par Pt denies recent injury, travel or thrombophilic event\par \par Pt  denies art insuff\par \par Pt denies any recent  cardiac c/o , SOB, Chest Pain or recent heart attacks \par \par Pt c/o leg numbness  worse on the left than the right leg  also at rest \par \par Pt states dx of C5 pinched  nerve dx by mri \par Pt states f/u mri did not show  at  a recent hospitalization the above states pathology \par \par Pt also states some gait abnormalities  which remain since his neuro intervention \par

## 2022-09-19 NOTE — ASSESSMENT
[FreeTextEntry1] : Impression le c/o from combo of  le neurorgenic cause  possibly also diabetic neuropathy and symptomatic venous insuff not yet sono evident\par \par \par Plan Med Conservative management leg elevation, knee high compression stockings 15-20mm Hg (rx given), wt loss, diet control, exercise program, protective measures\par d/w pt that the comp stockings will help specifically w the venous insuff sx only \par d/w pt to f/u w his neurosurg  for d/w re the remainder of his sx\par telehealth visit in  2mo to re eval \par \par \par Letter faxed to Dr RODRIGO Sotomayor MD \par \par \par Varicose veins are enlarged, twisted veins. Varicose veins are caused by increased blood pressure in the veins.  The blood moves towards the heart by 1-way valves in the veins. When the valves become weakened or damaged, blood can collect in the veins and pool in your lower legs (ankles). This causes the veins to become enlarged and incompetent with reflux. Sitting or standing for long periods can cause blood to pool in the leg veins, increasing the pressure within the veins. \par Risk factors for varicose veins or venous disease may include:  obesity, older age, standing or sitting for prolonged periods of time for several years, being female, pregnancy, taking oral contraceptive pills or hormone replacement, being inactive, and/or smoking. \par The most common symptoms of varicose veins are sensations in the legs, such as a heavy feeling, burning, and/or aching. However, each individual may experience symptoms differently.  Other symptoms may include:  color changes in the skin, sores on the legs, or rash.  Severe varicose veins or venous disease may eventually produce long-term mild swelling that can result in more serious skin and tissue problems, such as ulcers and non-healing sores.\par Varicose veins and venous disease are diagnosed by a complete medical history, physical examination, and diagnostic studies for varicose veins including duplex ultrasound and color-flow imaging.  \par Medical treatment for varicose veins and venous disease include:  compression stockings, sclerotherapy, endovenous ablation and/or surgical treatment with microphlebectomy.  \par \par  [Arterial/Venous Disease] : arterial/venous disease [Other: _____] : [unfilled]

## 2022-09-19 NOTE — PHYSICAL EXAM
[1+] : left 1+ [2+] : left 2+ [No HSM] : no hepatosplenomegaly [No Rash or Lesion] : No rash or lesion [Alert] : alert [Oriented to Person] : oriented to person [Oriented to Place] : oriented to place [Oriented to Time] : oriented to time [Calm] : calm [JVD] : no jugular venous distention  [Abdomen Masses] : No abdominal masses [Stool Sample Taken] : No stool obtained  on rectal exam [de-identified] : nad [de-identified] : wnl [FreeTextEntry1] : no evid of art insuff\par Mil bilateral  leg venous insufficiency \par w mild  bilateral leg stasis dermatitis, hyperpigmentation in the gator area\par and mild bilateral leg edema \par Multiple  bilateral right leg small varicose  veins and spider veins  ant post medial calf and shin \par RLE Varicose veins measuring  1-2 mm in size on the calf /shin \par LLE Varicose veins measuring  1-2 mm in size on the calf /shin \par no wounds/ulcers\par  [de-identified] : wnl [de-identified] : Lázaro Cranial nerves 2-12 lázaro grossly intact [de-identified] : cooperative

## 2022-09-22 ENCOUNTER — OUTPATIENT (OUTPATIENT)
Dept: OUTPATIENT SERVICES | Facility: HOSPITAL | Age: 77
LOS: 1 days | End: 2022-09-22
Payer: MEDICARE

## 2022-09-22 ENCOUNTER — APPOINTMENT (OUTPATIENT)
Dept: CT IMAGING | Facility: IMAGING CENTER | Age: 77
End: 2022-09-22

## 2022-09-22 DIAGNOSIS — S06.5X9A TRAUMATIC SUBDURAL HEMORRHAGE WITH LOSS OF CONSCIOUSNESS OF UNSPECIFIED DURATION, INITIAL ENCOUNTER: ICD-10-CM

## 2022-09-22 PROCEDURE — 70450 CT HEAD/BRAIN W/O DYE: CPT | Mod: 26,MH

## 2022-09-22 PROCEDURE — 70450 CT HEAD/BRAIN W/O DYE: CPT

## 2022-09-28 ENCOUNTER — APPOINTMENT (OUTPATIENT)
Dept: NEUROSURGERY | Facility: CLINIC | Age: 77
End: 2022-09-28

## 2022-09-28 PROCEDURE — 99442: CPT | Mod: 95

## 2022-09-29 ENCOUNTER — RX RENEWAL (OUTPATIENT)
Age: 77
End: 2022-09-29

## 2022-10-18 ENCOUNTER — APPOINTMENT (OUTPATIENT)
Dept: OPHTHALMOLOGY | Facility: CLINIC | Age: 77
End: 2022-10-18

## 2022-10-18 ENCOUNTER — NON-APPOINTMENT (OUTPATIENT)
Age: 77
End: 2022-10-18

## 2022-10-18 PROCEDURE — 92083 EXTENDED VISUAL FIELD XM: CPT

## 2022-10-18 PROCEDURE — 92133 CPTRZD OPH DX IMG PST SGM ON: CPT

## 2022-10-18 PROCEDURE — 92014 COMPRE OPH EXAM EST PT 1/>: CPT

## 2022-11-02 ENCOUNTER — OUTPATIENT (OUTPATIENT)
Dept: OUTPATIENT SERVICES | Facility: HOSPITAL | Age: 77
LOS: 1 days | End: 2022-11-02
Payer: MEDICARE

## 2022-11-02 ENCOUNTER — APPOINTMENT (OUTPATIENT)
Dept: NEUROSURGERY | Facility: CLINIC | Age: 77
End: 2022-11-02

## 2022-11-02 ENCOUNTER — APPOINTMENT (OUTPATIENT)
Dept: CT IMAGING | Facility: IMAGING CENTER | Age: 77
End: 2022-11-02

## 2022-11-02 ENCOUNTER — NON-APPOINTMENT (OUTPATIENT)
Age: 77
End: 2022-11-02

## 2022-11-02 VITALS
HEIGHT: 65 IN | SYSTOLIC BLOOD PRESSURE: 121 MMHG | DIASTOLIC BLOOD PRESSURE: 77 MMHG | BODY MASS INDEX: 24.99 KG/M2 | OXYGEN SATURATION: 98 % | HEART RATE: 86 BPM | WEIGHT: 150 LBS

## 2022-11-02 DIAGNOSIS — S06.5X9A TRAUMATIC SUBDURAL HEMORRHAGE WITH LOSS OF CONSCIOUSNESS OF UNSPECIFIED DURATION, INITIAL ENCOUNTER: ICD-10-CM

## 2022-11-02 DIAGNOSIS — Z78.9 OTHER SPECIFIED HEALTH STATUS: ICD-10-CM

## 2022-11-02 PROCEDURE — 70450 CT HEAD/BRAIN W/O DYE: CPT

## 2022-11-02 PROCEDURE — 70450 CT HEAD/BRAIN W/O DYE: CPT | Mod: 26,MH

## 2022-11-02 PROCEDURE — 99214 OFFICE O/P EST MOD 30 MIN: CPT

## 2022-11-02 NOTE — REASON FOR VISIT
[Follow-Up: _____] : a [unfilled] follow-up visit [Spouse] : spouse [Family Member] : family member [FreeTextEntry1] : 90 day EMBOLISE trial visit, review results of follow up CT head non con done 11/2\par \par s/p Left MMA embolization 8/5/22\par \par EMBOLISE trial, randomized into the interventional arm\par

## 2022-11-02 NOTE — REVIEW OF SYSTEMS
[As Noted in HPI] : as noted in HPI [Difficulty Walking] : difficulty walking [Negative] : Heme/Lymph [de-identified] : Presents in wheelchair

## 2022-11-02 NOTE — PHYSICAL EXAM
[Person] : oriented to person [Place] : oriented to place [Time] : oriented to time [Motor Strength] : muscle strength was normal in all four extremities [FreeTextEntry8] : Ataxic gait, Negative Mattson's sign

## 2022-11-02 NOTE — ASSESSMENT
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 \par \par Approximately 3 month follow up CT scan 11/2 shows significant decrease in size of left SDH with minimal residual measuring 3mm, previously 8mm. No acute hemorrhage.\par Patient presents in wheelchair with progressive unsteadiness from chronic issue possibly due to cervical spinal stenosis. Has not seen neurologist, or spine specialist.\par From a neurosurgical standpoint from the MMA embolization procedure, this symptom is likely not related to the SDH given that there is near complete resolution of L SDH. \par \par \par PLAN:\par Discontinue Keppra 500mg BID\par Switch from Aggrenox to start taking ASA 81 instead\par Refer to neurologist for further work-up of presenting symptoms, Dr. Cunha\par 180 day EMBOLISE visit with repeat CT head non con - February 2023\par

## 2022-11-02 NOTE — HISTORY OF PRESENT ILLNESS
[FreeTextEntry1] : SOTO WHITNEY is a 77 year old male with PMH of HTN, HLD, DM 2, diabetic neuropathy, TIAs (diagnosed by neurologist in the past_ on Aggrenox, who presented to [facility] ED for right sided headaches for 1 month. Patient was followed by ophthalmology Dr. Santamaria for papilledema and was referred to come to [facility] ED for further evaluation. Ophthalmology evaluated for giant cell arteritis, right disc edema and peripapillary hemorrhages less likely given lack of clinical symptoms and negative inflammatory markers. MRI orbits negative for orbital mass or abnormal optic nerve signal. 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 He was transferred to [facility] on 8/5 for possible neurosurgical intervention for SDH. He was enrolled into the EMBOLISE trial and randomized into the interventional arm. On 8/5/22, he underwent Left MMA embolization. Patient with h/o multiple falls at home, ataxic gait, instructed to follow up with Dr. 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 Discharged on Keppra 500mg BID for seizure prophylaxis. \par \par Recently presented to [facility] ED on 8/21 as code stroke with transient episode of dysarthria, likely TIA. No neurovascular intervention given no large vessel occlusion seen on imaging, no tPA given no disabling neurological deficit. Could have been focal seizure given description of corner of lip getting twisted on the left side per wife. Denies noticing drooping of right side of mouth. Keppra was increased to 750mg BID. \par 2 week CT scan 8/21 shows stable L subacute to chronic SDH, slightly decreased in size, measuring 10mm, previously 12mm. 6 week follow up CT scan done on 9/22/22 which shows continued decrease in size and density of left lateral convexity SDH measuring 7mm. Keppra tapered down to 500mg BID. \par \par Neurologist: Dr. Delfino Sotomayor\par \par Today, patient presents in wheelchair with urinary issues with incontinence, urgency, frequency, and unsteadiness from chronic ataxic gait. Has followed up with urologist. Family reports he currently also has UTI. Wife denies new or recent headaches, seizure activity or seizure-like episodes. Denies cognitive issues or memory loss.

## 2022-11-30 ENCOUNTER — APPOINTMENT (OUTPATIENT)
Dept: VASCULAR SURGERY | Facility: CLINIC | Age: 77
End: 2022-11-30

## 2022-11-30 ENCOUNTER — APPOINTMENT (OUTPATIENT)
Dept: RADIOLOGY | Facility: HOSPITAL | Age: 77
End: 2022-11-30

## 2022-11-30 ENCOUNTER — OUTPATIENT (OUTPATIENT)
Dept: OUTPATIENT SERVICES | Facility: HOSPITAL | Age: 77
LOS: 1 days | End: 2022-11-30
Payer: MEDICARE

## 2022-11-30 ENCOUNTER — RESULT REVIEW (OUTPATIENT)
Age: 77
End: 2022-11-30

## 2022-11-30 DIAGNOSIS — G91.2 (IDIOPATHIC) NORMAL PRESSURE HYDROCEPHALUS: ICD-10-CM

## 2022-11-30 DIAGNOSIS — Z00.00 ENCOUNTER FOR GENERAL ADULT MEDICAL EXAMINATION WITHOUT ABNORMAL FINDINGS: ICD-10-CM

## 2022-11-30 LAB
APPEARANCE CSF: CLEAR — SIGNIFICANT CHANGE UP
APPEARANCE SPUN FLD: COLORLESS — SIGNIFICANT CHANGE UP
COLOR CSF: SIGNIFICANT CHANGE UP
GLUCOSE CSF-MCNC: 75 MG/DL — HIGH (ref 40–70)
GRAM STN FLD: SIGNIFICANT CHANGE UP
LYMPHOCYTES # CSF: 75 % — SIGNIFICANT CHANGE UP (ref 40–80)
MONOS+MACROS NFR CSF: 25 % — SIGNIFICANT CHANGE UP (ref 15–45)
NEUTROPHILS # CSF: 0 % — SIGNIFICANT CHANGE UP (ref 0–6)
NIGHT BLUE STAIN TISS: SIGNIFICANT CHANGE UP
NRBC NFR CSF: 2 /UL — SIGNIFICANT CHANGE UP (ref 0–5)
PROT CSF-MCNC: 33 MG/DL — SIGNIFICANT CHANGE UP (ref 15–45)
RBC # CSF: 0 /UL — SIGNIFICANT CHANGE UP (ref 0–0)
SPECIMEN SOURCE: SIGNIFICANT CHANGE UP
SPECIMEN SOURCE: SIGNIFICANT CHANGE UP
TUBE TYPE: SIGNIFICANT CHANGE UP

## 2022-11-30 PROCEDURE — 87070 CULTURE OTHR SPECIMN AEROBIC: CPT

## 2022-11-30 PROCEDURE — 88108 CYTOPATH CONCENTRATE TECH: CPT

## 2022-11-30 PROCEDURE — 88108 CYTOPATH CONCENTRATE TECH: CPT | Mod: 26

## 2022-11-30 PROCEDURE — 82945 GLUCOSE OTHER FLUID: CPT

## 2022-11-30 PROCEDURE — 86592 SYPHILIS TEST NON-TREP QUAL: CPT

## 2022-11-30 PROCEDURE — 84157 ASSAY OF PROTEIN OTHER: CPT

## 2022-11-30 PROCEDURE — 89051 BODY FLUID CELL COUNT: CPT

## 2022-11-30 PROCEDURE — 62328 DX LMBR SPI PNXR W/FLUOR/CT: CPT

## 2022-11-30 PROCEDURE — 87116 MYCOBACTERIA CULTURE: CPT

## 2022-11-30 PROCEDURE — 87205 SMEAR GRAM STAIN: CPT

## 2022-12-01 LAB — NON-GYNECOLOGICAL CYTOLOGY STUDY: SIGNIFICANT CHANGE UP

## 2022-12-02 LAB — VDRL CSF-TITR: SIGNIFICANT CHANGE UP

## 2022-12-03 LAB
CULTURE RESULTS: NO GROWTH — SIGNIFICANT CHANGE UP
SPECIMEN SOURCE: SIGNIFICANT CHANGE UP

## 2022-12-06 ENCOUNTER — APPOINTMENT (OUTPATIENT)
Dept: NEUROSURGERY | Facility: CLINIC | Age: 77
End: 2022-12-06

## 2022-12-06 VITALS
BODY MASS INDEX: 24.99 KG/M2 | DIASTOLIC BLOOD PRESSURE: 77 MMHG | OXYGEN SATURATION: 97 % | HEIGHT: 65 IN | WEIGHT: 150 LBS | SYSTOLIC BLOOD PRESSURE: 125 MMHG | HEART RATE: 98 BPM | TEMPERATURE: 99.3 F

## 2022-12-06 PROCEDURE — 99215 OFFICE O/P EST HI 40 MIN: CPT

## 2022-12-06 NOTE — REVIEW OF SYSTEMS
[Feeling Poorly] : feeling poorly [Feeling Tired] : feeling tired [Change In Personality] : personality change [As Noted in HPI] : as noted in HPI [Confused or Disoriented] : confusion [Memory Lapses or Loss] : memory loss [Decr. Concentrating Ability] : decreased concentrating ability [Difficulty with Language] : ~M difficulty with language [Difficulty Walking] : difficulty walking [Ataxia] : ataxia [Frequent Falls] : frequent falls [Incontinence] : incontinence [Negative] : Heme/Lymph

## 2022-12-13 ENCOUNTER — APPOINTMENT (OUTPATIENT)
Dept: NEUROSURGERY | Facility: CLINIC | Age: 77
End: 2022-12-13

## 2022-12-13 PROCEDURE — 99443: CPT | Mod: 95

## 2022-12-13 NOTE — PHYSICAL EXAM
[General Appearance - Alert] : alert [General Appearance - In No Acute Distress] : in no acute distress [Oriented To Time, Place, And Person] : oriented to person, place, and time [Person] : oriented to person [Place] : oriented to place [Time] : oriented to time [Short Term Intact] : short term memory intact [Remote Intact] : remote memory intact [Span Intact] : the attention span was normal [Concentration Intact] : normal concentrating ability [Fluency] : fluency intact [Comprehension] : comprehension intact [Current Events] : adequate knowledge of current events [Past History] : adequate knowledge of personal past history [Vocabulary] : adequate range of vocabulary [Cranial Nerves Oculomotor (III)] : extraocular motion intact [Cranial Nerves Trigeminal (V)] : facial sensation intact symmetrically [Cranial Nerves Facial (VII)] : face symmetrical [Cranial Nerves Vestibulocochlear (VIII)] : hearing was intact bilaterally [Cranial Nerves Glossopharyngeal (IX)] : tongue and palate midline [Cranial Nerves Accessory (XI - Cranial And Spinal)] : head turning and shoulder shrug symmetric [Cranial Nerves Hypoglossal (XII)] : there was no tongue deviation with protrusion [Motor Tone] : muscle tone was normal in all four extremities [Motor Strength] : muscle strength was normal in all four extremities [No Muscle Atrophy] : normal bulk in all four extremities [Sensation Tactile Decrease] : light touch was intact [Limited Balance] : the patient's balance was impaired [Extraocular Movements] : extraocular movements were intact [Outer Ear] : the ears and nose were normal in appearance [Neck Appearance] : the appearance of the neck was normal [] : no respiratory distress [Respiration, Rhythm And Depth] : normal respiratory rhythm and effort [Exaggerated Use Of Accessory Muscles For Inspiration] : no accessory muscle use [Heart Rate And Rhythm] : heart rate was normal and rhythm regular [Involuntary Movements] : no involuntary movements were seen [Skin Color & Pigmentation] : normal skin color and pigmentation [FreeTextEntry8] : gait unsteady with short steps,

## 2022-12-13 NOTE — HISTORY OF PRESENT ILLNESS
[de-identified] : 77 year old male with PMH of HTN, HLD, DM 2, diabetic neuropathy, TIAs (was on Aggrenox), who presented to [facility] ED for right sided headaches for 1 month. 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 Ophthalmology evaluated for giant cell arteritis,\par As per family, for the past year he has had several falls and unsteady gait along with memory issues and inct. 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 He was transferred to [facility] on 8/5 for possible neurosurgical intervention for SDH.  On 8/5/22, he underwent Left MMA embolization. Discharged on Keppra 500mg BID for seizure prophylaxis. \par \par Recently presented to [facility] ED on 8/21 as code stroke with transient episode of dysarthria, likely TIA. No neurovascular intervention given no large vessel occlusion seen on imaging, no tPA given no disabling neurological deficit. C Keppra was increased to 750mg BID. \par follow up 2 week CT scan 8/21 shows stable L subacute to chronic SDH, slightly decreased in size, measuring 10mm, previously 12mm. 6 week follow up CT scan done on 9/22/22 which shows continued decrease in size and density of left lateral convexity SDH measuring 7mm. Keppra tapered down to 500mg BID. \par He follows with Dr. Cunha and brain MRI performed on 11/28/2022 showed moderate enlargement of ventricular system. He was referred to our practice for evaluation for VPS.\par \par Today, patient presents in wheelchair awake, alert and in NAD. He underwent an LP in [place] and as per wife and family member, patient did not improve in terms of memory/cognition, urinary inct and gait instability. He was assessed by Dr. Cunha after LP and felt his gait improved. \par Wife and family member state the triad of symptoms started approximately a year ago, but worsened in August 2022. He is significantly worse today than last month. \par He is able to converse and appears oriented. He attempted to walk and was able to take a few steps cautiously and very unsteady during turning. \par Due to no significant improvement in symptom as per family, we will continue to monitor and can reassess if family wishes. \par Plan: fu in a  month to re-evaluate if family agrees\par Fu with Dr. Cunha

## 2022-12-13 NOTE — ASSESSMENT
[FreeTextEntry1] : 2022\par \par Dora Cunha MD\par [facility]\par [address] [place] [address]\[place] [address]\par \par Re:	Rad Guallpa\par :	1945\par \par Dear Dr. Cunha:\par \par Thank you for sending me Rad Guallpa in neurosurgical consultation.  He is a 77-year-old male with hyperlipidemia, hypertension, insulin-dependent type 2 diabetes, diabetic neuropathy, and a history of TIAs.  He has had a declining gait over the last \par 6 months and was eventually seen with a chronic left subdural hematoma and a smaller right subdural hematoma.  He was transferred to [facility] on  and underwent left MMA embolization.  Sequential studies have shown almost complete resolution of that subdural, and his symptoms at that time of headache are now gone.  He still has significant balance, gait, and “processing” issues, and he is here for consideration for  shunt.  His last CT scan was performed 2022.  It shows large ventricles, some preventricular transependymal flow, and significant atrophy of the brain and in the sylvian cisterns. \par \par The patient’s past medical history, as mentioned, is rich, inclusive of diabetes.  He has decreased hearing.  Past surgical history is negative, and he has no allergies.  Social history is negative, and family history is negative.  \par \par He went for a high-volume spinal tap at [facility] recently, and the family claims it did not help him at all.  There may have been a minor change, but the wife, who accompanied him today, feels that this is just his normal fluctuation.  She was adamant that it did not help him.\par \par On examination, he has an extremely unsteady gait that is magnetic, and he does occasionally get frozen.  \par \par Given the fact that the spinal tap did not help him, and given his rich medical history and his obesity, I am hesitant to perform a  shunt at the present time.  I will call you today and discuss the case.\par \par Thanks again,\par \par \par \par Jose Oropeza M.D., F.A.C.S.\par [facility]\par \par \par \par

## 2022-12-14 ENCOUNTER — APPOINTMENT (OUTPATIENT)
Dept: NEUROSURGERY | Facility: CLINIC | Age: 77
End: 2022-12-14

## 2022-12-29 NOTE — DATA REVIEWED
[de-identified] : CT head 8/21/22 Mustarde Flap Text: The defect edges were debeveled with a #15 scalpel blade.  Given the size, depth and location of the defect and the proximity to free margins a Mustarde flap was deemed most appropriate.  Using a sterile surgical marker, an appropriate flap was drawn incorporating the defect. The area thus outlined was incised with a #15 scalpel blade.  The skin margins were undermined to an appropriate distance in all directions utilizing iris scissors.

## 2023-01-14 LAB
CULTURE RESULTS: SIGNIFICANT CHANGE UP
SPECIMEN SOURCE: SIGNIFICANT CHANGE UP

## 2023-01-31 ENCOUNTER — OUTPATIENT (OUTPATIENT)
Dept: OUTPATIENT SERVICES | Facility: HOSPITAL | Age: 78
LOS: 1 days | End: 2023-01-31
Payer: MEDICARE

## 2023-01-31 ENCOUNTER — APPOINTMENT (OUTPATIENT)
Dept: CT IMAGING | Facility: IMAGING CENTER | Age: 78
End: 2023-01-31
Payer: MEDICARE

## 2023-01-31 DIAGNOSIS — S06.5X9A TRAUMATIC SUBDURAL HEMORRHAGE WITH LOSS OF CONSCIOUSNESS OF UNSPECIFIED DURATION, INITIAL ENCOUNTER: ICD-10-CM

## 2023-01-31 PROCEDURE — 70450 CT HEAD/BRAIN W/O DYE: CPT

## 2023-01-31 PROCEDURE — 70450 CT HEAD/BRAIN W/O DYE: CPT | Mod: 26,MH

## 2023-02-01 ENCOUNTER — APPOINTMENT (OUTPATIENT)
Dept: NEUROSURGERY | Facility: CLINIC | Age: 78
End: 2023-02-01
Payer: MEDICARE

## 2023-02-01 VITALS
DIASTOLIC BLOOD PRESSURE: 82 MMHG | WEIGHT: 150 LBS | BODY MASS INDEX: 24.99 KG/M2 | HEIGHT: 65 IN | SYSTOLIC BLOOD PRESSURE: 134 MMHG | HEART RATE: 86 BPM | OXYGEN SATURATION: 96 %

## 2023-02-01 PROCEDURE — 99214 OFFICE O/P EST MOD 30 MIN: CPT

## 2023-02-01 RX ORDER — LEVETIRACETAM 500 MG/1
500 TABLET, FILM COATED ORAL TWICE DAILY
Qty: 180 | Refills: 0 | Status: DISCONTINUED | COMMUNITY
Start: 2022-09-28 | End: 2023-02-01

## 2023-02-01 NOTE — REVIEW OF SYSTEMS
[Confused or Disoriented] : confusion [Memory Lapses or Loss] : memory loss [Decr. Concentrating Ability] : decreased concentrating ability [Difficulty Walking] : difficulty walking [As Noted in HPI] : as noted in HPI [Negative] : Heme/Lymph [de-identified] : imbalance

## 2023-02-01 NOTE — PHYSICAL EXAM
[Person] : oriented to person [Place] : oriented to place [Time] : oriented to time [FreeTextEntry8] : Presents in wheelchair

## 2023-02-01 NOTE — HISTORY OF PRESENT ILLNESS
[FreeTextEntry1] : SOTO WHITNEY is a 77 year old male with PMH of HTN, HLD, DM 2, diabetic neuropathy, TIAs (diagnosed by neurologist in the past on Aggrenox, who presented to [facility] ED for right sided headaches for 1 month s/p fall due to chronic imbalance and gait issues. Of note, wife states his balance issues approximately started 2 years ago and he was seen by ENT. 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 Ophthalmology evaluated for giant cell arteritis, right disc edema and peripapillary hemorrhages less likely given lack of clinical symptoms and negative inflammatory markers. MRI orbits negative for orbital mass or abnormal optic nerve signal. 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 He was transferred to [facility] on 8/5 for possible neurosurgical intervention for SDH. He was enrolled into the EMBOLISE trial and randomized into the interventional arm. On 8/5/22, he underwent Left MMA embolization. Patient with h/o multiple falls at home, ataxic gait, instructed to follow up with Dr. 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 Discharged on Keppra 500mg BID for seizure prophylaxis. \par \par Re-presented to [facility] ED on 8/21 as code stroke with transient episode of dysarthria, likely TIA. No neurovascular intervention given no large vessel occlusion seen on imaging, no tPA given no disabling neurological deficit. Could have been focal seizure given description of corner of lip getting twisted on the left side per wife. Denies noticing drooping of right side of mouth. Keppra was increased to 750mg BID. \par 2 week CT scan 8/21/22 shows stable L subacute to chronic SDH, slightly decreased in size, measuring 10mm, previously 12mm. 6 week follow up CT scan 9/22/22 which shows continued decrease in size and density of left lateral convexity SDH measuring 7mm. Keppra tapered down to 500mg BID. 3 month follow up CT scan 11/2/22 shows significant decrease in size of left SDH with minimal residual measuring 3mm, previously 8mm. No acute hemorrhage. Keppra 500mg BID discontinued, and Aggrenox switched to ASA 81 at last visit on 11/2/22.\par \par At the last visit, patient and family noted that he has had progression of his gait instability and urinary incontinence. As the SDH has decreased in size and the mass effect resolved, it has become apparent that there is underlying moderate hydrocephalus. 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 Dr. Cunha performed a high volume LP (30cc) and upon his evaluation he thought there was notable improvement in gait. However the patient and family state they did not appreciate any improvement after the lumbar puncture. It is therefore unclear at this time whether he would benefit from VPS. I discussed with Dr. Cunha who felt while at first the patient's gait appeared wide based, consistent with NPH, on his next assessment it was more magnetic and ataxic. We agreed on a trial of Sinemet with Dr. Cunha, and if no improvement in another month or so we can consider hospital admission and extended lumbar drain trial to further assess whether he could benefit from VPS. \par \par Today, he presents in a wheelchair for a follow up visit. He has tried the trial of Sinemet with no relief, and the medication was stopped after 1 week per neurology. Reports slight worsening of unsteadiness, occasional confusion, memory issues, and incontinent episodes.

## 2023-02-01 NOTE — REASON FOR VISIT
[Follow-Up: _____] : a [unfilled] follow-up visit [Spouse] : spouse [Family Member] : family member [FreeTextEntry1] : s/p Left MMA embolization 8/5/22\par \par EMBOLISE trial, randomized into the interventional arm\par \par 180 day EMBOLISE visit, reviewed results of repeat CT head non con done 1/31/23

## 2023-02-04 ENCOUNTER — OUTPATIENT (OUTPATIENT)
Dept: OUTPATIENT SERVICES | Facility: HOSPITAL | Age: 78
LOS: 1 days | End: 2023-02-04

## 2023-02-04 DIAGNOSIS — Z11.52 ENCOUNTER FOR SCREENING FOR COVID-19: ICD-10-CM

## 2023-02-04 LAB — SARS-COV-2 RNA SPEC QL NAA+PROBE: SIGNIFICANT CHANGE UP

## 2023-02-06 ENCOUNTER — INPATIENT (INPATIENT)
Facility: HOSPITAL | Age: 78
LOS: 10 days | Discharge: NURSING, REHAB OR LONG-TERM CARE | DRG: 33 | End: 2023-02-17
Attending: NEUROLOGICAL SURGERY | Admitting: NEUROLOGICAL SURGERY
Payer: MEDICARE

## 2023-02-06 ENCOUNTER — APPOINTMENT (OUTPATIENT)
Dept: NEUROSURGERY | Facility: HOSPITAL | Age: 78
End: 2023-02-06

## 2023-02-06 VITALS
WEIGHT: 149.91 LBS | TEMPERATURE: 98 F | RESPIRATION RATE: 20 BRPM | DIASTOLIC BLOOD PRESSURE: 68 MMHG | HEART RATE: 65 BPM | SYSTOLIC BLOOD PRESSURE: 119 MMHG | OXYGEN SATURATION: 98 % | HEIGHT: 65 IN

## 2023-02-06 DIAGNOSIS — S06.5X9A TRAUMATIC SUBDURAL HEMORRHAGE WITH LOSS OF CONSCIOUSNESS OF UNSPECIFIED DURATION, INITIAL ENCOUNTER: ICD-10-CM

## 2023-02-06 LAB
ALBUMIN SERPL ELPH-MCNC: 3.9 G/DL — SIGNIFICANT CHANGE UP (ref 3.3–5)
ALBUMIN SERPL ELPH-MCNC: 4.3 G/DL
ALP BLD-CCNC: 94 U/L
ALP SERPL-CCNC: 78 U/L — SIGNIFICANT CHANGE UP (ref 40–120)
ALT FLD-CCNC: 28 U/L — SIGNIFICANT CHANGE UP (ref 10–45)
ALT SERPL-CCNC: 62 U/L
ANION GAP SERPL CALC-SCNC: 10 MMOL/L
ANION GAP SERPL CALC-SCNC: 11 MMOL/L — SIGNIFICANT CHANGE UP (ref 5–17)
APPEARANCE CSF: CLEAR — SIGNIFICANT CHANGE UP
APPEARANCE SPUN FLD: COLORLESS — SIGNIFICANT CHANGE UP
APTT BLD: 36.7 SEC
AST SERPL-CCNC: 17 U/L
AST SERPL-CCNC: 19 U/L — SIGNIFICANT CHANGE UP (ref 10–40)
BASOPHILS # BLD AUTO: 0.06 K/UL
BASOPHILS NFR BLD AUTO: 0.6 %
BILIRUB SERPL-MCNC: 0.6 MG/DL — SIGNIFICANT CHANGE UP (ref 0.2–1.2)
BILIRUB SERPL-MCNC: 1 MG/DL
BUN SERPL-MCNC: 14 MG/DL — SIGNIFICANT CHANGE UP (ref 7–23)
BUN SERPL-MCNC: 19 MG/DL
CALCIUM SERPL-MCNC: 8.6 MG/DL — SIGNIFICANT CHANGE UP (ref 8.4–10.5)
CALCIUM SERPL-MCNC: 9.8 MG/DL
CHLORIDE SERPL-SCNC: 102 MMOL/L — SIGNIFICANT CHANGE UP (ref 96–108)
CHLORIDE SERPL-SCNC: 103 MMOL/L
CO2 SERPL-SCNC: 26 MMOL/L — SIGNIFICANT CHANGE UP (ref 22–31)
CO2 SERPL-SCNC: 28 MMOL/L
COLOR CSF: ABNORMAL
CREAT SERPL-MCNC: 0.98 MG/DL — SIGNIFICANT CHANGE UP (ref 0.5–1.3)
CREAT SERPL-MCNC: 1.08 MG/DL
EGFR: 71 ML/MIN/1.73M2
EGFR: 79 ML/MIN/1.73M2 — SIGNIFICANT CHANGE UP
EOSINOPHIL # BLD AUTO: 0.29 K/UL
EOSINOPHIL # CSF: 9 % — SIGNIFICANT CHANGE UP
EOSINOPHIL NFR BLD AUTO: 2.8 %
GLUCOSE BLDC GLUCOMTR-MCNC: 125 MG/DL — HIGH (ref 70–99)
GLUCOSE BLDC GLUCOMTR-MCNC: 148 MG/DL — HIGH (ref 70–99)
GLUCOSE BLDC GLUCOMTR-MCNC: 202 MG/DL — HIGH (ref 70–99)
GLUCOSE BLDC GLUCOMTR-MCNC: 220 MG/DL — HIGH (ref 70–99)
GLUCOSE CSF-MCNC: 91 MG/DL — HIGH (ref 40–70)
GLUCOSE SERPL-MCNC: 239 MG/DL — HIGH (ref 70–99)
GLUCOSE SERPL-MCNC: 97 MG/DL
HCT VFR BLD CALC: 49 % — SIGNIFICANT CHANGE UP (ref 39–50)
HCT VFR BLD CALC: 55.4 %
HGB BLD-MCNC: 16.2 G/DL — SIGNIFICANT CHANGE UP (ref 13–17)
HGB BLD-MCNC: 17.9 G/DL
IMM GRANULOCYTES NFR BLD AUTO: 0.3 %
INR PPP: 1.05 RATIO
LDH CSF L TO P-CCNC: <15 U/L — SIGNIFICANT CHANGE UP
LDH FLD-CCNC: <15 U/L — SIGNIFICANT CHANGE UP
LYMPHOCYTES # BLD AUTO: 1.41 K/UL
LYMPHOCYTES # CSF: 22 % — LOW (ref 40–80)
LYMPHOCYTES NFR BLD AUTO: 13.8 %
MAN DIFF?: NORMAL
MCHC RBC-ENTMCNC: 29.1 PG — SIGNIFICANT CHANGE UP (ref 27–34)
MCHC RBC-ENTMCNC: 29.2 PG
MCHC RBC-ENTMCNC: 32.3 GM/DL
MCHC RBC-ENTMCNC: 33.1 GM/DL — SIGNIFICANT CHANGE UP (ref 32–36)
MCV RBC AUTO: 88 FL — SIGNIFICANT CHANGE UP (ref 80–100)
MCV RBC AUTO: 90.4 FL
MONOCYTES # BLD AUTO: 1.05 K/UL
MONOCYTES NFR BLD AUTO: 10.3 %
MONOS+MACROS NFR CSF: 7 % — LOW (ref 15–45)
NEUTROPHILS # BLD AUTO: 7.4 K/UL
NEUTROPHILS # CSF: 62 % — HIGH (ref 0–6)
NEUTROPHILS NFR BLD AUTO: 72.2 %
NRBC # BLD: 0 /100 WBCS — SIGNIFICANT CHANGE UP (ref 0–0)
NRBC NFR CSF: 4 /UL — SIGNIFICANT CHANGE UP (ref 0–5)
PLATELET # BLD AUTO: 214 K/UL — SIGNIFICANT CHANGE UP (ref 150–400)
PLATELET # BLD AUTO: 228 K/UL
POTASSIUM SERPL-MCNC: 4.6 MMOL/L — SIGNIFICANT CHANGE UP (ref 3.5–5.3)
POTASSIUM SERPL-SCNC: 4.6 MMOL/L — SIGNIFICANT CHANGE UP (ref 3.5–5.3)
POTASSIUM SERPL-SCNC: 4.7 MMOL/L
PROT CSF-MCNC: 38 MG/DL — SIGNIFICANT CHANGE UP (ref 15–45)
PROT SERPL-MCNC: 7 G/DL
PROT SERPL-MCNC: 7.1 G/DL — SIGNIFICANT CHANGE UP (ref 6–8.3)
PT BLD: 12.3 SEC
RBC # BLD: 5.57 M/UL — SIGNIFICANT CHANGE UP (ref 4.2–5.8)
RBC # BLD: 6.13 M/UL
RBC # CSF: 4833 /UL — HIGH (ref 0–0)
RBC # FLD: 13.3 % — SIGNIFICANT CHANGE UP (ref 10.3–14.5)
RBC # FLD: 13.9 %
SODIUM SERPL-SCNC: 139 MMOL/L — SIGNIFICANT CHANGE UP (ref 135–145)
SODIUM SERPL-SCNC: 141 MMOL/L
TUBE TYPE: SIGNIFICANT CHANGE UP
WBC # BLD: 10.21 K/UL — SIGNIFICANT CHANGE UP (ref 3.8–10.5)
WBC # FLD AUTO: 10.21 K/UL — SIGNIFICANT CHANGE UP (ref 3.8–10.5)
WBC # FLD AUTO: 10.24 K/UL

## 2023-02-06 PROCEDURE — 62329 THER SPI PNXR CSF FLUOR/CT: CPT

## 2023-02-06 PROCEDURE — 99223 1ST HOSP IP/OBS HIGH 75: CPT | Mod: 25

## 2023-02-06 RX ORDER — DEXTROSE 50 % IN WATER 50 %
25 SYRINGE (ML) INTRAVENOUS ONCE
Refills: 0 | Status: DISCONTINUED | OUTPATIENT
Start: 2023-02-06 | End: 2023-02-13

## 2023-02-06 RX ORDER — GLUCAGON INJECTION, SOLUTION 0.5 MG/.1ML
1 INJECTION, SOLUTION SUBCUTANEOUS ONCE
Refills: 0 | Status: DISCONTINUED | OUTPATIENT
Start: 2023-02-06 | End: 2023-02-13

## 2023-02-06 RX ORDER — SODIUM CHLORIDE 9 MG/ML
1000 INJECTION, SOLUTION INTRAVENOUS
Refills: 0 | Status: DISCONTINUED | OUTPATIENT
Start: 2023-02-06 | End: 2023-02-13

## 2023-02-06 RX ORDER — INSULIN LISPRO 100/ML
VIAL (ML) SUBCUTANEOUS
Refills: 0 | Status: DISCONTINUED | OUTPATIENT
Start: 2023-02-06 | End: 2023-02-10

## 2023-02-06 RX ORDER — LOSARTAN POTASSIUM 100 MG/1
100 TABLET, FILM COATED ORAL DAILY
Refills: 0 | Status: DISCONTINUED | OUTPATIENT
Start: 2023-02-06 | End: 2023-02-13

## 2023-02-06 RX ORDER — ACETAMINOPHEN 500 MG
650 TABLET ORAL EVERY 6 HOURS
Refills: 0 | Status: DISCONTINUED | OUTPATIENT
Start: 2023-02-06 | End: 2023-02-13

## 2023-02-06 RX ORDER — INSULIN LISPRO 100/ML
VIAL (ML) SUBCUTANEOUS AT BEDTIME
Refills: 0 | Status: DISCONTINUED | OUTPATIENT
Start: 2023-02-06 | End: 2023-02-10

## 2023-02-06 RX ORDER — ATORVASTATIN CALCIUM 80 MG/1
20 TABLET, FILM COATED ORAL AT BEDTIME
Refills: 0 | Status: DISCONTINUED | OUTPATIENT
Start: 2023-02-06 | End: 2023-02-13

## 2023-02-06 RX ORDER — OXYCODONE HYDROCHLORIDE 5 MG/1
5 TABLET ORAL EVERY 4 HOURS
Refills: 0 | Status: DISCONTINUED | OUTPATIENT
Start: 2023-02-06 | End: 2023-02-13

## 2023-02-06 RX ORDER — SENNA PLUS 8.6 MG/1
2 TABLET ORAL AT BEDTIME
Refills: 0 | Status: DISCONTINUED | OUTPATIENT
Start: 2023-02-06 | End: 2023-02-13

## 2023-02-06 RX ORDER — SODIUM CHLORIDE 9 MG/ML
1000 INJECTION, SOLUTION INTRAVENOUS
Refills: 0 | Status: DISCONTINUED | OUTPATIENT
Start: 2023-02-06 | End: 2023-02-06

## 2023-02-06 RX ORDER — DEXTROSE 50 % IN WATER 50 %
15 SYRINGE (ML) INTRAVENOUS ONCE
Refills: 0 | Status: DISCONTINUED | OUTPATIENT
Start: 2023-02-06 | End: 2023-02-13

## 2023-02-06 RX ORDER — DEXTROSE 50 % IN WATER 50 %
12.5 SYRINGE (ML) INTRAVENOUS ONCE
Refills: 0 | Status: DISCONTINUED | OUTPATIENT
Start: 2023-02-06 | End: 2023-02-13

## 2023-02-06 RX ORDER — POLYETHYLENE GLYCOL 3350 17 G/17G
17 POWDER, FOR SOLUTION ORAL DAILY
Refills: 0 | Status: DISCONTINUED | OUTPATIENT
Start: 2023-02-06 | End: 2023-02-13

## 2023-02-06 RX ORDER — ENOXAPARIN SODIUM 100 MG/ML
40 INJECTION SUBCUTANEOUS EVERY 24 HOURS
Refills: 0 | Status: DISCONTINUED | OUTPATIENT
Start: 2023-02-07 | End: 2023-02-09

## 2023-02-06 RX ORDER — ONDANSETRON 8 MG/1
4 TABLET, FILM COATED ORAL EVERY 6 HOURS
Refills: 0 | Status: DISCONTINUED | OUTPATIENT
Start: 2023-02-06 | End: 2023-02-13

## 2023-02-06 RX ADMIN — ATORVASTATIN CALCIUM 20 MILLIGRAM(S): 80 TABLET, FILM COATED ORAL at 21:10

## 2023-02-06 NOTE — PATIENT PROFILE ADULT - LAST TOBACCO USE (DD-MM-YY)
LM by Manuela BOUCHER MA to have patient get this lasix renal scan done prior to his December appt.    16-May-1973

## 2023-02-06 NOTE — OCCUPATIONAL THERAPY INITIAL EVALUATION ADULT - TRANSFER TRAINING, PT EVAL
pt will perform sit<>stand with I in 4 weeks using LRAD in 4 weeks / pt will perform toilet transfer with CGA in 4 weeks using LRAD in 4 weeks

## 2023-02-06 NOTE — PROGRESS NOTE ADULT - ASSESSMENT
Patient seen and examined s/p LD for NPH trial. Doing well. Awake, alert, oriented x 3. MUHAMMAD strongly. SILT.    -drain 20cc/4 hrs   -Q4 neurochecks  -Q4 vitals  -Pain control  -Advance diet as tolerated (diabetic)  -PT/OT

## 2023-02-06 NOTE — PHYSICAL THERAPY INITIAL EVALUATION ADULT - TIMED UP AND GO TEST, REHAB EVAL
3 trials pre lumbar drain. trial 1: 44.48 seconds 8 steps to turn, trial 2: 43.11 8 steps to turn, trial 3: 42.48 seconds 11 steps to turn. average: 43.36 average

## 2023-02-06 NOTE — H&P ADULT - HISTORY OF PRESENT ILLNESS
77 year old male with PMH of HTN, HLD, DM 2, diabetic neuropathy, TIAs (was on Aggrenox), who presented to [facility] ED for right sided headaches for 1 month. Patient was followed by ophthalmology Dr. Santamaria for papilledema and was referred to come to [facility] ED for further evaluation. Ophthalmology evaluated for giant cell arteritis, As per family, for the past year he has had several falls and unsteady gait along with memory issues and inct. 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 He was transferred to [facility] on 8/5 for possible neurosurgical intervention for SDH. On 8/5/22, he underwent Left MMA embolization. Discharged on Keppra 500mg BID for seizure prophylaxis.  Recently presented to [facility] ED on 8/21 as code stroke with transient episode of dysarthria, likely TIA. No neurovascular intervention given no large vessel occlusion seen on imaging, no tPA given no disabling neurological deficit. C Keppra was increased to 750mg BID.   follow up 2 week CT scan 8/21 shows stable L subacute to chronic SDH, slightly decreased in size, measuring 10mm, previously 12mm. 6 week follow up CT scan done on 9/22/22 which shows continued decrease in size and density of left lateral convexity SDH measuring 7mm. Keppra tapered down to 500mg BID.  He follows with Dr. Cunha and brain MRI performed on 11/28/2022 showed moderate enlargement of ventricular system.. He underwent an LP in [place] and as per wife and family member, patient did not improve in terms of memory/cognition, urinary inct and gait instability. He was assessed by Dr. Cunha after LP and felt his gait improved.  Wife and family member state the triad of symptoms started approximately a year ago, but worsened in August 2022. He is significantly worse today than last month.  He is able to converse and appears oriented. He attempted to walk and was able to take a few steps cautiously and very unsteady during turning.  77 year old male with PMH of HTN, HLD, DM 2, diabetic neuropathy, TIAs (was on Aggrenox), who presented to [facility] ED for right sided headaches for 1 month. Patient was followed by ophthalmology Dr. Santamaria for papilledema and was referred to come to [facility] ED for further evaluation. Ophthalmology evaluated for giant cell arteritis, As per family, for the past year he has had several falls and unsteady gait along with memory issues and inct. 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 He was transferred to [facility] on 8/5 for possible neurosurgical intervention for SDH. He was enrolled into the EMBOLISE trial, randomized to intervention group, and On 8/5/22, he underwent Left MMA embolization. Discharged on Keppra 500mg BID for seizure prophylaxis.  Recently presented to [facility] ED on 8/21 as code stroke with transient episode of dysarthria, likely TIA. No neurovascular intervention given no large vessel occlusion seen on imaging, no tPA given no disabling neurological deficit. Keppra was increased to 750mg BID.   follow up 2 week CT scan 8/21 shows stable L subacute to chronic SDH, slightly decreased in size, measuring 10mm, previously 12mm. 6 week follow up CT scan done on 9/22/22 which shows continued decrease in size and density of left lateral convexity SDH measuring 7mm. Keppra tapered down to 500mg BID.  He follows with Dr. Cunha and brain MRI performed on 11/28/2022 showed moderate enlargement of ventricular system.. He underwent an LP in [place] and as per wife and family member, patient did not improve in terms of memory/cognition, urinary inct and gait instability. He was assessed by Dr. Cunha after LP and felt his gait improved.  Wife and family member state the triad of symptoms started approximately a year ago, but worsened in August 2022. He is significantly worse today than last month.  He is able to converse and appears oriented. He attempted to walk and was able to take a few steps cautiously and very unsteady during turning.

## 2023-02-06 NOTE — OCCUPATIONAL THERAPY INITIAL EVALUATION ADULT - ADDITIONAL COMMENTS
pt reports lives in private home with spouse, 5 TYLER, flight of stairs inside, walk-in shower with shower chair. Prior to admission assist withm all ADLs/ambulating short distances with RW/cane and assist, w/c for longer distances. Owns RW, cane, shower chair. pt reports lives in private home with spouse, 5 TYLER, flight of stairs inside, walk-in shower with shower chair. Prior to admission assist with all ADLs/ambulating short distances with RW/cane and assist, w/c for longer distances. Owns RW, cane, shower chair.

## 2023-02-06 NOTE — H&P ADULT - ATTENDING COMMENTS
Patient well known to me, admitted now for lumbar drain trial to evaluate for possible NPH. He has had gait imbalance for over a year with several falls, and in August 2022 was found to have a left chronic SDH. He was enrolled into the EMBOLISE trial and randomized to the intervention group, and underwent L MMA embolization on 8/5/22. This led to complete resolution of the SDH. While the subsequent scans have showed gradual resolution of the SDH, the ventricles have begun to look larger. 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 He underwent a high volume LP in 12/2022 under the direction of Dr. Cunha, who thought on his exam that there was improvement afterwards, but the patient and family do not believe so. Due to this discrepancy, we decided to pursue a lumbar drain trial to evaluate whether  shunt would be beneficial.

## 2023-02-06 NOTE — CHART NOTE - NSCHARTNOTEFT_GEN_A_CORE
Interventional Neuro- Radiology   Procedure Note    Procedure: LD placement   Pre- Procedure Diagnosis: NPH       : Dr. Chalo MD  Fellow: Dr. Su MD   Physician Assistant: Dayanna Canela PA-C    RN: Shonda   Tech: Sebas/ Tanesha     Anesthesia: Dr. Hannon (MAC)      I/Os:  Fluids: 300cc   Toscano: DTV   Contrast: 0cc   Estimated Blood Loss: <10cc      Preliminary Report:  Under MAC, patient was placed in left lateral decubitus position. The lower lumbar region was prepped and draped in the usual sterile fashion. Lidocaine was used to anesthetise the area. A Quincke LP needle was used to enter the midline into the CSF space and active egress of CSF was noted, LD successfully placed.  The opening pressure was   14. CSF fluid was sent out for analysis for: WBC, RBC, Protein, Glucose, and Culture & Sensitivity.   . ( Official note to follow).    Patient tolerated procedure well, vital signs stable, hemodynamically stable, no change in neurological status compared to baseline. Results discussed with patient and their family  Patient transferred to PACU for further care/ monitoring, with LD clamped, flat for 1hour, pending PT eval .

## 2023-02-06 NOTE — PHYSICAL THERAPY INITIAL EVALUATION ADULT - ADDITIONAL COMMENTS
Pt lives in a private home with wife there are 6 steps to enter, 14 steps inside home +handrail. Pt performed ADL/IADLs independently. Ambulates with RW. Owns DME: cane, RW Pt lives in a private home with wife there are 6 steps to enter, 14 steps inside home +handrail. Pt performed ADL/IADLs with assistance from family. Ambulates with RW with assist short distances, w/c for long distances. Owns DME: cane, RW, w/c

## 2023-02-06 NOTE — PATIENT PROFILE ADULT - FALL HARM RISK - HARM RISK INTERVENTIONS

## 2023-02-06 NOTE — PROGRESS NOTE ADULT - SUBJECTIVE AND OBJECTIVE BOX
Patient seen and examined s/p LD for NPH trial. Doing well. Awake, alert, oriented x 3. MUHAMMAD strongly. SILT.    T(C): 36.3 (02-06-23 @ 13:21), Max: 36.8 (02-06-23 @ 08:51)  HR: 89 (02-06-23 @ 13:21) (65 - 89)  BP: 155/81 (02-06-23 @ 13:21) (112/81 - 155/81)  RR: 18 (02-06-23 @ 13:21) (16 - 20)  SpO2: 99% (02-06-23 @ 13:21) (96% - 99%)                    CAPILLARY BLOOD GLUCOSE      POCT Blood Glucose.: 220 mg/dL (06 Feb 2023 08:53)

## 2023-02-06 NOTE — OCCUPATIONAL THERAPY INITIAL EVALUATION ADULT - PERTINENT HX OF CURRENT PROBLEM, REHAB EVAL
77 year old male with PMH of HTN, HLD, DM 2, diabetic neuropathy, TIAs (was on Aggrenox), who presented to [facility] ED for right sided headaches for 1 month. Patient was followed by ophthalmology Dr. Santamaria for papilledema and was referred to come to [facility] ED for further evaluation. Ophthalmology evaluated for giant cell arteritis, As per family, for the past year he has had several falls and unsteady gait along with memory issues and inct. 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 He was transferred to [facility] on 8/5 for possible neurosurgical intervention for SDH. On 8/5/22, he underwent Left MMA embolization. Discharged on Keppra 500mg BID for seizure prophylaxis.  Recently presented to [facility] ED on 8/21 as code stroke with transient episode of dysarthria, likely TIA. No neurovascular intervention given no large vessel occlusion seen on imaging, no tPA given no disabling neurological deficit. C Keppra was increased to 750mg BID.   follow up 2 week CT scan 8/21 shows stable L subacute to chronic SDH, slightly decreased in size, measuring 10mm, previously 12mm. 6 week follow up CT scan done on 9/22/22 which shows continued decrease in size and density of left lateral convexity SDH measuring 7mm. Keppra tapered down to 500mg BID.  He follows with Dr. Cunha and brain MRI performed on 11/28/2022 showed moderate enlargement of ventricular system.. He underwent an LP in [place] and as per wife and family member, patient did not improve in terms of memory/cognition, urinary inct and gait instability. He was assessed by Dr. Cunha after LP and felt his gait improved.  Wife and family member state the triad of symptoms started approximately a year ago, but worsened in August 2022. He is significantly worse today than last month.  He is able to converse and appears oriented. He attempted to walk and was able to take a few steps cautiously and very unsteady during turning.

## 2023-02-06 NOTE — H&P ADULT - ASSESSMENT
This is a 76yo male with chronic imbalance and gait issues, presents for LD placement. Physical therapy to be present for evaluations while in hospital  if clear improvement after 3 day lumbar drain trial, would then proceed with  shunt placement.

## 2023-02-06 NOTE — OCCUPATIONAL THERAPY INITIAL EVALUATION ADULT - COGNITIVE, VISUAL PERCEPTUAL, OT EVAL
pt will follow 100% single-step commands to complete ADL/task of choice in 4 weeks / pt will increase score on MOCA by 5 points in 4 weeks

## 2023-02-07 LAB
A1C WITH ESTIMATED AVERAGE GLUCOSE RESULT: 7.9 % — HIGH (ref 4–5.6)
ESTIMATED AVERAGE GLUCOSE: 180 MG/DL — HIGH (ref 68–114)
GLUCOSE BLDC GLUCOMTR-MCNC: 157 MG/DL — HIGH (ref 70–99)
GLUCOSE BLDC GLUCOMTR-MCNC: 180 MG/DL — HIGH (ref 70–99)
GLUCOSE BLDC GLUCOMTR-MCNC: 183 MG/DL — HIGH (ref 70–99)
GLUCOSE BLDC GLUCOMTR-MCNC: 210 MG/DL — HIGH (ref 70–99)

## 2023-02-07 PROCEDURE — 99231 SBSQ HOSP IP/OBS SF/LOW 25: CPT

## 2023-02-07 RX ORDER — INSULIN GLARGINE 100 [IU]/ML
8 INJECTION, SOLUTION SUBCUTANEOUS AT BEDTIME
Refills: 0 | Status: DISCONTINUED | OUTPATIENT
Start: 2023-02-07 | End: 2023-02-10

## 2023-02-07 RX ADMIN — Medication 2: at 11:47

## 2023-02-07 RX ADMIN — ATORVASTATIN CALCIUM 20 MILLIGRAM(S): 80 TABLET, FILM COATED ORAL at 21:41

## 2023-02-07 RX ADMIN — ENOXAPARIN SODIUM 40 MILLIGRAM(S): 100 INJECTION SUBCUTANEOUS at 17:41

## 2023-02-07 RX ADMIN — LOSARTAN POTASSIUM 100 MILLIGRAM(S): 100 TABLET, FILM COATED ORAL at 05:28

## 2023-02-07 RX ADMIN — INSULIN GLARGINE 8 UNIT(S): 100 INJECTION, SOLUTION SUBCUTANEOUS at 21:41

## 2023-02-07 RX ADMIN — SENNA PLUS 2 TABLET(S): 8.6 TABLET ORAL at 21:41

## 2023-02-07 RX ADMIN — Medication 1: at 16:34

## 2023-02-07 RX ADMIN — Medication 1: at 08:07

## 2023-02-07 NOTE — PROGRESS NOTE ADULT - SUBJECTIVE AND OBJECTIVE BOX
SUBJECTIVE: patient seen and evaluated at bedside, resting comfortably. Patient denies headaches, nausea, vomiting or dizziness.   No overnight events     Vital Signs Last 24 Hrs  T(C): 36.5 (02-07-23 @ 08:22), Max: 36.9 (02-06-23 @ 19:59)  T(F): 97.7 (02-07-23 @ 08:22), Max: 98.5 (02-06-23 @ 19:59)  HR: 86 (02-07-23 @ 08:22) (65 - 93)  BP: 161/88 (02-07-23 @ 08:22) (112/81 - 161/91)  BP(mean): 107 (02-06-23 @ 14:00) (94 - 108)  RR: 18 (02-07-23 @ 08:22) (16 - 20)  SpO2: 99% (02-07-23 @ 08:22) (96% - 99%)    PHYSICAL EXAM:    Constitutional: No Acute Distress   Neurological: AOx3,   Drains: LD site intact, dry   Pulmonary: Clear to Auscultation, No rales, No rhonchi, No wheezes   Cardiovascular: S1, S2, Regular rate and rhythm   Gastrointestinal: Soft, Non-tender, Non-distended, +bowel sounds x 4  Extremities: No calf tenderness bilaterally, no cyanosis, clubbing or edema    LABS:             16.2   10.21 )-----------( 214      ( 06 Feb 2023 19:28 )             49.0    02-06    139  |  102  |  14  ----------------------------<  239<H>  4.6   |  26  |  0.98    Ca    8.6      06 Feb 2023 19:28  TPro  7.1  /  Alb  3.9  /  TBili  0.6  /  DBili  x   /  AST  19  /  ALT  28  /  AlkPhos  78  02-06 02-06 @ 07:01  -  02-07 @ 07:00  --------------------------------------------------------  IN: 950 mL / OUT: 980 mL / NET: -30 mL    02-07 @ 07:01  - 02-07 @ 11:44  --------------------------------------------------------  MEDICATIONS  (STANDING):  atorvastatin 20 milliGRAM(s) Oral at bedtime  dextrose 5%. 1000 milliLiter(s) (100 mL/Hr) IV Continuous <Continuous>  dextrose 5%. 1000 milliLiter(s) (50 mL/Hr) IV Continuous <Continuous>  dextrose 50% Injectable 25 Gram(s) IV Push once  dextrose 50% Injectable 12.5 Gram(s) IV Push once  dextrose 50% Injectable 25 Gram(s) IV Push once  enoxaparin Injectable 40 milliGRAM(s) SubCutaneous every 24 hours  glucagon  Injectable 1 milliGRAM(s) IntraMuscular once  insulin lispro (ADMELOG) corrective regimen sliding scale   SubCutaneous three times a day before meals  insulin lispro (ADMELOG) corrective regimen sliding scale   SubCutaneous at bedtime  losartan 100 milliGRAM(s) Oral daily  polyethylene glycol 3350 17 Gram(s) Oral daily  senna 2 Tablet(s) Oral at bedtime    MEDICATIONS  (PRN):  acetaminophen     Tablet .. 650 milliGRAM(s) Oral every 6 hours PRN Temp greater or equal to 38C (100.4F), Mild Pain (1 - 3)  dextrose Oral Gel 15 Gram(s) Oral once PRN Blood Glucose LESS THAN 70 milliGRAM(s)/deciliter  ondansetron Injectable 4 milliGRAM(s) IV Push every 6 hours PRN Nausea and/or Vomiting  oxyCODONE    IR 5 milliGRAM(s) Oral every 4 hours PRN Moderate Pain (4 - 6)  IN: 0 mL / OUT: 20 mL / NET: -20 mL    DIET: CCD    SUBJECTIVE: patient seen and evaluated at bedside, resting comfortably. Patient denies headaches, nausea, vomiting or dizziness.   No overnight events     Vital Signs Last 24 Hrs  T(C): 36.5 (02-07-23 @ 08:22), Max: 36.9 (02-06-23 @ 19:59)  T(F): 97.7 (02-07-23 @ 08:22), Max: 98.5 (02-06-23 @ 19:59)  HR: 86 (02-07-23 @ 08:22) (65 - 93)  BP: 161/88 (02-07-23 @ 08:22) (112/81 - 161/91)  BP(mean): 107 (02-06-23 @ 14:00) (94 - 108)  RR: 18 (02-07-23 @ 08:22) (16 - 20)  SpO2: 99% (02-07-23 @ 08:22) (96% - 99%)    PHYSICAL EXAM:    Constitutional: No Acute Distress   Neurological: AOx3 (person, place and time), EOMI, PERRL, face symmetric, MUHAMMAD w/ 5/5 strength, SILT   Drains: LD site intact, dry   Pulmonary: Clear to Auscultation, No rales, No rhonchi, No wheezes   Cardiovascular: S1, S2, Regular rate and rhythm   Gastrointestinal: Soft, Non-tender, Non-distended, +bowel sounds x 4  Extremities: No calf tenderness bilaterally, no cyanosis, clubbing or edema    LABS:             16.2   10.21 )-----------( 214      ( 06 Feb 2023 19:28 )             49.0    02-06    139  |  102  |  14  ----------------------------<  239<H>  4.6   |  26  |  0.98    Ca    8.6      06 Feb 2023 19:28  TPro  7.1  /  Alb  3.9  /  TBili  0.6  /  DBili  x   /  AST  19  /  ALT  28  /  AlkPhos  78  02-06 02-06 @ 07:01  -  02-07 @ 07:00  --------------------------------------------------------  IN: 950 mL / OUT: 980 mL / NET: -30 mL    02-07 @ 07:01 - 02-07 @ 11:44  --------------------------------------------------------  MEDICATIONS  (STANDING):  atorvastatin 20 milliGRAM(s) Oral at bedtime  dextrose 5%. 1000 milliLiter(s) (100 mL/Hr) IV Continuous <Continuous>  dextrose 5%. 1000 milliLiter(s) (50 mL/Hr) IV Continuous <Continuous>  dextrose 50% Injectable 25 Gram(s) IV Push once  dextrose 50% Injectable 12.5 Gram(s) IV Push once  dextrose 50% Injectable 25 Gram(s) IV Push once  enoxaparin Injectable 40 milliGRAM(s) SubCutaneous every 24 hours  glucagon  Injectable 1 milliGRAM(s) IntraMuscular once  insulin lispro (ADMELOG) corrective regimen sliding scale   SubCutaneous three times a day before meals  insulin lispro (ADMELOG) corrective regimen sliding scale   SubCutaneous at bedtime  losartan 100 milliGRAM(s) Oral daily  polyethylene glycol 3350 17 Gram(s) Oral daily  senna 2 Tablet(s) Oral at bedtime    MEDICATIONS  (PRN):  acetaminophen     Tablet .. 650 milliGRAM(s) Oral every 6 hours PRN Temp greater or equal to 38C (100.4F), Mild Pain (1 - 3)  dextrose Oral Gel 15 Gram(s) Oral once PRN Blood Glucose LESS THAN 70 milliGRAM(s)/deciliter  ondansetron Injectable 4 milliGRAM(s) IV Push every 6 hours PRN Nausea and/or Vomiting  oxyCODONE    IR 5 milliGRAM(s) Oral every 4 hours PRN Moderate Pain (4 - 6)  IN: 0 mL / OUT: 20 mL / NET: -20 mL    DIET: CCD    Yes

## 2023-02-07 NOTE — PROGRESS NOTE ADULT - ASSESSMENT
ASSESSMENT: Patient ADM on 2/6 for Lumbar drain trial to evaluate for NPH.     NEURO:  LD draining @ 20cc/q4H   PT assessment     PULM:  Incentive spirometry, mobilize as tolerated    CV:  -160 mmHg  h/o HTN- c/w home losartan     RENAL:  IVL   voiding appropriately     GI:  CCD     ENDO:   h/o T2DM   A1C 7.9   start lantus 8U tonight and ISS for coverage   monitor fingersticks     HEME/ONC:  VTE prophylaxis: [x] SCDs [x] chemoprophylaxis: SQL     ID:  afebrile    ASSESSMENT: Patient ADM on 2/6 for Lumbar drain trial to evaluate for NPH.     NEURO:  LD draining @ 20cc/q4H   PT assessment - f/u recs     PULM:  Incentive spirometry, mobilize as tolerated    CV:  -160 mmHg  h/o HTN- c/w home losartan     RENAL:  IVL   voiding appropriately     GI:  CCD     ENDO:   h/o T2DM   A1C 7.9   start lantus 8U tonight and ISS for coverage   monitor fingersticks     HEME/ONC:  VTE prophylaxis: [x] SCDs [x] chemoprophylaxis: SQL     ID:  afebrile     #[id number]  Plan d/w Dr. Isabel

## 2023-02-07 NOTE — PROVIDER CONTACT NOTE (OTHER) - SITUATION
While draining pt's lumbar drain, pt noted to become more confused than prior to initiating drainage and becoming more agitated. While draining pt's lumbar drain, pt noted to become more confused than prior to initiating drainage and becoming more agitated. Draining stopped at 12cc's pending MD instruction.

## 2023-02-08 ENCOUNTER — APPOINTMENT (OUTPATIENT)
Dept: NEUROSURGERY | Facility: CLINIC | Age: 78
End: 2023-02-08

## 2023-02-08 LAB
GLUCOSE BLDC GLUCOMTR-MCNC: 157 MG/DL — HIGH (ref 70–99)
GLUCOSE BLDC GLUCOMTR-MCNC: 200 MG/DL — HIGH (ref 70–99)
GLUCOSE BLDC GLUCOMTR-MCNC: 231 MG/DL — HIGH (ref 70–99)
GLUCOSE BLDC GLUCOMTR-MCNC: 243 MG/DL — HIGH (ref 70–99)
SARS-COV-2 RNA SPEC QL NAA+PROBE: SIGNIFICANT CHANGE UP

## 2023-02-08 PROCEDURE — 71045 X-RAY EXAM CHEST 1 VIEW: CPT | Mod: 26

## 2023-02-08 PROCEDURE — 99232 SBSQ HOSP IP/OBS MODERATE 35: CPT | Mod: FS

## 2023-02-08 RX ADMIN — Medication 2: at 16:32

## 2023-02-08 RX ADMIN — INSULIN GLARGINE 8 UNIT(S): 100 INJECTION, SOLUTION SUBCUTANEOUS at 21:36

## 2023-02-08 RX ADMIN — Medication 1: at 08:27

## 2023-02-08 RX ADMIN — SENNA PLUS 2 TABLET(S): 8.6 TABLET ORAL at 21:36

## 2023-02-08 RX ADMIN — Medication 2: at 12:31

## 2023-02-08 RX ADMIN — ATORVASTATIN CALCIUM 20 MILLIGRAM(S): 80 TABLET, FILM COATED ORAL at 21:36

## 2023-02-08 RX ADMIN — LOSARTAN POTASSIUM 100 MILLIGRAM(S): 100 TABLET, FILM COATED ORAL at 05:31

## 2023-02-08 RX ADMIN — ENOXAPARIN SODIUM 40 MILLIGRAM(S): 100 INJECTION SUBCUTANEOUS at 16:32

## 2023-02-08 NOTE — PROGRESS NOTE ADULT - ASSESSMENT
ASSESSMENT: Patient ADM on 2/6 for Lumbar drain trial to evaluate for NPH.     day 2 LD trial for NPH:    NEURO:  LD draining @ 20cc/q4H   PT assessment - f/u recs, thus far no imprivement  ambulation    PULM:  Incentive spirometry, mobilize as tolerated    CV:  -160 mmHg  h/o HTN- c/w home losartan     RENAL:  IVL   voiding appropriately     GI:  CCD     ENDO:   h/o T2DM   A1C 7.9   start lantus 8U tonight and ISS for coverage   monitor fingersticks     HEME/ONC:  VTE prophylaxis: [x] SCDs [x] chemoprophylaxis: SQL     ID:  afebrile     #[id number]  Plan d/w Dr. Isabel

## 2023-02-08 NOTE — PROGRESS NOTE ADULT - SUBJECTIVE AND OBJECTIVE BOX
SUBJECTIVE: patient seen and evaluated at bedside, resting comfortably. Patient denies headaches, nausea, vomiting or dizziness.   No overnight events     Vital Signs Last 24 Hrs  T(C): 36.5 (02-07-23 @ 08:22), Max: 36.9 (02-06-23 @ 19:59)  T(F): 97.7 (02-07-23 @ 08:22), Max: 98.5 (02-06-23 @ 19:59)  HR: 86 (02-07-23 @ 08:22) (65 - 93)  BP: 161/88 (02-07-23 @ 08:22) (112/81 - 161/91)  BP(mean): 107 (02-06-23 @ 14:00) (94 - 108)  RR: 18 (02-07-23 @ 08:22) (16 - 20)  SpO2: 99% (02-07-23 @ 08:22) (96% - 99%)    PHYSICAL EXAM:    Constitutional: No Acute Distress   Neurological: AOx3 (person, place and time), EOMI, PERRL, face symmetric, MUHAMMAD w/ 5/5 strength, SILT   Drains: LD site intact, dry   Pulmonary: Clear to Auscultation, No rales, No rhonchi, No wheezes   Cardiovascular: S1, S2, Regular rate and rhythm   Gastrointestinal: Soft, Non-tender, Non-distended, +bowel sounds x 4  Extremities: No calf tenderness bilaterally, no cyanosis, clubbing or edema    LABS:             16.2   10.21 )-----------( 214      ( 06 Feb 2023 19:28 )             49.0    02-06    139  |  102  |  14  ----------------------------<  239<H>  4.6   |  26  |  0.98    Ca    8.6      06 Feb 2023 19:28  TPro  7.1  /  Alb  3.9  /  TBili  0.6  /  DBili  x   /  AST  19  /  ALT  28  /  AlkPhos  78  02-06 02-06 @ 07:01  -  02-07 @ 07:00  --------------------------------------------------------  IN: 950 mL / OUT: 980 mL / NET: -30 mL    02-07 @ 07:01 - 02-07 @ 11:44  --------------------------------------------------------  MEDICATIONS  (STANDING):  atorvastatin 20 milliGRAM(s) Oral at bedtime  dextrose 5%. 1000 milliLiter(s) (100 mL/Hr) IV Continuous <Continuous>  dextrose 5%. 1000 milliLiter(s) (50 mL/Hr) IV Continuous <Continuous>  dextrose 50% Injectable 25 Gram(s) IV Push once  dextrose 50% Injectable 12.5 Gram(s) IV Push once  dextrose 50% Injectable 25 Gram(s) IV Push once  enoxaparin Injectable 40 milliGRAM(s) SubCutaneous every 24 hours  glucagon  Injectable 1 milliGRAM(s) IntraMuscular once  insulin lispro (ADMELOG) corrective regimen sliding scale   SubCutaneous three times a day before meals  insulin lispro (ADMELOG) corrective regimen sliding scale   SubCutaneous at bedtime  losartan 100 milliGRAM(s) Oral daily  polyethylene glycol 3350 17 Gram(s) Oral daily  senna 2 Tablet(s) Oral at bedtime    MEDICATIONS  (PRN):  acetaminophen     Tablet .. 650 milliGRAM(s) Oral every 6 hours PRN Temp greater or equal to 38C (100.4F), Mild Pain (1 - 3)  dextrose Oral Gel 15 Gram(s) Oral once PRN Blood Glucose LESS THAN 70 milliGRAM(s)/deciliter  ondansetron Injectable 4 milliGRAM(s) IV Push every 6 hours PRN Nausea and/or Vomiting  oxyCODONE    IR 5 milliGRAM(s) Oral every 4 hours PRN Moderate Pain (4 - 6)  IN: 0 mL / OUT: 20 mL / NET: -20 mL    DIET: CCD

## 2023-02-09 LAB
GLUCOSE BLDC GLUCOMTR-MCNC: 170 MG/DL — HIGH (ref 70–99)
GLUCOSE BLDC GLUCOMTR-MCNC: 200 MG/DL — HIGH (ref 70–99)
GLUCOSE BLDC GLUCOMTR-MCNC: 233 MG/DL — HIGH (ref 70–99)
GLUCOSE BLDC GLUCOMTR-MCNC: 266 MG/DL — HIGH (ref 70–99)

## 2023-02-09 PROCEDURE — 99232 SBSQ HOSP IP/OBS MODERATE 35: CPT | Mod: FS

## 2023-02-09 RX ADMIN — ATORVASTATIN CALCIUM 20 MILLIGRAM(S): 80 TABLET, FILM COATED ORAL at 21:18

## 2023-02-09 RX ADMIN — Medication 100 MILLIGRAM(S): at 00:41

## 2023-02-09 RX ADMIN — Medication 2: at 12:05

## 2023-02-09 RX ADMIN — Medication 100 MILLIGRAM(S): at 16:22

## 2023-02-09 RX ADMIN — Medication 1: at 21:18

## 2023-02-09 RX ADMIN — INSULIN GLARGINE 8 UNIT(S): 100 INJECTION, SOLUTION SUBCUTANEOUS at 21:18

## 2023-02-09 RX ADMIN — LOSARTAN POTASSIUM 100 MILLIGRAM(S): 100 TABLET, FILM COATED ORAL at 06:15

## 2023-02-09 RX ADMIN — Medication 1: at 16:30

## 2023-02-09 RX ADMIN — Medication 1: at 08:04

## 2023-02-10 LAB
GLUCOSE BLDC GLUCOMTR-MCNC: 210 MG/DL — HIGH (ref 70–99)
GLUCOSE BLDC GLUCOMTR-MCNC: 232 MG/DL — HIGH (ref 70–99)
GLUCOSE BLDC GLUCOMTR-MCNC: 234 MG/DL — HIGH (ref 70–99)
GLUCOSE BLDC GLUCOMTR-MCNC: 299 MG/DL — HIGH (ref 70–99)

## 2023-02-10 PROCEDURE — 99232 SBSQ HOSP IP/OBS MODERATE 35: CPT | Mod: FS

## 2023-02-10 RX ORDER — INSULIN GLARGINE 100 [IU]/ML
12 INJECTION, SOLUTION SUBCUTANEOUS AT BEDTIME
Refills: 0 | Status: DISCONTINUED | OUTPATIENT
Start: 2023-02-10 | End: 2023-02-12

## 2023-02-10 RX ORDER — INSULIN LISPRO 100/ML
VIAL (ML) SUBCUTANEOUS
Refills: 0 | Status: DISCONTINUED | OUTPATIENT
Start: 2023-02-10 | End: 2023-02-12

## 2023-02-10 RX ORDER — ENOXAPARIN SODIUM 100 MG/ML
40 INJECTION SUBCUTANEOUS
Refills: 0 | Status: DISCONTINUED | OUTPATIENT
Start: 2023-02-11 | End: 2023-02-12

## 2023-02-10 RX ADMIN — Medication 4: at 16:48

## 2023-02-10 RX ADMIN — Medication 100 MILLIGRAM(S): at 00:12

## 2023-02-10 RX ADMIN — Medication 2: at 07:39

## 2023-02-10 RX ADMIN — Medication 3: at 12:09

## 2023-02-10 RX ADMIN — LOSARTAN POTASSIUM 100 MILLIGRAM(S): 100 TABLET, FILM COATED ORAL at 05:06

## 2023-02-10 RX ADMIN — ATORVASTATIN CALCIUM 20 MILLIGRAM(S): 80 TABLET, FILM COATED ORAL at 22:06

## 2023-02-10 RX ADMIN — INSULIN GLARGINE 12 UNIT(S): 100 INJECTION, SOLUTION SUBCUTANEOUS at 22:06

## 2023-02-11 DIAGNOSIS — I10 ESSENTIAL (PRIMARY) HYPERTENSION: ICD-10-CM

## 2023-02-11 DIAGNOSIS — E11.65 TYPE 2 DIABETES MELLITUS WITH HYPERGLYCEMIA: ICD-10-CM

## 2023-02-11 DIAGNOSIS — Z29.9 ENCOUNTER FOR PROPHYLACTIC MEASURES, UNSPECIFIED: ICD-10-CM

## 2023-02-11 DIAGNOSIS — E78.5 HYPERLIPIDEMIA, UNSPECIFIED: ICD-10-CM

## 2023-02-11 DIAGNOSIS — G91.2 (IDIOPATHIC) NORMAL PRESSURE HYDROCEPHALUS: ICD-10-CM

## 2023-02-11 LAB
GLUCOSE BLDC GLUCOMTR-MCNC: 199 MG/DL — HIGH (ref 70–99)
GLUCOSE BLDC GLUCOMTR-MCNC: 255 MG/DL — HIGH (ref 70–99)
GLUCOSE BLDC GLUCOMTR-MCNC: 270 MG/DL — HIGH (ref 70–99)
GLUCOSE BLDC GLUCOMTR-MCNC: 279 MG/DL — HIGH (ref 70–99)

## 2023-02-11 PROCEDURE — 99232 SBSQ HOSP IP/OBS MODERATE 35: CPT | Mod: FS

## 2023-02-11 PROCEDURE — 99223 1ST HOSP IP/OBS HIGH 75: CPT

## 2023-02-11 PROCEDURE — 93010 ELECTROCARDIOGRAM REPORT: CPT

## 2023-02-11 RX ORDER — INSULIN LISPRO 100/ML
VIAL (ML) SUBCUTANEOUS AT BEDTIME
Refills: 0 | Status: DISCONTINUED | OUTPATIENT
Start: 2023-02-11 | End: 2023-02-12

## 2023-02-11 RX ORDER — INSULIN LISPRO 100/ML
3 VIAL (ML) SUBCUTANEOUS
Refills: 0 | Status: DISCONTINUED | OUTPATIENT
Start: 2023-02-11 | End: 2023-02-12

## 2023-02-11 RX ORDER — INSULIN LISPRO 100/ML
5 VIAL (ML) SUBCUTANEOUS
Qty: 0 | Refills: 0 | DISCHARGE

## 2023-02-11 RX ADMIN — ENOXAPARIN SODIUM 40 MILLIGRAM(S): 100 INJECTION SUBCUTANEOUS at 18:08

## 2023-02-11 RX ADMIN — Medication 6: at 11:47

## 2023-02-11 RX ADMIN — ATORVASTATIN CALCIUM 20 MILLIGRAM(S): 80 TABLET, FILM COATED ORAL at 22:53

## 2023-02-11 RX ADMIN — Medication 6: at 16:49

## 2023-02-11 RX ADMIN — SENNA PLUS 2 TABLET(S): 8.6 TABLET ORAL at 22:53

## 2023-02-11 RX ADMIN — Medication 3 UNIT(S): at 16:50

## 2023-02-11 RX ADMIN — INSULIN GLARGINE 12 UNIT(S): 100 INJECTION, SOLUTION SUBCUTANEOUS at 22:53

## 2023-02-11 RX ADMIN — Medication 2: at 08:26

## 2023-02-11 RX ADMIN — LOSARTAN POTASSIUM 100 MILLIGRAM(S): 100 TABLET, FILM COATED ORAL at 06:04

## 2023-02-11 NOTE — CONSULT NOTE ADULT - PROBLEM SELECTOR RECOMMENDATION 2
Pt and pt's wife states he takes lantus 26 and humalog 20 TID  -c/w lantus 12, added admelog 3 TID for now. c/w sliding scale   -repeat A1c for Am  -nutrition consult  -holding home farxiga and ozempic

## 2023-02-11 NOTE — PROVIDER CONTACT NOTE (OTHER) - SITUATION
On reassessment, pt noted to have saturated gauze previously on lumbar drain incision and had fallen off. Clear/slightly pink tinged fluid also noted on purple bridger right below site.

## 2023-02-11 NOTE — PROGRESS NOTE ADULT - SUBJECTIVE AND OBJECTIVE BOX
SUBJECTIVE:   Patient was seen and evaluated at bedside. Patient is resting in bed and is in no new acute distress.   OVERNIGHT EVENTS:   none   Vital Signs Last 24 Hrs  T(C): 37 (11 Feb 2023 09:37), Max: 37 (11 Feb 2023 09:37)  T(F): 98.6 (11 Feb 2023 09:37), Max: 98.6 (11 Feb 2023 09:37)  HR: 80 (11 Feb 2023 09:37) (80 - 97)  BP: 131/88 (11 Feb 2023 09:37) (121/80 - 155/82)  BP(mean): --  RR: 18 (11 Feb 2023 09:37) (18 - 18)  SpO2: 99% (11 Feb 2023 09:37) (95% - 99%)    Parameters below as of 11 Feb 2023 09:37  Patient On (Oxygen Delivery Method): room air        PHYSICAL EXAM:    General: No Acute Distress     Neurological: Awake, alert oriented to person, place and time, Following Commands, PERRL, EOMI, Face Symmetrical, Speech Fluent, Moving all extremities, Muscle Strength normal in all four extremities, No Drift, Sensation to Light Touch Intact    Pulmonary: Clear to Auscultation, No Rales, No Rhonchi, No Wheezes     Cardiovascular: S1, S2, Regular Rate and Rhythm     Gastrointestinal: Soft, Nontender, Nondistended     Incision:   ld site clear   LABS:             02-10 @ 07:01  -  02-11 @ 07:00  --------------------------------------------------------  IN: 490 mL / OUT: 160 mL / NET: 330 mL      DRAINS:     MEDICATIONS:  Antibiotics:    Neuro:  acetaminophen     Tablet .. 650 milliGRAM(s) Oral every 6 hours PRN Temp greater or equal to 38C (100.4F), Mild Pain (1 - 3)  ondansetron Injectable 4 milliGRAM(s) IV Push every 6 hours PRN Nausea and/or Vomiting  oxyCODONE    IR 5 milliGRAM(s) Oral every 4 hours PRN Moderate Pain (4 - 6)    Cardiac:  losartan 100 milliGRAM(s) Oral daily    Pulm:  benzonatate 100 milliGRAM(s) Oral three times a day PRN Cough  guaiFENesin Oral Liquid (Sugar-Free) 100 milliGRAM(s) Oral every 6 hours PRN Cough    GI/:  polyethylene glycol 3350 17 Gram(s) Oral daily  senna 2 Tablet(s) Oral at bedtime    Other:   atorvastatin 20 milliGRAM(s) Oral at bedtime  dextrose 5%. 1000 milliLiter(s) IV Continuous <Continuous>  dextrose 5%. 1000 milliLiter(s) IV Continuous <Continuous>  dextrose 50% Injectable 25 Gram(s) IV Push once  dextrose 50% Injectable 12.5 Gram(s) IV Push once  dextrose 50% Injectable 25 Gram(s) IV Push once  dextrose Oral Gel 15 Gram(s) Oral once PRN Blood Glucose LESS THAN 70 milliGRAM(s)/deciliter  enoxaparin Injectable 40 milliGRAM(s) SubCutaneous <User Schedule>  glucagon  Injectable 1 milliGRAM(s) IntraMuscular once  insulin glargine Injectable (LANTUS) 12 Unit(s) SubCutaneous at bedtime  insulin lispro (ADMELOG) corrective regimen sliding scale   SubCutaneous three times a day before meals    DIET: [] Regular [] CCD [] Renal [] Puree [] Dysphagia [] Tube Feeds:   ccd diet   IMAGING:

## 2023-02-11 NOTE — CONSULT NOTE ADULT - PROBLEM SELECTOR RECOMMENDATION 9
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 mod risk for surgery. ekg reviewed: nonischemic, no st changes. medically optimized for planned procedure without further intervention at this time

## 2023-02-11 NOTE — CONSULT NOTE ADULT - NSCONSULTADDITIONALINFOA_GEN_ALL_CORE
d/w rosalba Randhawa MD  [facility] Division of Hospital Medicine  Available via MS Teams  Pager: [phone]

## 2023-02-11 NOTE — CONSULT NOTE ADULT - PROBLEM SELECTOR RECOMMENDATION 5
diet: Dash-CC  dvt ppx: lovenox 40 qD per nsgy  med Rec: Lantus 26 qhs, humalog 20 TID, irbesartan 300 qD, farxiga 10, atorva 20, ozempic 0.25 weekly. Of note, does not take keprra, donepazil, carbidopa-levodopa. will update in EMR.

## 2023-02-11 NOTE — PROGRESS NOTE ADULT - ASSESSMENT
HPI:  77 year old male with PMH of HTN, HLD, DM 2, diabetic neuropathy, TIAs (was on Aggrenox), who presented to [facility] ED for right sided headaches for 1 month. Patient was followed by ophthalmology Dr. Santamaria for papilledema and was referred to come to [facility] ED for further evaluation. Ophthalmology evaluated for giant cell arteritis, As per family, for the past year he has had several falls and unsteady gait along with memory issues and inct. 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 He was transferred to [facility] on 8/5 for possible neurosurgical intervention for SDH. He was enrolled into the EMBOLISE trial, randomized to intervention group, and On 8/5/22, he underwent Left MMA embolization. Discharged on Keppra 500mg BID for seizure prophylaxis.  Recently presented to [facility] ED on 8/21 as code stroke with transient episode of dysarthria, likely TIA. No neurovascular intervention given no large vessel occlusion seen on imaging, no tPA given no disabling neurological deficit. Keppra was increased to 750mg BID.   follow up 2 week CT scan 8/21 shows stable L subacute to chronic SDH, slightly decreased in size, measuring 10mm, previously 12mm. 6 week follow up CT scan done on 9/22/22 which shows continued decrease in size and density of left lateral convexity SDH measuring 7mm. Keppra tapered down to 500mg BID.  He follows with Dr. Cunha and brain MRI performed on 11/28/2022 showed moderate enlargement of ventricular system.. He underwent an LP in [place] and as per wife and family member, patient did not improve in terms of memory/cognition, urinary inct and gait instability. He was assessed by Dr. Cunha after LP and felt his gait improved.  Wife and family member state the triad of symptoms started approximately a year ago, but worsened in August 2022. He is significantly worse today than last month.  He is able to converse and appears oriented. He attempted to walk and was able to take a few steps cautiously and very unsteady during turning.  (06 Feb 2023 10:31)    PROCEDURE:  s/p lumbar drain placement on 2/6   POD#  Lumbar drain dcd on 2/10     PLAN:    1 Improvement noted after PT eval during csf drainage   2 per patient today walking is worse.   3 plan for  shunt on Monday   4 room air   5 blood pressure stable   6 dm - on lantus and sliding scale -may need pre meal coverage -hospitalist called   7 ccd diet   8 dvt ppx sql and scds   9 afebrile   10 voiding   11 last bm 2/10   12 will compare PT eval from yesterday and today and will follow up with Dr. Isabel     -will discuss with Dr. Isabel   -spectra [phone]

## 2023-02-12 ENCOUNTER — TRANSCRIPTION ENCOUNTER (OUTPATIENT)
Age: 78
End: 2023-02-12

## 2023-02-12 LAB
ANION GAP SERPL CALC-SCNC: 10 MMOL/L — SIGNIFICANT CHANGE UP (ref 5–17)
APTT BLD: 34.5 SEC — SIGNIFICANT CHANGE UP (ref 27.5–35.5)
BLD GP AB SCN SERPL QL: NEGATIVE — SIGNIFICANT CHANGE UP
BUN SERPL-MCNC: 21 MG/DL — SIGNIFICANT CHANGE UP (ref 7–23)
CALCIUM SERPL-MCNC: 8.8 MG/DL — SIGNIFICANT CHANGE UP (ref 8.4–10.5)
CHLORIDE SERPL-SCNC: 104 MMOL/L — SIGNIFICANT CHANGE UP (ref 96–108)
CO2 SERPL-SCNC: 25 MMOL/L — SIGNIFICANT CHANGE UP (ref 22–31)
CREAT SERPL-MCNC: 0.83 MG/DL — SIGNIFICANT CHANGE UP (ref 0.5–1.3)
EGFR: 90 ML/MIN/1.73M2 — SIGNIFICANT CHANGE UP
GLUCOSE BLDC GLUCOMTR-MCNC: 164 MG/DL — HIGH (ref 70–99)
GLUCOSE BLDC GLUCOMTR-MCNC: 185 MG/DL — HIGH (ref 70–99)
GLUCOSE BLDC GLUCOMTR-MCNC: 223 MG/DL — HIGH (ref 70–99)
GLUCOSE BLDC GLUCOMTR-MCNC: 268 MG/DL — HIGH (ref 70–99)
GLUCOSE SERPL-MCNC: 226 MG/DL — HIGH (ref 70–99)
HCT VFR BLD CALC: 50.2 % — HIGH (ref 39–50)
HGB BLD-MCNC: 16.7 G/DL — SIGNIFICANT CHANGE UP (ref 13–17)
INR BLD: 1.03 RATIO — SIGNIFICANT CHANGE UP (ref 0.88–1.16)
MCHC RBC-ENTMCNC: 28.9 PG — SIGNIFICANT CHANGE UP (ref 27–34)
MCHC RBC-ENTMCNC: 33.3 GM/DL — SIGNIFICANT CHANGE UP (ref 32–36)
MCV RBC AUTO: 86.9 FL — SIGNIFICANT CHANGE UP (ref 80–100)
MRSA PCR RESULT.: SIGNIFICANT CHANGE UP
NRBC # BLD: 0 /100 WBCS — SIGNIFICANT CHANGE UP (ref 0–0)
PLATELET # BLD AUTO: 220 K/UL — SIGNIFICANT CHANGE UP (ref 150–400)
POTASSIUM SERPL-MCNC: 4 MMOL/L — SIGNIFICANT CHANGE UP (ref 3.5–5.3)
POTASSIUM SERPL-SCNC: 4 MMOL/L — SIGNIFICANT CHANGE UP (ref 3.5–5.3)
PROTHROM AB SERPL-ACNC: 11.9 SEC — SIGNIFICANT CHANGE UP (ref 10.5–13.4)
RBC # BLD: 5.78 M/UL — SIGNIFICANT CHANGE UP (ref 4.2–5.8)
RBC # FLD: 12.9 % — SIGNIFICANT CHANGE UP (ref 10.3–14.5)
RH IG SCN BLD-IMP: POSITIVE — SIGNIFICANT CHANGE UP
S AUREUS DNA NOSE QL NAA+PROBE: SIGNIFICANT CHANGE UP
SARS-COV-2 RNA SPEC QL NAA+PROBE: SIGNIFICANT CHANGE UP
SODIUM SERPL-SCNC: 139 MMOL/L — SIGNIFICANT CHANGE UP (ref 135–145)
WBC # BLD: 8.42 K/UL — SIGNIFICANT CHANGE UP (ref 3.8–10.5)
WBC # FLD AUTO: 8.42 K/UL — SIGNIFICANT CHANGE UP (ref 3.8–10.5)

## 2023-02-12 PROCEDURE — 99232 SBSQ HOSP IP/OBS MODERATE 35: CPT | Mod: FS,57

## 2023-02-12 PROCEDURE — 70450 CT HEAD/BRAIN W/O DYE: CPT | Mod: 26

## 2023-02-12 PROCEDURE — 99232 SBSQ HOSP IP/OBS MODERATE 35: CPT

## 2023-02-12 RX ORDER — INSULIN GLARGINE 100 [IU]/ML
16 INJECTION, SOLUTION SUBCUTANEOUS AT BEDTIME
Refills: 0 | Status: DISCONTINUED | OUTPATIENT
Start: 2023-02-12 | End: 2023-02-12

## 2023-02-12 RX ORDER — SODIUM CHLORIDE 9 MG/ML
1000 INJECTION INTRAMUSCULAR; INTRAVENOUS; SUBCUTANEOUS
Refills: 0 | Status: DISCONTINUED | OUTPATIENT
Start: 2023-02-12 | End: 2023-02-13

## 2023-02-12 RX ORDER — INSULIN LISPRO 100/ML
6 VIAL (ML) SUBCUTANEOUS
Refills: 0 | Status: DISCONTINUED | OUTPATIENT
Start: 2023-02-12 | End: 2023-02-13

## 2023-02-12 RX ORDER — CHLORHEXIDINE GLUCONATE 213 G/1000ML
1 SOLUTION TOPICAL ONCE
Refills: 0 | Status: DISCONTINUED | OUTPATIENT
Start: 2023-02-13 | End: 2023-02-13

## 2023-02-12 RX ORDER — INSULIN LISPRO 100/ML
VIAL (ML) SUBCUTANEOUS EVERY 6 HOURS
Refills: 0 | Status: DISCONTINUED | OUTPATIENT
Start: 2023-02-12 | End: 2023-02-13

## 2023-02-12 RX ORDER — INSULIN GLARGINE 100 [IU]/ML
10 INJECTION, SOLUTION SUBCUTANEOUS AT BEDTIME
Refills: 0 | Status: DISCONTINUED | OUTPATIENT
Start: 2023-02-12 | End: 2023-02-13

## 2023-02-12 RX ADMIN — Medication 3 UNIT(S): at 08:56

## 2023-02-12 RX ADMIN — Medication 6 UNIT(S): at 17:09

## 2023-02-12 RX ADMIN — ATORVASTATIN CALCIUM 20 MILLIGRAM(S): 80 TABLET, FILM COATED ORAL at 20:17

## 2023-02-12 RX ADMIN — Medication 2: at 17:10

## 2023-02-12 RX ADMIN — Medication 6: at 08:55

## 2023-02-12 RX ADMIN — SENNA PLUS 2 TABLET(S): 8.6 TABLET ORAL at 20:17

## 2023-02-12 RX ADMIN — Medication 6 UNIT(S): at 11:56

## 2023-02-12 RX ADMIN — Medication 4: at 11:56

## 2023-02-12 RX ADMIN — LOSARTAN POTASSIUM 100 MILLIGRAM(S): 100 TABLET, FILM COATED ORAL at 06:00

## 2023-02-12 NOTE — CONSULT NOTE ADULT - SUBJECTIVE AND OBJECTIVE BOX
GENERAL SURGERY CONSULT NOTE    HPI:  77yo M with hx of DM-II with Neuropathy, TIAs (previously on Aggrenox), HTN, and HLD, SDH s/p MMA embolization ( aug 2022 ), with worsening gait imbalance and urinary incontinence, suspicion for NPH s/p LP in 12/2022 but did not have much improvement and now admitted to [facility] for NPH. Neurosurgical team planning  shunt tomorrow, general surgery consulted for assistance with laparoscopic portion of procedure. Patient has no past abdominal surgical history.     PMHx: Diabetes    TIA (transient ischemic attack)    HTN (hypertension)    Hyperlipidemia      PSHx: No past abdominal surgical history      Medications (inpatient): atorvastatin 20 milliGRAM(s) Oral at bedtime  dextrose 5%. 1000 milliLiter(s) IV Continuous <Continuous>  dextrose 5%. 1000 milliLiter(s) IV Continuous <Continuous>  dextrose 50% Injectable 25 Gram(s) IV Push once  dextrose 50% Injectable 12.5 Gram(s) IV Push once  dextrose 50% Injectable 25 Gram(s) IV Push once  glucagon  Injectable 1 milliGRAM(s) IntraMuscular once  insulin glargine Injectable (LANTUS) 10 Unit(s) SubCutaneous at bedtime  insulin lispro (ADMELOG) corrective regimen sliding scale   SubCutaneous every 6 hours  insulin lispro Injectable (ADMELOG) 6 Unit(s) SubCutaneous three times a day before meals  losartan 100 milliGRAM(s) Oral daily  polyethylene glycol 3350 17 Gram(s) Oral daily  senna 2 Tablet(s) Oral at bedtime  sodium chloride 0.9%. 1000 milliLiter(s) IV Continuous <Continuous>    Medications (PRN):acetaminophen     Tablet .. 650 milliGRAM(s) Oral every 6 hours PRN  benzonatate 100 milliGRAM(s) Oral three times a day PRN  dextrose Oral Gel 15 Gram(s) Oral once PRN  guaiFENesin Oral Liquid (Sugar-Free) 100 milliGRAM(s) Oral every 6 hours PRN  ondansetron Injectable 4 milliGRAM(s) IV Push every 6 hours PRN  oxyCODONE    IR 5 milliGRAM(s) Oral every 4 hours PRN    Allergies: No Known Allergies  Social Hx: Lives with wife nonsmoker no alcohol use  Family Hx: Family history of renal cancer  mother    Family history of prostate cancer in father  father        Physical Exam  T(C): 36.8  HR: 84 (74 - 91)  BP: 133/71 (130/69 - 155/91)  RR: 18 (18 - 18)  SpO2: 97% (95% - 98%)  Tmax: T(C): , Max: 36.9 (02-12-23 @ 01:00)    02-11-23  -  02-12-23  --------------------------------------------------------  IN:    Oral Fluid: 1140 mL  Total IN: 1140 mL    OUT:    Voided (mL): 1350 mL  Total OUT: 1350 mL    Total NET: -210 mL      02-12-23  -  02-12-23  --------------------------------------------------------  IN:    Oral Fluid: 670 mL  Total IN: 670 mL    OUT:    Voided (mL): 300 mL  Total OUT: 300 mL    Total NET: 370 mL    General: well developed, well nourished, NAD  Neuro: alert and oriented, no focal deficits, moves all extremities spontaneously  HEENT: NCAT, EOMI, anicteric, mucosa moist  Respiratory: respirations unlabored on RA  CVS: regular rate and rhythm  Abdomen: soft, nontender, nondistended, protuberant abdomen, small reducible umbilical hernia   Extremities: no edema, sensation and movement grossly intact  Skin: warm, dry, appropriate color    Labs:                        16.7   8.42  )-----------( 220      ( 12 Feb 2023 06:39 )             50.2     PT/INR - ( 12 Feb 2023 06:39 )   PT: 11.9 sec;   INR: 1.03 ratio         PTT - ( 12 Feb 2023 06:39 )  PTT:34.5 sec  02-12    139  |  104  |  21  ----------------------------<  226<H>  4.0   |  25  |  0.83    Ca    8.8      12 Feb 2023 06:39          Imaging and other studies:  
SOTO WHITNEY  77y  Male    HPI:  77yo M with hx of DM-II with Neuropathy, TIAs (previously on Aggrenox), HTN, and HLD, SDH s/p MMA embolization ( aug 2022 ), with worsening gait imbalance and urinary incontinence, suspicion for NPH s/p LP in 12/2022 but did not have much improvement and now admitted to [facility] for further evaluation. patient received a drain on 2/6 with questionable improvement in his gait and other symptoms and thus drain was removed on 2/10. Patient is now tentatively planned for  shunt placement on 2/13/23. medicine consulted for pre-op optimization and hyperglycemia. patient states that today he had difficulty walking, but that on some days his gait is improved. He denies any LE pain when walking, denies any LE swelling, denies any shortness of breath or chest pain while walking. Denies any nausea, vomiting, headaches, visual changes. Denies any hx of HF, CAD, stroke, but does take insulin at home.     PAST MEDICAL HISTORY  -Diabetes  -TIA (transient ischemic attack)  -HTN (hypertension)  -Hyperlipidemia    PAST SURGICAL HISTORY  -MMA embolization on 08/2022    Family History: non-contributory    Social history: denies etoh, denies cigarette, denies marijuana, cocaine, crack    REVIEW OF SYSTEMS:  CONSTITUTIONAL: No fever, weight loss, or fatigue  EYES: No eye pain, visual disturbances, or discharge  ENMT:  No difficulty hearing, tinnitus, vertigo; No sinus or throat pain  NECK: No pain or stiffness  RESPIRATORY: No cough, wheezing, chills or hemoptysis; No shortness of breath  CARDIOVASCULAR: No chest pain, palpitations, dizziness, or leg swelling  GASTROINTESTINAL: No abdominal or epigastric pain. No nausea, vomiting, or hematemesis; No diarrhea or constipation. No melena or hematochezia.  GENITOURINARY: No dysuria, frequency, hematuria, but has occasional urinary incontinence   NEUROLOGICAL: No headaches, memory loss, loss of strength, numbness, or tremors. + gait instability, states he feels "wobbly"  SKIN: No itching, burning, rashes, or lesions   LYMPH NODES: No enlarged glands  ENDOCRINE: No heat or cold intolerance; No hair loss  MUSCULOSKELETAL: No joint pain or swelling; No muscle, back, or extremity pain  PSYCHIATRIC: No depression, anxiety, mood swings, or difficulty sleeping  HEME/LYMPH: No easy bruising, or bleeding gums    T(C): 37 (02-11-23 @ 09:37), Max: 37 (02-11-23 @ 09:37)  HR: 80 (02-11-23 @ 09:37) (80 - 93)  BP: 131/88 (02-11-23 @ 09:37) (127/75 - 155/82)  RR: 18 (02-11-23 @ 09:37) (18 - 18)  SpO2: 99% (02-11-23 @ 09:37) (95% - 99%)  Wt(kg): --Vital Signs Last 24 Hrs  T(C): 37 (11 Feb 2023 09:37), Max: 37 (11 Feb 2023 09:37)  T(F): 98.6 (11 Feb 2023 09:37), Max: 98.6 (11 Feb 2023 09:37)  HR: 80 (11 Feb 2023 09:37) (80 - 93)  BP: 131/88 (11 Feb 2023 09:37) (127/75 - 155/82)  BP(mean): --  RR: 18 (11 Feb 2023 09:37) (18 - 18)  SpO2: 99% (11 Feb 2023 09:37) (95% - 99%)    Parameters below as of 11 Feb 2023 09:37  Patient On (Oxygen Delivery Method): room air    PHYSICAL EXAM:  GENERAL: NAD, well-groomed, well-developed  HEAD:  Atraumatic, Normocephalic  EYES: EOMI, PERRLA, conjunctiva and sclera clear  ENMT: No tonsillar erythema, exudates, or enlargement; Moist mucous membranes, No lesions  NECK: Supple, No JVD,  NERVOUS SYSTEM:  Alert & Oriented X3, Good concentration; Motor Strength 5/5 B/L upper and lower extremities; DTRs 2+ intact and symmetric  CHEST/LUNG: Clear to percussion bilaterally; No rales, rhonchi, wheezing, or rubs  HEART: Regular rate and rhythm; No murmurs, rubs, or gallops  ABDOMEN: Soft, Nontender, Nondistended; Bowel sounds present  EXTREMITIES:  2+ Peripheral Pulses, No clubbing, cyanosis, or edema  LYMPH: No lymphadenopathy noted  SKIN: No rashes or lesions    Consultant(s) Notes Reviewed:  [x ] YES  [ ] NO  Care Discussed with Consultants/Other Providers [ x] YES  [ ] NO    LABS:  The Labs were reviewed by me   The Radiology was reviewed by me      LABS: Personally reviewed labs: Creatinine 0.98, wbc 10.21, hb 16.2, plt 214    POCT Blood Glucose.: 279 mg/dL (11 Feb 2023 11:35)  POCT Blood Glucose.: 199 mg/dL (11 Feb 2023 07:47)  POCT Blood Glucose.: 234 mg/dL (10 Feb 2023 21:19)  POCT Blood Glucose.: 232 mg/dL (10 Feb 2023 16:38)    RADIOLOGY & ADDITIONAL TESTS:    Imaging Personally Reviewed:  [ x] YES  [ ] NO  < from: CT Head No Cont (01.31.23 @ 19:23) >  IMPRESSION: Normal pressure hydrocephalus. Resolved small left frontal   chronic subdural hematoma compared with 11/2/2022.  < end of copied text >

## 2023-02-12 NOTE — PROGRESS NOTE ADULT - ASSESSMENT
HPI:  77 year old male with PMH of HTN, HLD, DM 2, diabetic neuropathy, TIAs (was on Aggrenox), who presented to [facility] ED for right sided headaches for 1 month. Patient was followed by ophthalmology Dr. Santamaria for papilledema and was referred to come to [facility] ED for further evaluation. Ophthalmology evaluated for giant cell arteritis, As per family, for the past year he has had several falls and unsteady gait along with memory issues and inct. 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 He was transferred to [facility] on 8/5 for possible neurosurgical intervention for SDH. He was enrolled into the EMBOLISE trial, randomized to intervention group, and On 8/5/22, he underwent Left MMA embolization. Discharged on Keppra 500mg BID for seizure prophylaxis.  Recently presented to [facility] ED on 8/21 as code stroke with transient episode of dysarthria, likely TIA. No neurovascular intervention given no large vessel occlusion seen on imaging, no tPA given no disabling neurological deficit. Keppra was increased to 750mg BID.   follow up 2 week CT scan 8/21 shows stable L subacute to chronic SDH, slightly decreased in size, measuring 10mm, previously 12mm. 6 week follow up CT scan done on 9/22/22 which shows continued decrease in size and density of left lateral convexity SDH measuring 7mm. Keppra tapered down to 500mg BID.  He follows with Dr. Cunha and brain MRI performed on 11/28/2022 showed moderate enlargement of ventricular system.. He underwent an LP in [place] and as per wife and family member, patient did not improve in terms of memory/cognition, urinary inct and gait instability. He was assessed by Dr. Cunha after LP and felt his gait improved.  Wife and family member state the triad of symptoms started approximately a year ago, but worsened in August 2022. He is significantly worse today than last month.  He is able to converse and appears oriented. He attempted to walk and was able to take a few steps cautiously and very unsteady during turning.  (06 Feb 2023 10:31)    PROCEDURE:  pre op for  shunt in am       PLAN:  1 Out of bed   2 continue pt/ot   3  shunt in am   4 stereo head ct :p   5 room air   6 bp stable   7 dm - on lantus , pre meal admelog and sliding scale  8 ccd diet   9 dvt ppx sql and scds   10 afebrile   11 voiding   12 last bm 2/10   13 medically cleared   14 npo after midnight   dispo :p     -will discuss with Dr. Isabel   -pratibha [phone]          Problem/Plan - 1:  ·  Problem: Normal pressure hydrocephalus.      Problem/Plan - 2:  ·  Problem: Diabetes mellitus with hyperglycemia.      Problem/Plan - 3:  ·  Problem: Hypertension.        Additional Information:  Additional Information: 33 minutes spent . more than 50 percent of time was spent on examining patient, reviewed labs, images and spoke plan with wife.    Attestation Statements:    Attestation Statements:  Attending and PA/NP shared services statement (NON-critical care):     PA/NP to bill.     I independently performed the documented history, exam, and medical decision making.

## 2023-02-12 NOTE — CONSULT NOTE ADULT - ASSESSMENT
77yo M with hx of DM-II with Neuropathy, TIAs (previously on Aggrenox), HTN, and HLD, SDH s/p MMA embolization ( aug 2022 ), admitted for NPH, plan for  shunt tomorrow    Plan:  - Acute care surgery to be available for laparoscopic portion of  shunt placement  - Will also consent patient for possible umbilical hernia repair.   - Discussed with Dr. Santana    [facility]  [address]  
75yo M with hx of DM-II with Neuropathy, TIAs (previously on Aggrenox), HTN, and HLD, SDH s/p MMA embolization ( aug 2022 ), with worsening gait imbalance and urinary incontinence, suspicion for NPH s/p LP in 12/2022 but did not have much improvement and now admitted to [facility] for further evaluation, medicine consulted for pre-operative clearance for  shunt possibly on 2/13/23

## 2023-02-12 NOTE — PROGRESS NOTE ADULT - SUBJECTIVE AND OBJECTIVE BOX
[facility] Division of Hospital Medicine  Lico Randhawa MD  Available via MS Teams  Pager: [phone]    SUBJECTIVE / OVERNIGHT EVENTS:  - no events overnight, states he feels well today, denies any nausea, vomiting, headaches, cough, dysuria.     ADDITIONAL REVIEW OF SYSTEMS:    MEDICATIONS  (STANDING):  atorvastatin 20 milliGRAM(s) Oral at bedtime  dextrose 5%. 1000 milliLiter(s) (100 mL/Hr) IV Continuous <Continuous>  dextrose 5%. 1000 milliLiter(s) (50 mL/Hr) IV Continuous <Continuous>  dextrose 50% Injectable 25 Gram(s) IV Push once  dextrose 50% Injectable 12.5 Gram(s) IV Push once  dextrose 50% Injectable 25 Gram(s) IV Push once  glucagon  Injectable 1 milliGRAM(s) IntraMuscular once  insulin glargine Injectable (LANTUS) 16 Unit(s) SubCutaneous at bedtime  insulin lispro (ADMELOG) corrective regimen sliding scale   SubCutaneous at bedtime  insulin lispro (ADMELOG) corrective regimen sliding scale   SubCutaneous three times a day before meals  insulin lispro Injectable (ADMELOG) 6 Unit(s) SubCutaneous three times a day before meals  losartan 100 milliGRAM(s) Oral daily  polyethylene glycol 3350 17 Gram(s) Oral daily  senna 2 Tablet(s) Oral at bedtime  sodium chloride 0.9%. 1000 milliLiter(s) (75 mL/Hr) IV Continuous <Continuous>    MEDICATIONS  (PRN):  acetaminophen     Tablet .. 650 milliGRAM(s) Oral every 6 hours PRN Temp greater or equal to 38C (100.4F), Mild Pain (1 - 3)  benzonatate 100 milliGRAM(s) Oral three times a day PRN Cough  dextrose Oral Gel 15 Gram(s) Oral once PRN Blood Glucose LESS THAN 70 milliGRAM(s)/deciliter  guaiFENesin Oral Liquid (Sugar-Free) 100 milliGRAM(s) Oral every 6 hours PRN Cough  ondansetron Injectable 4 milliGRAM(s) IV Push every 6 hours PRN Nausea and/or Vomiting  oxyCODONE    IR 5 milliGRAM(s) Oral every 4 hours PRN Moderate Pain (4 - 6)      I&O's Summary    11 Feb 2023 07:01  -  12 Feb 2023 07:00  --------------------------------------------------------  IN: 1140 mL / OUT: 1350 mL / NET: -210 mL        PHYSICAL EXAM:  Vital Signs Last 24 Hrs  T(C): 36.7 (12 Feb 2023 09:22), Max: 37.1 (11 Feb 2023 13:50)  T(F): 98 (12 Feb 2023 09:22), Max: 98.8 (11 Feb 2023 19:58)  HR: 85 (12 Feb 2023 09:22) (74 - 101)  BP: 130/69 (12 Feb 2023 09:22) (109/72 - 145/80)  BP(mean): --  RR: 18 (12 Feb 2023 09:22) (18 - 18)  SpO2: 97% (12 Feb 2023 09:22) (95% - 100%)    Parameters below as of 12 Feb 2023 09:22  Patient On (Oxygen Delivery Method): room air      GENERAL: NAD, well-groomed, well-developed  HEAD:  Atraumatic, Normocephalic  EYES: EOMI, PERRLA, conjunctiva and sclera clear  ENMT: No tonsillar erythema, exudates, or enlargement; Moist mucous membranes, No lesions  NECK: Supple, No JVD,  NERVOUS SYSTEM:  Alert & Oriented X3, Good concentration; Motor Strength 5/5 B/L upper and lower extremities; DTRs 2+ intact and symmetric  CHEST/LUNG: Clear to percussion bilaterally; No rales, rhonchi, wheezing, or rubs  HEART: Regular rate and rhythm; No murmurs, rubs, or gallops  ABDOMEN: Soft, Nontender, Nondistended; Bowel sounds present  EXTREMITIES:  2+ Peripheral Pulses, No clubbing, cyanosis, or edema  LYMPH: No lymphadenopathy noted  SKIN: No rashes or lesions    LABS:                        16.7   8.42  )-----------( 220      ( 12 Feb 2023 06:39 )             50.2     02-12    139  |  104  |  21  ----------------------------<  226<H>  4.0   |  25  |  0.83    Ca    8.8      12 Feb 2023 06:39      PT/INR - ( 12 Feb 2023 06:39 )   PT: 11.9 sec;   INR: 1.03 ratio         PTT - ( 12 Feb 2023 06:39 )  PTT:34.5 sec          COVID-19 PCR: NotDetec (12 Feb 2023 06:38)  COVID-19 PCR: NotDetec (08 Feb 2023 18:26)  COVID-19 PCR: NotDetec (04 Feb 2023 15:39)      RADIOLOGY & ADDITIONAL TESTS:  New Results Reviewed Today: cbc cmp  New Imaging Personally Reviewed Today: n/a  New Electrocardiogram Personally Reviewed Today: n/a  Prior or Outpatient Records Reviewed Today: n/a    COMMUNICATION:  Care Discussed with Consultants/Other Providers and Details of Discussion: n/a  Discussions with Patient/Family: n/a  PCP Communication: n/a

## 2023-02-12 NOTE — PROGRESS NOTE ADULT - ASSESSMENT
75yo M with hx of DM-II with Neuropathy, TIAs (previously on Aggrenox), HTN, and HLD, SDH s/p MMA embolization ( aug 2022 ), with worsening gait imbalance and urinary incontinence, suspicion for NPH s/p LP in 12/2022 but did not have much improvement and now admitted to [facility] for further evaluation, medicine consulted for pre-operative clearance for  shunt possibly on 2/13/23

## 2023-02-12 NOTE — PROGRESS NOTE ADULT - SUBJECTIVE AND OBJECTIVE BOX
SUBJECTIVE:   Patient was seen and evaluated at bedside. Patient is resting in bed and is in no new acute distress.   OVERNIGHT EVENTS:   none   Vital Signs Last 24 Hrs  T(C): 36.7 (12 Feb 2023 09:22), Max: 37.1 (11 Feb 2023 13:50)  T(F): 98 (12 Feb 2023 09:22), Max: 98.8 (11 Feb 2023 19:58)  HR: 85 (12 Feb 2023 09:22) (74 - 101)  BP: 130/69 (12 Feb 2023 09:22) (109/72 - 145/80)  BP(mean): --  RR: 18 (12 Feb 2023 09:22) (18 - 18)  SpO2: 97% (12 Feb 2023 09:22) (95% - 100%)    Parameters below as of 12 Feb 2023 09:22  Patient On (Oxygen Delivery Method): room air        PHYSICAL EXAM:    General: No Acute Distress     Neurological: Awake, alert oriented to person, place and time, Following Commands, PERRL, EOMI, Face Symmetrical, Speech Fluent, Moving all extremities, Muscle Strength normal in all four extremities, No Drift, Sensation to Light Touch Intact, has balance issues and shuffled gait while walking.     Pulmonary: Clear to Auscultation, No Rales, No Rhonchi, No Wheezes     Cardiovascular: S1, S2, Regular Rate and Rhythm     Gastrointestinal: Soft, Nontender, Nondistended     Incision:   ld site dry   LABS:                        16.7   8.42  )-----------( 220      ( 12 Feb 2023 06:39 )             50.2    02-12    139  |  104  |  21  ----------------------------<  226<H>  4.0   |  25  |  0.83    Ca    8.8      12 Feb 2023 06:39    PT/INR - ( 12 Feb 2023 06:39 )   PT: 11.9 sec;   INR: 1.03 ratio         PTT - ( 12 Feb 2023 06:39 )  PTT:34.5 sec      02-11 @ 07:01  -  02-12 @ 07:00  --------------------------------------------------------  IN: 1140 mL / OUT: 1350 mL / NET: -210 mL      DRAINS:     MEDICATIONS:  Antibiotics:    Neuro:  acetaminophen     Tablet .. 650 milliGRAM(s) Oral every 6 hours PRN Temp greater or equal to 38C (100.4F), Mild Pain (1 - 3)  ondansetron Injectable 4 milliGRAM(s) IV Push every 6 hours PRN Nausea and/or Vomiting  oxyCODONE    IR 5 milliGRAM(s) Oral every 4 hours PRN Moderate Pain (4 - 6)    Cardiac:  losartan 100 milliGRAM(s) Oral daily    Pulm:  benzonatate 100 milliGRAM(s) Oral three times a day PRN Cough  guaiFENesin Oral Liquid (Sugar-Free) 100 milliGRAM(s) Oral every 6 hours PRN Cough    GI/:  polyethylene glycol 3350 17 Gram(s) Oral daily  senna 2 Tablet(s) Oral at bedtime    Other:   atorvastatin 20 milliGRAM(s) Oral at bedtime  dextrose 5%. 1000 milliLiter(s) IV Continuous <Continuous>  dextrose 5%. 1000 milliLiter(s) IV Continuous <Continuous>  dextrose 50% Injectable 25 Gram(s) IV Push once  dextrose 50% Injectable 12.5 Gram(s) IV Push once  dextrose 50% Injectable 25 Gram(s) IV Push once  dextrose Oral Gel 15 Gram(s) Oral once PRN Blood Glucose LESS THAN 70 milliGRAM(s)/deciliter  glucagon  Injectable 1 milliGRAM(s) IntraMuscular once  insulin glargine Injectable (LANTUS) 16 Unit(s) SubCutaneous at bedtime  insulin lispro (ADMELOG) corrective regimen sliding scale   SubCutaneous at bedtime  insulin lispro (ADMELOG) corrective regimen sliding scale   SubCutaneous three times a day before meals  insulin lispro Injectable (ADMELOG) 6 Unit(s) SubCutaneous three times a day before meals  sodium chloride 0.9%. 1000 milliLiter(s) IV Continuous <Continuous>    DIET: [] Regular [] CCD [] Renal [] Puree [] Dysphagia [] Tube Feeds:   ccd diet   IMAGING:   EXAM: [id number] - CT BRAIN  - ORDERED BY: KARI REED      PROCEDURE DATE:  01/31/2023           INTERPRETATION:  Clinical Indication: Follow-up, left frontal chronic   subdural hematoma, normal pressure hydrocephalus    5mm axial sections of the brain were obtained from base to vertex,   without the intravenous administration of contrast material. Coronal and   sagittal computer generated reconstructed views are available.    Comparison is made with prior CT of 9/22/2022 and 11/2/2022.    Thesmall left frontal low density subdural collection has resolved.   There is no residual collection. Ventricles and sulci are enlarged. The   ventricles are enlarged out of proportion to the degree of sulcal   prominence consistent with normal pressure hydrocephalus. Small vessel   white matter ischemic changes are noted. There has been previous   bilateral lens replacement surgery.    IMPRESSION: Normal pressure hydrocephalus. Resolved small left frontal   chronic subdural hematoma compared with 11/2/2022.      --- End of Report ---               ANA PAULA ASHER MD; Attending Radiologist   This document has been electronically signed. Feb 1 2023  9:53AM

## 2023-02-13 ENCOUNTER — APPOINTMENT (OUTPATIENT)
Dept: NEUROSURGERY | Facility: HOSPITAL | Age: 78
End: 2023-02-13

## 2023-02-13 ENCOUNTER — TRANSCRIPTION ENCOUNTER (OUTPATIENT)
Age: 78
End: 2023-02-13

## 2023-02-13 LAB
GLUCOSE BLDC GLUCOMTR-MCNC: 182 MG/DL — HIGH (ref 70–99)
GLUCOSE BLDC GLUCOMTR-MCNC: 184 MG/DL — HIGH (ref 70–99)
GLUCOSE BLDC GLUCOMTR-MCNC: 194 MG/DL — HIGH (ref 70–99)
GLUCOSE BLDC GLUCOMTR-MCNC: 197 MG/DL — HIGH (ref 70–99)
GLUCOSE BLDC GLUCOMTR-MCNC: 208 MG/DL — HIGH (ref 70–99)

## 2023-02-13 PROCEDURE — 62223 ESTABLISH BRAIN CAVITY SHUNT: CPT | Mod: 62

## 2023-02-13 PROCEDURE — 62223 ESTABLISH BRAIN CAVITY SHUNT: CPT | Mod: 62,GC

## 2023-02-13 PROCEDURE — 70450 CT HEAD/BRAIN W/O DYE: CPT | Mod: 26

## 2023-02-13 PROCEDURE — 61781 SCAN PROC CRANIAL INTRA: CPT

## 2023-02-13 DEVICE — SURGIFOAM PAD 8CM X 12.5CM X 10MM (100): Type: IMPLANTABLE DEVICE | Status: FUNCTIONAL

## 2023-02-13 DEVICE — PLATE LRG GAP SHUNT 14MM: Type: IMPLANTABLE DEVICE | Status: FUNCTIONAL

## 2023-02-13 DEVICE — VLV CERTAS PLUS INLINE SIPHON: Type: IMPLANTABLE DEVICE | Status: FUNCTIONAL

## 2023-02-13 DEVICE — BACTISEAL SHUNT CATH KIT WITH BARIUM: Type: IMPLANTABLE DEVICE | Status: FUNCTIONAL

## 2023-02-13 DEVICE — SCREW UN3 AXS SELF DRILL 1.5X4MM: Type: IMPLANTABLE DEVICE | Status: FUNCTIONAL

## 2023-02-13 DEVICE — SURGIFLO MATRIX WITH THROMBIN KIT: Type: IMPLANTABLE DEVICE | Status: FUNCTIONAL

## 2023-02-13 RX ORDER — SODIUM CHLORIDE 9 MG/ML
1000 INJECTION, SOLUTION INTRAVENOUS
Refills: 0 | Status: DISCONTINUED | OUTPATIENT
Start: 2023-02-13 | End: 2023-02-17

## 2023-02-13 RX ORDER — INSULIN LISPRO 100/ML
VIAL (ML) SUBCUTANEOUS AT BEDTIME
Refills: 0 | Status: DISCONTINUED | OUTPATIENT
Start: 2023-02-13 | End: 2023-02-17

## 2023-02-13 RX ORDER — INSULIN GLARGINE 100 [IU]/ML
10 INJECTION, SOLUTION SUBCUTANEOUS AT BEDTIME
Refills: 0 | Status: COMPLETED | OUTPATIENT
Start: 2023-02-13 | End: 2023-02-13

## 2023-02-13 RX ORDER — DEXTROSE 50 % IN WATER 50 %
25 SYRINGE (ML) INTRAVENOUS ONCE
Refills: 0 | Status: DISCONTINUED | OUTPATIENT
Start: 2023-02-13 | End: 2023-02-17

## 2023-02-13 RX ORDER — ACETAMINOPHEN 500 MG
650 TABLET ORAL EVERY 6 HOURS
Refills: 0 | Status: DISCONTINUED | OUTPATIENT
Start: 2023-02-13 | End: 2023-02-17

## 2023-02-13 RX ORDER — INSULIN GLARGINE 100 [IU]/ML
16 INJECTION, SOLUTION SUBCUTANEOUS AT BEDTIME
Refills: 0 | Status: DISCONTINUED | OUTPATIENT
Start: 2023-02-14 | End: 2023-02-15

## 2023-02-13 RX ORDER — POLYETHYLENE GLYCOL 3350 17 G/17G
17 POWDER, FOR SOLUTION ORAL DAILY
Refills: 0 | Status: DISCONTINUED | OUTPATIENT
Start: 2023-02-13 | End: 2023-02-17

## 2023-02-13 RX ORDER — SENNA PLUS 8.6 MG/1
2 TABLET ORAL AT BEDTIME
Refills: 0 | Status: DISCONTINUED | OUTPATIENT
Start: 2023-02-13 | End: 2023-02-17

## 2023-02-13 RX ORDER — DEXTROSE 50 % IN WATER 50 %
15 SYRINGE (ML) INTRAVENOUS ONCE
Refills: 0 | Status: DISCONTINUED | OUTPATIENT
Start: 2023-02-13 | End: 2023-02-17

## 2023-02-13 RX ORDER — INSULIN LISPRO 100/ML
6 VIAL (ML) SUBCUTANEOUS
Refills: 0 | Status: DISCONTINUED | OUTPATIENT
Start: 2023-02-13 | End: 2023-02-15

## 2023-02-13 RX ORDER — SODIUM CHLORIDE 9 MG/ML
1000 INJECTION INTRAMUSCULAR; INTRAVENOUS; SUBCUTANEOUS
Refills: 0 | Status: DISCONTINUED | OUTPATIENT
Start: 2023-02-13 | End: 2023-02-14

## 2023-02-13 RX ORDER — ONDANSETRON 8 MG/1
4 TABLET, FILM COATED ORAL EVERY 6 HOURS
Refills: 0 | Status: DISCONTINUED | OUTPATIENT
Start: 2023-02-13 | End: 2023-02-17

## 2023-02-13 RX ORDER — OXYCODONE HYDROCHLORIDE 5 MG/1
5 TABLET ORAL EVERY 4 HOURS
Refills: 0 | Status: DISCONTINUED | OUTPATIENT
Start: 2023-02-13 | End: 2023-02-17

## 2023-02-13 RX ORDER — LOSARTAN POTASSIUM 100 MG/1
100 TABLET, FILM COATED ORAL DAILY
Refills: 0 | Status: DISCONTINUED | OUTPATIENT
Start: 2023-02-13 | End: 2023-02-17

## 2023-02-13 RX ORDER — DEXTROSE 50 % IN WATER 50 %
12.5 SYRINGE (ML) INTRAVENOUS ONCE
Refills: 0 | Status: DISCONTINUED | OUTPATIENT
Start: 2023-02-13 | End: 2023-02-17

## 2023-02-13 RX ORDER — ONDANSETRON 8 MG/1
4 TABLET, FILM COATED ORAL ONCE
Refills: 0 | Status: DISCONTINUED | OUTPATIENT
Start: 2023-02-13 | End: 2023-02-14

## 2023-02-13 RX ORDER — CEFAZOLIN SODIUM 1 G
2000 VIAL (EA) INJECTION EVERY 8 HOURS
Refills: 0 | Status: COMPLETED | OUTPATIENT
Start: 2023-02-13 | End: 2023-02-14

## 2023-02-13 RX ORDER — GLUCAGON INJECTION, SOLUTION 0.5 MG/.1ML
1 INJECTION, SOLUTION SUBCUTANEOUS ONCE
Refills: 0 | Status: DISCONTINUED | OUTPATIENT
Start: 2023-02-13 | End: 2023-02-17

## 2023-02-13 RX ORDER — INSULIN LISPRO 100/ML
VIAL (ML) SUBCUTANEOUS
Refills: 0 | Status: DISCONTINUED | OUTPATIENT
Start: 2023-02-13 | End: 2023-02-17

## 2023-02-13 RX ORDER — HYDROMORPHONE HYDROCHLORIDE 2 MG/ML
0.25 INJECTION INTRAMUSCULAR; INTRAVENOUS; SUBCUTANEOUS
Refills: 0 | Status: DISCONTINUED | OUTPATIENT
Start: 2023-02-13 | End: 2023-02-14

## 2023-02-13 RX ORDER — ATORVASTATIN CALCIUM 80 MG/1
20 TABLET, FILM COATED ORAL AT BEDTIME
Refills: 0 | Status: DISCONTINUED | OUTPATIENT
Start: 2023-02-13 | End: 2023-02-17

## 2023-02-13 RX ADMIN — SODIUM CHLORIDE 60 MILLILITER(S): 9 INJECTION INTRAMUSCULAR; INTRAVENOUS; SUBCUTANEOUS at 20:38

## 2023-02-13 RX ADMIN — HYDROMORPHONE HYDROCHLORIDE 0.25 MILLIGRAM(S): 2 INJECTION INTRAMUSCULAR; INTRAVENOUS; SUBCUTANEOUS at 22:30

## 2023-02-13 RX ADMIN — SENNA PLUS 2 TABLET(S): 8.6 TABLET ORAL at 22:01

## 2023-02-13 RX ADMIN — HYDROMORPHONE HYDROCHLORIDE 0.25 MILLIGRAM(S): 2 INJECTION INTRAMUSCULAR; INTRAVENOUS; SUBCUTANEOUS at 23:00

## 2023-02-13 RX ADMIN — ATORVASTATIN CALCIUM 20 MILLIGRAM(S): 80 TABLET, FILM COATED ORAL at 22:01

## 2023-02-13 RX ADMIN — INSULIN GLARGINE 10 UNIT(S): 100 INJECTION, SOLUTION SUBCUTANEOUS at 23:02

## 2023-02-13 RX ADMIN — LOSARTAN POTASSIUM 100 MILLIGRAM(S): 100 TABLET, FILM COATED ORAL at 05:10

## 2023-02-13 NOTE — PROVIDER CONTACT NOTE (OTHER) - RECOMMENDATIONS
Notify provider of change, bed alarm in place.
eval
Notify provider and redress site with gauze to continue to monitor drainage.

## 2023-02-13 NOTE — BRIEF OPERATIVE NOTE - NSICDXBRIEFPREOP_GEN_ALL_CORE_FT
PRE-OP DIAGNOSIS:  Hydrocephalus 13-Feb-2023 20:33:31  Chris Viera  
PRE-OP DIAGNOSIS:  Normal pressure hydrocephalus 13-Feb-2023 20:34:17  Rosi Borges

## 2023-02-13 NOTE — BRIEF OPERATIVE NOTE - OPERATION/FINDINGS
R PO VPS certas at 4 with general surg laparoscopic assistance (Mamie) for intrabdominal placement.
Intra abdominal portion of the ventriculo peritoneal shut. Veres needle used for insufflation. Optiview access to the abdomen using a 5mm port. No injuries or adhesions were noted upon entry. Catheter was directed and place in the lower abdomen, and the tip was noted to be draining upon evaluation of the pump. Incisions were closed.

## 2023-02-13 NOTE — BRIEF OPERATIVE NOTE - NSICDXBRIEFPROCEDURE_GEN_ALL_CORE_FT
PROCEDURES:  Laparoscopy, with ventriculoperitoneal shunt insertion 13-Feb-2023 20:32:53  Chris Viera  
PROCEDURES:  Ventriculoperitoneal shunt 13-Feb-2023 20:33:59 R PO VPS [place]as at 4 Rosi Borges

## 2023-02-13 NOTE — PROGRESS NOTE ADULT - ASSESSMENT
77yo M with hx of DM-II with Neuropathy, TIAs (previously on Aggrenox), HTN, and HLD, SDH s/p MMA embolization ( aug 2022 ), admitted for NPH, plan for  shunt tomorrow    Plan:  - Acute care surgery to be available for laparoscopic portion of  shunt placement today  - Will also consent patient for possible umbilical hernia repair.   - care per primary team  - surgery will follow up    [facility]  [id number]

## 2023-02-13 NOTE — PROGRESS NOTE ADULT - SUBJECTIVE AND OBJECTIVE BOX
Patient seen and examined s/p R PO VPS, Certas at 4     Doing well. Tired, oriented x 2. MUHAMMAD strongly. SILT. Wound intact.    T(C): 36.4 (02-13-23 @ 20:15), Max: 37.1 (02-13-23 @ 00:50)  HR: 73 (02-13-23 @ 20:45) (73 - 85)  BP: 160/82 (02-13-23 @ 20:45) (133/71 - 169/76)  RR: 16 (02-13-23 @ 20:45) (16 - 18)  SpO2: 97% (02-13-23 @ 20:45) (97% - 99%)                          16.7   8.42  )-----------( 220      ( 12 Feb 2023 06:39 )             50.2     02-12    139  |  104  |  21  ----------------------------<  226<H>  4.0   |  25  |  0.83    Ca    8.8      12 Feb 2023 06:39      PT/INR - ( 12 Feb 2023 06:39 )   PT: 11.9 sec;   INR: 1.03 ratio         PTT - ( 12 Feb 2023 06:39 )  PTT:34.5 sec        CAPILLARY BLOOD GLUCOSE      POCT Blood Glucose.: 182 mg/dL (13 Feb 2023 20:35)  POCT Blood Glucose.: 184 mg/dL (13 Feb 2023 16:58)  POCT Blood Glucose.: 197 mg/dL (13 Feb 2023 11:35)  POCT Blood Glucose.: 194 mg/dL (13 Feb 2023 05:08)

## 2023-02-13 NOTE — PRE-ANESTHESIA EVALUATION ADULT - NSANTHPMHFT_GEN_ALL_CORE
chart and consultant notes reviewed. no hx sig cv/pulm dz - states et > 1 flight, no cp/sob. ekg rbbb. dm fs < 200

## 2023-02-13 NOTE — PROVIDER CONTACT NOTE (OTHER) - REASON
Pt with blood sugar 194 this am /Pt NPO for Or this am
Clear/slightly pink tinged fluid noted to have saturated gauze on lumbar drain incision, some drainage noted on purple bridger
Pt increasingly confused/agitated mid drain

## 2023-02-13 NOTE — PROVIDER CONTACT NOTE (OTHER) - ACTION/TREATMENT ORDERED:
Provider at bedside to assess. No active leakage noted from site visually or on palpation. Continue to monitor pt at this time and notify for any changes/complete saturations.
As per NP Aneesh pt hold am sliding scale dose ademalog/
Per MD, to continue to drain ordered 20cc's at this time. Will touch base with spouse via phone.

## 2023-02-13 NOTE — PROVIDER CONTACT NOTE (OTHER) - ASSESSMENT
Pt with blood sugar 194 this am /Pt NPO for Or this am
Pt AOx4 prior to drain but confused in conversation. Mid drain pt noted to become increasingly confused, unable to answer year correctly, verbalizing he left Jayne. Pt demanding he speaks to Dr. Isabel at this time and does not wish people he doesn't know (nurses) to touch him. Vitals otherwise remain WDL, MUHAMMAD 5/5. Pt called spouse on phone, spouse expressed concern about pt's confusion. MD Rin Chirinos to discuss with spouse. Pt is also expressing distrust towards family's assurance. Attempts to get out of bed to look for Dr. Isabel. Continuous attempts at reorientation made. Bed alarm in place.
Pt remains neurologically unchanged, vitals WDL. MUHAMMAD 5/5. Denies any HA, N/V, or discomfort. Suture still in place at this time. No active leak/drainage noted with naked eye at this time.

## 2023-02-13 NOTE — PROGRESS NOTE ADULT - SUBJECTIVE AND OBJECTIVE BOX
Surgery Progress Note    INTERVAl/SUBJECTIVE: No acute event overnight. Patient seen and examined in am rounds, found to be without acute distress.      Vital Signs Last 24 Hrs  T(C): 36.4 (13 Feb 2023 05:13), Max: 37.1 (13 Feb 2023 00:50)  T(F): 97.6 (13 Feb 2023 05:13), Max: 98.8 (13 Feb 2023 00:50)  HR: 80 (13 Feb 2023 05:13) (78 - 85)  BP: 133/71 (13 Feb 2023 05:13) (130/69 - 155/91)  BP(mean): --  RR: 18 (13 Feb 2023 05:13) (18 - 18)  SpO2: 98% (13 Feb 2023 05:13) (97% - 98%)    Parameters below as of 13 Feb 2023 00:50  Patient On (Oxygen Delivery Method): room air        Physical Exam:  General: Appears well, NAD  CHEST: breathing comfortably  CV: appears well perfused  Abdomen: soft, nontender, nondistended, no rebound or guarding  Extremities: Grossly symmetric    LABS:                        16.7   8.42  )-----------( 220      ( 12 Feb 2023 06:39 )             50.2     02-12    139  |  104  |  21  ----------------------------<  226<H>  4.0   |  25  |  0.83    Ca    8.8      12 Feb 2023 06:39      PT/INR - ( 12 Feb 2023 06:39 )   PT: 11.9 sec;   INR: 1.03 ratio         PTT - ( 12 Feb 2023 06:39 )  PTT:34.5 sec      INs and OUTs:    02-12-23 @ 07:01  -  02-13-23 @ 07:00  --------------------------------------------------------  IN: 970 mL / OUT: 900 mL / NET: 70 mL

## 2023-02-13 NOTE — PROGRESS NOTE ADULT - ASSESSMENT
Patient seen and examined s/p R PO VPS, Certas at 4     Doing well. Tired, oriented x 2. MUHAMMAD strongly. SILT. Wound intact.    -PACU until 11pm CTH. Then floor if cleared by neurosurgery  -Q4 neurochecks  -Q4 vitals  -Pain control  -Advance diet as tolerated  -PT/OT

## 2023-02-13 NOTE — BRIEF OPERATIVE NOTE - NSICDXBRIEFPOSTOP_GEN_ALL_CORE_FT
POST-OP DIAGNOSIS:  Hydrocephalus 13-Feb-2023 20:34:02  Chris Viera  
POST-OP DIAGNOSIS:  Normal pressure hydrocephalus 13-Feb-2023 20:34:28  Rosi Borges

## 2023-02-14 LAB
ANION GAP SERPL CALC-SCNC: 13 MMOL/L — SIGNIFICANT CHANGE UP (ref 5–17)
BUN SERPL-MCNC: 14 MG/DL — SIGNIFICANT CHANGE UP (ref 7–23)
CALCIUM SERPL-MCNC: 8.5 MG/DL — SIGNIFICANT CHANGE UP (ref 8.4–10.5)
CHLORIDE SERPL-SCNC: 101 MMOL/L — SIGNIFICANT CHANGE UP (ref 96–108)
CO2 SERPL-SCNC: 24 MMOL/L — SIGNIFICANT CHANGE UP (ref 22–31)
CREAT SERPL-MCNC: 0.81 MG/DL — SIGNIFICANT CHANGE UP (ref 0.5–1.3)
EGFR: 91 ML/MIN/1.73M2 — SIGNIFICANT CHANGE UP
GLUCOSE BLDC GLUCOMTR-MCNC: 191 MG/DL — HIGH (ref 70–99)
GLUCOSE BLDC GLUCOMTR-MCNC: 196 MG/DL — HIGH (ref 70–99)
GLUCOSE BLDC GLUCOMTR-MCNC: 232 MG/DL — HIGH (ref 70–99)
GLUCOSE BLDC GLUCOMTR-MCNC: 262 MG/DL — HIGH (ref 70–99)
GLUCOSE SERPL-MCNC: 252 MG/DL — HIGH (ref 70–99)
HCT VFR BLD CALC: 52.2 % — HIGH (ref 39–50)
HGB BLD-MCNC: 17.2 G/DL — HIGH (ref 13–17)
MAGNESIUM SERPL-MCNC: 1.8 MG/DL — SIGNIFICANT CHANGE UP (ref 1.6–2.6)
MCHC RBC-ENTMCNC: 29.1 PG — SIGNIFICANT CHANGE UP (ref 27–34)
MCHC RBC-ENTMCNC: 33 GM/DL — SIGNIFICANT CHANGE UP (ref 32–36)
MCV RBC AUTO: 88.2 FL — SIGNIFICANT CHANGE UP (ref 80–100)
NRBC # BLD: 0 /100 WBCS — SIGNIFICANT CHANGE UP (ref 0–0)
PHOSPHATE SERPL-MCNC: 3.8 MG/DL — SIGNIFICANT CHANGE UP (ref 2.5–4.5)
PLATELET # BLD AUTO: 177 K/UL — SIGNIFICANT CHANGE UP (ref 150–400)
POTASSIUM SERPL-MCNC: 4.5 MMOL/L — SIGNIFICANT CHANGE UP (ref 3.5–5.3)
POTASSIUM SERPL-SCNC: 4.5 MMOL/L — SIGNIFICANT CHANGE UP (ref 3.5–5.3)
RBC # BLD: 5.92 M/UL — HIGH (ref 4.2–5.8)
RBC # FLD: 12.8 % — SIGNIFICANT CHANGE UP (ref 10.3–14.5)
SARS-COV-2 RNA SPEC QL NAA+PROBE: SIGNIFICANT CHANGE UP
SODIUM SERPL-SCNC: 138 MMOL/L — SIGNIFICANT CHANGE UP (ref 135–145)
WBC # BLD: 14.11 K/UL — HIGH (ref 3.8–10.5)
WBC # FLD AUTO: 14.11 K/UL — HIGH (ref 3.8–10.5)

## 2023-02-14 PROCEDURE — 70450 CT HEAD/BRAIN W/O DYE: CPT | Mod: 26

## 2023-02-14 PROCEDURE — 99233 SBSQ HOSP IP/OBS HIGH 50: CPT

## 2023-02-14 RX ORDER — ENOXAPARIN SODIUM 100 MG/ML
40 INJECTION SUBCUTANEOUS
Refills: 0 | Status: DISCONTINUED | OUTPATIENT
Start: 2023-02-14 | End: 2023-02-14

## 2023-02-14 RX ORDER — QUETIAPINE FUMARATE 200 MG/1
25 TABLET, FILM COATED ORAL ONCE
Refills: 0 | Status: COMPLETED | OUTPATIENT
Start: 2023-02-14 | End: 2023-02-14

## 2023-02-14 RX ORDER — ENOXAPARIN SODIUM 100 MG/ML
40 INJECTION SUBCUTANEOUS
Refills: 0 | Status: DISCONTINUED | OUTPATIENT
Start: 2023-02-14 | End: 2023-02-17

## 2023-02-14 RX ADMIN — Medication 100 MILLIGRAM(S): at 18:13

## 2023-02-14 RX ADMIN — Medication 2: at 08:11

## 2023-02-14 RX ADMIN — Medication 6: at 16:48

## 2023-02-14 RX ADMIN — Medication 650 MILLIGRAM(S): at 10:09

## 2023-02-14 RX ADMIN — Medication 6 UNIT(S): at 11:43

## 2023-02-14 RX ADMIN — Medication 6 UNIT(S): at 08:10

## 2023-02-14 RX ADMIN — LOSARTAN POTASSIUM 100 MILLIGRAM(S): 100 TABLET, FILM COATED ORAL at 05:35

## 2023-02-14 RX ADMIN — Medication 4: at 11:44

## 2023-02-14 RX ADMIN — POLYETHYLENE GLYCOL 3350 17 GRAM(S): 17 POWDER, FOR SOLUTION ORAL at 11:43

## 2023-02-14 RX ADMIN — Medication 100 MILLIGRAM(S): at 01:22

## 2023-02-14 RX ADMIN — ATORVASTATIN CALCIUM 20 MILLIGRAM(S): 80 TABLET, FILM COATED ORAL at 21:21

## 2023-02-14 RX ADMIN — INSULIN GLARGINE 16 UNIT(S): 100 INJECTION, SOLUTION SUBCUTANEOUS at 21:21

## 2023-02-14 RX ADMIN — Medication 650 MILLIGRAM(S): at 10:39

## 2023-02-14 RX ADMIN — Medication 100 MILLIGRAM(S): at 10:46

## 2023-02-14 RX ADMIN — QUETIAPINE FUMARATE 25 MILLIGRAM(S): 200 TABLET, FILM COATED ORAL at 01:22

## 2023-02-14 RX ADMIN — Medication 6 UNIT(S): at 16:47

## 2023-02-14 RX ADMIN — SENNA PLUS 2 TABLET(S): 8.6 TABLET ORAL at 21:21

## 2023-02-14 RX ADMIN — ENOXAPARIN SODIUM 40 MILLIGRAM(S): 100 INJECTION SUBCUTANEOUS at 21:22

## 2023-02-14 NOTE — PHYSICAL THERAPY INITIAL EVALUATION ADULT - GAIT DEVIATIONS NOTED, PT EVAL
decreased solomon/decreased step length/decreased weight-shifting ability
decreased solomon/decreased step length/decreased weight-shifting ability

## 2023-02-14 NOTE — PROGRESS NOTE ADULT - SUBJECTIVE AND OBJECTIVE BOX
Patient is a 77y old  Male who presents with a chief complaint of LD placement (13 Feb 2023 21:15)      SUBJECTIVE / OVERNIGHT EVENTS:    MEDICATIONS  (STANDING):  atorvastatin 20 milliGRAM(s) Oral at bedtime  ceFAZolin   IVPB 2000 milliGRAM(s) IV Intermittent every 8 hours  dextrose 5%. 1000 milliLiter(s) (50 mL/Hr) IV Continuous <Continuous>  dextrose 5%. 1000 milliLiter(s) (100 mL/Hr) IV Continuous <Continuous>  dextrose 50% Injectable 25 Gram(s) IV Push once  dextrose 50% Injectable 12.5 Gram(s) IV Push once  dextrose 50% Injectable 25 Gram(s) IV Push once  enoxaparin Injectable 40 milliGRAM(s) SubCutaneous <User Schedule>  glucagon  Injectable 1 milliGRAM(s) IntraMuscular once  insulin glargine Injectable (LANTUS) 16 Unit(s) SubCutaneous at bedtime  insulin lispro (ADMELOG) corrective regimen sliding scale   SubCutaneous three times a day before meals  insulin lispro (ADMELOG) corrective regimen sliding scale   SubCutaneous at bedtime  insulin lispro Injectable (ADMELOG) 6 Unit(s) SubCutaneous three times a day before meals  losartan 100 milliGRAM(s) Oral daily  polyethylene glycol 3350 17 Gram(s) Oral daily  senna 2 Tablet(s) Oral at bedtime    MEDICATIONS  (PRN):  acetaminophen     Tablet .. 650 milliGRAM(s) Oral every 6 hours PRN Temp greater or equal to 38C (100.4F), Mild Pain (1 - 3)  benzonatate 100 milliGRAM(s) Oral three times a day PRN Cough  dextrose Oral Gel 15 Gram(s) Oral once PRN Blood Glucose LESS THAN 70 milliGRAM(s)/deciliter  guaiFENesin Oral Liquid (Sugar-Free) 100 milliGRAM(s) Oral every 6 hours PRN Cough  ondansetron Injectable 4 milliGRAM(s) IV Push every 6 hours PRN Nausea and/or Vomiting  oxyCODONE    IR 5 milliGRAM(s) Oral every 4 hours PRN Moderate Pain (4 - 6)      CAPILLARY BLOOD GLUCOSE      POCT Blood Glucose.: 196 mg/dL (14 Feb 2023 07:38)  POCT Blood Glucose.: 208 mg/dL (13 Feb 2023 22:06)  POCT Blood Glucose.: 182 mg/dL (13 Feb 2023 20:35)  POCT Blood Glucose.: 184 mg/dL (13 Feb 2023 16:58)  POCT Blood Glucose.: 197 mg/dL (13 Feb 2023 11:35)    I&O's Summary    13 Feb 2023 07:01  -  14 Feb 2023 07:00  --------------------------------------------------------  IN: 420 mL / OUT: 1100 mL / NET: -680 mL        PHYSICAL EXAM:  T(C): 37.2 (02-14-23 @ 09:37), Max: 37.2 (02-14-23 @ 09:37)  HR: 88 (02-14-23 @ 09:37) (70 - 88)  BP: 147/76 (02-14-23 @ 09:37) (125/71 - 176/83)  RR: 18 (02-14-23 @ 09:37) (16 - 18)  SpO2: 98% (02-14-23 @ 09:37) (96% - 99%)  CONSTITUTIONAL: Lethargic but arousable  EYES: PERRLA; conjunctiva and sclera clear  ENMT: Moist oral mucosa, no pharyngeal injection or exudates; normal dentition  NECK: Supple, no palpable masses; no thyromegaly  RESPIRATORY: Normal respiratory effort; lungs are clear to auscultation anteriorly  CARDIOVASCULAR: Regular rate and rhythm, normal S1 and S2, no murmur/rub/gallop; No lower extremity edema; Peripheral pulses are 2+ bilaterally  ABDOMEN: Nontender to palpation, normoactive bowel sounds, no rebound/guarding; No hepatosplenomegaly  MUSCULOSKELETAL:  No clubbing or cyanosis of digits; no joint swelling or tenderness to palpation  PSYCH: Lethargic  NEUROLOGY: Lethargic but arousable. Able to squeeze hands and move legs  SKIN: No rashes; no palpable lesions    LABS:                        17.2   14.11 )-----------( 177      ( 14 Feb 2023 05:47 )             52.2     02-14    138  |  101  |  14  ----------------------------<  252<H>  4.5   |  24  |  0.81    Ca    8.5      14 Feb 2023 05:49  Phos  3.8     02-14  Mg     1.8     02-14          RADIOLOGY & ADDITIONAL TESTS:    Imaging Personally Reviewed:    Consultant(s) Notes Reviewed:      Care Discussed with Consultants/Other Providers: Nsx

## 2023-02-14 NOTE — PHYSICAL THERAPY INITIAL EVALUATION ADULT - BALANCE DISTURBANCE, IDENTIFIED IMPAIRMENT CONTRIBUTE, REHAB EVAL
impaired coordination/impaired motor control/decreased strength
impaired coordination/decreased strength

## 2023-02-14 NOTE — PHYSICAL THERAPY INITIAL EVALUATION ADULT - IMPAIRMENTS CONTRIBUTING TO GAIT DEVIATIONS, PT EVAL
impaired balance/impaired coordination/narrow base of support/decreased strength
impaired balance/impaired coordination/decreased strength

## 2023-02-14 NOTE — PHYSICAL THERAPY INITIAL EVALUATION ADULT - TRANSFER TRAINING, PT EVAL
GOAL: Patient will perform sit to stand transfers independently with least restrictive assistive device in 2 weeks with proper hand placement
GOAL: Patient will perform sit to stand transfers independently with least restrictive assistive device in 2 weeks with proper hand placement

## 2023-02-14 NOTE — PHYSICAL THERAPY INITIAL EVALUATION ADULT - MANUAL MUSCLE TESTING RESULTS, REHAB EVAL
4 extremities at least 3/5/grossly assessed due to
4 extremities at least 3/5/grossly assessed due to

## 2023-02-14 NOTE — PROGRESS NOTE ADULT - ASSESSMENT
HPI:  77 year old male with PMH of HTN, HLD, DM 2, diabetic neuropathy, TIAs (was on Aggrenox), who presented to [facility] ED for right sided headaches for 1 month. Patient was followed by ophthalmology Dr. Santamaria for papilledema and was referred to come to [facility] ED for further evaluation. Ophthalmology evaluated for giant cell arteritis, As per family, for the past year he has had several falls and unsteady gait along with memory issues and inct. 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 He was transferred to [facility] on 8/5 for possible neurosurgical intervention for SDH. He was enrolled into the EMBOLISE trial, randomized to intervention group, and On 8/5/22, he underwent Left MMA embolization. Discharged on Keppra 500mg BID for seizure prophylaxis.  Recently presented to [facility] ED on 8/21 as code stroke with transient episode of dysarthria, likely TIA. No neurovascular intervention given no large vessel occlusion seen on imaging, no tPA given no disabling neurological deficit. Keppra was increased to 750mg BID.   follow up 2 week CT scan 8/21 shows stable L subacute to chronic SDH, slightly decreased in size, measuring 10mm, previously 12mm. 6 week follow up CT scan done on 9/22/22 which shows continued decrease in size and density of left lateral convexity SDH measuring 7mm. Keppra tapered down to 500mg BID.  He follows with Dr. Cunha and brain MRI performed on 11/28/2022 showed moderate enlargement of ventricular system.. He underwent an LP in [place] and as per wife and family member, patient did not improve in terms of memory/cognition, urinary inct and gait instability. He was assessed by Dr. Cunha after LP and felt his gait improved.  Wife and family member state the triad of symptoms started approximately a year ago, but worsened in August 2022. He is significantly worse today than last month.  He is able to converse and appears oriented. He attempted to walk and was able to take a few steps cautiously and very unsteady during turning.  (06 Feb 2023 10:31)    PROCEDURE: Laparoscopy, with ventriculoperitoneal shunt insertion  s/p lumbar drain placement - for nph trial   Ventriculoperitoneal shunt  s/p  shunt placement done on 2/13 certas @4      POD#  1   PLAN:  1 ct head today :p  2 out of bed   3 pt/ot eval   4 room air   5 blood pressure stable -continue on losartan   6 regular diet   7 stool softener   8 last bm 2/10   9 dm- continue on lantus and premeal admelog along with sliding scale   10 afebrile   11 dvt ppx sql and scds   12 dispo : p    will discuss with Dr. Isabel   spectra [id number]

## 2023-02-14 NOTE — PHYSICAL THERAPY INITIAL EVALUATION ADULT - DIAGNOSIS, PT EVAL
Decreased functional mobility/capacity secondary to impairments listed below
Decreased functional mobility/capacity secondary to impairments listed below

## 2023-02-14 NOTE — PHYSICAL THERAPY INITIAL EVALUATION ADULT - BALANCE TRAINING, PT EVAL
GOAL: Patient will demonstrate an increase in static/dynamic balance in sitting/standing where deficient by at least 1 grade within 2 weeks to facilitate greater independence during functional mobility and ADL's.
GOAL: Patient will demonstrate an increase in static/dynamic balance in sitting/standing where deficient by at least 1 grade within 2 weeks to facilitate greater independence during functional mobility and ADL's.

## 2023-02-14 NOTE — PROGRESS NOTE ADULT - ASSESSMENT
77yo M with hx of DM-II with Neuropathy, TIAs (previously on Aggrenox), HTN, and HLD, SDH s/p MMA embolization ( aug 2022 ), admitted for NPH, plan for s/p  shunt on 2/13    Plan:  - umbilical hernia not repaired  - advance diet as tolerated  - care per primary team  - reconsult as needed    ACS  [id number]   77yo M with hx of DM-II with Neuropathy, TIAs (previously on Aggrenox), HTN, and HLD, SDH s/p MMA embolization ( aug 2022 ), admitted for NPH, plan for s/p  shunt on 2/13    Plan:  - advance diet as tolerated  - care per primary team  - reconsult as needed    ACS  [id number]

## 2023-02-14 NOTE — CHART NOTE - NSCHARTNOTEFT_GEN_A_CORE
POST-OPERATIVE NOTE    Patient is s/p Laparoscopy, with ventriculoperitoneal shunt insertion     Subjective:  Patient reports pain at the incisional site, controlled with medication  Denies chest pain, shortness of breath, nausea, vomiting    Vital Signs Last 24 Hrs  T(C): 36.4 (13 Feb 2023 23:42), Max: 37 (13 Feb 2023 09:04)  T(F): 97.6 (13 Feb 2023 23:42), Max: 98.6 (13 Feb 2023 09:04)  HR: 81 (13 Feb 2023 23:42) (70 - 82)  BP: 152/84 (13 Feb 2023 23:42) (133/71 - 176/83)  BP(mean): 109 (13 Feb 2023 23:00) (12 - 120)  RR: 18 (13 Feb 2023 23:42) (16 - 18)  SpO2: 97% (13 Feb 2023 23:42) (96% - 99%)    Parameters below as of 13 Feb 2023 23:42  Patient On (Oxygen Delivery Method): room air    I&O's Detail    12 Feb 2023 07:01  -  13 Feb 2023 07:00  --------------------------------------------------------  IN:    Oral Fluid: 670 mL    sodium chloride 0.9%: 300 mL  Total IN: 970 mL    OUT:    Voided (mL): 900 mL  Total OUT: 900 mL    Total NET: 70 mL      13 Feb 2023 07:01  -  14 Feb 2023 00:58  --------------------------------------------------------  IN:    Oral Fluid: 240 mL    sodium chloride 0.9%: 180 mL  Total IN: 420 mL    OUT:    Indwelling Catheter - Urethral (mL): 200 mL    Voided (mL): 450 mL  Total OUT: 650 mL    Total NET: -230 mL      Physical Exam:  General: NAD, resting comfortably in bed   shunt in place  Pulmonary: Nonlabored breathing, no respiratory distress  Abdominal: soft, nondistended, 4 incisions c/d/i  Extremities: WWP      LABS:                        16.7   8.42  )-----------( 220      ( 12 Feb 2023 06:39 )             50.2     02-12    139  |  104  |  21  ----------------------------<  226<H>  4.0   |  25  |  0.83    Ca    8.8      12 Feb 2023 06:39      PT/INR - ( 12 Feb 2023 06:39 )   PT: 11.9 sec;   INR: 1.03 ratio         PTT - ( 12 Feb 2023 06:39 )  PTT:34.5 sec      Assessment:  The patient is a 77y Male who is now several hours post-op from above procedure.    Plan:  - F/u AM labs  - care per primary team

## 2023-02-14 NOTE — PROGRESS NOTE ADULT - SUBJECTIVE AND OBJECTIVE BOX
ACS SURGERY DAILY PROGRESS NOTE:         SUBJECTIVE/ROS: Patient feels well.  No events overnight. States pain controlled with analgesia.   Denies nausea, vomiting, chest pain, shortness of breath         MEDICATIONS  (STANDING):  atorvastatin 20 milliGRAM(s) Oral at bedtime  ceFAZolin   IVPB 2000 milliGRAM(s) IV Intermittent every 8 hours  dextrose 5%. 1000 milliLiter(s) (50 mL/Hr) IV Continuous <Continuous>  dextrose 5%. 1000 milliLiter(s) (100 mL/Hr) IV Continuous <Continuous>  dextrose 50% Injectable 25 Gram(s) IV Push once  dextrose 50% Injectable 12.5 Gram(s) IV Push once  dextrose 50% Injectable 25 Gram(s) IV Push once  enoxaparin Injectable 40 milliGRAM(s) SubCutaneous <User Schedule>  glucagon  Injectable 1 milliGRAM(s) IntraMuscular once  insulin glargine Injectable (LANTUS) 16 Unit(s) SubCutaneous at bedtime  insulin lispro (ADMELOG) corrective regimen sliding scale   SubCutaneous three times a day before meals  insulin lispro (ADMELOG) corrective regimen sliding scale   SubCutaneous at bedtime  insulin lispro Injectable (ADMELOG) 6 Unit(s) SubCutaneous three times a day before meals  losartan 100 milliGRAM(s) Oral daily  polyethylene glycol 3350 17 Gram(s) Oral daily  senna 2 Tablet(s) Oral at bedtime    MEDICATIONS  (PRN):  acetaminophen     Tablet .. 650 milliGRAM(s) Oral every 6 hours PRN Temp greater or equal to 38C (100.4F), Mild Pain (1 - 3)  benzonatate 100 milliGRAM(s) Oral three times a day PRN Cough  dextrose Oral Gel 15 Gram(s) Oral once PRN Blood Glucose LESS THAN 70 milliGRAM(s)/deciliter  guaiFENesin Oral Liquid (Sugar-Free) 100 milliGRAM(s) Oral every 6 hours PRN Cough  ondansetron Injectable 4 milliGRAM(s) IV Push every 6 hours PRN Nausea and/or Vomiting  oxyCODONE    IR 5 milliGRAM(s) Oral every 4 hours PRN Moderate Pain (4 - 6)      OBJECTIVE:    Vital Signs Last 24 Hrs  T(C): 37.2 (14 Feb 2023 09:37), Max: 37.2 (14 Feb 2023 09:37)  T(F): 98.9 (14 Feb 2023 09:37), Max: 98.9 (14 Feb 2023 09:37)  HR: 88 (14 Feb 2023 09:37) (70 - 88)  BP: 147/76 (14 Feb 2023 09:37) (125/71 - 176/83)  BP(mean): 109 (13 Feb 2023 23:00) (12 - 120)  RR: 18 (14 Feb 2023 09:37) (16 - 18)  SpO2: 98% (14 Feb 2023 09:37) (96% - 99%)    Parameters below as of 14 Feb 2023 09:37  Patient On (Oxygen Delivery Method): room air            I&O's Detail    13 Feb 2023 07:01  -  14 Feb 2023 07:00  --------------------------------------------------------  IN:    Oral Fluid: 240 mL    sodium chloride 0.9%: 180 mL  Total IN: 420 mL    OUT:    Indwelling Catheter - Urethral (mL): 650 mL    Voided (mL): 450 mL  Total OUT: 1100 mL    Total NET: -680 mL      14 Feb 2023 07:01  -  14 Feb 2023 11:08  --------------------------------------------------------  IN:  Total IN: 0 mL    OUT:    Indwelling Catheter - Urethral (mL): 250 mL  Total OUT: 250 mL    Total NET: -250 mL          Daily Height in cm: 165.1 (13 Feb 2023 18:41)    Daily     LABS:                        17.2   14.11 )-----------( 177      ( 14 Feb 2023 05:47 )             52.2     02-14    138  |  101  |  14  ----------------------------<  252<H>  4.5   |  24  |  0.81    Ca    8.5      14 Feb 2023 05:49  Phos  3.8     02-14  Mg     1.8     02-14        Physical Exam:  General: NAD, laying in bed  Abdomen: port site steri strips intact, mild oozing, soft, nontender, nondistended

## 2023-02-14 NOTE — PHYSICAL THERAPY INITIAL EVALUATION ADULT - BED MOBILITY TRAINING, PT EVAL
GOAL: Patient will perform bed mobility independently in 2 weeks
GOAL: Patient will perform bed mobility independently in 2 weeks

## 2023-02-14 NOTE — PHYSICAL THERAPY INITIAL EVALUATION ADULT - PERTINENT HX OF CURRENT PROBLEM, REHAB EVAL
77 year old male with PMH of HTN, HLD, DM 2, diabetic neuropathy, TIAs (was on Aggrenox), who presented to [facility] ED for right sided headaches for 1 month. Patient was followed by ophthalmology Dr. Santamaria for papilledema and was referred to come to [facility] ED for further evaluation. Ophthalmology evaluated for giant cell arteritis, As per family, for the past year he has had several falls and unsteady gait along with memory issues and inct. 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 He was transferred to [facility] on 8/5 for possible neurosurgical intervention for SDH. On 8/5/22, he underwent Left MMA embolization. Discharged on Keppra 500mg BID for seizure prophylaxis.  Recently presented to [facility] ED on 8/21 as code stroke with transient episode of dysarthria, likely TIA. No neurovascular intervention given no large vessel occlusion seen on imaging, no tPA given no disabling neurological deficit. C Keppra was increased to 750mg BID.   follow up 2 week CT scan 8/21 shows stable L subacute to chronic SDH, slightly decreased in size, measuring 10mm, previously 12mm. 6 week follow up CT scan done on 9/22/22 which shows continued decrease in size and density of left lateral convexity SDH measuring 7mm. Keppra tapered down to 500mg BID.  He follows with Dr. Cunha and brain MRI performed on 11/28/2022 showed moderate enlargement of ventricular system.. He underwent an LP in [place] and as per wife and family member, patient did not improve in terms of memory/cognition, urinary inct and gait instability. He was assessed by Dr. Cunha after LP and felt his gait improved.  Wife and family member state the triad of symptoms started approximately a year ago, but worsened in August 2022. He is significantly worse today than last month.  He is able to converse and appears oriented. He attempted to walk and was able to take a few steps cautiously and very unsteady during turning.
77 year old male with PMH of HTN, HLD, DM 2, diabetic neuropathy, TIAs (was on Aggrenox), who presented to [facility] ED for right sided headaches for 1 month. Patient was followed by ophthalmology Dr. Santamaria for papilledema and was referred to come to [facility] ED for further evaluation. Ophthalmology evaluated for giant cell arteritis, As per family, for the past year he has had several falls and unsteady gait along with memory issues and inct. 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 He was transferred to [facility] on 8/5 for possible neurosurgical intervention for SDH. On 8/5/22, he underwent Left MMA embolization. Discharged on Keppra 500mg BID for seizure prophylaxis.  Recently presented to [facility] ED on 8/21 as code stroke with transient episode of dysarthria, likely TIA. No neurovascular intervention given no large vessel occlusion seen on imaging, no tPA given no disabling neurological deficit. C Keppra was increased to 750mg BID.   follow up 2 week CT scan 8/21 shows stable L subacute to chronic SDH, slightly decreased in size, measuring 10mm, previously 12mm. 6 week follow up CT scan done on 9/22/22 which shows continued decrease in size and density of left lateral convexity SDH measuring 7mm. Keppra tapered down to 500mg BID.  He follows with Dr. Cunha and brain MRI performed on 11/28/2022 showed moderate enlargement of ventricular system.. He underwent an LP in [place] and as per wife and family member, patient did not improve in terms of memory/cognition, urinary inct and gait instability. He was assessed by Dr. Cunha after LP and felt his gait improved.  Wife and family member state the triad of symptoms started approximately a year ago, but worsened in August 2022. He is significantly worse today than last month.  He is able to converse and appears oriented. He attempted to walk and was able to take a few steps cautiously and very unsteady during turning.

## 2023-02-14 NOTE — PHYSICAL THERAPY INITIAL EVALUATION ADULT - NSPTDISCHREC_GEN_A_CORE
Subacute Rehab to improve functional mobility and independence. If pt goes home, recommend Home PT, caregiver assist with all OOB mobility/ADLs for safe house entry to encourage energy conservation and reduce fall risk./Sub-acute Rehab
TBD pending further functional assessment

## 2023-02-14 NOTE — PHYSICAL THERAPY INITIAL EVALUATION ADULT - ADDITIONAL COMMENTS
Pt lives in a private home with wife there are 6 steps to enter, 14 steps inside home +handrail. Pt performed ADL/IADLs with assistance from family. Ambulates with RW with assist short distances, w/c for long distances. Owns DME: cane, RW, w/c

## 2023-02-14 NOTE — PROGRESS NOTE ADULT - SUBJECTIVE AND OBJECTIVE BOX
SUBJECTIVE:   Patient was seen and evaluated at bedside. Patient is resting in bed and is in no new acute distress.   OVERNIGHT EVENTS:   none   Vital Signs Last 24 Hrs  T(C): 37.2 (14 Feb 2023 09:37), Max: 37.2 (14 Feb 2023 09:37)  T(F): 98.9 (14 Feb 2023 09:37), Max: 98.9 (14 Feb 2023 09:37)  HR: 88 (14 Feb 2023 09:37) (70 - 88)  BP: 147/76 (14 Feb 2023 09:37) (125/71 - 176/83)  BP(mean): 109 (13 Feb 2023 23:00) (12 - 120)  RR: 18 (14 Feb 2023 09:37) (16 - 18)  SpO2: 98% (14 Feb 2023 09:37) (96% - 99%)    Parameters below as of 14 Feb 2023 09:37  Patient On (Oxygen Delivery Method): room air        PHYSICAL EXAM:    General: No Acute Distress     Neurological: sleepy but easily wakes up to voice, says name and place,   Following Commands, PERRL, EOMI, Face Symmetrical, Speech Fluent, Moving all extremities, Muscle Strength normal in all four extremities, No Drift, Sensation to Light Touch Intact    Pulmonary: Clear to Auscultation, No Rales, No Rhonchi, No Wheezes     Cardiovascular: S1, S2, Regular Rate and Rhythm     Gastrointestinal: Soft, Nontender, Nondistended     Incision: clean and dry     LABS:                        17.2   14.11 )-----------( 177      ( 14 Feb 2023 05:47 )             52.2    02-14    138  |  101  |  14  ----------------------------<  252<H>  4.5   |  24  |  0.81    Ca    8.5      14 Feb 2023 05:49  Phos  3.8     02-14  Mg     1.8     02-14 02-13 @ 07:01  -  02-14 @ 07:00  --------------------------------------------------------  IN: 420 mL / OUT: 1100 mL / NET: -680 mL    02-14 @ 07:01 - 02-14 @ 11:53  --------------------------------------------------------  IN: 0 mL / OUT: 250 mL / NET: -250 mL      DRAINS:     MEDICATIONS:  Antibiotics:  ceFAZolin   IVPB 2000 milliGRAM(s) IV Intermittent every 8 hours    Neuro:  acetaminophen     Tablet .. 650 milliGRAM(s) Oral every 6 hours PRN Temp greater or equal to 38C (100.4F), Mild Pain (1 - 3)  ondansetron Injectable 4 milliGRAM(s) IV Push every 6 hours PRN Nausea and/or Vomiting  oxyCODONE    IR 5 milliGRAM(s) Oral every 4 hours PRN Moderate Pain (4 - 6)    Cardiac:  losartan 100 milliGRAM(s) Oral daily    Pulm:  benzonatate 100 milliGRAM(s) Oral three times a day PRN Cough  guaiFENesin Oral Liquid (Sugar-Free) 100 milliGRAM(s) Oral every 6 hours PRN Cough    GI/:  polyethylene glycol 3350 17 Gram(s) Oral daily  senna 2 Tablet(s) Oral at bedtime    Other:   atorvastatin 20 milliGRAM(s) Oral at bedtime  dextrose 5%. 1000 milliLiter(s) IV Continuous <Continuous>  dextrose 5%. 1000 milliLiter(s) IV Continuous <Continuous>  dextrose 50% Injectable 25 Gram(s) IV Push once  dextrose 50% Injectable 12.5 Gram(s) IV Push once  dextrose 50% Injectable 25 Gram(s) IV Push once  dextrose Oral Gel 15 Gram(s) Oral once PRN Blood Glucose LESS THAN 70 milliGRAM(s)/deciliter  enoxaparin Injectable 40 milliGRAM(s) SubCutaneous <User Schedule>  glucagon  Injectable 1 milliGRAM(s) IntraMuscular once  insulin glargine Injectable (LANTUS) 16 Unit(s) SubCutaneous at bedtime  insulin lispro (ADMELOG) corrective regimen sliding scale   SubCutaneous three times a day before meals  insulin lispro (ADMELOG) corrective regimen sliding scale   SubCutaneous at bedtime  insulin lispro Injectable (ADMELOG) 6 Unit(s) SubCutaneous three times a day before meals    DIET: [] Regular [] CCD [] Renal [] Puree [] Dysphagia [] Tube Feeds:   ccd diet   IMAGING:   ACC: [id number] EXAM:  CT BRAIN   ORDERED BY: BRIANA SKAGGS     PROCEDURE DATE:  02/13/2023          INTERPRETATION:  INDICATIONS:  11PM CTH POSTOP  SHUNT    TECHNIQUE:  Serial axial images were obtained from the skull base to the   vertex withoutintravenous contrast. Sagittal and Coronal reformats were   performed    COMPARISON EXAMINATION: 2/12/2023    FINDINGS:  VENTRICLES AND SULCI:  Status post placement right parietal shunt   catheter with its tip at the level of the septum pellucidum. No change in   ventricle size compared with the prior. Trace air in the right frontal   horn of the lateral ventricle.  INTRA-AXIAL:  No mass, blood or abnormal attenuation is seen.  EXTRA-AXIAL:  No mass or collection is seen.  VISUALIZED SINUSES:  Clear.  VISUALIZED MASTOIDS:  Clear.  CALVARIUM: Right parietal ketan hole  MISCELLANEOUS:  None.    IMPRESSION: Status post placement shunt catheter with its tip at the   level of the septum pellucidum by a right parietal ketan hole . No change   in theventricle size compared with 2/12/2023    --- End of Report ---            DERICK VILLAREAL MD; Attending Radiologist  This document has been electronically signed. Feb 14 2023 10:05AM

## 2023-02-14 NOTE — PHYSICAL THERAPY INITIAL EVALUATION ADULT - GAIT DISTANCE, PT EVAL
Problem: OCCUPATIONAL THERAPY ADULT  Goal: Performs self-care activities at highest level of function for planned discharge setting  See evaluation for individualized goals  Treatment Interventions: ADL retraining, Continued evaluation, Activityengagement (coping skills instruction, life management instruction)          See flowsheet documentation for full assessment, interventions and recommendations  Outcome: Progressing  Limitation: Decreased Safe judgement during ADL, Decreased high-level ADLs, Mood limitation (decreased coping skills, decreased life management skills)  Prognosis: Fair  Assessment: Bernice Ashley was encouraged to join this morning OT Socialization program  He was quiet, pleasant, but stated that he did not think he would join on this day  However, he was present in the group room at the start of the group  He sat passively as the group carried out morning stretch exercises  His eye contact was limited even when his name was mentioned  He did leave the group early after only 10 minutes  Progress has been guarded, but he was willing to at least attend the start of this program  Continue to encourage Bernice Ashley to regularly join OT program, commending his efforts and positive group investment 
25 feet
x3/10 feet

## 2023-02-14 NOTE — PHYSICAL THERAPY INITIAL EVALUATION ADULT - STRENGTHENING, PT EVAL
GOAL: Patient will demonstrate a 1/3 increase in strength where deficient within 2 weeks to assist with performing functional mobility and ADLs.
GOAL: Patient will demonstrate a 1/3 increase in strength where deficient within 2 weeks to assist with performing functional mobility and ADLs.

## 2023-02-14 NOTE — PROGRESS NOTE ADULT - ASSESSMENT
75yo M with hx of DM-II with Neuropathy, TIAs (previously on Aggrenox), HTN, and HLD, SDH s/p MMA embolization ( aug 2022 ), with worsening gait imbalance and urinary incontinence, suspicion for NPH s/p LP in 12/2022 but did not have much improvement, now admitted for  shunt.     Shunt placed 2/13. Pt lethargic but arousable this morning. Family by bedside.

## 2023-02-14 NOTE — PHYSICAL THERAPY INITIAL EVALUATION ADULT - GAIT TRAINING, PT EVAL
GOAL: Patient will ambulate 300 feet independently with least restrictive assistive device in 2 weeks.
GOAL: Patient will ambulate 300 feet independently with least restrictive assistive device in 2 weeks.

## 2023-02-15 ENCOUNTER — TRANSCRIPTION ENCOUNTER (OUTPATIENT)
Age: 78
End: 2023-02-15

## 2023-02-15 LAB
APPEARANCE UR: CLEAR — SIGNIFICANT CHANGE UP
BASOPHILS # BLD AUTO: 0.04 K/UL — SIGNIFICANT CHANGE UP (ref 0–0.2)
BASOPHILS NFR BLD AUTO: 0.3 % — SIGNIFICANT CHANGE UP (ref 0–2)
BILIRUB UR-MCNC: NEGATIVE — SIGNIFICANT CHANGE UP
COLOR SPEC: YELLOW — SIGNIFICANT CHANGE UP
DIFF PNL FLD: NEGATIVE — SIGNIFICANT CHANGE UP
EOSINOPHIL # BLD AUTO: 0.16 K/UL — SIGNIFICANT CHANGE UP (ref 0–0.5)
EOSINOPHIL NFR BLD AUTO: 1.1 % — SIGNIFICANT CHANGE UP (ref 0–6)
GLUCOSE BLDC GLUCOMTR-MCNC: 166 MG/DL — HIGH (ref 70–99)
GLUCOSE BLDC GLUCOMTR-MCNC: 249 MG/DL — HIGH (ref 70–99)
GLUCOSE BLDC GLUCOMTR-MCNC: 255 MG/DL — HIGH (ref 70–99)
GLUCOSE BLDC GLUCOMTR-MCNC: 307 MG/DL — HIGH (ref 70–99)
GLUCOSE UR QL: ABNORMAL
HCT VFR BLD CALC: 48.2 % — SIGNIFICANT CHANGE UP (ref 39–50)
HGB BLD-MCNC: 15.8 G/DL — SIGNIFICANT CHANGE UP (ref 13–17)
IMM GRANULOCYTES NFR BLD AUTO: 0.4 % — SIGNIFICANT CHANGE UP (ref 0–0.9)
KETONES UR-MCNC: NEGATIVE — SIGNIFICANT CHANGE UP
LEUKOCYTE ESTERASE UR-ACNC: NEGATIVE — SIGNIFICANT CHANGE UP
LYMPHOCYTES # BLD AUTO: 1.42 K/UL — SIGNIFICANT CHANGE UP (ref 1–3.3)
LYMPHOCYTES # BLD AUTO: 10.1 % — LOW (ref 13–44)
MCHC RBC-ENTMCNC: 28.4 PG — SIGNIFICANT CHANGE UP (ref 27–34)
MCHC RBC-ENTMCNC: 32.8 GM/DL — SIGNIFICANT CHANGE UP (ref 32–36)
MCV RBC AUTO: 86.5 FL — SIGNIFICANT CHANGE UP (ref 80–100)
MONOCYTES # BLD AUTO: 1.69 K/UL — HIGH (ref 0–0.9)
MONOCYTES NFR BLD AUTO: 12.1 % — SIGNIFICANT CHANGE UP (ref 2–14)
NEUTROPHILS # BLD AUTO: 10.65 K/UL — HIGH (ref 1.8–7.4)
NEUTROPHILS NFR BLD AUTO: 76 % — SIGNIFICANT CHANGE UP (ref 43–77)
NITRITE UR-MCNC: NEGATIVE — SIGNIFICANT CHANGE UP
NRBC # BLD: 0 /100 WBCS — SIGNIFICANT CHANGE UP (ref 0–0)
PH UR: 6 — SIGNIFICANT CHANGE UP (ref 5–8)
PLATELET # BLD AUTO: 219 K/UL — SIGNIFICANT CHANGE UP (ref 150–400)
PROT UR-MCNC: NEGATIVE — SIGNIFICANT CHANGE UP
RBC # BLD: 5.57 M/UL — SIGNIFICANT CHANGE UP (ref 4.2–5.8)
RBC # FLD: 13 % — SIGNIFICANT CHANGE UP (ref 10.3–14.5)
SP GR SPEC: 1.02 — SIGNIFICANT CHANGE UP (ref 1.01–1.02)
UROBILINOGEN FLD QL: NEGATIVE — SIGNIFICANT CHANGE UP
WBC # BLD: 14.01 K/UL — HIGH (ref 3.8–10.5)
WBC # FLD AUTO: 14.01 K/UL — HIGH (ref 3.8–10.5)

## 2023-02-15 PROCEDURE — 99233 SBSQ HOSP IP/OBS HIGH 50: CPT

## 2023-02-15 PROCEDURE — 71045 X-RAY EXAM CHEST 1 VIEW: CPT | Mod: 26

## 2023-02-15 RX ORDER — INSULIN LISPRO 100/ML
8 VIAL (ML) SUBCUTANEOUS
Refills: 0 | Status: DISCONTINUED | OUTPATIENT
Start: 2023-02-15 | End: 2023-02-16

## 2023-02-15 RX ORDER — INSULIN GLARGINE 100 [IU]/ML
18 INJECTION, SOLUTION SUBCUTANEOUS AT BEDTIME
Refills: 0 | Status: DISCONTINUED | OUTPATIENT
Start: 2023-02-15 | End: 2023-02-16

## 2023-02-15 RX ADMIN — Medication 6 UNIT(S): at 07:56

## 2023-02-15 RX ADMIN — Medication 650 MILLIGRAM(S): at 01:41

## 2023-02-15 RX ADMIN — LOSARTAN POTASSIUM 100 MILLIGRAM(S): 100 TABLET, FILM COATED ORAL at 05:47

## 2023-02-15 RX ADMIN — Medication 100 MILLIGRAM(S): at 00:56

## 2023-02-15 RX ADMIN — Medication 4: at 16:31

## 2023-02-15 RX ADMIN — Medication 8: at 11:44

## 2023-02-15 RX ADMIN — Medication 2: at 07:57

## 2023-02-15 RX ADMIN — Medication 650 MILLIGRAM(S): at 00:41

## 2023-02-15 RX ADMIN — Medication 2: at 22:22

## 2023-02-15 RX ADMIN — ATORVASTATIN CALCIUM 20 MILLIGRAM(S): 80 TABLET, FILM COATED ORAL at 22:22

## 2023-02-15 RX ADMIN — INSULIN GLARGINE 18 UNIT(S): 100 INJECTION, SOLUTION SUBCUTANEOUS at 22:22

## 2023-02-15 RX ADMIN — Medication 8 UNIT(S): at 16:31

## 2023-02-15 RX ADMIN — ENOXAPARIN SODIUM 40 MILLIGRAM(S): 100 INJECTION SUBCUTANEOUS at 22:23

## 2023-02-15 RX ADMIN — Medication 6 UNIT(S): at 11:44

## 2023-02-15 NOTE — DIETITIAN INITIAL EVALUATION ADULT - REASON
Nutrition-focused physical examination deferred at this time - pt with stable wt hx, reporting good PO intake in-house and PTA. No overt signs of fat/muscle wasting visually observed.

## 2023-02-15 NOTE — PROGRESS NOTE ADULT - SUBJECTIVE AND OBJECTIVE BOX
SUBJECTIVE:   Patient was seen and evaluated at bedside. Patient is resting in bed and is in no new acute distress.   OVERNIGHT EVENTS:   none   Vital Signs Last 24 Hrs  T(C): 36.9 (15 Feb 2023 09:03), Max: 38.1 (15 Feb 2023 01:00)  T(F): 98.5 (15 Feb 2023 09:03), Max: 100.5 (15 Feb 2023 01:00)  HR: 63 (15 Feb 2023 09:03) (63 - 99)  BP: 145/69 (15 Feb 2023 09:03) (113/72 - 174/68)  BP(mean): --  RR: 18 (15 Feb 2023 09:03) (18 - 18)  SpO2: 96% (15 Feb 2023 09:03) (96% - 98%)    Parameters below as of 15 Feb 2023 04:55  Patient On (Oxygen Delivery Method): room air        PHYSICAL EXAM:    General: No Acute Distress     Neurological: Awake, alert oriented to person, place and year , Following Commands, PERRL, EOMI, Face Symmetrical, Speech Fluent, Moving all extremities, Muscle Strength normal in all four extremities, No Drift, Sensation to Light Touch Intact    Pulmonary: Clear to Auscultation, No Rales, No Rhonchi, No Wheezes     Cardiovascular: S1, S2, Regular Rate and Rhythm     Gastrointestinal: Soft, Nontender, Nondistended     Incision:   clean and dry   LABS:                        15.8   14.01 )-----------( 219      ( 15 Feb 2023 01:10 )             48.2    02-14    138  |  101  |  14  ----------------------------<  252<H>  4.5   |  24  |  0.81    Ca    8.5      14 Feb 2023 05:49  Phos  3.8     02-14  Mg     1.8     02-14 02-14 @ 07:01  -  02-15 @ 07:00  --------------------------------------------------------  IN: 680 mL / OUT: 250 mL / NET: 430 mL    02-15 @ 07:01  -  02-15 @ 11:22  --------------------------------------------------------  IN: 0 mL / OUT: 300 mL / NET: -300 mL      DRAINS:     MEDICATIONS:  Antibiotics:    Neuro:  acetaminophen     Tablet .. 650 milliGRAM(s) Oral every 6 hours PRN Temp greater or equal to 38C (100.4F), Mild Pain (1 - 3)  ondansetron Injectable 4 milliGRAM(s) IV Push every 6 hours PRN Nausea and/or Vomiting  oxyCODONE    IR 5 milliGRAM(s) Oral every 4 hours PRN Moderate Pain (4 - 6)    Cardiac:  losartan 100 milliGRAM(s) Oral daily    Pulm:  benzonatate 100 milliGRAM(s) Oral three times a day PRN Cough  guaiFENesin Oral Liquid (Sugar-Free) 100 milliGRAM(s) Oral every 6 hours PRN Cough    GI/:  polyethylene glycol 3350 17 Gram(s) Oral daily  senna 2 Tablet(s) Oral at bedtime    Other:   atorvastatin 20 milliGRAM(s) Oral at bedtime  dextrose 5%. 1000 milliLiter(s) IV Continuous <Continuous>  dextrose 5%. 1000 milliLiter(s) IV Continuous <Continuous>  dextrose 50% Injectable 25 Gram(s) IV Push once  dextrose 50% Injectable 12.5 Gram(s) IV Push once  dextrose 50% Injectable 25 Gram(s) IV Push once  dextrose Oral Gel 15 Gram(s) Oral once PRN Blood Glucose LESS THAN 70 milliGRAM(s)/deciliter  enoxaparin Injectable 40 milliGRAM(s) SubCutaneous <User Schedule>  glucagon  Injectable 1 milliGRAM(s) IntraMuscular once  insulin glargine Injectable (LANTUS) 16 Unit(s) SubCutaneous at bedtime  insulin lispro (ADMELOG) corrective regimen sliding scale   SubCutaneous three times a day before meals  insulin lispro (ADMELOG) corrective regimen sliding scale   SubCutaneous at bedtime  insulin lispro Injectable (ADMELOG) 6 Unit(s) SubCutaneous three times a day before meals    DIET: [] Regular [] CCD [] Renal [] Puree [] Dysphagia [] Tube Feeds:   ccd diet   IMAGING:   ACC: [id number] EXAM:  CT BRAIN   ORDERED BY: DEAN ZAVALA     PROCEDURE DATE:  02/14/2023          INTERPRETATION:  Clinical indication: Status post  shunt.    Multiple axial sections were performed from base skull to vertex without   contrast enhancement. Coronal and sagittal reconstructions performed    This exam is compared with prior head CT performed on February 13, 2023.    Parenchymal volume loss and chronic microvascular ischemic changes are   again seen and unchanged from    High right parietal ketan hole and shunt catheter is again seen. The   position of the shunt catheter appears unchanged when compared prior   exam. The overall size and configuration of ventricles appear unchanged    There is no acute hemorrhage mass or mass effect seen.    Evaluation of the osseous structures with the appropriate window appears   normal    Extracalvarial soft tissue swelling stables seen involving the right   parietal region.    Extra-axial high attenuation is seen involving the left vertex region   which is compatible with embolic material.    IMPRESSION: Stable exam.    --- End of Report ---            SARAH NEWBERRY MD; Attending Radiologist  This document has been electronically signed. Feb 14 2023  2:03PM

## 2023-02-15 NOTE — DIETITIAN INITIAL EVALUATION ADULT - PERTINENT MEDS FT
MEDICATIONS  (STANDING):  atorvastatin 20 milliGRAM(s) Oral at bedtime  dextrose 5%. 1000 milliLiter(s) (50 mL/Hr) IV Continuous <Continuous>  dextrose 5%. 1000 milliLiter(s) (100 mL/Hr) IV Continuous <Continuous>  dextrose 50% Injectable 25 Gram(s) IV Push once  dextrose 50% Injectable 12.5 Gram(s) IV Push once  dextrose 50% Injectable 25 Gram(s) IV Push once  enoxaparin Injectable 40 milliGRAM(s) SubCutaneous <User Schedule>  glucagon  Injectable 1 milliGRAM(s) IntraMuscular once  insulin glargine Injectable (LANTUS) 16 Unit(s) SubCutaneous at bedtime  insulin lispro (ADMELOG) corrective regimen sliding scale   SubCutaneous three times a day before meals  insulin lispro (ADMELOG) corrective regimen sliding scale   SubCutaneous at bedtime  insulin lispro Injectable (ADMELOG) 6 Unit(s) SubCutaneous three times a day before meals  losartan 100 milliGRAM(s) Oral daily  polyethylene glycol 3350 17 Gram(s) Oral daily  senna 2 Tablet(s) Oral at bedtime    MEDICATIONS  (PRN):  acetaminophen     Tablet .. 650 milliGRAM(s) Oral every 6 hours PRN Temp greater or equal to 38C (100.4F), Mild Pain (1 - 3)  benzonatate 100 milliGRAM(s) Oral three times a day PRN Cough  dextrose Oral Gel 15 Gram(s) Oral once PRN Blood Glucose LESS THAN 70 milliGRAM(s)/deciliter  guaiFENesin Oral Liquid (Sugar-Free) 100 milliGRAM(s) Oral every 6 hours PRN Cough  ondansetron Injectable 4 milliGRAM(s) IV Push every 6 hours PRN Nausea and/or Vomiting  oxyCODONE    IR 5 milliGRAM(s) Oral every 4 hours PRN Moderate Pain (4 - 6)

## 2023-02-15 NOTE — DISCHARGE NOTE PROVIDER - NSDCMRMEDTOKEN_GEN_ALL_CORE_FT
atorvastatin 20 mg oral tablet: 1 tab(s) orally once a day  Farxiga 10 mg oral tablet: 1 tab(s) orally once a day  HumaLOG 100 units/mL injectable solution: 20 unit(s) injectable 3 times a day (with meals)  insulin glargine 100 units/mL subcutaneous solution: 26 unit(s) subcutaneous once a day (at bedtime)  irbesartan 300 mg oral tablet: 1 tab(s) orally once a day  Ozempic 2 mg/1.5 mL (0.25 mg or 0.5 mg dose) subcutaneous solution: 0.25 microcurie subcutaneous every 7 days   acetaminophen 325 mg oral tablet: 2 tab(s) orally every 6 hours, As needed, Temp greater or equal to 38C (100.4F), Mild Pain (1 - 3)  atorvastatin 20 mg oral tablet: 1 tab(s) orally once a day  bisacodyl 5 mg oral delayed release tablet: 1 tab(s) orally once a day (at bedtime), As needed, Constipation  enoxaparin: 40 milligram(s) subcutaneous once a day (at bedtime)  Farxiga 10 mg oral tablet: 1 tab(s) orally once a day  HumaLOG 100 units/mL injectable solution: 20 unit(s) injectable 3 times a day (with meals)  insulin glargine 100 units/mL subcutaneous solution: 26 unit(s) subcutaneous once a day (at bedtime)  irbesartan 300 mg oral tablet: 1 tab(s) orally once a day  oxyCODONE 5 mg oral tablet: 1 tab(s) orally every 4 hours, As needed, Moderate Pain (4 - 6)  Ozempic 2 mg/1.5 mL (0.25 mg or 0.5 mg dose) subcutaneous solution: 0.25 microcurie subcutaneous every 7 days  polyethylene glycol 3350 oral powder for reconstitution: 17 gram(s) orally once a day  senna leaf extract oral tablet: 2 tab(s) orally once a day (at bedtime)  simethicone 80 mg oral tablet, chewable: 1 tab(s) orally 3 times a day   acetaminophen 325 mg oral tablet: 2 tab(s) orally every 6 hours, As needed, Temp greater or equal to 38C (100.4F), Mild Pain (1 - 3)  atorvastatin 20 mg oral tablet: 1 tab(s) orally once a day  bisacodyl 5 mg oral delayed release tablet: 1 tab(s) orally once a day (at bedtime), As needed, Constipation  enoxaparin: 40 milligram(s) subcutaneous once a day (at bedtime)  Farxiga 10 mg oral tablet: 1 tab(s) orally once a day  HumaLOG 100 units/mL injectable solution: 12unit(s) injectable 3 times a day (with meals)  insulin glargine 100 units/mL subcutaneous solution: 26 unit(s) subcutaneous once a day (at bedtime)  irbesartan 300 mg oral tablet: 1 tab(s) orally once a day  oxyCODONE 5 mg oral tablet: 1 tab(s) orally every 4 hours, As needed, Moderate Pain (4 - 6)  Ozempic 2 mg/1.5 mL (0.25 mg or 0.5 mg dose) subcutaneous solution: 0.25 microcurie subcutaneous every 7 days  polyethylene glycol 3350 oral powder for reconstitution: 17 gram(s) orally once a day  senna leaf extract oral tablet: 2 tab(s) orally once a day (at bedtime)  simethicone 80 mg oral tablet, chewable: 1 tab(s) orally 3 times a day

## 2023-02-15 NOTE — DIETITIAN INITIAL EVALUATION ADULT - PERTINENT LABORATORY DATA
02-14    138  |  101  |  14  ----------------------------<  252<H>  4.5   |  24  |  0.81    Ca    8.5      14 Feb 2023 05:49  Phos  3.8     02-14  Mg     1.8     02-14    CAPILLARY BLOOD GLUCOSE  POCT Blood Glucose.: 166 mg/dL (15 Feb 2023 07:43)  POCT Blood Glucose.: 191 mg/dL (14 Feb 2023 21:11)  POCT Blood Glucose.: 262 mg/dL (14 Feb 2023 16:18)    A1C with Estimated Average Glucose Result: 7.9 % (02-07-23 @ 05:04)  A1C with Estimated Average Glucose Result: 6.8 % (08-04-22 @ 18:30)

## 2023-02-15 NOTE — DIETITIAN INITIAL EVALUATION ADULT - ORAL INTAKE PTA/DIET HISTORY
Pt reports good appetite/PO intake PTA, was following a 'healthy diet' per wife, vegetables, fruits, variety.  Confirms NKFA.

## 2023-02-15 NOTE — DISCHARGE NOTE PROVIDER - CARE PROVIDER_API CALL
Jamal Isabel (MD; MS)  Unallocated  [address] [facility], [address] [place] [id number]  Phone: [phone]  Fax: [phone]  Follow Up Time: 2 weeks

## 2023-02-15 NOTE — DISCHARGE NOTE PROVIDER - NSDCFUADDAPPT_GEN_ALL_CORE_FT
PLEASE D/C STAPLES ON ON 2/24  Please make a follow up appointment with Dr DE LA O and your primary care physician once discharged from rehab

## 2023-02-15 NOTE — DIETITIAN INITIAL EVALUATION ADULT - ENTER FROM (CAL/KG)
He never had chest pain but did have some gastric symptoms--it's unclear if this is related but doubtful given the progression.  His initial troponin was minimally elevated at 0.031 with a repeat of 0.088.  I have reviewed the EKG and it reveals normal sinus rhythm without evidence of acute ischemia.  I have reviewed the chest X-ray and it reveals no focal infiltrates or pleural effusions.  Will monitor on the Observation Unit with serial cardiac markers and telemetry monitoring.   25

## 2023-02-15 NOTE — PROGRESS NOTE ADULT - ASSESSMENT
77yo M with hx of DM-II with Neuropathy, TIAs (previously on Aggrenox), HTN, and HLD, SDH s/p MMA embolization ( aug 2022 ), with worsening gait imbalance and urinary incontinence, suspicion for NPH s/p LP in 12/2022 but did not have much improvement, now admitted for  shunt.     Shunt placed 2/13. Lethargic yesterday, more alert today.

## 2023-02-15 NOTE — DIETITIAN INITIAL EVALUATION ADULT - NSFNSPHYEXAMSKINFT_GEN_A_CORE
No pressure injuries noted per flowsheets.  Surgical incisions: lumbar spine, R head  shunt site, and Abdomen lap sites x4

## 2023-02-15 NOTE — PROGRESS NOTE ADULT - ASSESSMENT
HPI:  77 year old male with PMH of HTN, HLD, DM 2, diabetic neuropathy, TIAs (was on Aggrenox), who presented to [facility] ED for right sided headaches for 1 month. 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 Ophthalmology evaluated for giant cell arteritis, As per family, for the past year he has had several falls and unsteady gait along with memory issues and inct. 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 He was transferred to [facility] on 8/5 for possible neurosurgical intervention for SDH. He was enrolled into the EMBOLISE trial, randomized to intervention group, and On 8/5/22, he underwent Left MMA embolization. Discharged on Keppra 500mg BID for seizure prophylaxis.  Recently presented to [facility] ED on 8/21 as code stroke with transient episode of dysarthria, likely TIA. No neurovascular intervention given no large vessel occlusion seen on imaging, no tPA given no disabling neurological deficit. Keppra was increased to 750mg BID.   follow up 2 week CT scan 8/21 shows stable L subacute to chronic SDH, slightly decreased in size, measuring 10mm, previously 12mm. 6 week follow up CT scan done on 9/22/22 which shows continued decrease in size and density of left lateral convexity SDH measuring 7mm. Keppra tapered down to 500mg BID.  He follows with Dr. Cunha and brain MRI performed on 11/28/2022 showed moderate enlargement of ventricular system.. He underwent an LP in [place] and as per wife and family member, patient did not improve in terms of memory/cognition, urinary inct and gait instability. He was assessed by Dr. Cunha after LP and felt his gait improved.  Wife and family member state the triad of symptoms started approximately a year ago, but worsened in August 2022. He is significantly worse today than last month.  He is able to converse and appears oriented. He attempted to walk and was able to take a few steps cautiously and very unsteady during turning.  (06 Feb 2023 10:31)    PROCEDURE: Laparoscopy, with ventriculoperitoneal shunt insertion  s/p lumbar drain placement - for nph trial   Ventriculoperitoneal shunt  s/p  shunt placement done on 2/13 certas @4      POD#  2  PLAN:  1 ct head on 2/14 stable - may need another ct prior to dc   2 out of bed   3 pt/ot eval   4 room air   5 blood pressure stable -continue on losartan   6 regular diet   7 stool softener   8 last bm 2/14  9 dm- continue on lantus and premeal admelog along with sliding scale   10 tmax 100.5 UA negative cxr stable - will encourage incentive spirometry   11 dvt ppx sql and scds   12 dispo : p    will discuss with Dr. Isabel   spectra [id number]

## 2023-02-15 NOTE — DIETITIAN INITIAL EVALUATION ADULT - PERSON TAUGHT/METHOD
Provided education on Nutrition Therapy for Type 2 Diabetes. Emphasis on what foods contain carbohydrates, importance of pairing carbohydrates with protein for glycemic control; choosing whole grains vs refined carbohydrates; limiting refined sugars, portion sizes. Good comprehension noted. Pt and family made aware RD to remain available for any questions./verbal instruction/teach back - (Patient repeats in own words)/patient instructed/spouse instructed/support person

## 2023-02-15 NOTE — DIETITIAN INITIAL EVALUATION ADULT - NSFNSGIIOFT_GEN_A_CORE
Denies nausea, vomiting, constipation, diarrhea. Reports last BM 2/14. Pt currently on bowel regimen (miralax, senna).

## 2023-02-15 NOTE — DISCHARGE NOTE PROVIDER - HOSPITAL COURSE
Patient went to OR on 2/6 and had a lumbar drain placed. Post procedure patient was moved to floor. Patient was seen by physical therapy before drainage. Patient had drainage every 4 hours and reevaluation by PT for 3 days. Patient was noted to have improvement after drainage. Patient's lumbar drain was removed on 2/10. Patient had a stereotactic ct head done for  shunt planning. Patient went to OR on 2/13 and had a  shunt placed Certas @4. Post procedure patient had a ct head and it was stable. Patient was in recovery room and was moved to floor once stable. General surgery followed patient for distal approach of the  shunt. Patient was continued to be seen by physical therapy and was recommended for subacute rehab. Patient had low grade temperature. UA was negative and cxr was stable. Patient had follow up ct head and it was stable. Patient was discharged to rehab with follow up instructions. On 2/6 patient had a lumbar drain placed under fluoroscopic guidance. Post procedure patient was moved to floor. Patient was seen by physical therapy before drainage. Patient had drainage 20cc every 4 hours and reevaluation by PT for 3 days, and initially there was no improvement. Drainage was then increased to 30cc every 4 hours and re-evaluation showed significant improvement by PT. Patient's lumbar drain was removed on 2/10. Patient had a stereotactic ct head done for  shunt planning. Patient went to OR on 2/13 and had a  shunt placed Certas @4. Post procedure patient had a ct head and it was stable. Patient was in recovery room and was moved to floor once stable. General surgery followed patient for distal approach of the  shunt. Patient was continued to be seen by physical therapy and was recommended for subacute rehab. Patient had low grade temperature. UA was negative and cxr was stable. Patient had follow up ct head and it was stable. Patient was discharged to rehab with follow up instructions. On 2/6 patient had a lumbar drain placed under fluoroscopic guidance. Post procedure patient was moved to floor. Patient was seen by physical therapy before drainage. Patient had drainage 20cc every 4 hours and reevaluation by PT for 3 days, and initially there was no improvement. Drainage was then increased to 30cc every 4 hours and re-evaluation showed significant improvement by PT. Patient's lumbar drain was removed on 2/10. Patient had a stereotactic ct head done for  shunt planning. Patient went to OR on 2/13 and had a  shunt placed Certas @4. Post procedure patient had a ct head and it was stable. Patient was in recovery room and was moved to floor once stable. General surgery followed patient for distal approach of the  shunt. Patient was continued to be seen by physical therapy and was recommended for subacute rehab. Patient had low grade temperature. UA was negative and cxr was stable. Patient had follow up ct head and it shows increased hygroma, shunt setting was changed to Certas 5. Patient was discharged to rehab with follow up instructions.

## 2023-02-15 NOTE — DIETITIAN INITIAL EVALUATION ADULT - ADD RECOMMEND
1) Continue Consistent Carbohydrate diet  2) Monitor PO intake, GI tolerance, skin integrity, labs, weight, and bowel movement regularity.   3) Honor food preferences as feasible. Assist with meals PRN and encourage PO intake.  4) RD remains available upon request and will follow-up per protocol.

## 2023-02-15 NOTE — DISCHARGE NOTE PROVIDER - NSDCCPCAREPLAN_GEN_ALL_CORE_FT
PRINCIPAL DISCHARGE DIAGNOSIS  Diagnosis: Normal pressure hydrocephalus  Assessment and Plan of Treatment: You were treated with a VPS and you are doing better. you are stable to be discharged to rehab today  Please follow up with Dr Isabel and your primary care physician once discharged from rehab      SECONDARY DISCHARGE DIAGNOSES  Diagnosis: Hyperlipidemia  Assessment and Plan of Treatment: Continue Atorvastatin and follow up with your primary care physician    Diagnosis: Hypertension  Assessment and Plan of Treatment: Continue your home medications and follow up with your primary care physician    Diagnosis: Diabetes mellitus with hyperglycemia  Assessment and Plan of Treatment: Continue your home medications and follow up with your primary care physician

## 2023-02-15 NOTE — DISCHARGE NOTE PROVIDER - NSDCFUSCHEDAPPT_GEN_ALL_CORE_FT
JADE OJEDA Physician [facility]  OPHTHALM [address]  Scheduled Appointment: 04/18/2023

## 2023-02-15 NOTE — DIETITIAN INITIAL EVALUATION ADULT - NSFNSADHERENCEPTAFT_GEN_A_CORE
Pt noted with hx of DM2. Reports taking farxiga, ozempic, humalog, and lantus PTA. Reports checking fingersticks 4x/day with typical range of readings 92-200s mg/dl. A1c 7.9% indicates suboptimal glycemic control (noted increase from 6.8% 08/04/22).

## 2023-02-15 NOTE — DIETITIAN INITIAL EVALUATION ADULT - REASON INDICATOR FOR ASSESSMENT
Pt seen for length of stay assessment.   Source of information: medical record, pt, pt's family at bedside. Pt known to this RD from previous encounter in 08/2022. Chart reviewed, events noted.

## 2023-02-15 NOTE — DISCHARGE NOTE PROVIDER - NSDCFUADDINST_GEN_ALL_CORE_FT
-May take shower. Let water run over surgical site and pat dry after shower.  -If notice any new weakness, seizure, fever, discharge or opening at surgical site then please contact Dr. Isabel or come back to emergency room.  -May take shower. Let water run over surgical site and pat dry after shower.  -If notice any new weakness, seizure, fever, discharge or opening at surgical site then please contact Dr. Isabel or come back to emergency room.   PLEASE D/C STAPLES ON ON 2/24

## 2023-02-15 NOTE — DIETITIAN INITIAL EVALUATION ADULT - OTHER INFO
Reports  lbs. Denies recent wt changes.   Dosing wt: 149.9 lbs (02-13)  Wt history per chart: 150 lbs (08/05/22), 150 lbs (12/07/22), 152 lb (12/29/21). Wt appears stable, consistent with pt report. RD to continue to monitor weight trends as able/available.     Per chart, pt currently ordered for lantus, admelog SSI, admelog before meals, and atorvastatin in-house.

## 2023-02-15 NOTE — DIETITIAN INITIAL EVALUATION ADULT - REASON FOR ADMISSION
Traumatic subdural hemorrhage with loss of consciousness of unspecified duration, initial encounter    Per chart: 77yo M with hx of DM-II with Neuropathy, TIAs (previously on Aggrenox), HTN, and HLD, SDH s/p MMA embolization ( aug 2022 ), with worsening gait imbalance and urinary incontinence, suspicion for NPH s/p LP in 12/2022 but did not have much improvement, now admitted for  shunt. Shunt placed 2/13.

## 2023-02-15 NOTE — PROGRESS NOTE ADULT - SUBJECTIVE AND OBJECTIVE BOX
Patient is a 77y old  Male who presents with a chief complaint of Patient was admitted for NPH evaluation. h/o falls unsteady gait and memory loss.  (15 Feb 2023 13:40)      SUBJECTIVE / OVERNIGHT EVENTS: Fever 100.5 overnight. More alert today, up in chair, able to follow commands.     MEDICATIONS  (STANDING):  atorvastatin 20 milliGRAM(s) Oral at bedtime  dextrose 5%. 1000 milliLiter(s) (50 mL/Hr) IV Continuous <Continuous>  dextrose 5%. 1000 milliLiter(s) (100 mL/Hr) IV Continuous <Continuous>  dextrose 50% Injectable 25 Gram(s) IV Push once  dextrose 50% Injectable 12.5 Gram(s) IV Push once  dextrose 50% Injectable 25 Gram(s) IV Push once  enoxaparin Injectable 40 milliGRAM(s) SubCutaneous <User Schedule>  glucagon  Injectable 1 milliGRAM(s) IntraMuscular once  insulin glargine Injectable (LANTUS) 16 Unit(s) SubCutaneous at bedtime  insulin lispro (ADMELOG) corrective regimen sliding scale   SubCutaneous three times a day before meals  insulin lispro (ADMELOG) corrective regimen sliding scale   SubCutaneous at bedtime  insulin lispro Injectable (ADMELOG) 6 Unit(s) SubCutaneous three times a day before meals  losartan 100 milliGRAM(s) Oral daily  polyethylene glycol 3350 17 Gram(s) Oral daily  senna 2 Tablet(s) Oral at bedtime    MEDICATIONS  (PRN):  acetaminophen     Tablet .. 650 milliGRAM(s) Oral every 6 hours PRN Temp greater or equal to 38C (100.4F), Mild Pain (1 - 3)  benzonatate 100 milliGRAM(s) Oral three times a day PRN Cough  dextrose Oral Gel 15 Gram(s) Oral once PRN Blood Glucose LESS THAN 70 milliGRAM(s)/deciliter  guaiFENesin Oral Liquid (Sugar-Free) 100 milliGRAM(s) Oral every 6 hours PRN Cough  ondansetron Injectable 4 milliGRAM(s) IV Push every 6 hours PRN Nausea and/or Vomiting  oxyCODONE    IR 5 milliGRAM(s) Oral every 4 hours PRN Moderate Pain (4 - 6)      CAPILLARY BLOOD GLUCOSE      POCT Blood Glucose.: 307 mg/dL (15 Feb 2023 11:38)  POCT Blood Glucose.: 166 mg/dL (15 Feb 2023 07:43)  POCT Blood Glucose.: 191 mg/dL (2023 21:11)  POCT Blood Glucose.: 262 mg/dL (2023 16:18)    I&O's Summary    2023 07:01  -  15 Feb 2023 07:00  --------------------------------------------------------  IN: 680 mL / OUT: 250 mL / NET: 430 mL    15 Feb 2023 07:01  -  15 Feb 2023 15:11  --------------------------------------------------------  IN: 0 mL / OUT: 300 mL / NET: -300 mL        PHYSICAL EXAM:  T(C): 37.2 (02-15-23 @ 13:23), Max: 38.1 (02-15-23 @ 01:00)  HR: 94 (02-15-23 @ 13:23) (63 - 99)  BP: 159/81 (02-15-23 @ 13:23) (113/72 - 174/68)  RR: 18 (02-15-23 @ 13:23) (18 - 18)  SpO2: 97% (02-15-23 @ 13:23) (96% - 98%)  CONSTITUTIONAL: NAD, well-developed, well-groomed  EYES: PERRLA; conjunctiva and sclera clear  ENMT: Moist oral mucosa, no pharyngeal injection or exudates; normal dentition  NECK: Supple, no palpable masses; no thyromegaly  RESPIRATORY: Normal respiratory effort; lungs are clear to auscultation bilaterally  CARDIOVASCULAR: Regular rate and rhythm, normal S1 and S2, no murmur/rub/gallop; No lower extremity edema; Peripheral pulses are 2+ bilaterally  ABDOMEN: Nontender to palpation, normoactive bowel sounds, no rebound/guarding; No hepatosplenomegaly  MUSCULOSKELETAL:  No clubbing or cyanosis of digits; no joint swelling or tenderness to palpation  PSYCH: A+O to person, place, and time; affect appropriate  NEUROLOGY: CN 2-12 are intact and symmetric; no gross sensory deficits   SKIN: No rashes; no palpable lesions    LABS:                        15.8   14.01 )-----------( 219      ( 15 Feb 2023 01:10 )             48.2     02-14    138  |  101  |  14  ----------------------------<  252<H>  4.5   |  24  |  0.81    Ca    8.5      2023 05:49  Phos  3.8     -14  Mg     1.8     -14            Urinalysis Basic - ( 15 Feb 2023 10:20 )    Color: Yellow / Appearance: Clear / S.016 / pH: x  Gluc: x / Ketone: Negative  / Bili: Negative / Urobili: Negative   Blood: x / Protein: Negative / Nitrite: Negative   Leuk Esterase: Negative / RBC: x / WBC x   Sq Epi: x / Non Sq Epi: x / Bacteria: x        RADIOLOGY & ADDITIONAL TESTS:    Imaging Personally Reviewed:    Consultant(s) Notes Reviewed:      Care Discussed with Consultants/Other Providers: Abi

## 2023-02-16 LAB
GLUCOSE BLDC GLUCOMTR-MCNC: 167 MG/DL — HIGH (ref 70–99)
GLUCOSE BLDC GLUCOMTR-MCNC: 221 MG/DL — HIGH (ref 70–99)
GLUCOSE BLDC GLUCOMTR-MCNC: 282 MG/DL — HIGH (ref 70–99)
GLUCOSE BLDC GLUCOMTR-MCNC: 294 MG/DL — HIGH (ref 70–99)
HCT VFR BLD CALC: 47.2 % — SIGNIFICANT CHANGE UP (ref 39–50)
HGB BLD-MCNC: 15.4 G/DL — SIGNIFICANT CHANGE UP (ref 13–17)
MCHC RBC-ENTMCNC: 28.6 PG — SIGNIFICANT CHANGE UP (ref 27–34)
MCHC RBC-ENTMCNC: 32.6 GM/DL — SIGNIFICANT CHANGE UP (ref 32–36)
MCV RBC AUTO: 87.6 FL — SIGNIFICANT CHANGE UP (ref 80–100)
NRBC # BLD: 0 /100 WBCS — SIGNIFICANT CHANGE UP (ref 0–0)
PLATELET # BLD AUTO: 195 K/UL — SIGNIFICANT CHANGE UP (ref 150–400)
RBC # BLD: 5.39 M/UL — SIGNIFICANT CHANGE UP (ref 4.2–5.8)
RBC # FLD: 13 % — SIGNIFICANT CHANGE UP (ref 10.3–14.5)
SARS-COV-2 RNA SPEC QL NAA+PROBE: SIGNIFICANT CHANGE UP
WBC # BLD: 12.76 K/UL — HIGH (ref 3.8–10.5)
WBC # FLD AUTO: 12.76 K/UL — HIGH (ref 3.8–10.5)

## 2023-02-16 PROCEDURE — 99232 SBSQ HOSP IP/OBS MODERATE 35: CPT

## 2023-02-16 RX ORDER — INSULIN GLARGINE 100 [IU]/ML
22 INJECTION, SOLUTION SUBCUTANEOUS AT BEDTIME
Refills: 0 | Status: DISCONTINUED | OUTPATIENT
Start: 2023-02-16 | End: 2023-02-17

## 2023-02-16 RX ORDER — SIMETHICONE 80 MG/1
80 TABLET, CHEWABLE ORAL THREE TIMES A DAY
Refills: 0 | Status: DISCONTINUED | OUTPATIENT
Start: 2023-02-16 | End: 2023-02-17

## 2023-02-16 RX ORDER — INSULIN LISPRO 100/ML
10 VIAL (ML) SUBCUTANEOUS
Refills: 0 | Status: DISCONTINUED | OUTPATIENT
Start: 2023-02-16 | End: 2023-02-17

## 2023-02-16 RX ADMIN — INSULIN GLARGINE 22 UNIT(S): 100 INJECTION, SOLUTION SUBCUTANEOUS at 21:47

## 2023-02-16 RX ADMIN — SIMETHICONE 80 MILLIGRAM(S): 80 TABLET, CHEWABLE ORAL at 15:21

## 2023-02-16 RX ADMIN — ENOXAPARIN SODIUM 40 MILLIGRAM(S): 100 INJECTION SUBCUTANEOUS at 21:47

## 2023-02-16 RX ADMIN — ATORVASTATIN CALCIUM 20 MILLIGRAM(S): 80 TABLET, FILM COATED ORAL at 21:46

## 2023-02-16 RX ADMIN — Medication 10 UNIT(S): at 16:27

## 2023-02-16 RX ADMIN — Medication 8 UNIT(S): at 07:49

## 2023-02-16 RX ADMIN — Medication 6: at 11:29

## 2023-02-16 RX ADMIN — Medication 2: at 07:49

## 2023-02-16 RX ADMIN — SENNA PLUS 2 TABLET(S): 8.6 TABLET ORAL at 21:47

## 2023-02-16 RX ADMIN — LOSARTAN POTASSIUM 100 MILLIGRAM(S): 100 TABLET, FILM COATED ORAL at 05:50

## 2023-02-16 RX ADMIN — Medication 100 MILLIGRAM(S): at 01:51

## 2023-02-16 RX ADMIN — Medication 8 UNIT(S): at 11:30

## 2023-02-16 RX ADMIN — SIMETHICONE 80 MILLIGRAM(S): 80 TABLET, CHEWABLE ORAL at 21:46

## 2023-02-16 RX ADMIN — POLYETHYLENE GLYCOL 3350 17 GRAM(S): 17 POWDER, FOR SOLUTION ORAL at 11:31

## 2023-02-16 RX ADMIN — Medication 6: at 16:27

## 2023-02-16 NOTE — PROGRESS NOTE ADULT - ASSESSMENT
77yo M with hx of DM-II with Neuropathy, TIAs (previously on Aggrenox), HTN, and HLD, SDH s/p MMA embolization ( aug 2022 ), with worsening gait imbalance and urinary incontinence, suspicion for NPH s/p LP in 12/2022 but did not have much improvement, now admitted for  shunt.     Shunt placed 2/13. Currently in bed, alert, asking to work w PT.

## 2023-02-16 NOTE — PROGRESS NOTE ADULT - ASSESSMENT
77 year old male with PMH of HTN, HLD, DM 2, diabetic neuropathy, TIAs (was on Aggrenox), who presented to [facility] ED for right sided headaches for 1 month. Patient was followed by ophthalmology Dr. Santamaria for papilledema and was referred to come to [facility] ED for further evaluation. Ophthalmology evaluated for giant cell arteritis, As per family, for the past year he has had several falls and unsteady gait along with memory issues and inct. 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 He was transferred to [facility] on 8/5 for possible neurosurgical intervention for SDH. He was enrolled into the EMBOLISE trial, randomized to intervention group, and On 8/5/22, he underwent Left MMA embolization. Discharged on Keppra 500mg BID for seizure prophylaxis.  Recently presented to [facility] ED on 8/21 as code stroke with transient episode of dysarthria, likely TIA. No neurovascular intervention given no large vessel occlusion seen on imaging, no tPA given no disabling neurological deficit. Keppra was increased to 750mg BID.   follow up 2 week CT scan 8/21 shows stable L subacute to chronic SDH, slightly decreased in size, measuring 10mm, previously 12mm. 6 week follow up CT scan done on 9/22/22 which shows continued decrease in size and density of left lateral convexity SDH measuring 7mm. Keppra tapered down to 500mg BID.  He follows with Dr. Cunha and brain MRI performed on 11/28/2022 showed moderate enlargement of ventricular system.. He underwent an LP in [place] and as per wife and family member, patient did not improve in terms of memory/cognition, urinary inct and gait instability. He was assessed by Dr. Cunha after LP and felt his gait improved.  Wife and family member state the triad of symptoms started approximately a year ago, but worsened in August 2022. He is significantly worse today than last month.  He is able to converse and appears oriented. He attempted to walk and was able to take a few steps cautiously and very unsteady during turning.  (06 Feb 2023 10:31)    PROCEDURE: Laparoscopy, with ventriculoperitoneal shunt insertion  s/p lumbar drain placement - for nph trial   Ventriculoperitoneal shunt  s/p  shunt placement done on 2/13 certas @4      POD#  2  PLAN:  1 ct head on 2/14 stable - may need another ct prior to dc   2 out of bed   3 pt/ot eval   4 room air   5 blood pressure stable -continue on losartan   6 regular diet   7 stool softener   8 last bm 2/14  9 dm- continue on lantus and premeal admelog along with sliding scale   10 tmax 100.5 UA negative cxr stable - will encourage incentive spirometry   11 dvt ppx sql and scds   12 dispo : p    will discuss with Dr. Isabel   spectra [phone]           77 year old male with PMH of HTN, HLD, DM 2, diabetic neuropathy, TIAs (was on Aggrenox), who presented to [facility] ED for right sided headaches for 1 month. Patient was followed by ophthalmology Dr. Santamaria for papilledema and was referred to come to [facility] ED for further evaluation. Ophthalmology evaluated for giant cell arteritis, As per family, for the past year he has had several falls and unsteady gait along with memory issues and inct. 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 He was transferred to [facility] on 8/5 for possible neurosurgical intervention for SDH. He was enrolled into the EMBOLISE trial, randomized to intervention group, and On 8/5/22, he underwent Left MMA embolization. Discharged on Keppra 500mg BID for seizure prophylaxis.  Recently presented to [facility] ED on 8/21 as code stroke with transient episode of dysarthria, likely TIA. No neurovascular intervention given no large vessel occlusion seen on imaging, no tPA given no disabling neurological deficit. Keppra was increased to 750mg BID.   follow up 2 week CT scan 8/21 shows stable L subacute to chronic SDH, slightly decreased in size, measuring 10mm, previously 12mm. 6 week follow up CT scan done on 9/22/22 which shows continued decrease in size and density of left lateral convexity SDH measuring 7mm. Keppra tapered down to 500mg BID.  He follows with Dr. Cunha and brain MRI performed on 11/28/2022 showed moderate enlargement of ventricular system.. He underwent an LP in [place] and as per wife and family member, patient did not improve in terms of memory/cognition, urinary inct and gait instability. He was assessed by Dr. Cunha after LP and felt his gait improved.  Wife and family member state the triad of symptoms started approximately a year ago, but worsened in August 2022. He is significantly worse today than last month.  He is able to converse and appears oriented. He attempted to walk and was able to take a few steps cautiously and very unsteady during turning.  (06 Feb 2023 10:31)    PROCEDURE: Laparoscopy, with ventriculoperitoneal shunt insertion  s/p lumbar drain placement - for nph trial   Ventriculoperitoneal shunt  s/p  shunt placement done on 2/13 certas @4      POD#3 s/p VPS     PLAN:  1 NEURO CHECKS Q 4HRS  2 out of bed   3 pt/ot F/U   4 will repeat head Ct in am  5 pos FUNMILAYO in am    Pulmonary  -room air   -will encourage incentive spirometry    CV  -blood pressure stable -continue on losartan     GI  -regular diet   -stool softener : miralax and senna  - last bm 2/14    ENDO  -continue on lantus and premeal admelog along with sliding scale    ID   Afebrile - will encourage incentive spirometry     HEME  - dvt ppx sql and scds     Dispo : FUNMILAYO    will discuss with Dr. Isabel   spectra [phone]

## 2023-02-16 NOTE — PROGRESS NOTE ADULT - SUBJECTIVE AND OBJECTIVE BOX
Patient is a 77y old  Male who presents with a chief complaint of Hydrocephalus (2023 08:15)      SUBJECTIVE / OVERNIGHT EVENTS: Pt in bed, feels well. No recurrent fever.     MEDICATIONS  (STANDING):  atorvastatin 20 milliGRAM(s) Oral at bedtime  dextrose 5%. 1000 milliLiter(s) (50 mL/Hr) IV Continuous <Continuous>  dextrose 5%. 1000 milliLiter(s) (100 mL/Hr) IV Continuous <Continuous>  dextrose 50% Injectable 25 Gram(s) IV Push once  dextrose 50% Injectable 12.5 Gram(s) IV Push once  dextrose 50% Injectable 25 Gram(s) IV Push once  enoxaparin Injectable 40 milliGRAM(s) SubCutaneous <User Schedule>  glucagon  Injectable 1 milliGRAM(s) IntraMuscular once  insulin glargine Injectable (LANTUS) 18 Unit(s) SubCutaneous at bedtime  insulin lispro (ADMELOG) corrective regimen sliding scale   SubCutaneous three times a day before meals  insulin lispro (ADMELOG) corrective regimen sliding scale   SubCutaneous at bedtime  insulin lispro Injectable (ADMELOG) 8 Unit(s) SubCutaneous three times a day before meals  losartan 100 milliGRAM(s) Oral daily  polyethylene glycol 3350 17 Gram(s) Oral daily  senna 2 Tablet(s) Oral at bedtime    MEDICATIONS  (PRN):  acetaminophen     Tablet .. 650 milliGRAM(s) Oral every 6 hours PRN Temp greater or equal to 38C (100.4F), Mild Pain (1 - 3)  benzonatate 100 milliGRAM(s) Oral three times a day PRN Cough  bisacodyl 5 milliGRAM(s) Oral at bedtime PRN Constipation  dextrose Oral Gel 15 Gram(s) Oral once PRN Blood Glucose LESS THAN 70 milliGRAM(s)/deciliter  guaiFENesin Oral Liquid (Sugar-Free) 100 milliGRAM(s) Oral every 6 hours PRN Cough  ondansetron Injectable 4 milliGRAM(s) IV Push every 6 hours PRN Nausea and/or Vomiting  oxyCODONE    IR 5 milliGRAM(s) Oral every 4 hours PRN Moderate Pain (4 - 6)      CAPILLARY BLOOD GLUCOSE      POCT Blood Glucose.: 282 mg/dL (2023 11:22)  POCT Blood Glucose.: 167 mg/dL (2023 07:36)  POCT Blood Glucose.: 255 mg/dL (15 Feb 2023 22:19)  POCT Blood Glucose.: 249 mg/dL (15 Feb 2023 16:16)    I&O's Summary    15 Feb 2023 07:01  -  2023 07:00  --------------------------------------------------------  IN: 120 mL / OUT: 300 mL / NET: -180 mL        PHYSICAL EXAM:  T(C): 36.9 (23 @ 08:37), Max: 37.3 (02-15-23 @ 20:47)  HR: 83 (23 @ 08:37) (82 - 94)  BP: 154/87 (23 @ 08:37) (150/78 - 159/81)  RR: 18 (23 @ 08:37) (18 - 18)  SpO2: 96% (23 @ 08:37) (96% - 97%)  CONSTITUTIONAL: NAD, well-developed, well-groomed  EYES: PERRLA; conjunctiva and sclera clear  ENMT: Moist oral mucosa, no pharyngeal injection or exudates; normal dentition  NECK: Supple, no palpable masses; no thyromegaly  RESPIRATORY: Normal respiratory effort; lungs are clear to auscultation bilaterally  CARDIOVASCULAR: Regular rate and rhythm, normal S1 and S2, no murmur/rub/gallop; No lower extremity edema; Peripheral pulses are 2+ bilaterally  ABDOMEN: Nontender to palpation, normoactive bowel sounds, no rebound/guarding; No hepatosplenomegaly  MUSCULOSKELETAL:  No clubbing or cyanosis of digits; no joint swelling or tenderness to palpation  PSYCH: A+O to person, place, and time; affect appropriate  NEUROLOGY: CN 2-12 are intact, moving all extremities  SKIN: No rashes; no palpable lesions    LABS:                        15.4   12.76 )-----------( 195      ( 2023 05:31 )             47.2           Urinalysis Basic - ( 15 Feb 2023 10:20 )    Color: Yellow / Appearance: Clear / S.016 / pH: x  Gluc: x / Ketone: Negative  / Bili: Negative / Urobili: Negative   Blood: x / Protein: Negative / Nitrite: Negative   Leuk Esterase: Negative / RBC: x / WBC x   Sq Epi: x / Non Sq Epi: x / Bacteria: x        RADIOLOGY & ADDITIONAL TESTS:    Imaging Personally Reviewed:    Consultant(s) Notes Reviewed:      Care Discussed with Consultants/Other Providers: Nsx

## 2023-02-16 NOTE — PROGRESS NOTE ADULT - SUBJECTIVE AND OBJECTIVE BOX
Post-op day # 3 s/p VPS    Overnight event: no acute event      Vital Signs Last 24 Hrs  T(C): 36.8 (16 Feb 2023 04:57), Max: 37.3 (15 Feb 2023 20:47)  T(F): 98.3 (16 Feb 2023 04:57), Max: 99.1 (15 Feb 2023 20:47)  HR: 83 (16 Feb 2023 04:57) (63 - 94)  BP: 151/80 (16 Feb 2023 04:57) (145/69 - 159/81)  BP(mean): --  RR: 18 (16 Feb 2023 04:57) (18 - 18)  SpO2: 96% (16 Feb 2023 04:57) (96% - 97%)    Parameters below as of 16 Feb 2023 04:57  Patient On (Oxygen Delivery Method): room air                              15.4   12.76 )-----------( 195      ( 16 Feb 2023 05:31 )             47.2           Stroke Core Measures      DRAIN OUTPUT:     NEUROIMAGING:     PHYSICAL EXAM:    General: No Acute Distress     Neurological: Awake, alert oriented to person, place and time, Following Commands, PERRL, EOMI, Face Symmetrical, Speech Fluent, Moving all extremities, Muscle Strength normal in all four extremities, No Drift, Sensation to Light Touch Intact    Pulmonary: Clear to Auscultation, No Rales, No Rhonchi, No Wheezes     Cardiovascular: S1, S2, Regular Rate and Rhythm     Gastrointestinal: Soft, Nontender, Nondistended     Incision:     MEDICATIONS:   Antibiotics:    Neuro:  acetaminophen     Tablet .. 650 milliGRAM(s) Oral every 6 hours PRN Temp greater or equal to 38C (100.4F), Mild Pain (1 - 3)  ondansetron Injectable 4 milliGRAM(s) IV Push every 6 hours PRN Nausea and/or Vomiting  oxyCODONE    IR 5 milliGRAM(s) Oral every 4 hours PRN Moderate Pain (4 - 6)    Anticoagulation:  enoxaparin Injectable 40 milliGRAM(s) SubCutaneous <User Schedule>    Cardiology:  losartan 100 milliGRAM(s) Oral daily    Endo:   atorvastatin 20 milliGRAM(s) Oral at bedtime  dextrose 50% Injectable 25 Gram(s) IV Push once  dextrose 50% Injectable 12.5 Gram(s) IV Push once  dextrose 50% Injectable 25 Gram(s) IV Push once  dextrose Oral Gel 15 Gram(s) Oral once PRN  glucagon  Injectable 1 milliGRAM(s) IntraMuscular once  insulin glargine Injectable (LANTUS) 18 Unit(s) SubCutaneous at bedtime  insulin lispro (ADMELOG) corrective regimen sliding scale   SubCutaneous three times a day before meals  insulin lispro (ADMELOG) corrective regimen sliding scale   SubCutaneous at bedtime  insulin lispro Injectable (ADMELOG) 8 Unit(s) SubCutaneous three times a day before meals    Pulm:  benzonatate 100 milliGRAM(s) Oral three times a day PRN  guaiFENesin Oral Liquid (Sugar-Free) 100 milliGRAM(s) Oral every 6 hours PRN    GI/:  polyethylene glycol 3350 17 Gram(s) Oral daily  senna 2 Tablet(s) Oral at bedtime    Other:  dextrose 5%. 1000 milliLiter(s) IV Continuous <Continuous>  dextrose 5%. 1000 milliLiter(s) IV Continuous <Continuous>  benzonatate 100 milliGRAM(s) Oral three times a day PRN  guaiFENesin Oral Liquid (Sugar-Free) 100 milliGRAM(s) Oral every 6 hours PRN   polyethylene glycol 3350 17 Gram(s) Oral daily  senna 2 Tablet(s) Oral at bedtime       Post-op day # 3 s/p VPS    Overnight event: no acute event      Vital Signs Last 24 Hrs  T(C): 36.8 (16 Feb 2023 04:57), Max: 37.3 (15 Feb 2023 20:47)  T(F): 98.3 (16 Feb 2023 04:57), Max: 99.1 (15 Feb 2023 20:47)  HR: 83 (16 Feb 2023 04:57) (63 - 94)  BP: 151/80 (16 Feb 2023 04:57) (145/69 - 159/81)  BP(mean): --  RR: 18 (16 Feb 2023 04:57) (18 - 18)  SpO2: 96% (16 Feb 2023 04:57) (96% - 97%)    Parameters below as of 16 Feb 2023 04:57  Patient On (Oxygen Delivery Method): room air                              15.4   12.76 )-----------( 195      ( 16 Feb 2023 05:31 )             47.2           Stroke Core Measures      DRAIN OUTPUT:     NEUROIMAGING:     PHYSICAL EXAM:    General: No Acute Distress     Neurological: Awake, alert oriented to person, place and time, Following Commands, PERRL, EOMI, Face Symmetrical, Speech Fluent, Moving all extremities, Muscle Strength normal in all four extremities, No Drift, Sensation to Light Touch Intact    Pulmonary: Clear to Auscultation, No Rales, No Rhonchi, No Wheezes     Cardiovascular: S1, S2, Regular Rate and Rhythm     Gastrointestinal: Soft, Nontender, Nondistended     Incision: intact    MEDICATIONS:   Antibiotics:    Neuro:  acetaminophen     Tablet .. 650 milliGRAM(s) Oral every 6 hours PRN Temp greater or equal to 38C (100.4F), Mild Pain (1 - 3)  ondansetron Injectable 4 milliGRAM(s) IV Push every 6 hours PRN Nausea and/or Vomiting  oxyCODONE    IR 5 milliGRAM(s) Oral every 4 hours PRN Moderate Pain (4 - 6)    Anticoagulation:  enoxaparin Injectable 40 milliGRAM(s) SubCutaneous <User Schedule>    Cardiology:  losartan 100 milliGRAM(s) Oral daily    Endo:   atorvastatin 20 milliGRAM(s) Oral at bedtime  dextrose 50% Injectable 25 Gram(s) IV Push once  dextrose 50% Injectable 12.5 Gram(s) IV Push once  dextrose 50% Injectable 25 Gram(s) IV Push once  dextrose Oral Gel 15 Gram(s) Oral once PRN  glucagon  Injectable 1 milliGRAM(s) IntraMuscular once  insulin glargine Injectable (LANTUS) 18 Unit(s) SubCutaneous at bedtime  insulin lispro (ADMELOG) corrective regimen sliding scale   SubCutaneous three times a day before meals  insulin lispro (ADMELOG) corrective regimen sliding scale   SubCutaneous at bedtime  insulin lispro Injectable (ADMELOG) 8 Unit(s) SubCutaneous three times a day before meals    Pulm:  benzonatate 100 milliGRAM(s) Oral three times a day PRN  guaiFENesin Oral Liquid (Sugar-Free) 100 milliGRAM(s) Oral every 6 hours PRN    GI/:  polyethylene glycol 3350 17 Gram(s) Oral daily  senna 2 Tablet(s) Oral at bedtime    Other:  dextrose 5%. 1000 milliLiter(s) IV Continuous <Continuous>  dextrose 5%. 1000 milliLiter(s) IV Continuous <Continuous>  benzonatate 100 milliGRAM(s) Oral three times a day PRN  guaiFENesin Oral Liquid (Sugar-Free) 100 milliGRAM(s) Oral every 6 hours PRN   polyethylene glycol 3350 17 Gram(s) Oral daily  senna 2 Tablet(s) Oral at bedtime

## 2023-02-16 NOTE — PROGRESS NOTE ADULT - NSPROGADDITIONALINFOA_GEN_ALL_CORE
d/w rosalba
d/w rosalba
33 minutes spent . more than 50 percent of time was spent on examining patient, reviewed labs, images and spoke plan with wife.
d/w rosalba
d/w rosalba

## 2023-02-17 ENCOUNTER — TRANSCRIPTION ENCOUNTER (OUTPATIENT)
Age: 78
End: 2023-02-17

## 2023-02-17 VITALS
HEART RATE: 87 BPM | DIASTOLIC BLOOD PRESSURE: 82 MMHG | OXYGEN SATURATION: 97 % | RESPIRATION RATE: 18 BRPM | SYSTOLIC BLOOD PRESSURE: 149 MMHG

## 2023-02-17 LAB
ANION GAP SERPL CALC-SCNC: 11 MMOL/L — SIGNIFICANT CHANGE UP (ref 5–17)
BUN SERPL-MCNC: 12 MG/DL — SIGNIFICANT CHANGE UP (ref 7–23)
CALCIUM SERPL-MCNC: 8.7 MG/DL — SIGNIFICANT CHANGE UP (ref 8.4–10.5)
CHLORIDE SERPL-SCNC: 104 MMOL/L — SIGNIFICANT CHANGE UP (ref 96–108)
CO2 SERPL-SCNC: 24 MMOL/L — SIGNIFICANT CHANGE UP (ref 22–31)
CREAT SERPL-MCNC: 0.81 MG/DL — SIGNIFICANT CHANGE UP (ref 0.5–1.3)
EGFR: 91 ML/MIN/1.73M2 — SIGNIFICANT CHANGE UP
GLUCOSE BLDC GLUCOMTR-MCNC: 177 MG/DL — HIGH (ref 70–99)
GLUCOSE BLDC GLUCOMTR-MCNC: 245 MG/DL — HIGH (ref 70–99)
GLUCOSE SERPL-MCNC: 200 MG/DL — HIGH (ref 70–99)
POTASSIUM SERPL-MCNC: 3.9 MMOL/L — SIGNIFICANT CHANGE UP (ref 3.5–5.3)
POTASSIUM SERPL-SCNC: 3.9 MMOL/L — SIGNIFICANT CHANGE UP (ref 3.5–5.3)
SODIUM SERPL-SCNC: 139 MMOL/L — SIGNIFICANT CHANGE UP (ref 135–145)

## 2023-02-17 PROCEDURE — 36415 COLL VENOUS BLD VENIPUNCTURE: CPT

## 2023-02-17 PROCEDURE — 80048 BASIC METABOLIC PNL TOTAL CA: CPT

## 2023-02-17 PROCEDURE — 83036 HEMOGLOBIN GLYCOSYLATED A1C: CPT

## 2023-02-17 PROCEDURE — 82945 GLUCOSE OTHER FLUID: CPT

## 2023-02-17 PROCEDURE — C9399: CPT

## 2023-02-17 PROCEDURE — 83735 ASSAY OF MAGNESIUM: CPT

## 2023-02-17 PROCEDURE — C9803: CPT

## 2023-02-17 PROCEDURE — 81003 URINALYSIS AUTO W/O SCOPE: CPT

## 2023-02-17 PROCEDURE — 86850 RBC ANTIBODY SCREEN: CPT

## 2023-02-17 PROCEDURE — 99232 SBSQ HOSP IP/OBS MODERATE 35: CPT

## 2023-02-17 PROCEDURE — U0005: CPT

## 2023-02-17 PROCEDURE — 86901 BLOOD TYPING SEROLOGIC RH(D): CPT

## 2023-02-17 PROCEDURE — 93005 ELECTROCARDIOGRAM TRACING: CPT

## 2023-02-17 PROCEDURE — 85027 COMPLETE CBC AUTOMATED: CPT

## 2023-02-17 PROCEDURE — 97116 GAIT TRAINING THERAPY: CPT

## 2023-02-17 PROCEDURE — 97164 PT RE-EVAL EST PLAN CARE: CPT

## 2023-02-17 PROCEDURE — 99261: CPT

## 2023-02-17 PROCEDURE — 85730 THROMBOPLASTIN TIME PARTIAL: CPT

## 2023-02-17 PROCEDURE — C1889: CPT

## 2023-02-17 PROCEDURE — 97530 THERAPEUTIC ACTIVITIES: CPT

## 2023-02-17 PROCEDURE — 97166 OT EVAL MOD COMPLEX 45 MIN: CPT

## 2023-02-17 PROCEDURE — 87641 MR-STAPH DNA AMP PROBE: CPT

## 2023-02-17 PROCEDURE — 87640 STAPH A DNA AMP PROBE: CPT

## 2023-02-17 PROCEDURE — 62272 THER SPI PNXR DRG CSF: CPT

## 2023-02-17 PROCEDURE — 97130 THER IVNTJ EA ADDL 15 MIN: CPT

## 2023-02-17 PROCEDURE — 80053 COMPREHEN METABOLIC PANEL: CPT

## 2023-02-17 PROCEDURE — 77003 FLUOROGUIDE FOR SPINE INJECT: CPT

## 2023-02-17 PROCEDURE — C1729: CPT

## 2023-02-17 PROCEDURE — 97162 PT EVAL MOD COMPLEX 30 MIN: CPT

## 2023-02-17 PROCEDURE — 97129 THER IVNTJ 1ST 15 MIN: CPT

## 2023-02-17 PROCEDURE — C1713: CPT

## 2023-02-17 PROCEDURE — 85025 COMPLETE CBC W/AUTO DIFF WBC: CPT

## 2023-02-17 PROCEDURE — 82962 GLUCOSE BLOOD TEST: CPT

## 2023-02-17 PROCEDURE — 70450 CT HEAD/BRAIN W/O DYE: CPT | Mod: 26

## 2023-02-17 PROCEDURE — 71045 X-RAY EXAM CHEST 1 VIEW: CPT

## 2023-02-17 PROCEDURE — 85610 PROTHROMBIN TIME: CPT

## 2023-02-17 PROCEDURE — 84100 ASSAY OF PHOSPHORUS: CPT

## 2023-02-17 PROCEDURE — 70450 CT HEAD/BRAIN W/O DYE: CPT

## 2023-02-17 PROCEDURE — 89051 BODY FLUID CELL COUNT: CPT

## 2023-02-17 PROCEDURE — 86900 BLOOD TYPING SEROLOGIC ABO: CPT

## 2023-02-17 PROCEDURE — 84157 ASSAY OF PROTEIN OTHER: CPT

## 2023-02-17 PROCEDURE — U0003: CPT

## 2023-02-17 PROCEDURE — 83615 LACTATE (LD) (LDH) ENZYME: CPT

## 2023-02-17 RX ORDER — SENNA PLUS 8.6 MG/1
2 TABLET ORAL
Qty: 0 | Refills: 0 | DISCHARGE
Start: 2023-02-17

## 2023-02-17 RX ORDER — INSULIN LISPRO 100/ML
20 VIAL (ML) SUBCUTANEOUS
Qty: 0 | Refills: 0 | DISCHARGE

## 2023-02-17 RX ORDER — ENOXAPARIN SODIUM 100 MG/ML
40 INJECTION SUBCUTANEOUS
Qty: 0 | Refills: 0 | DISCHARGE
Start: 2023-02-17

## 2023-02-17 RX ORDER — POLYETHYLENE GLYCOL 3350 17 G/17G
17 POWDER, FOR SOLUTION ORAL
Qty: 0 | Refills: 0 | DISCHARGE
Start: 2023-02-17

## 2023-02-17 RX ORDER — SIMETHICONE 80 MG/1
1 TABLET, CHEWABLE ORAL
Qty: 0 | Refills: 0 | DISCHARGE
Start: 2023-02-17

## 2023-02-17 RX ORDER — OXYCODONE HYDROCHLORIDE 5 MG/1
1 TABLET ORAL
Qty: 0 | Refills: 0 | DISCHARGE
Start: 2023-02-17

## 2023-02-17 RX ORDER — ACETAMINOPHEN 500 MG
2 TABLET ORAL
Qty: 0 | Refills: 0 | DISCHARGE
Start: 2023-02-17

## 2023-02-17 RX ADMIN — Medication 2: at 07:28

## 2023-02-17 RX ADMIN — Medication 4: at 11:26

## 2023-02-17 RX ADMIN — LOSARTAN POTASSIUM 100 MILLIGRAM(S): 100 TABLET, FILM COATED ORAL at 06:09

## 2023-02-17 RX ADMIN — Medication 10 UNIT(S): at 11:26

## 2023-02-17 RX ADMIN — Medication 10 UNIT(S): at 07:28

## 2023-02-17 RX ADMIN — SIMETHICONE 80 MILLIGRAM(S): 80 TABLET, CHEWABLE ORAL at 13:00

## 2023-02-17 RX ADMIN — SIMETHICONE 80 MILLIGRAM(S): 80 TABLET, CHEWABLE ORAL at 06:09

## 2023-02-17 NOTE — PROGRESS NOTE ADULT - SUBJECTIVE AND OBJECTIVE BOX
Post-op day # 4 s/p Ventriculoperitoneal shunt (Certas at 4)      Overnight event: no acute event       Vital Signs Last 24 Hrs  T(C): 36.9 (17 Feb 2023 09:29), Max: 37 (17 Feb 2023 05:04)  T(F): 98.4 (17 Feb 2023 09:29), Max: 98.6 (17 Feb 2023 05:04)  HR: 88 (17 Feb 2023 09:29) (74 - 89)  BP: 138/75 (17 Feb 2023 09:29) (129/77 - 157/85)  BP(mean): --  RR: 18 (17 Feb 2023 09:29) (18 - 18)  SpO2: 98% (17 Feb 2023 09:29) (95% - 100%)    Parameters below as of 17 Feb 2023 09:29  Patient On (Oxygen Delivery Method): room air                              15.4   12.76 )-----------( 195      ( 16 Feb 2023 05:31 )             47.2    02-17    139  |  104  |  12  ----------------------------<  200<H>  3.9   |  24  |  0.81    Ca    8.7      17 Feb 2023 06:05       Stroke Core Measures      DRAIN OUTPUT:     NEUROIMAGING:     PHYSICAL EXAM:    General: No Acute Distress     Neurological: Awake, alert oriented to person, place and time, Following Commands, PERRL, EOMI, Face Symmetrical, Speech Fluent, Moving all extremities, Muscle Strength normal in all four extremities, No Drift, Sensation to Light Touch Intact    Pulmonary: Clear to Auscultation, No Rales, No Rhonchi, No Wheezes     Cardiovascular: S1, S2, Regular Rate and Rhythm     Gastrointestinal: Soft, Nontender, Nondistended     Incision:     MEDICATIONS:   Antibiotics:    Neuro:  acetaminophen     Tablet .. 650 milliGRAM(s) Oral every 6 hours PRN Temp greater or equal to 38C (100.4F), Mild Pain (1 - 3)  ondansetron Injectable 4 milliGRAM(s) IV Push every 6 hours PRN Nausea and/or Vomiting  oxyCODONE    IR 5 milliGRAM(s) Oral every 4 hours PRN Moderate Pain (4 - 6)    Anticoagulation:  enoxaparin Injectable 40 milliGRAM(s) SubCutaneous <User Schedule>    Cardiology:  losartan 100 milliGRAM(s) Oral daily    Endo:   atorvastatin 20 milliGRAM(s) Oral at bedtime  dextrose 50% Injectable 25 Gram(s) IV Push once  dextrose 50% Injectable 12.5 Gram(s) IV Push once  dextrose 50% Injectable 25 Gram(s) IV Push once  dextrose Oral Gel 15 Gram(s) Oral once PRN  glucagon  Injectable 1 milliGRAM(s) IntraMuscular once  insulin glargine Injectable (LANTUS) 22 Unit(s) SubCutaneous at bedtime  insulin lispro (ADMELOG) corrective regimen sliding scale   SubCutaneous three times a day before meals  insulin lispro (ADMELOG) corrective regimen sliding scale   SubCutaneous at bedtime  insulin lispro Injectable (ADMELOG) 10 Unit(s) SubCutaneous three times a day before meals    Pulm:  benzonatate 100 milliGRAM(s) Oral three times a day PRN  guaiFENesin Oral Liquid (Sugar-Free) 100 milliGRAM(s) Oral every 6 hours PRN    GI/:  bisacodyl 5 milliGRAM(s) Oral at bedtime PRN  polyethylene glycol 3350 17 Gram(s) Oral daily  senna 2 Tablet(s) Oral at bedtime  simethicone 80 milliGRAM(s) Chew three times a day    Other:  dextrose 5%. 1000 milliLiter(s) IV Continuous <Continuous>  dextrose 5%. 1000 milliLiter(s) IV Continuous <Continuous>      ASSESSMENT:  benzonatate 100 milliGRAM(s) Oral three times a day PRN  guaiFENesin Oral Liquid (Sugar-Free) 100 milliGRAM(s) Oral every 6 hours PRN   bisacodyl 5 milliGRAM(s) Oral at bedtime PRN  polyethylene glycol 3350 17 Gram(s) Oral daily  senna 2 Tablet(s) Oral at bedtime  simethicone 80 milliGRAM(s) Chew three times a day   s/p    PLAN:  NEURO:  CARDIOVASCULAR:  PULMONARY:  RENAL:  GI:  HEME:  ID:  ENDO:    DVT PROPHYLAXIS:  [] Venodynes                                [] Heparin/Lovenox    FALL RISK:  [] Low Risk                                    [] Impulsive Post-op day # 4 s/p Ventriculoperitoneal shunt (Certas at 4)      Overnight event: no acute event       Vital Signs Last 24 Hrs  T(C): 36.9 (17 Feb 2023 09:29), Max: 37 (17 Feb 2023 05:04)  T(F): 98.4 (17 Feb 2023 09:29), Max: 98.6 (17 Feb 2023 05:04)  HR: 88 (17 Feb 2023 09:29) (74 - 89)  BP: 138/75 (17 Feb 2023 09:29) (129/77 - 157/85)  BP(mean): --  RR: 18 (17 Feb 2023 09:29) (18 - 18)  SpO2: 98% (17 Feb 2023 09:29) (95% - 100%)    Parameters below as of 17 Feb 2023 09:29  Patient On (Oxygen Delivery Method): room air                              15.4   12.76 )-----------( 195      ( 16 Feb 2023 05:31 )             47.2    02-17    139  |  104  |  12  ----------------------------<  200<H>  3.9   |  24  |  0.81    Ca    8.7      17 Feb 2023 06:05       Stroke Core Measures      DRAIN OUTPUT:     NEUROIMAGING:     PHYSICAL EXAM:    General: No Acute Distress     Neurological: Awake, alert oriented to person, place and month and year with choices, Following Commands, PERRL, EOMI, Face Symmetrical, Speech Fluent, Moving all extremities, Muscle Strength normal in all four extremities, No Drift, Sensation to Light Touch Intact    Pulmonary: Clear to Auscultation, No Rales, No Rhonchi, No Wheezes     Cardiovascular: S1, S2, Regular Rate and Rhythm     Gastrointestinal: Soft, Nontender, Nondistended     Incision: Crani site and abd incision intact    MEDICATIONS:   Antibiotics:    Neuro:  acetaminophen     Tablet .. 650 milliGRAM(s) Oral every 6 hours PRN Temp greater or equal to 38C (100.4F), Mild Pain (1 - 3)  ondansetron Injectable 4 milliGRAM(s) IV Push every 6 hours PRN Nausea and/or Vomiting  oxyCODONE    IR 5 milliGRAM(s) Oral every 4 hours PRN Moderate Pain (4 - 6)    Anticoagulation:  enoxaparin Injectable 40 milliGRAM(s) SubCutaneous <User Schedule>    Cardiology:  losartan 100 milliGRAM(s) Oral daily    Endo:   atorvastatin 20 milliGRAM(s) Oral at bedtime  dextrose 50% Injectable 25 Gram(s) IV Push once  dextrose 50% Injectable 12.5 Gram(s) IV Push once  dextrose 50% Injectable 25 Gram(s) IV Push once  dextrose Oral Gel 15 Gram(s) Oral once PRN  glucagon  Injectable 1 milliGRAM(s) IntraMuscular once  insulin glargine Injectable (LANTUS) 22 Unit(s) SubCutaneous at bedtime  insulin lispro (ADMELOG) corrective regimen sliding scale   SubCutaneous three times a day before meals  insulin lispro (ADMELOG) corrective regimen sliding scale   SubCutaneous at bedtime  insulin lispro Injectable (ADMELOG) 10 Unit(s) SubCutaneous three times a day before meals    Pulm:  benzonatate 100 milliGRAM(s) Oral three times a day PRN  guaiFENesin Oral Liquid (Sugar-Free) 100 milliGRAM(s) Oral every 6 hours PRN    GI/:  bisacodyl 5 milliGRAM(s) Oral at bedtime PRN  polyethylene glycol 3350 17 Gram(s) Oral daily  senna 2 Tablet(s) Oral at bedtime  simethicone 80 milliGRAM(s) Chew three times a day    Other:  dextrose 5%. 1000 milliLiter(s) IV Continuous <Continuous>  dextrose 5%. 1000 milliLiter(s) IV Continuous <Continuous>      ASSESSMENT:  benzonatate 100 milliGRAM(s) Oral three times a day PRN  guaiFENesin Oral Liquid (Sugar-Free) 100 milliGRAM(s) Oral every 6 hours PRN   bisacodyl 5 milliGRAM(s) Oral at bedtime PRN  polyethylene glycol 3350 17 Gram(s) Oral daily  senna 2 Tablet(s) Oral at bedtime  simethicone 80 milliGRAM(s) Chew three times a day   s/p    PLAN:  NEURO:  CARDIOVASCULAR:  PULMONARY:  RENAL:  GI:  HEME:  ID:  ENDO:    DVT PROPHYLAXIS:  [] Venodynes                                [] Heparin/Lovenox    FALL RISK:  [] Low Risk                                    [] Impulsive

## 2023-02-17 NOTE — PROGRESS NOTE ADULT - PROBLEM SELECTOR PLAN 2
Pt and pt's wife states he takes lantus 26 and humalog 20 TID. A1C 7.9    -currently on lantus to 16, admelog 6 TID, FS elevated at 301. Lantus and Admelog increased, monitor.     -holding home farxiga and ozempic.
Pt and pt's wife states he takes lantus 26 and humalog 20 TID. A1C 7.9    -Lantus 18, admelog 8 TID, increased yesterday. FS still elevated, increased today, monitor.     -holding home farxiga and ozempic.
Pt and pt's wife states he takes lantus 26 and humalog 20 TID. A1C 7.9    -currently on lantus to 16, admelog 6 TID for now, moderate sliding scale. Hold Admelog if pt not eating. Monitor for hypoglycemia. FS this am 196    -holding home farxiga and ozempic.
Pt and pt's wife states he takes lantus 26 and humalog 20 TID. A1C 7.9    Resume home farxiga and ozempic, Lantus 26, decrease humalog to 12u tid, based on inpatient requirements.
Pt and pt's wife states he takes lantus 26 and humalog 20 TID  -increased lantus to 16, increase to admelog 6 TID for now. c/w moderate sliding scale   -nutrition consult  -holding home farxiga and ozempic.

## 2023-02-17 NOTE — DISCHARGE NOTE NURSING/CASE MANAGEMENT/SOCIAL WORK - NSDCPEFALRISK_GEN_ALL_CORE
For information on Fall & Injury Prevention, visit: [email or web address] OR  [email or web address] OR  https://www.cdc.gov/steadi/patient.html

## 2023-02-17 NOTE — PROGRESS NOTE ADULT - SUBJECTIVE AND OBJECTIVE BOX
Patient is a 77y old  Male who presents with a chief complaint of NPH (17 Feb 2023 09:47)      SUBJECTIVE / OVERNIGHT EVENTS: Pt up in chair, feels well.     MEDICATIONS  (STANDING):  atorvastatin 20 milliGRAM(s) Oral at bedtime  dextrose 5%. 1000 milliLiter(s) (50 mL/Hr) IV Continuous <Continuous>  dextrose 5%. 1000 milliLiter(s) (100 mL/Hr) IV Continuous <Continuous>  dextrose 50% Injectable 25 Gram(s) IV Push once  dextrose 50% Injectable 12.5 Gram(s) IV Push once  dextrose 50% Injectable 25 Gram(s) IV Push once  enoxaparin Injectable 40 milliGRAM(s) SubCutaneous <User Schedule>  glucagon  Injectable 1 milliGRAM(s) IntraMuscular once  insulin glargine Injectable (LANTUS) 22 Unit(s) SubCutaneous at bedtime  insulin lispro (ADMELOG) corrective regimen sliding scale   SubCutaneous three times a day before meals  insulin lispro (ADMELOG) corrective regimen sliding scale   SubCutaneous at bedtime  insulin lispro Injectable (ADMELOG) 10 Unit(s) SubCutaneous three times a day before meals  losartan 100 milliGRAM(s) Oral daily  polyethylene glycol 3350 17 Gram(s) Oral daily  senna 2 Tablet(s) Oral at bedtime  simethicone 80 milliGRAM(s) Chew three times a day    MEDICATIONS  (PRN):  acetaminophen     Tablet .. 650 milliGRAM(s) Oral every 6 hours PRN Temp greater or equal to 38C (100.4F), Mild Pain (1 - 3)  benzonatate 100 milliGRAM(s) Oral three times a day PRN Cough  bisacodyl 5 milliGRAM(s) Oral at bedtime PRN Constipation  dextrose Oral Gel 15 Gram(s) Oral once PRN Blood Glucose LESS THAN 70 milliGRAM(s)/deciliter  guaiFENesin Oral Liquid (Sugar-Free) 100 milliGRAM(s) Oral every 6 hours PRN Cough  ondansetron Injectable 4 milliGRAM(s) IV Push every 6 hours PRN Nausea and/or Vomiting  oxyCODONE    IR 5 milliGRAM(s) Oral every 4 hours PRN Moderate Pain (4 - 6)      CAPILLARY BLOOD GLUCOSE      POCT Blood Glucose.: 245 mg/dL (17 Feb 2023 11:15)  POCT Blood Glucose.: 177 mg/dL (17 Feb 2023 07:20)  POCT Blood Glucose.: 221 mg/dL (16 Feb 2023 21:12)  POCT Blood Glucose.: 294 mg/dL (16 Feb 2023 16:10)  POCT Blood Glucose.: 282 mg/dL (16 Feb 2023 11:22)    I&O's Summary    16 Feb 2023 07:01  -  17 Feb 2023 07:00  --------------------------------------------------------  IN: 680 mL / OUT: 800 mL / NET: -120 mL        PHYSICAL EXAM:  T(C): 36.9 (02-17-23 @ 09:29), Max: 37 (02-17-23 @ 05:04)  HR: 88 (02-17-23 @ 09:29) (74 - 89)  BP: 138/75 (02-17-23 @ 09:29) (129/77 - 157/85)  RR: 18 (02-17-23 @ 09:29) (18 - 18)  SpO2: 98% (02-17-23 @ 09:29) (95% - 100%)  CONSTITUTIONAL: NAD, well-developed, well-groomed  EYES: PERRLA; conjunctiva and sclera clear  ENMT: Moist oral mucosa, no pharyngeal injection or exudates; normal dentition  NECK: Supple, no palpable masses; no thyromegaly  RESPIRATORY: Normal respiratory effort; lungs are clear to auscultation bilaterally  CARDIOVASCULAR: Regular rate and rhythm, normal S1 and S2, no murmur/rub/gallop; No lower extremity edema; Peripheral pulses are 2+ bilaterally  ABDOMEN: Nontender to palpation, normoactive bowel sounds, no rebound/guarding; No hepatosplenomegaly  MUSCULOSKELETAL:  Normal gait; no clubbing or cyanosis of digits; no joint swelling or tenderness to palpation  PSYCH: A+O to person, place, and time; affect appropriate  NEUROLOGY: CN 2-12 are intact and symmetric; no gross sensory deficits   SKIN: No rashes; no palpable lesions    LABS:                        15.4   12.76 )-----------( 195      ( 16 Feb 2023 05:31 )             47.2     02-17    139  |  104  |  12  ----------------------------<  200<H>  3.9   |  24  |  0.81    Ca    8.7      17 Feb 2023 06:05          RADIOLOGY & ADDITIONAL TESTS:    Imaging Personally Reviewed:    Consultant(s) Notes Reviewed:      Care Discussed with Consultants/Other Providers: Nsx

## 2023-02-17 NOTE — PROGRESS NOTE ADULT - PROBLEM SELECTOR PLAN 5
diet: Dash-CC  dvt ppx: lovenox 40 qD per nsgy  med Rec: Lantus 26 qhs, humalog 20 TID, irbesartan 300 qD, farxiga 10, atorva 20, ozempic 0.25 weekly. Of note, does not take keprra, donepazil, carbidopa-levodopa. updated in EMR.
diet: Dash-CC. Last BM 2/14.    dvt ppx: lovenox 40 qD     med Rec: Lantus 26 qhs, humalog 20 TID, irbesartan 300 qD, farxiga 10, atorva 20, ozempic 0.25 weekly. Of note, does not take keprra, donepazil, carbidopa-levodopa. updated previously in EMR.
diet: Dash-CC    dvt ppx: lovenox 40 qD per nsgy    med Rec: Lantus 26 qhs, humalog 20 TID, irbesartan 300 qD, farxiga 10, atorva 20, ozempic 0.25 weekly. Of note, does not take keprra, donepazil, carbidopa-levodopa. updated previously in EMR.
diet: Dash-CC. Last BM 2/14.    dvt ppx: lovenox 40 qD     med Rec: Lantus 26 qhs, humalog 20 TID, irbesartan 300 qD, farxiga 10, atorva 20, ozempic 0.25 weekly. Of note, does not take keprra, donepazil, carbidopa-levodopa. updated previously in EMR.
diet: Dash-CC. Last BM 2/14.    dvt ppx: lovenox 40 qD     med Rec: Lantus 26 qhs, humalog 20 TID, irbesartan 300 qD, farxiga 10, atorva 20, ozempic 0.25 weekly. Of note, does not take keprra, donepazil, carbidopa-levodopa. updated previously in EMR.     Plan for rehab today.

## 2023-02-17 NOTE — PROGRESS NOTE ADULT - PROVIDER SPECIALTY LIST ADULT
Neurosurgery
Neurosurgery
Surgery
Trauma Surgery
Neurosurgery
Surgery
Neurosurgery
Neurosurgery
Hospitalist

## 2023-02-17 NOTE — PROGRESS NOTE ADULT - REASON FOR ADMISSION
LD placement
NPH
Hydrocephalus
LD placement
LD placement
lumbar drain trial for NPH evaluation.
LD placement
LD placement for NPH trial
LD placement
LD placement
LD placement for NPH trial
LD placement for nph trial
LD placement

## 2023-02-17 NOTE — PROGRESS NOTE ADULT - PROBLEM SELECTOR PLAN 3
Resume home irbesartan 300 qD.
c/w losartan 100 qD for home irbesartan 300 qD.
c/w losartan 100 qD for home irbesartan 300 qD.
c/w losartan 100 qD for home irbesartan 300 qD.  controlled
c/w losartan 100 qD  controlled  holding home irbesartan 300 qD.

## 2023-02-17 NOTE — PROGRESS NOTE ADULT - PROBLEM SELECTOR PLAN 1
Found to have NPH with classical signs of urinary incontinence and gait instability, although mental status is intact    -s/p lumbar drain 2/6-2/10    -s/p  shunt as above. Repeat CT as above.     -Fever on 2/15- CXR and UA unremarkable, no focal symptoms. No recurrence since. Encourage incentive spirometry
Found to have NPH with classical signs of urinary incontinence and gait instability, although mental status is intact    -s/p lumbar drain 2/6-2/10    -s/p  shunt as above. Lethargic yesterday, repeat CT stable. More alert today, sitting up in chair, able to follow commands    -Fever overnight- CXR and UA unremarkable, no focal symptoms. Check RVP if spikes again. Encourage incentive spirometry
Found to have NPH with classical signs of urinary incontinence and gait instability, although mental status is intact    -s/p lumbar drain 2/6-2/10    -s/p  shunt as above. Lethargic today, repeat CT pending. Monitor.
Found to have NPH with classical signs of urinary incontinence and gait instability, although mental status is intact    -s/p lumbar drain 2/6-2/10    -s/p  shunt as above. Repeat CT on 2/14 stable. Plan for repeat CT tomorrow.     -Fever yesterday- CXR and UA unremarkable, no focal symptoms. No recurrence since, monitor. Encourage incentive spirometry
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 mod risk for surgery. ekg reviewed: nonischemic, no st changes. medically optimized for planned procedure without further intervention at this time.

## 2023-02-17 NOTE — PROGRESS NOTE ADULT - PROBLEM SELECTOR PROBLEM 1
Normal pressure hydrocephalus

## 2023-02-17 NOTE — PROGRESS NOTE ADULT - ASSESSMENT
77 year old male with PMH of HTN, HLD, DM 2, diabetic neuropathy, TIAs (was on Aggrenox), who presented to [facility] ED for right sided headaches for 1 month. Patient was followed by ophthalmology Dr. Santamaria for papilledema and was referred to come to [facility] ED for further evaluation. Ophthalmology evaluated for giant cell arteritis, As per family, for the past year he has had several falls and unsteady gait along with memory issues and inct. 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 He was transferred to [facility] on 8/5 for possible neurosurgical intervention for SDH. He was enrolled into the EMBOLISE trial, randomized to intervention group, and On 8/5/22, he underwent Left MMA embolization. Discharged on Keppra 500mg BID for seizure prophylaxis.  Recently presented to [facility] ED on 8/21 as code stroke with transient episode of dysarthria, likely TIA. No neurovascular intervention given no large vessel occlusion seen on imaging, no tPA given no disabling neurological deficit. Keppra was increased to 750mg BID.   follow up 2 week CT scan 8/21 shows stable L subacute to chronic SDH, slightly decreased in size, measuring 10mm, previously 12mm. 6 week follow up CT scan done on 9/22/22 which shows continued decrease in size and density of left lateral convexity SDH measuring 7mm. Keppra tapered down to 500mg BID.  He follows with Dr. Cunha and brain MRI performed on 11/28/2022 showed moderate enlargement of ventricular system.. He underwent an LP in [place] and as per wife and family member, patient did not improve in terms of memory/cognition, urinary inct and gait instability. He was assessed by Dr. Cunha after LP and felt his gait improved.  Wife and family member state the triad of symptoms started approximately a year ago, but worsened in August 2022. He is significantly worse today than last month.  He is able to converse and appears oriented. He attempted to walk and was able to take a few steps cautiously and very unsteady during turning.  (06 Feb 2023 10:31)    PROCEDURE: Laparoscopy, with ventriculoperitoneal shunt insertion  s/p lumbar drain placement - for nph trial   Ventriculoperitoneal shunt  s/p  shunt placement done on 2/13 certas @4      POD#4 s/p VPS     PLAN:  1 NEURO CHECKS Q 4HRS  2 out of bed   3 pt/ot F/U   4 will repeat head Ct in am  5 pos FUNMILAYO in am    Pulmonary  -room air   -will encourage incentive spirometry    CV  -blood pressure stable -continue on losartan     GI  -regular diet   -stool softener : miralax and senna  - last bm 2/14    ENDO  -continue on lantus and premeal admelog along with sliding scale    ID   Afebrile - will encourage incentive spirometry     HEME  - dvt ppx sql and scds     Dispo : FUNMILAYO    will discuss with Dr. Isabel   spectra [id number]           77 year old male with PMH of HTN, HLD, DM 2, diabetic neuropathy, TIAs (was on Aggrenox), who presented to [facility] ED for right sided headaches for 1 month. Patient was followed by ophthalmology Dr. Santamaria for papilledema and was referred to come to [facility] ED for further evaluation. Ophthalmology evaluated for giant cell arteritis, As per family, for the past year he has had several falls and unsteady gait along with memory issues and inct. 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 He was transferred to [facility] on 8/5 for possible neurosurgical intervention for SDH. He was enrolled into the EMBOLISE trial, randomized to intervention group, and On 8/5/22, he underwent Left MMA embolization. Discharged on Keppra 500mg BID for seizure prophylaxis.  Recently presented to [facility] ED on 8/21 as code stroke with transient episode of dysarthria, likely TIA. No neurovascular intervention given no large vessel occlusion seen on imaging, no tPA given no disabling neurological deficit. Keppra was increased to 750mg BID.   follow up 2 week CT scan 8/21 shows stable L subacute to chronic SDH, slightly decreased in size, measuring 10mm, previously 12mm. 6 week follow up CT scan done on 9/22/22 which shows continued decrease in size and density of left lateral convexity SDH measuring 7mm. Keppra tapered down to 500mg BID.  He follows with Dr. Cunha and brain MRI performed on 11/28/2022 showed moderate enlargement of ventricular system.. He underwent an LP in [place] and as per wife and family member, patient did not improve in terms of memory/cognition, urinary inct and gait instability. He was assessed by Dr. Cunha after LP and felt his gait improved.  Wife and family member state the triad of symptoms started approximately a year ago, but worsened in August 2022. He is significantly worse today than last month.  He is able to converse and appears oriented. He attempted to walk and was able to take a few steps cautiously and very unsteady during turning.  (06 Feb 2023 10:31)    PROCEDURE: Laparoscopy, with ventriculoperitoneal shunt insertion  s/p lumbar drain placement - for nph trial   Ventriculoperitoneal shunt  s/p  shunt placement done on 2/13 certas @4      POD#4 s/p VPS     PLAN:  1 NEURO CHECKS Q 4HRS  2 out of bed , increase activity as tolerated  3 analgesic as needed   4 repeat head Ct stable  5 FUNMILAYO today    Pulmonary  -room air   -will encourage incentive spirometry    CV  -blood pressure stable -continue on losartan     GI  -regular diet   -stool softener : miralax and senna  - last bm 2/17    ENDO  -continue on lantus and premeal admelog along with sliding scale    ID   Afebrile     HEME  - dvt ppx sql and scds     Dispo : FUNMILAYO    will discuss with Dr. Isabel   spectra [id number]           77 year old male with PMH of HTN, HLD, DM 2, diabetic neuropathy, TIAs (was on Aggrenox), who presented to [facility] ED for right sided headaches for 1 month. Patient was followed by ophthalmology Dr. Santamaria for papilledema and was referred to come to [facility] ED for further evaluation. Ophthalmology evaluated for giant cell arteritis, As per family, for the past year he has had several falls and unsteady gait along with memory issues and inct. 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 He was transferred to [facility] on 8/5 for possible neurosurgical intervention for SDH. He was enrolled into the EMBOLISE trial, randomized to intervention group, and On 8/5/22, he underwent Left MMA embolization. Discharged on Keppra 500mg BID for seizure prophylaxis.  Recently presented to [facility] ED on 8/21 as code stroke with transient episode of dysarthria, likely TIA. No neurovascular intervention given no large vessel occlusion seen on imaging, no tPA given no disabling neurological deficit. Keppra was increased to 750mg BID.   follow up 2 week CT scan 8/21 shows stable L subacute to chronic SDH, slightly decreased in size, measuring 10mm, previously 12mm. 6 week follow up CT scan done on 9/22/22 which shows continued decrease in size and density of left lateral convexity SDH measuring 7mm. Keppra tapered down to 500mg BID.  He follows with Dr. Cunha and brain MRI performed on 11/28/2022 showed moderate enlargement of ventricular system.. He underwent an LP in [place] and as per wife and family member, patient did not improve in terms of memory/cognition, urinary inct and gait instability. He was assessed by Dr. Cunha after LP and felt his gait improved.  Wife and family member state the triad of symptoms started approximately a year ago, but worsened in August 2022. He is significantly worse today than last month.  He is able to converse and appears oriented. He attempted to walk and was able to take a few steps cautiously and very unsteady during turning.  (06 Feb 2023 10:31)    PROCEDURE: Laparoscopy, with ventriculoperitoneal shunt insertion  s/p lumbar drain placement - for nph trial   Ventriculoperitoneal shunt  s/p  shunt placement done on 2/13 certas @4      POD#4 s/p VPS     PLAN:  1 NEURO CHECKS Q 4HRS  2 out of bed , increase activity as tolerated  3 analgesic as needed   4 repeat head Ct with increased hygroma, shunt setting was changed to Certas 5  5 FUNMILAYO today    Pulmonary  -room air   -will encourage incentive spirometry    CV  -blood pressure stable -continue on losartan     GI  -regular diet   -stool softener : miralax and senna  - last bm 2/17    ENDO  -continue on lantus and premeal admelog along with sliding scale    ID   Afebrile     HEME  - dvt ppx sql and scds     Dispo : FUNMILAYO    will discuss with Dr. Isabel   spectra [phone]

## 2023-02-17 NOTE — PROGRESS NOTE ADULT - ASSESSMENT
77yo M with hx of DM-II with Neuropathy, TIAs (previously on Aggrenox), HTN, and HLD, SDH s/p MMA embolization ( aug 2022 ), with worsening gait imbalance and urinary incontinence, suspicion for NPH s/p LP in 12/2022 but did not have much improvement, now admitted for  shunt.     Shunt placed 2/13. Repeat CT done today- slight decrease in size of the lateral ventricles are seen when compared with the prior exam. Subdural collections are seen which is likely due to over shunting.

## 2023-02-17 NOTE — DISCHARGE NOTE NURSING/CASE MANAGEMENT/SOCIAL WORK - PATIENT PORTAL LINK FT
You can access the FollowMyHealth Patient Portal offered by [facility] by registering at the following website: [email or web address]. By joining [facility]’s FollowMyHealth portal, you will also be able to view your health information using other applications (apps) compatible with our system.

## 2023-03-03 NOTE — PHYSICAL THERAPY INITIAL EVALUATION ADULT - GAIT DEVIATIONS NOTED, PT EVAL
decreased solomon/decreased step length/decreased stride length/decreased weight-shifting ability no

## 2023-03-20 ENCOUNTER — OUTPATIENT (OUTPATIENT)
Dept: OUTPATIENT SERVICES | Facility: HOSPITAL | Age: 78
LOS: 1 days | End: 2023-03-20
Payer: MEDICARE

## 2023-03-20 ENCOUNTER — APPOINTMENT (OUTPATIENT)
Dept: CT IMAGING | Facility: HOSPITAL | Age: 78
End: 2023-03-20

## 2023-03-20 ENCOUNTER — RESULT REVIEW (OUTPATIENT)
Age: 78
End: 2023-03-20

## 2023-03-20 DIAGNOSIS — S06.5X9A TRAUMATIC SUBDURAL HEMORRHAGE WITH LOSS OF CONSCIOUSNESS OF UNSPECIFIED DURATION, INITIAL ENCOUNTER: ICD-10-CM

## 2023-03-20 DIAGNOSIS — G91.2 (IDIOPATHIC) NORMAL PRESSURE HYDROCEPHALUS: ICD-10-CM

## 2023-03-20 PROCEDURE — 70450 CT HEAD/BRAIN W/O DYE: CPT | Mod: 26,MH

## 2023-03-20 PROCEDURE — 70450 CT HEAD/BRAIN W/O DYE: CPT | Mod: MH

## 2023-03-22 ENCOUNTER — APPOINTMENT (OUTPATIENT)
Dept: NEUROSURGERY | Facility: CLINIC | Age: 78
End: 2023-03-22
Payer: MEDICARE

## 2023-03-22 PROCEDURE — 99443: CPT | Mod: 95

## 2023-03-23 ENCOUNTER — APPOINTMENT (OUTPATIENT)
Dept: NEUROSURGERY | Facility: CLINIC | Age: 78
End: 2023-03-23
Payer: MEDICARE

## 2023-03-23 VITALS
DIASTOLIC BLOOD PRESSURE: 64 MMHG | OXYGEN SATURATION: 97 % | TEMPERATURE: 98.1 F | SYSTOLIC BLOOD PRESSURE: 110 MMHG | HEART RATE: 93 BPM | HEIGHT: 65 IN

## 2023-03-23 PROCEDURE — 99024 POSTOP FOLLOW-UP VISIT: CPT

## 2023-03-23 RX ORDER — INSULIN LISPRO 100 [IU]/ML
INJECTION, SOLUTION INTRAVENOUS; SUBCUTANEOUS
Refills: 0 | Status: ACTIVE | COMMUNITY

## 2023-03-23 NOTE — ASSESSMENT
[FreeTextEntry1] : IMPRESSION: \par 77M with PMH of HTN, HLD, DM 2, diabetic neuropathy, TIAs on Aggrenox, s/p fall due to chronic imbalance and gait issues p/w R sided HA, found to have acute on chronic L SDH, R frontotemporal SDH, enrolled into EMBOLISE trial, randomized into intervention arm s/p L MMA embo 8/5 resulting in complete resolution of the SDH, now with worsening gait and urinary incontinence with concern for NPH. s/p lumbar drain trial, s/p R parieto-occipital  shunt placement 2/13/23 Certas valve set at 4.0, Postop CT initially looked great, but then follow up CTH showed new R SDH. Shunt setting changed to 5.0. Discharged to rehab with instructions for quick interval follow up. Outpatient CT 3/20/23 showed expansion of R SDH measuring 2.2cm. Pt currently asymptomatic. Denies worsening headaches, unsteadiness, or other symptoms of motor, sensory, speech, or visual abnormalities. \par \par Right  shunt reservoir patent, refills briskly. Valve setting turned off- set at 8.0, previously 5.0. Confirmed x2 by colleague, NP Violet Yu. \par \par \par \par PLAN:\par Certas valve shunt setting at 8.0\par Repeat CT head non con in 2 weeks - to be scheduled 4/5/23\par Follow up phone call with Dr. Isabel after to discuss results - scheduled 4/5/23\par Advised to call office or seek medical attention should he have new or worsening symptoms\par Spoke with EDNA Gregory from [facility] regarding plan

## 2023-03-23 NOTE — HISTORY OF PRESENT ILLNESS
[FreeTextEntry1] : SOTO WHITNEY is a 77 year old male with PMH of HTN, HLD, DM 2, diabetic neuropathy, TIAs (diagnosed by neurologist in the past on Aggrenox), chronic gait instability, who presented to [facility] ED for right sided headaches for 1 month s/p fall due to chronic imbalance and gait issues. Of note, wife states his balance issues approximately started 2 years ago and he was seen by ENT. Patient was followed by ophthalmology Dr. Santamaria for papilledema and was referred to come to [facility] ED for further evaluation. Ophthalmology evaluated for giant cell arteritis, right disc edema and peripapillary hemorrhages less likely given lack of clinical symptoms and negative inflammatory markers. MRI orbits negative for orbital mass or abnormal optic nerve signal. 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 He was transferred to [facility] on 8/5/22 for neurosurgical management. He was enrolled into the EMBOLISE trial and randomized into the interventional arm. On 8/5/22, he underwent Left MMA embolization. Patient with h/o multiple falls at home, ataxic gait, instructed to follow up with Dr. 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 Discharged on Keppra 500mg BID for seizure prophylaxis. \par \par Re-presented to [facility] ED on 8/21 as code stroke with transient episode of dysarthria, likely TIA. No neurovascular intervention given no large vessel occlusion seen on imaging, no tPA given no disabling neurological deficit. Could have been focal seizure given description of corner of lip getting twisted on the left side per wife. Denies noticing drooping of right side of mouth. Keppra was increased to 750mg BID. \par 2 week CT scan 8/21/22 shows stable L subacute to chronic SDH, slightly decreased in size, measuring 10mm, previously 12mm. 6 week [address] up [address] scan 9/22/22 which shows continued decrease in size and density of left lateral convexity SDH measuring 7mm. Keppra tapered down to 500mg BID. 3 month follow up [address] scan 11/2/22 shows significant decrease in size of left SDH with minimal residual measuring 3mm, previously 8mm. No acute hemorrhage. Keppra 500mg BID discontinued, and Aggrenox switched to ASA 81 at last visit on 11/2/22.\par \par The MMA embolization procedure resulted in eventual resolution of the SDH. However, as the hematoma resolved, the symptoms did not improve and in fact got worse over time with worsening gait instability, urinary incontinence, and generalized confusion. As the SDH has decreased in size and the mass effect resolved, it has become apparent that there is underlying moderate hydrocephalus. Subsequent head CTs also demonstrated gradual ventriculomegaly as the mass effect from the hematoma resolved. 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 He underwent a high volume lumbar puncture by Dr. Cunha (30cc) with high volume drainage of CSF and there was not clear clinical improvement. Upon Dr. Cunha’s evaluation, he thought there was notable improvement in gait; however, the patient and family state they did not appreciate any improvement after the LP. It was therefore unclear at that time whether he would benefit from VPS. I discussed with Dr. Cunha who felt while at first the patient's gait appeared wide based, consistent with NPH, on his next assessment it was more magnetic and ataxic. We agreed on a trial of Sinemet with Dr. Cunha, and if no improvement in another month or so we can consider hospital admission and extended lumbar drain trial to further assess whether he could benefit from VPS. After trialing the month of Sinemet, there was no relief and still had reported progressive worsening of symptoms. \par \par He was then admitted to [facility] for lumbar drain trial, and on 02/06/2023, underwent fluoroscopic guided placement of lumbar drain. He was then evaluated by Physical Therapy over the next four days. Initially after draining 5 ml/h for 3 days, there was no notable improvement. We then increased the drainage to 30 mL of CSF every four hours, and there was marked improvement in his gait. Given the improvement, the recommendation was made for placement of a ventriculoperitoneal shunt. On 2/13/23, he underwent right parieto-occipital  shunt placement with laparoscopic placement of the abdominal catheter in the abdomen. Certas valve set at 4.0. Postop CT initially looked great, but then follow up CTH showed new right sided subdural collection. Shunt setting changed to 5.0 at this time. Patient was discharged to rehab with instructions for quick interval follow up.\par \par Patient is currently at [facility] involved in PT/OT. Interval CT head non con done outpatient on 3/20/23 showed acute on subacute RIGHT frontal subdural hematoma measuring 2.2cm. There is moderate mass effect on the right frontal lobe with minimal leftward midline shift. Despite the finding of the expansion of this subdural collection to now an acute on chronic SDH, due to his atrophic brain there is not much mass effect and he is currently asymptomatic. Will attempt to manage conservatively by turning the valve off and following with interval scans. \par \par Today, patient presents to the office in wheelchair but able to stand and ambulate with assistance. He is here to adjust his shunt setting and turn the valve off. Patient and wife state he is doing well, and is continuing PT/OT while at rehab. Denies worsening headaches, unsteadiness, weakness. Per wife, there are plans for him being discharged home soon.

## 2023-03-23 NOTE — REASON FOR VISIT
[Follow-Up: _____] : a [unfilled] follow-up visit [Spouse] : spouse [Family Member] : family member [FreeTextEntry1] : Shunt setting change\par \par s/p Stereotactic and laparoscopic placement of right parieto-occipital ventriculoperitoneal shunt 2/13/23\par \par s/p Left MMA embolization 8/5/22\par \par EMBOLISE trial, randomized into the interventional arm

## 2023-03-23 NOTE — PHYSICAL EXAM
[Person] : oriented to person [Place] : oriented to place [Time] : oriented to time [FreeTextEntry8] : presents in wheelchair for assistance

## 2023-04-05 ENCOUNTER — APPOINTMENT (OUTPATIENT)
Dept: NEUROSURGERY | Facility: CLINIC | Age: 78
End: 2023-04-05
Payer: MEDICARE

## 2023-04-05 VITALS
SYSTOLIC BLOOD PRESSURE: 128 MMHG | HEIGHT: 65 IN | DIASTOLIC BLOOD PRESSURE: 77 MMHG | TEMPERATURE: 98.4 F | OXYGEN SATURATION: 96 % | BODY MASS INDEX: 24.16 KG/M2 | HEART RATE: 89 BPM | WEIGHT: 145 LBS

## 2023-04-05 PROCEDURE — 99024 POSTOP FOLLOW-UP VISIT: CPT

## 2023-04-05 RX ORDER — INSULIN GLARGINE 100 [IU]/ML
INJECTION, SOLUTION SUBCUTANEOUS
Refills: 0 | Status: ACTIVE | COMMUNITY

## 2023-04-05 RX ORDER — SEMAGLUTIDE 1.34 MG/ML
2 INJECTION, SOLUTION SUBCUTANEOUS
Refills: 0 | Status: ACTIVE | COMMUNITY

## 2023-04-05 RX ORDER — DAPAGLIFLOZIN 10 MG/1
TABLET, FILM COATED ORAL
Refills: 0 | Status: ACTIVE | COMMUNITY

## 2023-04-05 RX ORDER — ATORVASTATIN CALCIUM 80 MG/1
TABLET, FILM COATED ORAL
Refills: 0 | Status: ACTIVE | COMMUNITY

## 2023-04-05 RX ORDER — ELECTROLYTES/DEXTROSE
SOLUTION, ORAL ORAL
Refills: 0 | Status: DISCONTINUED | COMMUNITY
End: 2023-04-05

## 2023-04-05 NOTE — ASSESSMENT
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 s/p lumbar drain trial, s/p R parieto-occipital  shunt placement 2/13/23 Certas valve set at 4.0, Postop CT initially looked great, but then follow up CTH showed new R SDH. Shunt setting changed to 5.0. Discharged to rehab with instructions for quick interval follow up. Outpatient CT 3/20/23 showed expansion of R SDH measuring 2.2cm. Pt asymptomatic. Right  shunt setting turned off 2 weeks ago- set at 8.0, previously 5.0.\par \par Official radiology report of MRI not read, based on my own read of MRI 4/5/23, it shows unchanged and stable R SDH measuring 2.2cm. This imaging was obtained 2 weeks after turning the  shunt valve setting off to 8.0, previously 5.0. While shunt off, patient reports worsening of unsteadiness, memory loss, as expected with the shunt off.\par R  shunt reservoir patent, refills briskly. Valve setting checked and confirmed at 8.0 in office.\par \par \par \par PLAN:\par Hold baby aspirin at this time given subdural collections\par Certas valve shunt setting remains off at 8.0\par Recommend repeat CT head non con in 1 month\par Per wife request, prefers patient undergoing MRI to limit radiation exposure\par Follow up in our outpatient office after MRI in 1 month at Dr. Cunha's office to discuss results and shunt check\par Should there be worsening of SDH on serial imaging, will consider neurosurgical intervention\par

## 2023-04-05 NOTE — REASON FOR VISIT
[Follow-Up: _____] : a [unfilled] follow-up visit [Spouse] : spouse [Family Member] : family member [FreeTextEntry1] : Shunt check post-MRI\par \par s/p Stereotactic and laparoscopic placement of right parieto-occipital ventriculoperitoneal shunt 2/13/23\par \par s/p Left MMA embolization 8/5/22\par \par EMBOLISE trial, randomized into the interventional arm

## 2023-04-05 NOTE — HISTORY OF PRESENT ILLNESS
[FreeTextEntry1] : SOTO WHITNEY is a 77 year old male with PMH of HTN, HLD, DM 2, diabetic neuropathy, TIAs (diagnosed by neurologist in the past on Aggrenox), chronic gait instability, who presented to [facility] ED for right sided headaches for 1 month s/p fall due to chronic imbalance and gait issues. Of note, wife states his balance issues approximately started 2 years ago and he was seen by ENT. Patient was followed by ophthalmology Dr. Santamaria for papilledema and was referred to come to [facility] ED for further evaluation. Ophthalmology evaluated for giant cell arteritis, right disc edema and peripapillary hemorrhages less likely given lack of clinical symptoms and negative inflammatory markers. MRI orbits negative for orbital mass or abnormal optic nerve signal. 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 He was transferred to [facility] on 8/5/22 for neurosurgical management. He was enrolled into the EMBOLISE trial and randomized into the interventional arm. On 8/5/22, he underwent Left MMA embolization. Patient with h/o multiple falls at home, ataxic gait, instructed to follow up with Dr. 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 Discharged on Keppra 500mg BID for seizure prophylaxis. \par \par Re-presented to [facility] ED on 8/21 as code stroke with transient episode of dysarthria, likely TIA. No neurovascular intervention given no large vessel occlusion seen on imaging, no tPA given no disabling neurological deficit. Could have been focal seizure given description of corner of lip getting twisted on the left side per wife. Denies noticing drooping of right side of mouth. Keppra was increased to 750mg BID. \par \par 2 week CT scan 8/21/22 shows stable L subacute to chronic SDH, slightly decreased in size, measuring 10mm, previously 12mm. 6 week follow up CT scan 9/22/22 which shows continued decrease in size and density of left lateral convexity SDH measuring 7mm. Keppra tapered down to 500mg BID. 3 month follow up CT scan 11/2/22 shows significant decrease in size of left SDH with minimal residual measuring 3mm, previously 8mm. No acute hemorrhage. Keppra 500mg BID discontinued, and Aggrenox switched to ASA 81 at last visit on 11/2/22.\par The MMA embolization procedure resulted in eventual resolution of the SDH. However, as the hematoma resolved, the symptoms did not improve and in fact got worse over time with worsening gait instability, urinary incontinence, and generalized confusion. As the SDH has decreased in size and the mass effect resolved, it has become apparent that there is underlying moderate hydrocephalus. Subsequent head CTs also demonstrated gradual ventriculomegaly as the mass effect from the hematoma resolved. 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 He underwent a high volume lumbar puncture by Dr. Cunha (30cc) with high volume drainage of CSF and there was not clear clinical improvement. Upon Dr. Cunha’s evaluation, he thought there was notable improvement in gait; however, the patient and family state they did not appreciate any improvement after the LP. It was therefore unclear at that time whether he would benefit from VPS. Discussed with Dr. Cunha who felt while at first the patient's gait appeared wide based, consistent with NPH, on his next assessment it was more magnetic and ataxic. We agreed on a trial of Sinemet with Dr. Cunha, and if no improvement in another month or so we can consider hospital admission and extended lumbar drain trial to further assess whether he could benefit from VPS. After trialing the month of Sinemet, there was no relief and still had reported progressive worsening of symptoms. \par \par He was then admitted to [facility] for lumbar drain trial, and on 02/06/2023, underwent fluoroscopic guided placement of lumbar drain. He was then evaluated by Physical Therapy over the next four days. Initially after draining 5 ml/h for 3 days, there was no notable improvement. We then increased the drainage to 30 mL of CSF every four hours, and there was marked improvement in his gait. Given the improvement, the recommendation was made for placement of a ventriculoperitoneal shunt. On 2/13/23, he underwent right parieto-occipital  shunt placement with laparoscopic placement of the abdominal catheter in the abdomen. Certas valve set at 4.0. Postop CT initially looked great, but then follow up CTH showed new small right sided subdural collection. Shunt setting changed to 5.0 at this time. Patient was discharged to rehab with instructions for quick interval follow up.\par \par Patient then went to [facility] involved in PT/OT. Interval CT head non con done outpatient on 3/20/23 showed acute on subacute RIGHT frontal subdural hematoma measuring 2.2cm. There is moderate mass effect on the right frontal lobe with minimal leftward midline shift. Despite the finding of the expansion of this subdural collection to now an acute on chronic SDH, due to his atrophic brain there is not much mass effect and he is currently asymptomatic. Will attempt to manage conservatively by turning the valve off and following with interval scans. \par \par Today, patient presents to the office in wheelchair for a shunt check after undergoing MRI brain non con at Dr. Cunha’s office. Per wife’s request, preferred undergoing MRI as opposed to CT. Patient and family state increase in unsteadiness, memory loss, incontinence since shunt turned off 2 weeks prior. Aggrenox still held, baby aspirin continued.

## 2023-04-18 ENCOUNTER — APPOINTMENT (OUTPATIENT)
Dept: OPHTHALMOLOGY | Facility: CLINIC | Age: 78
End: 2023-04-18
Payer: MEDICARE

## 2023-04-18 ENCOUNTER — NON-APPOINTMENT (OUTPATIENT)
Age: 78
End: 2023-04-18

## 2023-04-18 PROCEDURE — 99214 OFFICE O/P EST MOD 30 MIN: CPT

## 2023-04-26 ENCOUNTER — RX RENEWAL (OUTPATIENT)
Age: 78
End: 2023-04-26

## 2023-04-28 ENCOUNTER — INPATIENT (INPATIENT)
Facility: HOSPITAL | Age: 78
LOS: 9 days | Discharge: HOME HEALTH | End: 2023-05-08
Attending: INTERNAL MEDICINE | Admitting: INTERNAL MEDICINE
Payer: MEDICARE

## 2023-04-28 VITALS
SYSTOLIC BLOOD PRESSURE: 162 MMHG | OXYGEN SATURATION: 97 % | HEART RATE: 137 BPM | DIASTOLIC BLOOD PRESSURE: 93 MMHG | RESPIRATION RATE: 27 BRPM | TEMPERATURE: 105 F

## 2023-04-28 DIAGNOSIS — E11.9 TYPE 2 DIABETES MELLITUS WITHOUT COMPLICATIONS: ICD-10-CM

## 2023-04-28 DIAGNOSIS — A41.9 SEPSIS, UNSPECIFIED ORGANISM: ICD-10-CM

## 2023-04-28 DIAGNOSIS — S06.5XAA TRAUMATIC SUBDURAL HEMORRHAGE WITH LOSS OF CONSCIOUSNESS STATUS UNKNOWN, INITIAL ENCOUNTER: ICD-10-CM

## 2023-04-28 DIAGNOSIS — Z98.2 PRESENCE OF CEREBROSPINAL FLUID DRAINAGE DEVICE: Chronic | ICD-10-CM

## 2023-04-28 DIAGNOSIS — R65.10 SYSTEMIC INFLAMMATORY RESPONSE SYNDROME (SIRS) OF NON-INFECTIOUS ORIGIN WITHOUT ACUTE ORGAN DYSFUNCTION: ICD-10-CM

## 2023-04-28 DIAGNOSIS — C61 MALIGNANT NEOPLASM OF PROSTATE: ICD-10-CM

## 2023-04-28 DIAGNOSIS — I10 ESSENTIAL (PRIMARY) HYPERTENSION: ICD-10-CM

## 2023-04-28 DIAGNOSIS — E78.5 HYPERLIPIDEMIA, UNSPECIFIED: ICD-10-CM

## 2023-04-28 DIAGNOSIS — Z29.9 ENCOUNTER FOR PROPHYLACTIC MEASURES, UNSPECIFIED: ICD-10-CM

## 2023-04-28 LAB
-  K. PNEUMONIAE GROUP: SIGNIFICANT CHANGE UP
ALBUMIN SERPL ELPH-MCNC: 4.4 G/DL — SIGNIFICANT CHANGE UP (ref 3.3–5)
ALP SERPL-CCNC: 78 U/L — SIGNIFICANT CHANGE UP (ref 40–120)
ALT FLD-CCNC: 24 U/L — SIGNIFICANT CHANGE UP (ref 4–41)
ANION GAP SERPL CALC-SCNC: 17 MMOL/L — HIGH (ref 7–14)
APPEARANCE CSF: CLEAR — SIGNIFICANT CHANGE UP
APPEARANCE SPUN FLD: COLORLESS — SIGNIFICANT CHANGE UP
APPEARANCE UR: CLEAR — SIGNIFICANT CHANGE UP
APTT BLD: 29.9 SEC — SIGNIFICANT CHANGE UP (ref 27–36.3)
AST SERPL-CCNC: 23 U/L — SIGNIFICANT CHANGE UP (ref 4–40)
B PERT DNA SPEC QL NAA+PROBE: SIGNIFICANT CHANGE UP
B PERT+PARAPERT DNA PNL SPEC NAA+PROBE: SIGNIFICANT CHANGE UP
BACTERIA # UR AUTO: NEGATIVE — SIGNIFICANT CHANGE UP
BACTERIAL AG PNL SER: 0 % — SIGNIFICANT CHANGE UP
BASE EXCESS BLDV CALC-SCNC: -0.3 MMOL/L — SIGNIFICANT CHANGE UP (ref -2–3)
BASE EXCESS BLDV CALC-SCNC: -2.9 MMOL/L — LOW (ref -2–3)
BASOPHILS # BLD AUTO: 0.04 K/UL — SIGNIFICANT CHANGE UP (ref 0–0.2)
BASOPHILS NFR BLD AUTO: 0.4 % — SIGNIFICANT CHANGE UP (ref 0–2)
BILIRUB SERPL-MCNC: 0.6 MG/DL — SIGNIFICANT CHANGE UP (ref 0.2–1.2)
BILIRUB UR-MCNC: NEGATIVE — SIGNIFICANT CHANGE UP
BLOOD GAS VENOUS COMPREHENSIVE RESULT: SIGNIFICANT CHANGE UP
BLOOD GAS VENOUS COMPREHENSIVE RESULT: SIGNIFICANT CHANGE UP
BORDETELLA PARAPERTUSSIS (RAPRVP): SIGNIFICANT CHANGE UP
BUN SERPL-MCNC: 15 MG/DL — SIGNIFICANT CHANGE UP (ref 7–23)
C PNEUM DNA SPEC QL NAA+PROBE: SIGNIFICANT CHANGE UP
CALCIUM SERPL-MCNC: 9.1 MG/DL — SIGNIFICANT CHANGE UP (ref 8.4–10.5)
CHLORIDE BLDV-SCNC: 101 MMOL/L — SIGNIFICANT CHANGE UP (ref 96–108)
CHLORIDE BLDV-SCNC: 106 MMOL/L — SIGNIFICANT CHANGE UP (ref 96–108)
CHLORIDE SERPL-SCNC: 101 MMOL/L — SIGNIFICANT CHANGE UP (ref 98–107)
CO2 BLDV-SCNC: 23.2 MMOL/L — SIGNIFICANT CHANGE UP (ref 22–26)
CO2 BLDV-SCNC: 28.9 MMOL/L — HIGH (ref 22–26)
CO2 SERPL-SCNC: 21 MMOL/L — LOW (ref 22–31)
COLOR CSF: COLORLESS — SIGNIFICANT CHANGE UP
COLOR SPEC: SIGNIFICANT CHANGE UP
CREAT SERPL-MCNC: 1.19 MG/DL — SIGNIFICANT CHANGE UP (ref 0.5–1.3)
DIFF PNL FLD: ABNORMAL
EGFR: 63 ML/MIN/1.73M2 — SIGNIFICANT CHANGE UP
EOSINOPHIL # BLD AUTO: 0.22 K/UL — SIGNIFICANT CHANGE UP (ref 0–0.5)
EOSINOPHIL # CSF: 40 % — SIGNIFICANT CHANGE UP
EOSINOPHIL NFR BLD AUTO: 2.2 % — SIGNIFICANT CHANGE UP (ref 0–6)
EPI CELLS # UR: 1 /HPF — SIGNIFICANT CHANGE UP (ref 0–5)
FLUAV SUBTYP SPEC NAA+PROBE: SIGNIFICANT CHANGE UP
FLUBV RNA SPEC QL NAA+PROBE: SIGNIFICANT CHANGE UP
GAS PNL BLDV: 135 MMOL/L — LOW (ref 136–145)
GAS PNL BLDV: 135 MMOL/L — LOW (ref 136–145)
GAS PNL BLDV: SIGNIFICANT CHANGE UP
GLUCOSE BLDC GLUCOMTR-MCNC: 102 MG/DL — HIGH (ref 70–99)
GLUCOSE BLDC GLUCOMTR-MCNC: 97 MG/DL — SIGNIFICANT CHANGE UP (ref 70–99)
GLUCOSE BLDV-MCNC: 119 MG/DL — HIGH (ref 70–99)
GLUCOSE BLDV-MCNC: 133 MG/DL — HIGH (ref 70–99)
GLUCOSE CSF-MCNC: 74 MG/DL — HIGH (ref 40–70)
GLUCOSE SERPL-MCNC: 119 MG/DL — HIGH (ref 70–99)
GLUCOSE UR QL: ABNORMAL
GRAM STN FLD: SIGNIFICANT CHANGE UP
HADV DNA SPEC QL NAA+PROBE: SIGNIFICANT CHANGE UP
HCO3 BLDV-SCNC: 22 MMOL/L — SIGNIFICANT CHANGE UP (ref 22–29)
HCO3 BLDV-SCNC: 27 MMOL/L — SIGNIFICANT CHANGE UP (ref 22–29)
HCOV 229E RNA SPEC QL NAA+PROBE: SIGNIFICANT CHANGE UP
HCOV HKU1 RNA SPEC QL NAA+PROBE: SIGNIFICANT CHANGE UP
HCOV NL63 RNA SPEC QL NAA+PROBE: SIGNIFICANT CHANGE UP
HCOV OC43 RNA SPEC QL NAA+PROBE: SIGNIFICANT CHANGE UP
HCT VFR BLD CALC: 50.6 % — HIGH (ref 39–50)
HCT VFR BLDA CALC: 41 % — SIGNIFICANT CHANGE UP (ref 39–51)
HCT VFR BLDA CALC: 50 % — SIGNIFICANT CHANGE UP (ref 39–51)
HGB BLD CALC-MCNC: 13.7 G/DL — SIGNIFICANT CHANGE UP (ref 12.6–17.4)
HGB BLD CALC-MCNC: 16.7 G/DL — SIGNIFICANT CHANGE UP (ref 12.6–17.4)
HGB BLD-MCNC: 16 G/DL — SIGNIFICANT CHANGE UP (ref 13–17)
HMPV RNA SPEC QL NAA+PROBE: SIGNIFICANT CHANGE UP
HPIV1 RNA SPEC QL NAA+PROBE: SIGNIFICANT CHANGE UP
HPIV2 RNA SPEC QL NAA+PROBE: SIGNIFICANT CHANGE UP
HPIV3 RNA SPEC QL NAA+PROBE: SIGNIFICANT CHANGE UP
HPIV4 RNA SPEC QL NAA+PROBE: SIGNIFICANT CHANGE UP
IANC: 8.55 K/UL — HIGH (ref 1.8–7.4)
IMM GRANULOCYTES NFR BLD AUTO: 0.2 % — SIGNIFICANT CHANGE UP (ref 0–0.9)
INR BLD: 1.09 RATIO — SIGNIFICANT CHANGE UP (ref 0.88–1.16)
KETONES UR-MCNC: NEGATIVE — SIGNIFICANT CHANGE UP
LACTATE BLDV-MCNC: 1.4 MMOL/L — SIGNIFICANT CHANGE UP (ref 0.5–2)
LACTATE BLDV-MCNC: 3.6 MMOL/L — HIGH (ref 0.5–2)
LEUKOCYTE ESTERASE UR-ACNC: NEGATIVE — SIGNIFICANT CHANGE UP
LYMPHOCYTES # BLD AUTO: 1.22 K/UL — SIGNIFICANT CHANGE UP (ref 1–3.3)
LYMPHOCYTES # BLD AUTO: 12 % — LOW (ref 13–44)
LYMPHOCYTES # CSF: 0 % — SIGNIFICANT CHANGE UP
M PNEUMO DNA SPEC QL NAA+PROBE: SIGNIFICANT CHANGE UP
MCHC RBC-ENTMCNC: 27.7 PG — SIGNIFICANT CHANGE UP (ref 27–34)
MCHC RBC-ENTMCNC: 31.6 GM/DL — LOW (ref 32–36)
MCV RBC AUTO: 87.5 FL — SIGNIFICANT CHANGE UP (ref 80–100)
METHOD TYPE: SIGNIFICANT CHANGE UP
MONOCYTES # BLD AUTO: 0.12 K/UL — SIGNIFICANT CHANGE UP (ref 0–0.9)
MONOCYTES NFR BLD AUTO: 1.2 % — LOW (ref 2–14)
MONOS+MACROS NFR CSF: 40 % — SIGNIFICANT CHANGE UP
NEUTROPHILS # BLD AUTO: 8.55 K/UL — HIGH (ref 1.8–7.4)
NEUTROPHILS # CSF: 20 % — SIGNIFICANT CHANGE UP
NEUTROPHILS NFR BLD AUTO: 84 % — HIGH (ref 43–77)
NITRITE UR-MCNC: NEGATIVE — SIGNIFICANT CHANGE UP
NRBC # BLD: 0 /100 WBCS — SIGNIFICANT CHANGE UP (ref 0–0)
NRBC # FLD: 0 K/UL — SIGNIFICANT CHANGE UP (ref 0–0)
NRBC NFR CSF: 0 CELLS/UL — SIGNIFICANT CHANGE UP (ref 0–5)
OTHER CELLS CSF MANUAL: 0 % — SIGNIFICANT CHANGE UP
PCO2 BLDV: 38 MMHG — LOW (ref 42–55)
PCO2 BLDV: 54 MMHG — SIGNIFICANT CHANGE UP (ref 42–55)
PH BLDV: 7.31 — LOW (ref 7.32–7.43)
PH BLDV: 7.37 — SIGNIFICANT CHANGE UP (ref 7.32–7.43)
PH UR: 6 — SIGNIFICANT CHANGE UP (ref 5–8)
PLATELET # BLD AUTO: 206 K/UL — SIGNIFICANT CHANGE UP (ref 150–400)
PO2 BLDV: 20 MMHG — LOW (ref 25–45)
PO2 BLDV: 42 MMHG — SIGNIFICANT CHANGE UP (ref 25–45)
POTASSIUM BLDV-SCNC: 3.5 MMOL/L — SIGNIFICANT CHANGE UP (ref 3.5–5.1)
POTASSIUM BLDV-SCNC: 4.5 MMOL/L — SIGNIFICANT CHANGE UP (ref 3.5–5.1)
POTASSIUM SERPL-MCNC: 3.7 MMOL/L — SIGNIFICANT CHANGE UP (ref 3.5–5.3)
POTASSIUM SERPL-SCNC: 3.7 MMOL/L — SIGNIFICANT CHANGE UP (ref 3.5–5.3)
PROT CSF-MCNC: 5 MG/DL — LOW (ref 15–45)
PROT SERPL-MCNC: 7.9 G/DL — SIGNIFICANT CHANGE UP (ref 6–8.3)
PROT UR-MCNC: NEGATIVE — SIGNIFICANT CHANGE UP
PROTHROM AB SERPL-ACNC: 12.6 SEC — SIGNIFICANT CHANGE UP (ref 10.5–13.4)
RAPID RVP RESULT: SIGNIFICANT CHANGE UP
RBC # BLD: 5.78 M/UL — SIGNIFICANT CHANGE UP (ref 4.2–5.8)
RBC # CSF: 2 CELLS/UL — HIGH (ref 0–0)
RBC # FLD: 13.7 % — SIGNIFICANT CHANGE UP (ref 10.3–14.5)
RBC CASTS # UR COMP ASSIST: 8 /HPF — HIGH (ref 0–4)
RSV RNA SPEC QL NAA+PROBE: SIGNIFICANT CHANGE UP
RV+EV RNA SPEC QL NAA+PROBE: SIGNIFICANT CHANGE UP
SAO2 % BLDV: 23.6 % — LOW (ref 67–88)
SAO2 % BLDV: 71.3 % — SIGNIFICANT CHANGE UP (ref 67–88)
SARS-COV-2 RNA SPEC QL NAA+PROBE: SIGNIFICANT CHANGE UP
SODIUM SERPL-SCNC: 139 MMOL/L — SIGNIFICANT CHANGE UP (ref 135–145)
SP GR SPEC: 1.02 — SIGNIFICANT CHANGE UP (ref 1.01–1.05)
SPECIMEN SOURCE: SIGNIFICANT CHANGE UP
TOTAL CELLS COUNTED, SPINAL FLUID: 5 CELLS — SIGNIFICANT CHANGE UP
TUBE TYPE: SIGNIFICANT CHANGE UP
UROBILINOGEN FLD QL: SIGNIFICANT CHANGE UP
WBC # BLD: 10.17 K/UL — SIGNIFICANT CHANGE UP (ref 3.8–10.5)
WBC # FLD AUTO: 10.17 K/UL — SIGNIFICANT CHANGE UP (ref 3.8–10.5)
WBC UR QL: 5 /HPF — SIGNIFICANT CHANGE UP (ref 0–5)

## 2023-04-28 PROCEDURE — 71260 CT THORAX DX C+: CPT | Mod: 26,MD

## 2023-04-28 PROCEDURE — 99223 1ST HOSP IP/OBS HIGH 75: CPT

## 2023-04-28 PROCEDURE — 99285 EMERGENCY DEPT VISIT HI MDM: CPT

## 2023-04-28 PROCEDURE — 75809 NONVASCULAR SHUNT X-RAY: CPT | Mod: 26

## 2023-04-28 PROCEDURE — 70450 CT HEAD/BRAIN W/O DYE: CPT | Mod: 26,MD

## 2023-04-28 PROCEDURE — 70450 CT HEAD/BRAIN W/O DYE: CPT | Mod: 26,MA,77

## 2023-04-28 PROCEDURE — 74177 CT ABD & PELVIS W/CONTRAST: CPT | Mod: 26,MA

## 2023-04-28 PROCEDURE — 99223 1ST HOSP IP/OBS HIGH 75: CPT | Mod: GC

## 2023-04-28 RX ORDER — DEXTROSE 50 % IN WATER 50 %
15 SYRINGE (ML) INTRAVENOUS ONCE
Refills: 0 | Status: DISCONTINUED | OUTPATIENT
Start: 2023-04-28 | End: 2023-04-28

## 2023-04-28 RX ORDER — PIPERACILLIN AND TAZOBACTAM 4; .5 G/20ML; G/20ML
3.38 INJECTION, POWDER, LYOPHILIZED, FOR SOLUTION INTRAVENOUS ONCE
Refills: 0 | Status: DISCONTINUED | OUTPATIENT
Start: 2023-04-28 | End: 2023-04-28

## 2023-04-28 RX ORDER — PIPERACILLIN AND TAZOBACTAM 4; .5 G/20ML; G/20ML
3.38 INJECTION, POWDER, LYOPHILIZED, FOR SOLUTION INTRAVENOUS ONCE
Refills: 0 | Status: COMPLETED | OUTPATIENT
Start: 2023-04-28 | End: 2023-04-28

## 2023-04-28 RX ORDER — VANCOMYCIN HCL 1 G
1000 VIAL (EA) INTRAVENOUS ONCE
Refills: 0 | Status: COMPLETED | OUTPATIENT
Start: 2023-04-28 | End: 2023-04-28

## 2023-04-28 RX ORDER — DEXTROSE 50 % IN WATER 50 %
15 SYRINGE (ML) INTRAVENOUS ONCE
Refills: 0 | Status: DISCONTINUED | OUTPATIENT
Start: 2023-04-28 | End: 2023-05-08

## 2023-04-28 RX ORDER — INSULIN GLARGINE 100 [IU]/ML
20 INJECTION, SOLUTION SUBCUTANEOUS AT BEDTIME
Refills: 0 | Status: DISCONTINUED | OUTPATIENT
Start: 2023-04-28 | End: 2023-05-08

## 2023-04-28 RX ORDER — VANCOMYCIN HCL 1 G
1000 VIAL (EA) INTRAVENOUS EVERY 24 HOURS
Refills: 0 | Status: DISCONTINUED | OUTPATIENT
Start: 2023-04-28 | End: 2023-04-29

## 2023-04-28 RX ORDER — INSULIN LISPRO 100/ML
7 VIAL (ML) SUBCUTANEOUS
Refills: 0 | Status: DISCONTINUED | OUTPATIENT
Start: 2023-04-28 | End: 2023-04-28

## 2023-04-28 RX ORDER — CEFEPIME 1 G/1
2000 INJECTION, POWDER, FOR SOLUTION INTRAMUSCULAR; INTRAVENOUS EVERY 12 HOURS
Refills: 0 | Status: DISCONTINUED | OUTPATIENT
Start: 2023-04-28 | End: 2023-04-29

## 2023-04-28 RX ORDER — ACETAMINOPHEN 500 MG
1000 TABLET ORAL ONCE
Refills: 0 | Status: COMPLETED | OUTPATIENT
Start: 2023-04-28 | End: 2023-04-29

## 2023-04-28 RX ORDER — LEVETIRACETAM 250 MG/1
250 TABLET, FILM COATED ORAL
Refills: 0 | Status: DISCONTINUED | OUTPATIENT
Start: 2023-04-28 | End: 2023-05-08

## 2023-04-28 RX ORDER — SODIUM CHLORIDE 9 MG/ML
1000 INJECTION, SOLUTION INTRAVENOUS
Refills: 0 | Status: DISCONTINUED | OUTPATIENT
Start: 2023-04-28 | End: 2023-04-28

## 2023-04-28 RX ORDER — INSULIN LISPRO 100/ML
VIAL (ML) SUBCUTANEOUS EVERY 6 HOURS
Refills: 0 | Status: DISCONTINUED | OUTPATIENT
Start: 2023-04-28 | End: 2023-04-28

## 2023-04-28 RX ORDER — SODIUM CHLORIDE 9 MG/ML
1000 INJECTION, SOLUTION INTRAVENOUS
Refills: 0 | Status: DISCONTINUED | OUTPATIENT
Start: 2023-04-28 | End: 2023-05-08

## 2023-04-28 RX ORDER — INSULIN LISPRO 100/ML
VIAL (ML) SUBCUTANEOUS AT BEDTIME
Refills: 0 | Status: DISCONTINUED | OUTPATIENT
Start: 2023-04-28 | End: 2023-05-08

## 2023-04-28 RX ORDER — INSULIN GLARGINE 100 [IU]/ML
20 INJECTION, SOLUTION SUBCUTANEOUS AT BEDTIME
Refills: 0 | Status: DISCONTINUED | OUTPATIENT
Start: 2023-04-28 | End: 2023-04-28

## 2023-04-28 RX ORDER — GLUCAGON INJECTION, SOLUTION 0.5 MG/.1ML
1 INJECTION, SOLUTION SUBCUTANEOUS ONCE
Refills: 0 | Status: DISCONTINUED | OUTPATIENT
Start: 2023-04-28 | End: 2023-05-08

## 2023-04-28 RX ORDER — INSULIN LISPRO 100/ML
VIAL (ML) SUBCUTANEOUS
Refills: 0 | Status: DISCONTINUED | OUTPATIENT
Start: 2023-04-28 | End: 2023-05-08

## 2023-04-28 RX ORDER — DEXTROSE 50 % IN WATER 50 %
25 SYRINGE (ML) INTRAVENOUS ONCE
Refills: 0 | Status: DISCONTINUED | OUTPATIENT
Start: 2023-04-28 | End: 2023-04-28

## 2023-04-28 RX ORDER — DEXTROSE 50 % IN WATER 50 %
12.5 SYRINGE (ML) INTRAVENOUS ONCE
Refills: 0 | Status: DISCONTINUED | OUTPATIENT
Start: 2023-04-28 | End: 2023-04-28

## 2023-04-28 RX ORDER — ACETAMINOPHEN 500 MG
1000 TABLET ORAL ONCE
Refills: 0 | Status: COMPLETED | OUTPATIENT
Start: 2023-04-28 | End: 2023-04-28

## 2023-04-28 RX ORDER — ATORVASTATIN CALCIUM 80 MG/1
20 TABLET, FILM COATED ORAL AT BEDTIME
Refills: 0 | Status: DISCONTINUED | OUTPATIENT
Start: 2023-04-28 | End: 2023-05-08

## 2023-04-28 RX ORDER — DEXTROSE 50 % IN WATER 50 %
12.5 SYRINGE (ML) INTRAVENOUS ONCE
Refills: 0 | Status: DISCONTINUED | OUTPATIENT
Start: 2023-04-28 | End: 2023-05-08

## 2023-04-28 RX ORDER — DEXTROSE 50 % IN WATER 50 %
25 SYRINGE (ML) INTRAVENOUS ONCE
Refills: 0 | Status: DISCONTINUED | OUTPATIENT
Start: 2023-04-28 | End: 2023-05-08

## 2023-04-28 RX ORDER — GLUCAGON INJECTION, SOLUTION 0.5 MG/.1ML
1 INJECTION, SOLUTION SUBCUTANEOUS ONCE
Refills: 0 | Status: DISCONTINUED | OUTPATIENT
Start: 2023-04-28 | End: 2023-04-28

## 2023-04-28 RX ORDER — SODIUM CHLORIDE 9 MG/ML
2000 INJECTION INTRAMUSCULAR; INTRAVENOUS; SUBCUTANEOUS ONCE
Refills: 0 | Status: COMPLETED | OUTPATIENT
Start: 2023-04-28 | End: 2023-04-28

## 2023-04-28 RX ORDER — SODIUM CHLORIDE 9 MG/ML
500 INJECTION, SOLUTION INTRAVENOUS ONCE
Refills: 0 | Status: COMPLETED | OUTPATIENT
Start: 2023-04-28 | End: 2023-04-28

## 2023-04-28 RX ORDER — ONDANSETRON 8 MG/1
4 TABLET, FILM COATED ORAL ONCE
Refills: 0 | Status: COMPLETED | OUTPATIENT
Start: 2023-04-28 | End: 2023-04-28

## 2023-04-28 RX ORDER — INSULIN LISPRO 100/ML
20 VIAL (ML) SUBCUTANEOUS
Qty: 0 | Refills: 0 | DISCHARGE

## 2023-04-28 RX ORDER — INSULIN LISPRO 100/ML
7 VIAL (ML) SUBCUTANEOUS
Refills: 0 | Status: DISCONTINUED | OUTPATIENT
Start: 2023-04-28 | End: 2023-05-08

## 2023-04-28 RX ADMIN — Medication 400 MILLIGRAM(S): at 02:11

## 2023-04-28 RX ADMIN — ONDANSETRON 4 MILLIGRAM(S): 8 TABLET, FILM COATED ORAL at 02:11

## 2023-04-28 RX ADMIN — PIPERACILLIN AND TAZOBACTAM 200 GRAM(S): 4; .5 INJECTION, POWDER, LYOPHILIZED, FOR SOLUTION INTRAVENOUS at 11:24

## 2023-04-28 RX ADMIN — CEFEPIME 100 MILLIGRAM(S): 1 INJECTION, POWDER, FOR SOLUTION INTRAMUSCULAR; INTRAVENOUS at 18:33

## 2023-04-28 RX ADMIN — Medication 250 MILLIGRAM(S): at 03:36

## 2023-04-28 RX ADMIN — SODIUM CHLORIDE 2000 MILLILITER(S): 9 INJECTION INTRAMUSCULAR; INTRAVENOUS; SUBCUTANEOUS at 02:10

## 2023-04-28 RX ADMIN — INSULIN GLARGINE 20 UNIT(S): 100 INJECTION, SOLUTION SUBCUTANEOUS at 23:04

## 2023-04-28 RX ADMIN — Medication 250 MILLIGRAM(S): at 18:16

## 2023-04-28 RX ADMIN — LEVETIRACETAM 250 MILLIGRAM(S): 250 TABLET, FILM COATED ORAL at 18:16

## 2023-04-28 RX ADMIN — ATORVASTATIN CALCIUM 20 MILLIGRAM(S): 80 TABLET, FILM COATED ORAL at 23:04

## 2023-04-28 RX ADMIN — SODIUM CHLORIDE 500 MILLILITER(S): 9 INJECTION, SOLUTION INTRAVENOUS at 19:46

## 2023-04-28 RX ADMIN — PIPERACILLIN AND TAZOBACTAM 200 GRAM(S): 4; .5 INJECTION, POWDER, LYOPHILIZED, FOR SOLUTION INTRAVENOUS at 02:23

## 2023-04-28 NOTE — CONSULT NOTE ADULT - ATTENDING COMMENTS
77 M w PMHx of DM2 with Neuropathy, TIAs (previously on Aggrenox), HTN, and HLD, subdural hematoma s/p Left MMA embolization (August 2022), NPH s/p  shunt (Feb 2023) and recent prostate bx 2 days ago for hx pf prostate cancer not on treatment per family now presented today with AMS and fever  Fever, no leukocytosis  Prostate biopsy 3 days prior to arrival  Has VPS  CT Chest/CT A/P  No evidence of intra-abdominal abscess. Clear chest. (reviewed imaging personally)  CT without collection around VPS; CT new SDH  UA neg, UCX pending  RVP neg  BCX pending  Significantly high fevers uncertain source, potential life threatening underlying cause; due to occult infection vs SDH  Overall, Fever, SDH, prostate biopsy/recent UTI  - Vanco 1g q 24 (monitor levels)  - Cefepime 2g q 12  - DC Zosyn  - Please sample VPS fluid--send for cell counts as well as culture (no other infectious source found)  - F/U BCXs  - F/U UCX  - Monitor for alternate sources    Delfino Garvin MD  Contact on TEAMS messaging from 9am - 5pm  From 5pm-9am, on weekends, or if no response call [phone]    I was physically present for the key portions of the evaluation and management service provided. I saw and examined the patient. I agree with the above history, physical, and plan except for any discrepancies which I have documented in “Attending Statements.” Please refer to “Attending Statements” for final plan.
Patient well known to me, with complicated history of left SAH s/p MMA embolization that resulted in resolution of the SDH, then progressive ventriculomegaly with the classic symptoms of NPH including gait instability, urinary incontinence, and confusion. This was worked up by myself and Dr. Cunha, and there was initial uncertainty surrounding the diagnosis of NPH because he did not exhibit the classic magnetic gait. High volume LP was unclear whether he had any real improvement. He also has brain atrophy out of proportion to age. Sinemet trial was tried given some Parkinsonian features but he did not respond. We thus decided to perform a lumbar drain trial to see if he would truly benefit from VPS. After 3 days of drainage of 5cc/h there no clear improvement. We increased drainage to 30cc q3h, and there was marked improvement documented by physical therapy. We decided to perform VPS based on that, with the possibility that he has very low pressure hydrocephalus that needed aggressive drainage to have symptomatic relief.     VPS was unremarkable (Certas programmable valve, laparoscopic placement of peritoneal catheter by gen surg) and valve was initially set to 4. Postop maybe slight improvement in gait. Prior to discharge a small right subdural collection was noted, so valve was dialed up to 5. He was sent to rehab with instructions for short interval follow up head CT, but this unfortunately was not done until several weeks later. At that point, the SDH had expanded substantially. The shunt was turned off (dialed to 8) and interval scans were stable. Family and patient were very reluctant to undergo any additional procedures at that time, understandably so. Luckily due to his severe brain atrophy, there was not much mass effect or midline shift from the rather large SDH, so we opted to treat the SDH conservatively.    I last saw him in the office on 4/5. He was neurologically stable and scans were relatively stable. A CT on 3/20 showed the hematoma to be 2.6cm, and an MRI performed at Dr. Cunha's office on 4/5 showed it to be 2.2cm, so there was signs that it was beginning to improve. Now on this admission at [facility], I measure the SDH on the scan from 4/28 to be 2.0cm. Will continue to manage this conservatively as it appears to be slowly going in the right direction, there is no significant mass effect, and the patient and family are reluctant to undergo any additional procedures.     Now with fevers, shunt tap appears clean with no concern for shunt infection.

## 2023-04-28 NOTE — ED ADULT NURSE REASSESSMENT NOTE - NS ED NURSE REASSESS COMMENT FT1
Patient back to trauma A from XR and CT scan. Patient awake and alert x4, able to answer questions appropriately, respirations even and unlabored, vitals stable. Repeat rectal temp improved. Patient awaiting results and dispo. Family arrived and at bedside now. Stretcher in lowest position, wheels locked, appropriate side rails in place, call bell in reach. Float RN Patient back to trauma A from XR and CT scan. Patient awake and alert x4, able to answer questions appropriately, respirations even and unlabored, vitals stable. Repeat rectal temp improved. Repeat VBG obtained and sent to lab. Patient awaiting results and dispo. Family arrived and at bedside now. Stretcher in lowest position, wheels locked, appropriate side rails in place, call bell in reach.

## 2023-04-28 NOTE — ED PROVIDER NOTE - CLINICAL SUMMARY MEDICAL DECISION MAKING FREE TEXT BOX
Marya Nolasco MD, PGY-3: 76YO hx of DM-II with Neuropathy, TIAs (previously on Aggrenox), HTN, and HLD, SDH s/p MMA embolization ( aug 2022 ), NPH s/p  shunt, p/w  altered mental status. vs: febrile, tachycardic, normotensive. Suspect sepsis, consider urinary source.  also consider worsening of baseline NPH, shunt failure/fracture. We will plan for septic work-up, basic labs, cultures, fluids, IV antibiotics, CT head, xray shunt series.

## 2023-04-28 NOTE — CONSULT NOTE ADULT - ASSESSMENT
78 YO male with Hx of Left MMAE for SDH,  shunt for NPH presenting with AMS and fever S/P prostate biopsy 2 days ago, R/O sepsis.

## 2023-04-28 NOTE — H&P ADULT - PROBLEM SELECTOR PLAN 3
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 Baez (Outpatient urologist) for further collateral at [phone]  pt does not have dysuria, hematuria, pain in the genital area, groin, lower abdomen, or lower back. however does endorse urinary frequency/nocturia

## 2023-04-28 NOTE — ED ADULT NURSE NOTE - OBJECTIVE STATEMENT
Tomasa RN. Patient arrives to trauma A via EMS coming in from home for AMS/fevers/shaking. Patient has extensive pmhx (please see provider note). Per EMS, patient recently had prostate biopsy 2 days ago, state patient was endorsing fevers and confusion since biopsy. Patient arrives without family members. A&Ox1, unable to answer assessment questions, rigors shaking observed, respirations even and labored, placed on cardiac monitor-sinus tach to 130s. Patient found febrile rectally 104.9. MD Jones aware. Patient clean and changed into hospital gown. Skin observed intact. Abdomen round, firm and distended, no tenderness observed. No nonverbal signs of pain observed. Patient arrives with 20G IV to left hand. 18G IV placed to right antecubital, labs collected and sent to lab. Patient medicated per orders. Report given to primary RN Matilda. Stretcher in lowest position, wheels locked, appropriate side rails in place, call bell in reach. Tomasa RN. Patient arrives to trauma A via EMS coming in from home for AMS/fevers/shaking. Patient has extensive pmhx (please see provider note) hx  Shunt, subdural hematoma, dementia, etc. Per EMS, patient recently had prostate biopsy 2 days ago, state patient was endorsing fevers and increase confusion since biopsy. Patient arrives without family members. A&Ox1, unable to answer assessment questions, rigors shaking observed, respirations even and labored, placed on cardiac monitor-sinus tach to 130s. Patient found febrile rectally 104.9. MD Jones aware. Patient clean and changed into hospital gown. Skin observed intact. Abdomen round, soft and distended, no tenderness observed. No nonverbal signs of pain observed. Patient arrives with 20G IV to left hand. 18G IV placed to right antecubital, labs collected and sent to lab. Patient medicated per orders. Report given to primary RN Matilda. Stretcher in lowest position, wheels locked, appropriate side rails in place, call bell in reach.

## 2023-04-28 NOTE — ED PROVIDER NOTE - OBJECTIVE STATEMENT
78YO hx of DM-II with Neuropathy, TIAs (previously on Aggrenox), HTN, and HLD, SDH s/p MMA embolization ( aug 2022 ), NPH s/p  shunt, p/w  altered mental status.  BIBA.  2 days prior had prostate biopsy,  in the past patient was admitted for sepsis s/p biopsy.  EMS reports that patient has recently had fever, confusion. Initial temperature elevated, elevated heart rate, normal glucose.  Patiently normally on Keppra in setting of hydrocephalus.  Denies any seizure-like activity although notes bilateral upper extremity lower extremity tremors.

## 2023-04-28 NOTE — H&P ADULT - PROBLEM SELECTOR PLAN 2
- neurosurgery following  - CT Head shows R. frontal subdural hematoma, new since prior study, no midline shift, resolution of low density holohemispheric right subdural collection and left frontal subdural collection, stable ventricular size   - NSGY requesting interval CT for stability in 4-6 hours  - MRI brain w/w/o contrast  - Dr. Isabel to see patient and consider right MMAE when sepsis treated - neurosurgery following  - CT Head shows R. frontal subdural hematoma, compared to CT Head from one month ago per NSGY no change. no midline shift, resolution of low density holohemispheric right subdural collection and left frontal subdural collection, stable ventricular size.   - At this point no plan to pursue any tap or shunt per NSGY team urgently, Dr. Isabel wanting to await for cultures to return and source control of sepsis, fever unlikely 2/2 to infection of shunt given stable ventricular size   - F/u MRI brain w/w/o contrast  - Continue with Keppra 250 mg bid as per Nsgy

## 2023-04-28 NOTE — H&P ADULT - HISTORY OF PRESENT ILLNESS
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 Jamal Isabel (Neurosurgery) & Dr. Laura (Neurology), prostate bx 2 days ago for hx pf prostate cancer not on treatment per family who p/w acute metabolic encephalopathy, found w SIRS.    ED Course: patient s/p vanc 1x, c/w zosyn, no identifiable source of infxn on imaging or labs - concern for shunt infx - per neuro sx, check MR imaging and will consider tap.    Per family the patient has had history of post-biopsy prostatitis in the past 3 y ago, will f/u urine cultures and blood cultures. Patient denies dysuria, hematuria, or other urinary symptoms beside chronic dribbling and frequent nocturia. Patient states that he does not feel unwell. However, he was AAOx1 on our exam today, oriented to self, but giving his home address when asked his location, and stating it was November 2023.     Patient had no other complaints other than mild, chronic discomfort in the area of his shunt (pointed to his occipital region). Per family at bedside patient does not ambulate independently, they use a wheelchair and cane. Patient also has home health aides which come to the home 3 times a week. No home PT as of now.    Patient had a fever last night as well as worsening confusion over the past month after  shunt was turned off by NSGY. Per family this prompted them to take the patient to the ED today.

## 2023-04-28 NOTE — ED ADULT NURSE NOTE - NSFALLRSKINDICTYPE_ED_ALL_ED
Confusion/Disorientation Azithromycin Counseling:  I discussed with the patient the risks of azithromycin including but not limited to GI upset, allergic reaction, drug rash, diarrhea, and yeast infections.

## 2023-04-28 NOTE — ED PROVIDER NOTE - PHYSICAL EXAMINATION
Gen: WDWN, NAD  HEENT: EOMI, no nasal discharge, mucous membranes moist  CV: Fast rate, regular rhythm, 2+ radial pulses R  Resp: no accessory muscle use, no increased work of breathing  GI: Abdomen soft, + distended, NTTP  MSK: No open wounds, no bruising, no LE edema  Neuro: A&Ox1, following commands, moving all four extremities spontaneously  Psych: appropriate mood

## 2023-04-28 NOTE — PATIENT PROFILE ADULT - FALL HARM RISK - HARM RISK INTERVENTIONS

## 2023-04-28 NOTE — H&P ADULT - PROBLEM SELECTOR PLAN 1
- presented SIRS+ : Tachycardic to 137, Febrile to 40.5, unknown source as of now  - UA bland, however follow up urine culture given recent prostate biopsy 2 days ago and family reporting pt has hx of previous post bx prostatitis  - Antibiotic treatment: c/w zosyn, s/p vanc  - Follow up blood cultures  - no leukocytosis  - fever improved after iv ofirmev and antibiotics, will ctm

## 2023-04-28 NOTE — ED PROVIDER NOTE - PROGRESS NOTE DETAILS
Dr. Gomez: Pt was signed out to me awaiting repeat CT head and CT abd/pelvis. Neurosurgery has seen pt. Pt currently resting in NAD. Adrian: CTH stable, CT C/a/p no identified source of infection, lactate and mental status improved. Unclear etiology, shunt vs prostate biopsy, d/w NSGY would want him on medicine service for iv abx, monitoring, poss shunt tap at later time. d/w hospitalist, admit to medicine.

## 2023-04-28 NOTE — H&P ADULT - ATTENDING COMMENTS
77 M w hx of DM2 with Neuropathy, TIAs (previously on Aggrenox), HTN, and HLD, SDH s/p Left MMA embolization ( aug 2022 ), NPH s/p  shunt (Feb 2023), prostate bx 2 days ago for hx pf prostate cancer not on treatement per family who p/w acute metabolic encephalopathy, found w SIRS - no identifiable source on imaging or labs - concern for shunt infx - per neuro sx, check MR imaging and will consider tap   empiric abx would need to be broad, will have ID input  fu cultures   reports hx of prostate ca s/p bx 2 days ago and per wife has had post bx prostatitis prior to this - UA bland and denies urinary symptoms. will perform prostate exam once off ED hallway and f.u urine cx   new SDH - family reports fall - serial imaging per neuro sx - avoid blood thinners  reach out ot out-pt  who performed bx for collateral 77 M w hx of DM2 with Neuropathy, TIAs (previously on Aggrenox), HTN, and HLD, SDH s/p Left MMA embolization ( aug 2022 ), NPH s/p  shunt (Feb 2023), prostate bx 2 days ago for hx pf prostate cancer not on treatement per family who p/w acute metabolic encephalopathy, found w SIRS - no identifiable source on imaging or labs - concern for shunt infx - per neuro sx, check MR imaging and will consider tap   empiric abx would need to be broad, will have ID input  fu cultures   reports hx of prostate ca s/p bx 2 days ago and per wife has had post bx prostatitis prior to this - UA bland and denies urinary symptoms. will perform prostate exam once off ED hallway and f.u urine cx   new SDH - family reports fall - serial imaging per neuro sx and consideration of right MMAE when sepsis is treated, r.od - avoid blood thinners  reach out to out-pt  who performed bx for collateral

## 2023-04-28 NOTE — H&P ADULT - NSHPPHYSICALEXAM_GEN_ALL_CORE
T(C): 36.2 (04-28-23 @ 08:36), Max: 40.5 (04-28-23 @ 01:46)  HR: 83 (04-28-23 @ 08:36) (83 - 137)  BP: 105/61 (04-28-23 @ 08:36) (105/61 - 162/93)  RR: 16 (04-28-23 @ 08:36) (16 - 27)  SpO2: 100% (04-28-23 @ 08:36) (97% - 100%)    CONSTITUTIONAL: Well groomed, no apparent distress  EYES: PERRLA and symmetric, EOMI, No conjunctival or scleral injection, non-icteric  ENMT: Oral mucosa with moist membranes. Normal dentition; no pharyngeal injection or exudates  NECK: Supple, symmetric and without tracheal deviation   RESP: No respiratory distress, no use of accessory muscles; CTA b/l, no WRR  CV: RRR, +S1S2, no MRG; no JVD; no peripheral edema  GI: Soft, NT, ND, no rebound, no guarding; no palpable masses; no hepatosplenomegaly; no hernia palpated  LYMPH: No cervical LAD or tenderness.  SKIN: No rashes or ulcers noted; no subcutaneous nodules or induration palpable  NEURO: CN II-XII intact; strength 5/5 in upper extremities, 4/5 in LLE, 5/5 in RLE. sensation intact in upper and lower extremities b/l to light touch   PSYCH: A&O x1 , oriented to self, but giving his home address when asked his location, and stating it was November 2023.

## 2023-04-28 NOTE — CONSULT NOTE ADULT - SUBJECTIVE AND OBJECTIVE BOX
76YO hx of DM-II with Neuropathy, TIAs (previously on Aggrenox), HTN, and HLD, SDH s/p Left MMA embolization ( aug 2022 ), NPH s/p  shunt (Feb 2023), p/w  altered mental status.  BIBA.  2 days prior had prostate biopsy,  in the past patient was admitted for sepsis s/p biopsy.  EMS reports that patient has recently had fever, confusion. Initial temperature elevated, elevated heart rate, normal glucose.  Patiently normally on Keppra in setting of hydrocephalus.  Denies any seizure-like activity although notes bilateral upper extremity lower extremity tremors.    WDWN male in NAD  Vital Signs Last 24 Hrs  T(C): 38.6 (28 Apr 2023 03:40), Max: 40.5 (28 Apr 2023 01:46)  T(F): 101.5 (28 Apr 2023 03:40), Max: 104.9 (28 Apr 2023 01:46)  HR: 104 (28 Apr 2023 03:40) (104 - 137)  BP: 119/64 (28 Apr 2023 03:40) (119/64 - 162/93)  BP(mean): 90 (28 Apr 2023 02:33) (90 - 90)  RR: 19 (28 Apr 2023 03:40) (19 - 27)  SpO2: 99% (28 Apr 2023 03:40) (97% - 99%)    Parameters below as of 28 Apr 2023 03:40  Patient On (Oxygen Delivery Method): room air    AAO to self and hospital  Unable to name day, month, or year  PERRLA, EOMI  CN 2-12 grossly intact  [facility] strength 5/5  No drift  SILT    < from: CT Head No Cont (04.28.23 @ 03:37) >  FINDINGS:    Mixed density right frontal extra-axial collection since prior study,   measuring 2 cm in maximal thickness. Mild mass effect upon the adjacent   frontal cortex. No midline shift.    Stable low-density left frontal subdural collection possibly a hygroma.   Previous right holohemispheric low-density subdural collection has   resolved.    Right parietal approach ventriculostomy catheter tip terminates in the   region of the left lateral ventricle.    Overall stable ventricular size with stable position of a right parietal   approach ventriculostomy catheter, tip in the frontal horn of the left   lateral ventricle. Size and configuration of the ventricles is unchanged   since prior with bifrontal diameter is measuring 4 cm and third ventricle   diameters measuring 1 cm, both unchanged.    Cerebral volume loss is present, stable in degree.    The visualized paranasal sinuses are clear. The mastoid air cells and   middle ear cavities are clear.    The soft tissues of the scalp are unremarkable. Right parietal ketan hole.   Bilateral lens replacements.    IMPRESSION:    Mixed density right frontal subdural hematoma, new since prior study. No   midline shift.    Resolution of previously seen low-density holohemispheric right subdural   collection and left frontal low-density subdural collection.    Stable ventricular size in stable position of a rightparietal approach    shunt catheter.    < end of copied text >

## 2023-04-28 NOTE — H&P ADULT - NSHPSOCIALHISTORY_GEN_ALL_CORE
Lives with wife.  Patient also has home health aid who comes 2-3 days a week. Ambulates with a cane and uses wheelchair outdoors as well. No Physical Therapy that comes to the home as per family.

## 2023-04-28 NOTE — CONSULT NOTE ADULT - PROBLEM SELECTOR RECOMMENDATION 2
Admit to medicine for treatment Admit to medicine for treatment  If no source for sepsis identified will tap shunt for CSF analysis and culture

## 2023-04-28 NOTE — CONSULT NOTE ADULT - SUBJECTIVE AND OBJECTIVE BOX
Patient is a 77y old  Male who presents with a chief complaint of   HPI: Mr Ramu is a 77 M w PMHx of DM2 with Neuropathy, TIAs (previously on Aggrenox), HTN, and HLD, subdural hematoma s/p Left MMA embolization (2022), NPH s/p  shunt (2023) and recent prostate bx 2 days ago for hx pf prostate cancer not on treatment per family now presented today with AMS and fever.   Pt was noted to be febrile in ED  s/p vanc 1x, c/w zosyn,CT chesta/p with con negative for source of infection and CTH with new right frontal subdural hematoma. Per family the patient has had history of post-biopsy prostatitis in the past 3 y ago, will. Patient had no other complaints other than mild, chronic discomfort in the area of his shunt (pointed to his occipital region). ID consulted for the same.  Pt has no dysuria, hematuria, pain in the genital area, groin, lower abdomen, or lower back. however does endorse urinary frequency/nocturia.    REVIEW OF SYSTEMS  [  ] ROS unobtainable because:    [ x ] All other systems negative except as noted below    Constitutional:  [ ] fever [ ] chills  [ ] weight loss  [ ]night sweat  [ ]poor appetite/PO intake [ ]fatigue   Skin:  [ ] rash [ ] phlebitis	  Eyes: [ ] icterus [ ] pain  [ ] discharge	  ENMT: [ ] sore throat  [ ] thrush [ ] ulcers [ ] exudates [ ]anosmia  Respiratory: [ ] dyspnea [ ] hemoptysis [ ] cough [ ] sputum	  Cardiovascular:  [ ] chest pain [ ] palpitations [ ] edema	  Gastrointestinal:  [ ] nausea [ ] vomiting [ ] diarrhea [ ] constipation [ ] pain	  Genitourinary:  [ ] dysuria [ ] frequency [ ] hematuria [ ] discharge [ ] flank pain  [ ] incontinence  Musculoskeletal:  [ ] myalgias [ ] arthralgias [ ] arthritis  [ ] back pain  Neurological:  [ ] headache [ ] weakness [ ] seizures  [ ] confusion/altered mental status    prior hospital charts reviewed [V]  primary team notes reviewed [V]  other consultant notes reviewed [V]    PAST MEDICAL & SURGICAL HISTORY:  Diabetes  TIA (transient ischemic attack)  HTN (hypertension)  Hyperlipidemia  S/P  shunt    SOCIAL HISTORY:  - Denied smoking/vaping/alcohol/recreational drug use    FAMILY HISTORY:  Family history of renal cancer  mother    Family history of prostate cancer in father  father        Allergies  No Known Allergies        ANTIMICROBIALS:  piperacillin/tazobactam IVPB.. 3.375 Once      ANTIMICROBIALS (past 90 days):  MEDICATIONS  (STANDING):  piperacillin/tazobactam IVPB.   200 mL/Hr IV Intermittent (23 @ 11:24)    piperacillin/tazobactam IVPB...   200 mL/Hr IV Intermittent (23 @ 02:23)    vancomycin  IVPB.   250 mL/Hr IV Intermittent (23 @ 03:36)        OTHER MEDS:   MEDICATIONS  (STANDING):  dextrose 50% Injectable 12.5 once  dextrose 50% Injectable 25 once  dextrose 50% Injectable 25 once  dextrose Oral Gel 15 once PRN  glucagon  Injectable 1 once  insulin glargine Injectable (LANTUS) 20 at bedtime  insulin lispro (ADMELOG) corrective regimen sliding scale  every 6 hours      VITALS:  Vital Signs Last 24 Hrs  T(F): 97.1 (23 @ 08:36), Max: 104.9 (23 @ 01:46)    Vital Signs Last 24 Hrs  HR: 83 (23 @ 08:36) (83 - 137)  BP: 105/61 (23 @ 08:36) (105/61 - 162/93)  RR: 16 (23 @ 08:36)  SpO2: 100% (23 @ 08:36) (97% - 100%)  Wt(kg): --    EXAM:    GA: NAD, AOx3  HEENT: oral cavity no lesion  CV: nl S1/S2, no RMG  Lungs: CTAB, No distress  Abd: BS+, soft, nontender, no rebounding pain  Ext: no edema  Neuro: No focal deficits  Skin: Intact  IV: no phlebitis    Labs:                        16.0   10.17 )-----------( 206      ( 2023 01:55 )             50.6         139  |  101  |  15  ----------------------------<  119<H>  3.7   |  21<L>  |  1.19    Ca    9.1      2023 01:55    TPro  7.9  /  Alb  4.4  /  TBili  0.6  /  DBili  x   /  AST  23  /  ALT  24  /  AlkPhos  78        WBC Trend:  WBC Count: 10.17 (23 @ 01:55)      Auto Neutrophil #: 8.55 K/uL (23 @ 01:55)  Auto Neutrophil #: 10.65 K/uL (02-15-23 @ 01:10)  Auto Neutrophil #: 7.31 K/uL (22 @ 16:27)  Auto Neutrophil #: 7.36 K/uL (22 @ 06:37)  Auto Neutrophil #: 7.63 K/uL (22 @ 18:30)      Creatine Trend:  Creatinine, Serum: 1.19 ()      Liver Biochemical Testing Trend:  Alanine Aminotransferase (ALT/SGPT): 24 ()  Alanine Aminotransferase (ALT/SGPT): 28 ()  Alanine Aminotransferase (ALT/SGPT): 27 ()  Alanine Aminotransferase (ALT/SGPT): 28 ()  Aspartate Aminotransferase (AST/SGOT): 23 (23 @ 01:55)  Aspartate Aminotransferase (AST/SGOT): 19 (23 @ 19:28)  Aspartate Aminotransferase (AST/SGOT): 25 (22 @ 16:27)  Aspartate Aminotransferase (AST/SGOT): 25 (22 @ 18:30)  Bilirubin Total, Serum: 0.6 ()  Bilirubin Total, Serum: 0.6 ()  Bilirubin Total, Serum: 0.3 ()  Bilirubin Total, Serum: 0.4 ()      Trend LDH      Auto Eosinophil %: 2.2 % (23 @ 01:55)      Urinalysis Basic - ( 2023 03:50 )    Color: Light Yellow / Appearance: Clear / S.025 / pH: x  Gluc: x / Ketone: Negative  / Bili: Negative / Urobili: <2 mg/dL   Blood: x / Protein: Negative / Nitrite: Negative   Leuk Esterase: Negative / RBC: 8 /HPF / WBC 5 /HPF   Sq Epi: x / Non Sq Epi: x / Bacteria: Negative        MICROBIOLOGY:    MRSA PCR Result.: Nacho (23 @ 06:39)    Rapid RVP Result: NotDetec ( @ 01:55)      RADIOLOGY:  imaging below personally reviewed    < from: CT Abdomen and Pelvis w/ IV Cont (. @ 09:21) >  The prostate is prominent. No evidence of intra-abdominal abscess. Clear chest.  Right-sided  shunt extending along the right lateral  peritoneal cavity. No adjacent fluid collection is identified.  < end of copied text >    < from: CT Head No Cont ()) >  Mixed density right frontal subdural hematoma, new since prior study. No midline shift.  Resolution of previously seen low-density holohemispheric right subdural  collection and left frontal low-density subdural collection. Stable ventricular size in stable position of a rightparietal approach   shunt catheter.    < end of copied text >   Patient is a 77y old  Male who presents with a chief complaint of   HPI: Mr Ramu is a 77 M w PMHx of DM2 with Neuropathy, TIAs (previously on Aggrenox), HTN, and HLD, subdural hematoma s/p Left MMA embolization (2022), NPH s/p  shunt (2023) and recent prostate bx 2 days ago for hx pf prostate cancer not on treatment per family now presented today with AMS and fever.   Pt was noted to be febrile in ED  s/p vanc 1x, c/w zosyn,CT chesta/p with con negative for source of infection and CTH with new right frontal subdural hematoma. Per family the patient has had history of post-biopsy prostatitis in the past 3 y ago, will. Patient had no other complaints other than mild, chronic discomfort in the area of his shunt (pointed to his occipital region). ID consulted for the same.  Pt has no dysuria, hematuria, pain in the genital area, groin, lower abdomen, or lower back. however appears a little confused and AOx1    REVIEW OF SYSTEMS  [  ] ROS unobtainable because:    [ x ] All other systems negative except as noted below    Constitutional:  [ ] fever [ ] chills  [ ] weight loss  [ ]night sweat  [ ]poor appetite/PO intake [ ]fatigue   Skin:  [ ] rash [ ] phlebitis	  Eyes: [ ] icterus [ ] pain  [ ] discharge	  ENMT: [ ] sore throat  [ ] thrush [ ] ulcers [ ] exudates [ ]anosmia  Respiratory: [ ] dyspnea [ ] hemoptysis [ ] cough [ ] sputum	  Cardiovascular:  [ ] chest pain [ ] palpitations [ ] edema	  Gastrointestinal:  [ ] nausea [ ] vomiting [ ] diarrhea [ ] constipation [ ] pain	  Genitourinary:  [ ] dysuria [ ] frequency [ ] hematuria [ ] discharge [ ] flank pain  [ ] incontinence  Musculoskeletal:  [ ] myalgias [ ] arthralgias [ ] arthritis  [ ] back pain  Neurological:  [ ] headache [ ] weakness [ ] seizures  [ ] confusion/altered mental status    prior hospital charts reviewed [V]  primary team notes reviewed [V]  other consultant notes reviewed [V]    PAST MEDICAL & SURGICAL HISTORY:  Diabetes  TIA (transient ischemic attack)  HTN (hypertension)  Hyperlipidemia  S/P  shunt    SOCIAL HISTORY:  - Denied smoking/vaping/alcohol/recreational drug use    FAMILY HISTORY:  Family history of renal cancer  mother    Family history of prostate cancer in father  father        Allergies  No Known Allergies        ANTIMICROBIALS:  piperacillin/tazobactam IVPB.. 3.375 Once      ANTIMICROBIALS (past 90 days):  MEDICATIONS  (STANDING):  piperacillin/tazobactam IVPB.   200 mL/Hr IV Intermittent (23 @ 11:24)    piperacillin/tazobactam IVPB...   200 mL/Hr IV Intermittent (23 @ 02:23)    vancomycin  IVPB.   250 mL/Hr IV Intermittent (23 @ 03:36)        OTHER MEDS:   MEDICATIONS  (STANDING):  dextrose 50% Injectable 12.5 once  dextrose 50% Injectable 25 once  dextrose 50% Injectable 25 once  dextrose Oral Gel 15 once PRN  glucagon  Injectable 1 once  insulin glargine Injectable (LANTUS) 20 at bedtime  insulin lispro (ADMELOG) corrective regimen sliding scale  every 6 hours      VITALS:  Vital Signs Last 24 Hrs  T(F): 97.1 (23 @ 08:36), Max: 104.9 (23 @ 01:46)    Vital Signs Last 24 Hrs  HR: 83 (23 @ 08:36) (83 - 137)  BP: 105/61 (23 @ 08:36) (105/61 - 162/93)  RR: 16 (23 @ 08:36)  SpO2: 100% (23 @ 08:36) (97% - 100%)  Wt(kg): --    EXAM:    GA: NAD, AOx1  HEENT: oral cavity no lesion  CV: nl S1/S2, no RMG  Lungs: CTAB, No distress  Abd: BS+, soft, nontender, no rebounding pain  Ext: no edema  Neuro: No focal deficits  Skin: Intact  IV: no phlebitis    Labs:                        16.0   10.17 )-----------( 206      ( 2023 01:55 )             50.6         139  |  101  |  15  ----------------------------<  119<H>  3.7   |  21<L>  |  1.19    Ca    9.1      2023 01:55    TPro  7.9  /  Alb  4.4  /  TBili  0.6  /  DBili  x   /  AST  23  /  ALT  24  /  AlkPhos  78        WBC Trend:  WBC Count: 10.17 (23 @ 01:55)      Auto Neutrophil #: 8.55 K/uL (23 @ 01:55)  Auto Neutrophil #: 10.65 K/uL (02-15-23 @ 01:10)  Auto Neutrophil #: 7.31 K/uL (22 @ 16:27)  Auto Neutrophil #: 7.36 K/uL (22 @ 06:37)  Auto Neutrophil #: 7.63 K/uL (22 @ 18:30)      Creatine Trend:  Creatinine, Serum: 1.19 ()      Liver Biochemical Testing Trend:  Alanine Aminotransferase (ALT/SGPT): 24 ()  Alanine Aminotransferase (ALT/SGPT): 28 ()  Alanine Aminotransferase (ALT/SGPT): 27 ()  Alanine Aminotransferase (ALT/SGPT): 28 ()  Aspartate Aminotransferase (AST/SGOT): 23 (23 @ 01:55)  Aspartate Aminotransferase (AST/SGOT): 19 (23 @ 19:28)  Aspartate Aminotransferase (AST/SGOT): 25 (22 @ 16:27)  Aspartate Aminotransferase (AST/SGOT): 25 (22 @ 18:30)  Bilirubin Total, Serum: 0.6 ()  Bilirubin Total, Serum: 0.6 ()  Bilirubin Total, Serum: 0.3 ()  Bilirubin Total, Serum: 0.4 ()      Trend LDH      Auto Eosinophil %: 2.2 % (23 @ 01:55)      Urinalysis Basic - ( 2023 03:50 )    Color: Light Yellow / Appearance: Clear / S.025 / pH: x  Gluc: x / Ketone: Negative  / Bili: Negative / Urobili: <2 mg/dL   Blood: x / Protein: Negative / Nitrite: Negative   Leuk Esterase: Negative / RBC: 8 /HPF / WBC 5 /HPF   Sq Epi: x / Non Sq Epi: x / Bacteria: Negative        MICROBIOLOGY:    MRSA PCR Result.: NotDetec (23 @ 06:39)    Rapid RVP Result: NotDetec ( @ 01:55)      RADIOLOGY:  imaging below personally reviewed    < from: CT Abdomen and Pelvis w/ IV Cont (. @ 09:21) >  The prostate is prominent. No evidence of intra-abdominal abscess. Clear chest.  Right-sided  shunt extending along the right lateral  peritoneal cavity. No adjacent fluid collection is identified.  < end of copied text >    < from: CT Head No Cont ()) >  Mixed density right frontal subdural hematoma, new since prior study. No midline shift.  Resolution of previously seen low-density holohemispheric right subdural  collection and left frontal low-density subdural collection. Stable ventricular size in stable position of a rightparietal approach   shunt catheter.    < end of copied text >

## 2023-04-28 NOTE — ED ADULT NURSE REASSESSMENT NOTE - NS ED NURSE REASSESS COMMENT FT1
Patient received in Trauma A from tete BISHOP. Patient resting in bed with family at bedside. Patient is alert and oriented x 4 at this time. Follows commands. Vitals stable. No distress noted. Nursing care continues

## 2023-04-28 NOTE — CONSULT NOTE ADULT - PROBLEM SELECTOR RECOMMENDATION 9
Interval CT for stability in 4-6 hours  Will present case to Dr Isabel for consideration of right MMAE when sepsis is treated Interval CT for stability in 4-6 hours  Will present case to Dr Isabel for consideration of right MMAE when sepsis is treated  MRI brain with and without contrast

## 2023-04-28 NOTE — ED PROVIDER NOTE - ATTENDING CONTRIBUTION TO CARE
I performed a face-to-face evaluation of the patient and performed a history and physical examination along with the resident or ACP, and/or medical student above.  I agree with the history and physical examination as documented by the resident or ACP, and/or medical student above.  Sellers:  Gen: WDWN, NAD  HEENT: EOMI, no nasal discharge, mucous membranes moist  CV: Fast rate, regular rhythm, 2+ radial pulses R  Resp: no accessory muscle use, no increased work of breathing  GI: Abdomen soft, + distended, NTTP  MSK: No open wounds, no bruising, no LE edema  Neuro: A&Ox1, following commands, moving all four extremities spontaneously  Psych: appropriate

## 2023-04-28 NOTE — H&P ADULT - NSHPREVIEWOFSYSTEMS_GEN_ALL_CORE
REVIEW OF SYSTEMS:    CONSTITUTIONAL: + weakness, fever. No chills  EYES/ENT: No visual changes;  No vertigo or throat pain   NECK: No pain or stiffness  RESPIRATORY: No cough, wheezing, hemoptysis; No shortness of breath  CARDIOVASCULAR: No chest pain or palpitations  GASTROINTESTINAL: No abdominal or epigastric pain. No nausea, vomiting, or hematemesis; No diarrhea or constipation. No melena or hematochezia.  GENITOURINARY: No dysuria or hematuria.+Nocturia.  NEUROLOGICAL: No numbness.+ weakness  SKIN: No itching, burning, rashes, or lesions   All other review of systems is negative unless indicated above.

## 2023-04-28 NOTE — CONSULT NOTE ADULT - ASSESSMENT
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 Pt was noted to be febrile in ED  s/p vanc 1x, c/w zosyn,CT chesta/p with con negative for source of infection and CTH with new right frontal subdural hematoma. ID consulted for fever workup    WORKUP  CT Chest, Abdomen and Pelvis w/ IV Cont (04.28): The prostate is prominent. No evidence of intra-abdominal abscess. Clear chest.  Right-sided  shunt extending along the right lateral  peritoneal cavity. No adjacent fluid collection is identified.  CT Head No Cont (04.28)): Mixed density right frontal subdural hematoma, new since prior study. No midline shift.  Resolution of previously seen low-density holohemispheric right subdural  collection and left frontal low-density subdural collection. Stable ventricular size in stable position of a rightparietal approach   shunt catheter.  UA and RVP: negative    DIAGNOSIS and IMPRESSION  ·	Fevers  ·	Right frontal Subdural hematoma    Fevers to 104.9 w/o leukocytosis  s/p vanc x 1 and Zosyn x 2 on 4/28  Recent Prostate biopsy 3 days back for past hx of prostate CA  UA, RVP and CT Chest a/p with con negative for source of fever    RECOMMENDATIONS  Will f/u urine cultures and blood cultures    PT TO BE SEEN. PRELIM NOTE  PENDING RECS. PLEASE WAIT FOR FINAL RECS AFTER DISCUSSION WITH ATTENDINGRichard Loyola MD, PGY5  ID fellow  [employer] Teams Preferred  After 5pm/weekends call [phone]    Mr Guallpa is a 77 M w PMHx of DM2 with Neuropathy, TIAs (previously on Aggrenox), HTN, and HLD, subdural hematoma s/p Left MMA embolization (August 2022), NPH s/p  shunt (Feb 2023) and recent prostate bx 2 days ago for hx pf prostate cancer not on treatment per family now presented today with AMS and fever. Pt was noted to be febrile in ED  s/p vanc 1x, c/w zosyn,CT chesta/p with con negative for source of infection and CTH with new right frontal subdural hematoma. ID consulted for fever workup    WORKUP  CT Chest, Abdomen and Pelvis w/ IV Cont (04.28): The prostate is prominent. No evidence of intra-abdominal abscess. Clear chest.  Right-sided  shunt extending along the right lateral  peritoneal cavity. No adjacent fluid collection is identified.  CT Head No Cont (04.28)): Mixed density right frontal subdural hematoma, new since prior study. No midline shift.  Resolution of previously seen low-density holohemispheric right subdural  collection and left frontal low-density subdural collection. Stable ventricular size in stable position of a rightparietal approach   shunt catheter.  UA and RVP: negative    DIAGNOSIS and IMPRESSION  ·	Fevers  ·	Right frontal Subdural hematoma    Fevers to 104.9 w/o leukocytosis  s/p vanc x 1 and Zosyn x 2 on 4/28  Recent Prostate biopsy 3 days back for past hx of prostate CA  UA, RVP and CT Chest a/p with con negative for source of fever  Family reports giving him merari-prostatic biopsy abx   Source of fever: CNS shunt infection vs bacteremia from prostatic biopsy vs Central fevers from [facility]    RECOMMENDATIONS  Will f/u urine cultures and blood cultures  Stop Zosyn  Recommend Vanco 1g q 24 (monitor levels) and Cefepime 2g q 12  Recommend NSX eval to sample VPS fluid to r/o infection given negative imaging, AMS and high fevers    Pt seen and examined. Case d/w attending   Recommendation provided to primary team via MS Teams.      Gerardo Loyola MD, PGY5  ID fellow  [employer] Teams Preferred  After 5pm/weekends call [phone]

## 2023-04-28 NOTE — H&P ADULT - PROBLEM SELECTOR PLAN 6
on Irbesartan 300 mg qd at home on Irbesartan 300 mg qd at home  holding BP medications in setting of sepsis

## 2023-04-28 NOTE — PROCEDURE NOTE - ADDITIONAL PROCEDURE DETAILS
Shunt tapped with 23g butterfly, good proximal flow, low pressure, 5cc of clear csf withdrawn and sent for studies.

## 2023-04-28 NOTE — H&P ADULT - ASSESSMENT
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 Jamal Isabel (Neurosurgery) & Dr. Laura (Neurology), prostate bx 2 days ago for hx pf prostate cancer not on treatment per family who p/w acute metabolic encephalopathy, found w SIRS admitted to medicine for further treatment.

## 2023-04-28 NOTE — H&P ADULT - NSHPLABSRESULTS_GEN_ALL_CORE
LABS: Personally reviewed labs, imaging, and ECG                          16.0   10.17 )-----------( 206      ( 2023 01:55 )             50.6           139  |  101  |  15  ----------------------------<  119<H>  3.7   |  21<L>  |  1.19    Ca    9.1      2023 01:55    TPro  7.9  /  Alb  4.4  /  TBili  0.6  /  DBili  x   /  AST  23  /  ALT  24  /  AlkPhos  78         LIVER FUNCTIONS - ( 2023 01:55 )  Alb: 4.4 g/dL / Pro: 7.9 g/dL / ALK PHOS: 78 U/L / ALT: 24 U/L / AST: 23 U/L / GGT: x                    Urinalysis Basic - ( 2023 03:50 )    Color: Light Yellow / Appearance: Clear / S.025 / pH: x  Gluc: x / Ketone: Negative  / Bili: Negative / Urobili: <2 mg/dL   Blood: x / Protein: Negative / Nitrite: Negative   Leuk Esterase: Negative / RBC: 8 /HPF / WBC 5 /HPF   Sq Epi: x / Non Sq Epi: x / Bacteria: Negative        PT/INR - ( 2023 01:55 )   PT: 12.6 sec;   INR: 1.09 ratio         PTT - ( 2023 01:55 )  PTT:29.9 sec    Lactate Trend    CAPILLARY BLOOD GLUCOSE    RADIOLOGY & ADDITIONAL TESTS:   CT Head No Cont (23 @ 03:37) >  FINDINGS:    Mixed density right frontal extra-axial collection since prior study,   measuring 2 cm in maximal thickness. Mild mass effect upon the adjacent   frontal cortex. No midline shift.    Stable low-density left frontal subdural collection possibly a hygroma.   Previous right holohemispheric low-density subdural collection has   resolved.    Right parietal approach ventriculostomy catheter tip terminates in the   region of the left lateral ventricle.    Overall stable ventricular size with stable position of a right parietal   approach ventriculostomy catheter, tip in the frontal horn of the left   lateral ventricle. Size and configuration of the ventricles is unchanged   since prior with bifrontal diameter is measuring 4 cm and third ventricle   diameters measuring 1 cm, both unchanged.    Cerebral volume loss is present, stable in degree.    The visualized paranasal sinuses are clear. The mastoid air cells and   middle ear cavities are clear.    The soft tissues of the scalp are unremarkable. Right parietal ketan hole.   Bilateral lens replacements.    IMPRESSION:    Mixed density right frontal subdural hematoma, new since prior study. No   midline shift.    Resolution of previously seen low-density holohemispheric right subdural   collection and left frontal low-density subdural collection.    Stable ventricular size in stable position of a rightparietal approach    shunt catheter.

## 2023-04-28 NOTE — H&P ADULT - PROBLEM SELECTOR PLAN 7
DVT Prophylaxis: No blood thinners given [facility]  Dispo: Home health aides at home, may need reinstatement of Home PT  Code Status: Full Code  HCP: Wife, Glory Guallpa DVT Prophylaxis: No blood thinners given [facility]. SCDs ordered.   Dispo: Home health aides at home, may need reinstatement of Home PT  Code Status: Full Code  HCP: Wife, Glory Guallpa

## 2023-04-29 LAB
A1C WITH ESTIMATED AVERAGE GLUCOSE RESULT: 7.6 % — HIGH (ref 4–5.6)
ALBUMIN SERPL ELPH-MCNC: 3.5 G/DL — SIGNIFICANT CHANGE UP (ref 3.3–5)
ALBUMIN SERPL ELPH-MCNC: 3.8 G/DL — SIGNIFICANT CHANGE UP (ref 3.3–5)
ALP SERPL-CCNC: 68 U/L — SIGNIFICANT CHANGE UP (ref 40–120)
ALP SERPL-CCNC: 76 U/L — SIGNIFICANT CHANGE UP (ref 40–120)
ALT FLD-CCNC: 16 U/L — SIGNIFICANT CHANGE UP (ref 4–41)
ALT FLD-CCNC: 17 U/L — SIGNIFICANT CHANGE UP (ref 4–41)
ANION GAP SERPL CALC-SCNC: 12 MMOL/L — SIGNIFICANT CHANGE UP (ref 7–14)
ANION GAP SERPL CALC-SCNC: 15 MMOL/L — HIGH (ref 7–14)
AST SERPL-CCNC: 18 U/L — SIGNIFICANT CHANGE UP (ref 4–40)
AST SERPL-CCNC: 23 U/L — SIGNIFICANT CHANGE UP (ref 4–40)
BASE EXCESS BLDV CALC-SCNC: 0.8 MMOL/L — SIGNIFICANT CHANGE UP (ref -2–3)
BASOPHILS # BLD AUTO: 0.04 K/UL — SIGNIFICANT CHANGE UP (ref 0–0.2)
BASOPHILS # BLD AUTO: 0.06 K/UL — SIGNIFICANT CHANGE UP (ref 0–0.2)
BASOPHILS NFR BLD AUTO: 0.3 % — SIGNIFICANT CHANGE UP (ref 0–2)
BASOPHILS NFR BLD AUTO: 0.5 % — SIGNIFICANT CHANGE UP (ref 0–2)
BILIRUB SERPL-MCNC: 0.6 MG/DL — SIGNIFICANT CHANGE UP (ref 0.2–1.2)
BILIRUB SERPL-MCNC: 0.9 MG/DL — SIGNIFICANT CHANGE UP (ref 0.2–1.2)
BLOOD GAS VENOUS COMPREHENSIVE RESULT: SIGNIFICANT CHANGE UP
BUN SERPL-MCNC: 15 MG/DL — SIGNIFICANT CHANGE UP (ref 7–23)
BUN SERPL-MCNC: 20 MG/DL — SIGNIFICANT CHANGE UP (ref 7–23)
CALCIUM SERPL-MCNC: 8.7 MG/DL — SIGNIFICANT CHANGE UP (ref 8.4–10.5)
CALCIUM SERPL-MCNC: 8.8 MG/DL — SIGNIFICANT CHANGE UP (ref 8.4–10.5)
CHLORIDE BLDV-SCNC: 102 MMOL/L — SIGNIFICANT CHANGE UP (ref 96–108)
CHLORIDE SERPL-SCNC: 100 MMOL/L — SIGNIFICANT CHANGE UP (ref 98–107)
CHLORIDE SERPL-SCNC: 102 MMOL/L — SIGNIFICANT CHANGE UP (ref 98–107)
CO2 BLDV-SCNC: 25.4 MMOL/L — SIGNIFICANT CHANGE UP (ref 22–26)
CO2 SERPL-SCNC: 19 MMOL/L — LOW (ref 22–31)
CO2 SERPL-SCNC: 22 MMOL/L — SIGNIFICANT CHANGE UP (ref 22–31)
CREAT SERPL-MCNC: 1.02 MG/DL — SIGNIFICANT CHANGE UP (ref 0.5–1.3)
CREAT SERPL-MCNC: 1.27 MG/DL — SIGNIFICANT CHANGE UP (ref 0.5–1.3)
CSF PCR RESULT: SIGNIFICANT CHANGE UP
CULTURE RESULTS: NO GROWTH — SIGNIFICANT CHANGE UP
EGFR: 58 ML/MIN/1.73M2 — LOW
EGFR: 76 ML/MIN/1.73M2 — SIGNIFICANT CHANGE UP
EOSINOPHIL # BLD AUTO: 0.07 K/UL — SIGNIFICANT CHANGE UP (ref 0–0.5)
EOSINOPHIL # BLD AUTO: 0.11 K/UL — SIGNIFICANT CHANGE UP (ref 0–0.5)
EOSINOPHIL NFR BLD AUTO: 0.6 % — SIGNIFICANT CHANGE UP (ref 0–6)
EOSINOPHIL NFR BLD AUTO: 0.9 % — SIGNIFICANT CHANGE UP (ref 0–6)
ESTIMATED AVERAGE GLUCOSE: 171 — SIGNIFICANT CHANGE UP
GAS PNL BLDV: 131 MMOL/L — LOW (ref 136–145)
GLUCOSE BLDC GLUCOMTR-MCNC: 111 MG/DL — HIGH (ref 70–99)
GLUCOSE BLDC GLUCOMTR-MCNC: 128 MG/DL — HIGH (ref 70–99)
GLUCOSE BLDC GLUCOMTR-MCNC: 138 MG/DL — HIGH (ref 70–99)
GLUCOSE BLDC GLUCOMTR-MCNC: 147 MG/DL — HIGH (ref 70–99)
GLUCOSE BLDC GLUCOMTR-MCNC: 92 MG/DL — SIGNIFICANT CHANGE UP (ref 70–99)
GLUCOSE BLDV-MCNC: 146 MG/DL — HIGH (ref 70–99)
GLUCOSE SERPL-MCNC: 142 MG/DL — HIGH (ref 70–99)
GLUCOSE SERPL-MCNC: 78 MG/DL — SIGNIFICANT CHANGE UP (ref 70–99)
HCO3 BLDV-SCNC: 24 MMOL/L — SIGNIFICANT CHANGE UP (ref 22–29)
HCT VFR BLD CALC: 45.5 % — SIGNIFICANT CHANGE UP (ref 39–50)
HCT VFR BLD CALC: 45.9 % — SIGNIFICANT CHANGE UP (ref 39–50)
HCT VFR BLDA CALC: 46 % — SIGNIFICANT CHANGE UP (ref 39–51)
HGB BLD CALC-MCNC: 15.3 G/DL — SIGNIFICANT CHANGE UP (ref 12.6–17.4)
HGB BLD-MCNC: 15 G/DL — SIGNIFICANT CHANGE UP (ref 13–17)
HGB BLD-MCNC: 15 G/DL — SIGNIFICANT CHANGE UP (ref 13–17)
IANC: 9.41 K/UL — HIGH (ref 1.8–7.4)
IANC: 9.46 K/UL — HIGH (ref 1.8–7.4)
IMM GRANULOCYTES NFR BLD AUTO: 0.2 % — SIGNIFICANT CHANGE UP (ref 0–0.9)
IMM GRANULOCYTES NFR BLD AUTO: 0.3 % — SIGNIFICANT CHANGE UP (ref 0–0.9)
LACTATE BLDV-MCNC: 2.8 MMOL/L — HIGH (ref 0.5–2)
LYMPHOCYTES # BLD AUTO: 1.1 K/UL — SIGNIFICANT CHANGE UP (ref 1–3.3)
LYMPHOCYTES # BLD AUTO: 1.36 K/UL — SIGNIFICANT CHANGE UP (ref 1–3.3)
LYMPHOCYTES # BLD AUTO: 10.8 % — LOW (ref 13–44)
LYMPHOCYTES # BLD AUTO: 8.9 % — LOW (ref 13–44)
MAGNESIUM SERPL-MCNC: 2 MG/DL — SIGNIFICANT CHANGE UP (ref 1.6–2.6)
MAGNESIUM SERPL-MCNC: 2.1 MG/DL — SIGNIFICANT CHANGE UP (ref 1.6–2.6)
MCHC RBC-ENTMCNC: 28.5 PG — SIGNIFICANT CHANGE UP (ref 27–34)
MCHC RBC-ENTMCNC: 28.8 PG — SIGNIFICANT CHANGE UP (ref 27–34)
MCHC RBC-ENTMCNC: 32.7 GM/DL — SIGNIFICANT CHANGE UP (ref 32–36)
MCHC RBC-ENTMCNC: 33 GM/DL — SIGNIFICANT CHANGE UP (ref 32–36)
MCV RBC AUTO: 87.1 FL — SIGNIFICANT CHANGE UP (ref 80–100)
MCV RBC AUTO: 87.5 FL — SIGNIFICANT CHANGE UP (ref 80–100)
MONOCYTES # BLD AUTO: 1.68 K/UL — HIGH (ref 0–0.9)
MONOCYTES # BLD AUTO: 1.69 K/UL — HIGH (ref 0–0.9)
MONOCYTES NFR BLD AUTO: 13.3 % — SIGNIFICANT CHANGE UP (ref 2–14)
MONOCYTES NFR BLD AUTO: 13.6 % — SIGNIFICANT CHANGE UP (ref 2–14)
NEUTROPHILS # BLD AUTO: 9.41 K/UL — HIGH (ref 1.8–7.4)
NEUTROPHILS # BLD AUTO: 9.46 K/UL — HIGH (ref 1.8–7.4)
NEUTROPHILS NFR BLD AUTO: 74.8 % — SIGNIFICANT CHANGE UP (ref 43–77)
NEUTROPHILS NFR BLD AUTO: 75.8 % — SIGNIFICANT CHANGE UP (ref 43–77)
NRBC # BLD: 0 /100 WBCS — SIGNIFICANT CHANGE UP (ref 0–0)
NRBC # BLD: 0 /100 WBCS — SIGNIFICANT CHANGE UP (ref 0–0)
NRBC # FLD: 0 K/UL — SIGNIFICANT CHANGE UP (ref 0–0)
NRBC # FLD: 0 K/UL — SIGNIFICANT CHANGE UP (ref 0–0)
PCO2 BLDV: 35 MMHG — LOW (ref 42–55)
PH BLDV: 7.45 — HIGH (ref 7.32–7.43)
PHOSPHATE SERPL-MCNC: 2.5 MG/DL — SIGNIFICANT CHANGE UP (ref 2.5–4.5)
PHOSPHATE SERPL-MCNC: 4 MG/DL — SIGNIFICANT CHANGE UP (ref 2.5–4.5)
PLATELET # BLD AUTO: 166 K/UL — SIGNIFICANT CHANGE UP (ref 150–400)
PLATELET # BLD AUTO: 176 K/UL — SIGNIFICANT CHANGE UP (ref 150–400)
PO2 BLDV: 54 MMHG — HIGH (ref 25–45)
POTASSIUM BLDV-SCNC: 3.7 MMOL/L — SIGNIFICANT CHANGE UP (ref 3.5–5.1)
POTASSIUM SERPL-MCNC: 3.7 MMOL/L — SIGNIFICANT CHANGE UP (ref 3.5–5.3)
POTASSIUM SERPL-MCNC: 4 MMOL/L — SIGNIFICANT CHANGE UP (ref 3.5–5.3)
POTASSIUM SERPL-SCNC: 3.7 MMOL/L — SIGNIFICANT CHANGE UP (ref 3.5–5.3)
POTASSIUM SERPL-SCNC: 4 MMOL/L — SIGNIFICANT CHANGE UP (ref 3.5–5.3)
PROT SERPL-MCNC: 6.6 G/DL — SIGNIFICANT CHANGE UP (ref 6–8.3)
PROT SERPL-MCNC: 6.9 G/DL — SIGNIFICANT CHANGE UP (ref 6–8.3)
RBC # BLD: 5.2 M/UL — SIGNIFICANT CHANGE UP (ref 4.2–5.8)
RBC # BLD: 5.27 M/UL — SIGNIFICANT CHANGE UP (ref 4.2–5.8)
RBC # FLD: 13.6 % — SIGNIFICANT CHANGE UP (ref 10.3–14.5)
RBC # FLD: 13.8 % — SIGNIFICANT CHANGE UP (ref 10.3–14.5)
SAO2 % BLDV: 88.1 % — HIGH (ref 67–88)
SODIUM SERPL-SCNC: 134 MMOL/L — LOW (ref 135–145)
SODIUM SERPL-SCNC: 136 MMOL/L — SIGNIFICANT CHANGE UP (ref 135–145)
SPECIMEN SOURCE: SIGNIFICANT CHANGE UP
WBC # BLD: 12.41 K/UL — HIGH (ref 3.8–10.5)
WBC # BLD: 12.64 K/UL — HIGH (ref 3.8–10.5)
WBC # FLD AUTO: 12.41 K/UL — HIGH (ref 3.8–10.5)
WBC # FLD AUTO: 12.64 K/UL — HIGH (ref 3.8–10.5)

## 2023-04-29 PROCEDURE — 99233 SBSQ HOSP IP/OBS HIGH 50: CPT | Mod: GC

## 2023-04-29 PROCEDURE — 99232 SBSQ HOSP IP/OBS MODERATE 35: CPT

## 2023-04-29 RX ORDER — ACETAMINOPHEN 500 MG
1000 TABLET ORAL ONCE
Refills: 0 | Status: COMPLETED | OUTPATIENT
Start: 2023-04-29 | End: 2023-04-29

## 2023-04-29 RX ORDER — VANCOMYCIN HCL 1 G
1000 VIAL (EA) INTRAVENOUS ONCE
Refills: 0 | Status: COMPLETED | OUTPATIENT
Start: 2023-04-29 | End: 2023-04-29

## 2023-04-29 RX ORDER — ACETAMINOPHEN 500 MG
650 TABLET ORAL EVERY 6 HOURS
Refills: 0 | Status: DISCONTINUED | OUTPATIENT
Start: 2023-04-29 | End: 2023-04-29

## 2023-04-29 RX ORDER — MEROPENEM 1 G/30ML
1000 INJECTION INTRAVENOUS EVERY 8 HOURS
Refills: 0 | Status: DISCONTINUED | OUTPATIENT
Start: 2023-04-29 | End: 2023-04-30

## 2023-04-29 RX ADMIN — ATORVASTATIN CALCIUM 20 MILLIGRAM(S): 80 TABLET, FILM COATED ORAL at 21:37

## 2023-04-29 RX ADMIN — Medication 7 UNIT(S): at 09:40

## 2023-04-29 RX ADMIN — INSULIN GLARGINE 20 UNIT(S): 100 INJECTION, SOLUTION SUBCUTANEOUS at 21:37

## 2023-04-29 RX ADMIN — LEVETIRACETAM 250 MILLIGRAM(S): 250 TABLET, FILM COATED ORAL at 18:06

## 2023-04-29 RX ADMIN — Medication 7 UNIT(S): at 18:07

## 2023-04-29 RX ADMIN — CEFEPIME 100 MILLIGRAM(S): 1 INJECTION, POWDER, FOR SOLUTION INTRAMUSCULAR; INTRAVENOUS at 07:00

## 2023-04-29 RX ADMIN — LEVETIRACETAM 250 MILLIGRAM(S): 250 TABLET, FILM COATED ORAL at 05:51

## 2023-04-29 RX ADMIN — CEFEPIME 100 MILLIGRAM(S): 1 INJECTION, POWDER, FOR SOLUTION INTRAMUSCULAR; INTRAVENOUS at 18:07

## 2023-04-29 RX ADMIN — Medication 1000 MILLIGRAM(S): at 20:34

## 2023-04-29 RX ADMIN — Medication 400 MILLIGRAM(S): at 18:41

## 2023-04-29 RX ADMIN — Medication 7 UNIT(S): at 13:24

## 2023-04-29 RX ADMIN — Medication 250 MILLIGRAM(S): at 21:39

## 2023-04-29 RX ADMIN — MEROPENEM 100 MILLIGRAM(S): 1 INJECTION INTRAVENOUS at 21:40

## 2023-04-29 RX ADMIN — Medication 400 MILLIGRAM(S): at 00:17

## 2023-04-29 NOTE — PROGRESS NOTE ADULT - PROBLEM SELECTOR PLAN 4
on lantus 30 units at bedtime and humalog 14 units tid with meals  given npo status now will keep on iss  once patient eating again will keep on lowered dosage of home regimen given sepsis  starting lantus 20 units qhs and humalog 7 units tid and ISS  holding home Farxiga and ozempic

## 2023-04-29 NOTE — PROGRESS NOTE ADULT - SUBJECTIVE AND OBJECTIVE BOX
Follow Up:  fever    Interval History/ROS:  Overnight: No acute events.  Patient with Tmax 102.1 past 24 hours.  Patient otherwise hemodynamically stable.  Latest labs show leukocytosis of 12.4, BMP with renal function within normal limits, hepatic function within normal limits.  LP performed with 0 nucleated cells, glucose 74, protein 5.  CSF Gram stain without organisms seen, culture pending.  CSF PCR panel negative.  Blood cultures growing gram-negative rods, klebsiella on PCR.  Urine culture without growth.    Patient seen examined at bedside.  Denies any new pain or discomfort.  Allergies  No Known Allergies    ANTIMICROBIALS:  cefepime   IVPB 2000 every 12 hours  vancomycin  IVPB 1000 every 24 hours    OTHER MEDS:  MEDICATIONS  (STANDING):  atorvastatin 20 at bedtime  dextrose 50% Injectable 12.5 once  dextrose 50% Injectable 25 once  dextrose 50% Injectable 25 once  dextrose Oral Gel 15 once PRN  glucagon  Injectable 1 once  insulin glargine Injectable (LANTUS) 20 at bedtime  insulin lispro (ADMELOG) corrective regimen sliding scale  three times a day before meals  insulin lispro (ADMELOG) corrective regimen sliding scale  at bedtime  insulin lispro Injectable (ADMELOG) 7 three times a day before meals  levETIRAcetam 250 two times a day    Vital Signs Last 24 Hrs  T(C): 36.9 (2023 06:17), Max: 38.9 (2023 23:15)  T(F): 98.5 (2023 06:17), Max: 102.1 (2023 23:15)  HR: 88 (2023 06:17) (82 - 111)  BP: 118/73 (2023 06:17) (115/62 - 146/70)  BP(mean): --  RR: 18 (2023 06:17) (18 - 20)  SpO2: 100% (2023 06:17) (96% - 100%)    Parameters below as of 2023 06:17  Patient On (Oxygen Delivery Method): room air    PHYSICAL EXAM:  GA: NAD, AOx1  HEENT: oral cavity no lesion  CV: nl S1/S2, no RMG  Lungs: CTAB, No distress  Abd: BS+, soft, nontender, no rebounding pain  Ext: no edema  Neuro: No focal deficits  Skin: Intact  IV: no phlebitis                          15.0   12.41 )-----------( 166      ( 2023 06:00 )             45.5       04-29    134<L>  |  100  |  15  ----------------------------<  78  3.7   |  22  |  1.02    Ca    8.7      2023 06:00  Phos  4.0       Mg     2.10         TPro  6.6  /  Alb  3.5  /  TBili  0.9  /  DBili  x   /  AST  18  /  ALT  16  /  AlkPhos  68        Urinalysis Basic - ( 2023 03:50 )    Color: Light Yellow / Appearance: Clear / S.025 / pH: x  Gluc: x / Ketone: Negative  / Bili: Negative / Urobili: <2 mg/dL   Blood: x / Protein: Negative / Nitrite: Negative   Leuk Esterase: Negative / RBC: 8 /HPF / WBC 5 /HPF   Sq Epi: x / Non Sq Epi: x / Bacteria: Negative        MICROBIOLOGY:  v    Culture - CSF with Gram Stain (collected 2023 19:06)  Source: .CSF CSF  Gram Stain (2023 20:49):    No polymorphonuclear leukocytes seen    No organisms seen    by cytocentrifuge    Culture - Urine (collected 2023 03:15)  Source: Clean Catch Clean Catch (Midstream)  Final Report (2023 06:24):    No growth    Culture - Blood (collected 2023 01:45)  Source: .Blood Blood-Peripheral  Gram Stain (2023 18:14):    Growth in aerobic and anaerobic bottles: Gram Negative Rods  Preliminary Report (2023 18:14):    Growth in aerobic and anaerobic bottles: Gram Negative Rods    Culture - Blood (collected 2023 01:35)  Source: .Blood Blood-Peripheral  Gram Stain (2023 18:14):    Growth in aerobic and anaerobic bottles: Gram Negative Rods  Preliminary Report (2023 18:14):    Growth in aerobic and anaerobic bottles: Gram Negative Rods    ***Blood Panel PCR results on this specimen are available    approximately 3 hours after the Gram stain result.***    Gram stain, PCR, and/or culture results may not always    correspond due todifference in methodologies.    ************************************************************    This PCR assay was performed by multiplex PCR. This    Assay tests for 66 bacterial and resistance gene targets.    Please refer to the [facility] Labs testdirectory    at [email or web address] for details.  Organism: Blood Culture PCR (2023 19:44)  Organism: Blood Culture PCR (2023 19:44)      Method Type: PCR      -  K. pneumoniae group: Detec (K. pneumoniae, K. quasipneumoniae, K. variicola)      Rapid RVP Result: NotDetec ( @ 01:55)

## 2023-04-29 NOTE — PROGRESS NOTE ADULT - PROBLEM SELECTOR PLAN 3
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 Baez (Outpatient urologist) for further collateral at [phone]  pt does not have dysuria, hematuria, pain in the genital area, groin, lower abdomen, or lower back. however does endorse urinary frequency/nocturia

## 2023-04-29 NOTE — PROGRESS NOTE ADULT - PROBLEM SELECTOR PLAN 2
- neurosurgery following  - CT Head shows R. frontal subdural hematoma, compared to CT Head from one month ago per NSGY no change. no midline shift, resolution of low density holohemispheric right subdural collection and left frontal subdural collection, stable ventricular size.   - At this point no plan to pursue any tap or shunt per NSGY team urgently, Dr. Isabel wanting to await for cultures to return and source control of sepsis, fever unlikely 2/2 to infection of shunt given stable ventricular size   - F/u MRI brain w/w/o contrast  - Continue with Keppra 250 mg bid as per Nsgy

## 2023-04-29 NOTE — PROGRESS NOTE ADULT - ASSESSMENT
Mr Ramu is a 77 M w PMHx of DM2 with Neuropathy, TIAs (previously on Aggrenox), HTN, and HLD, subdural hematoma s/p Left MMA embolization (August 2022), NPH s/p  shunt (Feb 2023) and recent prostate bx 2 days ago for hx pf prostate cancer not on treatment per family now presented today with AMS and fever. Pt was noted to be febrile in ED  s/p vanc 1x, c/w zosyn,CT chesta/p with con negative for source of infection and CTH with new right frontal subdural hematoma. ID consulted for fever workup    WORKUP  CT Chest, Abdomen and Pelvis w/ IV Cont (04.28): The prostate is prominent. No evidence of intra-abdominal abscess. Clear chest.  Right-sided  shunt extending along the right lateral  peritoneal cavity. No adjacent fluid collection is identified.  CT Head No Cont (04.28)): Mixed density right frontal subdural hematoma, new since prior study. No midline shift.  Resolution of previously seen low-density holohemispheric right subdural  collection and left frontal low-density subdural collection. Stable ventricular size in stable position of a rightparietal approach   shunt catheter.  UA and RVP: negative    DIAGNOSIS and IMPRESSION  ·	Fevers  ·	Right frontal Subdural hematoma    Fevers to 104.9 w/o leukocytosis  s/p vanc x 1 and Zosyn x 2 on 4/28  Recent Prostate biopsy 3 days back for past hx of prostate CA  UA, RVP and CT Chest a/p with con negative for source of fever  Family reports giving him merari-prostatic biopsy abx   Source of fever: CNS shunt infection vs bacteremia from prostatic biopsy vs Central fevers from [facility]    RECOMMENDATIONS  ***      Bala Hanson MD  [employer] Teams Preferred  After 5pm/weekends call [phone]    Mr Ramu is a 77 M w PMHx of DM2 with Neuropathy, TIAs (previously on Aggrenox), HTN, and HLD, subdural hematoma s/p Left MMA embolization (August 2022), NPH s/p  shunt (Feb 2023) and recent prostate bx 2 days ago for hx pf prostate cancer not on treatment per family now presented today with AMS and fever. Pt was noted to be febrile in ED  s/p vanc 1x, c/w zosyn,CT chesta/p with con negative for source of infection and CTH with new right frontal subdural hematoma. ID consulted for fever workup    WORKUP  CT Chest, Abdomen and Pelvis w/ IV Cont (04.28): The prostate is prominent. No evidence of intra-abdominal abscess. Clear chest.  Right-sided  shunt extending along the right lateral  peritoneal cavity. No adjacent fluid collection is identified.  CT Head No Cont (04.28)): Mixed density right frontal subdural hematoma, new since prior study. No midline shift.  Resolution of previously seen low-density holohemispheric right subdural  collection and left frontal low-density subdural collection. Stable ventricular size in stable position of a rightparietal approach   shunt catheter.  UA and RVP: negative    DIAGNOSIS and IMPRESSION  ·	Fevers  ·	Right frontal Subdural hematoma  ·	Klebsiella bacteremia    Fevers up to 104.9 w/o leukocytosis, 102.1 F past 24 hours  s/p vanc x 1 and Zosyn x 2 on 4/28  Recent Prostate biopsy 3 days back for past hx of prostate CA  UA, RVP and CT Chest a/p with con negative for source of fever  Family reports giving him merari-prostatic biopsy abx   Source of fever: bacteremia from prostatic biopsy vs Central fevers from [facility]  LP performed - 0 nucleated cells, CSF PCR negative, Elevated glucose, low protein - lower suspicion for CNS infection overall    RECOMMENDATIONS  Would discontinue vancomycin  Continue cefepime  Follow CSF cultures  Follow positive blood cultures for sensitivities of klebsiella  Obtain repeat blood cultures  If fevers persist, will need to re-image abdomen for evaluation for uncontrolled source    Bala Hanson MD  [employer] Teams Preferred  After 5pm/weekends call [phone]

## 2023-04-29 NOTE — PROGRESS NOTE ADULT - ATTENDING COMMENTS
now w high grade kleb bacteremia - source unclear as ua, ucx, pan imaging as well as CSF prelim neg   ID fu today  dc vanco, on cefepime and fu sensitivities - breakthrough fever on cefepime and rising leokocytosis - ? escalate to carbapenems - will dw ID   per neuro sx no plan for shunt intervention, awaiting [facility] - [facility] stable on repeat imaging - avoid blood thinners  PT eval, fall precautions

## 2023-04-29 NOTE — PHYSICAL THERAPY INITIAL EVALUATION ADULT - GAIT DEVIATIONS NOTED, PT EVAL
decreased solomon/increased time in double stance/decreased step length/decreased weight-shifting ability

## 2023-04-29 NOTE — PHYSICAL THERAPY INITIAL EVALUATION ADULT - ADDITIONAL COMMENTS
Pt A&Ox1, unreliable historian. As per documentation, pt lives with wife, ambulates with a cane and has a rolling walker which is used for outdoor. Please follow case management/social work documentation for further history.    Following evaluation, pt was left semireclined in bed in no distress.

## 2023-04-29 NOTE — PROGRESS NOTE ADULT - PROBLEM SELECTOR PLAN 7
DVT Prophylaxis: No blood thinners given [facility]. SCDs ordered.   Dispo: Home health aides at home, may need reinstatement of Home PT  Code Status: Full Code  HCP: Wife, Glory Guallpa DVT Prophylaxis: No blood thinners given [facility]. SCDs ordered.   Dispo: Home health aides at home.  Per PT Consult --> Recommending the patient go to inpatient rehab.   Code Status: Full Code  HCP: Wife, Glory Guallpa

## 2023-04-29 NOTE — PROGRESS NOTE ADULT - SUBJECTIVE AND OBJECTIVE BOX
Zoë Christy, PGY1  Internal Medicine    Patient is a 77y old  Male who presents with a chief complaint of     SUBJECTIVE/INTERVAL EVENTS: Patient underwent Ventriculoperitoneal shunt tap yesterday. Had a fever of 102.1 F yesterday, tylenol given. Patient's fever improved to 100.3, no additional tylenol given.   MEDICATIONS  (STANDING):  atorvastatin 20 milliGRAM(s) Oral at bedtime  cefepime   IVPB 2000 milliGRAM(s) IV Intermittent every 12 hours  dextrose 5%. 1000 milliLiter(s) (50 mL/Hr) IV Continuous <Continuous>  dextrose 5%. 1000 milliLiter(s) (100 mL/Hr) IV Continuous <Continuous>  dextrose 50% Injectable 25 Gram(s) IV Push once  dextrose 50% Injectable 12.5 Gram(s) IV Push once  dextrose 50% Injectable 25 Gram(s) IV Push once  glucagon  Injectable 1 milliGRAM(s) IntraMuscular once  insulin glargine Injectable (LANTUS) 20 Unit(s) SubCutaneous at bedtime  insulin lispro (ADMELOG) corrective regimen sliding scale   SubCutaneous three times a day before meals  insulin lispro (ADMELOG) corrective regimen sliding scale   SubCutaneous at bedtime  insulin lispro Injectable (ADMELOG) 7 Unit(s) SubCutaneous three times a day before meals  levETIRAcetam 250 milliGRAM(s) Oral two times a day  vancomycin  IVPB 1000 milliGRAM(s) IV Intermittent every 24 hours    MEDICATIONS  (PRN):  dextrose Oral Gel 15 Gram(s) Oral once PRN Blood Glucose LESS THAN 70 milliGRAM(s)/deciliter      VITAL SIGNS:  T(F): 98.5 (23 @ 06:17), Max: 102.1 (23 @ 23:15)  HR: 88 (23 @ 06:17) (82 - 111)  BP: 118/73 (23 @ 06:17) (105/61 - 146/70)  RR: 18 (23 @ 06:17) (16 - 20)  SpO2: 100% (23 @ 06:17) (96% - 100%)    I&O's Summary    Daily     Daily     PHYSICAL EXAM:  Gen: Alert, NAD  HEENT: NCAT, conjunctiva clear, sclera anicteric, no erythema or exudates in the oropharynx, mmm  Neck: Supple, no JVD  CV: RRR, S1S2, no m/r/g  Resp: CTAB, normal respiratory effort  Abd: Soft, nontender, nondistended, normal bowel sounds  Ext: no edema, no clubbing or cyanosis  Neuro: AOx1 to self not to place and time, CN2-12 grossly intact, MUHAMMAD  SKIN: warm, perfused    LABS:                        16.0   10.17 )-----------( 206      ( 2023 01:55 )             50.6     Hgb Trend: 16.0<--      139  |  101  |  15  ----------------------------<  119<H>  3.7   |  21<L>  |  1.19    Ca    9.1      2023 01:55    TPro  7.9  /  Alb  4.4  /  TBili  0.6  /  DBili  x   /  AST  23  /  ALT  24  /  AlkPhos  78      Creatinine Trend: 1.19<--  LIVER FUNCTIONS - ( 2023 01:55 )  Alb: 4.4 g/dL / Pro: 7.9 g/dL / ALK PHOS: 78 U/L / ALT: 24 U/L / AST: 23 U/L / GGT: x           PT/INR - ( 2023 01:55 )   PT: 12.6 sec;   INR: 1.09 ratio         PTT - ( 2023 01:55 )  PTT:29.9 sec      Urinalysis Basic - ( 2023 03:50 )    Color: Light Yellow / Appearance: Clear / S.025 / pH: x  Gluc: x / Ketone: Negative  / Bili: Negative / Urobili: <2 mg/dL   Blood: x / Protein: Negative / Nitrite: Negative   Leuk Esterase: Negative / RBC: 8 /HPF / WBC 5 /HPF   Sq Epi: x / Non Sq Epi: x / Bacteria: Negative        CAPILLARY BLOOD GLUCOSE      POCT Blood Glucose.: 102 mg/dL (2023 22:31)  POCT Blood Glucose.: 97 mg/dL (2023 17:58)      RADIOLOGY & ADDITIONAL TESTS: Reviewed    Imaging Personally Reviewed:    Consultant(s) Notes Reviewed:      Care Discussed with Consultants/Other Providers:   Zoë Christy, PGY1  Internal Medicine    Patient is a 77y old  Male who presents with a chief complaint of     SUBJECTIVE/INTERVAL EVENTS: Patient underwent Ventriculoperitoneal shunt tap yesterday. Had a fever of 102.1 F yesterday, tylenol given. Patient's fever improved to 100.3, no additional tylenol given.     MEDICATIONS  (STANDING):  atorvastatin 20 milliGRAM(s) Oral at bedtime  cefepime   IVPB 2000 milliGRAM(s) IV Intermittent every 12 hours  dextrose 5%. 1000 milliLiter(s) (50 mL/Hr) IV Continuous <Continuous>  dextrose 5%. 1000 milliLiter(s) (100 mL/Hr) IV Continuous <Continuous>  dextrose 50% Injectable 25 Gram(s) IV Push once  dextrose 50% Injectable 12.5 Gram(s) IV Push once  dextrose 50% Injectable 25 Gram(s) IV Push once  glucagon  Injectable 1 milliGRAM(s) IntraMuscular once  insulin glargine Injectable (LANTUS) 20 Unit(s) SubCutaneous at bedtime  insulin lispro (ADMELOG) corrective regimen sliding scale   SubCutaneous three times a day before meals  insulin lispro (ADMELOG) corrective regimen sliding scale   SubCutaneous at bedtime  insulin lispro Injectable (ADMELOG) 7 Unit(s) SubCutaneous three times a day before meals  levETIRAcetam 250 milliGRAM(s) Oral two times a day  vancomycin  IVPB 1000 milliGRAM(s) IV Intermittent every 24 hours    MEDICATIONS  (PRN):  dextrose Oral Gel 15 Gram(s) Oral once PRN Blood Glucose LESS THAN 70 milliGRAM(s)/deciliter      VITAL SIGNS:  T(F): 98.5 (23 @ 06:17), Max: 102.1 (23 @ 23:15)  HR: 88 (23 @ 06:17) (82 - 111)  BP: 118/73 (23 @ 06:17) (105/61 - 146/70)  RR: 18 (23 @ 06:17) (16 - 20)  SpO2: 100% (23 @ 06:17) (96% - 100%)    I&O's Summary    Daily     Daily     PHYSICAL EXAM:  Gen: Alert, NAD  HEENT: NCAT, conjunctiva clear, sclera anicteric, no erythema or exudates in the oropharynx, mmm  Neck: Supple, no JVD  CV: RRR, S1S2, no m/r/g  Resp: CTAB, normal respiratory effort  Abd: Soft, nontender, nondistended, normal bowel sounds  Ext: no edema, no clubbing or cyanosis  Neuro: AOx1 to self not to place and time, CN2-12 grossly intact, MUHAMMAD  SKIN: warm, perfused    LABS:                        16.0   10.17 )-----------( 206      ( 2023 01:55 )             50.6     Hgb Trend: 16.0<--      139  |  101  |  15  ----------------------------<  119<H>  3.7   |  21<L>  |  1.19    Ca    9.1      2023 01:55    TPro  7.9  /  Alb  4.4  /  TBili  0.6  /  DBili  x   /  AST  23  /  ALT  24  /  AlkPhos  78      Creatinine Trend: 1.19<--  LIVER FUNCTIONS - ( 2023 01:55 )  Alb: 4.4 g/dL / Pro: 7.9 g/dL / ALK PHOS: 78 U/L / ALT: 24 U/L / AST: 23 U/L / GGT: x           PT/INR - ( 2023 01:55 )   PT: 12.6 sec;   INR: 1.09 ratio         PTT - ( 2023 01:55 )  PTT:29.9 sec      Urinalysis Basic - ( 2023 03:50 )    Color: Light Yellow / Appearance: Clear / S.025 / pH: x  Gluc: x / Ketone: Negative  / Bili: Negative / Urobili: <2 mg/dL   Blood: x / Protein: Negative / Nitrite: Negative   Leuk Esterase: Negative / RBC: 8 /HPF / WBC 5 /HPF   Sq Epi: x / Non Sq Epi: x / Bacteria: Negative        CAPILLARY BLOOD GLUCOSE      POCT Blood Glucose.: 102 mg/dL (2023 22:31)  POCT Blood Glucose.: 97 mg/dL (2023 17:58)      RADIOLOGY & ADDITIONAL TESTS: Reviewed    Imaging Personally Reviewed:    Consultant(s) Notes Reviewed:      Care Discussed with Consultants/Other Providers:

## 2023-04-29 NOTE — RAPID RESPONSE TEAM SUMMARY - NSSITUATIONBACKGROUNDRRT_GEN_ALL_CORE
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 Jamal Isabel (Neurosurgery) & Dr. Laura (Neurology), prostate bx 2 days ago for hx pf prostate cancer not on treatment per family who p/w acute metabolic encephalopathy, found w SIRS admitted to medicine for further treatment. Patient found to be Klebsielleae bacteremic, unclear source, c/f prostate vs shunt.     RRT called for witnessed full body tremors, unclear if seizure vs rigors. Rectal temp 103, BP 150s, HR and O2 wnl. Patient at baseline mental status, no focal deficits. CBC, CMP, VBG, prolactin, and blood cultures drawn and sent. Antibiotics broadened to meropenem. Patient with loose but formed stool, GI PCR and stool cultures sent. RRT ended, primary team to follow up on labs sent.

## 2023-04-29 NOTE — PHYSICAL THERAPY INITIAL EVALUATION ADULT - PERTINENT HX OF CURRENT PROBLEM, REHAB EVAL
77 year old male presents with acute metabolic encephalopathy, found with SIRS admitted to medicine for further treatment. CT Head shows right frontal subdural hematoma.

## 2023-04-30 LAB
-  AMIKACIN: SIGNIFICANT CHANGE UP
-  AMPICILLIN/SULBACTAM: SIGNIFICANT CHANGE UP
-  AMPICILLIN: SIGNIFICANT CHANGE UP
-  AZTREONAM: SIGNIFICANT CHANGE UP
-  CEFAZOLIN: SIGNIFICANT CHANGE UP
-  CEFEPIME: SIGNIFICANT CHANGE UP
-  CEFOXITIN: SIGNIFICANT CHANGE UP
-  CEFTRIAXONE: SIGNIFICANT CHANGE UP
-  CIPROFLOXACIN: SIGNIFICANT CHANGE UP
-  ERTAPENEM: SIGNIFICANT CHANGE UP
-  GENTAMICIN: SIGNIFICANT CHANGE UP
-  IMIPENEM: SIGNIFICANT CHANGE UP
-  LEVOFLOXACIN: SIGNIFICANT CHANGE UP
-  MEROPENEM: SIGNIFICANT CHANGE UP
-  PIPERACILLIN/TAZOBACTAM: SIGNIFICANT CHANGE UP
-  TOBRAMYCIN: SIGNIFICANT CHANGE UP
-  TRIMETHOPRIM/SULFAMETHOXAZOLE: SIGNIFICANT CHANGE UP
ALBUMIN SERPL ELPH-MCNC: 3.4 G/DL — SIGNIFICANT CHANGE UP (ref 3.3–5)
ALP SERPL-CCNC: 68 U/L — SIGNIFICANT CHANGE UP (ref 40–120)
ALT FLD-CCNC: 15 U/L — SIGNIFICANT CHANGE UP (ref 4–41)
ANION GAP SERPL CALC-SCNC: 12 MMOL/L — SIGNIFICANT CHANGE UP (ref 7–14)
AST SERPL-CCNC: 21 U/L — SIGNIFICANT CHANGE UP (ref 4–40)
BASOPHILS # BLD AUTO: 0.05 K/UL — SIGNIFICANT CHANGE UP (ref 0–0.2)
BASOPHILS NFR BLD AUTO: 0.4 % — SIGNIFICANT CHANGE UP (ref 0–2)
BILIRUB SERPL-MCNC: 0.7 MG/DL — SIGNIFICANT CHANGE UP (ref 0.2–1.2)
BUN SERPL-MCNC: 15 MG/DL — SIGNIFICANT CHANGE UP (ref 7–23)
CALCIUM SERPL-MCNC: 8.5 MG/DL — SIGNIFICANT CHANGE UP (ref 8.4–10.5)
CHLORIDE SERPL-SCNC: 103 MMOL/L — SIGNIFICANT CHANGE UP (ref 98–107)
CO2 SERPL-SCNC: 22 MMOL/L — SIGNIFICANT CHANGE UP (ref 22–31)
CREAT SERPL-MCNC: 0.83 MG/DL — SIGNIFICANT CHANGE UP (ref 0.5–1.3)
CULTURE RESULTS: SIGNIFICANT CHANGE UP
CULTURE RESULTS: SIGNIFICANT CHANGE UP
EGFR: 90 ML/MIN/1.73M2 — SIGNIFICANT CHANGE UP
EOSINOPHIL # BLD AUTO: 0.2 K/UL — SIGNIFICANT CHANGE UP (ref 0–0.5)
EOSINOPHIL NFR BLD AUTO: 1.6 % — SIGNIFICANT CHANGE UP (ref 0–6)
GI PCR PANEL: SIGNIFICANT CHANGE UP
GLUCOSE BLDC GLUCOMTR-MCNC: 123 MG/DL — HIGH (ref 70–99)
GLUCOSE BLDC GLUCOMTR-MCNC: 137 MG/DL — HIGH (ref 70–99)
GLUCOSE BLDC GLUCOMTR-MCNC: 152 MG/DL — HIGH (ref 70–99)
GLUCOSE BLDC GLUCOMTR-MCNC: 285 MG/DL — HIGH (ref 70–99)
GLUCOSE SERPL-MCNC: 128 MG/DL — HIGH (ref 70–99)
HCT VFR BLD CALC: 45.3 % — SIGNIFICANT CHANGE UP (ref 39–50)
HGB BLD-MCNC: 15 G/DL — SIGNIFICANT CHANGE UP (ref 13–17)
IANC: 8.92 K/UL — HIGH (ref 1.8–7.4)
IMM GRANULOCYTES NFR BLD AUTO: 0.3 % — SIGNIFICANT CHANGE UP (ref 0–0.9)
LYMPHOCYTES # BLD AUTO: 1.22 K/UL — SIGNIFICANT CHANGE UP (ref 1–3.3)
LYMPHOCYTES # BLD AUTO: 10 % — LOW (ref 13–44)
MAGNESIUM SERPL-MCNC: 2 MG/DL — SIGNIFICANT CHANGE UP (ref 1.6–2.6)
MCHC RBC-ENTMCNC: 29 PG — SIGNIFICANT CHANGE UP (ref 27–34)
MCHC RBC-ENTMCNC: 33.1 GM/DL — SIGNIFICANT CHANGE UP (ref 32–36)
MCV RBC AUTO: 87.6 FL — SIGNIFICANT CHANGE UP (ref 80–100)
METHOD TYPE: SIGNIFICANT CHANGE UP
MONOCYTES # BLD AUTO: 1.77 K/UL — HIGH (ref 0–0.9)
MONOCYTES NFR BLD AUTO: 14.5 % — HIGH (ref 2–14)
NEUTROPHILS # BLD AUTO: 8.92 K/UL — HIGH (ref 1.8–7.4)
NEUTROPHILS NFR BLD AUTO: 73.2 % — SIGNIFICANT CHANGE UP (ref 43–77)
NRBC # BLD: 0 /100 WBCS — SIGNIFICANT CHANGE UP (ref 0–0)
NRBC # FLD: 0 K/UL — SIGNIFICANT CHANGE UP (ref 0–0)
ORGANISM # SPEC MICROSCOPIC CNT: SIGNIFICANT CHANGE UP
PHOSPHATE SERPL-MCNC: 3 MG/DL — SIGNIFICANT CHANGE UP (ref 2.5–4.5)
PLATELET # BLD AUTO: 153 K/UL — SIGNIFICANT CHANGE UP (ref 150–400)
POTASSIUM SERPL-MCNC: 3.7 MMOL/L — SIGNIFICANT CHANGE UP (ref 3.5–5.3)
POTASSIUM SERPL-SCNC: 3.7 MMOL/L — SIGNIFICANT CHANGE UP (ref 3.5–5.3)
PROLACTIN SERPL-MCNC: 15.6 NG/ML — SIGNIFICANT CHANGE UP (ref 4.1–18.4)
PROT SERPL-MCNC: 6.6 G/DL — SIGNIFICANT CHANGE UP (ref 6–8.3)
RBC # BLD: 5.17 M/UL — SIGNIFICANT CHANGE UP (ref 4.2–5.8)
RBC # FLD: 13.5 % — SIGNIFICANT CHANGE UP (ref 10.3–14.5)
SODIUM SERPL-SCNC: 137 MMOL/L — SIGNIFICANT CHANGE UP (ref 135–145)
SPECIMEN SOURCE: SIGNIFICANT CHANGE UP
SPECIMEN SOURCE: SIGNIFICANT CHANGE UP
WBC # BLD: 12.2 K/UL — HIGH (ref 3.8–10.5)
WBC # FLD AUTO: 12.2 K/UL — HIGH (ref 3.8–10.5)

## 2023-04-30 PROCEDURE — 99233 SBSQ HOSP IP/OBS HIGH 50: CPT | Mod: GC

## 2023-04-30 PROCEDURE — 99232 SBSQ HOSP IP/OBS MODERATE 35: CPT

## 2023-04-30 RX ORDER — ENOXAPARIN SODIUM 100 MG/ML
40 INJECTION SUBCUTANEOUS EVERY 24 HOURS
Refills: 0 | Status: DISCONTINUED | OUTPATIENT
Start: 2023-04-30 | End: 2023-05-08

## 2023-04-30 RX ORDER — CEFEPIME 1 G/1
2000 INJECTION, POWDER, FOR SOLUTION INTRAMUSCULAR; INTRAVENOUS EVERY 12 HOURS
Refills: 0 | Status: DISCONTINUED | OUTPATIENT
Start: 2023-04-30 | End: 2023-05-01

## 2023-04-30 RX ADMIN — ATORVASTATIN CALCIUM 20 MILLIGRAM(S): 80 TABLET, FILM COATED ORAL at 22:28

## 2023-04-30 RX ADMIN — Medication 7 UNIT(S): at 09:45

## 2023-04-30 RX ADMIN — Medication 7 UNIT(S): at 13:20

## 2023-04-30 RX ADMIN — Medication 7 UNIT(S): at 18:11

## 2023-04-30 RX ADMIN — LEVETIRACETAM 250 MILLIGRAM(S): 250 TABLET, FILM COATED ORAL at 18:13

## 2023-04-30 RX ADMIN — INSULIN GLARGINE 20 UNIT(S): 100 INJECTION, SOLUTION SUBCUTANEOUS at 22:27

## 2023-04-30 RX ADMIN — MEROPENEM 100 MILLIGRAM(S): 1 INJECTION INTRAVENOUS at 06:05

## 2023-04-30 RX ADMIN — CEFEPIME 100 MILLIGRAM(S): 1 INJECTION, POWDER, FOR SOLUTION INTRAMUSCULAR; INTRAVENOUS at 13:24

## 2023-04-30 RX ADMIN — ENOXAPARIN SODIUM 40 MILLIGRAM(S): 100 INJECTION SUBCUTANEOUS at 13:24

## 2023-04-30 RX ADMIN — Medication 6: at 13:19

## 2023-04-30 RX ADMIN — Medication 2: at 18:11

## 2023-04-30 RX ADMIN — LEVETIRACETAM 250 MILLIGRAM(S): 250 TABLET, FILM COATED ORAL at 06:05

## 2023-04-30 NOTE — PROGRESS NOTE ADULT - PROBLEM SELECTOR PLAN 1
- presented SIRS+ : Tachycardic to 137, Febrile to 40.5, unknown source as of now  - UA bland, however follow up urine culture given recent prostate biopsy 2 days ago and family reporting pt has hx of previous post bx prostatitis  - Antibiotic treatment: c/w merepenem (s/p1x  Vanc ON at RRT)  - Follow up blood cultures  - no leukocytosis  - fever improved after iv ofirmev and antibiotics, will ctm

## 2023-04-30 NOTE — PROGRESS NOTE ADULT - SUBJECTIVE AND OBJECTIVE BOX
Follow Up:  bacteremia    Interval History/ROS:    Allergies  No Known Allergies        ANTIMICROBIALS:  cefepime   IVPB 2000 every 12 hours      OTHER MEDS:  MEDICATIONS  (STANDING):  atorvastatin 20 at bedtime  dextrose 50% Injectable 25 once  dextrose 50% Injectable 12.5 once  dextrose 50% Injectable 25 once  dextrose Oral Gel 15 once PRN  enoxaparin Injectable 40 every 24 hours  glucagon  Injectable 1 once  insulin glargine Injectable (LANTUS) 20 at bedtime  insulin lispro (ADMELOG) corrective regimen sliding scale  three times a day before meals  insulin lispro (ADMELOG) corrective regimen sliding scale  at bedtime  insulin lispro Injectable (ADMELOG) 7 three times a day before meals  levETIRAcetam 250 two times a day      Vital Signs Last 24 Hrs  T(C): 36.8 (30 Apr 2023 10:38), Max: 39.5 (29 Apr 2023 18:20)  T(F): 98.3 (30 Apr 2023 10:38), Max: 103.1 (29 Apr 2023 18:20)  HR: 91 (30 Apr 2023 10:38) (87 - 124)  BP: 138/86 (30 Apr 2023 10:38) (122/66 - 160/90)  BP(mean): --  RR: 18 (30 Apr 2023 10:38) (18 - 18)  SpO2: 99% (30 Apr 2023 10:38) (98% - 100%)    Parameters below as of 30 Apr 2023 10:38  Patient On (Oxygen Delivery Method): room air        PHYSICAL EXAM:  Constitutional: non-toxic, no distress  HEAD/EYES: anicteric, no conjunctival injection  ENT:  supple, no thrush  Cardiovascular:   normal S1, S2, no murmur, no edema  Respiratory:  clear BS bilaterally, no wheezes, no rales  GI:  soft, non-tender, normal bowel sounds  :  no espinoza, no CVA tenderness  Musculoskeletal:  no synovitis, normal ROM  Neurologic: awake and alert, normal strength, no focal findings  Skin:  no rash, no erythema, no phlebitis  Heme/Onc: no lymphadenopathy   Psychiatric:  awake, alert, appropriate mood                                15.0   12.20 )-----------( 153      ( 30 Apr 2023 06:09 )             45.3       04-30    137  |  103  |  15  ----------------------------<  128<H>  3.7   |  22  |  0.83    Ca    8.5      30 Apr 2023 06:09  Phos  3.0     04-30  Mg     2.00     04-30    TPro  6.6  /  Alb  3.4  /  TBili  0.7  /  DBili  x   /  AST  21  /  ALT  15  /  AlkPhos  68  04-30          MICROBIOLOGY:  v    Culture - CSF with Gram Stain (collected 28 Apr 2023 19:06)  Source: .CSF CSF  Gram Stain (28 Apr 2023 20:49):    No polymorphonuclear leukocytes seen    No organisms seen    by cytocentrifuge  Preliminary Report (29 Apr 2023 14:46):    No growth    Culture - Urine (collected 28 Apr 2023 03:15)  Source: Clean Catch Clean Catch (Midstream)  Final Report (29 Apr 2023 06:24):    No growth    Culture - Blood (collected 28 Apr 2023 01:45)  Source: .Blood Blood-Peripheral  Gram Stain (28 Apr 2023 18:14):    Growth in aerobic and anaerobic bottles: Gram Negative Rods  Final Report (30 Apr 2023 07:59):    Growth in aerobic and anaerobic bottles: Klebsiella pneumoniae    See previous culture [id number]    Culture - Blood (collected 28 Apr 2023 01:35)  Source: .Blood Blood-Peripheral  Gram Stain (28 Apr 2023 18:14):    Growth in aerobic and anaerobic bottles: Gram Negative Rods  Final Report (30 Apr 2023 07:58):    Growth in aerobic and anaerobic bottles: Klebsiella pneumoniae    ***Blood Panel PCR results on this specimen are available    approximately 3 hours after the Gram stain result.***    Gram stain, PCR, and/or culture results may not always    correspond dueto difference in methodologies.    ************************************************************    This PCR assay was performed by multiplex PCR. This    Assay tests for 66 bacterial and resistance gene targets.    Please refer to the [facility] Labs test directory    at [email or web address] for details.  Organism: Blood Culture PCR  Klebsiella pneumoniae (30 Apr 2023 07:58)  Organism: Klebsiella pneumoniae (30 Apr 2023 07:58)      Method Type: ANTHONY      -  Amikacin: S <=16      -  Ampicillin: R >16 These ampicillin results predict results for amoxicillin      -  Ampicillin/Sulbactam: S <=4/2 Enterobacter, Klebsiella aerogenes, Citrobacter, and Serratia may develop resistance during prolonged therapy (3-4 days)      -  Aztreonam: S <=4      -  Cefazolin: S <=2 Enterobacter, Klebsiella aerogenes, Citrobacter, and Serratia may develop resistance during prolonged therapy (3-4 days)      -  Cefepime: S <=2      -  Cefoxitin: S <=8      -  Ceftriaxone: S <=1 Enterobacter, Klebsiella aerogenes, Citrobacter, and Serratia may develop resistance during prolonged therapy      -  Ciprofloxacin: S <=0.25      -  Ertapenem: S <=0.5      -  Gentamicin: S <=2      -  Imipenem: S <=1      -  Levofloxacin: S <=0.5      -  Meropenem: S <=1      -  Piperacillin/Tazobactam: S <=8      -  Tobramycin: S <=2      -  Trimethoprim/Sulfamethoxazole: R >2/38  Organism: Blood Culture PCR (30 Apr 2023 07:58)      Method Type: PCR      -  K. pneumoniae group: Detec (K. pneumoniae, K. quasipneumoniae, K. variicola)              Rapid RVP Result: NotDetec (04-28 @ 01:55)        RADIOLOGY:   Follow Up:  bacteremia    Interval History/ROS:  Overnight: RRT called due to development of tremors.  Patient febrile to 103, tachycardic as well.  Repeat infectious work-up obtained and antibiotics broadened to meropenem.  Patient afebrile this AM.  Continues to have leukocytosis of 12.2.  BMP with renal function within normal limits, hepatic function within normal limits.  Procalcitonin 15.6.  CSF culture from 4/28/2023 remains negative.  GI PCR panel obtained following RRT is negative.      Patient seen examined at bedside.  Continues to be confused however not in distress.  Denies any new pain or discomfort.    Allergies  No Known Allergies    ANTIMICROBIALS:  cefepime   IVPB 2000 every 12 hours      OTHER MEDS:  MEDICATIONS  (STANDING):  atorvastatin 20 at bedtime  dextrose 50% Injectable 25 once  dextrose 50% Injectable 12.5 once  dextrose 50% Injectable 25 once  dextrose Oral Gel 15 once PRN  enoxaparin Injectable 40 every 24 hours  glucagon  Injectable 1 once  insulin glargine Injectable (LANTUS) 20 at bedtime  insulin lispro (ADMELOG) corrective regimen sliding scale  three times a day before meals  insulin lispro (ADMELOG) corrective regimen sliding scale  at bedtime  insulin lispro Injectable (ADMELOG) 7 three times a day before meals  levETIRAcetam 250 two times a day      Vital Signs Last 24 Hrs  T(C): 36.8 (30 Apr 2023 10:38), Max: 39.5 (29 Apr 2023 18:20)  T(F): 98.3 (30 Apr 2023 10:38), Max: 103.1 (29 Apr 2023 18:20)  HR: 91 (30 Apr 2023 10:38) (87 - 124)  BP: 138/86 (30 Apr 2023 10:38) (122/66 - 160/90)  BP(mean): --  RR: 18 (30 Apr 2023 10:38) (18 - 18)  SpO2: 99% (30 Apr 2023 10:38) (98% - 100%)    Parameters below as of 30 Apr 2023 10:38  Patient On (Oxygen Delivery Method): room air        PHYSICAL EXAM:  GA: NAD, AOx1  HEENT: oral cavity no lesion  CV: nl S1/S2, no RMG  Lungs: CTAB, No distress  Abd: BS+, soft, nontender, no rebounding pain  Ext: no edema  Neuro: No focal deficits  Skin: Intact  IV: no phlebitis                          15.0   12.20 )-----------( 153      ( 30 Apr 2023 06:09 )             45.3       04-30    137  |  103  |  15  ----------------------------<  128<H>  3.7   |  22  |  0.83    Ca    8.5      30 Apr 2023 06:09  Phos  3.0     04-30  Mg     2.00     04-30    TPro  6.6  /  Alb  3.4  /  TBili  0.7  /  DBili  x   /  AST  21  /  ALT  15  /  AlkPhos  68  04-30          MICROBIOLOGY:  v    Culture - CSF with Gram Stain (collected 28 Apr 2023 19:06)  Source: .CSF CSF  Gram Stain (28 Apr 2023 20:49):    No polymorphonuclear leukocytes seen    No organisms seen    by cytocentrifuge  Preliminary Report (29 Apr 2023 14:46):    No growth    Culture - Urine (collected 28 Apr 2023 03:15)  Source: Clean Catch Clean Catch (Midstream)  Final Report (29 Apr 2023 06:24):    No growth    Culture - Blood (collected 28 Apr 2023 01:45)  Source: .Blood Blood-Peripheral  Gram Stain (28 Apr 2023 18:14):    Growth in aerobic and anaerobic bottles: Gram Negative Rods  Final Report (30 Apr 2023 07:59):    Growth in aerobic and anaerobic bottles: Klebsiella pneumoniae    See previous culture [id number]    Culture - Blood (collected 28 Apr 2023 01:35)  Source: .Blood Blood-Peripheral  Gram Stain (28 Apr 2023 18:14):    Growth in aerobic and anaerobic bottles: Gram Negative Rods  Final Report (30 Apr 2023 07:58):    Growth in aerobic and anaerobic bottles: Klebsiella pneumoniae    ***Blood Panel PCR results on this specimen are available    approximately 3 hours after the Gram stain result.***    Gram stain, PCR, and/or culture results may not always    correspond dueto difference in methodologies.    ************************************************************    This PCR assay was performed by multiplex PCR. This    Assay tests for 66 bacterial and resistance gene targets.    Please refer to the [facility] Labs test directory    at [email or web address] for details.  Organism: Blood Culture PCR  Klebsiella pneumoniae (30 Apr 2023 07:58)  Organism: Klebsiella pneumoniae (30 Apr 2023 07:58)      Method Type: ANTHONY      -  Amikacin: S <=16      -  Ampicillin: R >16 These ampicillin results predict results for amoxicillin      -  Ampicillin/Sulbactam: S <=4/2 Enterobacter, Klebsiella aerogenes, Citrobacter, and Serratia may develop resistance during prolonged therapy (3-4 days)      -  Aztreonam: S <=4      -  Cefazolin: S <=2 Enterobacter, Klebsiella aerogenes, Citrobacter, and Serratia may develop resistance during prolonged therapy (3-4 days)      -  Cefepime: S <=2      -  Cefoxitin: S <=8      -  Ceftriaxone: S <=1 Enterobacter, Klebsiella aerogenes, Citrobacter, and Serratia may develop resistance during prolonged therapy      -  Ciprofloxacin: S <=0.25      -  Ertapenem: S <=0.5      -  Gentamicin: S <=2      -  Imipenem: S <=1      -  Levofloxacin: S <=0.5      -  Meropenem: S <=1      -  Piperacillin/Tazobactam: S <=8      -  Tobramycin: S <=2      -  Trimethoprim/Sulfamethoxazole: R >2/38  Organism: Blood Culture PCR (30 Apr 2023 07:58)      Method Type: PCR      -  K. pneumoniae group: Detec (K. pneumoniae, K. quasipneumoniae, K. variicola)      Rapid RVP Result: NotDetec (04-28 @ 01:55)

## 2023-04-30 NOTE — PROGRESS NOTE ADULT - PROBLEM SELECTOR PLAN 7
DVT Prophylaxis: No blood thinners given SDH. SCDs ordered.   Dispo: Home health aides at home.  Per PT Consult --> Recommending the patient go to inpatient rehab.   Code Status: Full Code  HCP: Wife, Glory Guallpa

## 2023-04-30 NOTE — PROGRESS NOTE ADULT - PROBLEM SELECTOR PLAN 3
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 Baez (Outpatient urologist) for further collateral at [phone]  pt does not have dysuria, hematuria, pain in the genital area, groin, lower abdomen, or lower back. however does endorse urinary frequency/nocturia

## 2023-04-30 NOTE — PROGRESS NOTE ADULT - SUBJECTIVE AND OBJECTIVE BOX
PROGRESS NOTE:   Authored by Juan Kauffman MD  Pager: [facility] [phone]; [phone]    Patient is a 77y old  Male who presents with a chief complaint of AMS, fever (29 Apr 2023 07:44)      SUBJECTIVE / OVERNIGHT EVENTS:  RRT ON for full body tremors, rectal temp of 103, BP 150s. Pt at baseline mental status, repeat cultures sent and pt gievn 1x vanc and switched for cefepime for merepenem.     ADDITIONAL REVIEW OF SYSTEMS:    MEDICATIONS  (STANDING):  atorvastatin 20 milliGRAM(s) Oral at bedtime  dextrose 5%. 1000 milliLiter(s) (50 mL/Hr) IV Continuous <Continuous>  dextrose 5%. 1000 milliLiter(s) (100 mL/Hr) IV Continuous <Continuous>  dextrose 50% Injectable 12.5 Gram(s) IV Push once  dextrose 50% Injectable 25 Gram(s) IV Push once  dextrose 50% Injectable 25 Gram(s) IV Push once  glucagon  Injectable 1 milliGRAM(s) IntraMuscular once  insulin glargine Injectable (LANTUS) 20 Unit(s) SubCutaneous at bedtime  insulin lispro (ADMELOG) corrective regimen sliding scale   SubCutaneous three times a day before meals  insulin lispro (ADMELOG) corrective regimen sliding scale   SubCutaneous at bedtime  insulin lispro Injectable (ADMELOG) 7 Unit(s) SubCutaneous three times a day before meals  levETIRAcetam 250 milliGRAM(s) Oral two times a day  meropenem  IVPB 1000 milliGRAM(s) IV Intermittent every 8 hours    MEDICATIONS  (PRN):  dextrose Oral Gel 15 Gram(s) Oral once PRN Blood Glucose LESS THAN 70 milliGRAM(s)/deciliter      CAPILLARY BLOOD GLUCOSE      POCT Blood Glucose.: 111 mg/dL (29 Apr 2023 21:31)  POCT Blood Glucose.: 147 mg/dL (29 Apr 2023 18:26)  POCT Blood Glucose.: 138 mg/dL (29 Apr 2023 17:34)  POCT Blood Glucose.: 128 mg/dL (29 Apr 2023 12:40)  POCT Blood Glucose.: 92 mg/dL (29 Apr 2023 09:39)    I&O's Summary    29 Apr 2023 07:01  -  30 Apr 2023 07:00  --------------------------------------------------------  IN: 0 mL / OUT: 400 mL / NET: -400 mL        PHYSICAL EXAM:  Vital Signs Last 24 Hrs  T(C): 36.8 (30 Apr 2023 05:13), Max: 39.5 (29 Apr 2023 18:20)  T(F): 98.2 (30 Apr 2023 05:13), Max: 103.1 (29 Apr 2023 18:20)  HR: 87 (30 Apr 2023 05:13) (87 - 124)  BP: 160/90 (30 Apr 2023 05:13) (122/66 - 160/90)  BP(mean): --  RR: 18 (30 Apr 2023 05:13) (17 - 18)  SpO2: 100% (30 Apr 2023 05:13) (98% - 100%)    Parameters below as of 30 Apr 2023 05:13  Patient On (Oxygen Delivery Method): room air    Gen: Alert, NAD  HEENT: NCAT, conjunctiva clear, sclera anicteric, no erythema or exudates in the oropharynx, mmm  Neck: Supple, no JVD  CV: RRR, S1S2, no m/r/g  Resp: CTAB, normal respiratory effort  Abd: Soft, nontender, nondistended, normal bowel sounds  Ext: no edema, no clubbing or cyanosis  Neuro: AOx1 to self not to place and time, CN2-12 grossly intact, MUHAMMAD  SKIN: warm, perfused      LABS:                        15.0   12.20 )-----------( 153      ( 30 Apr 2023 06:09 )             45.3     04-30    137  |  103  |  x   ----------------------------<  x   3.7   |  x   |  x     Ca    8.8      29 Apr 2023 18:57  Phos  3.0     04-30  Mg     2.00     04-30    TPro  6.9  /  Alb  3.8  /  TBili  0.6  /  DBili  x   /  AST  23  /  ALT  17  /  AlkPhos  76  04-29              Culture - CSF with Gram Stain (collected 28 Apr 2023 19:06)  Source: .CSF CSF  Gram Stain (28 Apr 2023 20:49):    No polymorphonuclear leukocytes seen    No organisms seen    by cytocentrifuge  Preliminary Report (29 Apr 2023 14:46):    No growth    Culture - Urine (collected 28 Apr 2023 03:15)  Source: Clean Catch Clean Catch (Midstream)  Final Report (29 Apr 2023 06:24):    No growth    Culture - Blood (collected 28 Apr 2023 01:45)  Source: .Blood Blood-Peripheral  Gram Stain (28 Apr 2023 18:14):    Growth in aerobic and anaerobic bottles: Gram Negative Rods  Preliminary Report (29 Apr 2023 13:36):    Growth in aerobic and anaerobic bottles: Klebsiella pneumoniae    See previous culture [id number]    Culture - Blood (collected 28 Apr 2023 01:35)  Source: .Blood Blood-Peripheral  Gram Stain (28 Apr 2023 18:14):    Growth in aerobic and anaerobic bottles: Gram Negative Rods  Preliminary Report (29 Apr 2023 11:10):    Growth in aerobic and anaerobic bottles: Klebsiella pneumoniae    ***Blood Panel PCR results on this specimen are available    approximately 3 hours after the Gram stain result.***    Gram stain, PCR, and/or culture results may not always    correspond dueto difference in methodologies.    ************************************************************    This PCR assay was performed by multiplex PCR. This    Assay tests for 66 bacterial and resistance gene targets.    Please refer to the [facility] test directory    at [email or web address] for details.  Organism: Blood Culture PCR (28 Apr 2023 19:44)  Organism: Blood Culture PCR (28 Apr 2023 19:44)        RADIOLOGY & ADDITIONAL TESTS:  Results Reviewed:   Imaging Personally Reviewed:  Electrocardiogram Personally Reviewed:    COORDINATION OF CARE:  Care Discussed with Consultants/Other Providers [Y/N]:  Prior or Outpatient Records Reviewed [Y/N]:

## 2023-04-30 NOTE — PROGRESS NOTE ADULT - ATTENDING COMMENTS
now w high grade kleb bacteremia - source unclear as ua, ucx, pan imaging as well as CSF prelim neg   ?? prostatitis w recent instrumentation but less likely w bland ua and neg ucx   ID fu today as RRT yesterday for breakthrough fever 103 on cefepime and now escalated to delroes - ? repeat imaging for focus  fu repeat bcx   tremors likely rigors in setting of fever as opposed to sz, no post ictal state reported and prolactin normal  per neuro sx no plan for shunt intervention, awaiting [facility] - SDH stable on repeat imaging - avoid blood thinners  in-hospital delirium - d.t infx - reorient, no agitation reported  PT eval, fall precautions  dw wife on the phone this morning

## 2023-04-30 NOTE — PROGRESS NOTE ADULT - PROBLEM SELECTOR PLAN 2
- neurosurgery following  - CT Head shows R. frontal subdural hematoma, compared to CT Head from one month ago per NSGY no change. no midline shift, resolution of low density holohemispheric right subdural collection and left frontal subdural collection, stable ventricular size.   - At this point no plan to pursue any tap or shunt per NSGY team urgently, Dr. Isabel wanting to await for cultures to return and source control of sepsis, fever unlikely 2/2 to infection of shunt given stable ventricular size   - F/u MRI brain w/w/o contrast  - Continue with Keppra 250 mg bid as per Nsgy  -Per NSY, okay to start DVT ppx

## 2023-04-30 NOTE — PROGRESS NOTE ADULT - ASSESSMENT
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 Pt was noted to be febrile in ED  s/p vanc 1x, c/w zosyn,CT chesta/p with con negative for source of infection and CTH with new right frontal subdural hematoma. ID consulted for fever workup    WORKUP  CT Chest, Abdomen and Pelvis w/ IV Cont (04.28): The prostate is prominent. No evidence of intra-abdominal abscess. Clear chest.  Right-sided  shunt extending along the right lateral  peritoneal cavity. No adjacent fluid collection is identified.  CT Head No Cont (04.28)): Mixed density right frontal subdural hematoma, new since prior study. No midline shift.  Resolution of previously seen low-density holohemispheric right subdural  collection and left frontal low-density subdural collection. Stable ventricular size in stable position of a rightparietal approach   shunt catheter.  UA and RVP: negative    DIAGNOSIS and IMPRESSION  ·	Fevers  ·	Right frontal Subdural hematoma  ·	Klebsiella bacteremia    Fevers up to 104.9 w/o leukocytosis, 102.1 F past 24 hours  s/p vanc x 1 and Zosyn x 2 on 4/28  Recent Prostate biopsy 3 days back for past hx of prostate CA  UA, RVP and CT Chest a/p with con negative for source of fever  Family reports giving him merari-prostatic biopsy abx   Source of fever: bacteremia from prostatic biopsy more likely vs Central fevers from SDH  LP performed - 0 nucleated cells, CSF PCR negative, Elevated glucose, low protein, CSF culture with no growth - lower suspicion for CNS/ shunt infection overall  BCx with klebsiella growth, sensitivities reviewed    RECOMMENDATIONS  Sensitivities reviewed, would switch from cefepime to ceftriaxone  Less likely to have  shunt/CNS infection given LP results and brain imaging, think prostatic source more likely for fevers  Follow CSF cultures  Follow repeat blood cultures  Follow fever curve and WBC count    Bala Hanson MD  [employer] Teams Preferred  After 5pm/weekends call [phone]

## 2023-05-01 ENCOUNTER — TRANSCRIPTION ENCOUNTER (OUTPATIENT)
Age: 78
End: 2023-05-01

## 2023-05-01 LAB
ALBUMIN SERPL ELPH-MCNC: 3.9 G/DL — SIGNIFICANT CHANGE UP (ref 3.3–5)
ALP SERPL-CCNC: 74 U/L — SIGNIFICANT CHANGE UP (ref 40–120)
ALT FLD-CCNC: 21 U/L — SIGNIFICANT CHANGE UP (ref 4–41)
ANION GAP SERPL CALC-SCNC: 11 MMOL/L — SIGNIFICANT CHANGE UP (ref 7–14)
AST SERPL-CCNC: 24 U/L — SIGNIFICANT CHANGE UP (ref 4–40)
BASOPHILS # BLD AUTO: 0.04 K/UL — SIGNIFICANT CHANGE UP (ref 0–0.2)
BASOPHILS NFR BLD AUTO: 0.4 % — SIGNIFICANT CHANGE UP (ref 0–2)
BILIRUB SERPL-MCNC: 0.7 MG/DL — SIGNIFICANT CHANGE UP (ref 0.2–1.2)
BUN SERPL-MCNC: 12 MG/DL — SIGNIFICANT CHANGE UP (ref 7–23)
CALCIUM SERPL-MCNC: 9.1 MG/DL — SIGNIFICANT CHANGE UP (ref 8.4–10.5)
CHLORIDE SERPL-SCNC: 101 MMOL/L — SIGNIFICANT CHANGE UP (ref 98–107)
CO2 SERPL-SCNC: 24 MMOL/L — SIGNIFICANT CHANGE UP (ref 22–31)
CREAT SERPL-MCNC: 0.77 MG/DL — SIGNIFICANT CHANGE UP (ref 0.5–1.3)
CULTURE RESULTS: NO GROWTH — SIGNIFICANT CHANGE UP
EGFR: 92 ML/MIN/1.73M2 — SIGNIFICANT CHANGE UP
EOSINOPHIL # BLD AUTO: 0.21 K/UL — SIGNIFICANT CHANGE UP (ref 0–0.5)
EOSINOPHIL NFR BLD AUTO: 2.2 % — SIGNIFICANT CHANGE UP (ref 0–6)
GLUCOSE BLDC GLUCOMTR-MCNC: 120 MG/DL — HIGH (ref 70–99)
GLUCOSE BLDC GLUCOMTR-MCNC: 133 MG/DL — HIGH (ref 70–99)
GLUCOSE BLDC GLUCOMTR-MCNC: 135 MG/DL — HIGH (ref 70–99)
GLUCOSE BLDC GLUCOMTR-MCNC: 191 MG/DL — HIGH (ref 70–99)
GLUCOSE BLDC GLUCOMTR-MCNC: 253 MG/DL — HIGH (ref 70–99)
GLUCOSE SERPL-MCNC: 152 MG/DL — HIGH (ref 70–99)
HCT VFR BLD CALC: 44.4 % — SIGNIFICANT CHANGE UP (ref 39–50)
HGB BLD-MCNC: 14.9 G/DL — SIGNIFICANT CHANGE UP (ref 13–17)
IANC: 6.9 K/UL — SIGNIFICANT CHANGE UP (ref 1.8–7.4)
IMM GRANULOCYTES NFR BLD AUTO: 0.3 % — SIGNIFICANT CHANGE UP (ref 0–0.9)
LYMPHOCYTES # BLD AUTO: 1.21 K/UL — SIGNIFICANT CHANGE UP (ref 1–3.3)
LYMPHOCYTES # BLD AUTO: 12.9 % — LOW (ref 13–44)
MAGNESIUM SERPL-MCNC: 2.1 MG/DL — SIGNIFICANT CHANGE UP (ref 1.6–2.6)
MCHC RBC-ENTMCNC: 28.2 PG — SIGNIFICANT CHANGE UP (ref 27–34)
MCHC RBC-ENTMCNC: 33.6 GM/DL — SIGNIFICANT CHANGE UP (ref 32–36)
MCV RBC AUTO: 83.9 FL — SIGNIFICANT CHANGE UP (ref 80–100)
MONOCYTES # BLD AUTO: 0.98 K/UL — HIGH (ref 0–0.9)
MONOCYTES NFR BLD AUTO: 10.5 % — SIGNIFICANT CHANGE UP (ref 2–14)
NEUTROPHILS # BLD AUTO: 6.9 K/UL — SIGNIFICANT CHANGE UP (ref 1.8–7.4)
NEUTROPHILS NFR BLD AUTO: 73.7 % — SIGNIFICANT CHANGE UP (ref 43–77)
NRBC # BLD: 0 /100 WBCS — SIGNIFICANT CHANGE UP (ref 0–0)
NRBC # FLD: 0 K/UL — SIGNIFICANT CHANGE UP (ref 0–0)
PHOSPHATE SERPL-MCNC: 2.7 MG/DL — SIGNIFICANT CHANGE UP (ref 2.5–4.5)
PLATELET # BLD AUTO: 206 K/UL — SIGNIFICANT CHANGE UP (ref 150–400)
POTASSIUM SERPL-MCNC: 4 MMOL/L — SIGNIFICANT CHANGE UP (ref 3.5–5.3)
POTASSIUM SERPL-SCNC: 4 MMOL/L — SIGNIFICANT CHANGE UP (ref 3.5–5.3)
PROT SERPL-MCNC: 7.2 G/DL — SIGNIFICANT CHANGE UP (ref 6–8.3)
RBC # BLD: 5.29 M/UL — SIGNIFICANT CHANGE UP (ref 4.2–5.8)
RBC # FLD: 13.2 % — SIGNIFICANT CHANGE UP (ref 10.3–14.5)
SODIUM SERPL-SCNC: 136 MMOL/L — SIGNIFICANT CHANGE UP (ref 135–145)
SPECIMEN SOURCE: SIGNIFICANT CHANGE UP
WBC # BLD: 9.37 K/UL — SIGNIFICANT CHANGE UP (ref 3.8–10.5)
WBC # FLD AUTO: 9.37 K/UL — SIGNIFICANT CHANGE UP (ref 3.8–10.5)

## 2023-05-01 PROCEDURE — 99232 SBSQ HOSP IP/OBS MODERATE 35: CPT | Mod: GC

## 2023-05-01 PROCEDURE — 99232 SBSQ HOSP IP/OBS MODERATE 35: CPT

## 2023-05-01 RX ORDER — CEFTRIAXONE 500 MG/1
2000 INJECTION, POWDER, FOR SOLUTION INTRAMUSCULAR; INTRAVENOUS EVERY 24 HOURS
Refills: 0 | Status: DISCONTINUED | OUTPATIENT
Start: 2023-05-01 | End: 2023-05-02

## 2023-05-01 RX ADMIN — ATORVASTATIN CALCIUM 20 MILLIGRAM(S): 80 TABLET, FILM COATED ORAL at 22:31

## 2023-05-01 RX ADMIN — ENOXAPARIN SODIUM 40 MILLIGRAM(S): 100 INJECTION SUBCUTANEOUS at 13:03

## 2023-05-01 RX ADMIN — INSULIN GLARGINE 20 UNIT(S): 100 INJECTION, SOLUTION SUBCUTANEOUS at 22:32

## 2023-05-01 RX ADMIN — Medication 7 UNIT(S): at 09:27

## 2023-05-01 RX ADMIN — LEVETIRACETAM 250 MILLIGRAM(S): 250 TABLET, FILM COATED ORAL at 05:34

## 2023-05-01 RX ADMIN — LEVETIRACETAM 250 MILLIGRAM(S): 250 TABLET, FILM COATED ORAL at 18:54

## 2023-05-01 RX ADMIN — Medication 7 UNIT(S): at 13:00

## 2023-05-01 RX ADMIN — Medication 7 UNIT(S): at 18:45

## 2023-05-01 RX ADMIN — Medication 2: at 18:44

## 2023-05-01 RX ADMIN — Medication 1: at 22:31

## 2023-05-01 RX ADMIN — CEFEPIME 100 MILLIGRAM(S): 1 INJECTION, POWDER, FOR SOLUTION INTRAMUSCULAR; INTRAVENOUS at 01:51

## 2023-05-01 RX ADMIN — CEFTRIAXONE 100 MILLIGRAM(S): 500 INJECTION, POWDER, FOR SOLUTION INTRAMUSCULAR; INTRAVENOUS at 20:21

## 2023-05-01 NOTE — DISCHARGE NOTE PROVIDER - NSDCCPTREATMENT_GEN_ALL_CORE_FT
PRINCIPAL PROCEDURE  Procedure: CT of chest, abdomen, and pelvis with intravenous contrast  Findings and Treatment:   < end of copied text >  FINDINGS:  CHEST:  LUNGS AND LARGE AIRWAYS: Patentcentral airways. No pulmonary nodules. No   confluent airspace opacity.  PLEURA: No pleural effusion.  VESSELS: Within normal limits.  HEART: Heart size is normal. No pericardial effusion.  MEDIASTINUM AND VERO: No lymphadenopathy.  CHEST WALL AND LOWER NECK: Mild bilateral gynecomastia.  ABDOMEN AND PELVIS:  LIVER: Within normal limits.  BILE DUCTS: Normal caliber.  GALLBLADDER: Within normal limits.  SPLEEN: Within normal limits.  PANCREAS: Within normal limits.  ADRENALS: Within normal limits.  KIDNEYS/URETERS: Within normal limits.  BLADDER: Minimally distended.  REPRODUCTIVE ORGANS: The prostate is prominent. The seminal vesicles are   symmetric.  BOWEL: No bowel obstruction. Appendix is normal. There is no mesenteric   adenopathy.  PERITONEUM: Right-sided  shunt extending along the right lateral   peritoneal cavity. No adjacent fluid collection is identified.  VESSELS: Within normal limits.  RETROPERITONEUM/LYMPH NODES: No lymphadenopathy.  ABDOMINAL WALL: Within normal limits.  BONES: Within normal limits.  IMPRESSION: No evidence of intra-abdominal abscess. Clear chest.< from: CT Chest w/ IV Cont (04.28.23 @ 09:21) >      SECONDARY PROCEDURE  Procedure: MRI head  Findings and Treatment:     Procedure: X-ray series of shunt  Findings and Treatment: IMPRESSION:  No discontinuity, kinking, or significant peritubal calcification seen< from: Xray Shuntogram  Shunt (04.28.23 @ 03:53)     PRINCIPAL PROCEDURE  Procedure: CT of chest, abdomen, and pelvis with intravenous contrast  Findings and Treatment:   < end of copied text >  FINDINGS:  CHEST:  LUNGS AND LARGE AIRWAYS: Patentcentral airways. No pulmonary nodules. No   confluent airspace opacity.  PLEURA: No pleural effusion.  VESSELS: Within normal limits.  HEART: Heart size is normal. No pericardial effusion.  MEDIASTINUM AND VERO: No lymphadenopathy.  CHEST WALL AND LOWER NECK: Mild bilateral gynecomastia.  ABDOMEN AND PELVIS:  LIVER: Within normal limits.  BILE DUCTS: Normal caliber.  GALLBLADDER: Within normal limits.  SPLEEN: Within normal limits.  PANCREAS: Within normal limits.  ADRENALS: Within normal limits.  KIDNEYS/URETERS: Within normal limits.  BLADDER: Minimally distended.  REPRODUCTIVE ORGANS: The prostate is prominent. The seminal vesicles are   symmetric.  BOWEL: No bowel obstruction. Appendix is normal. There is no mesenteric   adenopathy.  PERITONEUM: Right-sided  shunt extending along the right lateral   peritoneal cavity. No adjacent fluid collection is identified.  VESSELS: Within normal limits.  RETROPERITONEUM/LYMPH NODES: No lymphadenopathy.  ABDOMINAL WALL: Within normal limits.  BONES: Within normal limits.  IMPRESSION: No evidence of intra-abdominal abscess. Clear chest.< from: CT Chest w/ IV Cont (04.28.23 @ 09:21) >      SECONDARY PROCEDURE  Procedure: X-ray series of shunt  Findings and Treatment: IMPRESSION:  No discontinuity, kinking, or significant peritubal calcification seen< from: Xray Shuntogram  Shunt (04.28.23 @ 03:53)

## 2023-05-01 NOTE — PROGRESS NOTE ADULT - PROBLEM SELECTOR PLAN 3
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 Baez (Outpatient urologist) for further collateral at [phone]  pt does not have dysuria, hematuria, pain in the genital area, groin, lower abdomen, or lower back. however does endorse urinary frequency/nocturia

## 2023-05-01 NOTE — PROGRESS NOTE ADULT - ATTENDING COMMENTS
sensitive Kleb, switched to Rocephin  now afebrile   repeat bcx now neg to date  source likely prostatitis   might need prolong course of abx - will reach out to ID when ok to switch to po   dw neurosx if [facility]H needed - SDH stable on serial CT and no plan for shunt intervention  per neurosx ok to start pharmacologic DVT ppx

## 2023-05-01 NOTE — DISCHARGE NOTE PROVIDER - PROVIDER TOKENS
PROVIDER:[TOKEN:[[id number]],SCHEDULEDAPPT:[05/17/2023]],FREE:[LAST:[Roddy],FIRST:[Delfino],PHONE:[phone]],FAX:[(   )    -],ADDRESS:[[facility] - [address], [address] [place] [address], [place] [address]],FOLLOWUP:[2 weeks]]

## 2023-05-01 NOTE — DISCHARGE NOTE PROVIDER - NSDCMRMEDTOKEN_GEN_ALL_CORE_FT
acetaminophen 325 mg oral tablet: 2 tab(s) orally every 6 hours, As needed, Temp greater or equal to 38C (100.4F), Mild Pain (1 - 3)  atorvastatin 20 mg oral tablet: 1 tab(s) orally once a day  Farxiga 10 mg oral tablet: 1 tab(s) orally once a day  HumaLOG 100 units/mL injectable solution: 16 unit(s) injectable 3 times a day (with meals)  insulin glargine 100 units/mL subcutaneous solution: 30 unit(s) subcutaneous once a day (at bedtime)  irbesartan 300 mg oral tablet: 1 tab(s) orally once a day  levETIRAcetam 250 mg oral tablet: 1 tablet orally 2 times a day  Ozempic 2 mg/1.5 mL (0.25 mg or 0.5 mg dose) subcutaneous solution: 0.25 microcurie subcutaneous every 7 days   acetaminophen 325 mg oral tablet: 2 tab(s) orally every 6 hours, As needed, Temp greater or equal to 38C (100.4F), Mild Pain (1 - 3)  atorvastatin 20 mg oral tablet: 1 tab(s) orally once a day  Farxiga 10 mg oral tablet: 1 tab(s) orally once a day  irbesartan 300 mg oral tablet: 1 tab(s) orally once a day  levETIRAcetam 250 mg oral tablet: 1 tablet orally 2 times a day  Ozempic 2 mg/1.5 mL (0.25 mg or 0.5 mg dose) subcutaneous solution: 0.25 microcurie subcutaneous every 7 days   acetaminophen 325 mg oral tablet: 2 tab(s) orally every 6 hours, As needed, Temp greater or equal to 38C (100.4F), Mild Pain (1 - 3)  atorvastatin 20 mg oral tablet: 1 tab(s) orally once a day  ciprofloxacin 500 mg oral tablet: 1 tab(s) orally every 12 hours Please take 1 tablet, twice a day, once in the morning, once in the evening, until 5/12/2023.  Farxiga 10 mg oral tablet: 1 tab(s) orally once a day  irbesartan 300 mg oral tablet: 1 tab(s) orally once a day  levETIRAcetam 250 mg oral tablet: 1 tablet orally 2 times a day  Ozempic 2 mg/1.5 mL (0.25 mg or 0.5 mg dose) subcutaneous solution: 0.25 microcurie subcutaneous every 7 days   acetaminophen 325 mg oral tablet: 2 tab(s) orally every 6 hours, As needed, Temp greater or equal to 38C (100.4F), Mild Pain (1 - 3)  atorvastatin 20 mg oral tablet: 1 tab(s) orally once a day  ciprofloxacin 500 mg oral tablet: 1 tab(s) orally every 12 hours  Farxiga 10 mg oral tablet: 1 tab(s) orally once a day  irbesartan 300 mg oral tablet: 1 tab(s) orally once a day  levETIRAcetam 250 mg oral tablet: 1 tablet orally 2 times a day  Ozempic 2 mg/1.5 mL (0.25 mg or 0.5 mg dose) subcutaneous solution: 0.25 microcurie subcutaneous every 7 days   acetaminophen 325 mg oral tablet: 2 tab(s) orally every 6 hours, As needed, Temp greater or equal to 38C (100.4F), Mild Pain (1 - 3)  atorvastatin 20 mg oral tablet: 1 tab(s) orally once a day  ciprofloxacin 500 mg oral tablet: 1 tab(s) orally every 12 hours  Farxiga 10 mg oral tablet: 1 tab(s) orally once a day  HumaLOG 100 units/mL injectable solution: 16 unit(s) injectable 3 times a day (with meals)  insulin glargine 100 units/mL subcutaneous solution: 20 unit(s) subcutaneous once a day (at bedtime)  irbesartan 300 mg oral tablet: 1 tab(s) orally once a day  levETIRAcetam 250 mg oral tablet: 1 tablet orally 2 times a day  Ozempic 2 mg/1.5 mL (0.25 mg or 0.5 mg dose) subcutaneous solution: 0.25 microcurie subcutaneous every 7 days

## 2023-05-01 NOTE — PROGRESS NOTE ADULT - ATTENDING COMMENTS
77 M w PMHx of DM2 with Neuropathy, TIAs (previously on Aggrenox), HTN, and HLD, subdural hematoma s/p Left MMA embolization (August 2022), NPH s/p  shunt (Feb 2023) and recent prostate bx 2 days ago for hx pf prostate cancer not on treatment per family now presented today with AMS and fever  Fever, no leukocytosis  Prostate biopsy 3 days prior to arrival  Has VPS  CT Chest/CT A/P  No evidence of intra-abdominal abscess. Clear chest.  CT without collection around VPS; CT new SDH  UA neg, UCX pending  RVP neg  VPS fluid reassuring  BCX  with Kleb S CTX  Presentation likely due to GN bacteremia 2/2 prostatitis (recent biopsy)  Overall, Fever, SDH, prostate biopsy/recent UTI  - Ceftriaxone 2g q 24  - F/U BCXs  - Monitor for alternate sources  - Anticipate will send out on PO antibiotic course when DC planning    Delfino Garvin MD  Contact on TEAMS messaging from 9am - 5pm  From 5pm-9am, on weekends, or if no response call [phone]    I was physically present for the key portions of the evaluation and management service provided. I saw and examined the patient. I agree with the above history, physical, and plan except for any discrepancies which I have documented in “Attending Statements.” Please refer to “Attending Statements” for final plan.

## 2023-05-01 NOTE — DISCHARGE NOTE PROVIDER - HOSPITAL COURSE
OUTSTANDING ISSUES AT DISCHARGE  #NPH s/p VPS  [ ] FYI: Shunt CSF Sampled 4/28, monitor for any new symptoms associated with this    #Klebsiella Bacteremia  [ ] S/P Prostate Biopsy as OP 3d prior to admission.   [ ]     HOSPITAL COURSE  Mr. Guallpa is a 77M with h/o T2DM c/b Neuropathy, TIAs (previously on Aggrenox), HTN, and HLD, subdural hematoma s/p Left MMA embolization (August 2022, stable on this admission), NPH s/p  shunt (Feb 2023) and prostate biopsy 2 days prior to admission who presented with 1 month of progressive confusion/AMS but then developed high grade fever 104.9, prompting evaluation and management for sepsis. Initially, with a negative UA, concern that source may be VPS, so this was sampled; CSF studies NOT concerning for infectious process.     Admission blood cultures with Klebsiella pneumoniae, sensitive to CTX. Source ultimately considered likely from prostate biopsy as the rest of the evaluation was unremarkable. Managed with Cefepime initially and de-escalated with CTX without incident.       #Subacute/Chronic SDH  Shunt function / studies normal. SDH stable, not enlarging. No further neurosurgical intervention indicated as intpatient.   OUTSTANDING ISSUES AT DISCHARGE  #NPH s/p VPS  [ ] FYI: Shunt CSF Sampled 4/28, monitor for any new symptoms associated with this    #Klebsiella Bacteremia  [ ] S/P Prostate Biopsy as OP 3d prior to admission.   [ ] Discharge on oral ciprofloxacin to complete a 2 week course    HOSPITAL COURSE  Mr. Guallpa is a 77M with h/o T2DM c/b Neuropathy, TIAs (previously on Aggrenox), HTN, and HLD, subdural hematoma s/p Left MMA embolization (August 2022, stable on this admission), NPH s/p  shunt (Feb 2023) and prostate biopsy 2 days prior to admission who presented with 1 month of progressive confusion/AMS but then developed high grade fever 104.9, prompting evaluation and management for sepsis. Initially, with a negative UA, concern that source may be VPS, so this was sampled; CSF studies NOT concerning for infectious process.     Admission blood cultures with Klebsiella pneumoniae, sensitive to CTX. Source ultimately considered likely from prostate biopsy as the rest of the evaluation was unremarkable. Managed with Cefepime initially and de-escalated with CTX without incident.       #Subacute/Chronic SDH  Shunt function / studies normal. SDH stable, not enlarging. No further neurosurgical intervention indicated as intpatient.   OUTSTANDING ISSUES AT DISCHARGE  #NPH s/p VPS  [ ] FYI: Shunt CSF Sampled 4/28, monitor for any new symptoms associated with this    #Subacute/Chronic SDH  Shunt function / studies normal. SDH stable, not enlarging. No further neurosurgical intervention indicated as in-patient.      #Klebsiella Bacteremia  [ ] S/P Prostate Biopsy as OP 3d prior to admission.   [ ] Discharge on oral ciprofloxacin to complete a 2 week course          HOSPITAL COURSE  Mr. Guallpa is a 77M with h/o T2DM c/b Neuropathy, TIAs (previously on Aggrenox), HTN, and HLD, subdural hematoma s/p Left MMA embolization (August 2022, stable on this admission), NPH s/p  shunt (Feb 2023) and prostate biopsy 2 days prior to admission who presented with 1 month of progressive confusion/AMS but then developed high grade fever 104.9, prompting evaluation and management for sepsis. Initially, with a negative UA, concern that source may be VPS, so this was sampled; CSF studies NOT concerning for infectious process.     Admission blood cultures with Klebsiella pneumoniae, sensitive to CTX. Source ultimately considered likely from prostate biopsy as the rest of the evaluation was unremarkable. Managed with Cefepime initially and de-escalated with CTX without incident. Infectious diseases physician also following the patient. Recommended to discharge the patient on ciprofloxacin po tablet to complete two week course. Infectious Diseases department had lower suspicion for true prostatitis given patient UA was clean and CT a/p on admission was negative for prostatitis.      OUTSTANDING ISSUES AT DISCHARGE  #NPH s/p VPS  [ ] FYI: Shunt CSF Sampled 4/28, monitor for any new symptoms associated with this    #Subacute/Chronic SDH  Shunt function / studies normal. SDH stable, not enlarging. No further neurosurgical intervention indicated as in-patient.      #Klebsiella Bacteremia  [ ] S/P Prostate Biopsy as OP 3d prior to admission.   [ ] Discharge on oral ciprofloxacin to complete a 2 week course          HOSPITAL COURSE  Mr. Guallpa is a 77M with h/o T2DM c/b Neuropathy, TIAs (previously on Aggrenox), HTN, and HLD, subdural hematoma s/p Left MMA embolization (August 2022, stable on this admission), NPH s/p  shunt (Feb 2023) and prostate biopsy 2 days prior to admission who presented with 1 month of progressive confusion/AMS but then developed high grade fever 104.9, prompting evaluation and management for sepsis. Initially, with a negative UA, concern that source possibly was VPS, so this was sampled; CSF studies NOT concerning for infectious process. Patient seen and evaluated by neurosurgery team for the VPS sampling. They recommend patient follow up with Neurosurgeon: Dr. Isabel for stability CTH in 2 weeks after discharge.     Admission blood cultures with Klebsiella pneumoniae, sensitive to CTX. Source ultimately considered likely from prostate biopsy as the rest of the evaluation was unremarkable. Managed with Cefepime initially and de-escalated with CTX without incident. Infectious diseases physician also following the patient. Recommended to discharge the patient on ciprofloxacin po tablet to complete two week course. Patient will be discharged home with home PT on Ciprofloxacin 500mg q 12 with end-date of 5/12/23. Patient also should follow up with Dr. Jamal Isabel in 2 weeks as per Neurosurgery recommendations.      OUTSTANDING ISSUES AT DISCHARGE  #NPH s/p VPS  [ ] FYI: Shunt CSF Sampled 4/28, monitor for any new symptoms associated with this    #Subacute/Chronic SDH  Shunt function / studies normal. SDH stable, not enlarging. No further neurosurgical intervention indicated as in-patient.  [ ] F/u with Dr. Jamal Isabel (Neurosurgery) on 5/17/2023: Appointment scheduled     #Klebsiella Bacteremia  [ ] S/P Prostate Biopsy as OP 3d prior to admission.   [ ] Discharge on oral ciprofloxacin to complete a 2 week course      HOSPITAL COURSE  Mr. Guallpa is a 77M with h/o T2DM c/b Neuropathy, TIAs (previously on Aggrenox), HTN, and HLD, subdural hematoma s/p Left MMA embolization (August 2022, stable on this admission), NPH s/p  shunt (Feb 2023) and prostate biopsy 2 days prior to admission who presented with 1 month of progressive confusion/AMS but then developed high grade fever 104.9, prompting evaluation and management for sepsis. Initially, with a negative UA, concern that source possibly was VPS, so this was sampled; CSF studies NOT concerning for infectious process. Patient seen and evaluated by neurosurgery team for the VPS sampling. They recommend patient follow up with Neurosurgeon: Dr. Isabel for stability CTH in 2 weeks after discharge.     Admission blood cultures with Klebsiella pneumoniae, sensitive to CTX. Source ultimately considered likely from prostate biopsy as the rest of the evaluation was unremarkable. Managed with Cefepime initially and de-escalated with CTX without incident. Infectious diseases physician also following the patient. Recommended to discharge the patient on ciprofloxacin po tablet to complete two week course. Patient will be discharged home with home PT on Ciprofloxacin 500mg q 12 with end-date of 5/12/23. Patient also should follow up with Dr. Jamal Isabel (Appointment: 5/17/2023) as per Neurosurgery recommendations.      OUTSTANDING ISSUES AT DISCHARGE  #NPH s/p VPS  [ ] FYI: Shunt CSF Sampled 4/28, monitor for any new symptoms associated with this    #Subacute/Chronic SDH  Shunt function / studies normal. SDH stable, not enlarging. No further neurosurgical intervention indicated as in-patient.  [ ] F/u with Dr. Jamal Isabel (Neurosurgery) on 5/17/2023: Appointment scheduled     #Klebsiella Bacteremia  [ ] S/P Prostate Biopsy as OP 3d prior to admission.   [ ] Discharge on oral ciprofloxacin to complete a 2 week course    #Prostate Cancer, history  [ ] Follow up with Urologist: Dr. Delfino Pereyra: [phone] to go over prostate biopsy results and/or next steps.      HOSPITAL COURSE  Mr. Guallpa is a 77M with h/o T2DM c/b Neuropathy, TIAs (previously on Aggrenox), HTN, and HLD, subdural hematoma s/p Left MMA embolization (August 2022, stable on this admission), NPH s/p  shunt (Feb 2023) and prostate biopsy 2 days prior to admission who presented with 1 month of progressive confusion/AMS but then developed high grade fever 104.9, prompting evaluation and management for sepsis. Initially, with a negative UA, concern that source possibly was VPS, so this was sampled; CSF studies NOT concerning for infectious process. Patient seen and evaluated by neurosurgery team for the VPS sampling. They recommend patient follow up with Neurosurgeon: Dr. Isabel for stability CTH in 2 weeks after discharge.     Admission blood cultures with Klebsiella pneumoniae, sensitive to CTX. Source ultimately considered likely from prostate biopsy as the rest of the evaluation was unremarkable. Managed with Cefepime initially and de-escalated with CTX without incident. Infectious diseases physician also following the patient. Recommended to discharge the patient on ciprofloxacin po tablet to complete two week course. Patient will be discharged home with home PT on Ciprofloxacin 500mg q 12 with end-date of 5/12/23. Patient also should follow up with Dr. Jamal Isabel (Appointment: 5/17/2023) as per Neurosurgery recommendations.      OUTSTANDING ISSUES AT DISCHARGE  #NPH s/p VPS  [ ] FYI: Shunt CSF Sampled 4/28, monitor for any new symptoms associated with this    #Subacute/Chronic SDH  Shunt function / studies normal. SDH stable, not enlarging. No further neurosurgical intervention indicated as in-patient.  [ ] F/u with Dr. Jamal Isabel (Neurosurgery) on 5/17/2023: Appointment scheduled     #Klebsiella Bacteremia  [ ] S/P Prostate Biopsy as OP 3d prior to admission.   [ ] Discharge on oral ciprofloxacin to complete a 2 week course    #Prostate Cancer, history  [ ] Follow up with Urologist: Dr. Delfino Pereyra: [phone] to go over prostate biopsy results and/or next steps.      HOSPITAL COURSE  Mr. Guallpa is a 77M with h/o T2DM c/b Neuropathy, TIAs (previously on Aggrenox), HTN, and HLD, subdural hematoma s/p Left MMA embolization (August 2022, stable on this admission), NPH s/p  shunt (Feb 2023) and prostate biopsy 2 days prior to admission who presented with 1 month of progressive confusion/AMS but then developed high grade fever 104.9, prompting evaluation and management for sepsis. Initially, with a negative UA, concern that source possibly was VPS, so this was sampled; CSF studies NOT concerning for infectious process; NSGY obtained shunt sample, which was unremarkable (not c/w infection). Blood cultures with Klebsiella pneumoniae, sensitive to CTX. Source ultimately considered likely from prostate biopsy as the rest of the evaluation was unremarkable. Managed with Cefepime initially and de-escalated with CTX without incident. Infectious diseases physician also following the patient. Recommended to discharge the patient on Levofloxacin PO tablet to complete two week course. Patient will be discharged home with home PT on Ciprofloxacin 500mg q 12 with end-date of 5/12/23. Patient also should follow up with Dr. Jamal Isabel (Appointment: 5/17/2023) as per Neurosurgery recommendations.     They recommend patient follow up with Neurosurgeon: Dr. Isabel for stability CTH in 2 weeks after discharge.        OUTSTANDING ISSUES AT DISCHARGE:  #NPH s/p VPS  [ ] FYI: Shunt CSF Sampled 4/28, monitor for any new symptoms associated with this    #Subacute/Chronic SDH  Shunt function / studies normal. SDH stable, not enlarging. No further neurosurgical intervention indicated as in-patient.  [ ] F/u with Dr. Jamal Isabel (Neurosurgery) on 5/17/2023: Appointment scheduled     #Klebsiella Bacteremia  [ ] S/P Prostate Biopsy as OP 3d prior to admission.   [ ] Discharge on oral ciprofloxacin to complete a 2 week course (Ciprofloxacin 500 mg q12 with end date of 5/12)    #Prostate Cancer, history  [ ] Follow up with Urologist: Dr. Delfino Pereyra: [phone] to go over prostate biopsy results and/or next steps.      HOSPITAL COURSE  Mr. Guallpa is a 77M with h/o T2DM c/b Neuropathy, TIAs (previously on Aggrenox), HTN, and HLD, subdural hematoma s/p Left MMA embolization (August 2022, stable on this admission), NPH s/p  shunt (Feb 2023) and prostate biopsy 2 days prior to admission who presented with 1 month of progressive confusion/AMS but then developed high grade fever 104.9, prompting evaluation and management for sepsis. Initially, with a negative UA, concern that source possibly was VPS, so this was sampled; CSF studies NOT concerning for infectious process; NSGY obtained shunt sample, which was unremarkable (not c/w infection). Blood cultures with Klebsiella pneumoniae, sensitive to CTX. Source ultimately considered likely from prostate biopsy as the rest of the evaluation was unremarkable. Managed with Cefepime initially and de-escalated with CTX without incident. Infectious diseases physician also following the patient. Recommended to discharge the patient on Levofloxacin PO tablet to complete two week course. Patient will be discharged home with home PT on Ciprofloxacin 500mg q 12 with end-date of 5/12/23. Patient also should follow up with Dr. Jamal Isabel (Appointment: 5/17/2023) as per Neurosurgery recommendations.     They recommend patient follow up with Neurosurgeon: Dr. Isabel for stability CTH in 2 weeks after discharge.        OUTSTANDING ISSUES AT DISCHARGE:  #NPH s/p VPS  [ ] FYI: Shunt CSF Sampled 4/28, monitor for any new symptoms associated with this    #Subacute/Chronic SDH  Shunt function / studies normal. SDH stable, not enlarging. No further neurosurgical intervention indicated as in-patient.  [ ] F/u with Dr. Jamal Isabel (Neurosurgery) on 5/17/2023: Appointment scheduled     #Klebsiella Bacteremia  [ ] S/P Prostate Biopsy as OP 3d prior to admission.  [ ] Discharge on oral ciprofloxacin to complete a 2 week course (Ciprofloxacin 500 mg q12 with end date of 5/12)    #Prostate Cancer, history  [ ] Follow up with Urologist: Dr. Delfino Pereyra: [phone] to go over prostate biopsy results and/or next steps.      HOSPITAL COURSE  Mr. Guallpa is a 77M with h/o T2DM c/b Neuropathy, TIAs (previously on Aggrenox), HTN, and HLD, subdural hematoma s/p Left MMA embolization (August 2022, stable on this admission), NPH s/p  shunt (Feb 2023) and prostate biopsy 2 days prior to admission who presented with 1 month of progressive confusion/AMS but then developed high grade fever 104.9, prompting evaluation and management for sepsis. Initially, with a negative UA, concern that source possibly was VPS, so this was sampled; CSF studies NOT concerning for infectious process; NSGY obtained shunt sample, which was unremarkable (not c/w infection). Blood cultures with Klebsiella pneumoniae, sensitive to CTX. Source ultimately considered likely from prostate biopsy as the rest of the evaluation was unremarkable. Managed with Cefepime initially and de-escalated with CTX without incident. Infectious diseases physician also following the patient. Recommended to discharge the patient on Levofloxacin PO tablet to complete two week course. Patient will be discharged home with home PT on Ciprofloxacin 500mg q 12 with end-date of 5/12/23. Patient also should follow up with Dr. Jamal Isabel (Appointment: 5/17/2023) as per Neurosurgery recommendations.     They recommend patient follow up with Neurosurgeon: Dr. Isabel for stability CTH in 2 weeks after discharge.        OUTSTANDING ISSUES AT DISCHARGE:  #NPH s/p VPS  [ ] FYI: Shunt CSF Sampled 4/28, monitor for any new symptoms associated with this    #Subacute/Chronic SDH  Shunt function / studies normal. SDH stable, not enlarging. No further neurosurgical intervention indicated as in-patient.  [ ] F/u with Dr. Jamal Isabel (Neurosurgery) on 5/17/2023: Appointment scheduled     #Klebsiella Bacteremia  [ ] S/P Prostate Biopsy as OP 3d prior to admission.  [ ] Discharge on oral ciprofloxacin to complete a 2 week course (Ciprofloxacin 500 mg q12 with end date of 5/12)    #Prostate Cancer, history  [ ] Follow up with Urologist: Dr. Delfino Pereyra: [phone] to go over prostate biopsy results and/or next steps.      HOSPITAL COURSE  Mr. Guallpa is a 77M with h/o T2DM c/b Neuropathy, TIAs (previously on Aggrenox), HTN, and HLD, subdural hematoma s/p Left MMA embolization (August 2022, stable on this admission), NPH s/p  shunt (Feb 2023) and prostate biopsy 2 days prior to admission who presented with 1 month of progressive confusion/AMS but then developed high grade fever 104.9, prompting evaluation and management for sepsis. Initially, with a negative UA, concern that source possibly was VPS, so this was sampled; CSF studies NOT concerning for infectious process; NSGY obtained shunt sample, which was unremarkable (not c/w infection). Blood cultures with Klebsiella pneumoniae, sensitive to CTX. Source ultimately considered likely from prostate biopsy as the rest of the evaluation was unremarkable. Managed with Cefepime initially and de-escalated with CTX without incident. Infectious diseases physician also following the patient. Recommended to discharge the patient on Levofloxacin PO tablet to complete two week course. Patient will be discharged home with home PT on Ciprofloxacin 500mg q 12 with end-date of 5/12/23. Patient also should follow up with Dr. Jamal Isabel (Appointment: 5/17/2023) as per Neurosurgery recommendations.     They recommend patient follow up with Neurosurgeon: Dr. Isabel for stability CTH in 2 weeks after discharge.     Upon discharge patient is hemodynamically and medically optimized for discharge.      OUTSTANDING ISSUES AT DISCHARGE:  #NPH s/p VPS  [ ] FYI: Shunt CSF Sampled 4/28, monitor for any new symptoms associated with this    #Subacute/Chronic SDH  Shunt function / studies normal. SDH stable, not enlarging. No further neurosurgical intervention indicated as in-patient.  [ ] F/u with Dr. Jamal Isabel (Neurosurgery) on 5/17/2023: Appointment scheduled     #Klebsiella Bacteremia  [ ] S/P Prostate Biopsy as OP 3d prior to admission.  [ ] Discharge on oral ciprofloxacin to complete a 2 week course (Ciprofloxacin 500 mg q12 with end date of 5/12)    #Prostate Cancer, history  [ ] Follow up with Urologist: Dr. Delfino Pereyra: [phone] to go over prostate biopsy results and/or next steps.      HOSPITAL COURSE  Mr. Guallpa is a 77M with h/o T2DM c/b Neuropathy, TIAs (previously on Aggrenox), HTN, and HLD, subdural hematoma s/p Left MMA embolization (August 2022, stable on this admission), NPH s/p  shunt (Feb 2023) and prostate biopsy 2 days prior to admission who presented with 1 month of progressive confusion/AMS but then developed high grade fever 104.9, prompting evaluation and management for sepsis. Initially, with a negative UA, concern that source possibly was VPS, so this was sampled; CSF studies NOT concerning for infectious process; NSGY obtained shunt sample, which was unremarkable (not c/w infection). Blood cultures with Klebsiella pneumoniae, sensitive to CTX. Source ultimately considered likely from prostate biopsy as the rest of the evaluation was unremarkable. Managed with Cefepime initially and de-escalated with CTX without incident. Infectious diseases physician also following the patient. Recommended to discharge the patient on Levofloxacin PO tablet to complete two week course. Patient will be discharged home with home PT on Ciprofloxacin 500mg q 12 with end-date of 5/12/23. Patient also should follow up with Dr. Jamal Isabel (Appointment: 5/17/2023) as per Neurosurgery recommendations.     They recommend patient follow up with Neurosurgeon: Dr. Isabel for stability CTH in 2 weeks after discharge.     Upon discharge patient is hemodynamically and medically optimized for discharge.       DC Dx:  #Transient K pneumonia bacteremia 2' prostate bx  #DM2 w/ neuropathy  #TIA  #HTN  #HLD  #SDH s/p L MMA embolization  #NPH s/p  shunt

## 2023-05-01 NOTE — PROGRESS NOTE ADULT - PROBLEM SELECTOR PLAN 1
- presented SIRS+ : Tachycardic to 137, Febrile to 40.5, unknown source as of now  - UA bland, however follow up urine culture given recent prostate biopsy 2 days ago and family reporting pt has hx of previous post bx prostatitis  - Antibiotic treatment: c/w merepenem (s/p1x  Vanc ON at [facility])  - Follow up blood cultures  - no leukocytosis  - fever improved after iv ofirmev and antibiotics, will ctm - presented SIRS+ : Tachycardic to 137, Febrile to 40.5, unknown source as of now  - UA bland, however follow up urine culture given recent prostate biopsy 2 days ago and family reporting pt has hx of previous post bx prostatitis  - Antibiotic treatment: s/p merepenem (s/p1x  Vanc ON at [facility])  - Per ID recs on 4/30: switch patient to ceftriaxone IV  - Follow up blood cultures  - no leukocytosis  - fever improved after iv ofirmev and antibiotics, will ctm

## 2023-05-01 NOTE — DISCHARGE NOTE PROVIDER - CARE PROVIDER_API CALL
Jamal Isabel (MD; MS)  Unallocated  [address] [address]  Phone: [phone]  Fax: [phone]  Scheduled Appointment: 05/17/2023    Delfino Pereyra  [facility] - [address], [address], [place] [id number]  Phone: [phone]  Fax: (   )    -  Follow Up Time: 2 weeks

## 2023-05-01 NOTE — PROGRESS NOTE ADULT - ASSESSMENT
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 CT chest/a/p with con negative for source of infection and CTH with new right frontal subdural hematoma. ID consulted for fever workup. Pt found to have kleb bacteremia.  shunt tap negative    WORKUP  CT Chest, Abdomen and Pelvis w/ IV Cont (04.28): The prostate is prominent. No evidence of intra-abdominal abscess. Clear chest.  Right-sided  shunt. No adjacent fluid collection is identified.  CT Head No Cont (04.28)): Mixed density right frontal subdural hematoma, new since prior study.  UA and RVP: negative  Blood cx (4/28): Klebsiella Pneumonia -> negative 4/29  LP performed - 0 nucleated cells, CSF PCR negative, Elevated glucose, low protein, CSF culture with no growth     DIAGNOSIS and IMPRESSION  ·Fevers  ·Right frontal Subdural hematoma  ·Klebsiella bacteremia    Fevers up to 104.9 w/o leukocytosis, 102.1 F past 24 hours  s/p vanc x 1 and Zosyn x 2 on 4/28  Recent Prostate biopsy 3 days back for past hx of prostate CA  UA, RVP and CT Chest a/p with con negative for source of fever  Family reports giving him merari-prostatic biopsy abx   lower suspicion for CNS/ shunt infection or central fevers from SDH  Was persistently febrile on 4/29: Need to consider Prostatitis vs Just BSI from biopsy given duration of therapy is different    RECOMMENDATIONS  c/w CTX for now  Follow fever curve and WBC count      PRELIM NOTE    Gerardo Loyola MD, PGY5   ID fellow  [employer] Teams Preferred  After 5pm/weekends call [phone]     Mr Guallpa is a 77 M w PMHx of DM2 with Neuropathy, TIAs (previously on Aggrenox), HTN, and HLD, subdural hematoma s/p Left MMA embolization (August 2022), NPH s/p  shunt (Feb 2023) and recent prostate bx 2 days ago for hx pf prostate cancer not on treatment per family now presented today with AMS and fever. CT chest/a/p with con negative for source of infection and CTH with new right frontal subdural hematoma. ID consulted for fever workup. Pt found to have kleb bacteremia.  shunt tap negative    WORKUP  CT Chest, Abdomen and Pelvis w/ IV Cont (04.28): The prostate is prominent. No evidence of intra-abdominal abscess. Clear chest.  Right-sided  shunt. No adjacent fluid collection is identified.  CT Head No Cont (04.28)): Mixed density right frontal subdural hematoma, new since prior study.  UA and RVP: negative  Blood cx (4/28): Klebsiella Pneumonia -> negative 4/29  LP performed - 0 nucleated cells, CSF PCR negative, Elevated glucose, low protein, CSF culture with no growth     DIAGNOSIS and IMPRESSION  ·Fevers  ·Right frontal Subdural hematoma  ·Klebsiella bacteremia    Fevers up to 104.9 w/o leukocytosis, 102.1 F past 24 hours  s/p vanc x 1 and Zosyn x 2 on 4/28  Recent Prostate biopsy 3 days back for past hx of prostate CA  UA, RVP and CT Chest a/p with con negative for source of fever  Family reports giving him merari-prostatic biopsy abx   lower suspicion for CNS/ shunt infection or central fevers from SDH    RECOMMENDATIONS  c/w CTX for now  Follow fever curve and WBC count  Will plan for 2 week course of therapy -> Can likely discharge on PO Levaquin when stable    Pt seen and examined. Case d/w attending       Gerardo Loyola MD, PGY5   ID fellow  [employer] Teams Preferred  After 5pm/weekends call [phone]

## 2023-05-01 NOTE — PROGRESS NOTE ADULT - SUBJECTIVE AND OBJECTIVE BOX
Follow Up:      Interval History/ROS:Patient is a 77y old  Male who presents with a chief complaint of AMS, fever (29 Apr 2023 07:44)      REVIEW OF SYSTEMS  [  ] ROS unobtainable because:    [ x ] All other systems negative except as noted below    Constitutional:  [ ] fever [ ] chills  [ ] weight loss  [ ]night sweat  [ ]poor appetite/PO intake [ ]fatigue   Skin:  [ ] rash [ ] phlebitis	  Eyes: [ ] icterus [ ] pain  [ ] discharge	  ENMT: [ ] sore throat  [ ] thrush [ ] ulcers [ ] exudates [ ]anosmia  Respiratory: [ ] dyspnea [ ] hemoptysis [ ] cough [ ] sputum	  Cardiovascular:  [ ] chest pain [ ] palpitations [ ] edema	  Gastrointestinal:  [ ] nausea [ ] vomiting [ ] diarrhea [ ] constipation [ ] pain	  Genitourinary:  [ ] dysuria [ ] frequency [ ] hematuria [ ] discharge [ ] flank pain  [ ] incontinence  Musculoskeletal:  [ ] myalgias [ ] arthralgias [ ] arthritis  [ ] back pain  Neurological:  [ ] headache [ ] weakness [ ] seizures  [ ] confusion/altered mental status    Allergies  No Known Allergies        ANTIMICROBIALS:    cefTRIAXone   IVPB 2000 every 24 hours      ANTIMICROBIALS (past 90 days):  MEDICATIONS  (STANDING):  cefepime   IVPB   100 mL/Hr IV Intermittent (04-29-23 @ 18:07)   100 mL/Hr IV Intermittent (04-29-23 @ 07:00)   100 mL/Hr IV Intermittent (04-28-23 @ 18:33)    cefepime   IVPB   100 mL/Hr IV Intermittent (05-01-23 @ 01:51)   100 mL/Hr IV Intermittent (04-30-23 @ 13:24)    meropenem  IVPB   100 mL/Hr IV Intermittent (04-30-23 @ 06:05)   100 mL/Hr IV Intermittent (04-29-23 @ 21:40)    piperacillin/tazobactam IVPB.   200 mL/Hr IV Intermittent (04-28-23 @ 11:24)    piperacillin/tazobactam IVPB...   200 mL/Hr IV Intermittent (04-28-23 @ 02:23)    vancomycin  IVPB   250 mL/Hr IV Intermittent (04-28-23 @ 18:16)    vancomycin  IVPB   250 mL/Hr IV Intermittent (04-29-23 @ 21:39)    vancomycin  IVPB.   250 mL/Hr IV Intermittent (04-28-23 @ 03:36)        OTHER MEDS: MEDICATIONS  (STANDING):  atorvastatin 20 at bedtime  dextrose 50% Injectable 25 once  dextrose 50% Injectable 12.5 once  dextrose 50% Injectable 25 once  dextrose Oral Gel 15 once PRN  enoxaparin Injectable 40 every 24 hours  glucagon  Injectable 1 once  insulin glargine Injectable (LANTUS) 20 at bedtime  insulin lispro (ADMELOG) corrective regimen sliding scale  three times a day before meals  insulin lispro (ADMELOG) corrective regimen sliding scale  at bedtime  insulin lispro Injectable (ADMELOG) 7 three times a day before meals  levETIRAcetam 250 two times a day      Vital Signs Last 24 Hrs  T(F): 98.1 (05-01-23 @ 05:34), Max: 104.9 (04-28-23 @ 01:46)    Vital Signs Last 24 Hrs  HR: 89 (05-01-23 @ 05:34) (87 - 95)  BP: 151/89 (05-01-23 @ 05:34) (138/86 - 156/92)  RR: 16 (05-01-23 @ 05:34)  SpO2: 99% (05-01-23 @ 05:34) (98% - 99%)  Wt(kg): --    EXAM:    GA: NAD  HEENT: oral cavity no lesion  CV: nl S1/S2, no RMG  Lungs: CTAB, no distress  Abd: BS+, soft, nontender, no rebounding pain  Ext: no edema  Neuro: No focal deficits  Skin: Intact  IV: no phlebitis    Labs:                        15.0   12.20 )-----------( 153      ( 30 Apr 2023 06:09 )             45.3     04-30    137  |  103  |  15  ----------------------------<  128<H>  3.7   |  22  |  0.83    Ca    8.5      30 Apr 2023 06:09  Phos  3.0     04-30  Mg     2.00     04-30    TPro  6.6  /  Alb  3.4  /  TBili  0.7  /  DBili  x   /  AST  21  /  ALT  15  /  AlkPhos  68  04-30      WBC Trend:  WBC Count: 12.20 (04-30-23 @ 06:09)  WBC Count: 12.64 (04-29-23 @ 18:57)  WBC Count: 12.41 (04-29-23 @ 06:00)  WBC Count: 10.17 (04-28-23 @ 01:55)      Creatine Trend:  Creatinine, Serum: 0.83 (04-30)  Creatinine, Serum: 1.27 (04-29)  Creatinine, Serum: 1.02 (04-29)  Creatinine, Serum: 1.19 (04-28)      Liver Biochemical Testing Trend:  Alanine Aminotransferase (ALT/SGPT): 15 (04-30)  Alanine Aminotransferase (ALT/SGPT): 17 (04-29)  Alanine Aminotransferase (ALT/SGPT): 16 (04-29)  Alanine Aminotransferase (ALT/SGPT): 24 (04-28)  Alanine Aminotransferase (ALT/SGPT): 28 (02-06)  Aspartate Aminotransferase (AST/SGOT): 21 (04-30-23 @ 06:09)  Aspartate Aminotransferase (AST/SGOT): 23 (04-29-23 @ 18:57)  Aspartate Aminotransferase (AST/SGOT): 18 (04-29-23 @ 06:00)  Aspartate Aminotransferase (AST/SGOT): 23 (04-28-23 @ 01:55)  Aspartate Aminotransferase (AST/SGOT): 19 (02-06-23 @ 19:28)  Bilirubin Total, Serum: 0.7 (04-30)  Bilirubin Total, Serum: 0.6 (04-29)  Bilirubin Total, Serum: 0.9 (04-29)  Bilirubin Total, Serum: 0.6 (04-28)  Bilirubin Total, Serum: 0.6 (02-06)           MICROBIOLOGY:    MRSA PCR Result.: NotDetec (02-12-23 @ 06:39)      Culture - Blood (collected 29 Apr 2023 18:47)  Source: .Blood Blood-Peripheral  Preliminary Report:    No growth to date.    Culture - Blood (collected 29 Apr 2023 18:42)  Source: .Blood Blood-Peripheral  Preliminary Report:    No growth to date.    Culture - CSF with Gram Stain (collected 28 Apr 2023 19:06)  Source: .CSF CSF  Preliminary Report:    No growth    Culture - Urine (collected 28 Apr 2023 03:15)  Source: Clean Catch Clean Catch (Midstream)  Final Report:    No growth    Culture - Blood (collected 28 Apr 2023 01:45)  Source: .Blood Blood-Peripheral  Final Report:    Growth in aerobic and anaerobic bottles: Klebsiella pneumoniae    See previous culture [id number]    Culture - Blood (collected 28 Apr 2023 01:35)  Source: .Blood Blood-Peripheral  Final Report:    Growth in aerobic and anaerobic bottles: Klebsiella pneumoniae    ***Blood Panel PCR results on this specimen are available    approximately 3 hours after the Gram stain result.***    Gram stain, PCR, and/or culture results may not always    correspond dueto difference in methodologies.    ************************************************************    This PCR assay was performed by multiplex PCR. This    Assay tests for 66 bacterial and resistance gene targets.    Please refer to the [facility] test directory    at [email or web address] for details.  Organism: Blood Culture PCR  Klebsiella pneumoniae  Organism: Klebsiella pneumoniae    Sensitivities:      Method Type: ANTHONY      -  Amikacin: S <=16      -  Ampicillin: R >16 These ampicillin results predict results for amoxicillin      -  Ampicillin/Sulbactam: S <=4/2 Enterobacter, Klebsiella aerogenes, Citrobacter, and Serratia may develop resistance during prolonged therapy (3-4 days)      -  Aztreonam: S <=4      -  Cefazolin: S <=2 Enterobacter, Klebsiella aerogenes, Citrobacter, and Serratia may develop resistance during prolonged therapy (3-4 days)      -  Cefepime: S <=2      -  Cefoxitin: S <=8      -  Ceftriaxone: S <=1 Enterobacter, Klebsiella aerogenes, Citrobacter, and Serratia may develop resistance during prolonged therapy      -  Ciprofloxacin: S <=0.25      -  Ertapenem: S <=0.5      -  Gentamicin: S <=2      -  Imipenem: S <=1      -  Levofloxacin: S <=0.5      -  Meropenem: S <=1      -  Piperacillin/Tazobactam: S <=8      -  Tobramycin: S <=2      -  Trimethoprim/Sulfamethoxazole: R >2/38  Organism: Blood Culture PCR    Sensitivities:      Method Type: PCR      -  K. pneumoniae group: Detec (K. pneumoniae, K. quasipneumoniae, K. variicola)    Culture - Acid Fast - CSF (collected 30 Nov 2022 10:20)  Source: .CSF  Final Report:    No acid-fast bacilli isolated after 6 weeks.    Culture - CSF with Gram Stain (collected 30 Nov 2022 10:20)  Source: .CSF  Final Report:    No growth    Rapid RVP Result: NotDetec (04-28 @ 01:55)  Sedimentation Rate, Erythrocyte: 20 mm/hr (08-04-22 @ 18:30)    RADIOLOGY:  imaging below personally reviewed       Follow Up:  bacteremia    Interval History/ROS: Patient is aafebrile. Still confused. possibly from SDH.       REVIEW OF SYSTEMS  [  ] ROS unobtainable because:    [ x ] All other systems negative except as noted below    Constitutional:  [ ] fever [ ] chills  [ ] weight loss  [ ]night sweat  [ ]poor appetite/PO intake [ ]fatigue   Skin:  [ ] rash [ ] phlebitis	  Eyes: [ ] icterus [ ] pain  [ ] discharge	  ENMT: [ ] sore throat  [ ] thrush [ ] ulcers [ ] exudates [ ]anosmia  Respiratory: [ ] dyspnea [ ] hemoptysis [ ] cough [ ] sputum	  Cardiovascular:  [ ] chest pain [ ] palpitations [ ] edema	  Gastrointestinal:  [ ] nausea [ ] vomiting [ ] diarrhea [ ] constipation [ ] pain	  Genitourinary:  [ ] dysuria [ ] frequency [ ] hematuria [ ] discharge [ ] flank pain  [ ] incontinence  Musculoskeletal:  [ ] myalgias [ ] arthralgias [ ] arthritis  [ ] back pain  Neurological:  [ ] headache [ ] weakness [ ] seizures  [ ] confusion/altered mental status    Allergies  No Known Allergies        ANTIMICROBIALS:    cefTRIAXone   IVPB 2000 every 24 hours      ANTIMICROBIALS (past 90 days):  MEDICATIONS  (STANDING):  cefepime   IVPB   100 mL/Hr IV Intermittent (04-29-23 @ 18:07)   100 mL/Hr IV Intermittent (04-29-23 @ 07:00)   100 mL/Hr IV Intermittent (04-28-23 @ 18:33)    cefepime   IVPB   100 mL/Hr IV Intermittent (05-01-23 @ 01:51)   100 mL/Hr IV Intermittent (04-30-23 @ 13:24)    meropenem  IVPB   100 mL/Hr IV Intermittent (04-30-23 @ 06:05)   100 mL/Hr IV Intermittent (04-29-23 @ 21:40)    piperacillin/tazobactam IVPB.   200 mL/Hr IV Intermittent (04-28-23 @ 11:24)    piperacillin/tazobactam IVPB...   200 mL/Hr IV Intermittent (04-28-23 @ 02:23)    vancomycin  IVPB   250 mL/Hr IV Intermittent (04-28-23 @ 18:16)    vancomycin  IVPB   250 mL/Hr IV Intermittent (04-29-23 @ 21:39)    vancomycin  IVPB.   250 mL/Hr IV Intermittent (04-28-23 @ 03:36)        OTHER MEDS: MEDICATIONS  (STANDING):  atorvastatin 20 at bedtime  dextrose 50% Injectable 25 once  dextrose 50% Injectable 12.5 once  dextrose 50% Injectable 25 once  dextrose Oral Gel 15 once PRN  enoxaparin Injectable 40 every 24 hours  glucagon  Injectable 1 once  insulin glargine Injectable (LANTUS) 20 at bedtime  insulin lispro (ADMELOG) corrective regimen sliding scale  three times a day before meals  insulin lispro (ADMELOG) corrective regimen sliding scale  at bedtime  insulin lispro Injectable (ADMELOG) 7 three times a day before meals  levETIRAcetam 250 two times a day      Vital Signs Last 24 Hrs  T(F): 98.1 (05-01-23 @ 05:34), Max: 104.9 (04-28-23 @ 01:46)    Vital Signs Last 24 Hrs  HR: 89 (05-01-23 @ 05:34) (87 - 95)  BP: 151/89 (05-01-23 @ 05:34) (138/86 - 156/92)  RR: 16 (05-01-23 @ 05:34)  SpO2: 99% (05-01-23 @ 05:34) (98% - 99%)  Wt(kg): --    EXAM:    GA: NAD, AOx1  HEENT: oral cavity no lesion  CV: nl S1/S2, no RMG  Lungs: CTAB, no distress  Abd: BS+, soft, nontender, no rebounding pain  Ext: no edema  Neuro: No focal deficits  Skin: Intact  IV: no phlebitis    Labs:                        15.0   12.20 )-----------( 153      ( 30 Apr 2023 06:09 )             45.3     04-30    137  |  103  |  15  ----------------------------<  128<H>  3.7   |  22  |  0.83    Ca    8.5      30 Apr 2023 06:09  Phos  3.0     04-30  Mg     2.00     04-30    TPro  6.6  /  Alb  3.4  /  TBili  0.7  /  DBili  x   /  AST  21  /  ALT  15  /  AlkPhos  68  04-30      WBC Trend:  WBC Count: 12.20 (04-30-23 @ 06:09)  WBC Count: 12.64 (04-29-23 @ 18:57)  WBC Count: 12.41 (04-29-23 @ 06:00)  WBC Count: 10.17 (04-28-23 @ 01:55)      Creatine Trend:  Creatinine, Serum: 0.83 (04-30)  Creatinine, Serum: 1.27 (04-29)  Creatinine, Serum: 1.02 (04-29)  Creatinine, Serum: 1.19 (04-28)      Liver Biochemical Testing Trend:  Alanine Aminotransferase (ALT/SGPT): 15 (04-30)  Alanine Aminotransferase (ALT/SGPT): 17 (04-29)  Alanine Aminotransferase (ALT/SGPT): 16 (04-29)  Alanine Aminotransferase (ALT/SGPT): 24 (04-28)  Alanine Aminotransferase (ALT/SGPT): 28 (02-06)  Aspartate Aminotransferase (AST/SGOT): 21 (04-30-23 @ 06:09)  Aspartate Aminotransferase (AST/SGOT): 23 (04-29-23 @ 18:57)  Aspartate Aminotransferase (AST/SGOT): 18 (04-29-23 @ 06:00)  Aspartate Aminotransferase (AST/SGOT): 23 (04-28-23 @ 01:55)  Aspartate Aminotransferase (AST/SGOT): 19 (02-06-23 @ 19:28)  Bilirubin Total, Serum: 0.7 (04-30)  Bilirubin Total, Serum: 0.6 (04-29)  Bilirubin Total, Serum: 0.9 (04-29)  Bilirubin Total, Serum: 0.6 (04-28)  Bilirubin Total, Serum: 0.6 (02-06)           MICROBIOLOGY:    MRSA PCR Result.: NotDetec (02-12-23 @ 06:39)      Culture - Blood (collected 29 Apr 2023 18:47)  Source: .Blood Blood-Peripheral  Preliminary Report:    No growth to date.    Culture - Blood (collected 29 Apr 2023 18:42)  Source: .Blood Blood-Peripheral  Preliminary Report:    No growth to date.    Culture - CSF with Gram Stain (collected 28 Apr 2023 19:06)  Source: .CSF CSF  Preliminary Report:    No growth    Culture - Urine (collected 28 Apr 2023 03:15)  Source: Clean Catch Clean Catch (Midstream)  Final Report:    No growth    Culture - Blood (collected 28 Apr 2023 01:45)  Source: .Blood Blood-Peripheral  Final Report:    Growth in aerobic and anaerobic bottles: Klebsiella pneumoniae    See previous culture [id number]    Culture - Blood (collected 28 Apr 2023 01:35)  Source: .Blood Blood-Peripheral  Final Report:    Growth in aerobic and anaerobic bottles: Klebsiella pneumoniae    ***Blood Panel PCR results on this specimen are available    approximately 3 hours after the Gram stain result.***    Gram stain, PCR, and/or culture results may not always    correspond dueto difference in methodologies.    ************************************************************    This PCR assay was performed by multiplex PCR. This    Assay tests for 66 bacterial and resistance gene targets.    Please refer to the [facility] test directory    at [email or web address] for details.  Organism: Blood Culture PCR  Klebsiella pneumoniae  Organism: Klebsiella pneumoniae    Sensitivities:      Method Type: ANTHONY      -  Amikacin: S <=16      -  Ampicillin: R >16 These ampicillin results predict results for amoxicillin      -  Ampicillin/Sulbactam: S <=4/2 Enterobacter, Klebsiella aerogenes, Citrobacter, and Serratia may develop resistance during prolonged therapy (3-4 days)      -  Aztreonam: S <=4      -  Cefazolin: S <=2 Enterobacter, Klebsiella aerogenes, Citrobacter, and Serratia may develop resistance during prolonged therapy (3-4 days)      -  Cefepime: S <=2      -  Cefoxitin: S <=8      -  Ceftriaxone: S <=1 Enterobacter, Klebsiella aerogenes, Citrobacter, and Serratia may develop resistance during prolonged therapy      -  Ciprofloxacin: S <=0.25      -  Ertapenem: S <=0.5      -  Gentamicin: S <=2      -  Imipenem: S <=1      -  Levofloxacin: S <=0.5      -  Meropenem: S <=1      -  Piperacillin/Tazobactam: S <=8      -  Tobramycin: S <=2      -  Trimethoprim/Sulfamethoxazole: R >2/38  Organism: Blood Culture PCR    Sensitivities:      Method Type: PCR      -  K. pneumoniae group: Detec (K. pneumoniae, K. quasipneumoniae, K. variicola)    Culture - Acid Fast - CSF (collected 30 Nov 2022 10:20)  Source: .CSF  Final Report:    No acid-fast bacilli isolated after 6 weeks.    Culture - CSF with Gram Stain (collected 30 Nov 2022 10:20)  Source: .CSF  Final Report:    No growth    Rapid RVP Result: NotDetec (04-28 @ 01:55)  Sedimentation Rate, Erythrocyte: 20 mm/hr (08-04-22 @ 18:30)    RADIOLOGY:  imaging below personally reviewed

## 2023-05-01 NOTE — DISCHARGE NOTE PROVIDER - NSDCCPCAREPLAN_GEN_ALL_CORE_FT
PRINCIPAL DISCHARGE DIAGNOSIS  Diagnosis: Bacteremia due to Klebsiella pneumoniae  Assessment and Plan of Treatment:       SECONDARY DISCHARGE DIAGNOSES  Diagnosis: Subacute subdural hematoma  Assessment and Plan of Treatment:     Diagnosis: Diabetes mellitus  Assessment and Plan of Treatment:     Diagnosis: Hyperlipidemia  Assessment and Plan of Treatment:     Diagnosis: Hypertension  Assessment and Plan of Treatment:      PRINCIPAL DISCHARGE DIAGNOSIS  Diagnosis: Bacteremia due to Klebsiella pneumoniae  Assessment and Plan of Treatment: When you presented, you had fever at home and chills. You were found to test positive in blood for a bacteria known as klebsiella pneumoniae. Bacteremia is a medical condition where bacteria are present in the bloodstream. Symptoms of bacteremia may include fever, chills, rapid heartbeat, and low blood pressure.  We treated you with antibiotics to eliminate the bacteria causing the infection. You were seen by infectious diseases physicians as well. You should continue taking Ciprofloxacin (a type of antibiotic) until: to complete a two week period of antibiotics.      SECONDARY DISCHARGE DIAGNOSES  Diagnosis: Subacute subdural hematoma  Assessment and Plan of Treatment:     Diagnosis: Diabetes mellitus  Assessment and Plan of Treatment:     Diagnosis: Hyperlipidemia  Assessment and Plan of Treatment:     Diagnosis: Hypertension  Assessment and Plan of Treatment:      PRINCIPAL DISCHARGE DIAGNOSIS  Diagnosis: Bacteremia due to Klebsiella pneumoniae  Assessment and Plan of Treatment: When you presented, you had fever at home and chills. You were found to test positive in blood for a bacteria known as klebsiella pneumoniae. Bacteremia is a medical condition where bacteria are present in the bloodstream. Symptoms of bacteremia may include fever, chills, rapid heartbeat, and low blood pressure.  We treated you with antibiotics to eliminate the bacteria causing the infection. You were seen by infectious diseases physicians as well. You should continue taking Ciprofloxacin (a type of antibiotic) 500mg twice a day with end-date of 5/12/23.      SECONDARY DISCHARGE DIAGNOSES  Diagnosis: Subacute subdural hematoma  Assessment and Plan of Treatment: A subdural hematoma is a collection of blood between the covering of the brain (dura) and the surface of the brain. 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 They want you to follow up with your outpatient neurosurgeon physician: Dr. Berry Link: your appointment is scheduled for 5/17.       Diagnosis: Diabetes mellitus  Assessment and Plan of Treatment:     Diagnosis: Hyperlipidemia  Assessment and Plan of Treatment:     Diagnosis: Hypertension  Assessment and Plan of Treatment:      PRINCIPAL DISCHARGE DIAGNOSIS  Diagnosis: Bacteremia due to Klebsiella pneumoniae  Assessment and Plan of Treatment: When you presented, you had fever at home and chills. You were found to test positive in blood for a bacteria known as klebsiella pneumoniae. Bacteremia is a medical condition where bacteria are present in the bloodstream. Symptoms of bacteremia may include fever, chills, rapid heartbeat, and low blood pressure.  We treated you with antibiotics to eliminate the bacteria causing the infection. You were seen by infectious diseases physicians as well. You should continue taking Ciprofloxacin (a type of antibiotic) 500mg twice a day with end-date of 5/12/23.      SECONDARY DISCHARGE DIAGNOSES  Diagnosis: Subacute subdural hematoma  Assessment and Plan of Treatment: A subdural hematoma is a collection of blood between the covering of the brain (dura) and the surface of the brain. 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 They want you to follow up with your outpatient neurosurgeon physician: Dr. Berry Link: your appointment is scheduled for 5/17.   Additionally, please continue your anti-seizure Keppra medication when you go home.       Diagnosis: Diabetes mellitus  Assessment and Plan of Treatment: While you were in the hospital, we treated your diabetes (high blood sugar) with insulin. When you leave the hospital, you should restart your farxiga and ozempic - and continue to follow the instructions of your primary care physician. Please also recall that a healthy, balanced diet is crucial in managing diabetes. Avoid high-sugar, high-carbohydrate foods and focus on consuming more vegetables, fruits, whole grains, lean proteins, and healthy fats.    Diagnosis: Hyperlipidemia  Assessment and Plan of Treatment: You have a history of hyperlipidemia. This is when you have too much cholesterol in your blood, which can increase the risk of heart disease and stroke. Risk factors for high cholesterol include a diet high in saturated and trans fats, being overweight, lack of physical activity, and a family history of high cholesterol.  High cholesterol can be managed through lifestyle changes, such as eating a healthy diet low in saturated and trans fats, increasing physical activity, and in your case: taking your cholesterol medication. Please take your Atorvastatin as prescribed: 20 mg, 1 tablet daily.    Diagnosis: Hypertension  Assessment and Plan of Treatment: Hypertension, also known as high blood pressure, is a condition where the force of blood against the walls of your arteries is consistently higher than normal. You have a history of high blood pressure. With proper management, hypertension can be controlled, and the risk of serious health problems can be reduced. Please continue to take your home medication: Irbesartan 300 mg, once a day, every day.    Diagnosis: History of prostate cancer  Assessment and Plan of Treatment:      PRINCIPAL DISCHARGE DIAGNOSIS  Diagnosis: Bacteremia due to Klebsiella pneumoniae  Assessment and Plan of Treatment: When you presented, you had fever at home and chills. You were found to test positive in blood for a bacteria known as klebsiella pneumoniae. Bacteremia is a medical condition where bacteria are present in the bloodstream. Symptoms of bacteremia may include fever, chills, rapid heartbeat, and low blood pressure.  We treated you with antibiotics to eliminate the bacteria causing the infection. You were seen by infectious diseases physicians as well. You should continue taking Ciprofloxacin (a type of antibiotic) 500mg twice a day with end-date of 5/12/23.      SECONDARY DISCHARGE DIAGNOSES  Diagnosis: Subacute subdural hematoma  Assessment and Plan of Treatment: A subdural hematoma is a collection of blood between the covering of the brain (dura) and the surface of the brain. 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 They want you to follow up with your outpatient neurosurgeon physician: Dr. Berry Link: your appointment is scheduled for 5/17.   Additionally, please continue your anti-seizure Keppra medication when you go home.       Diagnosis: Diabetes mellitus  Assessment and Plan of Treatment: While you were in the hospital, we treated your diabetes (high blood sugar) with insulin. When you leave the hospital, you should restart your farxiga and ozempic - and continue to follow the instructions of your primary care physician. Please also recall that a healthy, balanced diet is crucial in managing diabetes. Avoid high-sugar, high-carbohydrate foods and focus on consuming more vegetables, fruits, whole grains, lean proteins, and healthy fats.    Diagnosis: Hyperlipidemia  Assessment and Plan of Treatment: You have a history of hyperlipidemia. This is when you have too much cholesterol in your blood, which can increase the risk of heart disease and stroke. Risk factors for high cholesterol include a diet high in saturated and trans fats, being overweight, lack of physical activity, and a family history of high cholesterol.  High cholesterol can be managed through lifestyle changes, such as eating a healthy diet low in saturated and trans fats, increasing physical activity, and in your case: taking your cholesterol medication. Please take your Atorvastatin as prescribed: 20 mg, 1 tablet daily.    Diagnosis: Hypertension  Assessment and Plan of Treatment: Hypertension, also known as high blood pressure, is a condition where the force of blood against the walls of your arteries is consistently higher than normal. You have a history of high blood pressure. With proper management, hypertension can be controlled, and the risk of serious health problems can be reduced. Please continue to take your home medication: Irbesartan 300 mg, once a day, every day.    Diagnosis: History of prostate cancer  Assessment and Plan of Treatment: You have a history of prostate cancer. You were following with Dr. Pereyra ([facility] - [address], [address], [place] [address]). Please make an appointment with him by calling [phone] and discuss the prostate biopsy results and any further next steps.     PRINCIPAL DISCHARGE DIAGNOSIS  Diagnosis: Bacteremia due to Klebsiella pneumoniae  Assessment and Plan of Treatment: When you presented, you had fever at home and chills. You were found to test positive in blood for a bacteria known as klebsiella pneumoniae. Bacteremia is a medical condition where bacteria are present in the bloodstream. Symptoms of bacteremia may include fever, chills, rapid heartbeat, and low blood pressure.  We have sent a prescription for your antibiotic medication, directly to your pharmacy.  Please pick it up as soon as possible and take as directed.  Please finish the entire course of antibiotics as prescribed.  If you have any stomach pain during or after taking antibiotics, yogurt has been shown to help.  Don't use any alcohol or grapefruit juice with any antibiotics.  You should continue taking Ciprofloxacin (a type of antibiotic) 500mg twice a day with end-date of 5/12/23.      SECONDARY DISCHARGE DIAGNOSES  Diagnosis: Subacute subdural hematoma  Assessment and Plan of Treatment: A subdural hematoma is a collection of blood between the covering of the brain (dura) and the surface of the brain. 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 They want you to follow up with your outpatient neurosurgeon physician: Dr. Jamal Isabel: your appointment is scheduled for 5/17.   Additionally, please continue your anti-seizure Keppra medication when you go home. You will need a repeat CT scan of your head to ensure stability of these lesions within 2 weeks from your discharge. Dr. Isabel can order this for you.      Diagnosis: Diabetes mellitus  Assessment and Plan of Treatment: While you were in the hospital, we treated your diabetes (high blood sugar) with insulin. When you leave the hospital, you should restart your farxiga and ozempic - and continue to follow the instructions of your primary care physician.   Your A1c was 7.6%, which still suggests relatively poor glycemic control. We recommend that you keep a log of your blood sugars measured at home and bring this with your to your fol  Please also recall that a healthy, balanced diet is crucial in managing diabetes. Avoid high-sugar, high-carbohydrate foods and focus on consuming more vegetables, fruits, whole grains, lean proteins, and healthy fats.    Diagnosis: Hyperlipidemia  Assessment and Plan of Treatment: You have a history of hyperlipidemia. This is when you have too much cholesterol in your blood, which can increase the risk of heart disease and stroke. Risk factors for high cholesterol include a diet high in saturated and trans fats, being overweight, lack of physical activity, and a family history of high cholesterol.  Continue taking your Atorvastatin 20mg by mouth every night before bed.    Diagnosis: Hypertension  Assessment and Plan of Treatment: Hypertension, also known as high blood pressure, is a condition where the force of blood against the walls of your arteries is consistently higher than normal. You have a history of high blood pressure. With proper management, hypertension can be controlled, and the risk of serious health problems can be reduced.   Please continue to take your home medication: Irbesartan 300 mg, once a day, every day.    Diagnosis: History of prostate cancer  Assessment and Plan of Treatment: You have a history of prostate cancer. You were following with Dr. Pereyra ([facility] - [address], [facility] [place] [address]). Please make an appointment with him by calling [phone] and discuss the prostate biopsy results and any further next steps.     PRINCIPAL DISCHARGE DIAGNOSIS  Diagnosis: Bacteremia due to Klebsiella pneumoniae  Assessment and Plan of Treatment: When you presented, you had fever at home and chills. You were found to test positive in blood for a bacteria known as klebsiella pneumoniae. Bacteremia is a medical condition where bacteria are present in the bloodstream. Symptoms of bacteremia may include fever, chills, rapid heartbeat, and low blood pressure.  We have sent a prescription for your antibiotic medication, directly to your pharmacy.  Please pick it up as soon as possible and take as directed.  Please finish the entire course of antibiotics as prescribed.  If you have any stomach pain during or after taking antibiotics, yogurt has been shown to help.  Don't use any alcohol or grapefruit juice with any antibiotics.  You should continue taking Ciprofloxacin (a type of antibiotic) 500mg twice a day with end-date of 5/12/23.      SECONDARY DISCHARGE DIAGNOSES  Diagnosis: Subacute subdural hematoma  Assessment and Plan of Treatment: A subdural hematoma is a collection of blood between the covering of the brain (dura) and the surface of the brain. 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 They want you to follow up with your outpatient neurosurgeon physician: Dr. Jamal Isabel: your appointment is scheduled for 5/17.   Additionally, please continue your anti-seizure Keppra medication when you go home. You will need a repeat CT scan of your head to ensure stability of these lesions within 2 weeks from your discharge. Dr. Isabel can order this for you.      Diagnosis: Diabetes mellitus  Assessment and Plan of Treatment: While you were in the hospital, we treated your diabetes (high blood sugar) with insulin. When you leave the hospital, you should restart your farxiga and ozempic - and continue to follow the instructions of your primary care physician.   Your A1c was 7.6%, which still suggests relatively poor glycemic control. We recommend that you keep a log of your blood sugars measured at home and bring this with your to your follow with your PCP.   Please also recall that a healthy, balanced diet is crucial in managing diabetes. Avoid high-sugar, high-carbohydrate foods and focus on consuming more vegetables, fruits, whole grains, lean proteins, and healthy fats.    Diagnosis: Hyperlipidemia  Assessment and Plan of Treatment: You have a history of hyperlipidemia. This is when you have too much cholesterol in your blood, which can increase the risk of heart disease and stroke. Risk factors for high cholesterol include a diet high in saturated and trans fats, being overweight, lack of physical activity, and a family history of high cholesterol.  Continue taking your Atorvastatin 20mg by mouth every night before bed.    Diagnosis: Hypertension  Assessment and Plan of Treatment: Hypertension, also known as high blood pressure, is a condition where the force of blood against the walls of your arteries is consistently higher than normal. You have a history of high blood pressure. With proper management, hypertension can be controlled, and the risk of serious health problems can be reduced.   Please continue to take your home medication: Irbesartan 300 mg, once a day, every day.    Diagnosis: History of prostate cancer  Assessment and Plan of Treatment: You have a history of prostate cancer. You were following with Dr. Pereyra ([facility] - [address], [facility], [place] [address]). Please make an appointment with him by calling [phone] and discuss the prostate biopsy results and any further next steps.

## 2023-05-01 NOTE — PROGRESS NOTE ADULT - SUBJECTIVE AND OBJECTIVE BOX
Zoë Christy, PGY1  Internal Medicine    Patient is a 77y old  Male who presents with a chief complaint of AMS, fever (29 Apr 2023 07:44)      SUBJECTIVE/INTERVAL EVENTS: RRT on 4/29 evening for breakthrough fever 103 on cefepime and now escalated to meropenem. Patient also had tremors, though unlikely to be true seizure activity etiology given in setting of febrile illness and prolactin normal.    MEDICATIONS  (STANDING):  atorvastatin 20 milliGRAM(s) Oral at bedtime  cefepime   IVPB 2000 milliGRAM(s) IV Intermittent every 12 hours  dextrose 5%. 1000 milliLiter(s) (100 mL/Hr) IV Continuous <Continuous>  dextrose 5%. 1000 milliLiter(s) (50 mL/Hr) IV Continuous <Continuous>  dextrose 50% Injectable 25 Gram(s) IV Push once  dextrose 50% Injectable 12.5 Gram(s) IV Push once  dextrose 50% Injectable 25 Gram(s) IV Push once  enoxaparin Injectable 40 milliGRAM(s) SubCutaneous every 24 hours  glucagon  Injectable 1 milliGRAM(s) IntraMuscular once  insulin glargine Injectable (LANTUS) 20 Unit(s) SubCutaneous at bedtime  insulin lispro (ADMELOG) corrective regimen sliding scale   SubCutaneous three times a day before meals  insulin lispro (ADMELOG) corrective regimen sliding scale   SubCutaneous at bedtime  insulin lispro Injectable (ADMELOG) 7 Unit(s) SubCutaneous three times a day before meals  levETIRAcetam 250 milliGRAM(s) Oral two times a day    MEDICATIONS  (PRN):  dextrose Oral Gel 15 Gram(s) Oral once PRN Blood Glucose LESS THAN 70 milliGRAM(s)/deciliter      VITAL SIGNS:  T(F): 98.1 (05-01-23 @ 05:34), Max: 98.4 (04-30-23 @ 17:35)  HR: 89 (05-01-23 @ 05:34) (87 - 95)  BP: 151/89 (05-01-23 @ 05:34) (138/86 - 156/92)  RR: 16 (05-01-23 @ 05:34) (16 - 20)  SpO2: 99% (05-01-23 @ 05:34) (98% - 99%)    I&O's Summary    30 Apr 2023 07:01  -  01 May 2023 07:00  --------------------------------------------------------  IN: 110 mL / OUT: 50 mL / NET: 60 mL      Daily     Daily     PHYSICAL EXAM:  Gen: Alert, NAD  HEENT: NCAT, conjunctiva clear, sclera anicteric, no erythema or exudates in the oropharynx, mmm  Neck: Supple, no JVD  CV: RRR, S1S2, no m/r/g  Resp: CTAB, normal respiratory effort  Abd: Soft, nontender, nondistended, normal bowel sounds  Ext: no edema, no clubbing or cyanosis  Neuro: AOx1, CN2-12 grossly intact, MUHAMMAD  SKIN: warm, perfused    LABS:                        15.0   12.20 )-----------( 153      ( 30 Apr 2023 06:09 )             45.3     Hgb Trend: 15.0<--, 15.0<--, 15.0<--, 16.0<--  04-30    137  |  103  |  15  ----------------------------<  128<H>  3.7   |  22  |  0.83    Ca    8.5      30 Apr 2023 06:09  Phos  3.0     04-30  Mg     2.00     04-30    TPro  6.6  /  Alb  3.4  /  TBili  0.7  /  DBili  x   /  AST  21  /  ALT  15  /  AlkPhos  68  04-30    Creatinine Trend: 0.83<--, 1.27<--, 1.02<--, 1.19<--  LIVER FUNCTIONS - ( 30 Apr 2023 06:09 )  Alb: 3.4 g/dL / Pro: 6.6 g/dL / ALK PHOS: 68 U/L / ALT: 15 U/L / AST: 21 U/L / GGT: x                     CAPILLARY BLOOD GLUCOSE      POCT Blood Glucose.: 137 mg/dL (30 Apr 2023 21:41)  POCT Blood Glucose.: 152 mg/dL (30 Apr 2023 17:48)  POCT Blood Glucose.: 285 mg/dL (30 Apr 2023 12:28)  POCT Blood Glucose.: 123 mg/dL (30 Apr 2023 09:12)      RADIOLOGY & ADDITIONAL TESTS: Reviewed    Imaging Personally Reviewed:    Consultant(s) Notes Reviewed:      Care Discussed with Consultants/Other Providers:   Zoë Christy, PGY1  Internal Medicine    Patient is a 77y old  Male who presents with a chief complaint of AMS, fever (29 Apr 2023 07:44)      SUBJECTIVE/INTERVAL EVENTS: RRT on 4/29 evening for breakthrough fever 103 on cefepime. Patient also had tremors, though unlikely to be true seizure activity etiology given in setting of febrile illness and prolactin normal.     Per Infectious Diseases, patient has been switched to Ceftriaxone.     MEDICATIONS  (STANDING):  atorvastatin 20 milliGRAM(s) Oral at bedtime  cefepime   IVPB 2000 milliGRAM(s) IV Intermittent every 12 hours  dextrose 5%. 1000 milliLiter(s) (100 mL/Hr) IV Continuous <Continuous>  dextrose 5%. 1000 milliLiter(s) (50 mL/Hr) IV Continuous <Continuous>  dextrose 50% Injectable 25 Gram(s) IV Push once  dextrose 50% Injectable 12.5 Gram(s) IV Push once  dextrose 50% Injectable 25 Gram(s) IV Push once  enoxaparin Injectable 40 milliGRAM(s) SubCutaneous every 24 hours  glucagon  Injectable 1 milliGRAM(s) IntraMuscular once  insulin glargine Injectable (LANTUS) 20 Unit(s) SubCutaneous at bedtime  insulin lispro (ADMELOG) corrective regimen sliding scale   SubCutaneous three times a day before meals  insulin lispro (ADMELOG) corrective regimen sliding scale   SubCutaneous at bedtime  insulin lispro Injectable (ADMELOG) 7 Unit(s) SubCutaneous three times a day before meals  levETIRAcetam 250 milliGRAM(s) Oral two times a day    MEDICATIONS  (PRN):  dextrose Oral Gel 15 Gram(s) Oral once PRN Blood Glucose LESS THAN 70 milliGRAM(s)/deciliter      VITAL SIGNS:  T(F): 98.1 (05-01-23 @ 05:34), Max: 98.4 (04-30-23 @ 17:35)  HR: 89 (05-01-23 @ 05:34) (87 - 95)  BP: 151/89 (05-01-23 @ 05:34) (138/86 - 156/92)  RR: 16 (05-01-23 @ 05:34) (16 - 20)  SpO2: 99% (05-01-23 @ 05:34) (98% - 99%)    I&O's Summary    30 Apr 2023 07:01  -  01 May 2023 07:00  --------------------------------------------------------  IN: 110 mL / OUT: 50 mL / NET: 60 mL      Daily     Daily     PHYSICAL EXAM:  Gen: Alert, NAD  HEENT: NCAT, conjunctiva clear, sclera anicteric, no erythema or exudates in the oropharynx, mmm  Neck: Supple, no JVD  CV: RRR, S1S2, no m/r/g  Resp: CTAB, normal respiratory effort  Abd: Soft, nontender, nondistended, normal bowel sounds  Ext: no edema, no clubbing or cyanosis  Neuro: AOx1, CN2-12 grossly intact, MUHAMMAD  SKIN: warm, perfused    LABS:                        15.0   12.20 )-----------( 153      ( 30 Apr 2023 06:09 )             45.3     Hgb Trend: 15.0<--, 15.0<--, 15.0<--, 16.0<--  04-30    137  |  103  |  15  ----------------------------<  128<H>  3.7   |  22  |  0.83    Ca    8.5      30 Apr 2023 06:09  Phos  3.0     04-30  Mg     2.00     04-30    TPro  6.6  /  Alb  3.4  /  TBili  0.7  /  DBili  x   /  AST  21  /  ALT  15  /  AlkPhos  68  04-30    Creatinine Trend: 0.83<--, 1.27<--, 1.02<--, 1.19<--  LIVER FUNCTIONS - ( 30 Apr 2023 06:09 )  Alb: 3.4 g/dL / Pro: 6.6 g/dL / ALK PHOS: 68 U/L / ALT: 15 U/L / AST: 21 U/L / GGT: x                     CAPILLARY BLOOD GLUCOSE      POCT Blood Glucose.: 137 mg/dL (30 Apr 2023 21:41)  POCT Blood Glucose.: 152 mg/dL (30 Apr 2023 17:48)  POCT Blood Glucose.: 285 mg/dL (30 Apr 2023 12:28)  POCT Blood Glucose.: 123 mg/dL (30 Apr 2023 09:12)      RADIOLOGY & ADDITIONAL TESTS: Reviewed    Imaging Personally Reviewed:    Consultant(s) Notes Reviewed:      Care Discussed with Consultants/Other Providers:

## 2023-05-01 NOTE — DISCHARGE NOTE PROVIDER - NSDCFUSCHEDAPPT_GEN_ALL_CORE_FT
Jamal Isabel  [facility] Physician [facility]  NEUROSURG [address] [place] [address]  Scheduled Appointment: 05/17/2023

## 2023-05-01 NOTE — DISCHARGE NOTE PROVIDER - NSDCFUADDAPPT_GEN_ALL_CORE_FT
You have an appointment with Dr. Jamal Isabel on 5/17/2023 at [address] [place] [address]. You can call [phone] if you have any concerns.  You have an appointment with Dr. Jamal Isabel on 5/17/2023 at [address] [place] [address]. You can call [phone] if you have any concerns.    Please also follow up with your urologist Dr. Delfino Pereyra, with regards to your prostate cancer. You had a biopsy for this, so please follow up with them to discuss next steps, if any.

## 2023-05-02 LAB
ALBUMIN SERPL ELPH-MCNC: 3.4 G/DL — SIGNIFICANT CHANGE UP (ref 3.3–5)
ALP SERPL-CCNC: 68 U/L — SIGNIFICANT CHANGE UP (ref 40–120)
ALT FLD-CCNC: 27 U/L — SIGNIFICANT CHANGE UP (ref 4–41)
ANION GAP SERPL CALC-SCNC: 9 MMOL/L — SIGNIFICANT CHANGE UP (ref 7–14)
AST SERPL-CCNC: 26 U/L — SIGNIFICANT CHANGE UP (ref 4–40)
BILIRUB SERPL-MCNC: 0.4 MG/DL — SIGNIFICANT CHANGE UP (ref 0.2–1.2)
BUN SERPL-MCNC: 13 MG/DL — SIGNIFICANT CHANGE UP (ref 7–23)
CALCIUM SERPL-MCNC: 8.9 MG/DL — SIGNIFICANT CHANGE UP (ref 8.4–10.5)
CHLORIDE SERPL-SCNC: 103 MMOL/L — SIGNIFICANT CHANGE UP (ref 98–107)
CO2 SERPL-SCNC: 26 MMOL/L — SIGNIFICANT CHANGE UP (ref 22–31)
CREAT SERPL-MCNC: 0.95 MG/DL — SIGNIFICANT CHANGE UP (ref 0.5–1.3)
EGFR: 82 ML/MIN/1.73M2 — SIGNIFICANT CHANGE UP
GLUCOSE BLDC GLUCOMTR-MCNC: 113 MG/DL — HIGH (ref 70–99)
GLUCOSE BLDC GLUCOMTR-MCNC: 125 MG/DL — HIGH (ref 70–99)
GLUCOSE BLDC GLUCOMTR-MCNC: 170 MG/DL — HIGH (ref 70–99)
GLUCOSE BLDC GLUCOMTR-MCNC: 183 MG/DL — HIGH (ref 70–99)
GLUCOSE SERPL-MCNC: 131 MG/DL — HIGH (ref 70–99)
HCT VFR BLD CALC: 47 % — SIGNIFICANT CHANGE UP (ref 39–50)
HGB BLD-MCNC: 15.2 G/DL — SIGNIFICANT CHANGE UP (ref 13–17)
MAGNESIUM SERPL-MCNC: 2.2 MG/DL — SIGNIFICANT CHANGE UP (ref 1.6–2.6)
MCHC RBC-ENTMCNC: 28.6 PG — SIGNIFICANT CHANGE UP (ref 27–34)
MCHC RBC-ENTMCNC: 32.3 GM/DL — SIGNIFICANT CHANGE UP (ref 32–36)
MCV RBC AUTO: 88.5 FL — SIGNIFICANT CHANGE UP (ref 80–100)
NRBC # BLD: 0 /100 WBCS — SIGNIFICANT CHANGE UP (ref 0–0)
NRBC # FLD: 0 K/UL — SIGNIFICANT CHANGE UP (ref 0–0)
PHOSPHATE SERPL-MCNC: 3.1 MG/DL — SIGNIFICANT CHANGE UP (ref 2.5–4.5)
PLATELET # BLD AUTO: 231 K/UL — SIGNIFICANT CHANGE UP (ref 150–400)
POTASSIUM SERPL-MCNC: 4.2 MMOL/L — SIGNIFICANT CHANGE UP (ref 3.5–5.3)
POTASSIUM SERPL-SCNC: 4.2 MMOL/L — SIGNIFICANT CHANGE UP (ref 3.5–5.3)
PROT SERPL-MCNC: 6.9 G/DL — SIGNIFICANT CHANGE UP (ref 6–8.3)
RBC # BLD: 5.31 M/UL — SIGNIFICANT CHANGE UP (ref 4.2–5.8)
RBC # FLD: 13.2 % — SIGNIFICANT CHANGE UP (ref 10.3–14.5)
SARS-COV-2 RNA SPEC QL NAA+PROBE: SIGNIFICANT CHANGE UP
SODIUM SERPL-SCNC: 138 MMOL/L — SIGNIFICANT CHANGE UP (ref 135–145)
WBC # BLD: 9.06 K/UL — SIGNIFICANT CHANGE UP (ref 3.8–10.5)
WBC # FLD AUTO: 9.06 K/UL — SIGNIFICANT CHANGE UP (ref 3.8–10.5)

## 2023-05-02 PROCEDURE — 99232 SBSQ HOSP IP/OBS MODERATE 35: CPT | Mod: GC

## 2023-05-02 PROCEDURE — 99232 SBSQ HOSP IP/OBS MODERATE 35: CPT

## 2023-05-02 RX ORDER — CIPROFLOXACIN LACTATE 400MG/40ML
500 VIAL (ML) INTRAVENOUS EVERY 12 HOURS
Refills: 0 | Status: DISCONTINUED | OUTPATIENT
Start: 2023-05-02 | End: 2023-05-08

## 2023-05-02 RX ADMIN — Medication 7 UNIT(S): at 18:06

## 2023-05-02 RX ADMIN — Medication 7 UNIT(S): at 12:38

## 2023-05-02 RX ADMIN — LEVETIRACETAM 250 MILLIGRAM(S): 250 TABLET, FILM COATED ORAL at 17:06

## 2023-05-02 RX ADMIN — Medication 2: at 18:05

## 2023-05-02 RX ADMIN — INSULIN GLARGINE 20 UNIT(S): 100 INJECTION, SOLUTION SUBCUTANEOUS at 22:41

## 2023-05-02 RX ADMIN — LEVETIRACETAM 250 MILLIGRAM(S): 250 TABLET, FILM COATED ORAL at 05:57

## 2023-05-02 RX ADMIN — ATORVASTATIN CALCIUM 20 MILLIGRAM(S): 80 TABLET, FILM COATED ORAL at 22:25

## 2023-05-02 RX ADMIN — ENOXAPARIN SODIUM 40 MILLIGRAM(S): 100 INJECTION SUBCUTANEOUS at 12:24

## 2023-05-02 RX ADMIN — Medication 2: at 12:37

## 2023-05-02 RX ADMIN — Medication 7 UNIT(S): at 09:04

## 2023-05-02 RX ADMIN — Medication 500 MILLIGRAM(S): at 22:25

## 2023-05-02 NOTE — PROGRESS NOTE ADULT - PROBLEM SELECTOR PLAN 7
DVT Prophylaxis: No blood thinners given SDH. SCDs ordered.   Dispo: Home health aides at home.  Per PT Consult --> Recommending the patient go to inpatient rehab.   Code Status: Full Code  HCP: Wife, Glory Guallpa DVT Prophylaxis: No blood thinners given SDH. SCDs ordered.   Dispo: Home health aides at home.  Per PT Consult -->FUNMILAYO  Code Status: Full Code  HCP: Wife, Glory Guallpa DVT Prophylaxis: SCDs  Dispo: Home health aides at home.  Per PT Consult -->FUNMILAYO  Code Status: Full Code  HCP: Wife, Glory Guallpa

## 2023-05-02 NOTE — PROGRESS NOTE ADULT - SUBJECTIVE AND OBJECTIVE BOX
Follow Up:  Klebsiella Bacteremia    Interval History/ROS: Patient is afebrile w/o leukocytosis.     REVIEW OF SYSTEMS  [  ] ROS unobtainable because:    [ x ] All other systems negative except as noted below    Constitutional:  [ ] fever [ ] chills  [ ] weight loss  [ ]night sweat  [ ]poor appetite/PO intake [ ]fatigue   Skin:  [ ] rash [ ] phlebitis	  Eyes: [ ] icterus [ ] pain  [ ] discharge	  ENMT: [ ] sore throat  [ ] thrush [ ] ulcers [ ] exudates [ ]anosmia  Respiratory: [ ] dyspnea [ ] hemoptysis [ ] cough [ ] sputum	  Cardiovascular:  [ ] chest pain [ ] palpitations [ ] edema	  Gastrointestinal:  [ ] nausea [ ] vomiting [ ] diarrhea [ ] constipation [ ] pain	  Genitourinary:  [ ] dysuria [ ] frequency [ ] hematuria [ ] discharge [ ] flank pain  [ ] incontinence  Musculoskeletal:  [ ] myalgias [ ] arthralgias [ ] arthritis  [ ] back pain  Neurological:  [ ] headache [ ] weakness [ ] seizures  [ ] confusion/altered mental status    Allergies  No Known Allergies        ANTIMICROBIALS:    cefTRIAXone   IVPB 2000 every 24 hours      ANTIMICROBIALS (past 90 days):  MEDICATIONS  (STANDING):  cefepime   IVPB   100 mL/Hr IV Intermittent (04-29-23 @ 18:07)   100 mL/Hr IV Intermittent (04-29-23 @ 07:00)   100 mL/Hr IV Intermittent (04-28-23 @ 18:33)    cefepime   IVPB   100 mL/Hr IV Intermittent (05-01-23 @ 01:51)   100 mL/Hr IV Intermittent (04-30-23 @ 13:24)    cefTRIAXone   IVPB   100 mL/Hr IV Intermittent (05-01-23 @ 20:21)    meropenem  IVPB   100 mL/Hr IV Intermittent (04-30-23 @ 06:05)   100 mL/Hr IV Intermittent (04-29-23 @ 21:40)    piperacillin/tazobactam IVPB.   200 mL/Hr IV Intermittent (04-28-23 @ 11:24)    piperacillin/tazobactam IVPB...   200 mL/Hr IV Intermittent (04-28-23 @ 02:23)    vancomycin  IVPB   250 mL/Hr IV Intermittent (04-28-23 @ 18:16)    vancomycin  IVPB   250 mL/Hr IV Intermittent (04-29-23 @ 21:39)    vancomycin  IVPB.   250 mL/Hr IV Intermittent (04-28-23 @ 03:36)        OTHER MEDS: MEDICATIONS  (STANDING):  atorvastatin 20 at bedtime  dextrose 50% Injectable 25 once  dextrose 50% Injectable 12.5 once  dextrose 50% Injectable 25 once  dextrose Oral Gel 15 once PRN  enoxaparin Injectable 40 every 24 hours  glucagon  Injectable 1 once  insulin glargine Injectable (LANTUS) 20 at bedtime  insulin lispro (ADMELOG) corrective regimen sliding scale  three times a day before meals  insulin lispro (ADMELOG) corrective regimen sliding scale  at bedtime  insulin lispro Injectable (ADMELOG) 7 three times a day before meals  levETIRAcetam 250 two times a day      Vital Signs Last 24 Hrs  T(F): 98.2 (05-02-23 @ 01:41), Max: 104.9 (04-28-23 @ 01:46)    Vital Signs Last 24 Hrs  HR: 88 (05-02-23 @ 01:41) (87 - 89)  BP: 128/70 (05-02-23 @ 01:41) (128/70 - 155/96)  RR: 18 (05-02-23 @ 01:41)  SpO2: 99% (05-02-23 @ 01:41) (99% - 100%)  Wt(kg): --    EXAM:    GA: NAD, AOx1  HEENT: oral cavity no lesion  CV: nl S1/S2, no RMG  Lungs: CTAB, no distress  Abd: BS+, soft, nontender, no rebounding pain  Ext: no edema  Neuro: No focal deficits  Skin: Intact  IV: no phlebitis    Labs:                        14.9   9.37  )-----------( 206      ( 01 May 2023 12:28 )             44.4     05-01    136  |  101  |  12  ----------------------------<  152<H>  4.0   |  24  |  0.77    Ca    9.1      01 May 2023 12:28  Phos  2.7     05-01  Mg     2.10     05-01    TPro  7.2  /  Alb  3.9  /  TBili  0.7  /  DBili  x   /  AST  24  /  ALT  21  /  AlkPhos  74  05-01      WBC Trend:  WBC Count: 9.37 (05-01-23 @ 12:28)  WBC Count: 12.20 (04-30-23 @ 06:09)  WBC Count: 12.64 (04-29-23 @ 18:57)  WBC Count: 12.41 (04-29-23 @ 06:00)      Creatine Trend:  Creatinine, Serum: 0.77 (05-01)  Creatinine, Serum: 0.83 (04-30)  Creatinine, Serum: 1.27 (04-29)  Creatinine, Serum: 1.02 (04-29)      Liver Biochemical Testing Trend:  Alanine Aminotransferase (ALT/SGPT): 21 (05-01)  Alanine Aminotransferase (ALT/SGPT): 15 (04-30)  Alanine Aminotransferase (ALT/SGPT): 17 (04-29)  Alanine Aminotransferase (ALT/SGPT): 16 (04-29)  Alanine Aminotransferase (ALT/SGPT): 24 (04-28)  Aspartate Aminotransferase (AST/SGOT): 24 (05-01-23 @ 12:28)  Aspartate Aminotransferase (AST/SGOT): 21 (04-30-23 @ 06:09)  Aspartate Aminotransferase (AST/SGOT): 23 (04-29-23 @ 18:57)  Aspartate Aminotransferase (AST/SGOT): 18 (04-29-23 @ 06:00)  Aspartate Aminotransferase (AST/SGOT): 23 (04-28-23 @ 01:55)  Bilirubin Total, Serum: 0.7 (05-01)  Bilirubin Total, Serum: 0.7 (04-30)  Bilirubin Total, Serum: 0.6 (04-29)  Bilirubin Total, Serum: 0.9 (04-29)  Bilirubin Total, Serum: 0.6 (04-28)      Trend LDH          MICROBIOLOGY:    MRSA PCR Result.: NotDetec (02-12-23 @ 06:39)      Culture - Blood (collected 30 Apr 2023 06:09)  Source: .Blood Blood-Peripheral  Preliminary Report:    No growth to date.    Culture - Blood (collected 30 Apr 2023 06:02)  Source: .Blood Blood-Venous  Preliminary Report:    No growth to date.    Culture - Blood (collected 29 Apr 2023 18:47)  Source: .Blood Blood-Peripheral  Preliminary Report:    No growth to date.    Culture - Blood (collected 29 Apr 2023 18:42)  Source: .Blood Blood-Peripheral  Preliminary Report:    No growth to date.    Culture - CSF with Gram Stain (collected 28 Apr 2023 19:06)  Source: .CSF CSF  Final Report:    No growth    Culture - Urine (collected 28 Apr 2023 03:15)  Source: Clean Catch Clean Catch (Midstream)  Final Report:    No growth    Culture - Blood (collected 28 Apr 2023 01:45)  Source: .Blood Blood-Peripheral  Final Report:    Growth in aerobic and anaerobic bottles: Klebsiella pneumoniae    See previous culture [id number]    Culture - Blood (collected 28 Apr 2023 01:35)  Source: .Blood Blood-Peripheral  Final Report:    Growth in aerobic and anaerobic bottles: Klebsiella pneumoniae    ***Blood Panel PCR results on this specimen are available    approximately 3 hours after the Gram stain result.***    Gram stain, PCR, and/or culture results may not always    correspond dueto difference in methodologies.    ************************************************************    This PCR assay was performed by multiplex PCR. This    Assay tests for 66 bacterial and resistance gene targets.    Please refer to the [facility] test directory    at [email or web address] for details.  Organism: Blood Culture PCR  Klebsiella pneumoniae  Organism: Klebsiella pneumoniae    Sensitivities:      Method Type: ANTHONY      -  Amikacin: S <=16      -  Ampicillin: R >16 These ampicillin results predict results for amoxicillin      -  Ampicillin/Sulbactam: S <=4/2 Enterobacter, Klebsiella aerogenes, Citrobacter, and Serratia may develop resistance during prolonged therapy (3-4 days)      -  Aztreonam: S <=4      -  Cefazolin: S <=2 Enterobacter, Klebsiella aerogenes, Citrobacter, and Serratia may develop resistance during prolonged therapy (3-4 days)      -  Cefepime: S <=2      -  Cefoxitin: S <=8      -  Ceftriaxone: S <=1 Enterobacter, Klebsiella aerogenes, Citrobacter, and Serratia may develop resistance during prolonged therapy      -  Ciprofloxacin: S <=0.25      -  Ertapenem: S <=0.5      -  Gentamicin: S <=2      -  Imipenem: S <=1      -  Levofloxacin: S <=0.5      -  Meropenem: S <=1      -  Piperacillin/Tazobactam: S <=8      -  Tobramycin: S <=2      -  Trimethoprim/Sulfamethoxazole: R >2/38  Organism: Blood Culture PCR    Sensitivities:      Method Type: PCR      -  K. pneumoniae group: Detec (K. pneumoniae, K. quasipneumoniae, K. variicola)    Rapid RVP Result: NotDetec (04-28 @ 01:55)    RADIOLOGY:  imaging below personally reviewed

## 2023-05-02 NOTE — PROGRESS NOTE ADULT - ASSESSMENT
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 CT chest/a/p with con negative for source of infection and CTH with new right frontal subdural hematoma. ID consulted for fever workup. Pt found to have kleb bacteremia.  shunt tap negative    WORKUP  CT Chest, Abdomen and Pelvis w/ IV Cont (04.28): The prostate is prominent. No evidence of intra-abdominal abscess. Clear chest.  Right-sided  shunt. No adjacent fluid collection is identified.  CT Head No Cont (04.28)): Mixed density right frontal subdural hematoma, new since prior study.  UA and RVP: negative  Blood cx (4/28): Klebsiella Pneumonia -> negative 4/29  LP performed - 0 nucleated cells, CSF PCR negative, Elevated glucose, low protein, CSF culture with no growth     DIAGNOSIS and IMPRESSION  ·	Klebsiella bacteremia  ·	Right frontal Subdural hematoma  ·	Metabolic encephalopathy    Fevers up to 104.9 w/o leukocytosis, 102.1 F past 24 hours  s/p vanc x 1 and Zosyn x 2 on 4/28  UA, RVP and CT Chest a/p with con negative for source of fever  Recent Prostate biopsy 3 days back for past hx of prostate CA was likely source of bacteremia  Family reports giving him merari-prostatic biopsy abx   lower suspicion for CNS/ shunt infection or central fevers from SDH    RECOMMENDATIONS  c/w CTX for now  Follow fever curve and WBC count  Will plan for 2 week course of therapy -> Can likely discharge on PO Levaquin when stable    PRELIM NOTE.       Gerardo Loyola MD, PGY5   ID fellow  [employer] Teams Preferred  After 5pm/weekends call [phone]   Mr Guallpa is a 77 M w PMHx of DM2 with Neuropathy, TIAs (previously on Aggrenox), HTN, and HLD, subdural hematoma s/p Left MMA embolization (August 2022), NPH s/p  shunt (Feb 2023) and recent prostate bx 2 days ago for hx pf prostate cancer not on treatment per family now presented today with AMS and fever. CT chest/a/p with con negative for source of infection and CTH with new right frontal subdural hematoma. ID consulted for fever workup. Pt found to have kleb bacteremia.  shunt tap negative    WORKUP  CT Chest, Abdomen and Pelvis w/ IV Cont (04.28): The prostate is prominent. No evidence of intra-abdominal abscess. Clear chest.  Right-sided  shunt. No adjacent fluid collection is identified.  CT Head No Cont (04.28)): Mixed density right frontal subdural hematoma, new since prior study.  UA and RVP: negative  Blood cx (4/28): Klebsiella Pneumonia -> negative 4/29  LP performed - 0 nucleated cells, CSF PCR negative, Elevated glucose, low protein, CSF culture with no growth     DIAGNOSIS and IMPRESSION  ·	Klebsiella bacteremia  ·	Right frontal Subdural hematoma  ·	Metabolic encephalopathy    Fevers up to 104.9 w/o leukocytosis, 102.1 F past 24 hours  s/p vanc x 1 and Zosyn x 2 on 4/28  UA, RVP and CT Chest a/p with con negative for source of fever  Recent Prostate biopsy 3 days back for past hx of prostate CA was likely source of bacteremia  Family reports giving him merari-prostatic biopsy abx   lower suspicion for CNS/ shunt infection or central fevers from SDH    RECOMMENDATIONS  c/w CTX for now  Follow fever curve and WBC count  Will plan for 2 week course of therapy -> Can discharge on PO Cipro 500mg q 12 through 5/12/23    Gerardo Loyola MD, PGY5   ID fellow  [email or web address] Teams Preferred  After 5pm/weekends call [phone]

## 2023-05-02 NOTE — PROGRESS NOTE ADULT - PROBLEM SELECTOR PLAN 3
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 Baez (Outpatient urologist) for further collateral at [phone]  pt does not have dysuria, hematuria, pain in the genital area, groin, lower abdomen, or lower back. however does endorse urinary frequency/nocturia

## 2023-05-02 NOTE — PROGRESS NOTE ADULT - ATTENDING COMMENTS
bcx remain NGTD  per ID anticipate 2 week course w oral Levaquin on discharge  check EKG for QTC  per neurosx no need for [facility]  PT rec FUNMILAYO - pt agreeable but wants us to dw wife - will reach out  updated sw and cm

## 2023-05-02 NOTE — PROGRESS NOTE ADULT - ATTENDING COMMENTS
77 M w PMHx of DM2 with Neuropathy, TIAs (previously on Aggrenox), HTN, and HLD, subdural hematoma s/p Left MMA embolization (August 2022), NPH s/p  shunt (Feb 2023) and recent prostate bx 2 days ago for hx pf prostate cancer not on treatment per family now presented today with AMS and fever  Fever, no leukocytosis  Prostate biopsy 3 days prior to arrival  Has VPS  CT Chest/CT A/P  No evidence of intra-abdominal abscess. Clear chest.  CT without collection around VPS; CT new SDH  UA neg, UCX pending  RVP neg  VPS fluid reassuring  BCX  with Kleb S CTX  Presentation likely due to GN bacteremia 2/2 prostatitis (recent biopsy)  Overall, Fever, SDH, prostate biopsy/recent UTI  - Cipro 500mg q 12 through 5/12/23  - DC Ceftriaxone  - F/U BCXs  - Monitor for alternate sources    Delfino Garvin MD  Contact on TEAMS messaging from 9am - 5pm  From 5pm-9am, on weekends, or if no response call [phone]    I was physically present for the key portions of the evaluation and management service provided. I saw and examined the patient. I agree with the above history, physical, and plan except for any discrepancies which I have documented in “Attending Statements.” Please refer to “Attending Statements” for final plan.

## 2023-05-02 NOTE — PROGRESS NOTE ADULT - SUBJECTIVE AND OBJECTIVE BOX
Zoë Christy, PGY1  Internal Medicine    Patient is a 77y old  Male who presents with a chief complaint of AMS, fever (29 Apr 2023 07:44)      SUBJECTIVE/INTERVAL EVENTS:     MEDICATIONS  (STANDING):  atorvastatin 20 milliGRAM(s) Oral at bedtime  cefTRIAXone   IVPB 2000 milliGRAM(s) IV Intermittent every 24 hours  dextrose 5%. 1000 milliLiter(s) (100 mL/Hr) IV Continuous <Continuous>  dextrose 5%. 1000 milliLiter(s) (50 mL/Hr) IV Continuous <Continuous>  dextrose 50% Injectable 25 Gram(s) IV Push once  dextrose 50% Injectable 12.5 Gram(s) IV Push once  dextrose 50% Injectable 25 Gram(s) IV Push once  enoxaparin Injectable 40 milliGRAM(s) SubCutaneous every 24 hours  glucagon  Injectable 1 milliGRAM(s) IntraMuscular once  insulin glargine Injectable (LANTUS) 20 Unit(s) SubCutaneous at bedtime  insulin lispro (ADMELOG) corrective regimen sliding scale   SubCutaneous three times a day before meals  insulin lispro (ADMELOG) corrective regimen sliding scale   SubCutaneous at bedtime  insulin lispro Injectable (ADMELOG) 7 Unit(s) SubCutaneous three times a day before meals  levETIRAcetam 250 milliGRAM(s) Oral two times a day    MEDICATIONS  (PRN):  dextrose Oral Gel 15 Gram(s) Oral once PRN Blood Glucose LESS THAN 70 milliGRAM(s)/deciliter      VITAL SIGNS:  T(F): 98.2 (05-02-23 @ 01:41), Max: 98.2 (05-01-23 @ 10:00)  HR: 88 (05-02-23 @ 01:41) (87 - 89)  BP: 128/70 (05-02-23 @ 01:41) (128/70 - 155/96)  RR: 18 (05-02-23 @ 01:41) (16 - 18)  SpO2: 99% (05-02-23 @ 01:41) (99% - 100%)    I&O's Summary    Daily     Daily     PHYSICAL EXAM:  Gen: Alert, NAD  HEENT: NCAT, conjunctiva clear, sclera anicteric, no erythema or exudates in the oropharynx, mmm  Neck: Supple, no JVD  CV: RRR, S1S2, no m/r/g  Resp: CTAB, normal respiratory effort  Abd: Soft, nontender, nondistended, normal bowel sounds  Ext: no edema, no clubbing or cyanosis  Neuro: AOx3, CN2-12 grossly intact, MUHAMMAD  SKIN: warm, perfused    LABS:                        14.9   9.37  )-----------( 206      ( 01 May 2023 12:28 )             44.4     Hgb Trend: 14.9<--, 15.0<--, 15.0<--, 15.0<--, 16.0<--  05-01    136  |  101  |  12  ----------------------------<  152<H>  4.0   |  24  |  0.77    Ca    9.1      01 May 2023 12:28  Phos  2.7     05-01  Mg     2.10     05-01    TPro  7.2  /  Alb  3.9  /  TBili  0.7  /  DBili  x   /  AST  24  /  ALT  21  /  AlkPhos  74  05-01    Creatinine Trend: 0.77<--, 0.83<--, 1.27<--, 1.02<--, 1.19<--  LIVER FUNCTIONS - ( 01 May 2023 12:28 )  Alb: 3.9 g/dL / Pro: 7.2 g/dL / ALK PHOS: 74 U/L / ALT: 21 U/L / AST: 24 U/L / GGT: x                     CAPILLARY BLOOD GLUCOSE      POCT Blood Glucose.: 253 mg/dL (01 May 2023 21:41)  POCT Blood Glucose.: 191 mg/dL (01 May 2023 17:54)  POCT Blood Glucose.: 135 mg/dL (01 May 2023 12:31)  POCT Blood Glucose.: 133 mg/dL (01 May 2023 08:49)  POCT Blood Glucose.: 120 mg/dL (01 May 2023 07:36)      RADIOLOGY & ADDITIONAL TESTS: Reviewed    Imaging Personally Reviewed:    Consultant(s) Notes Reviewed:      Care Discussed with Consultants/Other Providers:   Zoë Christy, PGY1  Internal Medicine    Patient is a 77y old  Male who presents with a chief complaint of AMS, fever (29 Apr 2023 07:44)      SUBJECTIVE/INTERVAL EVENTS: No acute events overnight. Will obtain repeat EKG to monitor for QT interval iso future possible QT-prolonging antibiotic therapy per ID.    MEDICATIONS  (STANDING):  atorvastatin 20 milliGRAM(s) Oral at bedtime  cefTRIAXone   IVPB 2000 milliGRAM(s) IV Intermittent every 24 hours  dextrose 5%. 1000 milliLiter(s) (100 mL/Hr) IV Continuous <Continuous>  dextrose 5%. 1000 milliLiter(s) (50 mL/Hr) IV Continuous <Continuous>  dextrose 50% Injectable 25 Gram(s) IV Push once  dextrose 50% Injectable 12.5 Gram(s) IV Push once  dextrose 50% Injectable 25 Gram(s) IV Push once  enoxaparin Injectable 40 milliGRAM(s) SubCutaneous every 24 hours  glucagon  Injectable 1 milliGRAM(s) IntraMuscular once  insulin glargine Injectable (LANTUS) 20 Unit(s) SubCutaneous at bedtime  insulin lispro (ADMELOG) corrective regimen sliding scale   SubCutaneous three times a day before meals  insulin lispro (ADMELOG) corrective regimen sliding scale   SubCutaneous at bedtime  insulin lispro Injectable (ADMELOG) 7 Unit(s) SubCutaneous three times a day before meals  levETIRAcetam 250 milliGRAM(s) Oral two times a day    MEDICATIONS  (PRN):  dextrose Oral Gel 15 Gram(s) Oral once PRN Blood Glucose LESS THAN 70 milliGRAM(s)/deciliter      VITAL SIGNS:  T(F): 98.2 (05-02-23 @ 01:41), Max: 98.2 (05-01-23 @ 10:00)  HR: 88 (05-02-23 @ 01:41) (87 - 89)  BP: 128/70 (05-02-23 @ 01:41) (128/70 - 155/96)  RR: 18 (05-02-23 @ 01:41) (16 - 18)  SpO2: 99% (05-02-23 @ 01:41) (99% - 100%)    I&O's Summary    Daily     Daily     PHYSICAL EXAM:  Gen: Alert, NAD  HEENT: NCAT, conjunctiva clear, sclera anicteric, no erythema or exudates in the oropharynx, mmm  Neck: Supple, no JVD  CV: RRR, S1S2, no m/r/g  Resp: CTAB, normal respiratory effort  Abd: Soft, nontender, nondistended, normal bowel sounds  Ext: no edema, no clubbing or cyanosis  Neuro: AOx1, CN2-12 grossly intact, MUHAMMAD  SKIN: warm, perfused    LABS:                        14.9   9.37  )-----------( 206      ( 01 May 2023 12:28 )             44.4     Hgb Trend: 14.9<--, 15.0<--, 15.0<--, 15.0<--, 16.0<--  05-01    136  |  101  |  12  ----------------------------<  152<H>  4.0   |  24  |  0.77    Ca    9.1      01 May 2023 12:28  Phos  2.7     05-01  Mg     2.10     05-01    TPro  7.2  /  Alb  3.9  /  TBili  0.7  /  DBili  x   /  AST  24  /  ALT  21  /  AlkPhos  74  05-01    Creatinine Trend: 0.77<--, 0.83<--, 1.27<--, 1.02<--, 1.19<--  LIVER FUNCTIONS - ( 01 May 2023 12:28 )  Alb: 3.9 g/dL / Pro: 7.2 g/dL / ALK PHOS: 74 U/L / ALT: 21 U/L / AST: 24 U/L / GGT: x                     CAPILLARY BLOOD GLUCOSE      POCT Blood Glucose.: 253 mg/dL (01 May 2023 21:41)  POCT Blood Glucose.: 191 mg/dL (01 May 2023 17:54)  POCT Blood Glucose.: 135 mg/dL (01 May 2023 12:31)  POCT Blood Glucose.: 133 mg/dL (01 May 2023 08:49)  POCT Blood Glucose.: 120 mg/dL (01 May 2023 07:36)      RADIOLOGY & ADDITIONAL TESTS: Reviewed    Imaging Personally Reviewed:    Consultant(s) Notes Reviewed:      Care Discussed with Consultants/Other Providers:

## 2023-05-02 NOTE — PROGRESS NOTE ADULT - PROBLEM SELECTOR PLAN 1
- presented SIRS+ : Tachycardic to 137, Febrile to 40.5, unknown source as of now  - UA bland, however follow up urine culture given recent prostate biopsy 2 days ago and family reporting pt has hx of previous post bx prostatitis  - Antibiotic treatment: s/p merepenem (s/p1x  Vanc ON at RRT)  - Per ID recs on 4/30: switch patient to ceftriaxone IV  - Follow up blood cultures  - no leukocytosis  - fever improved after iv ofirmev and antibiotics, will ctm - presented SIRS+ : Tachycardic to 137, Febrile to 40.5, unknown source as of now  - UA bland, however follow up urine culture given recent prostate biopsy 2 days ago and family reporting pt has hx of previous post bx prostatitis  - Antibiotic treatment: s/p merepenem (s/p1x  Vanc ON at RRT)  - Per ID recs on 4/30: switch patient to ceftriaxone IV  - Follow up blood cultures  - no leukocytosis  - fever improved after iv ofirmev and antibiotics, will ctm  - PER ID : okay to discharge on a po levaquin regimen for duration of 2 weeks, monitor QTc - repeat EKG

## 2023-05-03 ENCOUNTER — TRANSCRIPTION ENCOUNTER (OUTPATIENT)
Age: 78
End: 2023-05-03

## 2023-05-03 LAB
ALBUMIN SERPL ELPH-MCNC: 3.5 G/DL — SIGNIFICANT CHANGE UP (ref 3.3–5)
ALP SERPL-CCNC: 68 U/L — SIGNIFICANT CHANGE UP (ref 40–120)
ALT FLD-CCNC: 35 U/L — SIGNIFICANT CHANGE UP (ref 4–41)
ANION GAP SERPL CALC-SCNC: 12 MMOL/L — SIGNIFICANT CHANGE UP (ref 7–14)
AST SERPL-CCNC: 28 U/L — SIGNIFICANT CHANGE UP (ref 4–40)
BASOPHILS # BLD AUTO: 0.07 K/UL — SIGNIFICANT CHANGE UP (ref 0–0.2)
BASOPHILS NFR BLD AUTO: 0.8 % — SIGNIFICANT CHANGE UP (ref 0–2)
BILIRUB SERPL-MCNC: 0.6 MG/DL — SIGNIFICANT CHANGE UP (ref 0.2–1.2)
BUN SERPL-MCNC: 14 MG/DL — SIGNIFICANT CHANGE UP (ref 7–23)
CALCIUM SERPL-MCNC: 9 MG/DL — SIGNIFICANT CHANGE UP (ref 8.4–10.5)
CHLORIDE SERPL-SCNC: 103 MMOL/L — SIGNIFICANT CHANGE UP (ref 98–107)
CO2 SERPL-SCNC: 23 MMOL/L — SIGNIFICANT CHANGE UP (ref 22–31)
CREAT SERPL-MCNC: 0.77 MG/DL — SIGNIFICANT CHANGE UP (ref 0.5–1.3)
EGFR: 92 ML/MIN/1.73M2 — SIGNIFICANT CHANGE UP
EOSINOPHIL # BLD AUTO: 0.49 K/UL — SIGNIFICANT CHANGE UP (ref 0–0.5)
EOSINOPHIL NFR BLD AUTO: 5.8 % — SIGNIFICANT CHANGE UP (ref 0–6)
GLUCOSE BLDC GLUCOMTR-MCNC: 122 MG/DL — HIGH (ref 70–99)
GLUCOSE BLDC GLUCOMTR-MCNC: 125 MG/DL — HIGH (ref 70–99)
GLUCOSE BLDC GLUCOMTR-MCNC: 159 MG/DL — HIGH (ref 70–99)
GLUCOSE BLDC GLUCOMTR-MCNC: 220 MG/DL — HIGH (ref 70–99)
GLUCOSE SERPL-MCNC: 134 MG/DL — HIGH (ref 70–99)
HCT VFR BLD CALC: 49.3 % — SIGNIFICANT CHANGE UP (ref 39–50)
HGB BLD-MCNC: 16 G/DL — SIGNIFICANT CHANGE UP (ref 13–17)
IANC: 5.39 K/UL — SIGNIFICANT CHANGE UP (ref 1.8–7.4)
IMM GRANULOCYTES NFR BLD AUTO: 0.4 % — SIGNIFICANT CHANGE UP (ref 0–0.9)
LYMPHOCYTES # BLD AUTO: 1.52 K/UL — SIGNIFICANT CHANGE UP (ref 1–3.3)
LYMPHOCYTES # BLD AUTO: 18 % — SIGNIFICANT CHANGE UP (ref 13–44)
MAGNESIUM SERPL-MCNC: 2.1 MG/DL — SIGNIFICANT CHANGE UP (ref 1.6–2.6)
MCHC RBC-ENTMCNC: 28.3 PG — SIGNIFICANT CHANGE UP (ref 27–34)
MCHC RBC-ENTMCNC: 32.5 GM/DL — SIGNIFICANT CHANGE UP (ref 32–36)
MCV RBC AUTO: 87.1 FL — SIGNIFICANT CHANGE UP (ref 80–100)
MONOCYTES # BLD AUTO: 0.95 K/UL — HIGH (ref 0–0.9)
MONOCYTES NFR BLD AUTO: 11.2 % — SIGNIFICANT CHANGE UP (ref 2–14)
NEUTROPHILS # BLD AUTO: 5.39 K/UL — SIGNIFICANT CHANGE UP (ref 1.8–7.4)
NEUTROPHILS NFR BLD AUTO: 63.8 % — SIGNIFICANT CHANGE UP (ref 43–77)
NRBC # BLD: 0 /100 WBCS — SIGNIFICANT CHANGE UP (ref 0–0)
NRBC # FLD: 0 K/UL — SIGNIFICANT CHANGE UP (ref 0–0)
PHOSPHATE SERPL-MCNC: 3.6 MG/DL — SIGNIFICANT CHANGE UP (ref 2.5–4.5)
PLATELET # BLD AUTO: 229 K/UL — SIGNIFICANT CHANGE UP (ref 150–400)
POTASSIUM SERPL-MCNC: 4.1 MMOL/L — SIGNIFICANT CHANGE UP (ref 3.5–5.3)
POTASSIUM SERPL-SCNC: 4.1 MMOL/L — SIGNIFICANT CHANGE UP (ref 3.5–5.3)
PROT SERPL-MCNC: 7 G/DL — SIGNIFICANT CHANGE UP (ref 6–8.3)
RBC # BLD: 5.66 M/UL — SIGNIFICANT CHANGE UP (ref 4.2–5.8)
RBC # FLD: 13.2 % — SIGNIFICANT CHANGE UP (ref 10.3–14.5)
SODIUM SERPL-SCNC: 138 MMOL/L — SIGNIFICANT CHANGE UP (ref 135–145)
WBC # BLD: 8.45 K/UL — SIGNIFICANT CHANGE UP (ref 3.8–10.5)
WBC # FLD AUTO: 8.45 K/UL — SIGNIFICANT CHANGE UP (ref 3.8–10.5)

## 2023-05-03 PROCEDURE — 99239 HOSP IP/OBS DSCHRG MGMT >30: CPT

## 2023-05-03 PROCEDURE — 99232 SBSQ HOSP IP/OBS MODERATE 35: CPT

## 2023-05-03 RX ORDER — INSULIN LISPRO 100/ML
16 VIAL (ML) SUBCUTANEOUS
Refills: 0 | DISCHARGE

## 2023-05-03 RX ORDER — CIPROFLOXACIN LACTATE 400MG/40ML
1 VIAL (ML) INTRAVENOUS
Qty: 20 | Refills: 0
Start: 2023-05-03 | End: 2023-05-12

## 2023-05-03 RX ORDER — CIPROFLOXACIN LACTATE 400MG/40ML
1 VIAL (ML) INTRAVENOUS
Qty: 18 | Refills: 0
Start: 2023-05-03 | End: 2023-05-11

## 2023-05-03 RX ORDER — INSULIN GLARGINE 100 [IU]/ML
30 INJECTION, SOLUTION SUBCUTANEOUS
Refills: 0 | DISCHARGE

## 2023-05-03 RX ORDER — CIPROFLOXACIN LACTATE 400MG/40ML
1 VIAL (ML) INTRAVENOUS
Refills: 0
Start: 2023-05-03

## 2023-05-03 RX ADMIN — Medication 7 UNIT(S): at 10:05

## 2023-05-03 RX ADMIN — Medication 2: at 13:18

## 2023-05-03 RX ADMIN — Medication 7 UNIT(S): at 18:22

## 2023-05-03 RX ADMIN — Medication 500 MILLIGRAM(S): at 21:58

## 2023-05-03 RX ADMIN — ENOXAPARIN SODIUM 40 MILLIGRAM(S): 100 INJECTION SUBCUTANEOUS at 13:20

## 2023-05-03 RX ADMIN — Medication 500 MILLIGRAM(S): at 10:10

## 2023-05-03 RX ADMIN — INSULIN GLARGINE 20 UNIT(S): 100 INJECTION, SOLUTION SUBCUTANEOUS at 22:08

## 2023-05-03 RX ADMIN — ATORVASTATIN CALCIUM 20 MILLIGRAM(S): 80 TABLET, FILM COATED ORAL at 21:59

## 2023-05-03 RX ADMIN — LEVETIRACETAM 250 MILLIGRAM(S): 250 TABLET, FILM COATED ORAL at 07:17

## 2023-05-03 RX ADMIN — LEVETIRACETAM 250 MILLIGRAM(S): 250 TABLET, FILM COATED ORAL at 18:23

## 2023-05-03 RX ADMIN — Medication 7 UNIT(S): at 13:19

## 2023-05-03 NOTE — PROGRESS NOTE ADULT - SUBJECTIVE AND OBJECTIVE BOX
Zoë Christy, PGY1  Internal Medicine    Patient is a 77y old  Male who presents with a chief complaint of ams and fever (02 May 2023 07:08)      SUBJECTIVE/INTERVAL EVENTS: Afebrile overnight.       MEDICATIONS  (STANDING):  atorvastatin 20 milliGRAM(s) Oral at bedtime  ciprofloxacin     Tablet 500 milliGRAM(s) Oral every 12 hours  dextrose 5%. 1000 milliLiter(s) (100 mL/Hr) IV Continuous <Continuous>  dextrose 5%. 1000 milliLiter(s) (50 mL/Hr) IV Continuous <Continuous>  dextrose 50% Injectable 25 Gram(s) IV Push once  dextrose 50% Injectable 12.5 Gram(s) IV Push once  dextrose 50% Injectable 25 Gram(s) IV Push once  enoxaparin Injectable 40 milliGRAM(s) SubCutaneous every 24 hours  glucagon  Injectable 1 milliGRAM(s) IntraMuscular once  insulin glargine Injectable (LANTUS) 20 Unit(s) SubCutaneous at bedtime  insulin lispro (ADMELOG) corrective regimen sliding scale   SubCutaneous three times a day before meals  insulin lispro (ADMELOG) corrective regimen sliding scale   SubCutaneous at bedtime  insulin lispro Injectable (ADMELOG) 7 Unit(s) SubCutaneous three times a day before meals  levETIRAcetam 250 milliGRAM(s) Oral two times a day    MEDICATIONS  (PRN):  dextrose Oral Gel 15 Gram(s) Oral once PRN Blood Glucose LESS THAN 70 milliGRAM(s)/deciliter      VITAL SIGNS:  T(F): 98 (05-03-23 @ 06:27), Max: 98.4 (05-02-23 @ 13:42)  HR: 91 (05-03-23 @ 06:27) (83 - 91)  BP: 153/97 (05-03-23 @ 06:27) (127/70 - 153/97)  RR: 18 (05-03-23 @ 06:27) (18 - 18)  SpO2: 100% (05-03-23 @ 06:27) (96% - 100%)    I&O's Summary    02 May 2023 07:01  -  03 May 2023 07:00  --------------------------------------------------------  IN: 975 mL / OUT: 350 mL / NET: 625 mL      Daily     Daily     PHYSICAL EXAM:  Gen: Alert, NAD  HEENT: NCAT, conjunctiva clear, sclera anicteric, no erythema or exudates in the oropharynx, mmm  Neck: Supple, no JVD  CV: RRR, S1S2, no m/r/g  Resp: CTAB, normal respiratory effort  Abd: Soft, nontender, nondistended, normal bowel sounds  Ext: no edema, no clubbing or cyanosis  Neuro: AOx3, CN2-12 grossly intact, MUHAMMAD  SKIN: warm, perfused    LABS:                        16.0   8.45  )-----------( 229      ( 03 May 2023 04:18 )             49.3     Hgb Trend: 16.0<--, 15.2<--, 14.9<--, 15.0<--, 15.0<--  05-03    138  |  103  |  14  ----------------------------<  134<H>  4.1   |  23  |  0.77    Ca    9.0      03 May 2023 04:18  Phos  3.6     05-03  Mg     2.10     05-03    TPro  7.0  /  Alb  3.5  /  TBili  0.6  /  DBili  x   /  AST  28  /  ALT  35  /  AlkPhos  68  05-03    Creatinine Trend: 0.77<--, 0.95<--, 0.77<--, 0.83<--, 1.27<--, 1.02<--  LIVER FUNCTIONS - ( 03 May 2023 04:18 )  Alb: 3.5 g/dL / Pro: 7.0 g/dL / ALK PHOS: 68 U/L / ALT: 35 U/L / AST: 28 U/L / GGT: x                     CAPILLARY BLOOD GLUCOSE      POCT Blood Glucose.: 113 mg/dL (02 May 2023 22:21)  POCT Blood Glucose.: 170 mg/dL (02 May 2023 17:50)  POCT Blood Glucose.: 183 mg/dL (02 May 2023 12:27)  POCT Blood Glucose.: 125 mg/dL (02 May 2023 08:42)      RADIOLOGY & ADDITIONAL TESTS: Reviewed    Imaging Personally Reviewed:    Consultant(s) Notes Reviewed:      Care Discussed with Consultants/Other Providers:   Zoë Christy, PGY1  Internal Medicine    Patient is a 77y old  Male who presents with a chief complaint of ams and fever (02 May 2023 07:08)      SUBJECTIVE/INTERVAL EVENTS: Afebrile overnight. States that he feels fine. No weakness or numbness/tingling/loss of sensation.   No nausea/vomiting/CP, SOB.     MEDICATIONS  (STANDING):  atorvastatin 20 milliGRAM(s) Oral at bedtime  ciprofloxacin     Tablet 500 milliGRAM(s) Oral every 12 hours  dextrose 5%. 1000 milliLiter(s) (100 mL/Hr) IV Continuous <Continuous>  dextrose 5%. 1000 milliLiter(s) (50 mL/Hr) IV Continuous <Continuous>  dextrose 50% Injectable 25 Gram(s) IV Push once  dextrose 50% Injectable 12.5 Gram(s) IV Push once  dextrose 50% Injectable 25 Gram(s) IV Push once  enoxaparin Injectable 40 milliGRAM(s) SubCutaneous every 24 hours  glucagon  Injectable 1 milliGRAM(s) IntraMuscular once  insulin glargine Injectable (LANTUS) 20 Unit(s) SubCutaneous at bedtime  insulin lispro (ADMELOG) corrective regimen sliding scale   SubCutaneous three times a day before meals  insulin lispro (ADMELOG) corrective regimen sliding scale   SubCutaneous at bedtime  insulin lispro Injectable (ADMELOG) 7 Unit(s) SubCutaneous three times a day before meals  levETIRAcetam 250 milliGRAM(s) Oral two times a day    MEDICATIONS  (PRN):  dextrose Oral Gel 15 Gram(s) Oral once PRN Blood Glucose LESS THAN 70 milliGRAM(s)/deciliter      VITAL SIGNS:  T(F): 98 (05-03-23 @ 06:27), Max: 98.4 (05-02-23 @ 13:42)  HR: 91 (05-03-23 @ 06:27) (83 - 91)  BP: 153/97 (05-03-23 @ 06:27) (127/70 - 153/97)  RR: 18 (05-03-23 @ 06:27) (18 - 18)  SpO2: 100% (05-03-23 @ 06:27) (96% - 100%)    I&O's Summary    02 May 2023 07:01  -  03 May 2023 07:00  --------------------------------------------------------  IN: 975 mL / OUT: 350 mL / NET: 625 mL      Daily     Daily     PHYSICAL EXAM:  Gen: Alert, NAD  HEENT: NCAT, conjunctiva clear, sclera anicteric, no erythema or exudates in the oropharynx, mmm  Neck: Supple, no JVD  CV: RRR, S1S2, no m/r/g  Resp: CTAB, normal respiratory effort  Abd: Soft, nontender, nondistended, normal bowel sounds  Ext: no edema, no clubbing or cyanosis  Neuro: AOx3, CN2-12 grossly intact, MUHAMMAD  SKIN: warm, perfused    LABS:                        16.0   8.45  )-----------( 229      ( 03 May 2023 04:18 )             49.3     Hgb Trend: 16.0<--, 15.2<--, 14.9<--, 15.0<--, 15.0<--  05-03    138  |  103  |  14  ----------------------------<  134<H>  4.1   |  23  |  0.77    Ca    9.0      03 May 2023 04:18  Phos  3.6     05-03  Mg     2.10     05-03    TPro  7.0  /  Alb  3.5  /  TBili  0.6  /  DBili  x   /  AST  28  /  ALT  35  /  AlkPhos  68  05-03    Creatinine Trend: 0.77<--, 0.95<--, 0.77<--, 0.83<--, 1.27<--, 1.02<--  LIVER FUNCTIONS - ( 03 May 2023 04:18 )  Alb: 3.5 g/dL / Pro: 7.0 g/dL / ALK PHOS: 68 U/L / ALT: 35 U/L / AST: 28 U/L / GGT: x                     CAPILLARY BLOOD GLUCOSE      POCT Blood Glucose.: 113 mg/dL (02 May 2023 22:21)  POCT Blood Glucose.: 170 mg/dL (02 May 2023 17:50)  POCT Blood Glucose.: 183 mg/dL (02 May 2023 12:27)  POCT Blood Glucose.: 125 mg/dL (02 May 2023 08:42)      RADIOLOGY & ADDITIONAL TESTS: Reviewed    Imaging Personally Reviewed:    Consultant(s) Notes Reviewed:      Care Discussed with Consultants/Other Providers:

## 2023-05-03 NOTE — DISCHARGE NOTE NURSING/CASE MANAGEMENT/SOCIAL WORK - PATIENT PORTAL LINK FT
You can access the FollowMyHealth Patient Portal offered by [facility] by registering at the following website: [email or web address]. By joining [employer]’s FollowMyHealth portal, you will also be able to view your health information using other applications (apps) compatible with our system.

## 2023-05-03 NOTE — PROGRESS NOTE ADULT - ASSESSMENT
77 M w PMHx of DM2 with Neuropathy, TIAs (previously on Aggrenox), HTN, and HLD, subdural hematoma s/p Left MMA embolization (August 2022), NPH s/p  shunt (Feb 2023) and recent prostate bx 2 days ago for hx pf prostate cancer not on treatment per family now presented today with AMS and fever  Fever, no leukocytosis  Prostate biopsy 3 days prior to arrival  Has VPS  CT Chest/CT A/P  No evidence of intra-abdominal abscess. Clear chest.  CT without collection around VPS; CT new SDH  UA neg, UCX pending  RVP neg  VPS fluid reassuring  BCX  with Kleb S CTX  Presentation likely due to GN bacteremia 2/2 prostatitis (recent biopsy)  Overall, Fever, SDH, prostate biopsy/recent UTI  - Cipro 500mg q 12 through 5/12/23  - DC Ceftriaxone  - F/U BCXs  - Monitor for alternate sources  - F/U with urology    Signing off. Please call with further questions or change in status.    Delfino Garvin MD  Contact on TEAMS messaging from 9am - 5pm  From 5pm-9am, on weekends, or if no response call [phone]

## 2023-05-03 NOTE — PROGRESS NOTE ADULT - PROBLEM SELECTOR PLAN 1
- presented SIRS+ : Tachycardic to 137, Febrile to 40.5, unknown source as of now  - UA bland, however follow up urine culture given recent prostate biopsy 2 days ago and family reporting pt has hx of previous post bx prostatitis  - Antibiotic treatment: s/p merepenem (s/p1x  Vanc ON at RRT)  - Per ID recs on 4/30: switch patient to ceftriaxone IV  - Follow up blood cultures  - no leukocytosis  - fever improved after iv ofirmev and antibiotics, will ctm  - PER ID : okay to discharge on a po levaquin regimen for duration of 2 weeks, monitor QTc - repeat EKG

## 2023-05-03 NOTE — PROGRESS NOTE ADULT - PROBLEM SELECTOR PLAN 7
DVT Prophylaxis: SCDs  Dispo: Home health aides at home.  Per PT Consult -->[facility]  Code Status: Full Code  HCP: Wife, Glory Guallpa

## 2023-05-03 NOTE — PROGRESS NOTE ADULT - SUBJECTIVE AND OBJECTIVE BOX
CC: F/U for prostatitis    Saw/spoke to patient. no fevers, no chills. No new complaints.    Allergies  No Known Allergies    ANTIMICROBIALS:  ciprofloxacin     Tablet 500 every 12 hours    PE:    Vital Signs Last 24 Hrs  T(C): 36.7 (03 May 2023 10:20), Max: 36.9 (03 May 2023 07:16)  T(F): 98.1 (03 May 2023 10:20), Max: 98.4 (03 May 2023 07:16)  HR: 82 (03 May 2023 10:20) (82 - 91)  BP: 149/89 (03 May 2023 10:20) (145/88 - 153/97)  BP(mean): 118 (03 May 2023 06:27) (118 - 118)  RR: 17 (03 May 2023 10:20) (17 - 18)  SpO2: 100% (03 May 2023 10:20) (96% - 100%)    Gen: AOx3, NAD, non-toxic  CV: Nontachycardic  Resp: Breathing comfortably, RA  Abd: Soft, nontender  IV/Skin: No thrombophlebitis    LABS:                        16.0   8.45  )-----------( 229      ( 03 May 2023 04:18 )             49.3     05-03    138  |  103  |  14  ----------------------------<  134<H>  4.1   |  23  |  0.77    Ca    9.0      03 May 2023 04:18  Phos  3.6     05-03  Mg     2.10     05-03    TPro  7.0  /  Alb  3.5  /  TBili  0.6  /  DBili  x   /  AST  28  /  ALT  35  /  AlkPhos  68  05-03    MICROBIOLOGY:    .Blood Blood-Peripheral  04-30-23   No growth to date.  --  --    .Blood Blood-Venous  04-30-23   No growth to date.  --  --    .Blood Blood-Peripheral  04-29-23   No growth to date.  --  --    .Blood Blood-Peripheral  04-29-23   No growth to date.  --  --    .CSF CSF  04-28-23   No growth  --    No polymorphonuclear leukocytes seen  No organisms seen  by cytocentrifuge    Clean Catch Clean Catch (Midstream)  04-28-23   No growth  --  --    .Blood Blood-Peripheral  04-28-23   Growth in aerobic and anaerobic bottles: Klebsiella pneumoniae  See previous culture [id number]  --    Growth in aerobic and anaerobic bottles: Gram Negative Rods    .Blood Blood-Peripheral  04-28-23   Growth in aerobic and anaerobic bottles: Klebsiella pneumoniae  ***Blood Panel PCR results on this specimen are available  approximately 3 hours after the Gram stain result.***  Gram stain, PCR, and/or culture results may not always  correspond dueto difference in methodologies.  ************************************************************  This PCR assay was performed by multiplex PCR. This  Assay tests for 66 bacterial and resistance gene targets.  Please refer to the [facility] test directory  at [email or web address] for details.  --  Blood Culture PCR  Klebsiella pneumoniae    Rapid RVP Result: [place] (04-28 @ 01:55)    RADIOLOGY:    4/28 CT:    FINDINGS:  CHEST:  LUNGS AND LARGE AIRWAYS: Patent central airways. No pulmonary nodules. No   confluent airspace opacity.  PLEURA: No pleural effusion.  VESSELS: Within normal limits.  HEART: Heart size is normal. No pericardial effusion.  MEDIASTINUM AND VERO: No lymphadenopathy.  CHEST WALL AND LOWER NECK: Mild bilateral gynecomastia.    ABDOMEN AND PELVIS:  LIVER: Within normal limits.  BILE DUCTS: Normal caliber.  GALLBLADDER: Within normal limits.  SPLEEN: Within normal limits.  PANCREAS: Within normal limits.  ADRENALS: Within normal limits.  KIDNEYS/URETERS: Within normal limits.    BLADDER: Minimally distended.  REPRODUCTIVE ORGANS: The prostate is prominent. The seminal vesicles are   symmetric.    BOWEL: No bowel obstruction. Appendix is normal. There is no mesenteric   adenopathy.  PERITONEUM: Right-sided  shunt extending along the right lateral   peritoneal cavity. No adjacent fluid collection is identified.  VESSELS: Within normal limits.  RETROPERITONEUM/LYMPH NODES: No lymphadenopathy.  ABDOMINAL WALL: Within normal limits.  BONES: Within normal limits.    IMPRESSION: No evidence of intra-abdominal abscess. Clear chest.

## 2023-05-03 NOTE — PROGRESS NOTE ADULT - PROBLEM SELECTOR PLAN 3
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 Baez (Outpatient urologist) for further collateral at [phone]  pt does not have dysuria, hematuria, pain in the genital area, groin, lower abdomen, or lower back. however does endorse urinary frequency/nocturia

## 2023-05-03 NOTE — DISCHARGE NOTE NURSING/CASE MANAGEMENT/SOCIAL WORK - NSDCFUADDAPPT_GEN_ALL_CORE_FT
You have an appointment with Dr. Jamal Isabel on 5/17/2023 at [address] [place] [address]. You can call [phone] if you have any concerns.    Please also follow up with your urologist Dr. Delfino Pereyra, with regards to your prostate cancer. You had a biopsy for this, so please follow up with them to discuss next steps, if any.

## 2023-05-03 NOTE — PROGRESS NOTE ADULT - ATTENDING COMMENTS
resolved bacteremia and sepsis  switched to po Cipro to comeplet through 5/12 per ID  stable for dc to [facility]  updated SW and CM - dw Wife  Time spent 35 min resolved bacteremia and sepsis  switched to po Cipro to complete through 5/12 per ID  stable for dc to [facility]MILAYO  updated SW and CM - dw Wife  Time spent 35 min      Addendum - pt and wife now declining rehab - dc home w home PT

## 2023-05-04 LAB
CULTURE RESULTS: SIGNIFICANT CHANGE UP
CULTURE RESULTS: SIGNIFICANT CHANGE UP
GLUCOSE BLDC GLUCOMTR-MCNC: 131 MG/DL — HIGH (ref 70–99)
GLUCOSE BLDC GLUCOMTR-MCNC: 181 MG/DL — HIGH (ref 70–99)
GLUCOSE BLDC GLUCOMTR-MCNC: 219 MG/DL — HIGH (ref 70–99)
GLUCOSE BLDC GLUCOMTR-MCNC: 238 MG/DL — HIGH (ref 70–99)
SPECIMEN SOURCE: SIGNIFICANT CHANGE UP
SPECIMEN SOURCE: SIGNIFICANT CHANGE UP

## 2023-05-04 PROCEDURE — 99232 SBSQ HOSP IP/OBS MODERATE 35: CPT | Mod: GC

## 2023-05-04 RX ORDER — LOSARTAN POTASSIUM 100 MG/1
100 TABLET, FILM COATED ORAL DAILY
Refills: 0 | Status: DISCONTINUED | OUTPATIENT
Start: 2023-05-04 | End: 2023-05-08

## 2023-05-04 RX ORDER — LOSARTAN POTASSIUM 100 MG/1
100 TABLET, FILM COATED ORAL DAILY
Refills: 0 | Status: DISCONTINUED | OUTPATIENT
Start: 2023-05-04 | End: 2023-05-04

## 2023-05-04 RX ADMIN — LOSARTAN POTASSIUM 100 MILLIGRAM(S): 100 TABLET, FILM COATED ORAL at 18:30

## 2023-05-04 RX ADMIN — INSULIN GLARGINE 20 UNIT(S): 100 INJECTION, SOLUTION SUBCUTANEOUS at 21:35

## 2023-05-04 RX ADMIN — Medication 4: at 12:50

## 2023-05-04 RX ADMIN — Medication 500 MILLIGRAM(S): at 09:33

## 2023-05-04 RX ADMIN — LEVETIRACETAM 250 MILLIGRAM(S): 250 TABLET, FILM COATED ORAL at 18:28

## 2023-05-04 RX ADMIN — ATORVASTATIN CALCIUM 20 MILLIGRAM(S): 80 TABLET, FILM COATED ORAL at 21:20

## 2023-05-04 RX ADMIN — ENOXAPARIN SODIUM 40 MILLIGRAM(S): 100 INJECTION SUBCUTANEOUS at 13:21

## 2023-05-04 RX ADMIN — Medication 7 UNIT(S): at 09:31

## 2023-05-04 RX ADMIN — Medication 7 UNIT(S): at 12:51

## 2023-05-04 RX ADMIN — LEVETIRACETAM 250 MILLIGRAM(S): 250 TABLET, FILM COATED ORAL at 05:42

## 2023-05-04 RX ADMIN — Medication 4: at 18:24

## 2023-05-04 RX ADMIN — Medication 500 MILLIGRAM(S): at 21:20

## 2023-05-04 RX ADMIN — Medication 7 UNIT(S): at 18:24

## 2023-05-04 NOTE — PROGRESS NOTE ADULT - SUBJECTIVE AND OBJECTIVE BOX
Medicine Progress Note  --------------------  Delfino Harmon M.D.  Internal Medicine/Emergency Medicine  PGY-2  --------------------      Patient: SOTO WHITNEY, MRN: [id number], : 1945  Admitted on 23 for Sepsis    OVERNIGHT EVENTS  NAEON    SUBJECTIVE  -       ROS negative unless noted above.  ------------------------------------------------------------------------------------------------------------  OBJECTIVE:  Physical Exam  GEN: Awake, AOx2  HEENT: NCAT  CARDIO: RRR. Normal S1/S2, no m/r/g. No JVD.  RESP: CTAB  ABD: Soft, NTND. .  MSK: No obvious deformity or ROM deficit.   SKIN: Warm, dry..  NEURO: Moves all four extremities spontaneously  PSYCH: Appropriate mood & affect.      Vital Signs Last 24 Hrs  T(F): 98.2, Max: 98.4 (23 @ 07:16)  HR: 96 (82 - 96)  BP: 123/71 (123/71 - 149/89)  RR: 16 (16 - 18)  SpO2: 99% (99% - 100%)        DAILY MEASUREMENTS:  I&O's Summary    02 May 2023 07:01  -  03 May 2023 07:00  --------------------------------------------------------  IN: 975 mL / OUT: 350 mL / NET: 625 mL      Daily     Daily     Orthostatic VS      LABS:                        16.0   8.45  )-----------( 229      ( 03 May 2023 04:18 )             49.3     Hgb Trend: 16.0<--, 15.2<--, 14.9<--, 15.0<--, 15.0<--  05-03    138  |  103  |  14  ----------------------------<  134<H>  4.1   |  23  |  0.77    Ca    9.0      03 May 2023 04:18  Phos  3.6     05-  Mg     2.10         TPro  7.0  /  Alb  3.5  /  TBili  0.6  /  DBili  x   /  AST  28  /  ALT  35  /  AlkPhos  68  05      CAPILLARY BLOOD GLUCOSE      POCT Blood Glucose.: 220 mg/dL (03 May 2023 22:07)  POCT Blood Glucose.: 122 mg/dL (03 May 2023 17:59)  POCT Blood Glucose.: 159 mg/dL (03 May 2023 12:28)  POCT Blood Glucose.: 125 mg/dL (03 May 2023 08:36)                  RADIOLOGY & ADDITIONAL TESTS:  Results Reviewed:     Imaging Reviewed:  Electrocardiogram Reviewed:      ------------------------------------------------------------------------------------------------------------  MEDICATIONS  (STANDING):  atorvastatin 20 milliGRAM(s) Oral at bedtime  ciprofloxacin     Tablet 500 milliGRAM(s) Oral every 12 hours  dextrose 5%. 1000 milliLiter(s) (100 mL/Hr) IV Continuous <Continuous>  dextrose 5%. 1000 milliLiter(s) (50 mL/Hr) IV Continuous <Continuous>  dextrose 50% Injectable 25 Gram(s) IV Push once  dextrose 50% Injectable 12.5 Gram(s) IV Push once  dextrose 50% Injectable 25 Gram(s) IV Push once  enoxaparin Injectable 40 milliGRAM(s) SubCutaneous every 24 hours  glucagon  Injectable 1 milliGRAM(s) IntraMuscular once  insulin glargine Injectable (LANTUS) 20 Unit(s) SubCutaneous at bedtime  insulin lispro (ADMELOG) corrective regimen sliding scale   SubCutaneous three times a day before meals  insulin lispro (ADMELOG) corrective regimen sliding scale   SubCutaneous at bedtime  insulin lispro Injectable (ADMELOG) 7 Unit(s) SubCutaneous three times a day before meals  levETIRAcetam 250 milliGRAM(s) Oral two times a day    MEDICATIONS  (PRN):  dextrose Oral Gel 15 Gram(s) Oral once PRN Blood Glucose LESS THAN 70 milliGRAM(s)/deciliter    ------------------------------------------------------------------------------------------------------------  COORDINATION OF CARE:  Care discussed with consultants/other providers and notes reviewed [Y]     Medicine Progress Note  --------------------  Delfino Harmon M.D.  Internal Medicine/Emergency Medicine  PGY-2  --------------------      Patient: SOTO GUALLPA, MRN: [id number], : 1945  Admitted on 23 for Sepsis    OVERNIGHT EVENTS  Complex familyi discussion regarding safety of a home discharge, which is pt & wife (HCP) preference, but provider & pt brother emphasize significant risks given in-hospital decondition ico already vulnerable movement status. Favor [facility]    SUBJECTIVE  Mr. Guallpa expresses understanding of his condition and that he wants to go home.       ROS negative unless noted above.  ------------------------------------------------------------------------------------------------------------  OBJECTIVE:  Physical Exam  GEN: Awake, AOx2  HEENT: NCAT  CARDIO: RRR.   RESP: Normal respiratory effort  ABD: Soft, NTND. .  MSK: No obvious deformity or ROM deficit.   SKIN: Warm, dry.  NEURO: Moves all four extremities spontaneously; still weak in b/l LE; pt ambulatory support will be deferred to PT  PSYCH: Appropriate mood & affect.      Vital Signs Last 24 Hrs  T(F): 98.2, Max: 98.4 (23 @ 07:16)  HR: 96 (82 - 96)  BP: 123/71 (123/71 - 149/89)  RR: 16 (16 - 18)  SpO2: 99% (99% - 100%)        DAILY MEASUREMENTS:  I&O's Summary    02 May 2023 07:01  -  03 May 2023 07:00  --------------------------------------------------------  IN: 975 mL / OUT: 350 mL / NET: 625 mL      Daily     Daily     Orthostatic VS      LABS:                        16.0   8.45  )-----------( 229      ( 03 May 2023 04:18 )             49.3     Hgb Trend: 16.0<--, 15.2<--, 14.9<--, 15.0<--, 15.0<--  05-03    138  |  103  |  14  ----------------------------<  134<H>  4.1   |  23  |  0.77    Ca    9.0      03 May 2023 04:18  Phos  3.6     05-  Mg     2.10     -03    TPro  7.0  /  Alb  3.5  /  TBili  0.6  /  DBili  x   /  AST  28  /  ALT  35  /  AlkPhos  68  05-03      CAPILLARY BLOOD GLUCOSE      POCT Blood Glucose.: 220 mg/dL (03 May 2023 22:07)  POCT Blood Glucose.: 122 mg/dL (03 May 2023 17:59)  POCT Blood Glucose.: 159 mg/dL (03 May 2023 12:28)  POCT Blood Glucose.: 125 mg/dL (03 May 2023 08:36)          RADIOLOGY & ADDITIONAL TESTS:  Results Reviewed:  N/A (not following labs at this time)  Imaging Reviewed: No interval imaging  Electrocardiogram Reviewed: No interval ECG      ------------------------------------------------------------------------------------------------------------  MEDICATIONS  (STANDING):  atorvastatin 20 milliGRAM(s) Oral at bedtime  ciprofloxacin     Tablet 500 milliGRAM(s) Oral every 12 hours  dextrose 5%. 1000 milliLiter(s) (100 mL/Hr) IV Continuous <Continuous>  dextrose 5%. 1000 milliLiter(s) (50 mL/Hr) IV Continuous <Continuous>  dextrose 50% Injectable 25 Gram(s) IV Push once  dextrose 50% Injectable 12.5 Gram(s) IV Push once  dextrose 50% Injectable 25 Gram(s) IV Push once  enoxaparin Injectable 40 milliGRAM(s) SubCutaneous every 24 hours  glucagon  Injectable 1 milliGRAM(s) IntraMuscular once  insulin glargine Injectable (LANTUS) 20 Unit(s) SubCutaneous at bedtime  insulin lispro (ADMELOG) corrective regimen sliding scale   SubCutaneous three times a day before meals  insulin lispro (ADMELOG) corrective regimen sliding scale   SubCutaneous at bedtime  insulin lispro Injectable (ADMELOG) 7 Unit(s) SubCutaneous three times a day before meals  levETIRAcetam 250 milliGRAM(s) Oral two times a day    MEDICATIONS  (PRN):  dextrose Oral Gel 15 Gram(s) Oral once PRN Blood Glucose LESS THAN 70 milliGRAM(s)/deciliter    ------------------------------------------------------------------------------------------------------------  COORDINATION OF CARE:  Care discussed with consultants/other providers and notes reviewed [Y]

## 2023-05-04 NOTE — PROGRESS NOTE ADULT - PROBLEM SELECTOR PLAN 6
SBP at goal this morning but had been more elevated. WIll restart home meds.  on Irbesartan 300 mg qd at home SBP at goal this morning but had been more elevated. WIll restart home meds if afternoon vitals more HTN.  - HOLDing Irbesartan 300mg OP QD - reassess in pm

## 2023-05-04 NOTE — PROGRESS NOTE ADULT - PROBLEM SELECTOR PLAN 7
Hospital Bundle  Fluids: PO ad lisa  Electrolytes: Replete K > 4, Mg > 2, Phos > 3  Nutrition: Diet CC & DASH  PPX  ---VTE: LVX, SCDs  ---GI: Diet  ---Resp: IS  Access: PIVs  Code Status: FULL CODE  Dispo: [facility]

## 2023-05-04 NOTE — PROGRESS NOTE ADULT - PROBLEM SELECTOR PLAN 2
CT Head shows R. frontal subdural hematoma, compared to CT Head from one month ago per NSGY no change. no midline shift, resolution of low density holohemispheric right subdural collection and left frontal subdural collection, stable ventricular size.   - F/u MRI brain w/w/o contrast  - Continue with Keppra 250 mg bid as per Nsgy  - Per NSY, okay to start DVT ppx CT Head shows R. frontal subdural hematoma, compared to CT Head from one month ago per NSGY no change. no midline shift, resolution of low density holohemispheric right subdural collection and left frontal subdural collection, stable ventricular size.   - MRI brain not indicated given normal VPS series WNL & no c/f shunt infection; cancelled several days ago in that context  - Continue with Keppra 250 mg bid as per Nsgy  - Per NSY, okay to start DVT ppx

## 2023-05-04 NOTE — PROGRESS NOTE ADULT - PROBLEM SELECTOR PLAN 3
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 Baez (Outpatient urologist) for further collateral at [phone]  pt does not have dysuria, hematuria, pain in the genital area, groin, lower abdomen, or lower back. however does endorse urinary frequency/nocturia. Per wife prev hx of prostate cancer, untreated. 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 recently prostate biopsy was done 2 days pta.   - OP Uro: Dr. Baez (Outpatient urologist) [phone], no strong indication to involve him at this time; pt improved no evidence of acute prostatitis  - F/U OP with Dr. Baez for further prostate ca care

## 2023-05-04 NOTE — PROGRESS NOTE ADULT - PROBLEM SELECTOR PLAN 1
Presented tachycardic, febrile with c/f sepsis - unclear source initially. Now s/p VPS sampling d/t relatively unremarkable UA/CT C/A/P, which was also negative. Most likely 2/2 transient bacteremia s/p prostate biopsy w/o true acute prostatitis. Improved with IV Abx, transitioned to PO with plan to complete 2 week course (-5/12).  - C/W Ciprofloxacin 500mg PO BID (-5/12)  - QTc: Presented tachycardic, febrile with c/f sepsis - unclear source initially. Now s/p VPS sampling d/t relatively unremarkable UA/CT C/A/P, which was also negative. Most likely 2/2 transient bacteremia s/p prostate biopsy w/o true acute prostatitis. Improved with IV Abx, transitioned to PO with plan to complete 2 week course (-5/12).  - C/W Ciprofloxacin 500mg PO BID (-5/12)  - QTc (05/04/2023):

## 2023-05-04 NOTE — PROGRESS NOTE ADULT - PROBLEM SELECTOR PLAN 4
BGLs at goal, though this morning's is slightly more elevated than prior.  - C/W Glargine 20u QHS, Lispro 7u QAC, DARREN QAC/HS  - HOLDing Home Glargine 30U QHS, LIspro 14u QAC  - holding home Farxiga and ozempic BGLs at goal, though 5/4am's is slightly more elevated than prior. CTM  - C/W Glargine 20u QHS, Lispro 7u QAC, DARREN QAC/HS  - HOLDing Home Glargine 30U QHS, LIspro 14u QAC, will have [facility] continue to titrate this  - holding home Farxiga and ozempic

## 2023-05-04 NOTE — PROGRESS NOTE ADULT - ATTENDING COMMENTS
family and pt final decision for rehab  stable fro dc p placement  Time spent 35 min   updated sw and cm

## 2023-05-05 LAB
CULTURE RESULTS: SIGNIFICANT CHANGE UP
CULTURE RESULTS: SIGNIFICANT CHANGE UP
GLUCOSE BLDC GLUCOMTR-MCNC: 124 MG/DL — HIGH (ref 70–99)
GLUCOSE BLDC GLUCOMTR-MCNC: 140 MG/DL — HIGH (ref 70–99)
GLUCOSE BLDC GLUCOMTR-MCNC: 180 MG/DL — HIGH (ref 70–99)
GLUCOSE BLDC GLUCOMTR-MCNC: 188 MG/DL — HIGH (ref 70–99)
SPECIMEN SOURCE: SIGNIFICANT CHANGE UP
SPECIMEN SOURCE: SIGNIFICANT CHANGE UP

## 2023-05-05 PROCEDURE — 99232 SBSQ HOSP IP/OBS MODERATE 35: CPT | Mod: GC

## 2023-05-05 RX ORDER — INSULIN LISPRO 100/ML
16 VIAL (ML) SUBCUTANEOUS
Qty: 90 | Refills: 0
Start: 2023-05-05 | End: 2023-08-02

## 2023-05-05 RX ORDER — INSULIN GLARGINE 100 [IU]/ML
30 INJECTION, SOLUTION SUBCUTANEOUS
Qty: 90 | Refills: 0 | DISCHARGE
Start: 2023-05-05 | End: 2023-08-02

## 2023-05-05 RX ORDER — INSULIN GLARGINE 100 [IU]/ML
20 INJECTION, SOLUTION SUBCUTANEOUS
Qty: 90 | Refills: 0
Start: 2023-05-05 | End: 2023-08-02

## 2023-05-05 RX ORDER — INSULIN LISPRO 100/ML
20 VIAL (ML) SUBCUTANEOUS
Qty: 90 | Refills: 0 | DISCHARGE
Start: 2023-05-05 | End: 2023-08-02

## 2023-05-05 RX ADMIN — Medication 2: at 13:09

## 2023-05-05 RX ADMIN — Medication 500 MILLIGRAM(S): at 12:30

## 2023-05-05 RX ADMIN — LEVETIRACETAM 250 MILLIGRAM(S): 250 TABLET, FILM COATED ORAL at 18:44

## 2023-05-05 RX ADMIN — Medication 7 UNIT(S): at 09:08

## 2023-05-05 RX ADMIN — ATORVASTATIN CALCIUM 20 MILLIGRAM(S): 80 TABLET, FILM COATED ORAL at 21:12

## 2023-05-05 RX ADMIN — Medication 2: at 18:42

## 2023-05-05 RX ADMIN — INSULIN GLARGINE 20 UNIT(S): 100 INJECTION, SOLUTION SUBCUTANEOUS at 21:48

## 2023-05-05 RX ADMIN — ENOXAPARIN SODIUM 40 MILLIGRAM(S): 100 INJECTION SUBCUTANEOUS at 12:30

## 2023-05-05 RX ADMIN — LOSARTAN POTASSIUM 100 MILLIGRAM(S): 100 TABLET, FILM COATED ORAL at 06:58

## 2023-05-05 RX ADMIN — Medication 500 MILLIGRAM(S): at 21:12

## 2023-05-05 RX ADMIN — Medication 7 UNIT(S): at 18:43

## 2023-05-05 RX ADMIN — Medication 7 UNIT(S): at 13:09

## 2023-05-05 RX ADMIN — LEVETIRACETAM 250 MILLIGRAM(S): 250 TABLET, FILM COATED ORAL at 05:54

## 2023-05-05 NOTE — DIETITIAN INITIAL EVALUATION ADULT - ADD RECOMMEND
1) Recommend CSTCHO, low sodium diet   2) Monitor weights, labs, BM's, skin integrity, p.o. intake.   3) Encourage PO intake and honor food preferences as able.   4) Obtain weekly weights

## 2023-05-05 NOTE — DIETITIAN INITIAL EVALUATION ADULT - ORAL INTAKE PTA/DIET HISTORY
Pt lives at home with wife. No specific diet followed PTA. No supplements/Vitamins taken PTA. Pt reports good appetite PTA.

## 2023-05-05 NOTE — PROGRESS NOTE ADULT - PROBLEM SELECTOR PLAN 1
Presented tachycardic, febrile with c/f sepsis - unclear source initially. Now s/p VPS sampling d/t relatively unremarkable UA/CT C/A/P, which was also negative. Most likely 2/2 transient bacteremia s/p prostate biopsy w/o true acute prostatitis. Improved with IV Abx, transitioned to PO with plan to complete 2 week course (-5/12).  - C/W Ciprofloxacin 500mg PO BID (-5/12)  - QTc (05/04/2023):

## 2023-05-05 NOTE — PROGRESS NOTE ADULT - PROBLEM SELECTOR PLAN 3
Per wife prev hx of prostate cancer, untreated. 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 recently prostate biopsy was done 2 days pta.   - OP Uro: Dr. Baez (Outpatient urologist) [phone], no strong indication to involve him at this time; pt improved no evidence of acute prostatitis  - F/U OP with Dr. Baez for further prostate ca care

## 2023-05-05 NOTE — DIETITIAN INITIAL EVALUATION ADULT - PROBLEM SELECTOR PLAN 2
3 - neurosurgery following  - CT Head shows R. frontal subdural hematoma, compared to CT Head from one month ago per NSGY no change. no midline shift, resolution of low density holohemispheric right subdural collection and left frontal subdural collection, stable ventricular size.   - At this point no plan to pursue any tap or shunt per NSGY team urgently, Dr. Isabel wanting to await for cultures to return and source control of sepsis, fever unlikely 2/2 to infection of shunt given stable ventricular size   - F/u MRI brain w/w/o contrast  - Continue with Keppra 250 mg bid as per Nsgy

## 2023-05-05 NOTE — PROGRESS NOTE ADULT - PROBLEM SELECTOR PLAN 2
CT Head shows R. frontal subdural hematoma, compared to CT Head from one month ago per NSGY no change. no midline shift, resolution of low density holohemispheric right subdural collection and left frontal subdural collection, stable ventricular size.   - MRI brain not indicated given normal VPS series WNL & no c/f shunt infection; cancelled several days ago in that context  - Continue with Keppra 250 mg bid as per Nsgy  - Per NSY, okay to start DVT ppx

## 2023-05-05 NOTE — PROGRESS NOTE ADULT - PROBLEM SELECTOR PLAN 4
BGLs at goal, though 5/4am's is slightly more elevated than prior. CTM  - C/W Glargine 20u QHS, Lispro 7u QAC, DARREN QAC/HS  - HOLDing Home Glargine 30U QHS, LIspro 14u QAC, will have [facility] continue to titrate this  - holding home Farxiga and ozempic

## 2023-05-05 NOTE — PROGRESS NOTE ADULT - ATTENDING COMMENTS
resolved kleb bacteremia - source uncertain possibly prostatitis post bx  shunt CSF cx neg   bcx remain ngtd, now on po cipro to complete through 5/12 per ID  SDH stable on serial CT - no need for MRH and no sx interventions per neurosx - ok to cw DVT ppx  stable for dc pending [facility]  dw SW and CM

## 2023-05-05 NOTE — PROGRESS NOTE ADULT - PROBLEM SELECTOR PLAN 6
SBP at goal this morning but had been more elevated. WIll restart home meds if afternoon vitals more HTN.  - HOLDing Irbesartan 300mg OP QD - reassess in pm

## 2023-05-05 NOTE — DIETITIAN INITIAL EVALUATION ADULT - PROBLEM SELECTOR PLAN 3
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 Baez (Outpatient urologist) for further collateral at [phone]  pt does not have dysuria, hematuria, pain in the genital area, groin, lower abdomen, or lower back. however does endorse urinary frequency/nocturia

## 2023-05-05 NOTE — DIETITIAN INITIAL EVALUATION ADULT - OTHER INFO
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 Jamal Isabel (Neurosurgery) & Dr. Laura (Neurology), prostate bx 2 days ago for hx pf prostate cancer not on treatment per family who p/w acute metabolic encephalopathy, found w SIRS admitted to medicine for further treatment.     Nutrition interview: Pt A&Ox2 with some confusion. Collateral obtained from PCA and chart review. Per PCA pt ate all of breakfast this morning and had formed BM. No recent episodes of nausea, vomiting, diarrhea or constipation, last BM noted on 5/3 per RN flowsheets. Denies any chewing/swallowing difficulties. No known food allergies. Stated UBW: 150#, denies any weight changes. Food preferences explored and noted. Intake is % per RN flowsheets and per pt. Feeding skills: assistance. A1c 7.6%.   Pt with increased needs 2/2 prostate cancer

## 2023-05-05 NOTE — DIETITIAN INITIAL EVALUATION ADULT - NS FNS DIET ORDER
Diet, Consistent Carbohydrate w/Evening Snack:   DASH/TLC {Sodium & Cholesterol Restricted} (DASH) (05-04-23 @ 07:04) [Active]

## 2023-05-05 NOTE — DIETITIAN INITIAL EVALUATION ADULT - PROBLEM SELECTOR PLAN 7
DVT Prophylaxis: No blood thinners given SDH. SCDs ordered.   Dispo: Home health aides at home, may need reinstatement of Home PT  Code Status: Full Code  HCP: Wife, Glory Guallpa

## 2023-05-05 NOTE — PROGRESS NOTE ADULT - SUBJECTIVE AND OBJECTIVE BOX
Zoë Christy, PGY1  Internal Medicine    Patient is a 77y old  Male who presents with a chief complaint of Sepsis (04 May 2023 06:58)      SUBJECTIVE/INTERVAL EVENTS:  Patient endorses feeling well. He also denies fevers/chills. No new complaints.      MEDICATIONS  (STANDING):  atorvastatin 20 milliGRAM(s) Oral at bedtime  ciprofloxacin     Tablet 500 milliGRAM(s) Oral every 12 hours  dextrose 5%. 1000 milliLiter(s) (50 mL/Hr) IV Continuous <Continuous>  dextrose 5%. 1000 milliLiter(s) (100 mL/Hr) IV Continuous <Continuous>  dextrose 50% Injectable 25 Gram(s) IV Push once  dextrose 50% Injectable 12.5 Gram(s) IV Push once  dextrose 50% Injectable 25 Gram(s) IV Push once  enoxaparin Injectable 40 milliGRAM(s) SubCutaneous every 24 hours  glucagon  Injectable 1 milliGRAM(s) IntraMuscular once  insulin glargine Injectable (LANTUS) 20 Unit(s) SubCutaneous at bedtime  insulin lispro (ADMELOG) corrective regimen sliding scale   SubCutaneous three times a day before meals  insulin lispro (ADMELOG) corrective regimen sliding scale   SubCutaneous at bedtime  insulin lispro Injectable (ADMELOG) 7 Unit(s) SubCutaneous three times a day before meals  levETIRAcetam 250 milliGRAM(s) Oral two times a day  losartan 100 milliGRAM(s) Oral daily    MEDICATIONS  (PRN):  dextrose Oral Gel 15 Gram(s) Oral once PRN Blood Glucose LESS THAN 70 milliGRAM(s)/deciliter      VITAL SIGNS:  T(F): 98.6 (05-05-23 @ 05:00), Max: 98.6 (05-05-23 @ 05:00)  HR: 86 (05-05-23 @ 05:00) (86 - 99)  BP: 146/78 (05-05-23 @ 05:00) (139/78 - 146/78)  RR: 18 (05-05-23 @ 05:00) (17 - 18)  SpO2: 100% (05-05-23 @ 05:00) (98% - 100%)    I&O's Summary    Daily Height in cm: 165.1 (04 May 2023 09:00)    Daily     PHYSICAL EXAM:  Gen: Alert, NAD  HEENT: NCAT, conjunctiva clear, sclera anicteric, no erythema or exudates in the oropharynx, mmm  Neck: Supple, no JVD  CV: RRR, S1S2, no m/r/g  Resp: CTAB, normal respiratory effort  Abd: Soft, nontender, nondistended, normal bowel sounds  Ext: no edema, no clubbing or cyanosis  Neuro: AOx3, CN2-12 grossly intact, [place]  SKIN: warm, perfused    LABS:    Hgb Trend: 16.0<--, 15.2<--, 14.9<--, 15.0<--, 15.0<--        Creatinine Trend: 0.77<--, 0.95<--, 0.77<--, 0.83<--, 1.27<--, 1.02<--              CAPILLARY BLOOD GLUCOSE      POCT Blood Glucose.: 181 mg/dL (04 May 2023 21:22)  POCT Blood Glucose.: 219 mg/dL (04 May 2023 17:54)  POCT Blood Glucose.: 238 mg/dL (04 May 2023 12:27)  POCT Blood Glucose.: 131 mg/dL (04 May 2023 08:57)      RADIOLOGY & ADDITIONAL TESTS: Reviewed    Imaging Personally Reviewed:    Consultant(s) Notes Reviewed:      Care Discussed with Consultants/Other Providers:

## 2023-05-05 NOTE — DIETITIAN INITIAL EVALUATION ADULT - PERTINENT LABORATORY DATA
05-03 Na 138 mmol/L Glu 134 mg/dL<H> K+ 4.1 mmol/L Cr 0.77 mg/dL BUN 14 mg/dL Phos 3.6 mg/dL  05-05 @ 12:32 POCT 188 mg/dL

## 2023-05-06 LAB
GLUCOSE BLDC GLUCOMTR-MCNC: 137 MG/DL — HIGH (ref 70–99)
GLUCOSE BLDC GLUCOMTR-MCNC: 162 MG/DL — HIGH (ref 70–99)
GLUCOSE BLDC GLUCOMTR-MCNC: 183 MG/DL — HIGH (ref 70–99)
GLUCOSE BLDC GLUCOMTR-MCNC: 228 MG/DL — HIGH (ref 70–99)

## 2023-05-06 PROCEDURE — 99232 SBSQ HOSP IP/OBS MODERATE 35: CPT | Mod: GC

## 2023-05-06 RX ORDER — LIDOCAINE 4 G/100G
1 CREAM TOPICAL ONCE
Refills: 0 | Status: COMPLETED | OUTPATIENT
Start: 2023-05-06 | End: 2023-05-06

## 2023-05-06 RX ORDER — ACETAMINOPHEN 500 MG
650 TABLET ORAL ONCE
Refills: 0 | Status: COMPLETED | OUTPATIENT
Start: 2023-05-06 | End: 2023-05-06

## 2023-05-06 RX ADMIN — Medication 7 UNIT(S): at 09:15

## 2023-05-06 RX ADMIN — LEVETIRACETAM 250 MILLIGRAM(S): 250 TABLET, FILM COATED ORAL at 18:22

## 2023-05-06 RX ADMIN — Medication 500 MILLIGRAM(S): at 22:23

## 2023-05-06 RX ADMIN — LIDOCAINE 1 PATCH: 4 CREAM TOPICAL at 06:45

## 2023-05-06 RX ADMIN — Medication 2: at 13:19

## 2023-05-06 RX ADMIN — Medication 650 MILLIGRAM(S): at 06:42

## 2023-05-06 RX ADMIN — Medication 7 UNIT(S): at 13:19

## 2023-05-06 RX ADMIN — LOSARTAN POTASSIUM 100 MILLIGRAM(S): 100 TABLET, FILM COATED ORAL at 05:42

## 2023-05-06 RX ADMIN — ENOXAPARIN SODIUM 40 MILLIGRAM(S): 100 INJECTION SUBCUTANEOUS at 13:20

## 2023-05-06 RX ADMIN — Medication 650 MILLIGRAM(S): at 05:42

## 2023-05-06 RX ADMIN — Medication 2: at 09:15

## 2023-05-06 RX ADMIN — LIDOCAINE 1 PATCH: 4 CREAM TOPICAL at 05:39

## 2023-05-06 RX ADMIN — LEVETIRACETAM 250 MILLIGRAM(S): 250 TABLET, FILM COATED ORAL at 05:42

## 2023-05-06 RX ADMIN — ATORVASTATIN CALCIUM 20 MILLIGRAM(S): 80 TABLET, FILM COATED ORAL at 22:23

## 2023-05-06 RX ADMIN — INSULIN GLARGINE 20 UNIT(S): 100 INJECTION, SOLUTION SUBCUTANEOUS at 22:24

## 2023-05-06 RX ADMIN — Medication 7 UNIT(S): at 18:21

## 2023-05-06 RX ADMIN — Medication 500 MILLIGRAM(S): at 09:16

## 2023-05-06 NOTE — PROGRESS NOTE ADULT - PROBLEM SELECTOR PLAN 7
Hospital Bundle  Fluids: PO ad lisa  Electrolytes: Replete K > 4, Mg > 2, Phos > 3  Nutrition: Diet CC & DASH  PPX  ---VTE: LVX, SCDs  ---GI: Diet  ---Resp: IS  Access: PIVs  Code Status: FULL CODE  Dispo: [facility] Hospital Bundle  Fluids: PO ad lisa  Electrolytes: Replete K > 4, Mg > 2, Phos > 3  Nutrition: Diet CC & DASH  PPX  ---VTE: LVX, SCDs  ---GI: Diet  ---Resp: IS  Access: PIVs  Code Status: FULL CODE  Dispo: Home (per Wife - main decision maker)

## 2023-05-06 NOTE — PROGRESS NOTE ADULT - PROBLEM SELECTOR PLAN 4
BGLs at goal, though 5/4am's is slightly more elevated than prior. CTM  - C/W Glargine 20u QHS, Lispro 7u QAC, DARREN QAC/HS  - HOLDing Home Glargine 30U QHS, LIspro 14u QAC, will have [facility] continue to titrate this  - holding home Farxiga and ozempic

## 2023-05-06 NOTE — PROGRESS NOTE ADULT - PROBLEM SELECTOR PLAN 3
Per wife prev hx of prostate cancer, untreated. 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 recently prostate biopsy was done 2 days pta.   - OP Uro: Dr. Baez (Outpatient urologist) [phone], no strong indication to involve him at this time; pt improved no evidence of acute prostatitis  - F/U OP with Dr. Baez for further prostate ca care

## 2023-05-06 NOTE — PROGRESS NOTE ADULT - SUBJECTIVE AND OBJECTIVE BOX
Zoë Christy, PGY1  Internal Medicine    Patient is a 77y old  Male who presents with a chief complaint of ams and fever (05 May 2023 15:50)      SUBJECTIVE/INTERVAL EVENTS: Patient reports some significant rib pain overnight. However resolved after lidocaine patch.     MEDICATIONS  (STANDING):  atorvastatin 20 milliGRAM(s) Oral at bedtime  ciprofloxacin     Tablet 500 milliGRAM(s) Oral every 12 hours  dextrose 5%. 1000 milliLiter(s) (50 mL/Hr) IV Continuous <Continuous>  dextrose 5%. 1000 milliLiter(s) (100 mL/Hr) IV Continuous <Continuous>  dextrose 50% Injectable 25 Gram(s) IV Push once  dextrose 50% Injectable 12.5 Gram(s) IV Push once  dextrose 50% Injectable 25 Gram(s) IV Push once  enoxaparin Injectable 40 milliGRAM(s) SubCutaneous every 24 hours  glucagon  Injectable 1 milliGRAM(s) IntraMuscular once  insulin glargine Injectable (LANTUS) 20 Unit(s) SubCutaneous at bedtime  insulin lispro (ADMELOG) corrective regimen sliding scale   SubCutaneous at bedtime  insulin lispro (ADMELOG) corrective regimen sliding scale   SubCutaneous three times a day before meals  insulin lispro Injectable (ADMELOG) 7 Unit(s) SubCutaneous three times a day before meals  levETIRAcetam 250 milliGRAM(s) Oral two times a day  losartan 100 milliGRAM(s) Oral daily    MEDICATIONS  (PRN):  dextrose Oral Gel 15 Gram(s) Oral once PRN Blood Glucose LESS THAN 70 milliGRAM(s)/deciliter      VITAL SIGNS:  T(F): 98.6 (05-06-23 @ 04:48), Max: 98.9 (05-05-23 @ 21:15)  HR: 84 (05-06-23 @ 04:48) (84 - 89)  BP: 135/76 (05-06-23 @ 04:48) (123/78 - 144/79)  RR: 18 (05-06-23 @ 04:48) (18 - 18)  SpO2: 99% (05-06-23 @ 04:48) (99% - 100%)    I&O's Summary    Daily     Daily     PHYSICAL EXAM:  Gen: Alert, NAD  HEENT: NCAT, conjunctiva clear, sclera anicteric, no erythema or exudates in the oropharynx, mmm  Neck: Supple, no JVD  CV: RRR, S1S2, no m/r/g  Resp: CTAB, normal respiratory effort  Abd: Soft, nontender, nondistended, normal bowel sounds  Ext: no edema, no clubbing or cyanosis  Neuro: AOx3, CN2-12 grossly intact, [place]  SKIN: warm, perfused    LABS:    Hgb Trend: 16.0<--, 15.2<--, 14.9<--, 15.0<--, 15.0<--        Creatinine Trend: 0.77<--, 0.95<--, 0.77<--, 0.83<--, 1.27<--, 1.02<--              CAPILLARY BLOOD GLUCOSE      POCT Blood Glucose.: 124 mg/dL (05 May 2023 21:34)  POCT Blood Glucose.: 180 mg/dL (05 May 2023 18:27)  POCT Blood Glucose.: 188 mg/dL (05 May 2023 12:32)  POCT Blood Glucose.: 140 mg/dL (05 May 2023 08:57)      RADIOLOGY & ADDITIONAL TESTS: Reviewed    Imaging Personally Reviewed:    Consultant(s) Notes Reviewed:      Care Discussed with Consultants/Other Providers:   Zoë Christy, PGY1  Internal Medicine    Patient is a 77y old  Male who presents with a chief complaint of ams and fever (05 May 2023 15:50)      SUBJECTIVE/INTERVAL EVENTS: Patient reports some significant rib pain overnight. However resolved after lidocaine patch. Otherwise states that he is very angry and wants to go home. States that his wife wants him to go home as well.     MEDICATIONS  (STANDING):  atorvastatin 20 milliGRAM(s) Oral at bedtime  ciprofloxacin     Tablet 500 milliGRAM(s) Oral every 12 hours  dextrose 5%. 1000 milliLiter(s) (50 mL/Hr) IV Continuous <Continuous>  dextrose 5%. 1000 milliLiter(s) (100 mL/Hr) IV Continuous <Continuous>  dextrose 50% Injectable 25 Gram(s) IV Push once  dextrose 50% Injectable 12.5 Gram(s) IV Push once  dextrose 50% Injectable 25 Gram(s) IV Push once  enoxaparin Injectable 40 milliGRAM(s) SubCutaneous every 24 hours  glucagon  Injectable 1 milliGRAM(s) IntraMuscular once  insulin glargine Injectable (LANTUS) 20 Unit(s) SubCutaneous at bedtime  insulin lispro (ADMELOG) corrective regimen sliding scale   SubCutaneous at bedtime  insulin lispro (ADMELOG) corrective regimen sliding scale   SubCutaneous three times a day before meals  insulin lispro Injectable (ADMELOG) 7 Unit(s) SubCutaneous three times a day before meals  levETIRAcetam 250 milliGRAM(s) Oral two times a day  losartan 100 milliGRAM(s) Oral daily    MEDICATIONS  (PRN):  dextrose Oral Gel 15 Gram(s) Oral once PRN Blood Glucose LESS THAN 70 milliGRAM(s)/deciliter      VITAL SIGNS:  T(F): 98.6 (05-06-23 @ 04:48), Max: 98.9 (05-05-23 @ 21:15)  HR: 84 (05-06-23 @ 04:48) (84 - 89)  BP: 135/76 (05-06-23 @ 04:48) (123/78 - 144/79)  RR: 18 (05-06-23 @ 04:48) (18 - 18)  SpO2: 99% (05-06-23 @ 04:48) (99% - 100%)    I&O's Summary    Daily     Daily     PHYSICAL EXAM:  Gen: Alert, NAD  HEENT: NCAT, conjunctiva clear, sclera anicteric, no erythema or exudates in the oropharynx, mmm  Neck: Supple, no JVD  CV: RRR, S1S2, no m/r/g  Resp: CTAB, normal respiratory effort  Abd: Soft, tender to palpation on the left side under the ribcage, worse when he takes a deep breath, nondistended, normal bowel sounds  Ext: no edema, no clubbing or cyanosis  Neuro: AOx1-2, CN2-12 grossly intact, [place]  SKIN: warm, perfused    LABS:    Hgb Trend: 16.0<--, 15.2<--, 14.9<--, 15.0<--, 15.0<--        Creatinine Trend: 0.77<--, 0.95<--, 0.77<--, 0.83<--, 1.27<--, 1.02<--              CAPILLARY BLOOD GLUCOSE      POCT Blood Glucose.: 124 mg/dL (05 May 2023 21:34)  POCT Blood Glucose.: 180 mg/dL (05 May 2023 18:27)  POCT Blood Glucose.: 188 mg/dL (05 May 2023 12:32)  POCT Blood Glucose.: 140 mg/dL (05 May 2023 08:57)      RADIOLOGY & ADDITIONAL TESTS: Reviewed    Imaging Personally Reviewed:    Consultant(s) Notes Reviewed:      Care Discussed with Consultants/Other Providers:

## 2023-05-06 NOTE — PROGRESS NOTE ADULT - ATTENDING COMMENTS
77 y.o. M w/ a hx of DM2 w/ neuropathy, TIA, SDH s/p L MMA embolization, NPH s/p  shunt, recent prostate biopsy hospitalized for klebsiella bacteremia now on Cipro through 5/12.     Patient upset, reports no one from his family has called or come in to visit him. PCA at bedside reports patient spoke to family 1/2 hour prior on the phone.   Patient's wife declining [facility], wants to take patient home  CM to set up services

## 2023-05-06 NOTE — CHART NOTE - NSCHARTNOTEFT_GEN_A_CORE
CSF culture ngtd, blood cultures positive for klebsiella now on cefepime with improved mental status.   No further inpatient neurosurgical intervention at this time. DVT ppx ok otherwise continue to hold all therapeutic AC/AP. No need for AEDs.   Outpatient fu w/ Dr. Isabel for stability CTH in 2 weeks.   Will sign off, [id number].
Spoke with patient's wife Glory Guallpa today again after multiple extensive discussions.    Glory Guallpa is patient's main decision maker.     Although she is aware of the risks of going home and not going to [facility], patient's wife expressed understanding and still adamant on patient being d/c to home.    Patient's brother did want patient to go to rehab, however patient also deferred decision to wife.     Patient to be discharged home with home health services.

## 2023-05-07 LAB
GLUCOSE BLDC GLUCOMTR-MCNC: 134 MG/DL — HIGH (ref 70–99)
GLUCOSE BLDC GLUCOMTR-MCNC: 143 MG/DL — HIGH (ref 70–99)
GLUCOSE BLDC GLUCOMTR-MCNC: 147 MG/DL — HIGH (ref 70–99)
GLUCOSE BLDC GLUCOMTR-MCNC: 263 MG/DL — HIGH (ref 70–99)

## 2023-05-07 PROCEDURE — 99232 SBSQ HOSP IP/OBS MODERATE 35: CPT

## 2023-05-07 RX ADMIN — Medication 6: at 13:19

## 2023-05-07 RX ADMIN — Medication 7 UNIT(S): at 13:19

## 2023-05-07 RX ADMIN — ATORVASTATIN CALCIUM 20 MILLIGRAM(S): 80 TABLET, FILM COATED ORAL at 22:37

## 2023-05-07 RX ADMIN — Medication 7 UNIT(S): at 09:09

## 2023-05-07 RX ADMIN — LOSARTAN POTASSIUM 100 MILLIGRAM(S): 100 TABLET, FILM COATED ORAL at 06:29

## 2023-05-07 RX ADMIN — LEVETIRACETAM 250 MILLIGRAM(S): 250 TABLET, FILM COATED ORAL at 06:29

## 2023-05-07 RX ADMIN — ENOXAPARIN SODIUM 40 MILLIGRAM(S): 100 INJECTION SUBCUTANEOUS at 13:20

## 2023-05-07 RX ADMIN — Medication 7 UNIT(S): at 18:29

## 2023-05-07 RX ADMIN — Medication 500 MILLIGRAM(S): at 09:09

## 2023-05-07 RX ADMIN — Medication 500 MILLIGRAM(S): at 22:37

## 2023-05-07 RX ADMIN — LEVETIRACETAM 250 MILLIGRAM(S): 250 TABLET, FILM COATED ORAL at 18:30

## 2023-05-07 RX ADMIN — INSULIN GLARGINE 20 UNIT(S): 100 INJECTION, SOLUTION SUBCUTANEOUS at 22:38

## 2023-05-07 NOTE — PROGRESS NOTE ADULT - ATTENDING COMMENTS
77 y.o. M w/ a hx of DM2 w/ neuropathy, TIA, SDH s/p L MMA embolization, NPH s/p  shunt, recent prostate biopsy hospitalized for klebsiella bacteremia now on Cipro through 5/12.     Patient feels fine. Team discussed dispo with patient's family- wife now requesting [facility] as home is not fit for patient to return  [facility] placement

## 2023-05-07 NOTE — PROGRESS NOTE ADULT - SUBJECTIVE AND OBJECTIVE BOX
Marko Hamm, PGY1    SOTO WHITNEY  77y  Male    Complaints:  Subjective:     No acute o/n events. Pt denies subj fevers/chills, HA, dizziness, chest pain, palpitations, n/v, abd pain, dysuria, diarrhea      FAMILY HISTORY:  Family history of renal cancer  mother    Family history of prostate cancer in father  father      77yVital Signs Last 24 Hrs  T(C): 36.8 (07 May 2023 06:20), Max: 36.9 (06 May 2023 21:43)  T(F): 98.2 (07 May 2023 06:20), Max: 98.4 (06 May 2023 21:43)  HR: 85 (07 May 2023 06:20) (85 - 94)  BP: 120/76 (07 May 2023 06:20) (119/78 - 150/85)  BP(mean): --  RR: 18 (07 May 2023 06:20) (16 - 18)  SpO2: 99% (07 May 2023 06:20) (97% - 99%)    Parameters below as of 07 May 2023 06:20  Patient On (Oxygen Delivery Method): room air          PHYSICAL EXAM:  GENERAL: NAD, well-groomed, well-developed  HEAD:  Atraumatic, Normocephalic  EYES: EOMI, PERRLA, conjunctiva and sclera clear  ENMT: No tonsillar erythema, exudates, or enlargement; Moist mucous membranes, Good dentition, No lesions  NECK: Supple, No JVD, Normal thyroid  NERVOUS SYSTEM:  Alert & Oriented X3, Good concentration; Motor Strength 5/5 B/L upper and lower extremities; DTRs 2+ intact and symmetric  CHEST/LUNG: Clear to percussion bilaterally; No rales, rhonchi, wheezing, or rubs  HEART: Regular rate and rhythm; No murmurs, rubs, or gallops  ABDOMEN: Soft, Nontender, Nondistended; Bowel sounds present  EXTREMITIES:  2+ Peripheral Pulses, No clubbing, cyanosis, or edema  LYMPH: No lymphadenopathy noted  SKIN: No rashes or lesions      Consultant(s) Notes Reviewed:  [x ] YES  [ ] NO  Care Discussed with Consultants/Other Providers [ x] YES  [ ] NO    LABS:                Male  RADIOLOGY & ADDITIONAL TESTS:    Imaging Personally Reviewed:  [ ] YES  [ ] NO    MedsMEDICATIONS  (STANDING):  atorvastatin 20 milliGRAM(s) Oral at bedtime  ciprofloxacin     Tablet 500 milliGRAM(s) Oral every 12 hours  dextrose 5%. 1000 milliLiter(s) (50 mL/Hr) IV Continuous <Continuous>  dextrose 5%. 1000 milliLiter(s) (100 mL/Hr) IV Continuous <Continuous>  dextrose 50% Injectable 12.5 Gram(s) IV Push once  dextrose 50% Injectable 25 Gram(s) IV Push once  dextrose 50% Injectable 25 Gram(s) IV Push once  enoxaparin Injectable 40 milliGRAM(s) SubCutaneous every 24 hours  glucagon  Injectable 1 milliGRAM(s) IntraMuscular once  insulin glargine Injectable (LANTUS) 20 Unit(s) SubCutaneous at bedtime  insulin lispro (ADMELOG) corrective regimen sliding scale   SubCutaneous three times a day before meals  insulin lispro (ADMELOG) corrective regimen sliding scale   SubCutaneous at bedtime  insulin lispro Injectable (ADMELOG) 7 Unit(s) SubCutaneous three times a day before meals  levETIRAcetam 250 milliGRAM(s) Oral two times a day  losartan 100 milliGRAM(s) Oral daily    MEDICATIONS  (PRN):  dextrose Oral Gel 15 Gram(s) Oral once PRN Blood Glucose LESS THAN 70 milliGRAM(s)/deciliter      HEALTH ISSUES - PROBLEM Dx:  Subacute subdural hematoma    Prostate cancer    Diabetes mellitus    Hyperlipidemia    Hypertension    Need for prophylactic measure    SIRS (systemic inflammatory response syndrome)                   Marko Hamm, PGY1    SHANAEASAMI, SOTO  77y  Male    Complaints:  Subjective:     No acute o/n events. Pt denies subj fevers/chills, HA, dizziness, chest pain, palpitations, n/v, abd pain, dysuria, diarrhea. Pt is AOx2-3 on my examination.       FAMILY HISTORY:  Family history of renal cancer  mother    Family history of prostate cancer in father  father      77yVital Signs Last 24 Hrs  T(C): 36.8 (07 May 2023 06:20), Max: 36.9 (06 May 2023 21:43)  T(F): 98.2 (07 May 2023 06:20), Max: 98.4 (06 May 2023 21:43)  HR: 85 (07 May 2023 06:20) (85 - 94)  BP: 120/76 (07 May 2023 06:20) (119/78 - 150/85)  BP(mean): --  RR: 18 (07 May 2023 06:20) (16 - 18)  SpO2: 99% (07 May 2023 06:20) (97% - 99%)    Parameters below as of 07 May 2023 06:20  Patient On (Oxygen Delivery Method): room air          PHYSICAL EXAM:  Gen: Alert, NAD  HEENT: NCAT, conjunctiva clear, sclera anicteric, no erythema or exudates in the oropharynx,   Neck: Supple, no JVD  CV: RRR, S1S2, no m/r/g  Resp: CTAB, normal respiratory effort  Abd: Soft, tender to palpation on the left side under the ribcage, worse when he takes a deep breath, nondistended, normal bowel sounds  Ext: no edema, no clubbing or cyanosis  Neuro: AOx1-2, CN2-12 grossly intact, MUHAMMAD  SKIN: warm, perfused    Consultant(s) Notes Reviewed:  [x ] YES  [ ] NO  Care Discussed with Consultants/Other Providers [ x] YES  [ ] NO    LABS:                Male  RADIOLOGY & ADDITIONAL TESTS:    Imaging Personally Reviewed:  [ ] YES  [ ] NO    MedsMEDICATIONS  (STANDING):  atorvastatin 20 milliGRAM(s) Oral at bedtime  ciprofloxacin     Tablet 500 milliGRAM(s) Oral every 12 hours  dextrose 5%. 1000 milliLiter(s) (50 mL/Hr) IV Continuous <Continuous>  dextrose 5%. 1000 milliLiter(s) (100 mL/Hr) IV Continuous <Continuous>  dextrose 50% Injectable 12.5 Gram(s) IV Push once  dextrose 50% Injectable 25 Gram(s) IV Push once  dextrose 50% Injectable 25 Gram(s) IV Push once  enoxaparin Injectable 40 milliGRAM(s) SubCutaneous every 24 hours  glucagon  Injectable 1 milliGRAM(s) IntraMuscular once  insulin glargine Injectable (LANTUS) 20 Unit(s) SubCutaneous at bedtime  insulin lispro (ADMELOG) corrective regimen sliding scale   SubCutaneous three times a day before meals  insulin lispro (ADMELOG) corrective regimen sliding scale   SubCutaneous at bedtime  insulin lispro Injectable (ADMELOG) 7 Unit(s) SubCutaneous three times a day before meals  levETIRAcetam 250 milliGRAM(s) Oral two times a day  losartan 100 milliGRAM(s) Oral daily    MEDICATIONS  (PRN):  dextrose Oral Gel 15 Gram(s) Oral once PRN Blood Glucose LESS THAN 70 milliGRAM(s)/deciliter      HEALTH ISSUES - PROBLEM Dx:  Subacute subdural hematoma    Prostate cancer    Diabetes mellitus    Hyperlipidemia    Hypertension    Need for prophylactic measure    SIRS (systemic inflammatory response syndrome)                   Marko Hamm, PGY1    CHELO, SOTO  77y  Male    Complaints:  Subjective:     No acute o/n events. Pt denies subj fevers/chills, HA, dizziness, chest pain, palpitations, n/v, abd pain, dysuria, diarrhea. Pt is AOx2-3 on my examination.       FAMILY HISTORY:  Family history of renal cancer  mother    Family history of prostate cancer in father  father      77yVital Signs Last 24 Hrs  T(C): 36.8 (07 May 2023 06:20), Max: 36.9 (06 May 2023 21:43)  T(F): 98.2 (07 May 2023 06:20), Max: 98.4 (06 May 2023 21:43)  HR: 85 (07 May 2023 06:20) (85 - 94)  BP: 120/76 (07 May 2023 06:20) (119/78 - 150/85)  BP(mean): --  RR: 18 (07 May 2023 06:20) (16 - 18)  SpO2: 99% (07 May 2023 06:20) (97% - 99%)    Parameters below as of 07 May 2023 06:20  Patient On (Oxygen Delivery Method): room air          PHYSICAL EXAM:  Gen: Alert, NAD  HEENT: NCAT, conjunctiva clear, sclera anicteric, no erythema or exudates in the oropharynx,   Neck: Supple, no JVD  CV: RRR, S1S2, no m/r/g  Resp: CTAB, normal respiratory effort  Abd: Soft, tender to palpation on the left side under the ribcage, worse when he takes a deep breath, nondistended, normal bowel sounds  Ext: no edema, no clubbing or cyanosis  Neuro: AOx2-3, Non-focal, MUHAMMAD  SKIN: warm, perfused    Consultant(s) Notes Reviewed:  [x ] YES  [ ] NO  Care Discussed with Consultants/Other Providers [ x] YES  [ ] NO    LABS:    No labs today             Male  RADIOLOGY & ADDITIONAL TESTS:    Imaging Personally Reviewed:  [ ] YES  [ ] NO    MedsMEDICATIONS  (STANDING):  atorvastatin 20 milliGRAM(s) Oral at bedtime  ciprofloxacin     Tablet 500 milliGRAM(s) Oral every 12 hours  dextrose 5%. 1000 milliLiter(s) (50 mL/Hr) IV Continuous <Continuous>  dextrose 5%. 1000 milliLiter(s) (100 mL/Hr) IV Continuous <Continuous>  dextrose 50% Injectable 12.5 Gram(s) IV Push once  dextrose 50% Injectable 25 Gram(s) IV Push once  dextrose 50% Injectable 25 Gram(s) IV Push once  enoxaparin Injectable 40 milliGRAM(s) SubCutaneous every 24 hours  glucagon  Injectable 1 milliGRAM(s) IntraMuscular once  insulin glargine Injectable (LANTUS) 20 Unit(s) SubCutaneous at bedtime  insulin lispro (ADMELOG) corrective regimen sliding scale   SubCutaneous three times a day before meals  insulin lispro (ADMELOG) corrective regimen sliding scale   SubCutaneous at bedtime  insulin lispro Injectable (ADMELOG) 7 Unit(s) SubCutaneous three times a day before meals  levETIRAcetam 250 milliGRAM(s) Oral two times a day  losartan 100 milliGRAM(s) Oral daily    MEDICATIONS  (PRN):  dextrose Oral Gel 15 Gram(s) Oral once PRN Blood Glucose LESS THAN 70 milliGRAM(s)/deciliter      HEALTH ISSUES - PROBLEM Dx:  Subacute subdural hematoma    Prostate cancer    Diabetes mellitus    Hyperlipidemia    Hypertension    Need for prophylactic measure    SIRS (systemic inflammatory response syndrome)

## 2023-05-07 NOTE — PROGRESS NOTE ADULT - PROBLEM SELECTOR PLAN 7
Hospital Bundle  Fluids: PO ad lisa  Electrolytes: Replete K > 4, Mg > 2, Phos > 3  Nutrition: Diet CC & DASH  PPX  ---VTE: LVX, SCDs  ---GI: Diet  ---Resp: IS  Access: PIVs  Code Status: FULL CODE  Dispo: Home (per Wife - main decision maker) Hospital Bundle  Fluids: PO ad lisa  Electrolytes: Replete K > 4, Mg > 2, Phos > 3  Nutrition: Diet CC & DASH  PPX  ---VTE: LVX, SCDs  ---GI: Diet  ---Resp: IS  Access: PIVs  Code Status: FULL CODE  Dispo: Per  Wife is now ammenable to [facility]. Will d/w SW to set up

## 2023-05-07 NOTE — PROGRESS NOTE ADULT - PROBLEM SELECTOR PLAN 3
Per wife prev hx of prostate cancer, untreated. 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 recently prostate biopsy was done 2 days pta.   - OP Uro: Dr. Baez (Outpatient urologist) [phone], no strong indication to involve him at this time; pt improved no evidence of acute prostatitis  - F/U OP with Dr. Baez for further prostate ca care

## 2023-05-08 VITALS
RESPIRATION RATE: 17 BRPM | TEMPERATURE: 99 F | HEART RATE: 103 BPM | OXYGEN SATURATION: 98 % | SYSTOLIC BLOOD PRESSURE: 142 MMHG | DIASTOLIC BLOOD PRESSURE: 78 MMHG

## 2023-05-08 LAB
GLUCOSE BLDC GLUCOMTR-MCNC: 151 MG/DL — HIGH (ref 70–99)
GLUCOSE BLDC GLUCOMTR-MCNC: 153 MG/DL — HIGH (ref 70–99)
SARS-COV-2 RNA SPEC QL NAA+PROBE: SIGNIFICANT CHANGE UP

## 2023-05-08 PROCEDURE — 99239 HOSP IP/OBS DSCHRG MGMT >30: CPT

## 2023-05-08 RX ADMIN — LEVETIRACETAM 250 MILLIGRAM(S): 250 TABLET, FILM COATED ORAL at 06:57

## 2023-05-08 RX ADMIN — Medication 7 UNIT(S): at 12:32

## 2023-05-08 RX ADMIN — LOSARTAN POTASSIUM 100 MILLIGRAM(S): 100 TABLET, FILM COATED ORAL at 06:58

## 2023-05-08 RX ADMIN — Medication 2: at 09:42

## 2023-05-08 RX ADMIN — Medication 7 UNIT(S): at 09:43

## 2023-05-08 RX ADMIN — ENOXAPARIN SODIUM 40 MILLIGRAM(S): 100 INJECTION SUBCUTANEOUS at 12:30

## 2023-05-08 RX ADMIN — Medication 2: at 12:32

## 2023-05-08 RX ADMIN — Medication 500 MILLIGRAM(S): at 09:37

## 2023-05-08 NOTE — PROGRESS NOTE ADULT - PROVIDER SPECIALTY LIST ADULT
Infectious Disease
Internal Medicine

## 2023-05-08 NOTE — PROGRESS NOTE ADULT - PROBLEM SELECTOR PLAN 6
SBP at goal this morning but had been more elevated. WIll restart home meds if afternoon vitals more HTN.  - HOLDing Irbesartan 300mg OP QD - reassess in pm SBP at goal this morning but had been more elevated.   -Resume home meds.

## 2023-05-08 NOTE — PROGRESS NOTE ADULT - SUBJECTIVE AND OBJECTIVE BOX
INCOMPLETE NOTE    Bro Stanton | PGY1| Pager: [place] ([phone]) -- [facility] ([phone])  Interval Events:    REVIEW OF SYSTEMS:  CONSTITUTIONAL: No weakness, fevers or chills  EYES/ENT: No visual changes;  No vertigo or throat pain   NECK: No pain or stiffness  RESPIRATORY: No cough, wheezing, hemoptysis; No shortness of breath  CARDIOVASCULAR: No chest pain or palpitations  GASTROINTESTINAL: No abdominal or epigastric pain. No nausea, vomiting, or hematemesis; No diarrhea or constipation. No melena or hematochezia.  GENITOURINARY: No dysuria, frequency or hematuria  NEUROLOGICAL: No numbness or weakness  SKIN: No itching, burning, rashes, or lesions   All other review of systems is negative unless indicated above.    OBJECTIVE:  ICU Vital Signs Last 24 Hrs  T(C): 36.9 (07 May 2023 22:02), Max: 36.9 (07 May 2023 11:02)  T(F): 98.5 (07 May 2023 22:02), Max: 98.5 (07 May 2023 11:02)  HR: 91 (07 May 2023 22:02) (85 - 99)  BP: 129/91 (07 May 2023 22:02) (120/76 - 129/91)  BP(mean): --  ABP: --  ABP(mean): --  RR: 18 (07 May 2023 22:02) (18 - 18)  SpO2: 99% (07 May 2023 22:02) (99% - 100%)    O2 Parameters below as of 07 May 2023 22:02  Patient On (Oxygen Delivery Method): room air              CAPILLARY BLOOD GLUCOSE      POCT Blood Glucose.: 134 mg/dL (07 May 2023 22:14)      PHYSICAL EXAM:  General: WN/WD NAD  Neurology: A&Ox3, nonfocal, MUHAMMAD x 4  Eyes: PERRLA/ EOMI, Gross vision intact  ENT/Neck: Neck supple, trachea midline, No JVD, Gross hearing intact  Respiratory: CTA B/L, No wheezing, rales, rhonchi  CV: RRR, +S1/S2, -S3/S4, no murmurs, rubs or gallops  Abdominal: Soft, NT, ND +BS, No HSM  MSK: 5/5 strength UE/LE bilaterally  Extremities: No edema, 2+ peripheral pulses  Skin: No Rashes, Hematoma, Ecchymosis  Incisions:   Tubes:    HOSPITAL MEDICATIONS:  MEDICATIONS  (STANDING):  atorvastatin 20 milliGRAM(s) Oral at bedtime  ciprofloxacin     Tablet 500 milliGRAM(s) Oral every 12 hours  dextrose 5%. 1000 milliLiter(s) (50 mL/Hr) IV Continuous <Continuous>  dextrose 5%. 1000 milliLiter(s) (100 mL/Hr) IV Continuous <Continuous>  dextrose 50% Injectable 12.5 Gram(s) IV Push once  dextrose 50% Injectable 25 Gram(s) IV Push once  dextrose 50% Injectable 25 Gram(s) IV Push once  enoxaparin Injectable 40 milliGRAM(s) SubCutaneous every 24 hours  glucagon  Injectable 1 milliGRAM(s) IntraMuscular once  insulin glargine Injectable (LANTUS) 20 Unit(s) SubCutaneous at bedtime  insulin lispro (ADMELOG) corrective regimen sliding scale   SubCutaneous three times a day before meals  insulin lispro (ADMELOG) corrective regimen sliding scale   SubCutaneous at bedtime  insulin lispro Injectable (ADMELOG) 7 Unit(s) SubCutaneous three times a day before meals  levETIRAcetam 250 milliGRAM(s) Oral two times a day  losartan 100 milliGRAM(s) Oral daily    MEDICATIONS  (PRN):  dextrose Oral Gel 15 Gram(s) Oral once PRN Blood Glucose LESS THAN 70 milliGRAM(s)/deciliter      LABS:    Hgb Trend: 16.0<--, 15.2<--, 14.9<--        Creatinine Trend: 0.77<--, 0.95<--, 0.77<--, 0.83<--, 1.27<--, 1.02<--            MICROBIOLOGY:          Bro Romy | PGY1| Pager: [place] [phone] -- [place] ([phone])  Interval Events: No Acute events overnight; no acute complaints    OBJECTIVE:  ICU Vital Signs Last 24 Hrs  T(C): 36.9 (07 May 2023 22:02), Max: 36.9 (07 May 2023 11:02)  T(F): 98.5 (07 May 2023 22:02), Max: 98.5 (07 May 2023 11:02)  HR: 91 (07 May 2023 22:02) (85 - 99)  BP: 129/91 (07 May 2023 22:02) (120/76 - 129/91)  BP(mean): --  ABP: --  ABP(mean): --  RR: 18 (07 May 2023 22:02) (18 - 18)  SpO2: 99% (07 May 2023 22:02) (99% - 100%)    O2 Parameters below as of 07 May 2023 22:02  Patient On (Oxygen Delivery Method): room air              CAPILLARY BLOOD GLUCOSE      POCT Blood Glucose.: 134 mg/dL (07 May 2023 22:14)      PHYSICAL EXAM:  General: NAD  Neurology: A&Ox3, nonfocal, MUHAMMAD x 4  Eyes: PERRLA/ EOMI, Gross vision intact  ENT/Neck: Neck supple, trachea midline, No JVD, Gross hearing intact  Respiratory: CTA B/L, No wheezing, rales, rhonchi  CV: RRR, +S1/S2, -S3/S4, no murmurs, rubs or gallops  Abdominal: Soft, NT, ND +BS, No HSM  Extremities: No edema, 2+ peripheral pulses  Skin: No Rashes, Hematoma, Ecchymosis      HOSPITAL MEDICATIONS:  MEDICATIONS  (STANDING):  atorvastatin 20 milliGRAM(s) Oral at bedtime  ciprofloxacin     Tablet 500 milliGRAM(s) Oral every 12 hours  dextrose 5%. 1000 milliLiter(s) (50 mL/Hr) IV Continuous <Continuous>  dextrose 5%. 1000 milliLiter(s) (100 mL/Hr) IV Continuous <Continuous>  dextrose 50% Injectable 12.5 Gram(s) IV Push once  dextrose 50% Injectable 25 Gram(s) IV Push once  dextrose 50% Injectable 25 Gram(s) IV Push once  enoxaparin Injectable 40 milliGRAM(s) SubCutaneous every 24 hours  glucagon  Injectable 1 milliGRAM(s) IntraMuscular once  insulin glargine Injectable (LANTUS) 20 Unit(s) SubCutaneous at bedtime  insulin lispro (ADMELOG) corrective regimen sliding scale   SubCutaneous three times a day before meals  insulin lispro (ADMELOG) corrective regimen sliding scale   SubCutaneous at bedtime  insulin lispro Injectable (ADMELOG) 7 Unit(s) SubCutaneous three times a day before meals  levETIRAcetam 250 milliGRAM(s) Oral two times a day  losartan 100 milliGRAM(s) Oral daily    MEDICATIONS  (PRN):  dextrose Oral Gel 15 Gram(s) Oral once PRN Blood Glucose LESS THAN 70 milliGRAM(s)/deciliter      LABS:    Hgb Trend: 16.0<--, 15.2<--, 14.9<--        Creatinine Trend: 0.77<--, 0.95<--, 0.77<--, 0.83<--, 1.27<--, 1.02<--            MICROBIOLOGY:

## 2023-05-08 NOTE — PROGRESS NOTE ADULT - PROBLEM SELECTOR PROBLEM 1
SIRS (systemic inflammatory response syndrome)

## 2023-05-08 NOTE — PROGRESS NOTE ADULT - PROBLEM SELECTOR PROBLEM 2
Subacute subdural hematoma

## 2023-05-08 NOTE — PROGRESS NOTE ADULT - PROBLEM SELECTOR PLAN 5
c/w home Atorvastatin 20mg PO QHS
c/w home statin (atorvastatin 20 mg qd)
c/w home statin (atorvastatin 20 mg qd)
c/w home Atorvastatin 20mg PO QHS
c/w home statin (atorvastatin 20 mg qd)
c/w home Atorvastatin 20mg PO QHS
c/w home statin (atorvastatin 20 mg qd)
c/w home statin (atorvastatin 20 mg qd)

## 2023-05-08 NOTE — PROGRESS NOTE ADULT - PROBLEM SELECTOR PLAN 3
Per wife prev hx of prostate cancer, untreated. 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 recently prostate biopsy was done 2 days pta.   - OP Uro: Dr. Baez (Outpatient urologist) [phone], no strong indication to involve him at this time; pt improved no evidence of acute prostatitis  - F/U OP with Dr. Baez for further prostate ca care

## 2023-05-08 NOTE — PROGRESS NOTE ADULT - PROBLEM SELECTOR PLAN 7
Hospital Bundle  Fluids: PO ad lisa  Electrolytes: Replete K > 4, Mg > 2, Phos > 3  Nutrition: Diet CC & DASH  PPX  ---VTE: LVX, SCDs  ---GI: Diet  ---Resp: IS  Access: PIVs  Code Status: FULL CODE  Dispo: Per  Wife is now ammenable to [facility]. Will d/w SW to set up Hospital Bundle  Fluids: PO ad lisa  Electrolytes: Replete K > 4, Mg > 2, Phos > 3  Nutrition: Diet CC & DASH  PPX  ---VTE: LVX, SCDs  ---GI: Diet  ---Resp: IS  Access: PIVs  Code Status: FULL CODE  Dispo: To home. Fam refuses [facility].

## 2023-05-08 NOTE — PROGRESS NOTE ADULT - ASSESSMENT
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 Jamal Isabel (Neurosurgery) & Dr. Laura (Neurology), prostate bx 2 days ago for hx pf prostate cancer not on treatment per family who p/w acute metabolic encephalopathy, found w SIRS admitted to medicine for further treatment.  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 Jamal Isabel (Neurosurgery) & Dr. Laura (Neurology), prostate bx 2 days ago for hx pf prostate cancer not on treatment per family who p/w acute metabolic encephalopathy, found w SIRS admitted to medicine for further treatment.

## 2023-05-08 NOTE — PROGRESS NOTE ADULT - ATTENDING SUPERVISION STATEMENT
Fellow
Fellow
Resident

## 2023-05-17 ENCOUNTER — APPOINTMENT (OUTPATIENT)
Dept: NEUROSURGERY | Facility: CLINIC | Age: 78
End: 2023-05-17
Payer: MEDICARE

## 2023-05-17 ENCOUNTER — APPOINTMENT (OUTPATIENT)
Dept: CT IMAGING | Facility: IMAGING CENTER | Age: 78
End: 2023-05-17

## 2023-05-17 VITALS
HEIGHT: 65 IN | OXYGEN SATURATION: 98 % | DIASTOLIC BLOOD PRESSURE: 65 MMHG | BODY MASS INDEX: 24.16 KG/M2 | SYSTOLIC BLOOD PRESSURE: 113 MMHG | HEART RATE: 93 BPM | WEIGHT: 145 LBS | TEMPERATURE: 98.2 F

## 2023-05-17 PROCEDURE — 99215 OFFICE O/P EST HI 40 MIN: CPT

## 2023-05-17 NOTE — ASSESSMENT
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 s/p lumbar drain trial, s/p R parieto-occipital  shunt placement 2/13/23 Certas valve set at 4.0, Postop CT initially looked great, but then follow up CTH showed new R SDH. Shunt setting changed to 5.0. Discharged to rehab with instructions for quick interval follow up. Outpatient CT 3/20/23 showed expansion of R SDH measuring 2.2cm. Right  shunt setting turned off 3/23/23 set at 8.0, previously 5.0. Interval CT 4/28/23 done at admission for sepsis s/p prostate biopsy at [facility] showed stable SDH compared to prior MRI.\par \par Today, patient presents to the office in wheelchair stating he is unable to ambulate independently without assistance of wheelchair. Has been home from hospital, completed course of antibiotics. Source of infection is presumed to be from the prostate biopsy, CSF studies were negative. Symptoms and exam overall stable compared to prior. \par \par They expressed consideration for obtaining a second opinion. We discussed management options at this point, which like last time include surgical evacuation of the SDH, right MMA embolization, or continued conservative management. Since there is no midline shift, I still favor giving it more time as many VPS-associated SDHs do resolve on their own once the shunt is turned off. We ultimately agreed to get at least one more scan when it has been 1 month from the prior, and if improved, will continue to hold off on intervention. If not, can consider MMA embo. Again reiterated that his symptoms will not improve until we're able to turn the shunt back on. \par \par \par \par PLAN:\par Continue Keppra 250mg BID\par Hold baby aspirin at this time given subdural collections\par Certas valve shunt setting remains off at 8.0\par Recommend repeat CT head non con in another 2 weeks - 1 month from previous scan 4/28/23\par Wife prefers patient undergoing MRI to limit radiation exposure\par Shunt check post-MRI in office\par Follow up phone call after to review results of MRI\par Should there be worsening of SDH on serial imaging, will consider neurosurgical intervention\par

## 2023-05-17 NOTE — HISTORY OF PRESENT ILLNESS
[FreeTextEntry1] : SOTO WHITNEY is a 77 year old male with PMH of HTN, HLD, DM 2, diabetic neuropathy, TIAs (diagnosed by neurologist in the past on Aggrenox), chronic gait instability, who presented to [facility] ED for right sided headaches for 1 month s/p fall due to chronic imbalance and gait issues. Of note, wife states his balance issues approximately started 2 years ago and he was seen by ENT. Patient was followed by ophthalmology Dr. Santamaria for papilledema and was referred to come to [facility] ED for further evaluation. Ophthalmology evaluated for giant cell arteritis, right disc edema and peripapillary hemorrhages less likely given lack of clinical symptoms and negative inflammatory markers. MRI orbits negative for orbital mass or abnormal optic nerve signal. 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 He was transferred to [facility] on 8/5/22 for neurosurgical management. He was enrolled into the EMBOLISE trial and randomized into the interventional arm. On 8/5/22, he underwent Left MMA embolization. Patient with h/o multiple falls at home, ataxic gait, instructed to follow up with Dr. 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 Discharged on Keppra 500mg BID for seizure prophylaxis. \par \par Re-presented to [facility] ED on 8/21 as code stroke with transient episode of dysarthria, likely TIA. No neurovascular intervention given no large vessel occlusion seen on imaging, no tPA given no disabling neurological deficit. Could have been focal seizure given description of corner of lip getting twisted on the left side per wife. Denies noticing drooping of right side of mouth. Keppra was increased to 750mg BID. \par \par 2 week CT scan 8/21/22 shows stable L subacute to chronic SDH, slightly decreased in size, measuring 10mm, previously 12mm. [address] up [address] scan 9/22/22 which shows continued decrease in size and density of left lateral convexity SDH measuring 7mm. Keppra tapered down to 500mg BID. [address] follow up [address] scan 11/2/22 shows significant decrease in size of left SDH with minimal residual measuring 3mm, previously 8mm. No acute hemorrhage. Keppra 500mg BID discontinued, and Aggrenox switched to ASA 81 at last visit on 11/2/22.\par The MMA embolization procedure resulted in eventual resolution of the SDH. However, as the hematoma resolved, the symptoms did not improve and in fact got worse over time with worsening gait instability, urinary incontinence, and generalized confusion. As the SDH has decreased in size and the mass effect resolved, it has become apparent that there is underlying moderate hydrocephalus. Subsequent head CTs also demonstrated gradual ventriculomegaly as the mass effect from the hematoma resolved. 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 He underwent a high volume lumbar puncture by Dr. Cunha (30cc) with high volume drainage of CSF and there was not clear clinical improvement. Upon Dr. Cunha’s evaluation, he thought there was notable improvement in gait; however, the patient and family state they did not appreciate any improvement after the LP. It was therefore unclear at that time whether he would benefit from VPS. Discussed with Dr. Cunha who felt while at first the patient's gait appeared wide based, consistent with NPH, on his next assessment it was more magnetic and ataxic. We agreed on a trial of Sinemet with Dr. Cunha, and if no improvement in another month or so we can consider hospital admission and extended lumbar drain trial to further assess whether he could benefit from VPS. After trialing the month of Sinemet, there was no relief and still had reported progressive worsening of symptoms. \par \par He was then admitted to [facility] for lumbar drain trial, and on 02/06/2023, underwent fluoroscopic guided placement of lumbar drain. He was then evaluated by Physical Therapy over the next four days. Initially after draining 5 ml/h for 3 days, there was no notable improvement. We then increased the drainage to 30 mL of CSF every four hours, and there was marked improvement in his gait. Given the improvement, the recommendation was made for placement of a ventriculoperitoneal shunt. On 2/13/23, he underwent right parieto-occipital  shunt placement with laparoscopic placement of the abdominal catheter in the abdomen. Certas valve set at 4.0. Postop CT initially looked great, but then follow up CTH showed new small right sided subdural collection. Shunt setting changed to 5.0 at this time. Patient was discharged to rehab with instructions for quick interval follow up.\par \par Patient then went to [facility] involved in PT/OT. Interval CT head non con done outpatient on 3/20/23 showed acute on subacute RIGHT frontal subdural hematoma measuring 2.2cm. There is moderate mass effect on the right frontal lobe with minimal leftward midline shift. Despite the finding of the expansion of this subdural collection to now an acute on chronic SDH, due to his atrophic brain there is not much mass effect and he is currently asymptomatic. Will attempt to manage conservatively by turning the valve off on 3/23/23 and following with interval scans. \par \par Presented to [facility] ED 4/28/23 for worsening confusion over past month, fevers s/p prostate biopsy. Found to have positive blood cultures, Klebsiella, sepsis. Shunt CSF sampled and not concerning for infectious process, shunt function/studies normal. Discharged on antibiotics. \par \par Today, patient presents to the office in wheelchair stating he is unable to ambulate independently without assistance of wheelchair.

## 2023-05-17 NOTE — REASON FOR VISIT
[Follow-Up: _____] : a [unfilled] follow-up visit [Spouse] : spouse [Family Member] : family member [FreeTextEntry1] : s/p Stereotactic and laparoscopic placement of right parieto-occipital ventriculoperitoneal shunt 2/13/23\par \par s/p Left MMA embolization 8/5/22\par \par EMBOLISE trial, randomized into the interventional arm

## 2023-05-31 ENCOUNTER — APPOINTMENT (OUTPATIENT)
Dept: NEUROSURGERY | Facility: CLINIC | Age: 78
End: 2023-05-31

## 2023-06-01 ENCOUNTER — APPOINTMENT (OUTPATIENT)
Dept: NEUROSURGERY | Facility: CLINIC | Age: 78
End: 2023-06-01
Payer: MEDICARE

## 2023-06-01 ENCOUNTER — OUTPATIENT (OUTPATIENT)
Dept: OUTPATIENT SERVICES | Facility: HOSPITAL | Age: 78
LOS: 1 days | End: 2023-06-01
Payer: MEDICARE

## 2023-06-01 ENCOUNTER — APPOINTMENT (OUTPATIENT)
Dept: MRI IMAGING | Facility: IMAGING CENTER | Age: 78
End: 2023-06-01
Payer: MEDICARE

## 2023-06-01 VITALS
OXYGEN SATURATION: 99 % | HEIGHT: 65 IN | DIASTOLIC BLOOD PRESSURE: 73 MMHG | WEIGHT: 145 LBS | SYSTOLIC BLOOD PRESSURE: 123 MMHG | BODY MASS INDEX: 24.16 KG/M2 | HEART RATE: 95 BPM | TEMPERATURE: 97.7 F

## 2023-06-01 DIAGNOSIS — Z98.2 PRESENCE OF CEREBROSPINAL FLUID DRAINAGE DEVICE: Chronic | ICD-10-CM

## 2023-06-01 DIAGNOSIS — G91.2 (IDIOPATHIC) NORMAL PRESSURE HYDROCEPHALUS: ICD-10-CM

## 2023-06-01 DIAGNOSIS — S06.5XAA TRAUMATIC SUBDURAL HEMORRHAGE WITH LOSS OF CONSCIOUSNESS STATUS UNKNOWN, INITIAL ENCOUNTER: ICD-10-CM

## 2023-06-01 DIAGNOSIS — Z98.2 PRESENCE OF CEREBROSPINAL FLUID DRAINAGE DEVICE: ICD-10-CM

## 2023-06-01 PROCEDURE — 70551 MRI BRAIN STEM W/O DYE: CPT

## 2023-06-01 PROCEDURE — 99213 OFFICE O/P EST LOW 20 MIN: CPT

## 2023-06-01 PROCEDURE — 70551 MRI BRAIN STEM W/O DYE: CPT | Mod: 26,MH

## 2023-06-01 NOTE — REASON FOR VISIT
[FreeTextEntry1] : s/p Stereotactic and laparoscopic placement of right parieto-occipital ventriculoperitoneal shunt 2/13/23\par \par s/p Left MMA embolization 8/5/22\par \par EMBOLISE trial, randomized into the interventional arm

## 2023-06-01 NOTE — ASSESSMENT
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 s/p lumbar drain trial, s/p R parieto-occipital  shunt placement 2/13/23 Certas valve set at 4.0, Postop CT initially looked great, but then follow up CTH showed new R SDH. Shunt setting changed to 5.0. Discharged to rehab with instructions for quick interval follow up. Outpatient CT 3/20/23 showed expansion of R SDH measuring 2.2cm. Right  shunt setting turned off 3/23/23 set at 8.0, previously 5.0. Interval CT 4/28/23 done at admission for sepsis s/p prostate biopsy at [facility] showed stable SDH compared to prior MRI.\par \par Today, patient presents to the office in wheelchair, symptoms and exam overall stable compared to prior. \par R  shunt reservoir patent, refills briskly. Valve setting checked and confirmed shunt to be off at 8.0 in office. Confirmed x2 by NP colleague.\par \par \par \par PLAN:\par Certas valve shunt setting remains off at 8.0\par Follow up phone call with Dr. Isabel next week to review results of MRI head done 6/1/23\par Should there be worsening of SDH on serial imaging, will consider neurosurgical intervention

## 2023-06-01 NOTE — HISTORY OF PRESENT ILLNESS
[FreeTextEntry1] : SOTO WHITNEY is a 77 year old male with PMH of HTN, HLD, DM 2, diabetic neuropathy, TIAs (diagnosed by neurologist in the past on Aggrenox), chronic gait instability, who presented to [facility] ED for right sided headaches for 1 month s/p fall due to chronic imbalance and gait issues. Of note, wife states his balance issues approximately started 2 years ago and he was seen by ENT. Patient was followed by ophthalmology Dr. Santamaria for papilledema and was referred to come to [facility] ED for further evaluation. Ophthalmology evaluated for giant cell arteritis, right disc edema and peripapillary hemorrhages less likely given lack of clinical symptoms and negative inflammatory markers. MRI orbits negative for orbital mass or abnormal optic nerve signal. 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 He was transferred to [facility] on 8/5/22 for neurosurgical management. He was enrolled into the EMBOLISE trial and randomized into the interventional arm. On 8/5/22, he underwent Left MMA embolization. Patient with h/o multiple falls at home, ataxic gait, instructed to follow up with Dr. 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 Discharged on Keppra 500mg BID for seizure prophylaxis. \par \par Re-presented to [facility] ED on 8/21 as code stroke with transient episode of dysarthria, likely TIA. No neurovascular intervention given no large vessel occlusion seen on imaging, no tPA given no disabling neurological deficit. Could have been focal seizure given description of corner of lip getting twisted on the left side per wife. Denies noticing drooping of right side of mouth. Keppra was increased to 750mg BID. \par \par 2 week [address] scan 8/21/22 shows stable L subacute to chronic SDH, slightly decreased in size, measuring 10mm, previously 12mm. 6 week follow up [address] scan 9/22/22 which shows continued decrease in size and density of left lateral convexity SDH measuring 7mm. Keppra tapered down to 500mg BID. 3 month follow up CT scan 11/2/22 shows significant decrease in size of left SDH with minimal residual measuring 3mm, previously 8mm. No acute hemorrhage. Keppra 500mg BID discontinued, and Aggrenox switched to ASA 81 at last visit on 11/2/22.\par The MMA embolization procedure resulted in eventual resolution of the SDH. However, as the hematoma resolved, the symptoms did not improve and in fact got worse over time with worsening gait instability, urinary incontinence, and generalized confusion. As the SDH has decreased in size and the mass effect resolved, it has become apparent that there is underlying moderate hydrocephalus. Subsequent head CTs also demonstrated gradual ventriculomegaly as the mass effect from the hematoma resolved. 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 He underwent a high volume lumbar puncture by Dr. Cunha (30cc) with high volume drainage of CSF and there was not clear clinical improvement. Upon Dr. Cunha’s evaluation, he thought there was notable improvement in gait; however, the patient and family state they did not appreciate any improvement after the LP. It was therefore unclear at that time whether he would benefit from VPS. Discussed with Dr. Cunha who felt while at first the patient's gait appeared wide based, consistent with NPH, on his next assessment it was more magnetic and ataxic. We agreed on a trial of Sinemet with Dr. Cunha, and if no improvement in another month or so we can consider hospital admission and extended lumbar drain trial to further assess whether he could benefit from VPS. After trialing the month of Sinemet, there was no relief and still had reported progressive worsening of symptoms. \par \par He was then admitted to [facility] for lumbar drain trial, and on 02/06/2023, underwent fluoroscopic guided placement of lumbar drain. He was then evaluated by Physical Therapy over the next four days. Initially after draining 5 ml/h for 3 days, there was no notable improvement. We then increased the drainage to 30 mL of CSF every four hours, and there was marked improvement in his gait. Given the improvement, the recommendation was made for placement of a ventriculoperitoneal shunt. On 2/13/23, he underwent right parieto-occipital  shunt placement with laparoscopic placement of the abdominal catheter in the abdomen. Certas valve set at 4.0. Postop CT initially looked great, but then follow up CTH showed new small right sided subdural collection. Shunt setting changed to 5.0 at this time. Patient was discharged to rehab with instructions for quick interval follow up.\par \par Patient then went to [facility] involved in PT/OT. Interval CT head non con done outpatient on 3/20/23 showed acute on subacute RIGHT frontal subdural hematoma measuring 2.2cm. There is moderate mass effect on the right frontal lobe with minimal leftward midline shift. Despite the finding of the expansion of this subdural collection to now an acute on chronic SDH, due to his atrophic brain there is not much mass effect and he is currently asymptomatic. Will attempt to manage conservatively by turning the valve off on 3/23/23 and following with interval scans. \par \par Presented to [facility] ED 4/28/23 for worsening confusion over past month, fevers s/p prostate biopsy. Found to have positive blood cultures, Klebsiella, sepsis. Shunt CSF sampled and not concerning for infectious process, shunt function/studies normal. Discharged on antibiotics. \par \par Today, patient presents to the office in wheelchair for shunt check after undergoing MRI. Denies headaches or any new worsening symptoms.

## 2023-06-05 ENCOUNTER — APPOINTMENT (OUTPATIENT)
Dept: NEUROSURGERY | Facility: CLINIC | Age: 78
End: 2023-06-05
Payer: MEDICARE

## 2023-06-05 PROCEDURE — 99442: CPT | Mod: 95

## 2023-06-07 ENCOUNTER — APPOINTMENT (OUTPATIENT)
Dept: NEUROSURGERY | Facility: CLINIC | Age: 78
End: 2023-06-07
Payer: MEDICARE

## 2023-07-18 NOTE — PHYSICAL THERAPY INITIAL EVALUATION ADULT - PHYSICAL ASSIST/NONPHYSICAL ASSIST: SUPINE/SIT, REHAB EVAL
Pt called back. Scheduled with Nurse for 8/10 at 11 a.m. at [facility]. verbal cues/nonverbal cues (demo/gestures)/1 person assist

## 2023-07-19 ENCOUNTER — APPOINTMENT (OUTPATIENT)
Dept: NEUROSURGERY | Facility: CLINIC | Age: 78
End: 2023-07-19
Payer: MEDICARE

## 2023-07-19 VITALS
DIASTOLIC BLOOD PRESSURE: 89 MMHG | BODY MASS INDEX: 24.16 KG/M2 | HEART RATE: 95 BPM | HEIGHT: 65 IN | SYSTOLIC BLOOD PRESSURE: 141 MMHG | OXYGEN SATURATION: 97 % | WEIGHT: 145 LBS | TEMPERATURE: 98.9 F

## 2023-07-19 PROCEDURE — 99214 OFFICE O/P EST MOD 30 MIN: CPT

## 2023-07-19 NOTE — REVIEW OF SYSTEMS
[As Noted in HPI] : as noted in HPI [Negative] : Heme/Lymph [Confused or Disoriented] : confusion [Memory Lapses or Loss] : memory loss [Difficulty Walking] : difficulty walking

## 2023-07-20 NOTE — DATA REVIEWED
[de-identified] : MRI head 7/19/23 at [facility] 3T at Dr. Cunha's office

## 2023-07-20 NOTE — HISTORY OF PRESENT ILLNESS
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 s/p lumbar drain trial, s/p R parieto-occipital  shunt placement 2/13/23 Certas valve set at 4.0, Postop CT initially looked great, but then follow up CTH showed new R SDH. Shunt setting changed to 5.0. Discharged to rehab with instructions for quick interval follow up. Outpatient CT 3/20/23 showed expansion of R SDH measuring 2.2cm. Right  shunt setting turned off 3/23/23 set at 8.0, previously 5.0.\par \par Today, patient presents to the office in wheelchair for shunt check after undergoing MRI. Per the family, patient has progressive cognitive decline and confusion.

## 2023-07-20 NOTE — ASSESSMENT
[FreeTextEntry1] : IMPRESSION:\par 77M with PMH of HTN, HLD, DM 2, diabetic neuropathy, TIAs previously on Aggrenox, s/p fall due to chronic imbalance and gait issues p/w R sided HA, found to have acute on chronic L SDH, R frontotemporal SDH, enrolled into EMBOLISE trial, randomized into intervention arm s/p L MMA embo 8/5 resulting in complete resolution of the SDH, then with worsening gait and urinary incontinence with concern for NPH. s/p lumbar drain trial with gait improvement after high level of drainage, s/p R parieto-occipital  shunt placement 2/13/23 Certas valve set at 4.0, Postop CT initially looked great, but then follow up CTH showed new R SDH. Shunt setting changed to 5.0. Discharged to rehab with instructions for quick interval follow up. Outpatient CT 3/20/23 showed expansion of R SDH measuring 2.2cm. Right  shunt setting turned off 3/23/23 set at 8.0, previously 5.0. Right SDH being managed conservatively. \par \par Today, patient presents to the office in wheelchair, symptoms and exam overall stable compared to prior. \par R  shunt reservoir patent, refills briskly. Valve setting checked and confirmed shunt to be off at 8.0 in office. \par \par There is no official radiology report for MRI head done 7/19/23. Based on my own read, the SDH appears to have resolved. Will attempt to turn the shunt back on very slowly. Certas valve changed to 7 in office, previously 8.0.\par \par Explained to patient and family that even though the SDH has now resolved, if we increase his drianage through the shunt too quickly, it can easily recur. We have to go very slow with small incremental changes, repeating imaging each time to check for developing subdural collection. We again talked about his cognitive issues, and it remains unclear whether this is all related to NPH. There is extensive brain atrophy that seems out of proportion to his age. There is clearly some component of hydrocephalus with transependymal flow, but I believe there is likely some other underlying neurodegenerative process going on. Explained that there is no guarantee that dialing the shunt back down will help all or even some of his symptoms, and there is risk of new SDH formation. \par \par \par \par PLAN:\par Certas valve shunt setting at 7.0\par Discontinue Keppra 250mg BID\par Ok to restart baby aspirin daily from a neurosurgical standpoint\par Recommend repeat CT head non con in 3-4 weeks \par Wife prefers patient undergoing MRI to limit radiation exposure\par Pending results of this scan, will consider continuing to slowly turn shunt back on, one setting at a time\par Patient and family verbalized understanding and agreed to plan\par Advised to continue follow up with neurology, Dr. Cunha

## 2023-07-20 NOTE — REASON FOR VISIT
[Follow-Up: _____] : a [unfilled] follow-up visit [Spouse] : spouse [Family Member] : family member [Other: _____] : [unfilled] [FreeTextEntry1] : s/p Stereotactic and laparoscopic placement of right parieto-occipital ventriculoperitoneal shunt 2/13/23\par \par s/p Left MMA embolization 8/5/22\par \par Shunt-check post MRI head non con done 7/19/23 at Dr. Cunha’s office

## 2023-07-25 RX ORDER — LEVETIRACETAM 250 MG/1
250 TABLET, FILM COATED ORAL TWICE DAILY
Qty: 180 | Refills: 0 | Status: DISCONTINUED | COMMUNITY
Start: 2023-04-25 | End: 2023-07-25

## 2023-07-28 ENCOUNTER — APPOINTMENT (OUTPATIENT)
Dept: MRI IMAGING | Facility: IMAGING CENTER | Age: 78
End: 2023-07-28

## 2023-08-07 ENCOUNTER — APPOINTMENT (OUTPATIENT)
Dept: MRI IMAGING | Facility: IMAGING CENTER | Age: 78
End: 2023-08-07

## 2023-08-10 NOTE — PHYSICAL THERAPY INITIAL EVALUATION ADULT - PLANNED THERAPY INTERVENTIONS, PT EVAL
Thank you for choosing us for your  care today. If you have any questions about your ultrasound or care, please do not hesitate to contact us or your primary obstetrician. Some general instructions for your pregnancy are:    Exercise: Aim for 22 minutes per day (150 minutes per week) of regular exercise. Walking is great! Nutrition: Choose healthy sources of calcium, iron, and protein. Learn about Preeclampsia: preeclampsia is a common, serious high blood pressure complication in pregnancy. A blood pressure of [id number] (systolic or top number) or 18VVKS (diastolic or bottom number) is not normal and needs evaluation by your doctor. Aspirin is sometimes prescribed in early pregnancy to prevent preeclampsia in women with risk factors - ask your obstetrician if you should be on this medication. For more resources, visit:  [employer].  If you smoke, try to reduce how many cigarettes you smoke or try to quit completely. Do not vape. Other warning signs to watch out for in pregnancy or postpartum: chest pain, obstructed breathing or shortness of breath, seizures, thoughts of hurting yourself or your baby, bleeding, a painful or swollen leg, fever, or headache (see [facility] POST-BIRTH Warning Signs campaign). If these happen call 911. Itching is also not normal in pregnancy and if you experience this, especially over your hands and feet, potentially worse at night, notify your doctors. Conteo de patadas en el embarazo   LO QUE NECESITA SABER:   El conteo de patadas mide cuánto se está moviendo ricketts bebé en el útero. Romy patada de ricketts bebé podría sentirse olimpia romy torcedura, romy vuelta, un crujido, un meneo o un golpe. Es común sentir a tu bebé patear a las 32 a 29 semanas de [place]. Es posible que sienta al bebé patear ya a las 20 semanas de [place]. Puede que desee empezar a contar a las 28 semanas.   [address]:
Comuníquese con ricketts médico de inmediato si:  Usted siente un cambio en el número de patadas o movimientos de ricketts bebé. Siente menos de 10 patadas en 2 horas. Usted tiene preguntas o inquietudes acerca de los movimientos de ricketts bebé. Por qué realizar el conteo de patadas: Los movimientos de ricketts bebé podrían proporcionar información de la germán de ricketts bebé. Es posible que si hay problemas, ricketts bebé se mueva menos o nada en lo absoluto. El bebé podría moverse menos si no recibe suficiente oxígeno o alimento de la placenta. No fume mientras está embarazada. Fumar disminuye la cantidad de oxígeno que llega a ricketts bebé. Hable con ricketts médico si necesita ayuda para dejar de fumar. Los problemas que se encuentran en radha etapa más temprana son más fáciles de tratar. Cuándo realizar el conteo de patadas:  Cuente las patadas en el mismo horario todos los [place]. Realice el conteo de las patadas cuando ricketts bebé esté despierto y [place]. Ricketts bebé podría estar más activo en la tarde. Cómo realizar el conteo de patadas: Revise que ricketts bebé esté despierto antes de realizar el conteo de patadas. Usted puede despertar a ricketts bebé empujando ricketts estómago suavemente, caminando o tomando algo frío. Ricketts médico podría indicarle diferentes maneras de realizar el conteo. Es posible que le indique que glenna lo siguiente:  Use radha gráfica o un reloj para mantener un registro de la hora en que comienza y termina de contar. Siéntese en radha silla o acuéstese en ricketts costado derecho. Coloque leoncio reji en la parte más teddy de ricketts [place]. Cuente hasta que llegue a las 10 patadas. Escriba cuánto tiempo le lleva contar las 10 patadas. Podría ayala de 30 minutos a 2 horas para contar 10 patadas. No debería de ayala más de 2 horas para contar 10 patadas. Acuda a la consulta de control con ricketts médico según las indicaciones: Anote leoncio preguntas para que se acuerde de hacerlas jeremy leoncio visitas.   © Copyright [employer] 2022 Information
is for End User's use only and may not be sold, redistributed or otherwise used for commercial purposes. Esta información es sólo para uso en educación. Ricketts intención no es darle un consejo médico sobre enfermedades o tratamientos. Colsulte con ricketts Jolaine Kimberly farmacéutico antes de seguir cualquier régimen médico para saber si es seguro y efectivo para usted.
balance training/bed mobility training/gait training/strengthening/transfer training
balance training/bed mobility training/gait training/strengthening/transfer training

## 2023-08-15 NOTE — REVIEW OF SYSTEMS
[Confused or Disoriented] : confusion [Memory Lapses or Loss] : memory loss [Difficulty Walking] : difficulty walking [As Noted in HPI] : as noted in HPI [Negative] : Heme/Lymph

## 2023-08-16 ENCOUNTER — APPOINTMENT (OUTPATIENT)
Dept: NEUROSURGERY | Facility: CLINIC | Age: 78
End: 2023-08-16
Payer: MEDICARE

## 2023-08-16 VITALS
DIASTOLIC BLOOD PRESSURE: 82 MMHG | WEIGHT: 146 LBS | HEIGHT: 65 IN | OXYGEN SATURATION: 98 % | HEART RATE: 92 BPM | SYSTOLIC BLOOD PRESSURE: 148 MMHG | BODY MASS INDEX: 24.32 KG/M2

## 2023-08-16 PROCEDURE — 99214 OFFICE O/P EST MOD 30 MIN: CPT

## 2023-08-16 NOTE — HISTORY OF PRESENT ILLNESS
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 s/p lumbar drain trial, s/p R parieto-occipital  shunt placement 2/13/23 Certas valve set at 4.0, Postop CT initially looked great, but then follow up CTH showed new R SDH. Shunt setting changed to 5.0. Discharged to rehab with instructions for quick interval follow up. Outpatient CT 3/20/23 showed expansion of R SDH measuring 2.2cm. Right  shunt setting turned off 3/23/23 set at 8.0, previously 5.0. Set at 7.0 at last office visit.  Today, patient presents to the office in wheelchair for shunt check after undergoing MRI. Per the family, patient has exhibited some improvement with his walking but still has some cognitive issues and confusion.

## 2023-08-16 NOTE — ASSESSMENT
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 s/p lumbar drain trial with gait improvement after high level of drainage, s/p R parieto-occipital  shunt placement 2/13/23 Certas valve set at 4.0, Postop CT initially looked great, but then follow up CTH showed new R SDH. Shunt setting changed to 5.0. Discharged to rehab with instructions for quick interval follow up. Outpatient CT 3/20/23 showed expansion of R SDH measuring 2.2cm. Right  shunt setting turned off 3/23/23 set at 8.0, previously 5.0. Right SDH being managed conservatively. Keppra discontinued at last visit.  Today, patient presents to the office in wheelchair, symptoms and exam overall stable to improved compared to prior. Per family, patient has shown gradual improvement with his walking, still has cognitive issues.  R  shunt reservoir patent, refills briskly. Valve setting checked and confirmed shunt to be off at 7.0 in office.   There is no official radiology report for MRI head done today 8/16/23.. Based on my own read, the SDH still appears to have resolved. Report for prior MRI 7/19/23 shows complete reabsorption and resolution of SDH. Will continue to attempt to turn the shunt back on very slowly. Certas valve changed to 6 in office, previously 7.0.  Explained to patient and family that even though the SDH has now resolved, if we increase his drianage through the shunt too quickly, it can easily recur. We have to go very slow with small incremental changes, repeating imaging each time to check for developing subdural collection. We again talked about his cognitive issues, and it remains unclear whether this is all related to NPH. There is extensive brain atrophy that seems out of proportion to his age. There is clearly some component of hydrocephalus with transependymal flow, but I believe there is likely some other underlying neurodegenerative process going on. Explained that there is no guarantee that dialing the shunt back down will help all or even some of his symptoms, and there is risk of new SDH formation.     PLAN: Certas valve shunt setting changed to 6.0 today, confirmed  Ok to continue baby aspirin daily from a neurosurgical standpoint Recommend repeat CT head non con in 3-4 weeks  Wife prefers patient undergoing MRI to limit radiation exposure Plan to continue slowly dialing shunt back down  Advised to continue follow up with neurology, Dr. Cunha

## 2023-08-16 NOTE — DATA REVIEWED
[de-identified] : MRI head 8/16/23, 7/19/23 at [facility] Neck 3T at Dr. Cunha's office

## 2023-08-16 NOTE — REASON FOR VISIT
[Follow-Up: _____] : a [unfilled] follow-up visit [Spouse] : spouse [Family Member] : family member [Other: _____] : [unfilled] [FreeTextEntry1] : s/p Stereotactic and laparoscopic placement of right parieto-occipital ventriculoperitoneal shunt 2/13/23  s/p Left MMA embolization 8/5/22  Shunt-check post MRI head non con done 8/16/23 at Dr. Cunha's office

## 2023-09-19 NOTE — PROGRESS NOTE ADULT - PROBLEM SELECTOR PROBLEM 2
Diabetes mellitus with hyperglycemia
Diabetes mellitus with hyperglycemia
moderate assist (50% patients effort)
Diabetes mellitus with hyperglycemia

## 2023-09-20 ENCOUNTER — APPOINTMENT (OUTPATIENT)
Dept: NEUROSURGERY | Facility: CLINIC | Age: 78
End: 2023-09-20
Payer: MEDICARE

## 2023-09-20 VITALS
HEART RATE: 85 BPM | HEIGHT: 65 IN | OXYGEN SATURATION: 98 % | SYSTOLIC BLOOD PRESSURE: 158 MMHG | DIASTOLIC BLOOD PRESSURE: 92 MMHG | BODY MASS INDEX: 24.32 KG/M2 | WEIGHT: 146 LBS

## 2023-09-20 PROCEDURE — 99214 OFFICE O/P EST MOD 30 MIN: CPT

## 2023-11-29 ENCOUNTER — OUTPATIENT (OUTPATIENT)
Dept: OUTPATIENT SERVICES | Facility: HOSPITAL | Age: 78
LOS: 1 days | End: 2023-11-29
Payer: MEDICARE

## 2023-11-29 ENCOUNTER — APPOINTMENT (OUTPATIENT)
Dept: MRI IMAGING | Facility: CLINIC | Age: 78
End: 2023-11-29
Payer: MEDICARE

## 2023-11-29 ENCOUNTER — APPOINTMENT (OUTPATIENT)
Dept: NEUROSURGERY | Facility: CLINIC | Age: 78
End: 2023-11-29
Payer: MEDICARE

## 2023-11-29 VITALS
WEIGHT: 146 LBS | HEART RATE: 89 BPM | TEMPERATURE: 98.5 F | HEIGHT: 65 IN | OXYGEN SATURATION: 97 % | SYSTOLIC BLOOD PRESSURE: 137 MMHG | DIASTOLIC BLOOD PRESSURE: 78 MMHG | BODY MASS INDEX: 24.32 KG/M2

## 2023-11-29 DIAGNOSIS — S06.5XAA TRAUMATIC SUBDURAL HEMORRHAGE WITH LOSS OF CONSCIOUSNESS STATUS UNKNOWN, INITIAL ENCOUNTER: ICD-10-CM

## 2023-11-29 DIAGNOSIS — G91.2 (IDIOPATHIC) NORMAL PRESSURE HYDROCEPHALUS: ICD-10-CM

## 2023-11-29 DIAGNOSIS — Z98.2 PRESENCE OF CEREBROSPINAL FLUID DRAINAGE DEVICE: Chronic | ICD-10-CM

## 2023-11-29 DIAGNOSIS — Z98.2 PRESENCE OF CEREBROSPINAL FLUID DRAINAGE DEVICE: ICD-10-CM

## 2023-11-29 PROCEDURE — 99214 OFFICE O/P EST MOD 30 MIN: CPT

## 2023-11-29 PROCEDURE — 70551 MRI BRAIN STEM W/O DYE: CPT | Mod: 26,MH

## 2023-11-29 PROCEDURE — 70551 MRI BRAIN STEM W/O DYE: CPT

## 2023-11-29 RX ORDER — ASPIRIN 81 MG
81 TABLET,CHEWABLE ORAL
Refills: 0 | Status: ACTIVE | COMMUNITY

## 2023-11-29 RX ORDER — LEVETIRACETAM 100 MG/ML
500 INJECTION, SOLUTION, CONCENTRATE INTRAVENOUS
Refills: 0 | Status: DISCONTINUED | COMMUNITY
End: 2023-11-29

## 2023-11-29 RX ORDER — ACETAMINOPHEN 325 MG/1
TABLET, FILM COATED ORAL
Refills: 0 | Status: DISCONTINUED | COMMUNITY
End: 2023-11-29

## 2023-11-30 ENCOUNTER — APPOINTMENT (OUTPATIENT)
Dept: MRI IMAGING | Facility: IMAGING CENTER | Age: 78
End: 2023-11-30

## 2024-05-19 ENCOUNTER — INPATIENT (INPATIENT)
Facility: HOSPITAL | Age: 79
LOS: 1 days | Discharge: HOME HEALTH | End: 2024-05-21
Attending: INTERNAL MEDICINE | Admitting: INTERNAL MEDICINE
Payer: MEDICARE

## 2024-05-19 VITALS
TEMPERATURE: 98 F | RESPIRATION RATE: 18 BRPM | DIASTOLIC BLOOD PRESSURE: 92 MMHG | HEART RATE: 96 BPM | OXYGEN SATURATION: 96 % | SYSTOLIC BLOOD PRESSURE: 189 MMHG

## 2024-05-19 DIAGNOSIS — R56.9 UNSPECIFIED CONVULSIONS: ICD-10-CM

## 2024-05-19 DIAGNOSIS — E78.5 HYPERLIPIDEMIA, UNSPECIFIED: ICD-10-CM

## 2024-05-19 DIAGNOSIS — F03.90 UNSPECIFIED DEMENTIA, UNSPECIFIED SEVERITY, WITHOUT BEHAVIORAL DISTURBANCE, PSYCHOTIC DISTURBANCE, MOOD DISTURBANCE, AND ANXIETY: ICD-10-CM

## 2024-05-19 DIAGNOSIS — Z98.2 PRESENCE OF CEREBROSPINAL FLUID DRAINAGE DEVICE: ICD-10-CM

## 2024-05-19 DIAGNOSIS — I10 ESSENTIAL (PRIMARY) HYPERTENSION: ICD-10-CM

## 2024-05-19 DIAGNOSIS — R25.1 TREMOR, UNSPECIFIED: ICD-10-CM

## 2024-05-19 DIAGNOSIS — Z98.2 PRESENCE OF CEREBROSPINAL FLUID DRAINAGE DEVICE: Chronic | ICD-10-CM

## 2024-05-19 DIAGNOSIS — E11.9 TYPE 2 DIABETES MELLITUS WITHOUT COMPLICATIONS: ICD-10-CM

## 2024-05-19 LAB
ALBUMIN SERPL ELPH-MCNC: 3.6 G/DL — SIGNIFICANT CHANGE UP (ref 3.3–5)
ALP SERPL-CCNC: 64 U/L — SIGNIFICANT CHANGE UP (ref 40–120)
ALT FLD-CCNC: 23 U/L — SIGNIFICANT CHANGE UP (ref 4–41)
ANION GAP SERPL CALC-SCNC: 11 MMOL/L — SIGNIFICANT CHANGE UP (ref 7–14)
APPEARANCE UR: CLEAR — SIGNIFICANT CHANGE UP
AST SERPL-CCNC: 24 U/L — SIGNIFICANT CHANGE UP (ref 4–40)
BACTERIA # UR AUTO: NEGATIVE /HPF — SIGNIFICANT CHANGE UP
BASE EXCESS BLDV CALC-SCNC: 3.2 MMOL/L — HIGH (ref -2–3)
BASOPHILS # BLD AUTO: 0.04 K/UL — SIGNIFICANT CHANGE UP (ref 0–0.2)
BASOPHILS NFR BLD AUTO: 0.4 % — SIGNIFICANT CHANGE UP (ref 0–2)
BILIRUB SERPL-MCNC: 0.5 MG/DL — SIGNIFICANT CHANGE UP (ref 0.2–1.2)
BILIRUB UR-MCNC: NEGATIVE — SIGNIFICANT CHANGE UP
BUN SERPL-MCNC: 15 MG/DL — SIGNIFICANT CHANGE UP (ref 7–23)
CA-I SERPL-SCNC: 1.18 MMOL/L — SIGNIFICANT CHANGE UP (ref 1.15–1.33)
CALCIUM SERPL-MCNC: 8.5 MG/DL — SIGNIFICANT CHANGE UP (ref 8.4–10.5)
CAST: 0 /LPF — SIGNIFICANT CHANGE UP (ref 0–4)
CHLORIDE BLDV-SCNC: 103 MMOL/L — SIGNIFICANT CHANGE UP (ref 96–108)
CHLORIDE SERPL-SCNC: 102 MMOL/L — SIGNIFICANT CHANGE UP (ref 98–107)
CO2 BLDV-SCNC: 31.1 MMOL/L — HIGH (ref 22–26)
CO2 SERPL-SCNC: 24 MMOL/L — SIGNIFICANT CHANGE UP (ref 22–31)
COLOR SPEC: YELLOW — SIGNIFICANT CHANGE UP
CREAT SERPL-MCNC: 1.01 MG/DL — SIGNIFICANT CHANGE UP (ref 0.5–1.3)
DIFF PNL FLD: NEGATIVE — SIGNIFICANT CHANGE UP
EGFR: 76 ML/MIN/1.73M2 — SIGNIFICANT CHANGE UP
EOSINOPHIL # BLD AUTO: 0.12 K/UL — SIGNIFICANT CHANGE UP (ref 0–0.5)
EOSINOPHIL NFR BLD AUTO: 1.3 % — SIGNIFICANT CHANGE UP (ref 0–6)
GAS PNL BLDV: 133 MMOL/L — LOW (ref 136–145)
GAS PNL BLDV: SIGNIFICANT CHANGE UP
GAS PNL BLDV: SIGNIFICANT CHANGE UP
GLUCOSE BLDV-MCNC: 301 MG/DL — HIGH (ref 70–99)
GLUCOSE SERPL-MCNC: 273 MG/DL — HIGH (ref 70–99)
GLUCOSE UR QL: >=1000 MG/DL
HCO3 BLDV-SCNC: 30 MMOL/L — HIGH (ref 22–29)
HCT VFR BLD CALC: 47.8 % — SIGNIFICANT CHANGE UP (ref 39–50)
HCT VFR BLDA CALC: 49 % — SIGNIFICANT CHANGE UP (ref 39–51)
HGB BLD CALC-MCNC: 16.3 G/DL — SIGNIFICANT CHANGE UP (ref 12.6–17.4)
HGB BLD-MCNC: 16.1 G/DL — SIGNIFICANT CHANGE UP (ref 13–17)
IANC: 7.28 K/UL — SIGNIFICANT CHANGE UP (ref 1.8–7.4)
IMM GRANULOCYTES NFR BLD AUTO: 0.4 % — SIGNIFICANT CHANGE UP (ref 0–0.9)
KETONES UR-MCNC: NEGATIVE MG/DL — SIGNIFICANT CHANGE UP
LACTATE BLDV-MCNC: 2.3 MMOL/L — HIGH (ref 0.5–2)
LACTATE SERPL-SCNC: 1.4 MMOL/L — SIGNIFICANT CHANGE UP (ref 0.5–2)
LEUKOCYTE ESTERASE UR-ACNC: NEGATIVE — SIGNIFICANT CHANGE UP
LIDOCAIN IGE QN: 31 U/L — SIGNIFICANT CHANGE UP (ref 7–60)
LYMPHOCYTES # BLD AUTO: 1.02 K/UL — SIGNIFICANT CHANGE UP (ref 1–3.3)
LYMPHOCYTES # BLD AUTO: 10.7 % — LOW (ref 13–44)
MAGNESIUM SERPL-MCNC: 2.2 MG/DL — SIGNIFICANT CHANGE UP (ref 1.6–2.6)
MCHC RBC-ENTMCNC: 28.9 PG — SIGNIFICANT CHANGE UP (ref 27–34)
MCHC RBC-ENTMCNC: 33.7 GM/DL — SIGNIFICANT CHANGE UP (ref 32–36)
MCV RBC AUTO: 85.7 FL — SIGNIFICANT CHANGE UP (ref 80–100)
MONOCYTES # BLD AUTO: 1.02 K/UL — HIGH (ref 0–0.9)
MONOCYTES NFR BLD AUTO: 10.7 % — SIGNIFICANT CHANGE UP (ref 2–14)
NEUTROPHILS # BLD AUTO: 7.28 K/UL — SIGNIFICANT CHANGE UP (ref 1.8–7.4)
NEUTROPHILS NFR BLD AUTO: 76.5 % — SIGNIFICANT CHANGE UP (ref 43–77)
NITRITE UR-MCNC: NEGATIVE — SIGNIFICANT CHANGE UP
NRBC # BLD: 0 /100 WBCS — SIGNIFICANT CHANGE UP (ref 0–0)
NRBC # FLD: 0 K/UL — SIGNIFICANT CHANGE UP (ref 0–0)
PCO2 BLDV: 50 MMHG — SIGNIFICANT CHANGE UP (ref 42–55)
PH BLDV: 7.38 — SIGNIFICANT CHANGE UP (ref 7.32–7.43)
PH UR: 6 — SIGNIFICANT CHANGE UP (ref 5–8)
PHOSPHATE SERPL-MCNC: 2.7 MG/DL — SIGNIFICANT CHANGE UP (ref 2.5–4.5)
PLATELET # BLD AUTO: 185 K/UL — SIGNIFICANT CHANGE UP (ref 150–400)
PO2 BLDV: 39 MMHG — SIGNIFICANT CHANGE UP (ref 25–45)
POTASSIUM BLDV-SCNC: 4.3 MMOL/L — SIGNIFICANT CHANGE UP (ref 3.5–5.1)
POTASSIUM SERPL-MCNC: 4.3 MMOL/L — SIGNIFICANT CHANGE UP (ref 3.5–5.3)
POTASSIUM SERPL-SCNC: 4.3 MMOL/L — SIGNIFICANT CHANGE UP (ref 3.5–5.3)
PROT SERPL-MCNC: 6.9 G/DL — SIGNIFICANT CHANGE UP (ref 6–8.3)
PROT UR-MCNC: NEGATIVE MG/DL — SIGNIFICANT CHANGE UP
RBC # BLD: 5.58 M/UL — SIGNIFICANT CHANGE UP (ref 4.2–5.8)
RBC # FLD: 13.6 % — SIGNIFICANT CHANGE UP (ref 10.3–14.5)
RBC CASTS # UR COMP ASSIST: 0 /HPF — SIGNIFICANT CHANGE UP (ref 0–4)
SAO2 % BLDV: 65.7 % — LOW (ref 67–88)
SODIUM SERPL-SCNC: 137 MMOL/L — SIGNIFICANT CHANGE UP (ref 135–145)
SP GR SPEC: 1.03 — SIGNIFICANT CHANGE UP (ref 1–1.03)
SQUAMOUS # UR AUTO: 0 /HPF — SIGNIFICANT CHANGE UP (ref 0–5)
UROBILINOGEN FLD QL: 0.2 MG/DL — SIGNIFICANT CHANGE UP (ref 0.2–1)
WBC # BLD: 9.52 K/UL — SIGNIFICANT CHANGE UP (ref 3.8–10.5)
WBC # FLD AUTO: 9.52 K/UL — SIGNIFICANT CHANGE UP (ref 3.8–10.5)
WBC UR QL: 1 /HPF — SIGNIFICANT CHANGE UP (ref 0–5)

## 2024-05-19 PROCEDURE — 99283 EMERGENCY DEPT VISIT LOW MDM: CPT

## 2024-05-19 PROCEDURE — 70450 CT HEAD/BRAIN W/O DYE: CPT | Mod: 26,MC

## 2024-05-19 PROCEDURE — 99285 EMERGENCY DEPT VISIT HI MDM: CPT | Mod: GC

## 2024-05-19 PROCEDURE — 99223 1ST HOSP IP/OBS HIGH 75: CPT

## 2024-05-19 PROCEDURE — 71045 X-RAY EXAM CHEST 1 VIEW: CPT | Mod: 26

## 2024-05-19 RX ORDER — SODIUM CHLORIDE 9 MG/ML
1000 INJECTION INTRAMUSCULAR; INTRAVENOUS; SUBCUTANEOUS ONCE
Refills: 0 | Status: COMPLETED | OUTPATIENT
Start: 2024-05-19 | End: 2024-05-19

## 2024-05-19 RX ORDER — ASPIRIN/CALCIUM CARB/MAGNESIUM 324 MG
81 TABLET ORAL DAILY
Refills: 0 | Status: DISCONTINUED | OUTPATIENT
Start: 2024-05-19 | End: 2024-05-21

## 2024-05-19 RX ORDER — ATORVASTATIN CALCIUM 80 MG/1
1 TABLET, FILM COATED ORAL
Qty: 0 | Refills: 0 | DISCHARGE

## 2024-05-19 RX ORDER — INSULIN LISPRO 100/ML
VIAL (ML) SUBCUTANEOUS AT BEDTIME
Refills: 0 | Status: DISCONTINUED | OUTPATIENT
Start: 2024-05-19 | End: 2024-05-21

## 2024-05-19 RX ORDER — SEMAGLUTIDE 0.68 MG/ML
0.25 INJECTION, SOLUTION SUBCUTANEOUS
Qty: 0 | Refills: 0 | DISCHARGE

## 2024-05-19 RX ORDER — DAPAGLIFLOZIN 10 MG/1
1 TABLET, FILM COATED ORAL
Qty: 0 | Refills: 0 | DISCHARGE

## 2024-05-19 RX ORDER — ONDANSETRON 8 MG/1
4 TABLET, FILM COATED ORAL EVERY 8 HOURS
Refills: 0 | Status: DISCONTINUED | OUTPATIENT
Start: 2024-05-19 | End: 2024-05-21

## 2024-05-19 RX ORDER — DEXTROSE 50 % IN WATER 50 %
15 SYRINGE (ML) INTRAVENOUS ONCE
Refills: 0 | Status: DISCONTINUED | OUTPATIENT
Start: 2024-05-19 | End: 2024-05-21

## 2024-05-19 RX ORDER — LOSARTAN POTASSIUM 100 MG/1
100 TABLET, FILM COATED ORAL DAILY
Refills: 0 | Status: DISCONTINUED | OUTPATIENT
Start: 2024-05-19 | End: 2024-05-21

## 2024-05-19 RX ORDER — SODIUM CHLORIDE 9 MG/ML
1000 INJECTION, SOLUTION INTRAVENOUS
Refills: 0 | Status: DISCONTINUED | OUTPATIENT
Start: 2024-05-19 | End: 2024-05-21

## 2024-05-19 RX ORDER — TAMSULOSIN HYDROCHLORIDE 0.4 MG/1
1 CAPSULE ORAL
Refills: 0 | DISCHARGE

## 2024-05-19 RX ORDER — DEXTROSE 10 % IN WATER 10 %
125 INTRAVENOUS SOLUTION INTRAVENOUS ONCE
Refills: 0 | Status: DISCONTINUED | OUTPATIENT
Start: 2024-05-19 | End: 2024-05-21

## 2024-05-19 RX ORDER — LANOLIN ALCOHOL/MO/W.PET/CERES
3 CREAM (GRAM) TOPICAL AT BEDTIME
Refills: 0 | Status: DISCONTINUED | OUTPATIENT
Start: 2024-05-19 | End: 2024-05-21

## 2024-05-19 RX ORDER — INSULIN GLARGINE 100 [IU]/ML
24 INJECTION, SOLUTION SUBCUTANEOUS AT BEDTIME
Refills: 0 | Status: DISCONTINUED | OUTPATIENT
Start: 2024-05-19 | End: 2024-05-21

## 2024-05-19 RX ORDER — ATORVASTATIN CALCIUM 80 MG/1
20 TABLET, FILM COATED ORAL AT BEDTIME
Refills: 0 | Status: DISCONTINUED | OUTPATIENT
Start: 2024-05-19 | End: 2024-05-21

## 2024-05-19 RX ORDER — TAMSULOSIN HYDROCHLORIDE 0.4 MG/1
0.4 CAPSULE ORAL AT BEDTIME
Refills: 0 | Status: DISCONTINUED | OUTPATIENT
Start: 2024-05-19 | End: 2024-05-21

## 2024-05-19 RX ORDER — LEVETIRACETAM 250 MG/1
1 TABLET, FILM COATED ORAL
Refills: 0 | DISCHARGE

## 2024-05-19 RX ORDER — INSULIN LISPRO 100/ML
VIAL (ML) SUBCUTANEOUS
Refills: 0 | Status: DISCONTINUED | OUTPATIENT
Start: 2024-05-19 | End: 2024-05-21

## 2024-05-19 RX ORDER — GLUCAGON INJECTION, SOLUTION 0.5 MG/.1ML
1 INJECTION, SOLUTION SUBCUTANEOUS ONCE
Refills: 0 | Status: DISCONTINUED | OUTPATIENT
Start: 2024-05-19 | End: 2024-05-21

## 2024-05-19 RX ORDER — ACETAMINOPHEN 500 MG
650 TABLET ORAL EVERY 6 HOURS
Refills: 0 | Status: DISCONTINUED | OUTPATIENT
Start: 2024-05-19 | End: 2024-05-21

## 2024-05-19 RX ORDER — DEXTROSE 50 % IN WATER 50 %
25 SYRINGE (ML) INTRAVENOUS ONCE
Refills: 0 | Status: DISCONTINUED | OUTPATIENT
Start: 2024-05-19 | End: 2024-05-21

## 2024-05-19 RX ORDER — IRBESARTAN 75 MG/1
1 TABLET ORAL
Qty: 0 | Refills: 0 | DISCHARGE

## 2024-05-19 RX ORDER — DEXTROSE 50 % IN WATER 50 %
12.5 SYRINGE (ML) INTRAVENOUS ONCE
Refills: 0 | Status: DISCONTINUED | OUTPATIENT
Start: 2024-05-19 | End: 2024-05-21

## 2024-05-19 RX ORDER — ASPIRIN/CALCIUM CARB/MAGNESIUM 324 MG
1 TABLET ORAL
Refills: 0 | DISCHARGE

## 2024-05-19 RX ORDER — INSULIN LISPRO 100/ML
16 VIAL (ML) SUBCUTANEOUS
Refills: 0 | Status: DISCONTINUED | OUTPATIENT
Start: 2024-05-19 | End: 2024-05-21

## 2024-05-19 RX ADMIN — SODIUM CHLORIDE 1000 MILLILITER(S): 9 INJECTION INTRAMUSCULAR; INTRAVENOUS; SUBCUTANEOUS at 11:05

## 2024-05-19 RX ADMIN — INSULIN GLARGINE 24 UNIT(S): 100 INJECTION, SOLUTION SUBCUTANEOUS at 23:21

## 2024-05-19 NOTE — ED ADULT NURSE NOTE - OBJECTIVE STATEMENT
patient AOX1 name only states he is in [place] and denies having any seizures. patient unsure of current events and unsure why he is here. patient is very loud and appears angry  and irritable. tried to re orient him. connected him to monitor shows NSR. IV noted to left AC flushes well. patient denies any pain.  will continue to monitor.

## 2024-05-19 NOTE — ED PROVIDER NOTE - CLINICAL SUMMARY MEDICAL DECISION MAKING FREE TEXT BOX
PGY1/Brandon, DO: 77 yo M PMHx HTN, DM, hx ?CVA vs. TIA (no reported deficits per family), hydrocephalus s/p  shunt (2023) transferred from [facility] for evaluation of new onset sz (transfer accepted by Dr Curtis PEREZ). Pt was seen yesterday at [place] for evaluation of N/V, generalized weakness, negative w/u was medically cleared and sent home, upon returning home pt had reported sz with ~10 min postictal period, sent here as he is followed here. Per report review given Keppra around 0830. Currently denying complaints however A&O x2 to self, location; sts yr is 1970, unaware of current situation. Currently denying any complaints, however poor historian. VSS. NAD. Nontoxic appearing. Alert, speaks in full sentences; NC/AT. EOMI. PERRLA. conjunctiva/sclera clr. No proptosis, no nystagmus. Oropharynx clr. No evidence of tongue bite; neck supple FROM; CV RRR no MGR; lungs CTAB; abd obese soft nontender. NFD CN II-XII intact, A&O x2 to self/location; agitated. Evaluate for medical causes of AMS. Plan to call wife for collateral. Neurosurg and neuro have been paged for further recs. Reeval to dispo.

## 2024-05-19 NOTE — ED PROVIDER NOTE - PHYSICAL EXAMINATION
General: NAD. Nontoxic appearing. Alert, speaks in full sentences  HEENT: NC/AT. EOMI. PERRLA. conjunctiva/sclera clr. No proptosis, no nystagmus. Oropharynx clr. No evidence of tongue bite.  Neck: Supple. FROM  Cardiac: RRR no MGR  Pulmonary: CTAB. No increased WOB. No wheezes or crackles.  Abdominal: Obese, soft, nontender, no peritoneal signs.  Neurologic: No gross focal sensory or motor deficits. CN II-XII intact. Moves all 4 extremities during encounter.  Musculoskeletal: Strength appropriate in all 4 extremities for age with no limited ROM. No visible deformities or extremity swelling.  Skin: Color appropriate for race. Intact, warm, and well-perfused. No visible lesions or bruising.  Psychiatric: Agitated, A&O x2 to self, location; unaware of year, situation; unknown baseline

## 2024-05-19 NOTE — H&P ADULT - NSHPPHYSICALEXAM_GEN_ALL_CORE
PHYSICAL EXAM:  GENERAL: NAD, comfortable at bedside   HEAD:  Atraumatic, Normocephalic  EYES: EOMI, PERRL, conjunctiva and sclera clear  NECK: Supple, No JVD  CHEST/LUNG: Clear to auscultation bilaterally; No wheezes, rales or rhonchi  HEART: Regular rate and rhythm; No murmurs, rubs, or gallops, (+)S1, S2  ABDOMEN: Soft, Nontender, Nondistended; Normal Bowel sounds   EXTREMITIES:  2+ Peripheral Pulses, No clubbing, cyanosis, or edema  PSYCH: unable to assess at this time as mentioned above   NEUROLOGY: unable to assess at this time as mentioned above   SKIN: No rashes or lesions

## 2024-05-19 NOTE — ED PROVIDER NOTE - OBJECTIVE STATEMENT
PGY1/Brandon, DO: 77 yo M PMHx HTN, DM, hx ?CVA vs. TIA (no reported deficits per family), hydrocephalus s/p  shunt (2023) transferred from [facility] for evaluation of new onset sz (transfer accepted by Dr Curtis PEREZ). Pt was seen yesterday at [place] for evaluation of N/V, generalized weakness, negative w/u was medically cleared and sent home, upon returning home pt had reported sz with ~10 min postictal period, sent here as he is followed here. Currently denying complaints however A&O x2 to self, location PGY1/Brandon, DO: 79 yo M PMHx HTN, DM, hx ?CVA vs. TIA (no reported deficits per family), hydrocephalus s/p  shunt (2023) transferred from [facility] for evaluation of new onset sz (transfer accepted by Dr Curtis PEREZ). Pt was seen yesterday at [place] for evaluation of N/V, generalized weakness, negative w/u was medically cleared and sent home, upon returning home pt had reported sz with ~10 min postictal period, sent here as he is followed here. Per report review given Keppra around 0830. Currently denying complaints however A&O x2 to self, location; sts yr is 1970, unaware of current situation. Currently denying any complaints.

## 2024-05-19 NOTE — CONSULT NOTE ADULT - ATTENDING COMMENTS
Mr. Guallpa is a 77 yo man with dementia with an episode of agitation and tremulousness not consistent with a seizure.   I agree with work up and management as above.   F/U outpatient with neurology.  Neurology signing off. Please reconsult PRN or call [facility] [phone] with any questions.   Thank you.

## 2024-05-19 NOTE — CONSULT NOTE ADULT - ASSESSMENT
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 Neurologically at baseline. Certas valve confirmed at 5, shunt pumps and refills. OSH shunt series negative, CTH no acute pathology. Low suspicion for shunt malfunction

## 2024-05-19 NOTE — H&P ADULT - TIME BILLING
I have spent a total of 76minutes to prepare to see the patient, obtaining and reviewing history, physical examination, explaining the diagnosis, prognosis and treatment plan with the patient/family/caregiver. I also have spent the time ordering studies and testing, interpreting results, medicine reconciliation, subspecialty consultation and documentation as above. no

## 2024-05-19 NOTE — PROVIDER CONTACT NOTE (CHANGE IN STATUS NOTIFICATION) - SITUATION
Pt admitted s/p new onset seizure s/p  shunt repair, transfer from [facility] for neurosurgery workup. Pt on telemetry monitor in ED. RN questioning if patient needs telemetry order. Lorelei morelos ANM made aware of admission to floor

## 2024-05-19 NOTE — CHART NOTE - NSCHARTNOTEFT_GEN_A_CORE
CTH reviewed w/ attending Dr. Isabel. Ventricles read as slightly larger than prior. However, still unlikely for seizure to be related to elevated ICP/incr size of ventricles as primary dx NPH. Pt had shunt placed for symptoms of NPH, but had no relief of symptoms after placement of shunt. Pt had shunt dialed to off for period of time d/t post op complication of SDH formation (now resolved). No indication for shunt tap unless concern of CSF infection, which is clinically low at this point of time (WBC WNL and no hx fevers). Defer work up to neurology/medicine. Neurosurgery signing off please reconsult PRN. [phone]

## 2024-05-19 NOTE — H&P ADULT - NSHPLABSRESULTS_GEN_ALL_CORE
16.1   9.52  )-----------( 185      ( 19 May 2024 10:50 )             47.8     137  |  102  |  15  ----------------------------<  273<H>       4.3   |  24  |  1.01    Ca    8.5      19 May 2024 10:50  Phos  2.7       Mg     2.20         TPro  6.9  /  Alb  3.6  /  TBili  0.5  /  DBili  x   /  AST  24  /  ALT  23  /  AlkPhos  64          10:50 - VBG - pH: 7.38  | pCO2: 50    | pO2: 39    | Lactate: 2.3      Urinalysis Basic - ( 19 May 2024 12:12 )  Color: Yellow / Appearance: Clear / S.028 / pH: 6.0  Gluc: >=1000 mg/dL / Ketone: Negative mg/dL  / Bili: Negative / Urobili: 0.2 mg/dL   Blood: Negative / Protein: Negative mg/dL / Nitrite: Negative   Leuk Esterase: Negative / RBC: 0 /HPF / WBC 1 /HPF   Sq Epi: 0 /HPF / Bacteria: Negative /HPF  Hyaline Casts: x/WBC Casts: x      CXR interpreted by myself no focal consolidation   CT head interpreted by radiology: 1. No evidence of intraparenchymal hemorrhage or midline shift.  2. Ventricular size increased when compared to prior imaging. Additionally, areas of decreased attenuation along the periventricular white matter, likely a combination of chronic small vessel disease and transependymal CSF flow. A malfunctioning catheter should be considered.

## 2024-05-19 NOTE — H&P ADULT - PROBLEM SELECTOR PLAN 1
New  uncontrollable shaking for ~10min while he was stressed   Imaging as above, UA negative, CXR no focal consolidation   Neurology consulted:  check UA (negative), TSH, vitamin B12; no further inpatient neurology work up at this point, outpt f/u  PT consulted

## 2024-05-19 NOTE — H&P ADULT - HISTORY OF PRESENT ILLNESS
78 yr old male with a pmh of HTN, HLD, dementia, T2DM on insulin, hx CVA vx TIA, hydrocephalus s/p  shunt (2023) who presents for  shunt assessment by Neurosurgery. 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   ROS unable to obtain as sternal rubbed pt and he still did not wake up. Collateral obtained from wife  Vitals: T 98.5, HR 83, /83, RR 18 satting 99% RA

## 2024-05-19 NOTE — ED PROVIDER NOTE - NOTES
Transferred accepted by Dr. Montiel. call returned -- spoke with PA. Requesting shunt series. Will see pt.

## 2024-05-19 NOTE — ED ADULT NURSE NOTE - CHIEF COMPLAINT QUOTE
Pt presents to ED as transfer from [facility] with c/o seizures. Pt has hx of  shunt was evaluated for nausea, vomiting was discharged home then had 2 seizures. Pt arrives with #20g IV LAC.

## 2024-05-19 NOTE — H&P ADULT - PROBLEM SELECTOR PLAN 2
Chronic   Imaging with concern for increase in ventricle size  Neurosurgery consulted: "No indication for shunt tap unless concern of CSF infection, which is clinically low at this point of time"

## 2024-05-19 NOTE — ED ADULT TRIAGE NOTE - CHIEF COMPLAINT QUOTE
Pt presents to ED as transfer from [facility] with c/o seizures. Pt has hx of  shunt was evaluated for nausea, vomiting was discharged home then had 2 seizures. Pt arrives with #20g IV LAC.

## 2024-05-19 NOTE — H&P ADULT - PROBLEM SELECTOR PLAN 4
Chronic moderate exacerbation     Home regimen: basal 30 units nightly + prandial 20 units TID  Continue with basal 24 units nightly and prandial 16 units TID + LDCS  A1c and lipid panel in AM

## 2024-05-19 NOTE — CONSULT NOTE ADULT - SUBJECTIVE AND OBJECTIVE BOX
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 Pt was evaluated at OSH yesterday for generalized weakness and n/v with negative medical workup and pt sent home, upon returning home pt had reported seizure with 10min post ictal period.     RADIOLOGY:   OSH Shunt series read as negative (paperwork in chart)    MEDS:  sodium chloride 0.9% Bolus 1000 milliLiter(s) IV Bolus once      Vital Signs Last 24 Hrs  T(C): 36.8 (19 May 2024 09:24), Max: 36.8 (19 May 2024 09:24)  T(F): 98.2 (19 May 2024 09:24), Max: 98.2 (19 May 2024 09:24)  HR: 96 (19 May 2024 09:45) (96 - 96)  BP: 177/92 (19 May 2024 09:45) (177/92 - 189/92)  BP(mean): 113 (19 May 2024 09:45) (113 - 113)  RR: 18 (19 May 2024 09:45) (18 - 18)  SpO2: 98% (19 May 2024 09:45) (96% - 98%)    Parameters below as of 19 May 2024 09:45  Patient On (Oxygen Delivery Method): room air      PHYSICAL EXAM:  AOx2 (self/place), FC  PERRL, EOMI, face symmetrical  [facility] x 4 strong  SILT  R posterior shunt pumps and refills, certas valve confirmed at 5    
Neurology - Consult Note    -  Spectra: [id number] ([facility]), [phone] ([facility])  -    HPI: Patient SOTO WHITNEY is a 78y (1945) with PMH of DM, HTN, CVA? vs TIA with no residual deficit per history currently on aspirin, SDH sp left middle meningeal artery embolization in 2022, hydrocephalus sp VPS in 2023 without significant improvement, dementia per wife, difficulty walking uses a walker at baseline for the past year, hx of sleep apnea, no hx of seizure but hx of keppra use 1 year ago for prophylaxis presenting currently for an episode of generalized whole body shaking that persisted for seconds to a minute per wife. Author contacted wife over the phone and was able to understand semiology. Wife expressed that the patient was agitated and potentially stressed and thus had whole body shaking. There was no loss of consciousness. He was in bed when this happened. He has urinary incontinence at baseline but did not experience any incontinence during the episode. There was no tongue biting. Per wife, patient has not had any episodes in the past. He does not have a history of seizure. Of note, patient was evaluated initially at [facility] for an episode of nausea and vomiting and was discharged home a day before. He then had this episode of shaking which prompted hospitalization per wife who brought him again to [place]. Due to hx of shunting he was transferred to [facility] for NSGY evaluation.      Of note, imaging in the past revealed bilateral subdural hematomas. (both frontoparietal).  Neurology evaluated patient in 2022 for transient episode of dysarthria in the past with lip twitching assumed to be TIA vs seizure, he was titrated back then on keppra to 750 BID.    Review of Systems:    NEUROLOGICAL: +As stated in HPI above  All other review of systems is negative unless indicated above.    Allergies:  No Known Allergies    PMHx/PSHx/Family Hx: As above, otherwise see below   Diabetes    TIA (transient ischemic attack)    HTN (hypertension)    Hyperlipidemia    Social Hx:  No current use of tobacco, alcohol, or illicit drugs      Medications:  MEDICATIONS  (STANDING):    MEDICATIONS  (PRN):      Vitals:  T(C): 36.8 (05-19-24 @ 09:24), Max: 36.8 (05-19-24 @ 09:24)  HR: 96 (05-19-24 @ 09:45) (96 - 96)  BP: 177/92 (05-19-24 @ 09:45) (177/92 - 189/92)  RR: 18 (05-19-24 @ 09:45) (18 - 18)  SpO2: 98% (05-19-24 @ 09:45) (96% - 98%)    Physical Examination:       Neurologic Exam:  Mental status - Patient resting in bed, agitated, tends to provide tangential answers. Was sleeping and eyes closed, awakened by examiner and woke up normally. Disoriented to time, place and person: thought it was 1979, place is his room and did not recall president name. repetition and naming intact. Follows simple and complex commands.     Cranial nerves - VFF, EOMI, face sensation (V1-V3) intact b/l, facial strength intact without asymmetry b/l, hearing intact b/l, palate with symmetric elevation, trapezius OR sternocleidomastoid 5/5 strength b/l, tongue midline on protrusion with full lateral movement    Motor - Normal bulk and tone throughout. No pronator drift.  Strength testing            Deltoid      Biceps      Triceps                 R            5                 5               5                     5                                L             5                 5               4                     5                                        Hip Flexion    Hip Extension    Knee Flexion    Knee Extension    Dorsiflexion    Plantar Flexion  R              5                           5                       5                           5                            5                          5  L              4                           5                        5                           5                            5                          5    Sensation - Light touch decreased in left UE and LE in comparison to right    DTR's -             Biceps      Triceps     Brachioradialis               Patellar    Ankle    Toes/plantar response  R             1+             1+                  1+                       1+            1                 Down  L              2+             2+                 2+                        1+           1                Down    Coordination - Finger to Nose intact b/l. No tremors appreciated. HTS difficult to perform    Gait and station - not attempted for safety reasons    Labs:                        16.1   9.52  )-----------( 185      ( 19 May 2024 10:50 )             47.8     05-19    137  |  102  |  15  ----------------------------<  273<H>  4.3   |  24  |  1.01    Ca    8.5      19 May 2024 10:50  Phos  2.7     05-19  Mg     2.20     05-19    TPro  6.9  /  Alb  3.6  /  TBili  0.5  /  DBili  x   /  AST  24  /  ALT  23  /  AlkPhos  64  05-19    CAPILLARY BLOOD GLUCOSE        LIVER FUNCTIONS - ( 19 May 2024 10:50 )  Alb: 3.6 g/dL / Pro: 6.9 g/dL / ALK PHOS: 64 U/L / ALT: 23 U/L / AST: 24 U/L / GGT: x               CSF:                  Radiology:  CT Head No Cont:  (19 May 2024 11:22)        < from: CT Head No Cont (05.19.24 @ 11:22) >    IMPRESSION:    1.  No evidence of intraparenchymal hemorrhage or midline shift.  2.  Ventricular size increased when compared to prior imaging.   Additionally, areas of decreased attenuation along the periventricular   white matter, likely a combination of chronic small vessel disease and   transependymal CSF flow. A malfunctioning catheter should be considered.      < end of copied text >

## 2024-05-19 NOTE — ED PROVIDER NOTE - NS ED ROS FT
Awake, weak    Vital Signs Last 24 Hrs  T(C): 36.3 (06-12-19 @ 16:32), Max: 36.7 (06-12-19 @ 03:00)  T(F): 97.4 (06-12-19 @ 16:32), Max: 98 (06-12-19 @ 03:00)  HR: 122 (06-12-19 @ 16:32) (88 - 122)  BP: 114/67 (06-12-19 @ 16:32) (91/48 - 114/67)  RR: 15 (06-12-19 @ 16:32) (15 - 18)  SpO2: 100% (06-12-19 @ 16:32) (96% - 100%)    Card S1S2  Lungs fair air entry b/l  Abd soft, NT, ND  Extr + edema, erythema LLE, improving                                                                 10.5   8.86  )-----------( 275      ( 12 Jun 2019 07:14 )             32.4     12 Jun 2019 07:14    138    |  103    |  34     ----------------------------<  112    4.7     |  29     |  1.50     Ca    9.3        12 Jun 2019 07:14    PT/INR - ( 12 Jun 2019 07:14 )   PT: 39.8 sec;   INR: 3.42 ratio      acetaminophen   Tablet .. 650 milliGRAM(s) Oral every 6 hours PRN  aspirin enteric coated 81 milliGRAM(s) Oral daily  atorvastatin 80 milliGRAM(s) Oral at bedtime  cefepime   IVPB      cefepime   IVPB 2000 milliGRAM(s) IV Intermittent every 12 hours  digoxin     Tablet 0.125 milliGRAM(s) Oral daily  escitalopram 5 milliGRAM(s) Oral daily  mirtazapine 15 milliGRAM(s) Oral at bedtime  pantoprazole    Tablet 40 milliGRAM(s) Oral before breakfast  warfarin 2 milliGRAM(s) Oral once    A/P:    Septic/hemodynamic HENRY on CKD 3  No nephrotoxins  F/u BMP   Avoid hypotension   Encourage PO fluids  Strep bacteremia  Abx per ID  Will follow ROS as listed in HPI/MDM

## 2024-05-19 NOTE — ED PROVIDER NOTE - ATTENDING CONTRIBUTION TO CARE
Dr. Quintanilla:  I have personally performed a face to face bedside history and physical examination of this patient. I have discussed the history, examination, review of systems, assessment and plan of management with the resident. I have reviewed the electronic medical record and amended it to reflect my history, review of systems, physical exam, assessment and plan.    witnessed to be shaking Dr. Quintanilla:  I have personally performed a face to face bedside history and physical examination of this patient. I have discussed the history, examination, review of systems, assessment and plan of management with the resident. I have reviewed the electronic medical record and amended it to reflect my history, review of systems, physical exam, assessment and plan.    78M h/o HTN, DM, TIA, NPH s/p V shunt (2023), subdural hematoma s/p Left MMA embolization, transferred from outside hospital for neurosurgery evaluation.  Pt was seen yesterday at [facility] for N/V and generalized weakness; had normal labs & CT abd/pelvis, discharged home.  Upon returning home last night, reportedly had 2-3 episodes of diffuse body shaking witnessed by wife.  Went back to the [facility] and then was transferred here for neurosurgery evaluation.  Pt with ?dementia (as per wife), unable to provide history.      Exam:  - nad  - rrr  - ctab   -abd soft ntnd  - no focal neuro deficits    A/P  - "shaking episodes", unclear if new onset seizures  - basic labs, CT head  - neurosurgery and neurology recs appreciated

## 2024-05-19 NOTE — CONSULT NOTE ADULT - PROBLEM SELECTOR RECOMMENDATION 9
Low suspicion for shunt malfunction. R CTH to compare to prior imaging. Regardless of shunt function, shunt was placed for NPH symptoms and pt never improved after shunt insertion. Unlikely that seizure episode would be related to shunt/increased ICP. Low suspicion for shunt malfunction. R CTH to compare to prior imaging. Regardless of shunt function, shunt was placed for NPH symptoms and pt never improved after shunt insertion. Unlikely that seizure episode would be related to shunt/increased ICP. Rec neurology consult for seizures

## 2024-05-19 NOTE — PATIENT PROFILE ADULT - FALL HARM RISK - HARM RISK INTERVENTIONS
Assistance with ambulation/Assistance OOB with selected safe patient handling equipment/Communicate Risk of Fall with Harm to all staff/Discuss with provider need for PT consult/Monitor for mental status changes/Monitor gait and stability/Move patient closer to nurses' station/Provide patient with walking aids - walker, cane, crutches/Reinforce activity limits and safety measures with patient and family/Reorient to person, place and time as needed/Tailored Fall Risk Interventions/Toileting schedule using arm’s reach rule for commode and bathroom/Use of alarms - bed, chair and/or voice tab/Visual Cue: Yellow wristband and red socks/Bed in lowest position, wheels locked, appropriate side rails in place/Call bell, personal items and telephone in reach/Instruct patient to call for assistance before getting out of bed or chair/Non-slip footwear when patient is out of bed/[place] to call system/Physically safe environment - no spills, clutter or unnecessary equipment/Purposeful Proactive Rounding/Room/bathroom lighting operational, light cord in reach

## 2024-05-19 NOTE — H&P ADULT - NSICDXPASTMEDICALHX_GEN_ALL_CORE_FT
PAST MEDICAL HISTORY:  Dementia     Diabetes     H/O hydrocephalus     HTN (hypertension)     Hyperlipidemia     TIA (transient ischemic attack)

## 2024-05-19 NOTE — CONSULT NOTE ADULT - ASSESSMENT
78y (1945) with PMH of DM, HTN, CVA? vs TIA with no residual deficit per history currently on aspirin, SDH sp left middle meningeal artery embolization in 2022, hydrocephalus sp VPS in 2023 without significant improvement, dementia per wife, difficulty walking uses a walker at baseline for the past year, hx of sleep apnea, no hx of seizure but hx of keppra use 1 year ago for prophylaxis presenting currently for an episode of generalized whole body shaking that persisted for seconds to a minute per wife. There was no loss of consciousness, tongue biting or urinary incontinence. Patient has dementia at baseline with difficulty walking requiring walker and urinary incontinence. His exam revealed findings suggestive of baseline exam of dementia and slight left sided weakness and sensory loss. His CT brain revealed findings suggestive of shunt malfunction.    Impression:  1) episode of shaking, less likely seizure, could be due to agitation  2) NPH     plan:  [] shunt evaluation per neurosurgery, recs appreciated  [] routine EEG not more than 2 hours  [] metabolic work up per primary team  [] check UA, TSH, vitamin B1, B6, B12, D, E, folate, homocysteine, methylmalonic acid, lactate, creatnine kinase, ammonia, ESR, CRP    case to be seen with Dr. Sandoval 78y (1945) with PMH of DM, HTN, CVA? vs TIA with no residual deficit per history currently on aspirin, SDH sp left middle meningeal artery embolization in 2022, hydrocephalus sp VPS in 2023 without significant improvement, dementia per wife, difficulty walking uses a walker at baseline for the past year, hx of sleep apnea, no hx of seizure but hx of keppra use 1 year ago for prophylaxis presenting currently for an episode of generalized whole body shaking that persisted for seconds to a minute per wife. There was no loss of consciousness, tongue biting or urinary incontinence. Patient has dementia at baseline with difficulty walking requiring walker and urinary incontinence. His exam revealed findings suggestive of baseline exam of dementia and slight left sided weakness and sensory loss. His CT brain revealed findings suggestive of shunt malfunction.    Impression:  1) episode of shaking, less likely seizure, could be due to agitation  2) NPH     plan:  [] shunt evaluation per neurosurgery, recs appreciated  [] metabolic work up per primary team  [] check UA, TSH, vitamin B12  [] outpatient f/u at [address] [place] [address]    case seen with Dr. Sandoval 78y (1945) with PMH of DM, HTN, CVA? vs TIA with no residual deficit per history currently on aspirin, SDH sp left middle meningeal artery embolization in 2022, hydrocephalus sp VPS in 2023 without significant improvement, dementia per wife, difficulty walking uses a walker at baseline for the past year, hx of sleep apnea, no hx of seizure but hx of keppra use 1 year ago for prophylaxis presenting currently for an episode of generalized whole body shaking that persisted for seconds to a minute per wife. There was no loss of consciousness, tongue biting or urinary incontinence. Patient has dementia at baseline with difficulty walking requiring walker and urinary incontinence. His exam revealed findings suggestive of baseline exam of dementia and slight left sided weakness and sensory loss. His CT brain revealed findings suggestive of shunt malfunction.    Impression:  1) episode of shaking, less likely seizure, could be due to agitation  2) NPH     plan:  [] shunt evaluation per neurosurgery, recs appreciated  [] no further inpatient neurology work up at this point  [] patient can be discharged from neurology standpoint  [] metabolic work up per primary team  [] check UA, TSH, vitamin B12  [] outpatient f/u at [address] [place] [address]    case seen with Dr. Sandoval 78y (1945) with PMH of DM, HTN, CVA? vs TIA with no residual deficit per history currently on aspirin, SDH sp left middle meningeal artery embolization in 2022, hydrocephalus sp VPS in 2023 without significant improvement, dementia per wife, difficulty walking uses a walker at baseline for the past year, hx of sleep apnea, no hx of seizure but hx of keppra use 1 year ago for prophylaxis presenting currently for an episode of generalized whole body shaking that persisted for seconds to a minute per wife. There was no loss of consciousness, tongue biting or urinary incontinence. Patient has dementia at baseline with difficulty walking requiring walker and urinary incontinence. His exam revealed findings suggestive of baseline exam of dementia and slight left sided weakness and sensory loss. His CT brain revealed findings suggestive of shunt malfunction.    Impression:  1) episode of shaking with agitation, not consistent with seizure, could be due to agitation  2) NPH     plan:  [] shunt evaluation per neurosurgery, recs appreciated  [] no further inpatient neurology work up at this point  [] patient can be discharged from neurology standpoint  [] metabolic work up per primary team  [] check UA, TSH, vitamin B12  [] outpatient f/u at [address] [place] [address]    case seen with Dr. Sandoval

## 2024-05-20 ENCOUNTER — TRANSCRIPTION ENCOUNTER (OUTPATIENT)
Age: 79
End: 2024-05-20

## 2024-05-20 LAB
A1C WITH ESTIMATED AVERAGE GLUCOSE RESULT: 8.1 % — HIGH (ref 4–5.6)
ANION GAP SERPL CALC-SCNC: 10 MMOL/L — SIGNIFICANT CHANGE UP (ref 7–14)
BASOPHILS # BLD AUTO: 0.04 K/UL — SIGNIFICANT CHANGE UP (ref 0–0.2)
BASOPHILS NFR BLD AUTO: 0.5 % — SIGNIFICANT CHANGE UP (ref 0–2)
BUN SERPL-MCNC: 15 MG/DL — SIGNIFICANT CHANGE UP (ref 7–23)
CALCIUM SERPL-MCNC: 8.1 MG/DL — LOW (ref 8.4–10.5)
CHLORIDE SERPL-SCNC: 105 MMOL/L — SIGNIFICANT CHANGE UP (ref 98–107)
CHOLEST SERPL-MCNC: 103 MG/DL — SIGNIFICANT CHANGE UP
CO2 SERPL-SCNC: 24 MMOL/L — SIGNIFICANT CHANGE UP (ref 22–31)
CREAT SERPL-MCNC: 0.9 MG/DL — SIGNIFICANT CHANGE UP (ref 0.5–1.3)
CULTURE RESULTS: SIGNIFICANT CHANGE UP
EGFR: 87 ML/MIN/1.73M2 — SIGNIFICANT CHANGE UP
EOSINOPHIL # BLD AUTO: 0.24 K/UL — SIGNIFICANT CHANGE UP (ref 0–0.5)
EOSINOPHIL NFR BLD AUTO: 3.1 % — SIGNIFICANT CHANGE UP (ref 0–6)
ESTIMATED AVERAGE GLUCOSE: 186 — SIGNIFICANT CHANGE UP
FOLATE SERPL-MCNC: 19.9 NG/ML — HIGH (ref 3.1–17.5)
GLUCOSE BLDC GLUCOMTR-MCNC: 133 MG/DL — HIGH (ref 70–99)
GLUCOSE BLDC GLUCOMTR-MCNC: 160 MG/DL — HIGH (ref 70–99)
GLUCOSE BLDC GLUCOMTR-MCNC: 73 MG/DL — SIGNIFICANT CHANGE UP (ref 70–99)
GLUCOSE SERPL-MCNC: 145 MG/DL — HIGH (ref 70–99)
HCT VFR BLD CALC: 46.1 % — SIGNIFICANT CHANGE UP (ref 39–50)
HDLC SERPL-MCNC: 40 MG/DL — LOW
HGB BLD-MCNC: 15.1 G/DL — SIGNIFICANT CHANGE UP (ref 13–17)
IANC: 5.05 K/UL — SIGNIFICANT CHANGE UP (ref 1.8–7.4)
IMM GRANULOCYTES NFR BLD AUTO: 0.3 % — SIGNIFICANT CHANGE UP (ref 0–0.9)
LIPID PNL WITH DIRECT LDL SERPL: 46 MG/DL — SIGNIFICANT CHANGE UP
LYMPHOCYTES # BLD AUTO: 1.46 K/UL — SIGNIFICANT CHANGE UP (ref 1–3.3)
LYMPHOCYTES # BLD AUTO: 18.8 % — SIGNIFICANT CHANGE UP (ref 13–44)
MCHC RBC-ENTMCNC: 28.6 PG — SIGNIFICANT CHANGE UP (ref 27–34)
MCHC RBC-ENTMCNC: 32.8 GM/DL — SIGNIFICANT CHANGE UP (ref 32–36)
MCV RBC AUTO: 87.3 FL — SIGNIFICANT CHANGE UP (ref 80–100)
MONOCYTES # BLD AUTO: 0.96 K/UL — HIGH (ref 0–0.9)
MONOCYTES NFR BLD AUTO: 12.4 % — SIGNIFICANT CHANGE UP (ref 2–14)
NEUTROPHILS # BLD AUTO: 5.05 K/UL — SIGNIFICANT CHANGE UP (ref 1.8–7.4)
NEUTROPHILS NFR BLD AUTO: 64.9 % — SIGNIFICANT CHANGE UP (ref 43–77)
NON HDL CHOLESTEROL: 63 MG/DL — SIGNIFICANT CHANGE UP
NRBC # BLD: 0 /100 WBCS — SIGNIFICANT CHANGE UP (ref 0–0)
NRBC # FLD: 0 K/UL — SIGNIFICANT CHANGE UP (ref 0–0)
PLATELET # BLD AUTO: 190 K/UL — SIGNIFICANT CHANGE UP (ref 150–400)
POTASSIUM SERPL-MCNC: 4.1 MMOL/L — SIGNIFICANT CHANGE UP (ref 3.5–5.3)
POTASSIUM SERPL-SCNC: 4.1 MMOL/L — SIGNIFICANT CHANGE UP (ref 3.5–5.3)
RBC # BLD: 5.28 M/UL — SIGNIFICANT CHANGE UP (ref 4.2–5.8)
RBC # FLD: 13.2 % — SIGNIFICANT CHANGE UP (ref 10.3–14.5)
SODIUM SERPL-SCNC: 139 MMOL/L — SIGNIFICANT CHANGE UP (ref 135–145)
SPECIMEN SOURCE: SIGNIFICANT CHANGE UP
T4 FREE SERPL-MCNC: 1 NG/DL — SIGNIFICANT CHANGE UP (ref 0.9–1.8)
T4 FREE+ TSH PNL SERPL: 5.96 UIU/ML — HIGH (ref 0.27–4.2)
TRIGL SERPL-MCNC: 86 MG/DL — SIGNIFICANT CHANGE UP
VIT B12 SERPL-MCNC: 702 PG/ML — SIGNIFICANT CHANGE UP (ref 200–900)
WBC # BLD: 7.77 K/UL — SIGNIFICANT CHANGE UP (ref 3.8–10.5)
WBC # FLD AUTO: 7.77 K/UL — SIGNIFICANT CHANGE UP (ref 3.8–10.5)

## 2024-05-20 PROCEDURE — 99232 SBSQ HOSP IP/OBS MODERATE 35: CPT

## 2024-05-20 RX ORDER — CHLORHEXIDINE GLUCONATE 213 G/1000ML
1 SOLUTION TOPICAL
Refills: 0 | Status: DISCONTINUED | OUTPATIENT
Start: 2024-05-20 | End: 2024-05-21

## 2024-05-20 RX ADMIN — ATORVASTATIN CALCIUM 20 MILLIGRAM(S): 80 TABLET, FILM COATED ORAL at 22:55

## 2024-05-20 RX ADMIN — CHLORHEXIDINE GLUCONATE 1 APPLICATION(S): 213 SOLUTION TOPICAL at 13:16

## 2024-05-20 RX ADMIN — Medication 16 UNIT(S): at 13:13

## 2024-05-20 RX ADMIN — Medication 81 MILLIGRAM(S): at 13:13

## 2024-05-20 RX ADMIN — TAMSULOSIN HYDROCHLORIDE 0.4 MILLIGRAM(S): 0.4 CAPSULE ORAL at 22:55

## 2024-05-20 RX ADMIN — Medication 16 UNIT(S): at 09:18

## 2024-05-20 RX ADMIN — LOSARTAN POTASSIUM 100 MILLIGRAM(S): 100 TABLET, FILM COATED ORAL at 06:38

## 2024-05-20 RX ADMIN — INSULIN GLARGINE 24 UNIT(S): 100 INJECTION, SOLUTION SUBCUTANEOUS at 22:55

## 2024-05-20 NOTE — DISCHARGE NOTE PROVIDER - NSDCMRMEDTOKEN_GEN_ALL_CORE_FT
aspirin 81 mg oral capsule: 1 cap(s) orally once a day  atorvastatin 20 mg oral tablet: 1 tab(s) orally once a day  Farxiga 10 mg oral tablet: 1 tab(s) orally once a day  HumaLOG 100 units/mL injectable solution: 20 unit(s) injectable 3 times a day (with meals)  insulin glargine 100 units/mL subcutaneous solution: 30 unit(s) subcutaneous once a day (at bedtime)  irbesartan 300 mg oral tablet: 1 tab(s) orally once a day  Ozempic 2 mg/1.5 mL (0.25 mg or 0.5 mg dose) subcutaneous solution: 0.25 microcurie subcutaneous every 7 days  tamsulosin 0.4 mg oral capsule: 1 cap(s) orally once a day (at bedtime)   aspirin 81 mg oral capsule: 1 cap(s) orally once a day  atorvastatin 20 mg oral tablet: 1 tab(s) orally once a day  Farxiga 10 mg oral tablet: 1 tab(s) orally once a day  HumaLOG 100 units/mL injectable solution: 20 unit(s) injectable 3 times a day (with meals)  insulin glargine 100 units/mL subcutaneous solution: 30 unit(s) subcutaneous once a day (at bedtime)  irbesartan 300 mg oral tablet: 1 tab(s) orally once a day  melatonin 3 mg oral tablet: 1 tab(s) orally once a day (at bedtime) As needed Insomnia  Ozempic 2 mg/1.5 mL (0.25 mg or 0.5 mg dose) subcutaneous solution: 0.25 microcurie subcutaneous every 7 days  tamsulosin 0.4 mg oral capsule: 1 cap(s) orally once a day (at bedtime)

## 2024-05-20 NOTE — PROGRESS NOTE ADULT - PROBLEM SELECTOR PLAN 1
New  uncontrollable shaking for ~10min while he was stressed   Imaging as above, UA negative, CXR no focal consolidation   Neurology consulted:  check UA (negative), TSH, vitamin B12; no further inpatient neurology work up at this point, outpt f/u  PT consulted-FUNMILAYO

## 2024-05-20 NOTE — PHYSICAL THERAPY INITIAL EVALUATION ADULT - BED MOBILITY LIMITATIONS, REHAB EVAL
retropulsive/decreased ability to use arms for pushing/pulling/decreased ability to use legs for bridging/pushing/impaired ability to control trunk for mobility

## 2024-05-20 NOTE — PHYSICAL THERAPY INITIAL EVALUATION ADULT - IMPAIRMENTS FOUND, PT EVAL
aerobic capacity/endurance/decreased midline orientation/ergonomics and body mechanics/gross motor/neuromotor development and sensory integration/poor safety awareness/posture

## 2024-05-20 NOTE — PROGRESS NOTE ADULT - PROBLEM SELECTOR PLAN 4
Chronic moderate exacerbation     Home regimen: basal 30 units nightly + prandial 20 units TID  Continue with basal 24 units nightly and prandial 16 units TID + LDCS  A1c and lipid panel pending

## 2024-05-20 NOTE — PROGRESS NOTE ADULT - SUBJECTIVE AND OBJECTIVE BOX
Medicine Progress Note    Patient is a 78y old  Male who presents with a chief complaint of agitation (19 May 2024 22:04)      SUBJECTIVE / OVERNIGHT EVENTS:  denies new complaints, felling weak     ADDITIONAL REVIEW OF SYSTEMS: negative     MEDICATIONS  (STANDING):  aspirin enteric coated 81 milliGRAM(s) Oral daily  atorvastatin 20 milliGRAM(s) Oral at bedtime  chlorhexidine 2% Cloths 1 Application(s) Topical <User Schedule>  dextrose 10% Bolus 125 milliLiter(s) IV Bolus once  dextrose 5%. 1000 milliLiter(s) (50 mL/Hr) IV Continuous <Continuous>  dextrose 5%. 1000 milliLiter(s) (100 mL/Hr) IV Continuous <Continuous>  dextrose 50% Injectable 25 Gram(s) IV Push once  dextrose 50% Injectable 12.5 Gram(s) IV Push once  glucagon  Injectable 1 milliGRAM(s) IntraMuscular once  insulin glargine Injectable (LANTUS) 24 Unit(s) SubCutaneous at bedtime  insulin lispro (ADMELOG) corrective regimen sliding scale   SubCutaneous at bedtime  insulin lispro (ADMELOG) corrective regimen sliding scale   SubCutaneous three times a day before meals  insulin lispro Injectable (ADMELOG) 16 Unit(s) SubCutaneous three times a day before meals  losartan 100 milliGRAM(s) Oral daily  tamsulosin 0.4 milliGRAM(s) Oral at bedtime    MEDICATIONS  (PRN):  acetaminophen     Tablet .. 650 milliGRAM(s) Oral every 6 hours PRN Temp greater or equal to 38C (100.4F), Mild Pain (1 - 3)  aluminum hydroxide/magnesium hydroxide/simethicone Suspension 30 milliLiter(s) Oral every 4 hours PRN Dyspepsia  dextrose Oral Gel 15 Gram(s) Oral once PRN Blood Glucose LESS THAN 70 milliGRAM(s)/deciliter  melatonin 3 milliGRAM(s) Oral at bedtime PRN Insomnia  ondansetron Injectable 4 milliGRAM(s) IV Push every 8 hours PRN Nausea and/or Vomiting    CAPILLARY BLOOD GLUCOSE      POCT Blood Glucose.: 133 mg/dL (20 May 2024 12:48)  POCT Blood Glucose.: 127 mg/dL (20 May 2024 08:45)  POCT Blood Glucose.: 159 mg/dL (19 May 2024 23:07)  POCT Blood Glucose.: 214 mg/dL (19 May 2024 17:55)  POCT Blood Glucose.: 138 mg/dL (19 May 2024 14:51)    I&O's Summary      PHYSICAL EXAM:  Vital Signs Last 24 Hrs  T(C): 36.3 (20 May 2024 10:35), Max: 36.9 (19 May 2024 15:53)  T(F): 97.3 (20 May 2024 10:35), Max: 98.5 (19 May 2024 15:53)  HR: 88 (20 May 2024 10:35) (83 - 99)  BP: 133/76 (20 May 2024 10:35) (133/76 - 168/96)  BP(mean): --  RR: 17 (20 May 2024 10:35) (17 - 18)  SpO2: 97% (20 May 2024 10:35) (97% - 100%)    Parameters below as of 20 May 2024 06:18  Patient On (Oxygen Delivery Method): room air      CONSTITUTIONAL: NAD,   ENMT: Moist oral mucosa, no pharyngeal injection or exudates;   RESPIRATORY: Normal respiratory effort; lungs are clear to auscultation bilaterally  CARDIOVASCULAR: Regular rate and rhythm, normal S1 and S2,; No lower extremity edema;   ABDOMEN: Nontender to palpation, normoactive bowel sounds, no rebound/guarding;   PSYCH: A+O to person, place, affect appropriate  NEUROLOGY: CN 2-12 are intact and symmetric; no gross sensory deficits   SKIN: No rashes; no palpable lesions    LABS:                        15.1   7.77  )-----------( 190      ( 20 May 2024 06:28 )             46.1     05-20    139  |  105  |  15  ----------------------------<  145<H>  4.1   |  24  |  0.90    Ca    8.1<L>      20 May 2024 06:28  Phos  2.7     05-19  Mg     2.20     05-19    TPro  6.9  /  Alb  3.6  /  TBili  0.5  /  DBili  x   /  AST  24  /  ALT  23  /  AlkPhos  64  05-19          Urinalysis Basic - ( 20 May 2024 06:28 )    Color: x / Appearance: x / SG: x / pH: x  Gluc: 145 mg/dL / Ketone: x  / Bili: x / Urobili: x   Blood: x / Protein: x / Nitrite: x   Leuk Esterase: x / RBC: x / WBC x   Sq Epi: x / Non Sq Epi: x / Bacteria: x            RADIOLOGY & ADDITIONAL TESTS:  Imaging from Last 24 Hours:    Electrocardiogram/QTc Interval:    COORDINATION OF CARE:  Care Discussed with Consultants/Other Providers:

## 2024-05-20 NOTE — PHYSICAL THERAPY INITIAL EVALUATION ADULT - ADDITIONAL COMMENTS
Pt confused; recommend case management consultion for confirmation. Pt reports he lives in a private house with his wife and family; there are 2 steps to enter. Has a home health aide daily.

## 2024-05-20 NOTE — DISCHARGE NOTE PROVIDER - ATTENDING DISCHARGE PHYSICAL EXAMINATION:
.  VITAL SIGNS:  T(C): 36.8 (05-21-24 @ 10:20), Max: 36.8 (05-20-24 @ 13:51)  T(F): 98.3 (05-21-24 @ 10:20), Max: 98.3 (05-20-24 @ 13:51)  HR: 94 (05-21-24 @ 10:20) (82 - 96)  BP: 150/88 (05-21-24 @ 10:20) (119/68 - 163/78)  BP(mean): --  RR: 18 (05-21-24 @ 10:20) (17 - 18)  SpO2: 99% (05-21-24 @ 10:20) (97% - 99%)  Wt(kg): --    PHYSICAL EXAM:    Constitutional: WDWN resting comfortably in bed; NAD  Head: NC/AT  Eyes: PERRL, EOMI, clear conjunctiva  ENT: no nasal discharge; uvula midline, no oropharyngeal erythema or exudates;   Neck: supple;   Respiratory: CTA B/L; no W/R/R, no retractions  Cardiac: +S1/S2; RRR; no M/R/G;   Gastrointestinal: soft, NT/ND; no rebound or guarding; +BSx4  Back: spine midline, no bony tenderness or step-offs; no CVAT B/L  Extremities: WWP, no clubbing or cyanosis; no peripheral edema  Musculoskeletal: NROM x4; no joint swelling, tenderness or erythema  Neurologic: AAOx3; CNII-XII grossly intact; no focal deficits  Psychiatric: affect and characteristics of appearance, verbalizations, behaviors are appropriate

## 2024-05-20 NOTE — DISCHARGE NOTE PROVIDER - HOSPITAL COURSE
78 yr old male presenting with uncontrollable shaking for ~10min       Problem/Plan - 1:  ·  Problem: Episode of shaking.   ·  Plan: New  uncontrollable shaking for ~10min while he was stressed   Imaging as above, UA negative, CXR no focal consolidation   Neurology consulted:  check UA (negative), TSH, vitamin B12; no further inpatient neurology work up at this point, outpt f/u  PT consulted-FUNMILAYO.    Problem/Plan - 2:  ·  Problem: S/P  shunt.   ·  Plan: Chronic   Imaging with concern for increase in ventricle size  Neurosurgery consulted: "No indication for shunt tap unless concern of CSF infection, which is clinically low at this point of time".    Problem/Plan - 3:  ·  Problem: Benign essential HTN.   ·  Plan: Chronic moderate exacerbation  /83  Continue irbesartan 300mg daily formulary  Monitor.    Problem/Plan - 4:  ·  Problem: T2DM (type 2 diabetes mellitus).   ·  Plan: Chronic moderate exacerbation     Home regimen: basal 30 units nightly + prandial 20 units TID  Continue with basal 24 units nightly and prandial 16 units TID + LDCS  A1c and lipid panel pending.    Problem/Plan - 5:  ·  Problem: HLD (hyperlipidemia).   ·  Plan: Chronic stable   Continue atorvastatin 20mg nightly   Lipid panel in AM.    Problem/Plan - 6:  ·  Problem: Dementia.   ·  Plan: Chronic stable  Frequent reorientation.    On ___ this case was reviewed with  ____, the patient is medically stable and optimized for discharge as per attending. All medications were reviewed and prescriptions were sent to mutually agreed upon pharmacy. Discharge plan reviewed with patient and/or family.   78 yr old male presenting with uncontrollable shaking for ~10min       Problem/Plan - 1:  ·  Problem: Episode of shaking.   ·  Plan: New  uncontrollable shaking for ~10min while he was stressed   Imaging as above, UA negative, CXR no focal consolidation   Neurology consulted:  check UA (negative), TSH, vitamin B12; no further inpatient neurology work up at this point, outpt f/u  PT consulted-FUNMILAYO, but family refusing, going home with Home PT    Problem/Plan - 2:  ·  Problem: S/P  shunt.   ·  Plan: Chronic   Imaging with concern for increase in ventricle size  Neurosurgery consulted: "No indication for shunt tap unless concern of CSF infection, which is clinically low at this point of time".    Problem/Plan - 3:  ·  Problem: Benign essential HTN.   ·  Plan: Chronic moderate exacerbation  /83  Continue irbesartan 300mg daily formulary  Monitor.    Problem/Plan - 4:  ·  Problem: T2DM (type 2 diabetes mellitus).   ·  Plan: Chronic moderate exacerbation     Home regimen: basal 30 units nightly + prandial 20 units TID  Continue  home regimen       Problem/Plan - 5:  ·  Problem: HLD (hyperlipidemia).   ·  Plan: Chronic stable   Continue atorvastatin 20mg nightly   Lipid panel in AM.    Problem/Plan - 6:  ·  Problem: Dementia.   ·  Plan: Chronic stable  Frequent reorientation.    On __5/21_ this case was reviewed with Dr. Davidson ____, the patient is medically stable and optimized for discharge as per attending. All medications were reviewed and prescriptions were sent to mutually agreed upon pharmacy. Discharge plan reviewed with patient and/or family.

## 2024-05-20 NOTE — DISCHARGE NOTE PROVIDER - NSDCCPCAREPLAN_GEN_ALL_CORE_FT
PRINCIPAL DISCHARGE DIAGNOSIS  Diagnosis: Seizure-like activity  Assessment and Plan of Treatment:       SECONDARY DISCHARGE DIAGNOSES  Diagnosis:  (ventriculoperitoneal) shunt status  Assessment and Plan of Treatment:     Diagnosis: Dementia  Assessment and Plan of Treatment: Dementia is a loss of mental skills that affects daily life. It is different from mild memory loss that occurs with aging. Dementia can cause problems with memory, thinking clearly, and planning. It is different for everyone. But it usually gets worse slowly. Some people who have dementia can function well for a long time. But at some point it may become hard for the person to care for himself or herself. Make your home (or your loved one's home) safe. Tack down rugs, and put no-slip tape in the tub. Install handrails, and put safety switches on stoves and appliances. Keep rooms free of clutter. Make sure walkways around furniture are clear. Do not move furniture around, because the person may become confused. Use locks on doors and cupboards. Lock up knives, scissors, medicines, cleaning supplies, and other dangerous things. Do not let the person drive or cook if he or she can't do it safely. A person with dementia should not drive unless he or she is able to pass an on-road driving test. Get a medical alert bracelet for the person so that you can be contacted if (s)he wanders away, and consider getting a door alarm for notification of attempts to leave the home.      Diagnosis: T2DM (type 2 diabetes mellitus)  Assessment and Plan of Treatment: .Your HgA1C during hospitalization was noted to be 8.1%   Continue your medication regimen and a consistent carbohydrate diet (Meaning eating the same amount of carbohydrates at the same time each day). Monitor blood glucose levels throughout the day before meals and at bedtime. Record blood sugars and bring to outpatient providers appointment in order to be reviewed by your doctor for management modifications. If your sugars are more than 400 or less than 70 you should contact your PCP immediately.   Monitor for signs/symptoms of low blood glucose, such as, dizziness, altered mental status, or cool/clammy skin. In addition, monitor for signs/symptoms of high blood glucose, such as, feeling hot, dry, fatigued, or with increased thirst/urination.   Make regular podiatry appointments in order to have feet checked for wounds and uncontrolled toe nail growth to prevent infections, as well as, appointments with an ophthalmologist to monitor your vision.      Diagnosis: HLD (hyperlipidemia)  Assessment and Plan of Treatment: .Continue prescribed medications to control your cholesterol levels and a DASH (Low fat/salt) diet. Follow up with your primary care provider upon discharge for further management and monitoring of cholesterol levels.      Diagnosis: Benign essential HTN  Assessment and Plan of Treatment: .Continue blood pressure medication regimen as directed. Monitor for any visual changes, headaches or dizziness.  Monitor blood pressure regularly.  Follow up with your primary care provider for further management for high blood pressure.

## 2024-05-20 NOTE — PHYSICAL THERAPY INITIAL EVALUATION ADULT - TRANSFER SAFETY CONCERNS NOTED: SIT/STAND, REHAB EVAL
retropulsive/decreased safety awareness/losing balance/decreased sequencing ability/decreased weight-shifting ability

## 2024-05-20 NOTE — PHYSICAL THERAPY INITIAL EVALUATION ADULT - LEVEL OF INDEPENDENCE: GAIT, REHAB EVAL
Multiple attempt to perform lateral steps along EOB; however, pt unable to perform due to weakness and poor balance

## 2024-05-20 NOTE — PHYSICAL THERAPY INITIAL EVALUATION ADULT - GENERAL OBSERVATIONS, REHAB EVAL
Upon entry, pt semi-supine in bed in NAD. PCA present at bedside. HR 88 bpm. Pt left as received with all tubes/lines intact, bed alarm on, call bell in reach and in NAD. PCA present at bedside.

## 2024-05-20 NOTE — PHYSICAL THERAPY INITIAL EVALUATION ADULT - PERTINENT HX OF CURRENT PROBLEM, REHAB EVAL
Pt is a 78 year old male presenting for  shunt assessment by Neurosurgery for concern for possible seizures. CT head negative for acute findings; revealed increased ventricular size and areas of decreased attenuation along the periventricular white matter likely a combination of chronic small vessel disease and transependymal CSF flow.

## 2024-05-21 ENCOUNTER — TRANSCRIPTION ENCOUNTER (OUTPATIENT)
Age: 79
End: 2024-05-21

## 2024-05-21 VITALS
TEMPERATURE: 98 F | SYSTOLIC BLOOD PRESSURE: 136 MMHG | OXYGEN SATURATION: 98 % | DIASTOLIC BLOOD PRESSURE: 76 MMHG | RESPIRATION RATE: 17 BRPM | HEART RATE: 87 BPM

## 2024-05-21 LAB
GLUCOSE BLDC GLUCOMTR-MCNC: 145 MG/DL — HIGH (ref 70–99)
GLUCOSE BLDC GLUCOMTR-MCNC: 163 MG/DL — HIGH (ref 70–99)

## 2024-05-21 PROCEDURE — 99239 HOSP IP/OBS DSCHRG MGMT >30: CPT

## 2024-05-21 RX ORDER — LANOLIN ALCOHOL/MO/W.PET/CERES
1 CREAM (GRAM) TOPICAL
Qty: 0 | Refills: 0 | DISCHARGE
Start: 2024-05-21

## 2024-05-21 RX ADMIN — CHLORHEXIDINE GLUCONATE 1 APPLICATION(S): 213 SOLUTION TOPICAL at 05:57

## 2024-05-21 RX ADMIN — Medication 1: at 08:55

## 2024-05-21 RX ADMIN — Medication 16 UNIT(S): at 13:03

## 2024-05-21 RX ADMIN — LOSARTAN POTASSIUM 100 MILLIGRAM(S): 100 TABLET, FILM COATED ORAL at 05:55

## 2024-05-21 RX ADMIN — Medication 81 MILLIGRAM(S): at 13:03

## 2024-05-21 RX ADMIN — Medication 16 UNIT(S): at 08:55

## 2024-05-21 NOTE — DISCHARGE NOTE NURSING/CASE MANAGEMENT/SOCIAL WORK - PATIENT PORTAL LINK FT
You can access the FollowMyHealth Patient Portal offered by [facility] by registering at the following website: [email or web address]. By joining [facility]’s FollowMyHealth portal, you will also be able to view your health information using other applications (apps) compatible with our system.

## 2024-06-10 NOTE — PATIENT PROFILE ADULT - NSPROGENBLOODRESTRICT_GEN_A_NUR
Date of last visit:  5/14/2024  Date of next visit:  8/19/2024    Requested Prescriptions     Pending Prescriptions Disp Refills    colchicine (COLCRYS) 0.6 MG tablet [Pharmacy Med Name: Colchicine 0.6 MG Oral Tablet] 30 tablet 0     Sig: Take 1 tablet by mouth once daily     
none

## 2024-07-26 ENCOUNTER — INPATIENT (INPATIENT)
Facility: HOSPITAL | Age: 79
LOS: 6 days | Discharge: HOME | End: 2024-08-02
Attending: STUDENT IN AN ORGANIZED HEALTH CARE EDUCATION/TRAINING PROGRAM | Admitting: STUDENT IN AN ORGANIZED HEALTH CARE EDUCATION/TRAINING PROGRAM
Payer: MEDICARE

## 2024-07-26 VITALS
WEIGHT: 160.06 LBS | RESPIRATION RATE: 18 BRPM | DIASTOLIC BLOOD PRESSURE: 77 MMHG | OXYGEN SATURATION: 95 % | TEMPERATURE: 100 F | HEART RATE: 136 BPM | SYSTOLIC BLOOD PRESSURE: 115 MMHG

## 2024-07-26 DIAGNOSIS — Z98.2 PRESENCE OF CEREBROSPINAL FLUID DRAINAGE DEVICE: Chronic | ICD-10-CM

## 2024-07-26 DIAGNOSIS — Z98.2 PRESENCE OF CEREBROSPINAL FLUID DRAINAGE DEVICE: ICD-10-CM

## 2024-07-26 DIAGNOSIS — R10.9 UNSPECIFIED ABDOMINAL PAIN: ICD-10-CM

## 2024-07-26 PROBLEM — Z86.69 PERSONAL HISTORY OF OTHER DISEASES OF THE NERVOUS SYSTEM AND SENSE ORGANS: Chronic | Status: ACTIVE | Noted: 2024-05-19

## 2024-07-26 PROBLEM — F03.90 UNSPECIFIED DEMENTIA, UNSPECIFIED SEVERITY, WITHOUT BEHAVIORAL DISTURBANCE, PSYCHOTIC DISTURBANCE, MOOD DISTURBANCE, AND ANXIETY: Chronic | Status: ACTIVE | Noted: 2024-05-19

## 2024-07-26 LAB
ADD ON TEST-SPECIMEN IN LAB: SIGNIFICANT CHANGE UP
ALBUMIN SERPL ELPH-MCNC: 3.6 G/DL — SIGNIFICANT CHANGE UP (ref 3.3–5)
ALP SERPL-CCNC: 128 U/L — HIGH (ref 40–120)
ALT FLD-CCNC: 246 U/L — HIGH (ref 4–41)
ANION GAP SERPL CALC-SCNC: 17 MMOL/L — HIGH (ref 7–14)
APPEARANCE UR: ABNORMAL
APTT BLD: 41.9 SEC — HIGH (ref 24.5–35.6)
AST SERPL-CCNC: 140 U/L — HIGH (ref 4–40)
BACTERIA # UR AUTO: NEGATIVE /HPF — SIGNIFICANT CHANGE UP
BASE EXCESS BLDV CALC-SCNC: -0.4 MMOL/L — SIGNIFICANT CHANGE UP (ref -2–3)
BASE EXCESS BLDV CALC-SCNC: -1.8 MMOL/L — SIGNIFICANT CHANGE UP (ref -2–3)
BASOPHILS # BLD AUTO: 0.02 K/UL — SIGNIFICANT CHANGE UP (ref 0–0.2)
BASOPHILS NFR BLD AUTO: 0.1 % — SIGNIFICANT CHANGE UP (ref 0–2)
BILIRUB SERPL-MCNC: 2.4 MG/DL — HIGH (ref 0.2–1.2)
BILIRUB UR-MCNC: ABNORMAL
BLOOD GAS VENOUS COMPREHENSIVE RESULT: SIGNIFICANT CHANGE UP
BUN SERPL-MCNC: 32 MG/DL — HIGH (ref 7–23)
CA-I SERPL-SCNC: 1.09 MMOL/L — LOW (ref 1.15–1.33)
CALCIUM SERPL-MCNC: 8.4 MG/DL — SIGNIFICANT CHANGE UP (ref 8.4–10.5)
CAST: 14 /LPF — HIGH (ref 0–4)
CHLORIDE BLDV-SCNC: 103 MMOL/L — SIGNIFICANT CHANGE UP (ref 96–108)
CHLORIDE BLDV-SCNC: 107 MMOL/L — SIGNIFICANT CHANGE UP (ref 96–108)
CHLORIDE SERPL-SCNC: 103 MMOL/L — SIGNIFICANT CHANGE UP (ref 98–107)
CO2 BLDV-SCNC: 23.4 MMOL/L — SIGNIFICANT CHANGE UP (ref 22–26)
CO2 BLDV-SCNC: 27.9 MMOL/L — HIGH (ref 22–26)
CO2 SERPL-SCNC: 19 MMOL/L — LOW (ref 22–31)
COARSE GRAN CASTS #/AREA URNS AUTO: PRESENT
COLOR SPEC: SIGNIFICANT CHANGE UP
CREAT SERPL-MCNC: 1.62 MG/DL — HIGH (ref 0.5–1.3)
DIFF PNL FLD: NEGATIVE — SIGNIFICANT CHANGE UP
EGFR: 43 ML/MIN/1.73M2 — LOW
EOSINOPHIL # BLD AUTO: 0 K/UL — SIGNIFICANT CHANGE UP (ref 0–0.5)
EOSINOPHIL NFR BLD AUTO: 0 % — SIGNIFICANT CHANGE UP (ref 0–6)
FLUAV AG NPH QL: SIGNIFICANT CHANGE UP
FLUBV AG NPH QL: SIGNIFICANT CHANGE UP
GAS PNL BLDV: 134 MMOL/L — LOW (ref 136–145)
GAS PNL BLDV: 139 MMOL/L — SIGNIFICANT CHANGE UP (ref 136–145)
GAS PNL BLDV: SIGNIFICANT CHANGE UP
GAS PNL BLDV: SIGNIFICANT CHANGE UP
GLUCOSE BLDC GLUCOMTR-MCNC: 180 MG/DL — HIGH (ref 70–99)
GLUCOSE BLDC GLUCOMTR-MCNC: 232 MG/DL — HIGH (ref 70–99)
GLUCOSE BLDV-MCNC: 210 MG/DL — HIGH (ref 70–99)
GLUCOSE BLDV-MCNC: 368 MG/DL — HIGH (ref 70–99)
GLUCOSE SERPL-MCNC: 327 MG/DL — HIGH (ref 70–99)
GLUCOSE UR QL: >=1000 MG/DL
HCO3 BLDV-SCNC: 22 MMOL/L — SIGNIFICANT CHANGE UP (ref 22–29)
HCO3 BLDV-SCNC: 26 MMOL/L — SIGNIFICANT CHANGE UP (ref 22–29)
HCT VFR BLD CALC: 54.3 % — HIGH (ref 39–50)
HCT VFR BLDA CALC: 49 % — SIGNIFICANT CHANGE UP (ref 39–51)
HCT VFR BLDA CALC: 57 % — CRITICAL HIGH (ref 39–51)
HGB BLD CALC-MCNC: 16.4 G/DL — SIGNIFICANT CHANGE UP (ref 12.6–17.4)
HGB BLD CALC-MCNC: 18.9 G/DL — HIGH (ref 12.6–17.4)
HGB BLD-MCNC: 18.4 G/DL — HIGH (ref 13–17)
IANC: 16.57 K/UL — HIGH (ref 1.8–7.4)
IMM GRANULOCYTES NFR BLD AUTO: 0.5 % — SIGNIFICANT CHANGE UP (ref 0–0.9)
INR BLD: 1.14 RATIO — SIGNIFICANT CHANGE UP (ref 0.85–1.18)
KETONES UR-MCNC: ABNORMAL MG/DL
LACTATE BLDV-MCNC: 3.3 MMOL/L — HIGH (ref 0.5–2)
LACTATE BLDV-MCNC: 5.1 MMOL/L — CRITICAL HIGH (ref 0.5–2)
LEUKOCYTE ESTERASE UR-ACNC: NEGATIVE — SIGNIFICANT CHANGE UP
LYMPHOCYTES # BLD AUTO: 0.69 K/UL — LOW (ref 1–3.3)
LYMPHOCYTES # BLD AUTO: 3.6 % — LOW (ref 13–44)
MCHC RBC-ENTMCNC: 29 PG — SIGNIFICANT CHANGE UP (ref 27–34)
MCHC RBC-ENTMCNC: 33.9 GM/DL — SIGNIFICANT CHANGE UP (ref 32–36)
MCV RBC AUTO: 85.6 FL — SIGNIFICANT CHANGE UP (ref 80–100)
MONOCYTES # BLD AUTO: 1.81 K/UL — HIGH (ref 0–0.9)
MONOCYTES NFR BLD AUTO: 9.4 % — SIGNIFICANT CHANGE UP (ref 2–14)
NEUTROPHILS # BLD AUTO: 16.57 K/UL — HIGH (ref 1.8–7.4)
NEUTROPHILS NFR BLD AUTO: 86.4 % — HIGH (ref 43–77)
NITRITE UR-MCNC: NEGATIVE — SIGNIFICANT CHANGE UP
NRBC # BLD: 0 /100 WBCS — SIGNIFICANT CHANGE UP (ref 0–0)
NRBC # FLD: 0 K/UL — SIGNIFICANT CHANGE UP (ref 0–0)
PCO2 BLDV: 36 MMHG — LOW (ref 42–55)
PCO2 BLDV: 50 MMHG — SIGNIFICANT CHANGE UP (ref 42–55)
PH BLDV: 7.33 — SIGNIFICANT CHANGE UP (ref 7.32–7.43)
PH BLDV: 7.4 — SIGNIFICANT CHANGE UP (ref 7.32–7.43)
PH UR: 5.5 — SIGNIFICANT CHANGE UP (ref 5–8)
PLATELET # BLD AUTO: 209 K/UL — SIGNIFICANT CHANGE UP (ref 150–400)
PO2 BLDV: 33 MMHG — SIGNIFICANT CHANGE UP (ref 25–45)
PO2 BLDV: 54 MMHG — HIGH (ref 25–45)
POTASSIUM BLDV-SCNC: 4.7 MMOL/L — SIGNIFICANT CHANGE UP (ref 3.5–5.1)
POTASSIUM BLDV-SCNC: 5.1 MMOL/L — SIGNIFICANT CHANGE UP (ref 3.5–5.1)
POTASSIUM SERPL-MCNC: 5.1 MMOL/L — SIGNIFICANT CHANGE UP (ref 3.5–5.3)
POTASSIUM SERPL-SCNC: 5.1 MMOL/L — SIGNIFICANT CHANGE UP (ref 3.5–5.3)
PROT SERPL-MCNC: 7.3 G/DL — SIGNIFICANT CHANGE UP (ref 6–8.3)
PROT UR-MCNC: SIGNIFICANT CHANGE UP MG/DL
PROTHROM AB SERPL-ACNC: 12.7 SEC — SIGNIFICANT CHANGE UP (ref 9.5–13)
RBC # BLD: 6.34 M/UL — HIGH (ref 4.2–5.8)
RBC # FLD: 14.5 % — SIGNIFICANT CHANGE UP (ref 10.3–14.5)
RBC CASTS # UR COMP ASSIST: 4 /HPF — SIGNIFICANT CHANGE UP (ref 0–4)
REVIEW: SIGNIFICANT CHANGE UP
RSV RNA NPH QL NAA+NON-PROBE: SIGNIFICANT CHANGE UP
SAO2 % BLDV: 49.5 % — LOW (ref 67–88)
SAO2 % BLDV: 84.5 % — SIGNIFICANT CHANGE UP (ref 67–88)
SARS-COV-2 RNA SPEC QL NAA+PROBE: SIGNIFICANT CHANGE UP
SODIUM SERPL-SCNC: 139 MMOL/L — SIGNIFICANT CHANGE UP (ref 135–145)
SP GR SPEC: 1.03 — HIGH (ref 1–1.03)
SQUAMOUS # UR AUTO: 3 /HPF — SIGNIFICANT CHANGE UP (ref 0–5)
UROBILINOGEN FLD QL: 0.2 MG/DL — SIGNIFICANT CHANGE UP (ref 0.2–1)
WBC # BLD: 19.19 K/UL — HIGH (ref 3.8–10.5)
WBC # FLD AUTO: 19.19 K/UL — HIGH (ref 3.8–10.5)
WBC UR QL: 2 /HPF — SIGNIFICANT CHANGE UP (ref 0–5)

## 2024-07-26 PROCEDURE — 99222 1ST HOSP IP/OBS MODERATE 55: CPT | Mod: GC,25

## 2024-07-26 PROCEDURE — 74177 CT ABD & PELVIS W/CONTRAST: CPT | Mod: 26,MC

## 2024-07-26 PROCEDURE — 99291 CRITICAL CARE FIRST HOUR: CPT

## 2024-07-26 PROCEDURE — 70250 X-RAY EXAM OF SKULL: CPT | Mod: 26

## 2024-07-26 PROCEDURE — 70450 CT HEAD/BRAIN W/O DYE: CPT | Mod: 26,MC

## 2024-07-26 PROCEDURE — 74018 RADEX ABDOMEN 1 VIEW: CPT | Mod: 26

## 2024-07-26 PROCEDURE — 71045 X-RAY EXAM CHEST 1 VIEW: CPT | Mod: 26

## 2024-07-26 PROCEDURE — 99291 CRITICAL CARE FIRST HOUR: CPT | Mod: GC

## 2024-07-26 RX ORDER — HYDROMORPHONE HCL IN 0.9% NACL 0.2 MG/ML
0.5 PLASTIC BAG, INJECTION (ML) INTRAVENOUS EVERY 4 HOURS
Refills: 0 | Status: DISCONTINUED | OUTPATIENT
Start: 2024-07-26 | End: 2024-07-30

## 2024-07-26 RX ORDER — CHLORHEXIDINE GLUCONATE 500 MG/1
1 CLOTH TOPICAL DAILY
Refills: 0 | Status: DISCONTINUED | OUTPATIENT
Start: 2024-07-26 | End: 2024-08-02

## 2024-07-26 RX ORDER — DEXTROSE 4 G
25 TABLET,CHEWABLE ORAL ONCE
Refills: 0 | Status: DISCONTINUED | OUTPATIENT
Start: 2024-07-26 | End: 2024-08-02

## 2024-07-26 RX ORDER — DEXTROSE 4 G
12.5 TABLET,CHEWABLE ORAL ONCE
Refills: 0 | Status: DISCONTINUED | OUTPATIENT
Start: 2024-07-26 | End: 2024-08-02

## 2024-07-26 RX ORDER — VANCOMYCIN HYDROCHLORIDE 5 G/100ML
1000 INJECTION, POWDER, LYOPHILIZED, FOR SOLUTION INTRAVENOUS ONCE
Refills: 0 | Status: COMPLETED | OUTPATIENT
Start: 2024-07-26 | End: 2024-07-26

## 2024-07-26 RX ORDER — PIPERACILLIN SODIUM, TAZOBACTAM SODIUM 3; .375 G/15ML; G/15ML
3.38 INJECTION, POWDER, LYOPHILIZED, FOR SOLUTION INTRAVENOUS EVERY 12 HOURS
Refills: 0 | Status: DISCONTINUED | OUTPATIENT
Start: 2024-07-26 | End: 2024-07-29

## 2024-07-26 RX ORDER — INSULIN LISPRO 100/ML
VIAL (ML) SUBCUTANEOUS EVERY 6 HOURS
Refills: 0 | Status: DISCONTINUED | OUTPATIENT
Start: 2024-07-26 | End: 2024-07-26

## 2024-07-26 RX ORDER — IPRATROPIUM BROMIDE AND ALBUTEROL SULFATE 2.5; .5 MG/3ML; MG/3ML
3 SOLUTION RESPIRATORY (INHALATION) EVERY 6 HOURS
Refills: 0 | Status: DISCONTINUED | OUTPATIENT
Start: 2024-07-26 | End: 2024-08-02

## 2024-07-26 RX ORDER — ACETAMINOPHEN 500 MG
1000 TABLET ORAL EVERY 6 HOURS
Refills: 0 | Status: COMPLETED | OUTPATIENT
Start: 2024-07-26 | End: 2024-07-27

## 2024-07-26 RX ORDER — BACTERIOSTATIC SODIUM CHLORIDE 0.9 %
2100 VIAL (ML) INJECTION ONCE
Refills: 0 | Status: COMPLETED | OUTPATIENT
Start: 2024-07-26 | End: 2024-07-26

## 2024-07-26 RX ORDER — DEXTROSE MONOHYDRATE, SODIUM CHLORIDE, SODIUM LACTATE, CALCIUM CHLORIDE, MAGNESIUM CHLORIDE 1.5; 538; 448; 18.4; 5.08 G/100ML; MG/100ML; MG/100ML; MG/100ML; MG/100ML
1000 SOLUTION INTRAPERITONEAL
Refills: 0 | Status: DISCONTINUED | OUTPATIENT
Start: 2024-07-26 | End: 2024-07-27

## 2024-07-26 RX ORDER — DEXTROSE 4 G
15 TABLET,CHEWABLE ORAL ONCE
Refills: 0 | Status: DISCONTINUED | OUTPATIENT
Start: 2024-07-26 | End: 2024-07-27

## 2024-07-26 RX ORDER — BACTERIOSTATIC SODIUM CHLORIDE 0.9 %
1000 VIAL (ML) INJECTION ONCE
Refills: 0 | Status: DISCONTINUED | OUTPATIENT
Start: 2024-07-26 | End: 2024-07-26

## 2024-07-26 RX ORDER — HYDROMORPHONE HCL IN 0.9% NACL 0.2 MG/ML
0.2 PLASTIC BAG, INJECTION (ML) INTRAVENOUS EVERY 4 HOURS
Refills: 0 | Status: DISCONTINUED | OUTPATIENT
Start: 2024-07-26 | End: 2024-07-30

## 2024-07-26 RX ORDER — GLUCAGON INJECTION, SOLUTION 0.5 MG/.1ML
1 INJECTION, SOLUTION SUBCUTANEOUS ONCE
Refills: 0 | Status: DISCONTINUED | OUTPATIENT
Start: 2024-07-26 | End: 2024-07-27

## 2024-07-26 RX ORDER — INSULIN LISPRO 100/ML
VIAL (ML) SUBCUTANEOUS EVERY 6 HOURS
Refills: 0 | Status: DISCONTINUED | OUTPATIENT
Start: 2024-07-26 | End: 2024-07-27

## 2024-07-26 RX ORDER — HEPARIN SODIUM 1000 [USP'U]/ML
5000 INJECTION, SOLUTION INTRAVENOUS; SUBCUTANEOUS EVERY 8 HOURS
Refills: 0 | Status: DISCONTINUED | OUTPATIENT
Start: 2024-07-26 | End: 2024-08-02

## 2024-07-26 RX ORDER — PIPERACILLIN SODIUM, TAZOBACTAM SODIUM 3; .375 G/15ML; G/15ML
3.38 INJECTION, POWDER, LYOPHILIZED, FOR SOLUTION INTRAVENOUS ONCE
Refills: 0 | Status: COMPLETED | OUTPATIENT
Start: 2024-07-26 | End: 2024-07-26

## 2024-07-26 RX ORDER — ACETAMINOPHEN 500 MG
1000 TABLET ORAL ONCE
Refills: 0 | Status: COMPLETED | OUTPATIENT
Start: 2024-07-26 | End: 2024-07-26

## 2024-07-26 RX ADMIN — VANCOMYCIN HYDROCHLORIDE 250 MILLIGRAM(S): 5 INJECTION, POWDER, LYOPHILIZED, FOR SOLUTION INTRAVENOUS at 19:40

## 2024-07-26 RX ADMIN — HEPARIN SODIUM 5000 UNIT(S): 1000 INJECTION, SOLUTION INTRAVENOUS; SUBCUTANEOUS at 23:36

## 2024-07-26 RX ADMIN — Medication 400 MILLIGRAM(S): at 23:35

## 2024-07-26 RX ADMIN — Medication 400 MILLIGRAM(S): at 16:18

## 2024-07-26 RX ADMIN — Medication 2: at 23:07

## 2024-07-26 RX ADMIN — Medication 2100 MILLILITER(S): at 16:23

## 2024-07-26 RX ADMIN — DEXTROSE MONOHYDRATE, SODIUM CHLORIDE, SODIUM LACTATE, CALCIUM CHLORIDE, MAGNESIUM CHLORIDE 100 MILLILITER(S): 1.5; 538; 448; 18.4; 5.08 SOLUTION INTRAPERITONEAL at 21:00

## 2024-07-26 RX ADMIN — PIPERACILLIN SODIUM, TAZOBACTAM SODIUM 200 GRAM(S): 3; .375 INJECTION, POWDER, LYOPHILIZED, FOR SOLUTION INTRAVENOUS at 16:19

## 2024-07-26 RX ADMIN — Medication 10 UNIT(S): at 23:40

## 2024-07-26 NOTE — H&P ADULT - NSHPPHYSICALEXAM_GEN_ALL_CORE
LOS:     VITALS:   T(C): 37 (07-26-24 @ 19:00), Max: 39.8 (07-26-24 @ 16:02)  HR: 104 (07-26-24 @ 20:00) (104 - 136)  BP: 123/75 (07-26-24 @ 20:00) (108/74 - 130/74)  RR: 32 (07-26-24 @ 20:00) (18 - 36)  SpO2: 98% (07-26-24 @ 20:00) (95% - 98%)    GENERAL: NAD, tachypneic.   CHEST/LUNG:  Tachypneic but saturating 97%  HEART: Regular rate and rhythm.   ABDOMEN: Soft, TTP epigastrium, distended  EXTREMITIES:   No clubbing, cyanosis, or edema  Neuro: A&Ox2

## 2024-07-26 NOTE — ED PROVIDER NOTE - PROGRESS NOTE DETAILS
MD Davina (PGY-3) NSGY consulted. To see patient. MD Davina (PGY-3) surg consulted. To see patient. Amrit Lee PGY2:  Pt seen by surg who will admit to SICU

## 2024-07-26 NOTE — CONSULT NOTE ADULT - PROBLEM SELECTOR RECOMMENDATION 9
- No acute nsx intervention.   - Can tap shunt if source of infection is needed    [id number]    Case discussed with attending neurosurgeon Dr. Isabel

## 2024-07-26 NOTE — H&P ADULT - NSHPLABSRESULTS_GEN_ALL_CORE
LABS:                        18.4   19.19 )-----------( 209      ( 2024 15:54 )             54.3         139  |  103  |  32<H>  ----------------------------<  327<H>  5.1   |  19<L>  |  1.62<H>    Ca    8.4      2024 15:54    TPro  7.3  /  Alb  3.6  /  TBili  2.4<H>  /  DBili  x   /  AST  140<H>  /  ALT  246<H>  /  AlkPhos  128<H>      PT/INR - ( 2024 15:54 )   PT: 12.7 sec;   INR: 1.14 ratio         PTT - ( 2024 15:54 )  PTT:41.9 sec      Urinalysis Basic - ( 2024 15:54 )    Color: Dark Yellow / Appearance: Cloudy / S.033 / pH: x  Gluc: 327 mg/dL / Ketone: Trace mg/dL  / Bili: Small / Urobili: 0.2 mg/dL   Blood: x / Protein: Trace mg/dL / Nitrite: Negative   Leuk Esterase: Negative / RBC: 4 /HPF / WBC 2 /HPF   Sq Epi: x / Non Sq Epi: 3 /HPF / Bacteria: Negative /HPF        Urinalysis with Rflx Culture (collected 2024 15:54)

## 2024-07-26 NOTE — ED ADULT NURSE REASSESSMENT NOTE - NS ED NURSE REASSESS COMMENT FT1
Mobile Critical Care RN: pt received in rm 7. Pt is alert, follows commands, knows his name, confused to time and place. Eyes NANCY. Vitals per flow sheet. Report given to SICU RN, pt transported to SICU without incident.

## 2024-07-26 NOTE — ED ADULT NURSE NOTE - NSFALLUNIVINTERV_ED_ALL_ED
Bed/Stretcher in lowest position, wheels locked, appropriate side rails in place/Call bell, personal items and telephone in reach/Instruct patient to call for assistance before getting out of bed/chair/stretcher/Non-slip footwear applied when patient is off stretcher/[place] to call system/Physically safe environment - no spills, clutter or unnecessary equipment/Purposeful proactive rounding/Room/bathroom lighting operational, light cord in reach

## 2024-07-26 NOTE — ED PROVIDER NOTE - PHYSICAL EXAMINATION
Const: not in acute distress  Eyes: no conjunctival injection  HEENT: Head NCAT, Moist MM.  Neck: Trachea midline.   CVS: +S1/S2, No murmurs or gallops  RESP: Unlabored respiratory effort. Clear to auscultation bilaterally.  GI: Diffuse tenderness/Nondistended, No CVA tenderness b/l.   Skin: Intact.   Neuro: weakness in the legs  Psych: responding in yes/no

## 2024-07-26 NOTE — ED ADULT NURSE NOTE - OBJECTIVE STATEMENT
BREAK RN: pt. received to room 7 as a code sepsis. pt. lethargic upon initial interview, responsive to sternal rub and pain. As per wife at bedside, pt. has been like this since 0430 x1 day ago, and has not improved. Pt. wife at bedside endorses N/V x1 day as well as "blood tinged urine". pt. tachypneic, tachycardic, and febrile rectally upon initial interview. 18g IV placed in the L Wrist, 20g IV placed in the R AC via Float RN. labs drawn and sent as per orders. pt. intermittently catheterized using aseptic technique, tolerated well, approx 200cc of digna urine noted, collected and sent. pending due medications. comfort measures provided. safety precautions maintained.

## 2024-07-26 NOTE — CONSULT NOTE ADULT - ASSESSMENT
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

## 2024-07-26 NOTE — PATIENT PROFILE ADULT - FALL HARM RISK - HARM RISK INTERVENTIONS
Assistance with ambulation/Assistance OOB with selected safe patient handling equipment/Communicate Risk of Fall with Harm to all staff/Discuss with provider need for PT consult/Monitor for mental status changes/Monitor gait and stability/Move patient closer to nurses' station/Provide patient with walking aids - walker, cane, crutches/Reinforce activity limits and safety measures with patient and family/Reorient to person, place and time as needed/Tailored Fall Risk Interventions/Toileting schedule using arm’s reach rule for commode and bathroom/Use of alarms - bed, chair and/or voice tab/Visual Cue: Yellow wristband and red socks/Bed in lowest position, wheels locked, appropriate side rails in place/Call bell, personal items and telephone in reach/Instruct patient to call for assistance before getting out of bed or chair/Non-slip footwear when patient is out of bed/[place] to call system/Physically safe environment - no spills, clutter or unnecessary equipment/Purposeful Proactive Rounding/Room/bathroom lighting operational, light cord in reach

## 2024-07-26 NOTE — CONSULT NOTE ADULT - SUBJECTIVE AND OBJECTIVE BOX
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     RADIOLOGY:     MEDS:  acetaminophen   IVPB .. 1000 milliGRAM(s) IV Intermittent Once  piperacillin/tazobactam IVPB... 3.375 Gram(s) IV Intermittent once  sodium chloride 0.9% Bolus 2100 milliLiter(s) IV Bolus once  vancomycin  IVPB. 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    RADIOLOGY:   < from: CT Head No Cont (07.26.24 @ 17:36) >  IMPRESSION:    1. Lateral ventricular dilatation has slightly progressed since   5/19/2024. Periventricular diminished attenuation may include   transependymal resorption of CSF, stable finding.    2. No brain parenchymal lesion has developed      MEDS:  acetaminophen   IVPB .. 1000 milliGRAM(s) IV Intermittent Once  piperacillin/tazobactam IVPB... 3.375 Gram(s) IV Intermittent once  sodium chloride 0.9% Bolus 2100 milliLiter(s) IV Bolus once  vancomycin  IVPB. 1000 milliGRAM(s) IV Intermittent once      Vital Signs Last 24 Hrs  T(C): 39.8 (26 Jul 2024 16:02), Max: 39.8 (26 Jul 2024 16:02)  T(F): 103.6 (26 Jul 2024 16:02), Max: 103.6 (26 Jul 2024 16:02)  HR: 132 (26 Jul 2024 16:02) (132 - 136)  BP: 115/82 (26 Jul 2024 16:02) (115/77 - 115/82)  BP(mean): --  RR: 36 (26 Jul 2024 16:02) (18 - 36)  SpO2: 97% (26 Jul 2024 16:02) (95% - 97%)    Parameters below as of 26 Jul 2024 16:02  Patient On (Oxygen Delivery Method): room air          PHYSICAL EXAM:  AOx1 (person), to place and year with options  PERRL, not cooperating with EOMI   BUE 5/5  BLE: HF 3/5, PF/DF 5/5  No pronator drift   Shunt Certas @4

## 2024-07-26 NOTE — ED PROVIDER NOTE - WR ORDER DATE AND TIME 2
Called patient to inform her that prescription was resent, no answer, unable to leave message.    26-Jul-2024 15:35

## 2024-07-26 NOTE — ED PROVIDER NOTE - ATTENDING CONTRIBUTION TO CARE
Dr. Quintanilla:  I have personally performed a face to face bedside history and physical examination of this patient. I have discussed the history, examination, review of systems, assessment and plan of management with the resident. I have reviewed the electronic medical record and amended it to reflect my history, review of systems, physical exam, assessment and plan.    78M h/o HTN, HLD, dementia, T2DM on insulin, hx CVA vx TIA, hydrocephalus s/p  shunt (2023) presents with altered mental status and N/V x 1 day. Talking less and weaker than his baseline.      Exam:  - nad  - rrr  - ctab  - abd soft, TTP diffusely    A/P  - febrile in ED, eval infection source  - sepsis labs, CT head & abd/pelvis, CXR, flu/covid Dr. Quintanilla:  I have personally performed a face to face bedside history and physical examination of this patient. I have discussed the history, examination, review of systems, assessment and plan of management with the resident. I have reviewed the electronic medical record and amended it to reflect my history, review of systems, physical exam, assessment and plan.    78M h/o HTN, HLD, dementia, T2DM on insulin, hx CVA vx TIA, hydrocephalus s/p  shunt (2023) presents with altered mental status and N/V x 1 day. Talking less and weaker than his baseline.      Exam:  - nad  - rrr  - ctab  - abd soft, TTP diffusely    A/P  - febrile in ED with sepsis, eval infection source  - sepsis labs, CT head & abd/pelvis, CXR, flu/covid

## 2024-07-26 NOTE — ED PROVIDER NOTE - OBJECTIVE STATEMENT
78-year-old male with past medical history of hypertension, hyperlipidemia, dementia, diabetes type 2 on insulin, CVA, hydrocephalus status post  shunt, presenting to the ED with altered mental status nausea plan for today.  Patient with multiple episodes of nausea and vomiting.  Patient is also talking and walking less than his baseline.  Patient with wife at bedside providing collateral.

## 2024-07-26 NOTE — CONSULT NOTE ADULT - ASSESSMENT
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 SICU consulted for hemodynamic monitoring.     PLAN:  NEUROLOGIC   - Pain control: IV tylenol, dilaudid PRN    RESPIRATORY   - Monitor SpO2 goal >92%  - Incentive spirometry     CARDIOVASCULAR   - Monitor hemodynamics   - holding home irbesartan     GASTROINTESTINAL   - Diet: NPO/IVF    /RENAL   - IV fluids: LR @ 100cc/hr  - Monitor electrolytes, replete PRN  - trend Cr  - trend lactate     HEMATOLOGIC  - Monitor H/H   - DVT ppx: SQH    INFECTIOUS DISEASE  - Monitor fever / WBC  - IV zosyn    ENDOCRINE  - ISS  - takes 16u premeal, 30-36u lantus at home per family    LINES  - PIV     DISPO: SICU    Plan discussed with attending Dr. 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 SICU consulted for hemodynamic monitoring.     PLAN:  NEUROLOGIC   - Pain control: IV tylenol, dilaudid PRN    RESPIRATORY   - Monitor SpO2 goal >92%  - Incentive spirometry     CARDIOVASCULAR   - Monitor hemodynamics   - holding home irbesartan     GASTROINTESTINAL   - Diet: NPO/IVF    /RENAL   - IV fluids: LR @ 100cc/hr  - espinoza  - Monitor electrolytes, replete PRN  - trend Cr  - trend lactate     HEMATOLOGIC  - Monitor H/H   - DVT ppx: SQH    INFECTIOUS DISEASE  - Monitor fever / WBC  - IV zosyn    ENDOCRINE  - MISS  - takes 16u premeal, 30-36u lantus at home per family  - NPH q12    LINES  - PIV     DISPO: SICU    Plan discussed with attending Dr. Espino    SICU  Spectra [phone]

## 2024-07-26 NOTE — PATIENT PROFILE ADULT - FUNCTIONAL ASSESSMENT - BASIC MOBILITY 6.
1-calculated by average/Not able to assess (calculate score using [facility] averaging method)

## 2024-07-26 NOTE — H&P ADULT - HISTORY OF PRESENT ILLNESS
78M with a PMH of HTN, HLD, dementia, T2DM on insulin, hx CVA vx TIA, hydrocephalus s/p  shunt (2023) who presented to the ED with AMS, fevers and abdominal pain.  78M with a PMH of HTN, HLD, dementia, T2DM on insulin, hx CVA vx TIA, hydrocephalus s/p  shunt (2023) who presented to the ED with AMS, fevers and abdominal pain.     In ED: Patient slightly lethargic, answer slowly, A&ox2. HDS, tachycardic to 110, febrile. TTP in epigastrium, distended. Elavted LFTs, Elevated lipase 768

## 2024-07-26 NOTE — ED ADULT NURSE NOTE - CHIEF COMPLAINT QUOTE
pt reporting to the ED for AMS starting at 430 am yesterday. Family reports pt was vomiting yesterday and then became lethargic, not answering questions. pmh DM, HTN, HLD. No code stroke called due to time frame. awaiting ekg, febrile and tachycardic in triage.

## 2024-07-26 NOTE — CONSULT NOTE ADULT - SUBJECTIVE AND OBJECTIVE BOX
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 In ED patient is tachycardic to 110, febrile to 103.2, tachypneic to 36, lactate elevated to 5.1, CT scan c/f acute pancreatitis. SICU consulted for hemodynamic monitoring.     Further history obtained from family. Per the family, the patient has endorsed abdominal pain 2 days prior, and had few episodes of emesis yesterday although has not endorsed n/v today. Patient has been having +/+ for flatus/BM.     HISTORY  78y Male  Allergies: No Known Allergies    PAST MEDICAL & SURGICAL HISTORY:  Diabetes      TIA (transient ischemic attack)      HTN (hypertension)      Hyperlipidemia      H/O hydrocephalus      Dementia      S/P  shunt        FAMILY HISTORY:  Family history of renal cancer  mother    Family history of prostate cancer in father  father      SOCIAL HISTORY:    ADVANCE DIRECTIVES: Presumed Full Code    REVIEW OF SYSTEMS:    General: Non-Contributory  Skin/Breast: Non-Contributory  Ophthalmologic: Non-Contributory  ENMT: Non-Contributory  Respiratory and Thorax: Non-Contributory  Cardiovascular: Non-Contributory  Gastrointestinal: Non-Contributory  Genitourinary: Non-Contributory  Musculoskeletal: Non-Contributory  Neurological: Non-Contributory  Psychiatric: Non-Contributory  Hematology/Lymphatics: Non-Contributory  Endocrine: Non-Contributory  Allergic/Immunologic: Non-Contributory  HOME MEDICATIONS:   CURRENT MEDICATIONS:   --------------------------------------------------------------------------------------  Neurologic Medications  acetaminophen   IVPB .. 1000 milliGRAM(s) IV Intermittent every 6 hours  HYDROmorphone  Injectable 0.5 milliGRAM(s) IV Push every 4 hours PRN Severe Pain (7 - 10)  HYDROmorphone  Injectable 0.2 milliGRAM(s) IV Push every 4 hours PRN Moderate Pain (4 - 6)    Respiratory Medications    Cardiovascular Medications    Gastrointestinal Medications  lactated ringers. 1000 milliLiter(s) IV Continuous <Continuous>    Genitourinary Medications    Hematologic/Oncologic Medications  heparin   Injectable 5000 Unit(s) SubCutaneous every 8 hours    Antimicrobial/Immunologic Medications  piperacillin/tazobactam IVPB.. 3.375 Gram(s) IV Intermittent every 12 hours    Endocrine/Metabolic Medications  insulin lispro (ADMELOG) corrective regimen sliding scale   SubCutaneous every 6 hours    Topical/Other Medications  chlorhexidine 2% Cloths 1 Application(s) Topical daily    --------------------------------------------------------------------------------------  VITAL SIGNS, INS/OUTS (last 24 hours):  --------------------------------------------------------------------------------------  ((Insert SICU Vitals / Is+Os here)) ***  --------------------------------------------------------------------------------------    EXAM  NEUROLOGY  RASS:   	GCS:    Exam: NAD, axo x1   HEENT  Exam: Normocephalic, atraumatic.  EOMI   RESPIRATORY  Exam: nonlabored respirations, mild wheezing  CARDIOVASCULAR  Exam: S1, S2.  Regular rate and rhythm.  Peripheral edema    GI/NUTRITION  Exam: Abdomen soft, distended, TTP in RUQ   Current Diet: NPO  VASCULAR  Exam: Extremities warm, pink, well-perfused.   MUSCULOSKELETAL  Exam: All extremities moving spontaneously without limitations.    SKIN:  Exam: Good skin turgor, no skin breakdown.    METABOLIC/FLUIDS/ELECTROLYTES  lactated ringers. 1000 milliLiter(s) IV Continuous <Continuous>    HEMATOLOGIC  [x] DVT Prophylaxis: heparin   Injectable 5000 Unit(s) SubCutaneous every 8 hours    Transfusions:	[] PRBC	[] Platelets		[] FFP	[] Cryoprecipitate  INFECTIOUS DISEASE  Antimicrobials/Immunologic Medications:  piperacillin/tazobactam IVPB.. 3.375 Gram(s) IV Intermittent every 12 hours    Day #  	of    ***  Tubes/Lines/Drains  ***  [x] Peripheral IV  [] Central Venous Line     	[] R	[] L	[] IJ	[] Fem	[] SC	Date Placed:   [] Arterial Line		[] R	[] L	[] Fem	[] Rad	[] Ax	Date Placed:   [] PICC:         	[] Midline		[] Mediport  [] Urinary Catheter		Date Placed:     LABS  --------------------------------------------------------------------------------------  ((Insert SICU Labs here))***  --------------------------------------------------------------------------------------  OTHER LABS  IMAGING RESULTS  Echo:   CT:

## 2024-07-26 NOTE — ED ADULT TRIAGE NOTE - INTERNATIONAL TRAVEL
To be completed in Nursing note:    Please reference list for forms that require a visit for completion.  Please remind patients that providers are given 3-5 business days to complete and return forms.      Form type:  [facility] / Consulting Documentation    Date form received:  2020    Date form completed by Physician:  04/10/2020    How was form returned to patient (mailed, faxed, or at  for patient to ): will fax to [phone]    Date form mailed/faxed/left at  for patient and sent to HIM for scannin/10/2020      Once form is left for patient, faxed, or mailed PCS will then close the documentation only encounter.      Female No

## 2024-07-26 NOTE — ED PROVIDER NOTE - CLINICAL SUMMARY MEDICAL DECISION MAKING FREE TEXT BOX
78-year-old male with past medical history of hypertension, hyperlipidemia, dementia, diabetes type 2 on insulin, CVA, hydrocephalus status post  shunt, presenting to the ED with altered mental status nausea plan for today. Patient's vitals consistent with sepsis.  Will get septic workup, CT abdomen pelvis, CT head, shunt series, chest x-ray.  Will empirically antibiosis with Vanco and Zosyn and give IV fluids, Ofirmev.  Patient likely to be admitted given symptoms.

## 2024-07-26 NOTE — H&P ADULT - ASSESSMENT
PLAN:  - Admit under surgery  - SICU consult  - NPO/ IVF  - IV antibiotics   - GI consult for possible GI consult.   - No acute surgical intervention      Discussed with Dr Srinivas Sterling PGY3  B Team surgery  [id number]     78M with a PMH of HTN, HLD, dementia, T2DM on insulin, hx CVA vx TIA, hydrocephalus s/p  shunt (2023) who presented to the ED with AMS, fevers and abdominal pain.  Here with acute pancreatitis, febrile, tachypneic, tachycardic     PLAN:  - Admit under surgery  - SICU consult  - NPO/ IVF  - IV antibiotics   - GI consult for possible GI consult.   - No acute surgical intervention      Discussed with Dr Srinivas Sterling PGY3  B Team surgery  [id number]     78M with a PMH of HTN, HLD, dementia, T2DM on insulin, hx CVA vx TIA, hydrocephalus s/p  shunt (2023) who presented to the ED with AMS, fevers and abdominal pain.  Here with acute pancreatitis, febrile, tachypneic, tachycardic     PLAN:  - Admit under surgery  - SICU consult  - NPO/ IVF  - IV antibiotics   - GI consult for possible ERCP (emailed)   - No acute surgical intervention      Discussed with Dr Srinivas Sterling PGY3  B Team surgery  [id number]

## 2024-07-27 LAB
A1C WITH ESTIMATED AVERAGE GLUCOSE RESULT: 7.5 % — HIGH (ref 4–5.6)
ALBUMIN SERPL ELPH-MCNC: 3 G/DL — LOW (ref 3.3–5)
ALP SERPL-CCNC: 93 U/L — SIGNIFICANT CHANGE UP (ref 40–120)
ALT FLD-CCNC: 160 U/L — HIGH (ref 4–41)
ANION GAP SERPL CALC-SCNC: 10 MMOL/L — SIGNIFICANT CHANGE UP (ref 7–14)
AST SERPL-CCNC: 79 U/L — HIGH (ref 4–40)
BILIRUB SERPL-MCNC: 1.9 MG/DL — HIGH (ref 0.2–1.2)
BLOOD GAS ARTERIAL COMPREHENSIVE RESULT: SIGNIFICANT CHANGE UP
BUN SERPL-MCNC: 26 MG/DL — HIGH (ref 7–23)
CALCIUM SERPL-MCNC: 7.2 MG/DL — LOW (ref 8.4–10.5)
CHLORIDE SERPL-SCNC: 108 MMOL/L — HIGH (ref 98–107)
CO2 SERPL-SCNC: 22 MMOL/L — SIGNIFICANT CHANGE UP (ref 22–31)
CREAT SERPL-MCNC: 1.17 MG/DL — SIGNIFICANT CHANGE UP (ref 0.5–1.3)
CULTURE RESULTS: NO GROWTH — SIGNIFICANT CHANGE UP
EGFR: 64 ML/MIN/1.73M2 — SIGNIFICANT CHANGE UP
ESTIMATED AVERAGE GLUCOSE: 169 — SIGNIFICANT CHANGE UP
GLUCOSE BLDC GLUCOMTR-MCNC: 133 MG/DL — HIGH (ref 70–99)
GLUCOSE BLDC GLUCOMTR-MCNC: 137 MG/DL — HIGH (ref 70–99)
GLUCOSE BLDC GLUCOMTR-MCNC: 172 MG/DL — HIGH (ref 70–99)
GLUCOSE BLDC GLUCOMTR-MCNC: 204 MG/DL — HIGH (ref 70–99)
GLUCOSE SERPL-MCNC: 192 MG/DL — HIGH (ref 70–99)
HCT VFR BLD CALC: 47.1 % — SIGNIFICANT CHANGE UP (ref 39–50)
HGB BLD-MCNC: 15.3 G/DL — SIGNIFICANT CHANGE UP (ref 13–17)
LIDOCAIN IGE QN: 241 U/L — HIGH (ref 7–60)
MCHC RBC-ENTMCNC: 28.3 PG — SIGNIFICANT CHANGE UP (ref 27–34)
MCHC RBC-ENTMCNC: 32.5 GM/DL — SIGNIFICANT CHANGE UP (ref 32–36)
MCV RBC AUTO: 87.2 FL — SIGNIFICANT CHANGE UP (ref 80–100)
MRSA PCR RESULT.: DETECTED
NRBC # BLD: 0 /100 WBCS — SIGNIFICANT CHANGE UP (ref 0–0)
NRBC # FLD: 0 K/UL — SIGNIFICANT CHANGE UP (ref 0–0)
PLATELET # BLD AUTO: 149 K/UL — LOW (ref 150–400)
POTASSIUM SERPL-MCNC: 3.9 MMOL/L — SIGNIFICANT CHANGE UP (ref 3.5–5.3)
POTASSIUM SERPL-SCNC: 3.9 MMOL/L — SIGNIFICANT CHANGE UP (ref 3.5–5.3)
PROT SERPL-MCNC: 6 G/DL — SIGNIFICANT CHANGE UP (ref 6–8.3)
RBC # BLD: 5.4 M/UL — SIGNIFICANT CHANGE UP (ref 4.2–5.8)
RBC # FLD: 14 % — SIGNIFICANT CHANGE UP (ref 10.3–14.5)
S AUREUS DNA NOSE QL NAA+PROBE: DETECTED
SODIUM SERPL-SCNC: 140 MMOL/L — SIGNIFICANT CHANGE UP (ref 135–145)
SPECIMEN SOURCE: SIGNIFICANT CHANGE UP
WBC # BLD: 17.88 K/UL — HIGH (ref 3.8–10.5)
WBC # FLD AUTO: 17.88 K/UL — HIGH (ref 3.8–10.5)

## 2024-07-27 PROCEDURE — 99222 1ST HOSP IP/OBS MODERATE 55: CPT | Mod: GC

## 2024-07-27 PROCEDURE — 99232 SBSQ HOSP IP/OBS MODERATE 35: CPT | Mod: GC

## 2024-07-27 PROCEDURE — 74018 RADEX ABDOMEN 1 VIEW: CPT | Mod: 26

## 2024-07-27 PROCEDURE — 71045 X-RAY EXAM CHEST 1 VIEW: CPT | Mod: 26

## 2024-07-27 RX ORDER — MUPIROCIN CALCIUM 20 MG/G
1 CREAM TOPICAL
Refills: 0 | Status: COMPLETED | OUTPATIENT
Start: 2024-07-27 | End: 2024-08-01

## 2024-07-27 RX ORDER — DEXTROSE MONOHYDRATE, SODIUM CHLORIDE, SODIUM LACTATE, CALCIUM CHLORIDE, MAGNESIUM CHLORIDE 1.5; 538; 448; 18.4; 5.08 G/100ML; MG/100ML; MG/100ML; MG/100ML; MG/100ML
1000 SOLUTION INTRAPERITONEAL
Refills: 0 | Status: DISCONTINUED | OUTPATIENT
Start: 2024-07-28 | End: 2024-07-29

## 2024-07-27 RX ORDER — DEXTROSE MONOHYDRATE, SODIUM CHLORIDE, SODIUM LACTATE, CALCIUM CHLORIDE, MAGNESIUM CHLORIDE 1.5; 538; 448; 18.4; 5.08 G/100ML; MG/100ML; MG/100ML; MG/100ML; MG/100ML
1000 SOLUTION INTRAPERITONEAL
Refills: 0 | Status: DISCONTINUED | OUTPATIENT
Start: 2024-07-27 | End: 2024-07-27

## 2024-07-27 RX ORDER — INSULIN LISPRO 100/ML
VIAL (ML) SUBCUTANEOUS
Refills: 0 | Status: COMPLETED | OUTPATIENT
Start: 2024-07-27 | End: 2024-07-27

## 2024-07-27 RX ORDER — MUPIROCIN CALCIUM 20 MG/G
1 CREAM TOPICAL
Refills: 0 | Status: DISCONTINUED | OUTPATIENT
Start: 2024-07-27 | End: 2024-07-27

## 2024-07-27 RX ORDER — HYDRALAZINE HYDROCHLORIDE 100 MG/1
5 TABLET ORAL ONCE
Refills: 0 | Status: COMPLETED | OUTPATIENT
Start: 2024-07-27 | End: 2024-07-27

## 2024-07-27 RX ORDER — INSULIN LISPRO 100/ML
VIAL (ML) SUBCUTANEOUS EVERY 6 HOURS
Refills: 0 | Status: DISCONTINUED | OUTPATIENT
Start: 2024-07-28 | End: 2024-07-30

## 2024-07-27 RX ORDER — LOSARTAN POTASSIUM 50 MG/1
100 TABLET, FILM COATED ORAL DAILY
Refills: 0 | Status: DISCONTINUED | OUTPATIENT
Start: 2024-07-27 | End: 2024-07-28

## 2024-07-27 RX ORDER — DEXTROSE MONOHYDRATE, SODIUM CHLORIDE, SODIUM LACTATE, CALCIUM CHLORIDE, MAGNESIUM CHLORIDE 1.5; 538; 448; 18.4; 5.08 G/100ML; MG/100ML; MG/100ML; MG/100ML; MG/100ML
500 SOLUTION INTRAPERITONEAL ONCE
Refills: 0 | Status: COMPLETED | OUTPATIENT
Start: 2024-07-27 | End: 2024-07-27

## 2024-07-27 RX ADMIN — PIPERACILLIN SODIUM, TAZOBACTAM SODIUM 25 GRAM(S): 3; .375 INJECTION, POWDER, LYOPHILIZED, FOR SOLUTION INTRAVENOUS at 05:36

## 2024-07-27 RX ADMIN — IPRATROPIUM BROMIDE AND ALBUTEROL SULFATE 3 MILLILITER(S): 2.5; .5 SOLUTION RESPIRATORY (INHALATION) at 04:55

## 2024-07-27 RX ADMIN — IPRATROPIUM BROMIDE AND ALBUTEROL SULFATE 3 MILLILITER(S): 2.5; .5 SOLUTION RESPIRATORY (INHALATION) at 19:51

## 2024-07-27 RX ADMIN — Medication 0.5 MILLIGRAM(S): at 23:45

## 2024-07-27 RX ADMIN — Medication 400 MILLIGRAM(S): at 05:37

## 2024-07-27 RX ADMIN — Medication 0.5 MILLIGRAM(S): at 08:57

## 2024-07-27 RX ADMIN — Medication 0.5 MILLIGRAM(S): at 09:22

## 2024-07-27 RX ADMIN — CHLORHEXIDINE GLUCONATE 1 APPLICATION(S): 500 CLOTH TOPICAL at 11:36

## 2024-07-27 RX ADMIN — DEXTROSE MONOHYDRATE, SODIUM CHLORIDE, SODIUM LACTATE, CALCIUM CHLORIDE, MAGNESIUM CHLORIDE 125 MILLILITER(S): 1.5; 538; 448; 18.4; 5.08 SOLUTION INTRAPERITONEAL at 00:40

## 2024-07-27 RX ADMIN — Medication 2: at 05:38

## 2024-07-27 RX ADMIN — HEPARIN SODIUM 5000 UNIT(S): 1000 INJECTION, SOLUTION INTRAVENOUS; SUBCUTANEOUS at 23:49

## 2024-07-27 RX ADMIN — MUPIROCIN CALCIUM 1 APPLICATION(S): 20 CREAM TOPICAL at 20:30

## 2024-07-27 RX ADMIN — Medication 4: at 23:50

## 2024-07-27 RX ADMIN — Medication 10 UNIT(S): at 23:49

## 2024-07-27 RX ADMIN — Medication 10 UNIT(S): at 11:35

## 2024-07-27 RX ADMIN — DEXTROSE MONOHYDRATE, SODIUM CHLORIDE, SODIUM LACTATE, CALCIUM CHLORIDE, MAGNESIUM CHLORIDE 1000 MILLILITER(S): 1.5; 538; 448; 18.4; 5.08 SOLUTION INTRAPERITONEAL at 02:21

## 2024-07-27 RX ADMIN — Medication 400 MILLIGRAM(S): at 17:07

## 2024-07-27 RX ADMIN — Medication 400 MILLIGRAM(S): at 11:36

## 2024-07-27 RX ADMIN — HYDRALAZINE HYDROCHLORIDE 5 MILLIGRAM(S): 100 TABLET ORAL at 23:45

## 2024-07-27 RX ADMIN — IPRATROPIUM BROMIDE AND ALBUTEROL SULFATE 3 MILLILITER(S): 2.5; .5 SOLUTION RESPIRATORY (INHALATION) at 09:25

## 2024-07-27 RX ADMIN — IPRATROPIUM BROMIDE AND ALBUTEROL SULFATE 3 MILLILITER(S): 2.5; .5 SOLUTION RESPIRATORY (INHALATION) at 15:28

## 2024-07-27 RX ADMIN — HEPARIN SODIUM 5000 UNIT(S): 1000 INJECTION, SOLUTION INTRAVENOUS; SUBCUTANEOUS at 05:37

## 2024-07-27 RX ADMIN — LOSARTAN POTASSIUM 100 MILLIGRAM(S): 50 TABLET, FILM COATED ORAL at 11:36

## 2024-07-27 RX ADMIN — PIPERACILLIN SODIUM, TAZOBACTAM SODIUM 25 GRAM(S): 3; .375 INJECTION, POWDER, LYOPHILIZED, FOR SOLUTION INTRAVENOUS at 17:07

## 2024-07-27 RX ADMIN — HEPARIN SODIUM 5000 UNIT(S): 1000 INJECTION, SOLUTION INTRAVENOUS; SUBCUTANEOUS at 13:59

## 2024-07-27 NOTE — PROGRESS NOTE ADULT - SUBJECTIVE AND OBJECTIVE BOX
Subjective:  Patient seen at bedside this AM. Reports feeling well, pain is well controlled. Denies chest pain, SOB. Currently NPO and on IVF.       Objective:  Vital Signs  T(C): 37.1 (07-27 @ 08:00), Max: 39.8 (07-26 @ 16:02)  HR: 92 (07-27 @ 08:00) (87 - 136)  BP: 178/94 (07-27 @ 08:00) (108/74 - 178/94)  RR: 26 (07-27 @ 08:00) (18 - 36)  SpO2: 100% (07-27 @ 08:00) (95% - 100%)  07-26-24 @ 07:01  -  07-27-24 @ 07:00  --------------------------------------------------------  IN:  Total IN: 0 mL    OUT:    Indwelling Catheter - Urethral (mL): 740 mL  Total OUT: 740 mL    Total NET: -740 mL          Physical Exam:  GEN: resting in bed comfortably in NAD  RESP: no increased WOB, no tachypnea   ABD: softly distended abdomen. Moderate tenderness to palpation diffusely. No peritoneal signs.   EXTR: warm, well-perfused without gross deformities; spontaneous movement in b/l U/L extrem  NEURO: awake, confused which is baseline. Responsive to questions     Labs:                        15.3   17.88 )-----------( 149      ( 27 Jul 2024 00:47 )             47.1   07-27    140  |  108<H>  |  26<H>  ----------------------------<  192<H>  3.9   |  22  |  1.17    Ca    7.2<L>      27 Jul 2024 00:47    TPro  6.0  /  Alb  3.0<L>  /  TBili  1.9<H>  /  DBili  x   /  AST  79<H>  /  ALT  160<H>  /  AlkPhos  93  07-27    CAPILLARY BLOOD GLUCOSE      POCT Blood Glucose.: 172 mg/dL (27 Jul 2024 05:35)  POCT Blood Glucose.: 180 mg/dL (26 Jul 2024 23:39)  POCT Blood Glucose.: 232 mg/dL (26 Jul 2024 23:00)  POCT Blood Glucose.: 307 mg/dL (26 Jul 2024 15:01)      Medications:  MEDICATIONS  (STANDING):  acetaminophen   IVPB .. 1000 milliGRAM(s) IV Intermittent every 6 hours  albuterol/ipratropium for Nebulization 3 milliLiter(s) Nebulizer every 6 hours  chlorhexidine 2% Cloths 1 Application(s) Topical daily  dextrose 50% Injectable 25 Gram(s) IV Push once  dextrose 50% Injectable 12.5 Gram(s) IV Push once  dextrose 50% Injectable 25 Gram(s) IV Push once  heparin   Injectable 5000 Unit(s) SubCutaneous every 8 hours  insulin lispro (ADMELOG) corrective regimen sliding scale   SubCutaneous Before meals and at bedtime  insulin NPH human recombinant 10 Unit(s) SubCutaneous every 12 hours  lactated ringers. 1000 milliLiter(s) (50 mL/Hr) IV Continuous <Continuous>  losartan 100 milliGRAM(s) Oral daily  piperacillin/tazobactam IVPB.. 3.375 Gram(s) IV Intermittent every 12 hours    MEDICATIONS  (PRN):  HYDROmorphone  Injectable 0.5 milliGRAM(s) IV Push every 4 hours PRN Severe Pain (7 - 10)  HYDROmorphone  Injectable 0.2 milliGRAM(s) IV Push every 4 hours PRN Moderate Pain (4 - 6)      Imaging:

## 2024-07-27 NOTE — PROGRESS NOTE ADULT - ASSESSMENT
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 SICU consulted for hemodynamic monitoring.     PLAN:   NEUROLOGIC   - Pain control: IV tylenol, dilaudid PRN    RESPIRATORY   - Monitor SpO2 goal >92%  - Incentive spirometry     CARDIOVASCULAR   - Monitor hemodynamics   - holding home irbesartan     GASTROINTESTINAL   - Diet: NPO/IVF    /RENAL   - IV fluids: LR @ 125cc/hr  - espinoza  - Monitor electrolytes, replete PRN  - trend Cr  - trend lactate     HEMATOLOGIC  - Monitor H/H   - DVT ppx: SQH    INFECTIOUS DISEASE  - Monitor fever / WBC  - IV zosyn    ENDOCRINE  - MISS  - takes 16u premeal, 30-36u lantus at home per family  - NPH q12    LINES  - PIV     DISPO: SICU    --------------------------------------------------------------------------------------    Critical Care Diagnoses: 78y male PMH of HTN, HLD, dementia, DM on insulin, 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 SICU consulted for hemodynamic monitoring.     PLAN:   NEUROLOGIC   - Pain control: IV tylenol, dilaudid PRN    RESPIRATORY   - Monitor SpO2 goal >92%  - Incentive spirometry     CARDIOVASCULAR   - Monitor hemodynamics   - holding home irbesartan     GASTROINTESTINAL   - Diet: NPO/IVF    /RENAL   - IV fluids: LR @ 125cc/hr  - espinoza  - Monitor electrolytes, replete PRN  - trend Cr  - trend lactate     HEMATOLOGIC  - Monitor H/H   - DVT ppx: SQH    INFECTIOUS DISEASE  - Monitor fever / WBC  - IV zosyn    ENDOCRINE  - MISS  - takes 16u premeal, 30-36u lantus at home per family  - NPH q12    LINES  - PIV     DISPO: SICU    --------------------------------------------------------------------------------------   78y male PMH of HTN, HLD, dementia, DM on insulin, 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 SICU consulted for hemodynamic monitoring. BISAP 4.    PLAN:   NEUROLOGIC   - Pain control: IV tylenol, dilaudid PRN    RESPIRATORY   - Monitor SpO2 goal >92%  - Incentive spirometry     CARDIOVASCULAR   - Monitor hemodynamics   - restart home losartan    GASTROINTESTINAL   - Diet: CLD, npo at midnight  - HIDA tomorrow 8 am    /RENAL   - IV fluids: LR @ 50  - espinoza, monitor urine output  - Monitor electrolytes, replete PRN  - trend Cr  - trend lactate     HEMATOLOGIC  - Monitor H/H   - DVT ppx: SQH    INFECTIOUS DISEASE  - Monitor fever / WBC  - IV zosyn for ? acute cholecystitis    ENDOCRINE  - MISS  - takes 16u premeal, 30-36u lantus at home per family  - 10 NPH q12    LINES  - PIV   - Espinoza    DISPO: SICU    --------------------------------------------------------------------------------------

## 2024-07-27 NOTE — PROGRESS NOTE ADULT - ATTENDING COMMENTS
The patient was seen and examined, chart and notes reviewed.  The current diagnosis, plan of care and alternatives have been discussed with the patient.  All questions have been answered and updates have been discussed.  The case was discussed with team residents/PA's and medical students at surgical rounds and throughout the course of the day.    Gallstone pancreatitis  a.  Wean IVF resuscitation  b.  Obtain MRCP  c.  Appreciate GI input  d.  Trend LFTs    Acute cholecystitis  a.  Continue zosyn at this time  b.  Possible in-patient cholecystectomy  c.  daily CBC    Respiratory abnormality  a.  With adequate oxygenation  b.  Prn o2 via nasal cannula    At risk for malnutrition  a,  NPO at this time  b,  Clears as tolerated    Hypokalemia  a.  Replete K

## 2024-07-27 NOTE — PROGRESS NOTE ADULT - SUBJECTIVE AND OBJECTIVE BOX
SICU Daily Progress Note  =====================================================  Interval/Overnight Events:    - 500cc bolus  - espinoza placed    ALLERGIES:  No Known Allergies      --------------------------------------------------------------------------------------    MEDICATIONS:    Neurologic Medications  acetaminophen   IVPB .. 1000 milliGRAM(s) IV Intermittent every 6 hours  HYDROmorphone  Injectable 0.2 milliGRAM(s) IV Push every 4 hours PRN Moderate Pain (4 - 6)  HYDROmorphone  Injectable 0.5 milliGRAM(s) IV Push every 4 hours PRN Severe Pain (7 - 10)    Respiratory Medications  albuterol/ipratropium for Nebulization 3 milliLiter(s) Nebulizer every 6 hours    Cardiovascular Medications    Gastrointestinal Medications  dextrose 5%. 1000 milliLiter(s) IV Continuous <Continuous>  dextrose 5%. 1000 milliLiter(s) IV Continuous <Continuous>  lactated ringers. 1000 milliLiter(s) IV Continuous <Continuous>    Genitourinary Medications    Hematologic/Oncologic Medications  heparin   Injectable 5000 Unit(s) SubCutaneous every 8 hours    Antimicrobial/Immunologic Medications  piperacillin/tazobactam IVPB.. 3.375 Gram(s) IV Intermittent every 12 hours    Endocrine/Metabolic Medications  dextrose 50% Injectable 25 Gram(s) IV Push once  dextrose 50% Injectable 12.5 Gram(s) IV Push once  dextrose 50% Injectable 25 Gram(s) IV Push once  dextrose Oral Gel 15 Gram(s) Oral once PRN Blood Glucose LESS THAN 70 milliGRAM(s)/deciliter  glucagon  Injectable 1 milliGRAM(s) IntraMuscular once  insulin lispro (ADMELOG) corrective regimen sliding scale   SubCutaneous every 6 hours  insulin NPH human recombinant 10 Unit(s) SubCutaneous every 12 hours    Topical/Other Medications  chlorhexidine 2% Cloths 1 Application(s) Topical daily    --------------------------------------------------------------------------------------    VITAL SIGNS:    --------------------------------------------------------------------------------------    INS AND OUTS:  ((Insert SICU Vitals/Is+Os here))***  --------------------------------------------------------------------------------------    EXAM  NEUROLOGY  RASS:   	GCS:    Exam: NAD, axo x1   HEENT  Exam: Normocephalic, atraumatic.  EOMI   RESPIRATORY  Exam: nonlabored respirations, mild wheezing  CARDIOVASCULAR  Exam: S1, S2.  Regular rate and rhythm.  Peripheral edema    GI/NUTRITION  Exam: Abdomen soft, distended, TTP in RUQ   Current Diet: NPO  VASCULAR  Exam: Extremities warm, pink, well-perfused.   MUSCULOSKELETAL  Exam: All extremities moving spontaneously without limitations.    SKIN:  Exam: Good skin turgor, no skin breakdown.    METABOLIC/FLUIDS/ELECTROLYTES  lactated ringers. 1000 milliLiter(s) IV Continuous <Continuous>    HEMATOLOGIC  [x] DVT Prophylaxis: heparin   Injectable 5000 Unit(s) SubCutaneous every 8 hours    Transfusions:	[] PRBC	[] Platelets		[] FFP	[] Cryoprecipitate  INFECTIOUS DISEASE  Antimicrobials/Immunologic Medications:  piperacillin/tazobactam IVPB.. 3.375 Gram(s) IV Intermittent every 12 hours    Day #  	of    ***  Tubes/Lines/Drains  ***  [x] Peripheral IV  [] Central Venous Line     	[] R	[] L	[] IJ	[] Fem	[] SC	Date Placed:   [] Arterial Line		[] R	[] L	[] Fem	[] Rad	[] Ax	Date Placed:   [] PICC:         	[] Midline		[] Mediport  [] Urinary Catheter		Date Placed:     --------------------------------------------------------------------------------------    LABS    CBC ( @ 00:47)                          15.3                     17.88<H>  )--------------(  149<L>     --    % Neuts, --    % Lymphs, ANC: --                              47.1    CBC ( @ 15:54)                          18.4<H>                   19.19<H>  )--------------(  209        86.4<H>% Neuts, 3.6<L>% Lymphs, ANC: 16.57<H>                          54.3<H>    BMP ( @ 00:47)       140     |  108<H>  |  26<H> 			Ca++ --      Ca 7.2<L>       ---------------------------------( 192<H>		Mg --           3.9     |  22      |  1.17  			Ph --      BMP ( @ 15:54)       139     |  103     |  32<H> 			Ca++ --      Ca 8.4          ---------------------------------( 327<H>		Mg --           5.1     |  19<L>   |  1.62<H>			Ph --        LFTs ( @ 00:47)      TPro 6.0 / Alb 3.0<L> / TBili 1.9<H> / DBili -- / AST 79<H> / <H> / AlkPhos 93  LFTs ( @ 15:54)      TPro 7.3 / Alb 3.6 / TBili 2.4<H> / DBili -- / <H> / <H> / AlkPhos 128<H>    Coags ( @ 15:54)  aPTT 41.9<H> / INR 1.14 / PT 12.7      ABG ( @ 00:47)     7.40 / 39 / 88 / 24 / -0.5 / 97.1%     Lactate:      VBG ( @ 19:06)     7.33 / 50 / 33 / 26 / -0.4 / 49.5<L>%      Lactate: 3.3<H>  VBG ( @ 15:54)     7.40 / 36<L> / 54<H> / 22 / -1.8 / 84.5%      Lactate: 5.1<HH>    Urinalysis ( @ 00:47):     Color:  / Appearance:  / SG:  / pH:  / Gluc: 192<H> / Ketones:  / Bili:  / Urobili:  / Protein : / Nitrites:  / Leuk.Est:  / RBC:  / WBC:  / Sq Epi:  / Non Sq Epi:  / Bacteria      Urinalysis ( @ 15:54):     Color: Dark Yellow / Appearance: Cloudy<!> / S.033<H> / pH: 5.5 / Gluc: 327<H> / Ketones: Trace<!> / Bili: Small<!> / Urobili: 0.2 / Protein :Trace / Nitrites: Negative / Leuk.Est: Negative / RBC: 4 / WBC: 2 / Sq Epi:  / Non Sq Epi: 3 / Bacteria Negative         --------------------------------------------------------------------------------------    OTHER LABORATORY:     IMAGING STUDIES:   CXR:      SICU Daily Progress Note  =====================================================  Interval/Overnight Events:    - 500cc bolus  - espinoza placed  - diet advanced to clears  - GI rec HIDA    ALLERGIES:  No Known Allergies      --------------------------------------------------------------------------------------    MEDICATIONS:    Neurologic Medications  acetaminophen   IVPB .. 1000 milliGRAM(s) IV Intermittent every 6 hours  HYDROmorphone  Injectable 0.2 milliGRAM(s) IV Push every 4 hours PRN Moderate Pain (4 - 6)  HYDROmorphone  Injectable 0.5 milliGRAM(s) IV Push every 4 hours PRN Severe Pain (7 - 10)    Respiratory Medications  albuterol/ipratropium for Nebulization 3 milliLiter(s) Nebulizer every 6 hours    Cardiovascular Medications    Gastrointestinal Medications  dextrose 5%. 1000 milliLiter(s) IV Continuous <Continuous>  dextrose 5%. 1000 milliLiter(s) IV Continuous <Continuous>  lactated ringers. 1000 milliLiter(s) IV Continuous <Continuous>    Genitourinary Medications    Hematologic/Oncologic Medications  heparin   Injectable 5000 Unit(s) SubCutaneous every 8 hours    Antimicrobial/Immunologic Medications  piperacillin/tazobactam IVPB.. 3.375 Gram(s) IV Intermittent every 12 hours    Endocrine/Metabolic Medications  dextrose 50% Injectable 25 Gram(s) IV Push once  dextrose 50% Injectable 12.5 Gram(s) IV Push once  dextrose 50% Injectable 25 Gram(s) IV Push once  dextrose Oral Gel 15 Gram(s) Oral once PRN Blood Glucose LESS THAN 70 milliGRAM(s)/deciliter  glucagon  Injectable 1 milliGRAM(s) IntraMuscular once  insulin lispro (ADMELOG) corrective regimen sliding scale   SubCutaneous every 6 hours  insulin NPH human recombinant 10 Unit(s) SubCutaneous every 12 hours    Topical/Other Medications  chlorhexidine 2% Cloths 1 Application(s) Topical daily    --------------------------------------------------------------------------------------    VITAL SIGNS:    --------------------------------------------------------------------------------------    INS AND OUTS:  ((Insert SICU Vitals/Is+Os here))***  --------------------------------------------------------------------------------------    EXAM  NEUROLOGY  RASS:   	GCS:    Exam: NAD, axo x1   HEENT  Exam: Normocephalic, atraumatic.  EOMI   RESPIRATORY  Exam: nonlabored respirations, mild wheezing  CARDIOVASCULAR  Exam: S1, S2.  Regular rate and rhythm.  Peripheral edema    GI/NUTRITION  Exam: Abdomen soft, distended, TTP in RUQ   Current Diet: NPO  VASCULAR  Exam: Extremities warm, pink, well-perfused.   MUSCULOSKELETAL  Exam: All extremities moving spontaneously without limitations.    SKIN:  Exam: Good skin turgor, no skin breakdown.    METABOLIC/FLUIDS/ELECTROLYTES  lactated ringers. 1000 milliLiter(s) IV Continuous <Continuous>    HEMATOLOGIC  [x] DVT Prophylaxis: heparin   Injectable 5000 Unit(s) SubCutaneous every 8 hours    Transfusions:	[] PRBC	[] Platelets		[] FFP	[] Cryoprecipitate  INFECTIOUS DISEASE  Antimicrobials/Immunologic Medications:  piperacillin/tazobactam IVPB.. 3.375 Gram(s) IV Intermittent every 12 hours    Day #  	of    ***  Tubes/Lines/Drains  ***  [x] Peripheral IV  [] Central Venous Line     	[] R	[] L	[] IJ	[] Fem	[] SC	Date Placed:   [] Arterial Line		[] R	[] L	[] Fem	[] Rad	[] Ax	Date Placed:   [] PICC:         	[] Midline		[] Mediport  [x] Urinary Catheter		Date Placed:     --------------------------------------------------------------------------------------    LABS    CBC ( @ 00:47)                          15.3                     17.88<H>  )--------------(  149<L>     --    % Neuts, --    % Lymphs, ANC: --                              47.1    CBC ( @ 15:54)                          18.4<H>                   19.19<H>  )--------------(  209        86.4<H>% Neuts, 3.6<L>% Lymphs, ANC: 16.57<H>                          54.3<H>    BMP ( @ 00:47)       140     |  108<H>  |  26<H> 			Ca++ --      Ca 7.2<L>       ---------------------------------( 192<H>		Mg --           3.9     |  22      |  1.17  			Ph --      BMP ( @ 15:54)       139     |  103     |  32<H> 			Ca++ --      Ca 8.4          ---------------------------------( 327<H>		Mg --           5.1     |  19<L>   |  1.62<H>			Ph --        LFTs ( @ 00:47)      TPro 6.0 / Alb 3.0<L> / TBili 1.9<H> / DBili -- / AST 79<H> / <H> / AlkPhos 93  LFTs ( @ 15:54)      TPro 7.3 / Alb 3.6 / TBili 2.4<H> / DBili -- / <H> / <H> / AlkPhos 128<H>    Coags ( @ 15:54)  aPTT 41.9<H> / INR 1.14 / PT 12.7      ABG ( @ 00:47)     7.40 / 39 / 88 / 24 / -0.5 / 97.1%     Lactate:      VBG ( @ 19:06)     7.33 / 50 / 33 / 26 / -0.4 / 49.5<L>%      Lactate: 3.3<H>  VBG ( @ 15:54)     7.40 / 36<L> / 54<H> / 22 / -1.8 / 84.5%      Lactate: 5.1<HH>    Urinalysis ( @ 00:47):     Color:  / Appearance:  / SG:  / pH:  / Gluc: 192<H> / Ketones:  / Bili:  / Urobili:  / Protein : / Nitrites:  / Leuk.Est:  / RBC:  / WBC:  / Sq Epi:  / Non Sq Epi:  / Bacteria      Urinalysis ( @ 15:54):     Color: Dark Yellow / Appearance: Cloudy<!> / S.033<H> / pH: 5.5 / Gluc: 327<H> / Ketones: Trace<!> / Bili: Small<!> / Urobili: 0.2 / Protein :Trace / Nitrites: Negative / Leuk.Est: Negative / RBC: 4 / WBC: 2 / Sq Epi:  / Non Sq Epi: 3 / Bacteria Negative         --------------------------------------------------------------------------------------    OTHER LABORATORY:     IMAGING STUDIES:   CXR:

## 2024-07-27 NOTE — CONSULT NOTE ADULT - ASSESSMENT
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     #Gallstone pancreatitis  - CT imaging showed peripancreatic stranding and free fluid, consistent with acute interstitial edematous pancreatitis. Enhancement of the pancreas is normal. No drainable fluid collection. Non specific GB wall thickening with no evidence of cholecystitis. Cholelithiasis present.   - Bili was 2.3, w/ mildly elevated ALP, AST/ALT, now downtrending. Likely passed a stone.     Recommendations:   - recommend HIDA scan.   - No role for endoscopic intervention at this time.   - continue with IV fluids.   - continue with abx.   - Rest of the care per primary team.     Discussed the case with Dr. Kruse.     All recommendations are tentative until note is attested by an attending.     Asiya Vazquez, PGY-6  Gastroenterology/Hepatology Fellow  Available on [employer] Teams  [phone] (Short Range Pager)  [phone] (Long Range Pager)    After 5pm, please contact the on-call GI fellow.

## 2024-07-27 NOTE — PROGRESS NOTE ADULT - ASSESSMENT
78M with a PMH of HTN, HLD, dementia, T2DM on insulin, hx CVA vx TIA, hydrocephalus s/p  shunt (2023) who presented to the ED with AMS, fevers and abdominal pain.  Here with acute pancreatitis, febrile, tachypneic, tachycardic     PLAN:  - NPO/ IVF  - IV antibiotics   - GI consult for possible ERCP (emailed)   - Care per SICU    Sam Ash, PGY1  General Surgery  Surgery B Team  [phone]

## 2024-07-27 NOTE — PHYSICAL THERAPY INITIAL EVALUATION ADULT - GENERAL OBSERVATIONS, REHAB EVAL
Pt received semi-supine, +monitor lines, +espinoza, +IV, all lines/tubes intact, NAD. HR: 89 beats per minute

## 2024-07-27 NOTE — CONSULT NOTE ADULT - SUBJECTIVE AND OBJECTIVE BOX
HPI:    Allergies:  No Known Allergies      Home Medications:    Hospital Medications:  acetaminophen   IVPB .. 1000 milliGRAM(s) IV Intermittent every 6 hours  albuterol/ipratropium for Nebulization 3 milliLiter(s) Nebulizer every 6 hours  chlorhexidine 2% Cloths 1 Application(s) Topical daily  dextrose 50% Injectable 25 Gram(s) IV Push once  dextrose 50% Injectable 12.5 Gram(s) IV Push once  dextrose 50% Injectable 25 Gram(s) IV Push once  heparin   Injectable 5000 Unit(s) SubCutaneous every 8 hours  HYDROmorphone  Injectable 0.2 milliGRAM(s) IV Push every 4 hours PRN  HYDROmorphone  Injectable 0.5 milliGRAM(s) IV Push every 4 hours PRN  insulin lispro (ADMELOG) corrective regimen sliding scale   SubCutaneous Before meals and at bedtime  insulin NPH human recombinant 10 Unit(s) SubCutaneous every 12 hours  lactated ringers. 1000 milliLiter(s) IV Continuous <Continuous>  losartan 100 milliGRAM(s) Oral daily  piperacillin/tazobactam IVPB.. 3.375 Gram(s) IV Intermittent every 12 hours      PMHX/PSHX:  Diabetes    TIA (transient ischemic attack)    HTN (hypertension)    Hyperlipidemia    H/O hydrocephalus    Dementia    No significant past surgical history    S/P  shunt        Family history:  Family history of renal cancer    Family history of prostate cancer in father        Denies family history of colon cancer/polyps, stomach cancer/polyps, pancreatic cancer/masses, liver cancer/disease, ovarian cancer and endometrial cancer.    Social History:   Tob: Denies  EtOH: Denies  Illicit Drugs: Denies    ROS:     General:  No wt loss, fevers, chills, night sweats, fatigue  Eyes:  Good vision, no reported pain  ENT:  No sore throat, pain, runny nose, dysphagia  CV:  No pain, palpitations, hypo/hypertension  Pulm:  No dyspnea, cough, tachypnea, wheezing  GI:  see HPI  :  No pain, bleeding, incontinence, nocturia  Muscle:  No pain, weakness  Neuro:  No weakness, tingling, memory problems  Psych:  No fatigue, insomnia, mood problems, depression  Endocrine:  No polyuria, polydipsia, cold/heat intolerance  Heme:  No petechiae, ecchymosis, easy bruisability  Skin:  No rash, tattoos, scars, edema    PHYSICAL EXAM:     GENERAL:  No acute distress  HEENT:  NCAT, no scleral icterus   CHEST:  no respiratory distress  HEART:  Regular rate and rhythm  ABDOMEN:  Soft, non-tender, non-distended, normoactive bowel sounds,  no masses  EXTREMITIES: No edema  SKIN:  No rash/erythema/ecchymoses/petechiae/wounds/abscess/warm/dry  NEURO:  Alert and oriented x 3, no asterixis    Vital Signs:  Vital Signs Last 24 Hrs  T(C): 37.1 (2024 08:00), Max: 39.8 (2024 16:02)  T(F): 98.7 (2024 08:00), Max: 103.6 (2024 16:02)  HR: 92 (2024 08:00) (87 - 136)  BP: 178/94 (2024 08:00) (108/74 - 178/94)  BP(mean): 115 (2024 08:00) (88 - 115)  RR: 26 (2024 08:00) (18 - 36)  SpO2: 100% (2024 08:00) (95% - 100%)    Parameters below as of 2024 08:00  Patient On (Oxygen Delivery Method): room air      Daily     Daily Weight in k.1 (2024 04:00)    LABS:                        15.3   17.88 )-----------( 149      ( 2024 00:47 )             47.1     Mean Cell Volume: 87.2 fL (- @ 00:47)        140  |  108<H>  |  26<H>  ----------------------------<  192<H>  3.9   |  22  |  1.17    Ca    7.2<L>      2024 00:47    TPro  6.0  /  Alb  3.0<L>  /  TBili  1.9<H>  /  DBili  x   /  AST  79<H>  /  ALT  160<H>  /  AlkPhos  93      LIVER FUNCTIONS - ( 2024 00:47 )  Alb: 3.0 g/dL / Pro: 6.0 g/dL / ALK PHOS: 93 U/L / ALT: 160 U/L / AST: 79 U/L / GGT: x           PT/INR - ( 2024 15:54 )   PT: 12.7 sec;   INR: 1.14 ratio         PTT - ( 2024 15:54 )  PTT:41.9 sec  Urinalysis Basic - ( 2024 00:47 )    Color: x / Appearance: x / SG: x / pH: x  Gluc: 192 mg/dL / Ketone: x  / Bili: x / Urobili: x   Blood: x / Protein: x / Nitrite: x   Leuk Esterase: x / RBC: x / WBC x   Sq Epi: x / Non Sq Epi: x / Bacteria: x      Amylase Serum--      Lipase [id number]       Ammonia--  Amylase Serum--      Lipase [id number]       Ammonia--                          15.3   17.88 )-----------( 149      ( 2024 00:47 )             47.1                         18.4   19.19 )-----------( 209      ( 2024 15:54 )             54.3       Imaging:             HPI:    Mr. Guallpa is a 78y male PMH of HTN, HLD, dementia, DM on insulin, L SDH s/p L MMAE (2022), NPH s/p  shunt  presented to the ED with AMS and severe abd pain. On admission, had fever, labs showed leukocytosis, lactate elevated to 5.1. CT imaging showed GB wall thickening with acute pancreatitis, no evidence of choledocholithiasis. Patient is a poor historian. Per wife at bedside, reported he had similar episode 2 months when he was admitted to [email or web address] Rarely drinks alcohol. No fam hx of pancreatic cancer or pancreatitis. GI consulted for severe pancreatitis.     Allergies:  No Known Allergies    Hospital Medications:  acetaminophen   IVPB .. 1000 milliGRAM(s) IV Intermittent every 6 hours  albuterol/ipratropium for Nebulization 3 milliLiter(s) Nebulizer every 6 hours  chlorhexidine 2% Cloths 1 Application(s) Topical daily  dextrose 50% Injectable 25 Gram(s) IV Push once  dextrose 50% Injectable 12.5 Gram(s) IV Push once  dextrose 50% Injectable 25 Gram(s) IV Push once  heparin   Injectable 5000 Unit(s) SubCutaneous every 8 hours  HYDROmorphone  Injectable 0.2 milliGRAM(s) IV Push every 4 hours PRN  HYDROmorphone  Injectable 0.5 milliGRAM(s) IV Push every 4 hours PRN  insulin lispro (ADMELOG) corrective regimen sliding scale   SubCutaneous Before meals and at bedtime  insulin NPH human recombinant 10 Unit(s) SubCutaneous every 12 hours  lactated ringers. 1000 milliLiter(s) IV Continuous <Continuous>  losartan 100 milliGRAM(s) Oral daily  piperacillin/tazobactam IVPB.. 3.375 Gram(s) IV Intermittent every 12 hours    PMHX/PSHX:  Diabetes    TIA (transient ischemic attack)    HTN (hypertension)    Hyperlipidemia    H/O hydrocephalus    Dementia    No significant past surgical history    S/P  shunt    Family history: renal cancer, prostate cancer in father      Social History:   Tob: former smoker.   EtOH: occasional use.   Illicit Drugs: Denies    ROS:     General:  No wt loss, fevers, chills, night sweats, fatigue  Eyes:  Good vision, no reported pain  ENT:  No sore throat, pain, runny nose, dysphagia  CV:  No pain, palpitations, hypo/hypertension  Pulm:  No dyspnea, cough, tachypnea, wheezing  GI:  see HPI  :  No pain, bleeding, incontinence, nocturia  Muscle:  No pain, weakness  Neuro:  No weakness, tingling, memory problems  Psych:  No fatigue, insomnia, mood problems, depression  Endocrine:  No polyuria, polydipsia, cold/heat intolerance  Heme:  No petechiae, ecchymosis, easy bruisability  Skin:  No rash, tattoos, scars, edema    PHYSICAL EXAM:     GENERAL:  No acute distress, appears ill, lying in bed.   HEENT:  NCAT, mild scleral icterus   CHEST:  no respiratory distress  HEART:  Regular rate and rhythm  ABDOMEN:  Soft, moderate tenderness in the epigastric region, moderately distended,   EXTREMITIES: No LE edema  SKIN:  No rash/erythema/ecchymoses/petechiae/wounds/abscess/warm/dry  NEURO:  Alert and oriented x 2 only  name and place, no tremors.     Vital Signs:  Vital Signs Last 24 Hrs  T(C): 37.1 (2024 08:00), Max: 39.8 (2024 16:02)  T(F): 98.7 (2024 08:00), Max: 103.6 (2024 16:02)  HR: 92 (2024 08:00) (87 - 136)  BP: 178/94 (2024 08:00) (108/74 - 178/94)  BP(mean): 115 (2024 08:00) (88 - 115)  RR: 26 (2024 08:00) (18 - 36)  SpO2: 100% (2024 08:00) (95% - 100%)    Parameters below as of 2024 08:00  Patient On (Oxygen Delivery Method): room air      Daily     Daily Weight in k.1 (2024 04:00)    LABS:                        15.3   17.88 )-----------( 149      ( 2024 00:47 )             47.1     Mean Cell Volume: 87.2 fL (- @ 00:47)        140  |  108<H>  |  26<H>  ----------------------------<  192<H>  3.9   |  22  |  1.17    Ca    7.2<L>      2024 00:47    TPro  6.0  /  Alb  3.0<L>  /  TBili  1.9<H>  /  DBili  x   /  AST  79<H>  /  ALT  160<H>  /  AlkPhos  93  07-27    LIVER FUNCTIONS - ( 2024 00:47 )  Alb: 3.0 g/dL / Pro: 6.0 g/dL / ALK PHOS: 93 U/L / ALT: 160 U/L / AST: 79 U/L / GGT: x           PT/INR - ( 2024 15:54 )   PT: 12.7 sec;   INR: 1.14 ratio         PTT - ( 2024 15:54 )  PTT:41.9 sec  Urinalysis Basic - ( 2024 00:47 )    Color: x / Appearance: x / SG: x / pH: x  Gluc: 192 mg/dL / Ketone: x  / Bili: x / Urobili: x   Blood: x / Protein: x / Nitrite: x   Leuk Esterase: x / RBC: x / WBC x   Sq Epi: x / Non Sq Epi: x / Bacteria: x      Amylase Serum--      Lipase [id number]       Ammonia--  Amylase Serum--      Lipase [id number]       Ammonia--                          15.3   17.88 )-----------( 149      ( 2024 00:47 )             47.1                         18.4   19.19 )-----------( 209      ( 2024 15:54 )             54.3       Imaging:    CT A/P    FINDINGS:  LOWER CHEST: Right basilar linear atelectasis. Bibasilar subsegmental   atelectasis. Aortic and coronary calcifications. Bilateral gynecomastia.    LIVER: Within normal limits.  BILEDUCTS: Normal caliber.  GALLBLADDER: Nonspecific thickening of the gallbladder wall.   Cholelithiasis.  SPLEEN: Within normal limits.  PANCREAS: Peripancreatic stranding and free fluid, consistent with acute   interstitial edematous pancreatitis. Enhancement of the pancreas is   normal. No drainable fluid collection.  ADRENALS: A 1.0 cm indeterminate left adrenal nodule, stable since   previous exam.  KIDNEYS/URETERS: No hydronephrosis. A 2 mm nonobstructing left lower pole   renal stone.    BLADDER: Within normal limits.  REPRODUCTIVE ORGANS: Prostate within normal limits.    BOWEL: No bowel obstruction. Appendix is normal.  PERITONEUM/RETROPERITONEUM: Ventriculoperitoneal shunt is partially   imaged and terminates in the right lower quadrant without evidence of   discontinuity or kinking.  VESSELS: Atherosclerotic changes.  LYMPH NODES: No lymphadenopathy.  ABDOMINAL WALL: Small bilateral fat-containing inguinal hernias.  BONES: Degenerative changes.    IMPRESSION:  Peripancreatic stranding and free fluid, concerning for acute   interstitial edematous pancreatitis. Correlate with serum lipase.    Mild wall thickening of the gallbladder, likely reactive.    Ventriculoperitoneal shunt terminates in the right lower quadrant without   evidence of discontinued he or kinking within its partially visualized   course.

## 2024-07-27 NOTE — PHYSICAL THERAPY INITIAL EVALUATION ADULT - PHYSICAL ASSIST/NONPHYSICAL ASSIST: SUPINE/SIT, REHAB EVAL
HR increased to 102 beats per minute, RNTierra, made aware/verbal cues/nonverbal cues (demo/gestures)/1 person assist

## 2024-07-27 NOTE — PHYSICAL THERAPY INITIAL EVALUATION ADULT - ADDITIONAL COMMENTS
She needs OV and will get labs at the same time. Please schedule appt.
Unable to obtain accurate social history secondary to pt. presenting with confusion during subjective history and presenting with difficulty following commands, limited social history obtained through past medical documents, please refer to social work for more attainable social history.     Pt. left comfortable in bed with all tubes/lines intact, head of the bed elevated, call bell in reach and in NAD. RN aware of session and pt current position, and present at bedside.

## 2024-07-27 NOTE — PHYSICAL THERAPY INITIAL EVALUATION ADULT - PERTINENT HX OF CURRENT PROBLEM, REHAB EVAL
Pt is a 78 year old male who presented with acute pancreatitis. SICU consulted for hemodynamic monitoring.

## 2024-07-27 NOTE — PHYSICAL THERAPY INITIAL EVALUATION ADULT - MANUAL MUSCLE TESTING RESULTS, REHAB EVAL
bilateral upper extremities: at least 3/5, bilateral lower extremities: at least 3-/5/grossly assessed due to

## 2024-07-27 NOTE — CONSULT NOTE ADULT - ATTENDING COMMENTS
78M with SDH with  shunt/ severe sepsis with tachycardia, F 103, leukocytosis, lactate of 5.1 found to lipase 768, CT imaging also with interstitial pancreatitis, denies alcohol, , Ca 7.2  CBD wnl  nonspecific thickening of GB with cholelithiasis  LFTs /246/Tbil 2.4 now 1.9/ ->93    Imp:  ?gallstone pancreatitis with potential passed stone  consider cholecystitis    Rec  1- antibiotics  2- IVF supportive care  3- start clears  4- Recommend obtaining HIDA  5- trend LFTs
I agree with the history, physical, and plan, which I have reviewed and edited where appropriate.  I agree with notes/assessment of health care providers on my service.  I have personally examined the patient.  I was physically present for the key portions of the evaluation and management (E/M) service provided.  I reviewed data and laboratory tests/x-rays and all pertinent electronic images.  The patient is a critical care patient with life threatening hemodynamic and metabolic instability in SICU.  Risk benefit analyses discussed.    The patient is in SICU with diagnosis mentioned in the note.    The plan is specified below.    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 SICU consulted for hemodynamic monitoring.     PLAN:  NEUROLOGIC: h/o dementia, VPS   - Pain control: IV tylenol, dilaudid PRN  - NSGY on board for VPS     RESPIRATORY   - on RA     CARDIOVASCULAR   - holding home irbesartan     GASTROINTESTINAL: pancreatitis, possible cholangitis  - Diet: NPO/IVF  - trend bili  - GI consult for possible ERCP     /RENAL   - IV fluids: LR @ 100cc/hr  - espinoza    HEMATOLOGIC  - DVT ppx: SQH    INFECTIOUS DISEASE: possible cholangitis   - IV zosyn    ENDOCRINE- DM II   - MISS  - NPH q12

## 2024-07-27 NOTE — PHYSICAL THERAPY INITIAL EVALUATION ADULT - MD/RN NOTIFIED
" LOS: 0 days   Patient Care Team:  Juan Fernandez MD as PCP - General  Michael Manning MD as Consulting Physician (Rheumatology)  Jared Angulo MD as Surgeon (Colon and Rectal Surgery)  Bill Oseguera MD as Consulting Physician (Cardiology)  Selvin Oates MD as Consulting Physician (Urology)  Grant Courtney OD (Optometry)      Subjective     Interval History:     Subjective: dysphagia and history of esophageal stricture      ROS:   No chest pain, shortness of breath, or cough.        Medication Review:     Current Facility-Administered Medications:   •  sodium chloride 0.9 % infusion, 50 mL/hr, Intravenous, Once, Francisco Chi MD      Objective     Vital Signs  Vitals:    02/20/23 1432 03/03/23 0850   BP:  143/87   Pulse:  68   Resp:  12   Temp:  98.3 °F (36.8 °C)   TempSrc:  Oral   SpO2:  98%   Weight: 104 kg (230 lb) 104 kg (228 lb 2.8 oz)   Height: 177.8 cm (70\") 177.8 cm (70\")       Physical Exam:    General Appearance:    Awake and alert, in no acute distress   Head:    Normocephalic, without obvious abnormality   Eyes:          Conjunctivae normal, anicteric sclera   Throat:   No oral lesions, no thrush, oral mucosa moist   Neck:   No adenopathy, supple, no JVD   Lungs:     respirations regular, even and unlabored   Abdomen:     Soft, non-tender, no rebound or guarding, non-distended, no hepatosplenomegaly   Rectal:     Deferred   Extremities:   No edema, no cyanosis   Skin:   No bruising or rash, no jaundice        Results Review:    Lab Results (last 24 hours)     ** No results found for the last 24 hours. **          Imaging Results (Last 24 Hours)     ** No results found for the last 24 hours. **            Assessment & Plan   Proceed with scope and MAC anesthesia        Rei Manuel MD  03/03/23  09:48 EST  " yes

## 2024-07-28 LAB
ADD ON TEST-SPECIMEN IN LAB: SIGNIFICANT CHANGE UP
ALBUMIN SERPL ELPH-MCNC: 3.2 G/DL — LOW (ref 3.3–5)
ALP SERPL-CCNC: 84 U/L — SIGNIFICANT CHANGE UP (ref 40–120)
ALT FLD-CCNC: 109 U/L — HIGH (ref 4–41)
ANION GAP SERPL CALC-SCNC: 14 MMOL/L — SIGNIFICANT CHANGE UP (ref 7–14)
APTT BLD: 41.7 SEC — HIGH (ref 24.5–35.6)
AST SERPL-CCNC: 49 U/L — HIGH (ref 4–40)
BILIRUB SERPL-MCNC: 1.8 MG/DL — HIGH (ref 0.2–1.2)
BLD GP AB SCN SERPL QL: NEGATIVE — SIGNIFICANT CHANGE UP
BUN SERPL-MCNC: 18 MG/DL — SIGNIFICANT CHANGE UP (ref 7–23)
CALCIUM SERPL-MCNC: 7.5 MG/DL — LOW (ref 8.4–10.5)
CHLORIDE SERPL-SCNC: 106 MMOL/L — SIGNIFICANT CHANGE UP (ref 98–107)
CO2 SERPL-SCNC: 21 MMOL/L — LOW (ref 22–31)
CREAT SERPL-MCNC: 0.88 MG/DL — SIGNIFICANT CHANGE UP (ref 0.5–1.3)
EGFR: 88 ML/MIN/1.73M2 — SIGNIFICANT CHANGE UP
GLUCOSE BLDC GLUCOMTR-MCNC: 145 MG/DL — HIGH (ref 70–99)
GLUCOSE BLDC GLUCOMTR-MCNC: 147 MG/DL — HIGH (ref 70–99)
GLUCOSE BLDC GLUCOMTR-MCNC: 147 MG/DL — HIGH (ref 70–99)
GLUCOSE BLDC GLUCOMTR-MCNC: 157 MG/DL — HIGH (ref 70–99)
GLUCOSE SERPL-MCNC: 239 MG/DL — HIGH (ref 70–99)
HCT VFR BLD CALC: 48 % — SIGNIFICANT CHANGE UP (ref 39–50)
HGB BLD-MCNC: 15.5 G/DL — SIGNIFICANT CHANGE UP (ref 13–17)
INR BLD: 1.19 RATIO — HIGH (ref 0.85–1.18)
LIDOCAIN IGE QN: 47 U/L — SIGNIFICANT CHANGE UP (ref 7–60)
MAGNESIUM SERPL-MCNC: 2.1 MG/DL — SIGNIFICANT CHANGE UP (ref 1.6–2.6)
MCHC RBC-ENTMCNC: 28.1 PG — SIGNIFICANT CHANGE UP (ref 27–34)
MCHC RBC-ENTMCNC: 32.3 GM/DL — SIGNIFICANT CHANGE UP (ref 32–36)
MCV RBC AUTO: 87.1 FL — SIGNIFICANT CHANGE UP (ref 80–100)
NRBC # BLD: 0 /100 WBCS — SIGNIFICANT CHANGE UP (ref 0–0)
NRBC # FLD: 0 K/UL — SIGNIFICANT CHANGE UP (ref 0–0)
PHOSPHATE SERPL-MCNC: 1.4 MG/DL — LOW (ref 2.5–4.5)
PLATELET # BLD AUTO: 156 K/UL — SIGNIFICANT CHANGE UP (ref 150–400)
POTASSIUM SERPL-MCNC: 3.7 MMOL/L — SIGNIFICANT CHANGE UP (ref 3.5–5.3)
POTASSIUM SERPL-SCNC: 3.7 MMOL/L — SIGNIFICANT CHANGE UP (ref 3.5–5.3)
PROT SERPL-MCNC: 6.5 G/DL — SIGNIFICANT CHANGE UP (ref 6–8.3)
PROTHROM AB SERPL-ACNC: 13.4 SEC — HIGH (ref 9.5–13)
RBC # BLD: 5.51 M/UL — SIGNIFICANT CHANGE UP (ref 4.2–5.8)
RBC # FLD: 13.8 % — SIGNIFICANT CHANGE UP (ref 10.3–14.5)
RH IG SCN BLD-IMP: POSITIVE — SIGNIFICANT CHANGE UP
SODIUM SERPL-SCNC: 141 MMOL/L — SIGNIFICANT CHANGE UP (ref 135–145)
WBC # BLD: 15.72 K/UL — HIGH (ref 3.8–10.5)
WBC # FLD AUTO: 15.72 K/UL — HIGH (ref 3.8–10.5)

## 2024-07-28 PROCEDURE — 71045 X-RAY EXAM CHEST 1 VIEW: CPT | Mod: 26

## 2024-07-28 PROCEDURE — 71250 CT THORAX DX C-: CPT | Mod: 26

## 2024-07-28 PROCEDURE — 99232 SBSQ HOSP IP/OBS MODERATE 35: CPT | Mod: GC

## 2024-07-28 RX ORDER — PANTOPRAZOLE SODIUM 20 MG/1
40 TABLET, DELAYED RELEASE ORAL DAILY
Refills: 0 | Status: DISCONTINUED | OUTPATIENT
Start: 2024-07-28 | End: 2024-07-30

## 2024-07-28 RX ORDER — POTASSIUM PHOSPHATE, MONOBASIC AND POTASSIUM PHOSPHATE, DIBASIC 224; 236 MG/ML; MG/ML
30 INJECTION, SOLUTION INTRAVENOUS ONCE
Refills: 0 | Status: COMPLETED | OUTPATIENT
Start: 2024-07-28 | End: 2024-07-28

## 2024-07-28 RX ORDER — ACETAMINOPHEN 500 MG
1000 TABLET ORAL EVERY 6 HOURS
Refills: 0 | Status: COMPLETED | OUTPATIENT
Start: 2024-07-28 | End: 2024-07-28

## 2024-07-28 RX ADMIN — Medication 10 UNIT(S): at 23:06

## 2024-07-28 RX ADMIN — MUPIROCIN CALCIUM 1 APPLICATION(S): 20 CREAM TOPICAL at 18:00

## 2024-07-28 RX ADMIN — Medication 400 MILLIGRAM(S): at 11:55

## 2024-07-28 RX ADMIN — Medication 400 MILLIGRAM(S): at 05:42

## 2024-07-28 RX ADMIN — HEPARIN SODIUM 5000 UNIT(S): 1000 INJECTION, SOLUTION INTRAVENOUS; SUBCUTANEOUS at 22:57

## 2024-07-28 RX ADMIN — IPRATROPIUM BROMIDE AND ALBUTEROL SULFATE 3 MILLILITER(S): 2.5; .5 SOLUTION RESPIRATORY (INHALATION) at 04:09

## 2024-07-28 RX ADMIN — POTASSIUM PHOSPHATE, MONOBASIC AND POTASSIUM PHOSPHATE, DIBASIC 83.33 MILLIMOLE(S): 224; 236 INJECTION, SOLUTION INTRAVENOUS at 05:46

## 2024-07-28 RX ADMIN — Medication 0.5 MILLIGRAM(S): at 00:00

## 2024-07-28 RX ADMIN — Medication 400 MILLIGRAM(S): at 23:07

## 2024-07-28 RX ADMIN — DEXTROSE MONOHYDRATE, SODIUM CHLORIDE, SODIUM LACTATE, CALCIUM CHLORIDE, MAGNESIUM CHLORIDE 100 MILLILITER(S): 1.5; 538; 448; 18.4; 5.08 SOLUTION INTRAPERITONEAL at 22:56

## 2024-07-28 RX ADMIN — LOSARTAN POTASSIUM 100 MILLIGRAM(S): 50 TABLET, FILM COATED ORAL at 05:43

## 2024-07-28 RX ADMIN — Medication 2: at 05:44

## 2024-07-28 RX ADMIN — IPRATROPIUM BROMIDE AND ALBUTEROL SULFATE 3 MILLILITER(S): 2.5; .5 SOLUTION RESPIRATORY (INHALATION) at 08:01

## 2024-07-28 RX ADMIN — Medication 400 MILLIGRAM(S): at 17:43

## 2024-07-28 RX ADMIN — PIPERACILLIN SODIUM, TAZOBACTAM SODIUM 25 GRAM(S): 3; .375 INJECTION, POWDER, LYOPHILIZED, FOR SOLUTION INTRAVENOUS at 17:43

## 2024-07-28 RX ADMIN — MUPIROCIN CALCIUM 1 APPLICATION(S): 20 CREAM TOPICAL at 05:42

## 2024-07-28 RX ADMIN — IPRATROPIUM BROMIDE AND ALBUTEROL SULFATE 3 MILLILITER(S): 2.5; .5 SOLUTION RESPIRATORY (INHALATION) at 19:25

## 2024-07-28 RX ADMIN — CHLORHEXIDINE GLUCONATE 1 APPLICATION(S): 500 CLOTH TOPICAL at 17:16

## 2024-07-28 RX ADMIN — PANTOPRAZOLE SODIUM 40 MILLIGRAM(S): 20 TABLET, DELAYED RELEASE ORAL at 11:55

## 2024-07-28 RX ADMIN — HEPARIN SODIUM 5000 UNIT(S): 1000 INJECTION, SOLUTION INTRAVENOUS; SUBCUTANEOUS at 16:30

## 2024-07-28 RX ADMIN — DEXTROSE MONOHYDRATE, SODIUM CHLORIDE, SODIUM LACTATE, CALCIUM CHLORIDE, MAGNESIUM CHLORIDE 100 MILLILITER(S): 1.5; 538; 448; 18.4; 5.08 SOLUTION INTRAPERITONEAL at 00:30

## 2024-07-28 RX ADMIN — HEPARIN SODIUM 5000 UNIT(S): 1000 INJECTION, SOLUTION INTRAVENOUS; SUBCUTANEOUS at 05:45

## 2024-07-28 RX ADMIN — PIPERACILLIN SODIUM, TAZOBACTAM SODIUM 25 GRAM(S): 3; .375 INJECTION, POWDER, LYOPHILIZED, FOR SOLUTION INTRAVENOUS at 05:46

## 2024-07-28 RX ADMIN — Medication 10 UNIT(S): at 11:56

## 2024-07-28 NOTE — DATA REVIEWED
Immediate Brief Procedure Note    Jessica Felton  7/27/2024    Procedure(s): cardioversion with moderate sedation    Post-procedure Diagnosis: same    Proceduralist: Mi Madrid MD       Anesthesia Staff: Anesthesia staff cannot be found from this context.    Findings: same    Specimen(s):     Estimated Blood Loss: none  
Procedure End  
[de-identified] : CT scan 8/21/22

## 2024-07-28 NOTE — PROGRESS NOTE ADULT - SUBJECTIVE AND OBJECTIVE BOX
SICU Daily Progress Note  =====================================================  Interval/Overnight Events:     -OR today for cholecystectomy    ALLERGIES:  No Known Allergies      --------------------------------------------------------------------------------------    MEDICATIONS:    Neurologic Medications  acetaminophen   IVPB .. 1000 milliGRAM(s) IV Intermittent every 6 hours  HYDROmorphone  Injectable 0.5 milliGRAM(s) IV Push every 4 hours PRN Severe Pain (7 - 10)  HYDROmorphone  Injectable 0.2 milliGRAM(s) IV Push every 4 hours PRN Moderate Pain (4 - 6)    Respiratory Medications  albuterol/ipratropium for Nebulization 3 milliLiter(s) Nebulizer every 6 hours    Cardiovascular Medications  losartan 100 milliGRAM(s) Oral daily    Gastrointestinal Medications  lactated ringers. 1000 milliLiter(s) IV Continuous <Continuous>    Genitourinary Medications    Hematologic/Oncologic Medications  heparin   Injectable 5000 Unit(s) SubCutaneous every 8 hours    Antimicrobial/Immunologic Medications  piperacillin/tazobactam IVPB.. 3.375 Gram(s) IV Intermittent every 12 hours    Endocrine/Metabolic Medications  dextrose 50% Injectable 25 Gram(s) IV Push once  dextrose 50% Injectable 12.5 Gram(s) IV Push once  dextrose 50% Injectable 25 Gram(s) IV Push once  insulin lispro (ADMELOG) corrective regimen sliding scale   SubCutaneous every 6 hours  insulin NPH human recombinant 10 Unit(s) SubCutaneous every 12 hours    Topical/Other Medications  chlorhexidine 2% Cloths 1 Application(s) Topical daily  mupirocin 2% Ointment 1 Application(s) Both Nostrils two times a day    --------------------------------------------------------------------------------------    VITAL SIGNS:  ICU Vital Signs Last 24 Hrs  T(C): 37.6 (28 Jul 2024 00:00), Max: 37.6 (28 Jul 2024 00:00)  T(F): 99.6 (28 Jul 2024 00:00), Max: 99.6 (28 Jul 2024 00:00)  HR: 104 (28 Jul 2024 00:00) (86 - 109)  BP: 143/66 (28 Jul 2024 00:00) (143/66 - 178/94)  BP(mean): 89 (28 Jul 2024 00:00) (89 - 122)  ABP: --  ABP(mean): --  RR: 31 (28 Jul 2024 00:00) (14 - 32)  SpO2: 100% (28 Jul 2024 00:00) (89% - 100%)    O2 Parameters below as of 28 Jul 2024 00:00  Patient On (Oxygen Delivery Method): room air    --------------------------------------------------------------------------------------    INS AND OUTS:  I&O's Summary    26 Jul 2024 07:01  -  27 Jul 2024 07:00  --------------------------------------------------------  IN: 1250 mL / OUT: 740 mL / NET: 510 mL    27 Jul 2024 07:01  -  28 Jul 2024 01:55  --------------------------------------------------------  IN: 1635 mL / OUT: 1300 mL / NET: 335 mL    --------------------------------------------------------------------------------------    EXAM  NEUROLOGY  Exam: Normal, in no acute distress.  Alert and oriented x1, dementia.  No focal neurologic deficits.    HEENT  Exam: Normocephalic, atraumatic, EOMI.      RESPIRATORY  Exam: Expiratory wheezing, nonlabored respirations  Mechanical Ventilation:     CARDIOVASCULAR  Exam: Regular rate and rhythm.      GI/NUTRITION  Exam: Abdomen soft, Non-tender, very distended  Current Diet: NPO    VASCULAR  Exam: Extremities warm, pink, well-perfused.     MUSCULOSKELETAL  Exam: All extremities moving spontaneously without limitations.     SKIN  Exam: Good skin turgor, no skin breakdown.    METABOLIC / FLUIDS / ELECTROLYTES  lactated ringers. 1000 milliLiter(s) IV Continuous <Continuous>      HEMATOLOGIC  [x] VTE Prophylaxis: heparin   Injectable 5000 Unit(s) SubCutaneous every 8 hours    Transfusions:	[] PRBC	[] Platelets		[] FFP	[] Cryoprecipitate    INFECTIOUS DISEASE  Antimicrobials/Immunologic Medications:  piperacillin/tazobactam IVPB.. 3.375 Gram(s) IV Intermittent every 12 hours    Day #      of     ***    TUBES / LINES / DRAINS  ***  [x] Peripheral IV  [] Central Venous Line     	[] R	[] L	[] IJ	[] Fem	[] SC	Date Placed:   [] Arterial Line		[] R	[] L	[] Fem	[] Rad	[] Ax	Date Placed:   [] PICC		[] Midline		[] Mediport  [] Urinary Catheter		Date Placed:   [x] Necessity of urinary, arterial, and venous catheters discussed    --------------------------------------------------------------------------------------    LABS  07-28    141  |  106  |  18  ----------------------------<  239<H>  3.7   |  21<L>  |  0.88    Ca    7.5<L>      28 Jul 2024 00:47  Phos  1.4     07-28  Mg     2.10     07-28    TPro  6.5  /  Alb  3.2<L>  /  TBili  1.8<H>  /  DBili  x   /  AST  49<H>  /  ALT  109<H>  /  AlkPhos  84  07-28                        15.5   15.72 )-----------( 156      ( 28 Jul 2024 00:47 )             48.0     --------------------------------------------------------------------------------------    OTHER LABORATORY:     IMAGING STUDIES:   CXR:      SICU Daily Progress Note  =====================================================  Interval/Overnight Events:     -case canceled    ALLERGIES:  No Known Allergies      --------------------------------------------------------------------------------------    MEDICATIONS:    Neurologic Medications  acetaminophen   IVPB .. 1000 milliGRAM(s) IV Intermittent every 6 hours  HYDROmorphone  Injectable 0.5 milliGRAM(s) IV Push every 4 hours PRN Severe Pain (7 - 10)  HYDROmorphone  Injectable 0.2 milliGRAM(s) IV Push every 4 hours PRN Moderate Pain (4 - 6)    Respiratory Medications  albuterol/ipratropium for Nebulization 3 milliLiter(s) Nebulizer every 6 hours    Cardiovascular Medications  losartan 100 milliGRAM(s) Oral daily    Gastrointestinal Medications  lactated ringers. 1000 milliLiter(s) IV Continuous <Continuous>    Genitourinary Medications    Hematologic/Oncologic Medications  heparin   Injectable 5000 Unit(s) SubCutaneous every 8 hours    Antimicrobial/Immunologic Medications  piperacillin/tazobactam IVPB.. 3.375 Gram(s) IV Intermittent every 12 hours    Endocrine/Metabolic Medications  dextrose 50% Injectable 25 Gram(s) IV Push once  dextrose 50% Injectable 12.5 Gram(s) IV Push once  dextrose 50% Injectable 25 Gram(s) IV Push once  insulin lispro (ADMELOG) corrective regimen sliding scale   SubCutaneous every 6 hours  insulin NPH human recombinant 10 Unit(s) SubCutaneous every 12 hours    Topical/Other Medications  chlorhexidine 2% Cloths 1 Application(s) Topical daily  mupirocin 2% Ointment 1 Application(s) Both Nostrils two times a day    --------------------------------------------------------------------------------------    VITAL SIGNS:  ICU Vital Signs Last 24 Hrs  T(C): 37.6 (28 Jul 2024 00:00), Max: 37.6 (28 Jul 2024 00:00)  T(F): 99.6 (28 Jul 2024 00:00), Max: 99.6 (28 Jul 2024 00:00)  HR: 104 (28 Jul 2024 00:00) (86 - 109)  BP: 143/66 (28 Jul 2024 00:00) (143/66 - 178/94)  BP(mean): 89 (28 Jul 2024 00:00) (89 - 122)  ABP: --  ABP(mean): --  RR: 31 (28 Jul 2024 00:00) (14 - 32)  SpO2: 100% (28 Jul 2024 00:00) (89% - 100%)    O2 Parameters below as of 28 Jul 2024 00:00  Patient On (Oxygen Delivery Method): room air    --------------------------------------------------------------------------------------    INS AND OUTS:  I&O's Summary    26 Jul 2024 07:01  -  27 Jul 2024 07:00  --------------------------------------------------------  IN: 1250 mL / OUT: 740 mL / NET: 510 mL    27 Jul 2024 07:01  -  28 Jul 2024 01:55  --------------------------------------------------------  IN: 1635 mL / OUT: 1300 mL / NET: 335 mL    --------------------------------------------------------------------------------------    EXAM  NEUROLOGY  Exam: Normal, in no acute distress.  Alert and oriented x1, dementia.  No focal neurologic deficits.    HEENT  Exam: Normocephalic, atraumatic, EOMI.      RESPIRATORY  Exam: Expiratory wheezing, nonlabored respirations  Mechanical Ventilation:     CARDIOVASCULAR  Exam: Regular rate and rhythm.      GI/NUTRITION  Exam: Abdomen soft, Non-tender, very distended  Current Diet: NPO    VASCULAR  Exam: Extremities warm, pink, well-perfused.     MUSCULOSKELETAL  Exam: All extremities moving spontaneously without limitations.     SKIN  Exam: Good skin turgor, no skin breakdown.    METABOLIC / FLUIDS / ELECTROLYTES  lactated ringers. 1000 milliLiter(s) IV Continuous <Continuous>      HEMATOLOGIC  [x] VTE Prophylaxis: heparin   Injectable 5000 Unit(s) SubCutaneous every 8 hours    Transfusions:	[] PRBC	[] Platelets		[] FFP	[] Cryoprecipitate    INFECTIOUS DISEASE  Antimicrobials/Immunologic Medications:  piperacillin/tazobactam IVPB.. 3.375 Gram(s) IV Intermittent every 12 hours    Day #      of     ***    TUBES / LINES / DRAINS  ***  [x] Peripheral IV  [] Central Venous Line     	[] R	[] L	[] IJ	[] Fem	[] SC	Date Placed:   [] Arterial Line		[] R	[] L	[] Fem	[] Rad	[] Ax	Date Placed:   [] PICC		[] Midline		[] Mediport  [] Urinary Catheter		Date Placed:   [x] Necessity of urinary, arterial, and venous catheters discussed    --------------------------------------------------------------------------------------    LABS  07-28    141  |  106  |  18  ----------------------------<  239<H>  3.7   |  21<L>  |  0.88    Ca    7.5<L>      28 Jul 2024 00:47  Phos  1.4     07-28  Mg     2.10     07-28    TPro  6.5  /  Alb  3.2<L>  /  TBili  1.8<H>  /  DBili  x   /  AST  49<H>  /  ALT  109<H>  /  AlkPhos  84  07-28                        15.5   15.72 )-----------( 156      ( 28 Jul 2024 00:47 )             48.0     --------------------------------------------------------------------------------------    OTHER LABORATORY:     IMAGING STUDIES:   CXR:

## 2024-07-28 NOTE — PROGRESS NOTE ADULT - ASSESSMENT
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 SICU consulted for hemodynamic monitoring. BISAP 4.    PLAN:   NEUROLOGIC   - Pain control: IV tylenol, dilaudid PRN    RESPIRATORY   - Monitor SpO2 goal >92%  - Incentive spirometry   - Duonebs    CARDIOVASCULAR   - Monitor hemodynamics   - restart home losartan    GASTROINTESTINAL   - Diet: NPO  - OR today for cholecystectomy    /RENAL   - IV fluids: LR @ 100  - espinoza, monitor urine output  - Monitor electrolytes, replete PRN  - trend Cr  - trend lactate     HEMATOLOGIC  - Monitor H/H   - DVT ppx: SQH    INFECTIOUS DISEASE  - Monitor fever / WBC  - IV zosyn for ? 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 SICU consulted for hemodynamic monitoring. BISAP 4.    PLAN:   NEUROLOGIC   - Pain control: IV tylenol, dilaudid PRN    RESPIRATORY   - Monitor SpO2 goal >92%  - Incentive spirometry   - Duonebs  - plan for pocus    CARDIOVASCULAR   - Monitor hemodynamics   - restart home losartan    GASTROINTESTINAL   - Diet: CLD as case canceled    /RENAL   - IV fluids: LR @ 100  - espinoza, monitor urine output  - Monitor electrolytes, replete PRN  - trend Cr  - trend lactate     HEMATOLOGIC  - Monitor H/H   - DVT ppx: SQH    INFECTIOUS DISEASE  - Monitor fever / WBC  - IV zosyn for acute cholecystitis    ENDOCRINE  - MISS  - takes 16u premeal, 30-36u lantus at home per family  - 10 NPH q12    LINES  - PIV   - Espinoza    DISPO: SICU     78y male PMH of HTN, HLD, dementia, DM on insulin, 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 SICU consulted for hemodynamic monitoring. BISAP 4.    PLAN:   NEUROLOGIC   - Pain control: IV tylenol, dilaudid PRN    RESPIRATORY   - Monitor SpO2 goal >92%  - Incentive spirometry   - Duonebs  - POCUS 7/28 w/o B lines  - Medicine consulted - Dr. Jaramillo    CARDIOVASCULAR   - Monitor hemodynamics   - restart home losartan    GASTROINTESTINAL   - Diet: CLD as case canceled    /RENAL   - IV fluids: LR @ 100  - espinoza, monitor urine output  - Monitor electrolytes, replete PRN  - trend Cr  - trend lactate     HEMATOLOGIC  - Monitor H/H   - DVT ppx: SQH    INFECTIOUS DISEASE  - Monitor fever / WBC  - IV zosyn for acute cholecystitis    ENDOCRINE  - MISS  - takes 16u premeal, 30-36u lantus at home per family  - 10 NPH q12    LINES  - PIV   - Espinoza    DISPO: SICU

## 2024-07-28 NOTE — CONSULT NOTE ADULT - ASSESSMENT
Patient is a 77 Y/O Male with a PMHx of HTN, HLD, dementia, T2DM on insulin, hx CVA vx TIA, hydrocephalus s/p  shunt (2023) who presented to the ED with AMS, fevers and abdominal pain.  Here for acute pancreatitis. being managed in ICU forclose monitoring     # SOB/ Hypoxia   acute onset   resolved   afebrile   improved breathing,   CHeck CT chest, R/O pneumonia     # Acute pancreatitis   Per surg team   IV hydration  IV zosyn   Monitor leukocytosis   diet per surg   follow up Pan Cx    # DM  sliding scale  A1C 7.4     # HTN/HLD   BP control   adjust as toelrated     DVT and GI PPX

## 2024-07-28 NOTE — PROGRESS NOTE ADULT - ATTENDING COMMENTS
The patient was seen and examined, chart and notes reviewed.  The current diagnosis, plan of care and alternatives have been discussed with the patient.  All questions have been answered and updates have been discussed.  The case was discussed with B team residents/PA's and medical students at morning surgical rounds and throughout the course of the day.    Gallstone pancreatitis  a.  Wean IVF resuscitation  b.  Await  MRCP  c.  Appreciate GI input  d.  Trend LFTs    Acute cholecystitis  a.  Continue zosyn at this time  b.  Possible in-patient cholecystectomy  c.  daily CBC  d.  Obtain medical risk stratification    Respiratory abnormality  a.  With adequate oxygenation  b.  Prn o2 via nasal cannula    At risk for malnutrition  a,  Advance diet as tolerated    Hypokalemia  a.  Replete K

## 2024-07-28 NOTE — PROGRESS NOTE ADULT - ASSESSMENT
78M with a PMH of HTN, HLD, dementia, T2DM on insulin, hx CVA vx TIA, hydrocephalus s/p  shunt (2023) who presented to the ED with AMS, fevers and abdominal pain.  Here for acute pancreatitis.     Plan  - Pain control PRN  - POCUS today  - continue CLD  - Continue IVF  - DVT ppx  - Care per SICU    B team [id number]

## 2024-07-28 NOTE — PROGRESS NOTE ADULT - SUBJECTIVE AND OBJECTIVE BOX
Surgery Progress Note    SUBJECTIVE: Patient seen and examined at bedside. Patient report no nausea vomiting, passing gas and bowel movement.  --------------------------------------------------------------------------------------------------  OBJECTIVE:   Physical Exam:  General: AAOx3, NAD, lying comfortably in bed  HEENT: NC/AT  Respiratory: nonlabored breathing  Cardiovascular: RRR, normal S1 and S2, no murmurs or gallops  Abdomen: Softly distended, non-tender. No guarding or rebound.   Extremities: WWP, no edema  --------------------------------------------------------------------------------------------------  V/S:  Vital Signs Last 24 Hrs  T(C): 37.5 (28 Jul 2024 12:00), Max: 38.2 (28 Jul 2024 04:00)  T(F): 99.5 (28 Jul 2024 12:00), Max: 100.7 (28 Jul 2024 04:00)  HR: 84 (28 Jul 2024 15:00) (82 - 109)  BP: 145/72 (28 Jul 2024 15:00) (127/62 - 169/91)  BP(mean): 93 (28 Jul 2024 15:00) (80 - 116)  RR: 27 (28 Jul 2024 15:00) (14 - 32)  SpO2: 100% (28 Jul 2024 15:00) (96% - 100%)    Parameters below as of 28 Jul 2024 12:00  Patient On (Oxygen Delivery Method): room air        --------------------------------------------------------------------------------------------------  I/Os:    27 Jul 2024 07:01  -  28 Jul 2024 07:00  --------------------------------------------------------  IN:    IV PiggyBack: 500 mL    Lactated Ringers: 125 mL    Lactated Ringers: 700 mL    Lactated Ringers: 700 mL    Oral Fluid: 410 mL  Total IN: 2435 mL    OUT:    Indwelling Catheter - Urethral (mL): 1700 mL  Total OUT: 1700 mL    Total NET: 735 mL      28 Jul 2024 07:01  -  28 Jul 2024 15:29  --------------------------------------------------------  IN:    IV PiggyBack: 100 mL    Lactated Ringers: 600 mL  Total IN: 700 mL    OUT:    Indwelling Catheter - Urethral (mL): 250 mL  Total OUT: 250 mL    Total NET: 450 mL        --------------------------------------------------------------------------------------------------  LABS:                        15.5   15.72 )-----------( 156      ( 28 Jul 2024 00:47 )             48.0     28 Jul 2024 00:47    141    |  106    |  18     ----------------------------<  239    3.7     |  21     |  0.88     Ca    7.5        28 Jul 2024 00:47  Phos  1.4       28 Jul 2024 00:47  Mg     2.10      28 Jul 2024 00:47    TPro  6.5    /  Alb  3.2    /  TBili  1.8    /  DBili  x      /  AST  49     /  ALT  109    /  AlkPhos  84     28 Jul 2024 00:47    PT/INR - ( 28 Jul 2024 00:47 )   PT: 13.4 sec;   INR: 1.19 ratio         PTT - ( 28 Jul 2024 00:47 )  PTT:41.7 sec  CAPILLARY BLOOD GLUCOSE      POCT Blood Glucose.: 147 mg/dL (28 Jul 2024 11:53)  POCT Blood Glucose.: 157 mg/dL (28 Jul 2024 05:40)  POCT Blood Glucose.: 204 mg/dL (27 Jul 2024 23:48)  POCT Blood Glucose.: 137 mg/dL (27 Jul 2024 17:06)        LIVER FUNCTIONS - ( 28 Jul 2024 00:47 )  Alb: 3.2 g/dL / Pro: 6.5 g/dL / ALK PHOS: 84 U/L / ALT: 109 U/L / AST: 49 U/L / GGT: x             Culture - Urine (collected 26 Jul 2024 16:00)  Source: Clean Catch Clean Catch (Midstream)  Final Report (27 Jul 2024 21:52):    No growth    Urinalysis with Rflx Culture (collected 26 Jul 2024 15:54)    Culture - Blood (collected 26 Jul 2024 15:50)  Source: .Blood Blood-Peripheral  Preliminary Report (27 Jul 2024 22:02):    No growth at 24 hours    Culture - Blood (collected 26 Jul 2024 15:40)  Source: .Blood Blood-Peripheral  Preliminary Report (27 Jul 2024 22:02):    No growth at 24 hours      Urinalysis Basic - ( 28 Jul 2024 00:47 )    Color: x / Appearance: x / SG: x / pH: x  Gluc: 239 mg/dL / Ketone: x  / Bili: x / Urobili: x   Blood: x / Protein: x / Nitrite: x   Leuk Esterase: x / RBC: x / WBC x   Sq Epi: x / Non Sq Epi: x / Bacteria: x      --------------------------------------------------------------------------------------------------  MEDICATIONS  (STANDING):  acetaminophen   IVPB .. 1000 milliGRAM(s) IV Intermittent every 6 hours  albuterol/ipratropium for Nebulization 3 milliLiter(s) Nebulizer every 6 hours  chlorhexidine 2% Cloths 1 Application(s) Topical daily  dextrose 50% Injectable 25 Gram(s) IV Push once  dextrose 50% Injectable 12.5 Gram(s) IV Push once  dextrose 50% Injectable 25 Gram(s) IV Push once  heparin   Injectable 5000 Unit(s) SubCutaneous every 8 hours  insulin lispro (ADMELOG) corrective regimen sliding scale   SubCutaneous every 6 hours  insulin NPH human recombinant 10 Unit(s) SubCutaneous every 12 hours  lactated ringers. 1000 milliLiter(s) (100 mL/Hr) IV Continuous <Continuous>  mupirocin 2% Ointment 1 Application(s) Both Nostrils two times a day  pantoprazole  Injectable 40 milliGRAM(s) IV Push daily  piperacillin/tazobactam IVPB.. 3.375 Gram(s) IV Intermittent every 12 hours    MEDICATIONS  (PRN):  HYDROmorphone  Injectable 0.5 milliGRAM(s) IV Push every 4 hours PRN Severe Pain (7 - 10)  HYDROmorphone  Injectable 0.2 milliGRAM(s) IV Push every 4 hours PRN Moderate Pain (4 - 6)    --------------------------------------------------------------------------------------------------

## 2024-07-29 LAB
ANION GAP SERPL CALC-SCNC: 10 MMOL/L — SIGNIFICANT CHANGE UP (ref 7–14)
APTT BLD: 49 SEC — HIGH (ref 24.5–35.6)
BUN SERPL-MCNC: 17 MG/DL — SIGNIFICANT CHANGE UP (ref 7–23)
CALCIUM SERPL-MCNC: 7.2 MG/DL — LOW (ref 8.4–10.5)
CHLORIDE SERPL-SCNC: 107 MMOL/L — SIGNIFICANT CHANGE UP (ref 98–107)
CO2 SERPL-SCNC: 24 MMOL/L — SIGNIFICANT CHANGE UP (ref 22–31)
CREAT SERPL-MCNC: 0.77 MG/DL — SIGNIFICANT CHANGE UP (ref 0.5–1.3)
EGFR: 92 ML/MIN/1.73M2 — SIGNIFICANT CHANGE UP
GLUCOSE BLDC GLUCOMTR-MCNC: 157 MG/DL — HIGH (ref 70–99)
GLUCOSE BLDC GLUCOMTR-MCNC: 158 MG/DL — HIGH (ref 70–99)
GLUCOSE BLDC GLUCOMTR-MCNC: 159 MG/DL — HIGH (ref 70–99)
GLUCOSE SERPL-MCNC: 187 MG/DL — HIGH (ref 70–99)
HCT VFR BLD CALC: 41.5 % — SIGNIFICANT CHANGE UP (ref 39–50)
HGB BLD-MCNC: 13.9 G/DL — SIGNIFICANT CHANGE UP (ref 13–17)
INR BLD: 1.15 RATIO — SIGNIFICANT CHANGE UP (ref 0.85–1.18)
MAGNESIUM SERPL-MCNC: 2.1 MG/DL — SIGNIFICANT CHANGE UP (ref 1.6–2.6)
MCHC RBC-ENTMCNC: 29 PG — SIGNIFICANT CHANGE UP (ref 27–34)
MCHC RBC-ENTMCNC: 33.5 GM/DL — SIGNIFICANT CHANGE UP (ref 32–36)
MCV RBC AUTO: 86.6 FL — SIGNIFICANT CHANGE UP (ref 80–100)
NRBC # BLD: 0 /100 WBCS — SIGNIFICANT CHANGE UP (ref 0–0)
NRBC # FLD: 0 K/UL — SIGNIFICANT CHANGE UP (ref 0–0)
PHOSPHATE SERPL-MCNC: 1.9 MG/DL — LOW (ref 2.5–4.5)
PLATELET # BLD AUTO: 151 K/UL — SIGNIFICANT CHANGE UP (ref 150–400)
POTASSIUM SERPL-MCNC: 4.3 MMOL/L — SIGNIFICANT CHANGE UP (ref 3.5–5.3)
POTASSIUM SERPL-SCNC: 4.3 MMOL/L — SIGNIFICANT CHANGE UP (ref 3.5–5.3)
PROTHROM AB SERPL-ACNC: 12.9 SEC — SIGNIFICANT CHANGE UP (ref 9.5–13)
RBC # BLD: 4.79 M/UL — SIGNIFICANT CHANGE UP (ref 4.2–5.8)
RBC # FLD: 14 % — SIGNIFICANT CHANGE UP (ref 10.3–14.5)
SODIUM SERPL-SCNC: 141 MMOL/L — SIGNIFICANT CHANGE UP (ref 135–145)
WBC # BLD: 15.73 K/UL — HIGH (ref 3.8–10.5)
WBC # FLD AUTO: 15.73 K/UL — HIGH (ref 3.8–10.5)

## 2024-07-29 PROCEDURE — 99232 SBSQ HOSP IP/OBS MODERATE 35: CPT | Mod: 25,GC

## 2024-07-29 PROCEDURE — 99233 SBSQ HOSP IP/OBS HIGH 50: CPT

## 2024-07-29 PROCEDURE — 71045 X-RAY EXAM CHEST 1 VIEW: CPT | Mod: 26

## 2024-07-29 RX ORDER — ACETAMINOPHEN 500 MG
1000 TABLET ORAL EVERY 6 HOURS
Refills: 0 | Status: DISCONTINUED | OUTPATIENT
Start: 2024-07-29 | End: 2024-07-29

## 2024-07-29 RX ORDER — SODIUM PHOSPHATE, MONOBASIC, MONOHYDRATE 276; 142 MG/ML; MG/ML
15 INJECTION, SOLUTION INTRAVENOUS ONCE
Refills: 0 | Status: COMPLETED | OUTPATIENT
Start: 2024-07-29 | End: 2024-07-29

## 2024-07-29 RX ORDER — PIPERACILLIN SODIUM, TAZOBACTAM SODIUM 3; .375 G/15ML; G/15ML
3.38 INJECTION, POWDER, LYOPHILIZED, FOR SOLUTION INTRAVENOUS EVERY 8 HOURS
Refills: 0 | Status: DISCONTINUED | OUTPATIENT
Start: 2024-07-29 | End: 2024-07-31

## 2024-07-29 RX ORDER — AZITHROMYCIN 250 MG
500 TABLET ORAL EVERY 24 HOURS
Refills: 0 | Status: COMPLETED | OUTPATIENT
Start: 2024-07-29 | End: 2024-07-31

## 2024-07-29 RX ORDER — FUROSEMIDE 10 MG/ML
20 INJECTION, SOLUTION INTRAVENOUS ONCE
Refills: 0 | Status: COMPLETED | OUTPATIENT
Start: 2024-07-29 | End: 2024-07-29

## 2024-07-29 RX ORDER — ACETAMINOPHEN 500 MG
1000 TABLET ORAL EVERY 6 HOURS
Refills: 0 | Status: COMPLETED | OUTPATIENT
Start: 2024-07-29 | End: 2024-07-30

## 2024-07-29 RX ADMIN — IPRATROPIUM BROMIDE AND ALBUTEROL SULFATE 3 MILLILITER(S): 2.5; .5 SOLUTION RESPIRATORY (INHALATION) at 15:22

## 2024-07-29 RX ADMIN — Medication 10 UNIT(S): at 12:17

## 2024-07-29 RX ADMIN — PIPERACILLIN SODIUM, TAZOBACTAM SODIUM 25 GRAM(S): 3; .375 INJECTION, POWDER, LYOPHILIZED, FOR SOLUTION INTRAVENOUS at 06:06

## 2024-07-29 RX ADMIN — Medication 1000 MILLIGRAM(S): at 15:57

## 2024-07-29 RX ADMIN — Medication 2: at 18:14

## 2024-07-29 RX ADMIN — Medication 500 MILLIGRAM(S): at 14:14

## 2024-07-29 RX ADMIN — Medication 2: at 06:06

## 2024-07-29 RX ADMIN — IPRATROPIUM BROMIDE AND ALBUTEROL SULFATE 3 MILLILITER(S): 2.5; .5 SOLUTION RESPIRATORY (INHALATION) at 09:20

## 2024-07-29 RX ADMIN — CHLORHEXIDINE GLUCONATE 1 APPLICATION(S): 500 CLOTH TOPICAL at 11:38

## 2024-07-29 RX ADMIN — FUROSEMIDE 20 MILLIGRAM(S): 10 INJECTION, SOLUTION INTRAVENOUS at 10:25

## 2024-07-29 RX ADMIN — PANTOPRAZOLE SODIUM 40 MILLIGRAM(S): 20 TABLET, DELAYED RELEASE ORAL at 11:38

## 2024-07-29 RX ADMIN — PIPERACILLIN SODIUM, TAZOBACTAM SODIUM 25 GRAM(S): 3; .375 INJECTION, POWDER, LYOPHILIZED, FOR SOLUTION INTRAVENOUS at 21:14

## 2024-07-29 RX ADMIN — PIPERACILLIN SODIUM, TAZOBACTAM SODIUM 25 GRAM(S): 3; .375 INJECTION, POWDER, LYOPHILIZED, FOR SOLUTION INTRAVENOUS at 14:45

## 2024-07-29 RX ADMIN — SODIUM PHOSPHATE, MONOBASIC, MONOHYDRATE 63.75 MILLIMOLE(S): 276; 142 INJECTION, SOLUTION INTRAVENOUS at 03:53

## 2024-07-29 RX ADMIN — HEPARIN SODIUM 5000 UNIT(S): 1000 INJECTION, SOLUTION INTRAVENOUS; SUBCUTANEOUS at 06:05

## 2024-07-29 RX ADMIN — HEPARIN SODIUM 5000 UNIT(S): 1000 INJECTION, SOLUTION INTRAVENOUS; SUBCUTANEOUS at 21:14

## 2024-07-29 RX ADMIN — IPRATROPIUM BROMIDE AND ALBUTEROL SULFATE 3 MILLILITER(S): 2.5; .5 SOLUTION RESPIRATORY (INHALATION) at 22:58

## 2024-07-29 RX ADMIN — Medication 400 MILLIGRAM(S): at 15:27

## 2024-07-29 RX ADMIN — MUPIROCIN CALCIUM 1 APPLICATION(S): 20 CREAM TOPICAL at 06:04

## 2024-07-29 RX ADMIN — DEXTROSE MONOHYDRATE, SODIUM CHLORIDE, SODIUM LACTATE, CALCIUM CHLORIDE, MAGNESIUM CHLORIDE 100 MILLILITER(S): 1.5; 538; 448; 18.4; 5.08 SOLUTION INTRAPERITONEAL at 08:26

## 2024-07-29 RX ADMIN — IPRATROPIUM BROMIDE AND ALBUTEROL SULFATE 3 MILLILITER(S): 2.5; .5 SOLUTION RESPIRATORY (INHALATION) at 03:47

## 2024-07-29 RX ADMIN — MUPIROCIN CALCIUM 1 APPLICATION(S): 20 CREAM TOPICAL at 18:10

## 2024-07-29 RX ADMIN — HEPARIN SODIUM 5000 UNIT(S): 1000 INJECTION, SOLUTION INTRAVENOUS; SUBCUTANEOUS at 14:45

## 2024-07-29 NOTE — PROGRESS NOTE ADULT - SUBJECTIVE AND OBJECTIVE BOX
SICU Daily Progress Note  =====================================================  POD NA      	SICU Day 3    Interval/Overnight Events:   - CT Chest w/ BL pleural effusions and tracheomalacia, no PNA      MEDICATIONS:   --------------------------------------------------------------------------------------  Neurologic Medications  HYDROmorphone  Injectable 0.2 milliGRAM(s) IV Push every 4 hours PRN Moderate Pain (4 - 6)  HYDROmorphone  Injectable 0.5 milliGRAM(s) IV Push every 4 hours PRN Severe Pain (7 - 10)    Respiratory Medications  albuterol/ipratropium for Nebulization 3 milliLiter(s) Nebulizer every 6 hours    Cardiovascular Medications    Gastrointestinal Medications  lactated ringers. 1000 milliLiter(s) IV Continuous <Continuous>  pantoprazole  Injectable 40 milliGRAM(s) IV Push daily    Genitourinary Medications    Hematologic/Oncologic Medications  heparin   Injectable 5000 Unit(s) SubCutaneous every 8 hours    Antimicrobial/Immunologic Medications  piperacillin/tazobactam IVPB.. 3.375 Gram(s) IV Intermittent every 12 hours    Endocrine/Metabolic Medications  dextrose 50% Injectable 25 Gram(s) IV Push once  dextrose 50% Injectable 12.5 Gram(s) IV Push once  dextrose 50% Injectable 25 Gram(s) IV Push once  insulin lispro (ADMELOG) corrective regimen sliding scale   SubCutaneous every 6 hours  insulin NPH human recombinant 10 Unit(s) SubCutaneous every 12 hours    Topical/Other Medications  chlorhexidine 2% Cloths 1 Application(s) Topical daily  mupirocin 2% Ointment 1 Application(s) Both Nostrils two times a day    --------------------------------------------------------------------------------------    VITAL SIGNS, INS/OUTS (last 24 hours):  --------------------------------------------------------------------------------------  T(C): 36.9 (07-29-24 @ 00:00), Max: 38.2 (07-28-24 @ 04:00)  HR: 92 (07-29-24 @ 00:00) (82 - 98)  BP: 155/78 (07-29-24 @ 00:00) (127/62 - 179/84)  RR: 29 (07-29-24 @ 00:00) (15 - 32)  SpO2: 97% (07-29-24 @ 00:00) (96% - 100%)      07-27-24 @ 07:01  -  07-28-24 @ 07:00  --------------------------------------------------------  IN: 2435 mL / OUT: 1700 mL / NET: 735 mL    07-28-24 @ 07:01  -  07-29-24 @ 00:36  --------------------------------------------------------  IN: 2100 mL / OUT: 890 mL / NET: 1210 mL      --------------------------------------------------------------------------------------    EXAM  NEUROLOGY  Exam: Normal, in no acute distress.  Alert and oriented x1, dementia.  No focal neurologic deficits    HEENT  Exam: Normocephalic, atraumatic, EOMI      RESPIRATORY  Exam: Expiratory wheezing, nonlabored respirations, on RA    CARDIOVASCULAR  Exam: Regular rate and rhythm, normotensive    GI/NUTRITION  Exam: Abdomen soft, Non-tender, very distended  Current Diet: CLD    VASCULAR  Exam: Extremities warm, pink, well-perfused     MUSCULOSKELETAL  Exam: All extremities moving spontaneously without limitations     SKIN  Exam: Good skin turgor, no skin breakdown    Tubes/Lines/Drains  [x] Peripheral IV  [] Central Venous Line     	[] R	[] L	[] IJ	[] Fem	[] SC	Date Placed:   [] Arterial Line		[] R	[] L	[] Fem	[] Rad	[] Ax	Date Placed:   [] PICC		[] Midline		[] Mediport  [x] Urinary Catheter		Date Placed:   [x] Necessity of urinary, arterial, and venous catheters discussed    --------------------------------------------------------------------------------------    IMAGING STUDIES:      SICU Daily Progress Note  =====================================================  POD NA      	SICU Day 3    Interval/Overnight Events:   Patient with worsening cough productive of white sputum, denies fever and chills   - CT Chest w/ BL pleural effusions and tracheomalacia  - Bedside S&S w armando marvin for CAP         MEDICATIONS:   --------------------------------------------------------------------------------------  Neurologic Medications  HYDROmorphone  Injectable 0.2 milliGRAM(s) IV Push every 4 hours PRN Moderate Pain (4 - 6)  HYDROmorphone  Injectable 0.5 milliGRAM(s) IV Push every 4 hours PRN Severe Pain (7 - 10)    Respiratory Medications  albuterol/ipratropium for Nebulization 3 milliLiter(s) Nebulizer every 6 hours    Cardiovascular Medications    Gastrointestinal Medications  lactated ringers. 1000 milliLiter(s) IV Continuous <Continuous>  pantoprazole  Injectable 40 milliGRAM(s) IV Push daily    Genitourinary Medications    Hematologic/Oncologic Medications  heparin   Injectable 5000 Unit(s) SubCutaneous every 8 hours    Antimicrobial/Immunologic Medications  piperacillin/tazobactam IVPB.. 3.375 Gram(s) IV Intermittent every 12 hours    Endocrine/Metabolic Medications  dextrose 50% Injectable 25 Gram(s) IV Push once  dextrose 50% Injectable 12.5 Gram(s) IV Push once  dextrose 50% Injectable 25 Gram(s) IV Push once  insulin lispro (ADMELOG) corrective regimen sliding scale   SubCutaneous every 6 hours  insulin NPH human recombinant 10 Unit(s) SubCutaneous every 12 hours    Topical/Other Medications  chlorhexidine 2% Cloths 1 Application(s) Topical daily  mupirocin 2% Ointment 1 Application(s) Both Nostrils two times a day    --------------------------------------------------------------------------------------    VITAL SIGNS, INS/OUTS (last 24 hours):  --------------------------------------------------------------------------------------  T(C): 36.9 (07-29-24 @ 00:00), Max: 38.2 (07-28-24 @ 04:00)  HR: 92 (07-29-24 @ 00:00) (82 - 98)  BP: 155/78 (07-29-24 @ 00:00) (127/62 - 179/84)  RR: 29 (07-29-24 @ 00:00) (15 - 32)  SpO2: 97% (07-29-24 @ 00:00) (96% - 100%)      07-27-24 @ 07:01  -  07-28-24 @ 07:00  --------------------------------------------------------  IN: 2435 mL / OUT: 1700 mL / NET: 735 mL    07-28-24 @ 07:01  -  07-29-24 @ 00:36  --------------------------------------------------------  IN: 2100 mL / OUT: 890 mL / NET: 1210 mL      --------------------------------------------------------------------------------------    EXAM  NEUROLOGY  Exam: Normal, in no acute distress.  Alert and oriented x1, dementia.  No focal neurologic deficits    HEENT  Exam: Normocephalic, atraumatic, EOMI      RESPIRATORY  Exam: Expiratory wheezing, nonlabored respirations, on RA    CARDIOVASCULAR  Exam: Regular rate and rhythm, normotensive    GI/NUTRITION  Exam: Abdomen soft, Non-tender, very distended  Current Diet: CLD    VASCULAR  Exam: Extremities warm, pink, well-perfused     MUSCULOSKELETAL  Exam: All extremities moving spontaneously without limitations     SKIN  Exam: Good skin turgor, no skin breakdown    Tubes/Lines/Drains  [x] Peripheral IV  [] Central Venous Line     	[] R	[] L	[] IJ	[] Fem	[] SC	Date Placed:   [] Arterial Line		[] R	[] L	[] Fem	[] Rad	[] Ax	Date Placed:   [] PICC		[] Midline		[] Mediport  [x] Urinary Catheter		Date Placed:   [x] Necessity of urinary, arterial, and venous catheters discussed    --------------------------------------------------------------------------------------    IMAGING STUDIES:   < from: CT Chest No Cont (07.28.24 @ 15:47) >    FINDINGS:  Lines and Tubes: None    Mediastinum: The thyroid and thoracic inlet are normal. There is no   enlarged axiliary, mediastinal, or hilar lymph nodes. The heart size is   within normal limits. There is small pericardial effusion. The aorta is   normal in course and caliber, with scattered calcified atheromas. There   are atherosclerotic calcification of coronary vessels and aortic valve   annulus. The esophagus is unremarkable.    Lung: There is bowing of posterior membranous portion of the trachea   anteriorly and mild narrowing of bilateral main stem bronchi suggestive   of tracheomalacia. Evaluation of pulmonary parenchyma is limited due to   respiratory motion artifact. Within this limitation, there is no focal   consolidation. There is no suspicious pulmonary nodule/mass. Bibasilar   atelectasis is noted.    Pleura: There are small bibasilar pleural effusions.    Abdomen: Biliary sludge is noted. There is a small abdominal ascites and   stranding of mesenteric fat.. Limited evaluation of liver, spleen, and   bilateral adrenal glands is unremarkable.    Bone and Soft Tissue: There is no evidence of osteosclerotic or   osteolytic lesions. There are multilevel degenerative changes of thoracic   spine , with disk space narrowing and osteophytosis. The soft tissue is   grossly normal.    IMPRESSION:  Small bibasilar pleural effusions with atelectasis of posterior aspect of   lower lobes  Tracheomalacia.

## 2024-07-29 NOTE — PROGRESS NOTE ADULT - ATTENDING COMMENTS
gallstone pancreatitis  VPS status  oxygen requirement  suggest HIDA scan to evaluated for acute cholecystitis  if HIDA scan positive, may need perc drainage; will need neurosurgery input given  shunt status and risk of contamination of intra-abd cavity  additionally, a lap avelina is anticipated to be challenging due to shunt adhesions  continue IV antibiotics  appreciate SICU care

## 2024-07-29 NOTE — PROGRESS NOTE ADULT - ASSESSMENT
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 SICU consulted for hemodynamic monitoring. BISAP 4.    PLAN:   NEUROLOGIC   - Pain control: IV tylenol, dilaudid PRN    RESPIRATORY   - Monitor SpO2 goal >92%  - Incentive spirometry   - Duonebs  - POCUS 7/28 w/o B lines  - CT Chest w/ BL pleural effusions and tracheomalacia, no PNA    CARDIOVASCULAR   - Monitor hemodynamics   - Holding home BP meds    GASTROINTESTINAL   - Diet: CLD  - Consider speech & swallow consult this AM    /RENAL   - IV fluids: LR @ 100  - Toscano, monitor urine output  - Monitor electrolytes, replete PRN    HEMATOLOGIC  - Monitor H/H   - DVT ppx: SQH    INFECTIOUS DISEASE  - Monitor fever / WBC  - IV zosyn    ENDOCRINE  - MISS + 10 NPH q12  - takes 16u premeal, 30-36u lantus at home per family    LINES  - PIV   - Toscano    DISPO: SICU     78y male PMH of HTN, HLD, dementia, DM on insulin, 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 SICU consulted for hemodynamic monitoring. BISAP 4.    PLAN:   NEUROLOGIC   - Pain control: IV tylenol, dilaudid PRN    RESPIRATORY   - Monitor SpO2 goal >92%  - Incentive spirometry   - Duonebs  - POCUS 7/28 w/o B lines  - CT Chest w/ BL pleural effusions and tracheomalacia, no PNA  - 7/28 repeat POCUS    CARDIOVASCULAR   - Monitor hemodynamics   - Holding home BP meds    GASTROINTESTINAL   - Diet: CLD  - Bedside speech & swallow consult this AM    /RENAL   - IV fluids: LR @ 100  - Toscano, monitor urine output  - Monitor electrolytes, replete PRN    HEMATOLOGIC  - Monitor H/H   - DVT ppx: SQH    INFECTIOUS DISEASE  - Monitor fever / WBC  - IV zosyn  - 7/29 start azithro     ENDOCRINE  - MISS + 10 NPH q12  - takes 16u premeal, 30-36u lantus at home per family    LINES  - PIV   - Toscano    DISPO: SICU

## 2024-07-29 NOTE — PROGRESS NOTE ADULT - ASSESSMENT
Patient is a 79 Y/O Male with a PMHx of HTN, HLD, dementia, T2DM on insulin, hx CVA vx TIA, hydrocephalus s/p  shunt (2023) who presented to the ED with AMS, fevers and abdominal pain.  Here for acute pancreatitis. being managed in ICU forclose monitoring     # SOB/ Hypoxia   acute onset   resolved   afebrile   improved breathing,   CHeck CT chest, R/O pneumonia , negative fo rnay pneumonia  patient is optimized for OR by surg     # Acute pancreatitis   Per surg team   IV hydration  IV zosyn   Monitor leukocytosis   diet per surg   follow up Pan Cx    # DM  sliding scale  A1C 7.4     # HTN/HLD   BP control   adjust as tolerated     DVT and GI PPX

## 2024-07-29 NOTE — PROGRESS NOTE ADULT - SUBJECTIVE AND OBJECTIVE BOX
Name of Patient : SOTO WHITNEY  MRN: [id number]  Date of visit: 07-29-24 @ 14:39      Subjective: Patient seen and examined. No new events except as noted.   doing okay   no fever     REVIEW OF SYSTEMS:    CONSTITUTIONAL: No weakness, fevers or chills  EYES/ENT: No visual changes;  No vertigo or throat pain   NECK: No pain or stiffness  RESPIRATORY: No cough, wheezing, hemoptysis; No shortness of breath  CARDIOVASCULAR: No chest pain or palpitations  GASTROINTESTINAL: No abdominal or epigastric pain. No nausea, vomiting, or hematemesis; No diarrhea or constipation. No melena or hematochezia.  GENITOURINARY: No dysuria, frequency or hematuria  NEUROLOGICAL: No numbness or weakness  SKIN: No itching, burning, rashes, or lesions   All other review of systems is negative unless indicated above.    MEDICATIONS:  MEDICATIONS  (STANDING):  albuterol/ipratropium for Nebulization 3 milliLiter(s) Nebulizer every 6 hours  azithromycin   Tablet 500 milliGRAM(s) Oral every 24 hours  chlorhexidine 2% Cloths 1 Application(s) Topical daily  dextrose 50% Injectable 25 Gram(s) IV Push once  dextrose 50% Injectable 12.5 Gram(s) IV Push once  dextrose 50% Injectable 25 Gram(s) IV Push once  heparin   Injectable 5000 Unit(s) SubCutaneous every 8 hours  insulin lispro (ADMELOG) corrective regimen sliding scale   SubCutaneous every 6 hours  insulin NPH human recombinant 10 Unit(s) SubCutaneous every 12 hours  mupirocin 2% Ointment 1 Application(s) Both Nostrils two times a day  pantoprazole  Injectable 40 milliGRAM(s) IV Push daily  piperacillin/tazobactam IVPB.. 3.375 Gram(s) IV Intermittent every 8 hours      PHYSICAL EXAM:  T(C): 36.8 (07-29-24 @ 12:00), Max: 37.5 (07-28-24 @ 16:00)  HR: 94 (07-29-24 @ 14:00) (78 - 111)  BP: 151/86 (07-29-24 @ 14:00) (130/73 - 179/84)  RR: 26 (07-29-24 @ 14:00) (21 - 33)  SpO2: 94% (07-29-24 @ 14:00) (94% - 100%)  Wt(kg): --  I&O's Summary    28 Jul 2024 07:01  -  29 Jul 2024 07:00  --------------------------------------------------------  IN: 2700 mL / OUT: 1270 mL / NET: 1430 mL    29 Jul 2024 07:01  -  29 Jul 2024 14:39  --------------------------------------------------------  IN: 200 mL / OUT: 1365 mL / NET: -1165 mL          Appearance: Normal	  HEENT:  PERRLA   Lymphatic: No lymphadenopathy   Cardiovascular: Normal S1 S2, no JVD  Respiratory: normal effort , clear  Gastrointestinal:  Soft, pain on palpation   Skin: No rashes,  warm to touch  Psychiatry:  Mood & affect appropriate  Musculuskeletal: No edema    recent labs, Imaging and EKGs personally reviewed     07-28-24 @ 07:01  -  07-29-24 @ 07:00  --------------------------------------------------------  IN: 2700 mL / OUT: 1270 mL / NET: 1430 mL    07-29-24 @ 07:01 - 07-29-24 @ 14:39  --------------------------------------------------------  IN: 200 mL / OUT: 1365 mL / NET: -1165 mL                          13.9   15.73 )-----------( 151      ( 29 Jul 2024 01:45 )             41.5               07-29    141  |  107  |  17  ----------------------------<  187<H>  4.3   |  24  |  0.77    Ca    7.2<L>      29 Jul 2024 01:45  Phos  1.9     07-29  Mg     2.10     07-29    TPro  6.5  /  Alb  3.2<L>  /  TBili  1.8<H>  /  DBili  x   /  AST  49<H>  /  ALT  109<H>  /  AlkPhos  84  07-28    PT/INR - ( 29 Jul 2024 01:45 )   PT: 12.9 sec;   INR: 1.15 ratio         PTT - ( 29 Jul 2024 01:45 )  PTT:49.0 sec                   Urinalysis Basic - ( 29 Jul 2024 01:45 )    Color: x / Appearance: x / SG: x / pH: x  Gluc: 187 mg/dL / Ketone: x  / Bili: x / Urobili: x   Blood: x / Protein: x / Nitrite: x   Leuk Esterase: x / RBC: x / WBC x   Sq Epi: x / Non Sq Epi: x / Bacteria: x

## 2024-07-29 NOTE — PROGRESS NOTE ADULT - SUBJECTIVE AND OBJECTIVE BOX
Surgery Progress Note     Interval/Subjective:  Patient seen and examined. Reports pain is improving.   __________________________________________________________________  Vitals:  T(C): 36 (08:00), Max: 37.5 (12:00)  HR: 88 (08:00) (78 - 95)  BP: 161/93 (08:00) (137/84 - 179/84)  RR: 31 (08:00) (21 - 31)  SpO2: 96% (08:00) (96% - 100%)    I & O's:  IN:    IV PiggyBack: 400 mL    Lactated Ringers: 2300 mL  Total IN: 2700 mL    OUT:    Indwelling Catheter - Urethral (mL): 1270 mL  Total OUT: 1270 mL    Physical Exam:  General: NAD, resting comfortably in bed  HEENT: Normocephalic atraumatic  Respiratory: Nonlabored respirations  Cardio: regular rate, normotensive  Abdomen: soft, nondistended, TTP in epigastric region    Labs:  CBC: 24 @ 01:45          13.9   15.73 >----< 151          41.5    Chemistry: 24 @ 01:45  141|107|17    ------------< 187  4.3|24|0.77    Ca: 7.2 24 @ 01:45  M.10 24 @ 01:45  Phos: 1.9 24 @ 01:45    LFT's: 24 @ 01:45  AST: --  ALT: --  ALP: --  T.Bili: --  D.Bili: --  CBC: 24 @ 00:47          15.5   15.72 >----< 156          48.0    Chemistry: 24 @ 00:47  141|106|18    ------------< 239  3.7|21|0.88    Ca: 7.5 24 @ 00:47  M.10 24 @ 00:47  Phos: 1.4 24 @ 00:47    LFT's: 24 @ 00:47  AST: 49  ALT: 109  ALP: 84  Ginger: 1.8  Betty: --    __________________________________________________________________

## 2024-07-29 NOTE — DIETITIAN INITIAL EVALUATION ADULT - ENTER TO (G/KG)
[address] ENCOUNTER          ATTENDING PHYSICIAN NOTE       Date of evaluation: 3/28/2022    Chief Complaint     Hyperglycemia, Headache, and Tremors  Shakiness, headache    History of Present Illness     Filomena Cr is a 66 y.o. female who presents with chief complaint of shakiness, headache. Daughters provide some history but patient provides additional history. Daughter states that patient lives at home and is independent, cooks and cleans the house and generally takes care of herself and knows her medications. Over the last few days they have noticed she has been complaining of a headache and has been \"shaking all over \"and this is very unusual for her so they brought her here. Patient denies any pain anywhere other than pain in her head from her right eye all the way to the back of the right side of her head. States she woke up with this pain on Thursday and has had the pain throughout the entire weekend and pain has been somewhat severe. Pain has been constant with no specific alleviating or exacerbating factors. No vision changes. No associated numbness or tingling or weakness in arms or legs. No urinary symptoms. She does also note that she has been shaking all over which started yesterday, she states she is having \"spasms \"and she cannot stop them and she does not know why she is having them. On chart review, patient does have a known history of severe uncontrolled diabetes, blood sugars often noted to be Braxton Silvia \"and patient has had issues with being compliant with insulin before due to her refrigerator going out. She states she is taking her insulin, but her blood glucose is also greater than 600 on arrival here. Review of Systems     Review of Systems   Constitutional: Negative for activity change, appetite change, chills and fever. HENT: Negative for congestion, ear pain, facial swelling, nosebleeds, rhinorrhea and sore throat.     Eyes: Negative for photophobia, pain, discharge, itching and visual disturbance. Respiratory: Negative for cough, chest tightness, shortness of breath and wheezing. Cardiovascular: Negative for chest pain, palpitations and leg swelling. Gastrointestinal: Negative for abdominal pain, diarrhea, nausea and vomiting. Endocrine: Negative for cold intolerance, heat intolerance, polydipsia and polyuria. Genitourinary: Negative for dysuria, flank pain, hematuria, pelvic pain, vaginal bleeding and vaginal discharge. Musculoskeletal: Negative for arthralgias, back pain, joint swelling, myalgias and neck pain. Skin: Negative for rash and wound. Allergic/Immunologic: Negative for environmental allergies. Neurological: Positive for tremors and headaches. Negative for dizziness, seizures, syncope, weakness and light-headedness. Hematological: Negative for adenopathy. Does not bruise/bleed easily. Psychiatric/Behavioral: Negative for confusion and hallucinations. Past Medical, Surgical, Family, and Social History     She has a past medical history of Atrial fibrillation ([facility] [address] CKD (chronic kidney disease) stage 3, GFR 30-59 ml/min ([facility] [address] Diabetes mellitus type II, Diastolic CHF, chronic ([facility] [address] HTN (hypertension), Hyperlipidemia, and Tobacco abuse. She has no past surgical history on file. Her family history is not on file. She reports that she quit smoking about 6 years ago. Her smoking use included cigarettes. She has a 6.60 pack-year smoking history. Her smokeless tobacco use includes snuff and chew. She reports previous alcohol use. She reports that she does not use drugs.     Medications     Previous Medications    AMLODIPINE (NORVASC) 10 MG TABLET    TAKE 1 TABLET BY MOUTH ONE TIME A DAY    ASPIRIN (ASPIRIN CHILDRENS) 81 MG CHEWABLE TABLET    Take 1 tablet by mouth daily    ATORVASTATIN (LIPITOR) 40 MG TABLET    Take 1 tablet by mouth daily    BLOOD GLUCOSE MONITOR KIT AND SUPPLIES    Dispense sufficient amount for indicated testing frequency plus additional to accommodate PRN testing needs. Dispense all needed supplies to include: monitor, strips, lancing device, lancets, control solutions, alcohol swabs. CALCIUM-VITAMIN D 600-200 MG-UNIT TABS    Take 1 tablet by mouth 2 times daily    CARVEDILOL (COREG) 25 MG TABLET    take 1 tablet by mouth 2 times a day with meals    DAPAGLIFLOZIN (FARXIGA) 10 MG TABLET    Take 1 tablet by mouth every morning    DULAGLUTIDE (TRULICITY) 1.5 QU/0.9GH SOPN    Inject 1.5 mg into the skin once a week    EZETIMIBE (ZETIA) 10 MG TABLET    TAKE 1 TABLET BY MOUTH ONE TIME A DAY    FUROSEMIDE (LASIX) 40 MG TABLET    TAKE 1 TABLET BY MOUTH ONE TIME A DAY    GLIMEPIRIDE (AMARYL) 4 MG TABLET    Take 1 tablet by mouth every morning (before breakfast)    HANDICAP PLACARD MISC    by Does not apply route Start date: 10/27/21  End date 10/26/26    INSULIN GLARGINE (BASAGLAR KWIKPEN) 100 UNIT/ML INJECTION PEN    Inject 20 Units into the skin nightly    LISINOPRIL (PRINIVIL;ZESTRIL) 40 MG TABLET    TAKE ONE TABLET BY MOUTH ONE TIME DAILY    VITAMIN D3 (CHOLECALCIFEROL) 25 MCG (1000 UT) TABS TABLET    Take 1 tablet by mouth daily       Allergies     She has No Known Allergies. Physical Exam     INITIAL VITALS: BP: (!) 168/80, Temp: 97.9 °F (36.6 °C), Pulse: 93, Resp: 26, SpO2: 100 %   Physical Exam  Constitutional:       General: She is not in acute distress. Appearance: She is well-developed. She is not diaphoretic. HENT:      Head: Normocephalic and atraumatic. Mouth/Throat:      Pharynx: No oropharyngeal exudate. Eyes:      General:         Right eye: No discharge. Left eye: No discharge. Conjunctiva/sclera: Conjunctivae normal.      Pupils: Pupils are equal, round, and reactive to light. 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   Left eye normal.   Neck: Thyroid: No thyromegaly. Vascular: No JVD. Trachea: No tracheal deviation. Cardiovascular:      Rate and Rhythm: Normal rate and regular rhythm. Heart sounds: Normal heart sounds. No murmur heard. No friction rub. No gallop. Pulmonary:      Effort: Pulmonary effort is normal. No respiratory distress. Breath sounds: Normal breath sounds. No stridor. No wheezing or rales. Chest:      Chest wall: No tenderness. Abdominal:      General: Bowel sounds are normal. There is no distension. Palpations: Abdomen is soft. Tenderness: There is no abdominal tenderness. There is no guarding or rebound. Musculoskeletal:         General: No tenderness or deformity. Normal range of motion. Cervical back: Normal range of motion and neck supple. Lymphadenopathy:      Cervical: No cervical adenopathy. Skin:     General: Skin is warm and dry. Capillary Refill: Capillary refill takes less than 2 seconds. Findings: No erythema or rash. Neurological:      General: No focal deficit present. Mental Status: She is alert and oriented to person, place, and time. Cranial Nerves: No cranial nerve deficit. Coordination: Coordination normal.      Comments: Intermittent tremors particularly noted in right upper extremity as well as involving musculature of the abdomen, does not seem to have any mouth motor-isms during this time, no loss of consciousness. These episodes of shaking/tremors lasts for about 30 seconds and then go away on their own. Nonfocal neuro exam when patient is not shaking, able to lift arms and legs above the head, nonfocal cranial nerve exam.   Psychiatric:         Behavior: Behavior normal.         Diagnostic Results     EKG   Indication: Shaking. Heart rate 93, intervals normal, left axis deviation. No signs of ST elevation or depression, no T wave inversions. Comparison to prior EKG with no changes.   Impression: Sinus rhythm with no active ischemia. RADIOLOGY:  CT HEAD WO CONTRAST   Final Result      No acute intracranial abnormality. No significant change from prior study. XR CHEST (2 VW)   Final Result   1. No evidence of acute cardiopulmonary disease.            LABS:   Results for orders placed or performed during the hospital encounter of 03/28/22   CBC with Auto Differential   Result Value Ref Range    WBC 5.5 4.0 - 11.0 K/uL    RBC 3.83 (L) 4.00 - 5.20 M/uL    Hemoglobin 10.5 (L) 12.0 - 16.0 g/dL    Hematocrit 32.0 (L) 36.0 - 48.0 %    MCV 83.5 80.0 - 100.0 fL    MCH 27.3 26.0 - 34.0 pg    MCHC 32.7 31.0 - 36.0 g/dL    RDW 13.4 12.4 - 15.4 %    Platelets 522 104 - 561 K/uL    MPV 10.4 5.0 - 10.5 fL    Neutrophils % 77.3 %    Lymphocytes % 16.9 %    Monocytes % 5.1 %    Eosinophils % 0.4 %    Basophils % 0.3 %    Neutrophils Absolute 4.3 1.7 - 7.7 K/uL    Lymphocytes Absolute 0.9 (L) 1.0 - 5.1 K/uL    Monocytes Absolute 0.3 0.0 - 1.3 K/uL    Eosinophils Absolute 0.0 0.0 - 0.6 K/uL    Basophils Absolute 0.0 0.0 - 0.2 K/uL   Basic Metabolic Panel w/ Reflex to MG   Result Value Ref Range    Sodium 122 (L) 136 - 145 mmol/L    Potassium reflex Magnesium 4.6 3.5 - 5.1 mmol/L    Chloride 88 (L) 99 - 110 mmol/L    CO2 21 21 - 32 mmol/L    Anion Gap 13 3 - 16    Glucose 717 (HH) 70 - 99 mg/dL    BUN 37 (H) 7 - 20 mg/dL    CREATININE 1.9 (H) 0.6 - 1.2 mg/dL    GFR Non-African American 26 (A) >60    GFR  31 (A) >60    Calcium 9.5 8.3 - 10.6 mg/dL   Hepatic Function Panel   Result Value Ref Range    Total Protein 7.7 6.4 - 8.2 g/dL    Albumin 3.8 3.4 - 5.0 g/dL    Alkaline Phosphatase 125 40 - 129 U/L    ALT <5 (L) 10 - 40 U/L    AST 7 (L) 15 - 37 U/L    Total Bilirubin 0.3 0.0 - 1.0 mg/dL    Bilirubin, Direct <0.2 0.0 - 0.3 mg/dL    Bilirubin, Indirect see below 0.0 - 1.0 mg/dL   Lipase   Result Value Ref Range    Lipase 210.0 (H) 13.0 - 60.0 U/L   Troponin   Result Value Ref Range    Troponin <0.01 <0.01 ng/mL   Brain Natriuretic Peptide   Result Value Ref Range    Pro-BNP 2,682 (H) 0 - 449 pg/mL   Blood Gas, Venous   Result Value Ref Range    pH, John 7.373 7.350 - 7.450    pCO2, John 39.9 (L) 41.0 - 51.0 mmHg    pO2, John <30.0 25.0 - 40.0 mmHg    HCO3, Venous 23.2 (L) 24.0 - 28.0 mmol/L    Base Excess, John -1.9 -2.0 - 3.0 mmol/L    O2 Sat, John 59 Not established %    Carboxyhemoglobin 1.4 0.0 - 1.5 %    MetHgb, John 0.3 0.0 - 1.5 %    TC02 (Calc), John 24 mmol/L    Hemoglobin, John, Reduced 40.80 %   Lactate, Sepsis   Result Value Ref Range    Lactic Acid, Sepsis 1.5 0.4 - 1.9 mmol/L   Urinalysis with Microscopic   Result Value Ref Range    Urine Type NotGiven    Ammonia   Result Value Ref Range    Ammonia 15 11 - 51 umol/L   POCT Glucose   Result Value Ref Range    POC Glucose >600 (AA) 70 - 99 mg/dl    Performed on ACCU-CHEK    EKG 12 Lead   Result Value Ref Range    Ventricular Rate 93 BPM    Atrial Rate 93 BPM    P-R Interval 184 ms    QRS Duration 100 ms    Q-T Interval 388 ms    QTc Calculation (Bazett) 482 ms    P Axis 65 degrees    R Axis -32 degrees    T Axis 14 degrees    Diagnosis       EKG performed in ER and to be interpreted by ER physician. Confirmed by MD, ER ([id number]),  Nicole Mary ([phone]) on 3/28/2022 1:00:01 PM       ED BEDSIDE ULTRASOUND:  none    RECENT VITALS:  BP: (!) 173/89,Temp: 97.9 °F (36.6 °C), Pulse: 102, Resp: 19, SpO2: 100 %     Procedures     none    ED Course     Nursing Notes, Past Medical Hx, Past Surgical Hx, Social Hx,Allergies, and Family Hx were reviewed.     patient was given the following medications:  Orders Placed This Encounter   Medications    0.9 % sodium chloride bolus    LORazepam (ATIVAN) injection 0.5 mg    DISCONTD: insulin regular (HUMULIN R;NOVOLIN R) injection 8 Units    insulin regular (HUMULIN R;NOVOLIN R) injection 10 Units       CONSULTS:  IP CONSULT TO HOSPITALIST    MEDICAL DECISIONMAKING / ASSESSMENT / Fili Flores is a 66 y.o. female who presents with a chief complaint 1.4 of shakiness, headache. Initial exam reveals a well-appearing female in no acute distress with normal vitals, afebrile. Physical exam reveals the patient is intermittently shaking and having what do appear to be full body spasms, but in between these episodes, she has a very normal neurologic exam and normal mental status. Unclear if her headache is related to the spasms. I did a broad work-up especially given her fingerstick blood sugar being greater than 600. Labs revealed hyponatremia to 122 with elevated blood sugar of 717, sodium corrects to between 130 and 135. Patient was given fluids for hyperglycemia, no indication for potassium at this time with potassium being 4.6. Creatinine at baseline of 1.9. Labs otherwise unremarkable other than mildly elevated lipase, but patient with no abdominal complaints, did not feel like this was important to pursue. As part of looking for a cause of hyperglycemia, I did order a UA, chest x-ray. Chest x-ray unremarkable, UA pending. Head CT ordered for patient's altered mental status, headache, shaking, head CT unremarkable. .  Patient given 8 units of regular insulin for hyperglycemia, admitted to PCU under medicine team for further management. Was given Ativan for body shaking, after electrolytes are corrected if patient continues to have shaking, she may require neurology involvement for work-up of tremors/shaking. No concern for seizures or partial seizures at this time, but could be a consideration if these spells worsen. Stable on reassessment. CRITICAL CARE TIME    Upon my evaluation, this patient had a critical illness or injury that acutely impaired one or more vital organ systems such that there was a high probability of imminent or life threatening deterioration without intervention.  In this patient that illness with a high probability of imminent or life threatening deterioration was hyperglycemia requiring insulin correction, which required my direct attention, intervention, and personal management. I have personally provided 35 minutes of critical care time exclusive of separately billed procedures and treating other patients. Critical care was necessary to treat or prevent imminent or life threatening deterioration of the following condition(s): Hyperglycemia requiring insulin correction. Critical care time was spent personally by me on the following activities: This critical care time included obtaining a history; examining the patient; pulse oximetry; ordering and review of studies including CXR, blood gases; arranging urgent treatment with development of a management plan; evaluation of patient's response to treatment; frequent reassessment; and, discussions with other providers including consultants. Clinical Impression     1. Hyperglycemia    2. Coarse tremors    3. Hyponatremia        Disposition     PATIENT REFERRED TO:  No follow-up provider specified.     DISCHARGE MEDICATIONS:  New Prescriptions    No medications on file       DISPOSITION Decision To Admit 03/28/2022 12:36:59 PM       Herrera Garcia MD  03/28/22 [id number]

## 2024-07-29 NOTE — PROGRESS NOTE ADULT - ATTENDING COMMENTS
I agree with the detailed interval history, physical, and plan, which I have reviewed and edited where appropriate'; also agree with notes/assessment with my team on service.  I have personally examined the patient.  I was physically present for the key portions of the evaluation and management (E/M) service provided.  I reviewed all the pertinent data.  The patient is a critical care patient with life threatening hemodynamic and metabolic instability in SICU.  The SICU team has a constant risk benefit analyzes discussion and coordinating care with the primary team and all consultants.   The patient is in SICU with the chief complaint and diagnosis mentioned in the note.   The plan will be specified in the note.  78y male with dementia, s/p  shunt 2023 presented with acute pancreatitis in SICU for hemodynamic monitoring.   EXAM  NEUROLOGY  Exam:  No focal neurologic deficits  RESPIRATORY  Exam: Expiratory wheezing  CARDIOVASCULAR  Exam: RR  GI/NUTRITION  Exam: Abdomen soft  VASCULAR  Exam: Extremities warm    PLAN:   NEUROLOGIC   - tylenol  -dilaudid   RESPIRATORY   - Duonebs  - POCUS   CARDIOVASCULAR   - MAP>65  GASTROINTESTINAL   - Diet: CLD  /RENAL   - IV fluids: LR @ 100  HEMATOLOGIC  - SQH  INFECTIOUS DISEASE  - IV zosyn  ENDOCRINE  - MISS + 10 NPH q12    DISPO: SICU I agree with the detailed interval history, physical, and plan, which I have reviewed and edited where appropriate'; also agree with notes/assessment with my team on service.  I have personally examined the patient.  I was physically present for the key portions of the evaluation and management (E/M) service provided.  I reviewed all the pertinent data.  The patient is a critical care patient with life threatening hemodynamic and metabolic instability in SICU.  The SICU team has a constant risk benefit analyzes discussion and coordinating care with the primary team and all consultants.   The patient is in SICU with the chief complaint and diagnosis mentioned in the note.   The plan will be specified in the note.  78y male with dementia, s/p  shunt 2023 presented with acute pancreatitis in SICU for hemodynamic monitoring.   EXAM  NEUROLOGY  Exam:  No focal neurologic deficits  RESPIRATORY  Exam: Expiratory wheezing  CARDIOVASCULAR  Exam: RR  GI/NUTRITION  Exam: Abdomen soft  VASCULAR  Exam: Extremities warm    PLAN:   NEUROLOGIC   - tylenol  -dilaudid   RESPIRATORY   - Duonebs  - POCUS   CARDIOVASCULAR   - MAP>65  GASTROINTESTINAL   - Diet: CLD  /RENAL   - IV fluids: LR @ 100  HEMATOLOGIC  - SQH  INFECTIOUS DISEASE  - IV zosyn  ENDOCRINE  - MISS + 10 NPH q12    DISPO: SICU.

## 2024-07-29 NOTE — DIETITIAN INITIAL EVALUATION ADULT - PERTINENT MEDS FT
MEDICATIONS  (STANDING):  albuterol/ipratropium for Nebulization 3 milliLiter(s) Nebulizer every 6 hours  azithromycin   Tablet 500 milliGRAM(s) Oral every 24 hours  chlorhexidine 2% Cloths 1 Application(s) Topical daily  dextrose 50% Injectable 25 Gram(s) IV Push once  dextrose 50% Injectable 12.5 Gram(s) IV Push once  dextrose 50% Injectable 25 Gram(s) IV Push once  heparin   Injectable 5000 Unit(s) SubCutaneous every 8 hours  insulin lispro (ADMELOG) corrective regimen sliding scale   SubCutaneous every 6 hours  insulin NPH human recombinant 10 Unit(s) SubCutaneous every 12 hours  mupirocin 2% Ointment 1 Application(s) Both Nostrils two times a day  pantoprazole  Injectable 40 milliGRAM(s) IV Push daily  piperacillin/tazobactam IVPB.. 3.375 Gram(s) IV Intermittent every 8 hours    MEDICATIONS  (PRN):  HYDROmorphone  Injectable 0.5 milliGRAM(s) IV Push every 4 hours PRN Severe Pain (7 - 10)  HYDROmorphone  Injectable 0.2 milliGRAM(s) IV Push every 4 hours PRN Moderate Pain (4 - 6)

## 2024-07-29 NOTE — PROGRESS NOTE ADULT - ASSESSMENT
78M with a PMH of HTN, HLD, dementia, T2DM on insulin, hx CVA vx TIA, hydrocephalus s/p  shunt (2023) who presented to the ED with AMS, fevers and abdominal pain.  Here for acute pancreatitis. Now with improving pain and AHDS in the SICU. Plan for OR for lap avelina when abdominal pain resolves.     Plan  - OR planning  - Pain control PRN  - continue CLD  - Continue IVF  - DVT ppx  - Care per SICU    B team [id number]

## 2024-07-29 NOTE — DIETITIAN INITIAL EVALUATION ADULT - HEIGHT FOR BMI (CENTIMETERS)
[facility]      Pt Name: Nisa Saucedo  MRN: [id number]  [place] 2006  Date of evaluation: 9/16/2021  Provider: Amrit Cuadra MD    [address] [place] [address]       Chief Complaint   Patient presents with    Migraine     Pt ambulates into ED with complaints of cough, headache, fever yesterday, and runny nose. HISTORY OF PRESENT ILLNESS   (Location/Symptom, Timing/Onset, Context/Setting, Quality, Duration, Modifying Factors, Severity)  Note limiting factors. Nisa Saucedo is a 13 y.o. male who presents to the emergency department     This patient presents emergency department history of complaining of some coughing congestion headache fever he has been exposed to other individuals with Covid. He has not been immunized. Patient denies any other complaints. He is not immunocompromise he has no cancer not on immunotherapy is not a diabetic he is not suffering from obesity  Has been sick for several days now just getting worse. Patient really voices no other complaints he has no shortness of breath and his pulse ox is 99% on room air    The history is provided by the patient. Nursing Notes were reviewed. REVIEW OF SYSTEMS    (2-9 systems for level 4, 10 or more for level 5)     Review of Systems   Constitutional: Positive for activity change, appetite change, chills, fatigue and fever. HENT: Positive for congestion. Negative for sore throat. Eyes: Negative for visual disturbance. Respiratory: Positive for cough. Negative for chest tightness. Cardiovascular: Negative for chest pain. Allergic/Immunologic: Negative for immunocompromised state. Neurological: Positive for weakness. Negative for dizziness and light-headedness. All other systems reviewed and are negative. Except as noted above the remainder of the review of systems was reviewed and negative.        PAST MEDICAL HISTORY     Past Medical History:   Diagnosis Date    ADHD     [facility]    Anxiety     Eosinophilic esophagitis     Excessive anger     DMDD    Heart murmur          SURGICAL HISTORY       Past Surgical History:   Procedure Laterality Date    ENDOSCOPIC ULTRASOUND (LOWER)      FINGER SURGERY      broken finger    UPPER GASTROINTESTINAL ENDOSCOPY           CURRENT MEDICATIONS       Previous Medications    FLUTICASONE (FLONASE) 50 MCG/ACT NASAL SPRAY    2 sprays by Nasal route daily    HYDROCORTISONE 2.5 % CREAM    Apply topically 2 times daily    OMEPRAZOLE (PRILOSEC) 40 MG DELAYED RELEASE CAPSULE    Take 40 mg by mouth 2 times daily     POLYETHYLENE GLYCOL (GLYCOLAX) 17 G PACKET    Take 17 g by mouth daily as needed for Constipation       ALLERGIES     Amoxicillin and Penicillins    FAMILY HISTORY       Family History   Problem Relation Age of Onset    Depression Father     Thyroid Disease Father     Asthma Maternal Grandmother     Diabetes Maternal Grandfather     Thyroid Disease Paternal Grandmother           SOCIAL HISTORY       Social History     Socioeconomic History    Marital status: Single     Spouse name: None    Number of children: None    Years of education: None    Highest education level: None   Occupational History    None   Tobacco Use    Smoking status: Passive Smoke Exposure - Never Smoker    Smokeless tobacco: Never Used   Vaping Use    Vaping Use: Never used   Substance and Sexual Activity    Alcohol use: No    Drug use: No    Sexual activity: Never   Other Topics Concern    None   Social History Narrative    None     Social Determinants of Health     Financial Resource Strain:     Difficulty of Paying Living Expenses:    Food Insecurity:     Worried About Running Out of Food in the Last Year:     Ran Out of Food in the Last Year:    Transportation Needs:     Lack of Transportation (Medical):      Lack of Transportation (Non-Medical):    Physical Activity:     Days of Exercise per Week:     Minutes of Exercise per Session:    Stress:     Feeling of Stress :    Social Connections:     Frequency of Communication with Friends and Family:     Frequency of Social Gatherings with Friends and Family:     Attends Zoroastrian Services:     Active Member of Clubs or Organizations:     Attends Club or Organization Meetings:     Marital Status:    Intimate Partner Violence:     Fear of Current or Ex-Partner:     Emotionally Abused:     Physically Abused:     Sexually Abused:        SCREENINGS    Jaylene Coma Scale  Eye Opening: Spontaneous  Best Verbal Response: Oriented  Best Motor Response: Obeys commands  Jaylene Coma Scale Score: 15          PHYSICAL EXAM    (up to 7 for level 4, 8 or more for level 5)     ED Triage Vitals [09/16/21 0730]   BP Temp Temp Source Heart Rate Resp SpO2 Height Weight - Scale   109/74 99.5 °F (37.5 °C) Oral 77 18 99 % 5' 8\" (1.727 m) 155 lb (70.3 kg)       Physical Exam  Vitals and nursing note reviewed. Constitutional:       General: He is not in acute distress. Appearance: Normal appearance. He is well-developed. He is ill-appearing. He is not toxic-appearing or diaphoretic. HENT:      Head: Normocephalic. Right Ear: Ear canal and external ear normal.      Left Ear: Ear canal and external ear normal.      Nose: Congestion and rhinorrhea present. Mouth/Throat:      Mouth: Mucous membranes are moist.   Eyes:      Conjunctiva/sclera: Conjunctivae normal.      Pupils: Pupils are equal, round, and reactive to light. Neck:      Thyroid: No thyromegaly. Cardiovascular:      Rate and Rhythm: Normal rate and regular rhythm. Heart sounds: Normal heart sounds. No murmur heard. No friction rub. No gallop. Pulmonary:      Effort: Pulmonary effort is normal. No respiratory distress. Breath sounds: Normal breath sounds. Abdominal:      General: Bowel sounds are normal. There is no distension. Palpations: Abdomen is soft. Tenderness:  There is no abdominal tenderness. Musculoskeletal:      Cervical back: Normal range of motion and neck supple. Skin:     General: Skin is warm. Neurological:      Mental Status: He is alert and oriented to person, place, and time. GCS: GCS eye subscore is 4. GCS verbal subscore is 5. GCS motor subscore is 6. Cranial Nerves: No cranial nerve deficit. Sensory: No sensory deficit. Motor: No abnormal muscle tone. Coordination: Coordination normal.      Deep Tendon Reflexes: Reflexes normal.   Psychiatric:         Behavior: Behavior normal.         DIAGNOSTIC RESULTS     EKG: All EKG's are interpreted by the Emergency Department Physician who either signs or Co-signs this chart in the absence of a cardiologist.        RADIOLOGY:   Non-plain film images such as CT, Ultrasound and MRI are read by the radiologist. Plain radiographic images are visualized and preliminarily interpreted by the emergency physician with the below findings:        Interpretation per the Radiologist below, if available at the time of this note:    No orders to display           LABS:  No results found for this visit on 09/16/21.          EMERGENCY DEPARTMENT COURSE and DIFFERENTIAL DIAGNOSIS/MDM:     Vitals:    09/16/21 0730   BP: 109/74   Pulse: 77   Resp: 18   Temp: 99.5 °F (37.5 °C)   TempSrc: Oral   SpO2: 99%   Weight: 155 lb (70.3 kg)   Height: 5' 8\" (1.727 m)           MDM      REASSESSMENT          CRITICAL CARE TIME     CONSULTS:  None      PROCEDURES:     Procedures    MEDICATIONS GIVEN THIS VISIT:  Medications - No data to display     FINAL IMPRESSION      1. COVID-19            DISPOSITION/PLAN   DISPOSITION Decision To Discharge 09/16/2021 07:43:23 AM      PATIENT REFERRED TO:  Joan Ortiz MD  [id number]  [phone]      As needed, If symptoms worsen      DISCHARGE MEDICATIONS:  New Prescriptions    No medications on file       Controlled Substances Monitoring  No flowsheet data found.        (Please note that portions of this note were completed with a voice recognition program.  Efforts were made to edit the dictations but occasionally words are mis-transcribed.)    Patient was advised to return to the Emergency Department if there was any worsening.     Nuha Mustafa MD (electronically signed)  Attending Emergency Physician         Mervin Gallo MD  09/22/21 [id number] MADI Cevallos MD  09/22/21 4263 165.1

## 2024-07-29 NOTE — DIETITIAN INITIAL EVALUATION ADULT - PERTINENT LABORATORY DATA
Start crestor 40mg daily.   Stop simvastatin.    Please consider getting the shingles shots, shingrix, done at your pharmacy.   Push fluids.        
07-29    141  |  107  |  17  ----------------------------<  187<H>  4.3   |  24  |  0.77    Ca    7.2<L>      29 Jul 2024 01:45  Phos  1.9     07-29  Mg     2.10     07-29    TPro  6.5  /  Alb  3.2<L>  /  TBili  1.8<H>  /  DBili  x   /  AST  49<H>  /  ALT  109<H>  /  AlkPhos  84  07-28  POCT Blood Glucose.: 158 mg/dL (07-29-24 @ 12:06)  A1C with Estimated Average Glucose Result: 7.5 % (07-27-24 @ 00:47)  A1C with Estimated Average Glucose Result: 8.1 % (05-20-24 @ 06:28)

## 2024-07-29 NOTE — DIETITIAN INITIAL EVALUATION ADULT - OTHER INFO
79 y/o male with hx Dementia, hydrocephalus, HLD, HTN, TIA and DM admitted with acute pancreatitis. Unable to interview pt as he was very confused and disoriented upon presentation. He is being maintained NPO at present. No GI distress noted; last BM 7/27. NKFA listed. FS over the past 24 hrs 145 - 204 mg/dl with NPH and Admelog insulins ordered for coverage. HIE weight records indicate pt had recent recorded wt of 145 lbs. Current wt trend likely more reflective of edema which has been identified as 1+ generalized. Continue to monitor daily weights as available. Diet progression as per surgical team as medically feasible. Consider nutrition support intervention if unable to progress to oral diet. Please consult this service once nutrition plan of care has been established so that appropriate nutrition intervention can be provided in a timely manner. RDN services to remain available as needed.

## 2024-07-30 LAB
ALBUMIN SERPL ELPH-MCNC: 2.6 G/DL — LOW (ref 3.3–5)
ALP SERPL-CCNC: 88 U/L — SIGNIFICANT CHANGE UP (ref 40–120)
ALT FLD-CCNC: 48 U/L — HIGH (ref 4–41)
ANION GAP SERPL CALC-SCNC: 13 MMOL/L — SIGNIFICANT CHANGE UP (ref 7–14)
AST SERPL-CCNC: 28 U/L — SIGNIFICANT CHANGE UP (ref 4–40)
BILIRUB SERPL-MCNC: 0.8 MG/DL — SIGNIFICANT CHANGE UP (ref 0.2–1.2)
BLOOD GAS ARTERIAL COMPREHENSIVE RESULT: SIGNIFICANT CHANGE UP
BUN SERPL-MCNC: 19 MG/DL — SIGNIFICANT CHANGE UP (ref 7–23)
CALCIUM SERPL-MCNC: 7.1 MG/DL — LOW (ref 8.4–10.5)
CHLORIDE SERPL-SCNC: 105 MMOL/L — SIGNIFICANT CHANGE UP (ref 98–107)
CO2 SERPL-SCNC: 21 MMOL/L — LOW (ref 22–31)
CREAT SERPL-MCNC: 0.84 MG/DL — SIGNIFICANT CHANGE UP (ref 0.5–1.3)
EGFR: 89 ML/MIN/1.73M2 — SIGNIFICANT CHANGE UP
GLUCOSE BLDC GLUCOMTR-MCNC: 129 MG/DL — HIGH (ref 70–99)
GLUCOSE BLDC GLUCOMTR-MCNC: 131 MG/DL — HIGH (ref 70–99)
GLUCOSE BLDC GLUCOMTR-MCNC: 139 MG/DL — HIGH (ref 70–99)
GLUCOSE BLDC GLUCOMTR-MCNC: 139 MG/DL — HIGH (ref 70–99)
GLUCOSE BLDC GLUCOMTR-MCNC: 158 MG/DL — HIGH (ref 70–99)
GLUCOSE BLDC GLUCOMTR-MCNC: 162 MG/DL — HIGH (ref 70–99)
GLUCOSE SERPL-MCNC: 174 MG/DL — HIGH (ref 70–99)
HCT VFR BLD CALC: 40.5 % — SIGNIFICANT CHANGE UP (ref 39–50)
HGB BLD-MCNC: 13.3 G/DL — SIGNIFICANT CHANGE UP (ref 13–17)
MAGNESIUM SERPL-MCNC: 2.2 MG/DL — SIGNIFICANT CHANGE UP (ref 1.6–2.6)
MCHC RBC-ENTMCNC: 28.5 PG — SIGNIFICANT CHANGE UP (ref 27–34)
MCHC RBC-ENTMCNC: 32.8 GM/DL — SIGNIFICANT CHANGE UP (ref 32–36)
MCV RBC AUTO: 86.9 FL — SIGNIFICANT CHANGE UP (ref 80–100)
NRBC # BLD: 0 /100 WBCS — SIGNIFICANT CHANGE UP (ref 0–0)
NRBC # FLD: 0 K/UL — SIGNIFICANT CHANGE UP (ref 0–0)
PHOSPHATE SERPL-MCNC: 2.1 MG/DL — LOW (ref 2.5–4.5)
PLATELET # BLD AUTO: 143 K/UL — LOW (ref 150–400)
POTASSIUM SERPL-MCNC: 3.4 MMOL/L — LOW (ref 3.5–5.3)
POTASSIUM SERPL-SCNC: 3.4 MMOL/L — LOW (ref 3.5–5.3)
PROT SERPL-MCNC: 5.9 G/DL — LOW (ref 6–8.3)
RBC # BLD: 4.66 M/UL — SIGNIFICANT CHANGE UP (ref 4.2–5.8)
RBC # FLD: 13.9 % — SIGNIFICANT CHANGE UP (ref 10.3–14.5)
SODIUM SERPL-SCNC: 139 MMOL/L — SIGNIFICANT CHANGE UP (ref 135–145)
WBC # BLD: 14.39 K/UL — HIGH (ref 3.8–10.5)
WBC # FLD AUTO: 14.39 K/UL — HIGH (ref 3.8–10.5)

## 2024-07-30 PROCEDURE — 99231 SBSQ HOSP IP/OBS SF/LOW 25: CPT | Mod: 25,GC

## 2024-07-30 PROCEDURE — 71045 X-RAY EXAM CHEST 1 VIEW: CPT | Mod: 26

## 2024-07-30 PROCEDURE — 78226 HEPATOBILIARY SYSTEM IMAGING: CPT | Mod: 26,GC

## 2024-07-30 PROCEDURE — 99233 SBSQ HOSP IP/OBS HIGH 50: CPT

## 2024-07-30 RX ORDER — POTASSIUM CHLORIDE 1500 MG/1
10 TABLET, EXTENDED RELEASE ORAL
Refills: 0 | Status: COMPLETED | OUTPATIENT
Start: 2024-07-30 | End: 2024-07-30

## 2024-07-30 RX ORDER — INSULIN LISPRO 100/ML
VIAL (ML) SUBCUTANEOUS
Refills: 0 | Status: DISCONTINUED | OUTPATIENT
Start: 2024-07-30 | End: 2024-08-02

## 2024-07-30 RX ORDER — DEXTROSE MONOHYDRATE, SODIUM CHLORIDE, SODIUM LACTATE, CALCIUM CHLORIDE, MAGNESIUM CHLORIDE 1.5; 538; 448; 18.4; 5.08 G/100ML; MG/100ML; MG/100ML; MG/100ML; MG/100ML
1000 SOLUTION INTRAPERITONEAL
Refills: 0 | Status: DISCONTINUED | OUTPATIENT
Start: 2024-07-30 | End: 2024-07-30

## 2024-07-30 RX ORDER — GLUCAGON INJECTION, SOLUTION 0.5 MG/.1ML
1 INJECTION, SOLUTION SUBCUTANEOUS ONCE
Refills: 0 | Status: DISCONTINUED | OUTPATIENT
Start: 2024-07-30 | End: 2024-08-02

## 2024-07-30 RX ORDER — POTASSIUM PHOSPHATE, MONOBASIC AND POTASSIUM PHOSPHATE, DIBASIC 224; 236 MG/ML; MG/ML
30 INJECTION, SOLUTION INTRAVENOUS ONCE
Refills: 0 | Status: COMPLETED | OUTPATIENT
Start: 2024-07-30 | End: 2024-07-30

## 2024-07-30 RX ORDER — DEXTROSE MONOHYDRATE, SODIUM CHLORIDE, SODIUM LACTATE, CALCIUM CHLORIDE, MAGNESIUM CHLORIDE 1.5; 538; 448; 18.4; 5.08 G/100ML; MG/100ML; MG/100ML; MG/100ML; MG/100ML
1000 SOLUTION INTRAPERITONEAL
Refills: 0 | Status: DISCONTINUED | OUTPATIENT
Start: 2024-07-30 | End: 2024-08-02

## 2024-07-30 RX ORDER — ACETAMINOPHEN 500 MG
650 TABLET ORAL EVERY 6 HOURS
Refills: 0 | Status: DISCONTINUED | OUTPATIENT
Start: 2024-07-30 | End: 2024-08-02

## 2024-07-30 RX ORDER — DEXTROSE 4 G
15 TABLET,CHEWABLE ORAL ONCE
Refills: 0 | Status: DISCONTINUED | OUTPATIENT
Start: 2024-07-30 | End: 2024-08-02

## 2024-07-30 RX ORDER — OXYCODONE HYDROCHLORIDE 30 MG/1
5 TABLET ORAL EVERY 6 HOURS
Refills: 0 | Status: DISCONTINUED | OUTPATIENT
Start: 2024-07-30 | End: 2024-08-02

## 2024-07-30 RX ORDER — OXYCODONE HYDROCHLORIDE 30 MG/1
2.5 TABLET ORAL EVERY 4 HOURS
Refills: 0 | Status: DISCONTINUED | OUTPATIENT
Start: 2024-07-30 | End: 2024-08-02

## 2024-07-30 RX ADMIN — Medication 2: at 05:50

## 2024-07-30 RX ADMIN — IPRATROPIUM BROMIDE AND ALBUTEROL SULFATE 3 MILLILITER(S): 2.5; .5 SOLUTION RESPIRATORY (INHALATION) at 20:24

## 2024-07-30 RX ADMIN — IPRATROPIUM BROMIDE AND ALBUTEROL SULFATE 3 MILLILITER(S): 2.5; .5 SOLUTION RESPIRATORY (INHALATION) at 15:45

## 2024-07-30 RX ADMIN — PIPERACILLIN SODIUM, TAZOBACTAM SODIUM 25 GRAM(S): 3; .375 INJECTION, POWDER, LYOPHILIZED, FOR SOLUTION INTRAVENOUS at 22:58

## 2024-07-30 RX ADMIN — PIPERACILLIN SODIUM, TAZOBACTAM SODIUM 25 GRAM(S): 3; .375 INJECTION, POWDER, LYOPHILIZED, FOR SOLUTION INTRAVENOUS at 15:11

## 2024-07-30 RX ADMIN — Medication 10 UNIT(S): at 15:01

## 2024-07-30 RX ADMIN — HEPARIN SODIUM 5000 UNIT(S): 1000 INJECTION, SOLUTION INTRAVENOUS; SUBCUTANEOUS at 05:32

## 2024-07-30 RX ADMIN — IPRATROPIUM BROMIDE AND ALBUTEROL SULFATE 3 MILLILITER(S): 2.5; .5 SOLUTION RESPIRATORY (INHALATION) at 04:21

## 2024-07-30 RX ADMIN — MUPIROCIN CALCIUM 1 APPLICATION(S): 20 CREAM TOPICAL at 19:15

## 2024-07-30 RX ADMIN — POTASSIUM CHLORIDE 100 MILLIEQUIVALENT(S): 1500 TABLET, EXTENDED RELEASE ORAL at 03:55

## 2024-07-30 RX ADMIN — Medication 1000 MILLIGRAM(S): at 06:00

## 2024-07-30 RX ADMIN — HEPARIN SODIUM 5000 UNIT(S): 1000 INJECTION, SOLUTION INTRAVENOUS; SUBCUTANEOUS at 22:58

## 2024-07-30 RX ADMIN — Medication 500 MILLIGRAM(S): at 15:11

## 2024-07-30 RX ADMIN — MUPIROCIN CALCIUM 1 APPLICATION(S): 20 CREAM TOPICAL at 05:33

## 2024-07-30 RX ADMIN — DEXTROSE MONOHYDRATE, SODIUM CHLORIDE, SODIUM LACTATE, CALCIUM CHLORIDE, MAGNESIUM CHLORIDE 100 MILLILITER(S): 1.5; 538; 448; 18.4; 5.08 SOLUTION INTRAPERITONEAL at 01:32

## 2024-07-30 RX ADMIN — Medication 10 UNIT(S): at 00:43

## 2024-07-30 RX ADMIN — CHLORHEXIDINE GLUCONATE 1 APPLICATION(S): 500 CLOTH TOPICAL at 15:04

## 2024-07-30 RX ADMIN — Medication 400 MILLIGRAM(S): at 05:32

## 2024-07-30 RX ADMIN — POTASSIUM CHLORIDE 100 MILLIEQUIVALENT(S): 1500 TABLET, EXTENDED RELEASE ORAL at 04:53

## 2024-07-30 RX ADMIN — PIPERACILLIN SODIUM, TAZOBACTAM SODIUM 25 GRAM(S): 3; .375 INJECTION, POWDER, LYOPHILIZED, FOR SOLUTION INTRAVENOUS at 05:32

## 2024-07-30 RX ADMIN — Medication 1000 MILLIGRAM(S): at 00:30

## 2024-07-30 RX ADMIN — Medication 400 MILLIGRAM(S): at 14:40

## 2024-07-30 RX ADMIN — IPRATROPIUM BROMIDE AND ALBUTEROL SULFATE 3 MILLILITER(S): 2.5; .5 SOLUTION RESPIRATORY (INHALATION) at 09:54

## 2024-07-30 RX ADMIN — Medication 400 MILLIGRAM(S): at 00:07

## 2024-07-30 RX ADMIN — POTASSIUM PHOSPHATE, MONOBASIC AND POTASSIUM PHOSPHATE, DIBASIC 83.33 MILLIMOLE(S): 224; 236 INJECTION, SOLUTION INTRAVENOUS at 03:55

## 2024-07-30 RX ADMIN — PANTOPRAZOLE SODIUM 40 MILLIGRAM(S): 20 TABLET, DELAYED RELEASE ORAL at 14:40

## 2024-07-30 RX ADMIN — Medication 2: at 15:03

## 2024-07-30 RX ADMIN — POTASSIUM CHLORIDE 100 MILLIEQUIVALENT(S): 1500 TABLET, EXTENDED RELEASE ORAL at 05:49

## 2024-07-30 RX ADMIN — HEPARIN SODIUM 5000 UNIT(S): 1000 INJECTION, SOLUTION INTRAVENOUS; SUBCUTANEOUS at 15:11

## 2024-07-30 NOTE — PROGRESS NOTE ADULT - SUBJECTIVE AND OBJECTIVE BOX
Name of Patient : SOTO WHITNEY  MRN: [id number]        Subjective: Patient seen and examined. No new events except as noted.   doing okay     REVIEW OF SYSTEMS:    CONSTITUTIONAL: No weakness, fevers or chills  EYES/ENT: No visual changes;  No vertigo or throat pain   NECK: No pain or stiffness  RESPIRATORY: No cough, wheezing, hemoptysis; No shortness of breath  CARDIOVASCULAR: No chest pain or palpitations  GASTROINTESTINAL: No abdominal or epigastric pain. No nausea, vomiting, or hematemesis; No diarrhea or constipation. No melena or hematochezia.  GENITOURINARY: No dysuria, frequency or hematuria  NEUROLOGICAL: No numbness or weakness  SKIN: No itching, burning, rashes, or lesions   All other review of systems is negative unless indicated above.    MEDICATIONS:  MEDICATIONS  (STANDING):  acetaminophen     Tablet .. 650 milliGRAM(s) Oral every 6 hours  albuterol/ipratropium for Nebulization 3 milliLiter(s) Nebulizer every 6 hours  azithromycin   Tablet 500 milliGRAM(s) Oral every 24 hours  chlorhexidine 2% Cloths 1 Application(s) Topical daily  dextrose 5%. 1000 milliLiter(s) (100 mL/Hr) IV Continuous <Continuous>  dextrose 5%. 1000 milliLiter(s) (50 mL/Hr) IV Continuous <Continuous>  dextrose 50% Injectable 12.5 Gram(s) IV Push once  dextrose 50% Injectable 25 Gram(s) IV Push once  dextrose 50% Injectable 25 Gram(s) IV Push once  glucagon  Injectable 1 milliGRAM(s) IntraMuscular once  heparin   Injectable 5000 Unit(s) SubCutaneous every 8 hours  insulin lispro (ADMELOG) corrective regimen sliding scale   SubCutaneous Before meals and at bedtime  insulin NPH human recombinant 10 Unit(s) SubCutaneous every 12 hours  mupirocin 2% Ointment 1 Application(s) Both Nostrils two times a day  piperacillin/tazobactam IVPB.. 3.375 Gram(s) IV Intermittent every 8 hours      PHYSICAL EXAM:  T(C): 37.1 (07-30-24 @ 20:59), Max: 37.1 (07-30-24 @ 16:00)  HR: 101 (07-30-24 @ 20:59) (74 - 101)  BP: 153/95 (07-30-24 @ 20:59) (125/81 - 166/86)  RR: 18 (07-30-24 @ 20:59) (16 - 25)  SpO2: 99% (07-30-24 @ 20:59) (94% - 100%)  Wt(kg): --  I&O's Summary    29 Jul 2024 07:01  -  30 Jul 2024 07:00  --------------------------------------------------------  IN: 2200 mL / OUT: 2335 mL / NET: -135 mL    30 Jul 2024 07:01  -  31 Jul 2024 00:07  --------------------------------------------------------  IN: 200 mL / OUT: 575 mL / NET: -375 mL          Appearance: Normal	  HEENT:  PERRLA   Lymphatic: No lymphadenopathy   Cardiovascular: Normal S1 S2, no JVD  Respiratory: normal effort , clear  Gastrointestinal:  Soft, Non-tender  Skin: No rashes,  warm to touch  Psychiatry:  Mood & affect appropriate  Musculuskeletal: No edema    recent labs, Imaging and EKGs personally reviewed             07-29-24 @ 07:01  -  07-30-24 @ 07:00  --------------------------------------------------------  IN: 2200 mL / OUT: 2335 mL / NET: -135 mL    07-30-24 @ 07:01  -  07-31-24 @ 00:07  --------------------------------------------------------  IN: 200 mL / OUT: 575 mL / NET: -375 mL

## 2024-07-30 NOTE — PROGRESS NOTE ADULT - ATTENDING COMMENTS
edematous pancreatitis  negative HIDA scan  continue supportive care of pancreatitis; no acute indication for lap ccy, ashok. given VPS and negative HIDA scan

## 2024-07-30 NOTE — PROGRESS NOTE ADULT - ATTENDING COMMENTS
I agree with the detailed interval history, physical, and plan, which I have reviewed and edited where appropriate'; also agree with notes/assessment with my team on service.  I have personally examined the patient.  I was physically present for the key portions of the evaluation and management (E/M) service provided.  I reviewed all the pertinent data.  The patient is a critical care patient with life threatening hemodynamic and metabolic instability in SICU.  The SICU team has a constant risk benefit analyzes discussion and coordinating care with the primary team and all consultants.   The patient is in SICU with the chief complaint and diagnosis mentioned in the note.   The plan will be specified in the note.  78y male with dementia, s/p  shunt 2023 presented with acute pancreatitis in SICU for hemodynamic monitoring. HScan in AM.  EXAM  NEUROLOGY  Exam:  No focal neurologic deficits  RESPIRATORY  Exam: Expiratory wheezing  CARDIOVASCULAR  Exam: RR  GI/NUTRITION  Exam: Abdomen soft  VASCULAR  Exam: Extremities warm    PLAN:   NEUROLOGIC   - Tylenol  -Dilaudid   RESPIRATORY   - Duonebs  - POCUS   CARDIOVASCULAR   - MAP>65  GASTROINTESTINAL   - Diet: CLD  /RENAL   - IV fluids: LR @ 100  HEMATOLOGIC  - SQH  INFECTIOUS DISEASE  - Zosyn  ENDOCRINE  - MISS     DISPO: DC

## 2024-07-30 NOTE — PROGRESS NOTE ADULT - SUBJECTIVE AND OBJECTIVE BOX
TEAM [ B ] Surgery Daily Progress Note  =====================================================    SUBJECTIVE: Patient seen and examined at bedside on AM rounds. Patient still distended and mildly tender to palpation.   OOB/Amublating as tolerated. Denies fever, chills.    ALLERGIES:  No Known Allergies      --------------------------------------------------------------------------------------    MEDICATIONS:    Neurologic Medications  acetaminophen   IVPB .. 1000 milliGRAM(s) IV Intermittent every 6 hours  HYDROmorphone  Injectable 0.2 milliGRAM(s) IV Push every 4 hours PRN Moderate Pain (4 - 6)  HYDROmorphone  Injectable 0.5 milliGRAM(s) IV Push every 4 hours PRN Severe Pain (7 - 10)    Respiratory Medications  albuterol/ipratropium for Nebulization 3 milliLiter(s) Nebulizer every 6 hours    Cardiovascular Medications    Gastrointestinal Medications  lactated ringers. 1000 milliLiter(s) IV Continuous <Continuous>  pantoprazole  Injectable 40 milliGRAM(s) IV Push daily    Genitourinary Medications    Hematologic/Oncologic Medications  heparin   Injectable 5000 Unit(s) SubCutaneous every 8 hours    Antimicrobial/Immunologic Medications  azithromycin   Tablet 500 milliGRAM(s) Oral every 24 hours  piperacillin/tazobactam IVPB.. 3.375 Gram(s) IV Intermittent every 8 hours    Endocrine/Metabolic Medications  dextrose 50% Injectable 25 Gram(s) IV Push once  dextrose 50% Injectable 12.5 Gram(s) IV Push once  dextrose 50% Injectable 25 Gram(s) IV Push once  insulin lispro (ADMELOG) corrective regimen sliding scale   SubCutaneous every 6 hours  insulin NPH human recombinant 10 Unit(s) SubCutaneous every 12 hours    Topical/Other Medications  chlorhexidine 2% Cloths 1 Application(s) Topical daily  mupirocin 2% Ointment 1 Application(s) Both Nostrils two times a day    --------------------------------------------------------------------------------------    VITAL SIGNS:  T(C): 36.5 (07-30-24 @ 04:00), Max: 38.2 (07-29-24 @ 15:00)  HR: 81 (07-30-24 @ 07:00) (74 - 111)  BP: 153/86 (07-30-24 @ 07:00) (125/89 - 177/98)  RR: 17 (07-30-24 @ 07:00) (16 - 34)  SpO2: 94% (07-30-24 @ 07:00) (94% - 100%)  --------------------------------------------------------------------------------------    EXAM    General: NAD, resting in bed comfortably.  Cardiac: regular rate, warm and well perfused  Respiratory: Nonlabored respirations, normal cw expansion.  Abdomen: soft, mildly tender and distended  Extremities: normal strength, FROM, no deformities    --------------------------------------------------------------------------------------    LABS                        13.3   14.39 )-----------( 143      ( 30 Jul 2024 02:00 )             40.5     --------------------------------------------------------------------------------------    07-30    139  |  105  |  19  ----------------------------<  174<H>  3.4<L>   |  21<L>  |  0.84    Ca    7.1<L>      30 Jul 2024 02:00  Phos  2.1     07-30  Mg     2.20     07-30    TPro  5.9<L>  /  Alb  2.6<L>  /  TBili  0.8  /  DBili  x   /  AST  28  /  ALT  48<H>  /  AlkPhos  88  07-30  INS AND OUTS:    07-29-24 @ 07:01  -  07-30-24 @ 07:00  --------------------------------------------------------  IN: 2200 mL / OUT: 2335 mL / NET: -135 mL      --------------------------------------------------------------------------------------

## 2024-07-30 NOTE — PROGRESS NOTE ADULT - ASSESSMENT
78M with a PMH of HTN, HLD, dementia, T2DM on insulin, hx CVA vx TIA, hydrocephalus s/p  shunt (2023) who presented to the ED with AMS, fevers and abdominal pain.  Here for acute pancreatitis. Now with improving pain and AHDS in the SICU. Plan for OR for lap avelina when abdominal pain resolves.     Plan  - HIDA scan  - Pain control PRN  - Continue IVF  - DVT ppx  - Appreciate excellent SICU care    B team [id number]

## 2024-07-30 NOTE — PROGRESS NOTE ADULT - ASSESSMENT
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 SICU consulted for hemodynamic monitoring. BISAP 4. Plan for HIDA in AM.     PLAN:   NEUROLOGIC   - Pain control: IV tylenol, dilaudid PRN    RESPIRATORY   - Monitor SpO2 goal >92%  - Incentive spirometry   - Duonebs  - POCUS 7/28 w/o B lines  - CT Chest w/ BL pleural effusions and tracheomalacia, no PNA    CARDIOVASCULAR   - Monitor hemodynamics   - Holding home BP meds    GASTROINTESTINAL   - Diet: Pureed, NPO at MN  - Tolerated applesauce bedside  - Protonix QD  - HIDA today    /RENAL   - LR @ 100  - Toscano, monitor urine output  - Monitor electrolytes, replete PRN    HEMATOLOGIC  - Monitor H/H   - DVT ppx: SQH    INFECTIOUS DISEASE  - Monitor fever / WBC  - IV zosyn    ENDOCRINE  - MISS + 10 NPH q12  - takes 16u premeal, 30-36u lantus at home per family    LINES  - PIV   - Toscano

## 2024-07-30 NOTE — PROGRESS NOTE ADULT - ASSESSMENT
Patient is a 77 Y/O Male with a PMHx of HTN, HLD, dementia, T2DM on insulin, hx CVA vx TIA, hydrocephalus s/p  shunt (2023) who presented to the ED with AMS, fevers and abdominal pain.  Here for acute pancreatitis. being managed in ICU forclose monitoring     # SOB/ Hypoxia   acute onset   resolved   afebrile   improved breathing,   CHeck CT chest, R/O pneumonia , negative fo rnay pneumonia  patient is optimized for OR by surg     # Acute pancreatitis   Per surg team   IV hydration  IV zosyn   Monitor leukocytosis   diet per surg   follow up Pan Cx    # DM  sliding scale  A1C 7.4     # HTN/HLD   BP control   adjust as tolerated     DVT and GI PPX

## 2024-07-30 NOTE — PROGRESS NOTE ADULT - SUBJECTIVE AND OBJECTIVE BOX
SICU Daily Progress Note  =====================================================  POD NA      	SICU Day 4    Interval/Overnight Events:   - s/p 20 lasix  - Advanced to puree   - POCUS, equivocal  - Azithromycin for CAP  - NPO after midnight for HIDA tomorrow    MEDICATIONS:   --------------------------------------------------------------------------------------  acetaminophen   IVPB .. 1000 milliGRAM(s) IV Intermittent every 6 hours  albuterol/ipratropium for Nebulization 3 milliLiter(s) Nebulizer every 6 hours  azithromycin   Tablet 500 milliGRAM(s) Oral every 24 hours  chlorhexidine 2% Cloths 1 Application(s) Topical daily  dextrose 50% Injectable 25 Gram(s) IV Push once  dextrose 50% Injectable 12.5 Gram(s) IV Push once  dextrose 50% Injectable 25 Gram(s) IV Push once  heparin   Injectable 5000 Unit(s) SubCutaneous every 8 hours  HYDROmorphone  Injectable 0.2 milliGRAM(s) IV Push every 4 hours PRN  HYDROmorphone  Injectable 0.5 milliGRAM(s) IV Push every 4 hours PRN  insulin lispro (ADMELOG) corrective regimen sliding scale   SubCutaneous every 6 hours  insulin NPH human recombinant 10 Unit(s) SubCutaneous every 12 hours  mupirocin 2% Ointment 1 Application(s) Both Nostrils two times a day  pantoprazole  Injectable 40 milliGRAM(s) IV Push daily  piperacillin/tazobactam IVPB.. 3.375 Gram(s) IV Intermittent every 8 hours    --------------------------------------------------------------------------------------    VITAL SIGNS, INS/OUTS (last 24 hours):  --------------------------------------------------------------------------------------  T(C): 37.2 (07-30-24 @ 00:00), Max: 38.2 (07-29-24 @ 15:00)  HR: 82 (07-30-24 @ 01:00) (78 - 111)  BP: 133/86 (07-30-24 @ 01:00) (125/89 - 177/98)  RR: 16 (07-30-24 @ 01:00) (16 - 34)  SpO2: 95% (07-30-24 @ 01:00) (94% - 100%)      07-28-24 @ 07:01  -  07-29-24 @ 07:00  --------------------------------------------------------  IN: 2700 mL / OUT: 1270 mL / NET: 1430 mL    07-29-24 @ 07:01  -  07-30-24 @ 01:10  --------------------------------------------------------  IN: 500 mL / OUT: 1985 mL / NET: -1485 mL      --------------------------------------------------------------------------------------    EXAM  NEUROLOGY  Exam: Normal, in no acute distress.  Alert and oriented x1, dementia.  No focal neurologic deficits    HEENT  Exam: Normocephalic, atraumatic, EOMI      RESPIRATORY  Exam: Expiratory wheezing, nonlabored respirations, on RA    CARDIOVASCULAR  Exam: Regular rate and rhythm, normotensive off pressors    GI/NUTRITION  Exam: Abdomen soft, Non-tender, very distended  Current Diet: CLD      Exam: Toscano in place draining blood-tinged urine w/o clots or sediment    VASCULAR  Exam: Extremities warm, pink, well-perfused     MUSCULOSKELETAL  Exam: All extremities moving spontaneously without limitations     SKIN  Exam: Good skin turgor, no skin breakdown    Tubes/Lines/Drains  [x] Peripheral IV  [] Central Venous Line     	[] R	[] L	[] IJ	[] Fem	[] SC	Date Placed:   [] Arterial Line		[] R	[] L	[] Fem	[] Rad	[] Ax	Date Placed:   [] PICC		[] Midline		[] Mediport  [x] Urinary Catheter		Date Placed:   [x] Necessity of urinary, arterial, and venous catheters discussed    LABS  --------------------------------------------------------------------------------------    --------------------------------------------------------------------------------------    IMAGING STUDIES:

## 2024-07-31 ENCOUNTER — TRANSCRIPTION ENCOUNTER (OUTPATIENT)
Age: 79
End: 2024-07-31

## 2024-07-31 LAB
ALBUMIN SERPL ELPH-MCNC: 3 G/DL — LOW (ref 3.3–5)
ALP SERPL-CCNC: 125 U/L — HIGH (ref 40–120)
ALT FLD-CCNC: 44 U/L — HIGH (ref 4–41)
ANION GAP SERPL CALC-SCNC: 15 MMOL/L — HIGH (ref 7–14)
APTT BLD: 38 SEC — HIGH (ref 24.5–35.6)
AST SERPL-CCNC: 31 U/L — SIGNIFICANT CHANGE UP (ref 4–40)
BILIRUB SERPL-MCNC: 0.7 MG/DL — SIGNIFICANT CHANGE UP (ref 0.2–1.2)
BUN SERPL-MCNC: 17 MG/DL — SIGNIFICANT CHANGE UP (ref 7–23)
CALCIUM SERPL-MCNC: 7.6 MG/DL — LOW (ref 8.4–10.5)
CHLORIDE SERPL-SCNC: 105 MMOL/L — SIGNIFICANT CHANGE UP (ref 98–107)
CO2 SERPL-SCNC: 21 MMOL/L — LOW (ref 22–31)
CREAT SERPL-MCNC: 0.78 MG/DL — SIGNIFICANT CHANGE UP (ref 0.5–1.3)
CULTURE RESULTS: SIGNIFICANT CHANGE UP
CULTURE RESULTS: SIGNIFICANT CHANGE UP
EGFR: 91 ML/MIN/1.73M2 — SIGNIFICANT CHANGE UP
GLUCOSE BLDC GLUCOMTR-MCNC: 114 MG/DL — HIGH (ref 70–99)
GLUCOSE BLDC GLUCOMTR-MCNC: 119 MG/DL — HIGH (ref 70–99)
GLUCOSE BLDC GLUCOMTR-MCNC: 157 MG/DL — HIGH (ref 70–99)
GLUCOSE BLDC GLUCOMTR-MCNC: 181 MG/DL — HIGH (ref 70–99)
GLUCOSE BLDC GLUCOMTR-MCNC: 195 MG/DL — HIGH (ref 70–99)
GLUCOSE SERPL-MCNC: 129 MG/DL — HIGH (ref 70–99)
HCT VFR BLD CALC: 45.4 % — SIGNIFICANT CHANGE UP (ref 39–50)
HGB BLD-MCNC: 15 G/DL — SIGNIFICANT CHANGE UP (ref 13–17)
INR BLD: 1.13 RATIO — SIGNIFICANT CHANGE UP (ref 0.85–1.18)
MAGNESIUM SERPL-MCNC: 2.2 MG/DL — SIGNIFICANT CHANGE UP (ref 1.6–2.6)
MCHC RBC-ENTMCNC: 28.6 PG — SIGNIFICANT CHANGE UP (ref 27–34)
MCHC RBC-ENTMCNC: 33 GM/DL — SIGNIFICANT CHANGE UP (ref 32–36)
MCV RBC AUTO: 86.5 FL — SIGNIFICANT CHANGE UP (ref 80–100)
NRBC # BLD: 0 /100 WBCS — SIGNIFICANT CHANGE UP (ref 0–0)
NRBC # FLD: 0 K/UL — SIGNIFICANT CHANGE UP (ref 0–0)
PHOSPHATE SERPL-MCNC: 2.2 MG/DL — LOW (ref 2.5–4.5)
PLATELET # BLD AUTO: 171 K/UL — SIGNIFICANT CHANGE UP (ref 150–400)
POTASSIUM SERPL-MCNC: 4 MMOL/L — SIGNIFICANT CHANGE UP (ref 3.5–5.3)
POTASSIUM SERPL-SCNC: 4 MMOL/L — SIGNIFICANT CHANGE UP (ref 3.5–5.3)
PROT SERPL-MCNC: 6.9 G/DL — SIGNIFICANT CHANGE UP (ref 6–8.3)
PROTHROM AB SERPL-ACNC: 12.7 SEC — SIGNIFICANT CHANGE UP (ref 9.5–13)
RBC # BLD: 5.25 M/UL — SIGNIFICANT CHANGE UP (ref 4.2–5.8)
RBC # FLD: 14.2 % — SIGNIFICANT CHANGE UP (ref 10.3–14.5)
SODIUM SERPL-SCNC: 141 MMOL/L — SIGNIFICANT CHANGE UP (ref 135–145)
SPECIMEN SOURCE: SIGNIFICANT CHANGE UP
SPECIMEN SOURCE: SIGNIFICANT CHANGE UP
WBC # BLD: 14.03 K/UL — HIGH (ref 3.8–10.5)
WBC # FLD AUTO: 14.03 K/UL — HIGH (ref 3.8–10.5)

## 2024-07-31 PROCEDURE — 99232 SBSQ HOSP IP/OBS MODERATE 35: CPT | Mod: GC

## 2024-07-31 PROCEDURE — 74018 RADEX ABDOMEN 1 VIEW: CPT | Mod: 26

## 2024-07-31 RX ORDER — SOD PHOS DI, MONO/K PHOS MONO 250 MG
2 TABLET ORAL ONCE
Refills: 0 | Status: COMPLETED | OUTPATIENT
Start: 2024-07-31 | End: 2024-07-31

## 2024-07-31 RX ADMIN — HEPARIN SODIUM 5000 UNIT(S): 1000 INJECTION, SOLUTION INTRAVENOUS; SUBCUTANEOUS at 22:27

## 2024-07-31 RX ADMIN — CHLORHEXIDINE GLUCONATE 1 APPLICATION(S): 500 CLOTH TOPICAL at 12:45

## 2024-07-31 RX ADMIN — IPRATROPIUM BROMIDE AND ALBUTEROL SULFATE 3 MILLILITER(S): 2.5; .5 SOLUTION RESPIRATORY (INHALATION) at 21:06

## 2024-07-31 RX ADMIN — IPRATROPIUM BROMIDE AND ALBUTEROL SULFATE 3 MILLILITER(S): 2.5; .5 SOLUTION RESPIRATORY (INHALATION) at 09:09

## 2024-07-31 RX ADMIN — IPRATROPIUM BROMIDE AND ALBUTEROL SULFATE 3 MILLILITER(S): 2.5; .5 SOLUTION RESPIRATORY (INHALATION) at 02:57

## 2024-07-31 RX ADMIN — Medication 10 UNIT(S): at 23:07

## 2024-07-31 RX ADMIN — PIPERACILLIN SODIUM, TAZOBACTAM SODIUM 25 GRAM(S): 3; .375 INJECTION, POWDER, LYOPHILIZED, FOR SOLUTION INTRAVENOUS at 13:45

## 2024-07-31 RX ADMIN — MUPIROCIN CALCIUM 1 APPLICATION(S): 20 CREAM TOPICAL at 17:42

## 2024-07-31 RX ADMIN — Medication 650 MILLIGRAM(S): at 23:07

## 2024-07-31 RX ADMIN — Medication 2 PACKET(S): at 10:09

## 2024-07-31 RX ADMIN — HEPARIN SODIUM 5000 UNIT(S): 1000 INJECTION, SOLUTION INTRAVENOUS; SUBCUTANEOUS at 05:46

## 2024-07-31 RX ADMIN — Medication 650 MILLIGRAM(S): at 12:42

## 2024-07-31 RX ADMIN — Medication 1: at 22:27

## 2024-07-31 RX ADMIN — Medication 650 MILLIGRAM(S): at 23:40

## 2024-07-31 RX ADMIN — IPRATROPIUM BROMIDE AND ALBUTEROL SULFATE 3 MILLILITER(S): 2.5; .5 SOLUTION RESPIRATORY (INHALATION) at 15:58

## 2024-07-31 RX ADMIN — HEPARIN SODIUM 5000 UNIT(S): 1000 INJECTION, SOLUTION INTRAVENOUS; SUBCUTANEOUS at 13:46

## 2024-07-31 RX ADMIN — Medication 1: at 17:40

## 2024-07-31 RX ADMIN — Medication 1: at 12:40

## 2024-07-31 RX ADMIN — Medication 650 MILLIGRAM(S): at 17:40

## 2024-07-31 RX ADMIN — Medication 650 MILLIGRAM(S): at 18:10

## 2024-07-31 RX ADMIN — Medication 10 UNIT(S): at 00:51

## 2024-07-31 RX ADMIN — Medication 650 MILLIGRAM(S): at 13:12

## 2024-07-31 RX ADMIN — Medication 10 UNIT(S): at 12:41

## 2024-07-31 RX ADMIN — Medication 500 MILLIGRAM(S): at 17:39

## 2024-07-31 RX ADMIN — PIPERACILLIN SODIUM, TAZOBACTAM SODIUM 25 GRAM(S): 3; .375 INJECTION, POWDER, LYOPHILIZED, FOR SOLUTION INTRAVENOUS at 05:46

## 2024-07-31 NOTE — PROGRESS NOTE ADULT - SUBJECTIVE AND OBJECTIVE BOX
Surgery Progress Note     Interval/Subjective:  Patient seen and examined. Bloated this morning. having bms and passing gas. Toscano taken out last night.   __________________________________________________________________  Vitals:  T(C): 37.2 (05:45), Max: 37.8 (02:00)  HR: 93 (05:45) (75 - 101)  BP: 156/83 (05:45) (125/81 - 166/86)  RR: 16 (05:45) (16 - 25)  SpO2: 97% (05:45) (95% - 100%)    I & O's:  IN:    Lactated Ringers: 200 mL    Oral Fluid: 150 mL  Total IN: 350 mL    OUT:    Indwelling Catheter - Urethral (mL): 775 mL  Total OUT: 775 mL    Physical Exam:  General: NAD, resting comfortably in bed  HEENT: Normocephalic atraumatic  Respiratory: Nonlabored respirations  Cardio: regular rate, normotensive  Abdomen: soft, mildly tender in epigastric region, distended    Labs:  CBC: 24 @ 06:35          15.0   14.03 >----< 171          45.4    Chemistry: 24 @ 06:35  141|105|17    ------------< 129  4.0|21|0.78    Ca: 7.6 24 @ 06:35  M.20 24 @ 06:35  Phos: 2.2 24 @ 06:35    LFT's: 24 @ 06:35  AST: 31  ALT: 44  ALP: 125  T.Bili: 0.7  D.Bili: --  CBC: 24 @ 02:00          13.3   14.39 >----< 143          40.5    Chemistry: 24 @ 02:00  139|105|19    ------------< 174  3.4|21|0.84    Ca: 7.1 24 @ 02:00  M.20 24 @ 02:00  Phos: 2.1 24 @ 02:00    LFT's: 24 @ 02:00  AST: 28  ALT: 48  ALP: 88  T.Lázaroi: 0.8  DAbilio: --    __________________________________________________________________

## 2024-07-31 NOTE — DISCHARGE NOTE PROVIDER - CARE PROVIDERS DIRECT ADDRESSES
[email or web address]

## 2024-07-31 NOTE — PROGRESS NOTE ADULT - ASSESSMENT
78M with a PMH of HTN, HLD, dementia, T2DM on insulin, hx CVA vx TIA, hydrocephalus s/p  shunt (2023) who presented to the ED with AMS, fevers and abdominal pain.  Here for acute pancreatitis. Pain improving and now out of the SICU. HIDA was negative, so decision about cholecystectomy still being discussed.     Plan  - Pain control PRN  - on Pureed diet  - DVT ppx  - XRA  - DTV at 8am    B team [id number]

## 2024-07-31 NOTE — DISCHARGE NOTE PROVIDER - CARE PROVIDER_API CALL
Kathy Espino [facility]  Surgery  [address] [place] [address]  Phone: [phone]  Fax: [phone]  Follow Up Time: 2 weeks   Kathy Espino [address] [address]  Phone: [phone]  Fax: [phone]  Follow Up Time: 2 weeks    Mary Kruse  Gastroenterology  [address] [place] [address]  Phone: [phone]  Fax: [phone]  Follow Up Time: 2 weeks

## 2024-07-31 NOTE — DISCHARGE NOTE PROVIDER - PROVIDER TOKENS
PROVIDER:[TOKEN:[[id number]],FOLLOWUP:[2 weeks]] PROVIDER:[TOKEN:[[id number]],FOLLOWUP:[2 weeks]],PROVIDER:[TOKEN:[[id number]],FOLLOWUP:[2 weeks]]

## 2024-07-31 NOTE — PROGRESS NOTE ADULT - ATTENDING COMMENTS
Pt seen and examined.  Agree with resident eval and plan. Imp: Gallstone pancreatitis with pneumonia.  Delayed lap cholecystectomy as outpatient.

## 2024-07-31 NOTE — DISCHARGE NOTE PROVIDER - NSDCFUADDAPPT_GEN_ALL_CORE_FT
Please schedule an appointment with your PCP within 1-2 weeks of discharge to go over your recent hospitalization. Please bring your discharge paperwork with you.     Please call to schedule an appointment with Dr. Espino within 2 weeks of discharge. You will discuss planning for an interval cholecystectomy at that time.  Please schedule an appointment with your PCP within 1 week of discharge to go over your recent hospitalization. Please bring your discharge paperwork with you. Please discuss and follow up on your diabetic medications closely with your PCP since adjustments were made while in the hospital. Will need to adjust medications as needed outpatient.     Please call to schedule an appointment with Dr. Espino within 2 weeks of discharge. You will discuss planning for an interval cholecystectomy at that time.  Please schedule an appointment with your PCP within 1 week of discharge to go over your recent hospitalization. Please bring your discharge paperwork with you. Please discuss and follow up on your diabetic medications closely with your PCP since adjustments were made while in the hospital. Will need to adjust medications as needed outpatient. Do not continue taking home Ozempic for time being in setting of pancreatitis. Refer to your PCP for any additional changes to your diabetes medications.    Please call to schedule an appointment with Dr. Espino within 2 weeks of discharge. You will discuss planning for an interval cholecystectomy at that time.  Please schedule an appointment with your PCP within 1 week of discharge to go over your recent hospitalization. Please bring your discharge paperwork with you. Please discuss and follow up on your diabetic medications closely with your PCP since adjustments were made while in the hospital. Will need to adjust medications as needed outpatient. Can resume home Farxiga upon discharge. Do not continue taking home Ozempic for time being in setting of pancreatitis. Refer to your PCP for any additional changes to your diabetes medications.    Please call to schedule an appointment with Dr. Espino within 2 weeks of discharge. You will discuss planning for an interval cholecystectomy at that time.     Please call to schedule a follow up Gastroenterology appointment with Dr. Kruse within 2 weeks of discharge.  Please schedule an appointment with your PCP within 1 week of discharge to go over your recent hospitalization. Please bring your discharge paperwork with you. Adjust your home Lantus dose to deliver 12units daily at bedtime.  your prescription for Admelog at [facility] and deliver 4units before meals daily. Please discuss and follow up on your diabetic medications closely with your PCP since adjustments were made while in the hospital. Will need to adjust medications as needed outpatient. Can resume home Farxiga upon discharge. Do not continue taking home Ozempic for time being in setting of pancreatitis. Refer to your PCP for any additional changes to your diabetes medications. In case of emergency, or any additional questions, you can contact the Endocrinology office at  [phone].    Please call to schedule an appointment with Dr. Espino within 2 weeks of discharge. You will discuss planning for an interval cholecystectomy at that time.     Please call to schedule a follow up Gastroenterology appointment with Dr. Kruse within 2 weeks of discharge.

## 2024-07-31 NOTE — DISCHARGE NOTE PROVIDER - NSDCCPCAREPLAN_GEN_ALL_CORE_FT
PRINCIPAL DISCHARGE DIAGNOSIS  Diagnosis: Acute pancreatitis  Assessment and Plan of Treatment: BATHING: You may shower and/or sponge bathe. You may use warm soapy water in the shower and rinse, pat dry.  ACTIVITY: No heavy lifting or straining. Otherwise, you may return to your usual level of physical activity. If you are taking narcotic pain medication DO NOT drive a car, operate machinery or make important decisions.  DIET: Return to your usual diet.  NOTIFY YOUR SURGEON IF YOU HAVE: any bleeding that does not stop, any pus draining from your wound(s), any fever (over 100.4 F) persistent nausea/vomiting, or if your pain is not controlled on your discharge pain medications, unable to urinate.  Please follow up with your primary care physician in one week regarding your hospitalization, bring copies of your discharge paperwork.  Please follow up with your surgeon, Dr. Espino

## 2024-07-31 NOTE — DISCHARGE NOTE PROVIDER - NSDCMRMEDTOKEN_GEN_ALL_CORE_FT
aspirin 81 mg oral capsule: 1 cap(s) orally once a day  atorvastatin 20 mg oral tablet: 1 tab(s) orally once a day  Farxiga 10 mg oral tablet: 1 tab(s) orally once a day  HumaLOG 100 units/mL injectable solution: 20 unit(s) injectable 3 times a day (with meals)  insulin glargine 100 units/mL subcutaneous solution: 30 unit(s) subcutaneous once a day (at bedtime)  irbesartan 300 mg oral tablet: 1 tab(s) orally once a day  melatonin 3 mg oral tablet: 1 tab(s) orally once a day (at bedtime) As needed Insomnia  Ozempic 2 mg/1.5 mL (0.25 mg or 0.5 mg dose) subcutaneous solution: 0.25 microcurie subcutaneous every 7 days  tamsulosin 0.4 mg oral capsule: 1 cap(s) orally once a day (at bedtime)   acetaminophen 325 mg oral tablet: 2 tab(s) orally every 6 hours  aspirin 81 mg oral capsule: 1 cap(s) orally once a day  atorvastatin 20 mg oral tablet: 1 tab(s) orally once a day  Farxiga 10 mg oral tablet: 1 tab(s) orally once a day  HumaLOG 100 units/mL injectable solution: 20 unit(s) injectable 3 times a day (with meals)  insulin glargine 100 units/mL subcutaneous solution: 30 unit(s) subcutaneous once a day (at bedtime)  irbesartan 300 mg oral tablet: 1 tab(s) orally once a day  melatonin 3 mg oral tablet: 1 tab(s) orally once a day (at bedtime) As needed Insomnia  Ozempic 2 mg/1.5 mL (0.25 mg or 0.5 mg dose) subcutaneous solution: 0.25 microcurie subcutaneous every 7 days  tamsulosin 0.4 mg oral capsule: 1 cap(s) orally once a day (at bedtime)  ursodiol 300 mg oral capsule: 1 cap(s) orally 2 times a day   acetaminophen 325 mg oral tablet: 2 tab(s) orally every 6 hours  Admelog 100 units/mL injectable solution: 4 unit(s) injectable 3 times a day (before meals)  aspirin 81 mg oral capsule: 1 cap(s) orally once a day  atorvastatin 20 mg oral tablet: 1 tab(s) orally once a day  Farxiga 10 mg oral tablet: 1 tab(s) orally once a day  insulin glargine 100 units/mL subcutaneous solution: 12 unit(s) subcutaneous once a day (at bedtime)  irbesartan 300 mg oral tablet: 1 tab(s) orally once a day  melatonin 3 mg oral tablet: 1 tab(s) orally once a day (at bedtime) As needed Insomnia  oxyCODONE 5 mg oral tablet: 1 tab(s) orally every 6 hours as needed for Severe Pain (7 - 10) MDD: 4 tabs  tamsulosin 0.4 mg oral capsule: 1 cap(s) orally once a day (at bedtime)  ursodiol 300 mg oral capsule: 1 cap(s) orally 2 times a day

## 2024-07-31 NOTE — PROGRESS NOTE ADULT - SUBJECTIVE AND OBJECTIVE BOX
Name of Patient : SOTO WHITNEY  MRN: [id number]  Date of visit: 07-31-24      Subjective: Patient seen and examined. No new events except as noted.   doing okay     REVIEW OF SYSTEMS:    CONSTITUTIONAL: No weakness, fevers or chills  EYES/ENT: No visual changes;  No vertigo or throat pain   NECK: No pain or stiffness  RESPIRATORY: No cough, wheezing, hemoptysis; No shortness of breath  CARDIOVASCULAR: No chest pain or palpitations  GASTROINTESTINAL: No abdominal or epigastric pain. No nausea, vomiting, or hematemesis; No diarrhea or constipation. No melena or hematochezia.  GENITOURINARY: No dysuria, frequency or hematuria  NEUROLOGICAL: No numbness or weakness  SKIN: No itching, burning, rashes, or lesions   All other review of systems is negative unless indicated above.    MEDICATIONS:  MEDICATIONS  (STANDING):  acetaminophen     Tablet .. 650 milliGRAM(s) Oral every 6 hours  albuterol/ipratropium for Nebulization 3 milliLiter(s) Nebulizer every 6 hours  chlorhexidine 2% Cloths 1 Application(s) Topical daily  dextrose 5%. 1000 milliLiter(s) (100 mL/Hr) IV Continuous <Continuous>  dextrose 5%. 1000 milliLiter(s) (50 mL/Hr) IV Continuous <Continuous>  dextrose 50% Injectable 12.5 Gram(s) IV Push once  dextrose 50% Injectable 25 Gram(s) IV Push once  dextrose 50% Injectable 25 Gram(s) IV Push once  glucagon  Injectable 1 milliGRAM(s) IntraMuscular once  heparin   Injectable 5000 Unit(s) SubCutaneous every 8 hours  insulin lispro (ADMELOG) corrective regimen sliding scale   SubCutaneous Before meals and at bedtime  insulin NPH human recombinant 10 Unit(s) SubCutaneous every 12 hours  mupirocin 2% Ointment 1 Application(s) Both Nostrils two times a day      PHYSICAL EXAM:  T(C): 37.7 (07-31-24 @ 17:32), Max: 37.8 (07-31-24 @ 02:00)  HR: 93 (07-31-24 @ 17:32) (85 - 101)  BP: 150/87 (07-31-24 @ 17:32) (150/87 - 164/91)  RR: 17 (07-31-24 @ 17:32) (16 - 18)  SpO2: 98% (07-31-24 @ 17:32) (96% - 100%)  Wt(kg): --  I&O's Summary    30 Jul 2024 07:01  -  31 Jul 2024 07:00  --------------------------------------------------------  IN: 350 mL / OUT: 775 mL / NET: -425 mL    31 Jul 2024 07:01  -  31 Jul 2024 19:04  --------------------------------------------------------  IN: 460 mL / OUT: 0 mL / NET: 460 mL          Appearance: Normal	  HEENT:  PERRLA   Lymphatic: No lymphadenopathy   Cardiovascular: Normal S1 S2, no JVD  Respiratory: normal effort , clear  Gastrointestinal:  Soft, Non-tender  Skin: No rashes,  warm to touch  Psychiatry:  Mood & affect appropriate  Musculuskeletal: No edema    recent labs, Imaging and EKGs personally reviewed     07-30-24 @ 07:01  -  07-31-24 @ 07:00  --------------------------------------------------------  IN: 350 mL / OUT: 775 mL / NET: -425 mL    07-31-24 @ 07:01  -  07-31-24 @ 19:04  --------------------------------------------------------  IN: 460 mL / OUT: 0 mL / NET: 460 mL                            15.0   14.03 )-----------( 171      ( 31 Jul 2024 06:35 )             45.4               07-31    141  |  105  |  17  ----------------------------<  129<H>  4.0   |  21<L>  |  0.78    Ca    7.6<L>      31 Jul 2024 06:35  Phos  2.2     07-31  Mg     2.20     07-31    TPro  6.9  /  Alb  3.0<L>  /  TBili  0.7  /  DBili  x   /  AST  31  /  ALT  44<H>  /  AlkPhos  125<H>  07-31    PT/INR - ( 31 Jul 2024 06:35 )   PT: 12.7 sec;   INR: 1.13 ratio         PTT - ( 31 Jul 2024 06:35 )  PTT:38.0 sec                 ABG - ( 30 Jul 2024 02:00 )  pH, Arterial: 7.47  pH, Blood: x     /  pCO2: 32    /  pO2: 87    / HCO3: 23    / Base Excess: 0.4   /  SaO2: 97.3              Urinalysis Basic - ( 31 Jul 2024 06:35 )    Color: x / Appearance: x / SG: x / pH: x  Gluc: 129 mg/dL / Ketone: x  / Bili: x / Urobili: x   Blood: x / Protein: x / Nitrite: x   Leuk Esterase: x / RBC: x / WBC x   Sq Epi: x / Non Sq Epi: x / Bacteria: x

## 2024-07-31 NOTE — DISCHARGE NOTE PROVIDER - HOSPITAL COURSE
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 In ED patient is tachycardic to 110, febrile to 103.2, tachypneic to 36, lactate elevated to 5.1, TTP in epigastrium and distended, elevated LFTs, elevated lipase 768. CT scan c/f acute pancreatitis. Patient was admitted to surgery and SICU was consulted for hemodynamic monitoring. Patient was stabilized, HIDA exam was found negative, diet was advanced, and patient was downgraded to the floor.     Post op patients diet was slowly advanced as tolerated.  At this time, pt is tolerating a regular pureed diet, ambulating with PT, and voiding.  Pt has been deemed stable for discharge at this time.    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 In ED patient was tachycardic to 110, febrile to 103.2, tachypneic to 36, lactate elevated to 5.1, TTP in epigastrium and distended, elevated LFTs, elevated lipase 768. CT scan c/f acute pancreatitis. Patient was admitted to surgery and SICU was consulted for hemodynamic monitoring. Patient was stabilized, HIDA exam was found negative, diet was advanced, pain controlled, and patient was downgraded to the floor. Patient not requiring surgical intervention at this time to allow for pancreatitis to improve. Plan for interval cholecystectomy in the future.     8/2 - Endocrine consulted for discharge recommendations. CT scan performed for increasing wbc count.     PT recs Rehab upon discharge but family declined as they have 24hr home aid. Patient is set up with home PT instead.  Post op patients diet was slowly advanced as tolerated.  At this time, pt is tolerating a regular diet, ambulating with PT, and voiding.  Pt has been deemed stable for discharge at this time.    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 In ED patient was tachycardic to 110, febrile to 103.2, tachypneic to 36, lactate elevated to 5.1, TTP in epigastrium and distended, elevated LFTs, elevated lipase 768. CT scan c/f acute pancreatitis. Patient was admitted to surgery and SICU was consulted for hemodynamic monitoring. Patient was stabilized, HIDA exam was found negative, diet was advanced, pain controlled, and patient was downgraded to the floor. Patient not requiring surgical intervention at this time to allow for pancreatitis to improve. Plan for interval cholecystectomy in the future.     8/2 - Endocrine consulted for discharge recommendationsof Lantus 12units at bedtime, 4units of admelog premeal and discontinuing the NPH.  CT scan performed for increasing wbc count.     PT recs Rehab upon discharge but family declined as they have 24hr home aid. Patient is set up with home PT instead.  Post op patients diet was slowly advanced as tolerated.  At this time, pt is tolerating a regular diet, ambulating with PT, and voiding.  Pt has been deemed stable for discharge at this time.    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 In ED patient was tachycardic to 110, febrile to 103.2, tachypneic to 36, lactate elevated to 5.1, TTP in epigastrium and distended, elevated LFTs, elevated lipase 768. CT scan c/f acute pancreatitis. Patient was admitted to surgery and SICU was consulted for hemodynamic monitoring. Patient was stabilized, HIDA exam was found negative, diet was advanced, pain controlled, and patient was downgraded to the floor. Patient not requiring surgical intervention at this time to allow for pancreatitis to improve. Plan for interval cholecystectomy in the future.     8/2 - Endocrine consulted for discharge recommendationsof Lantus 12units at bedtime, 4units of admelog premeal and discontinuing the NPH.  Patient is also to hold off on home Ozempic in setting of pancreatitis. CT scan performed for increasing wbc count.     PT recs Rehab upon discharge but family declined as they have 24hr home aid. Patient is set up with home PT instead.  Post op patients diet was slowly advanced as tolerated.  At this time, pt is tolerating a regular diet, ambulating with PT, and voiding.  Pt has been deemed stable for discharge at this time.    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 In ED patient was tachycardic to 110, febrile to 103.2, tachypneic to 36, lactate elevated to 5.1, TTP in epigastrium and distended, elevated LFTs, elevated lipase 768. CT scan c/f acute pancreatitis. Patient was admitted to surgery and SICU was consulted for hemodynamic monitoring. Patient was stabilized, HIDA exam was found negative, diet was advanced, pain controlled, and patient was downgraded to the floor. Patient not requiring surgical intervention at this time to allow for pancreatitis to improve. Plan for interval cholecystectomy in the future.     8/2 - Endocrine consulted for discharge recommendations of Lantus 12units at bedtime, 4units of admelog premeal.  Patient is also to hold off on home Ozempic in setting of pancreatitis. CT scan performed for increasing wbc count. CT scan c/f ongoing pancreatitis but negative for pancreatic necrosis and no loculated collections noted. Patient has remained afebrile and reporting no pain.     PT recs Rehab upon discharge but family declined as they have 24hr home aid. Patient is set up with home PT instead.  Post op patients diet was slowly advanced as tolerated.  At this time, pt is tolerating a regular diet, ambulating with PT, and voiding.  Pt has been deemed stable for discharge at this time.

## 2024-07-31 NOTE — PROGRESS NOTE ADULT - ASSESSMENT
Patient is a 79 Y/O Male with a PMHx of HTN, HLD, dementia, T2DM on insulin, hx CVA vx TIA, hydrocephalus s/p  shunt (2023) who presented to the ED with AMS, fevers and abdominal pain.  Here for acute pancreatitis. being managed in ICU forclose monitoring     # SOB/ Hypoxia   acute onset   resolved   afebrile   improved breathing,   CHeck CT chest, R/O pneumonia , negative fo rnay pneumonia  patient is optimized for OR by surg     # Acute pancreatitis   Per surg team   IV hydration  IV zosyn   Monitor leukocytosis   diet per surg   outpatient lap avelina   follow up Pan Cx    # DM  sliding scale  A1C 7.4     # HTN/HLD   BP control   adjust as tolerated     DVT and GI PPX

## 2024-08-01 ENCOUNTER — TRANSCRIPTION ENCOUNTER (OUTPATIENT)
Age: 79
End: 2024-08-01

## 2024-08-01 LAB
ALBUMIN SERPL ELPH-MCNC: 2.8 G/DL — LOW (ref 3.3–5)
ALP SERPL-CCNC: 110 U/L — SIGNIFICANT CHANGE UP (ref 40–120)
ALT FLD-CCNC: 31 U/L — SIGNIFICANT CHANGE UP (ref 4–41)
ANION GAP SERPL CALC-SCNC: 9 MMOL/L — SIGNIFICANT CHANGE UP (ref 7–14)
AST SERPL-CCNC: 24 U/L — SIGNIFICANT CHANGE UP (ref 4–40)
BILIRUB SERPL-MCNC: 0.6 MG/DL — SIGNIFICANT CHANGE UP (ref 0.2–1.2)
BUN SERPL-MCNC: 16 MG/DL — SIGNIFICANT CHANGE UP (ref 7–23)
CALCIUM SERPL-MCNC: 7.6 MG/DL — LOW (ref 8.4–10.5)
CHLORIDE SERPL-SCNC: 105 MMOL/L — SIGNIFICANT CHANGE UP (ref 98–107)
CO2 SERPL-SCNC: 23 MMOL/L — SIGNIFICANT CHANGE UP (ref 22–31)
CREAT SERPL-MCNC: 0.75 MG/DL — SIGNIFICANT CHANGE UP (ref 0.5–1.3)
EGFR: 92 ML/MIN/1.73M2 — SIGNIFICANT CHANGE UP
GLUCOSE BLDC GLUCOMTR-MCNC: 109 MG/DL — HIGH (ref 70–99)
GLUCOSE BLDC GLUCOMTR-MCNC: 121 MG/DL — HIGH (ref 70–99)
GLUCOSE BLDC GLUCOMTR-MCNC: 148 MG/DL — HIGH (ref 70–99)
GLUCOSE BLDC GLUCOMTR-MCNC: 186 MG/DL — HIGH (ref 70–99)
GLUCOSE SERPL-MCNC: 174 MG/DL — HIGH (ref 70–99)
HCT VFR BLD CALC: 42.6 % — SIGNIFICANT CHANGE UP (ref 39–50)
HGB BLD-MCNC: 13.8 G/DL — SIGNIFICANT CHANGE UP (ref 13–17)
MAGNESIUM SERPL-MCNC: 2.2 MG/DL — SIGNIFICANT CHANGE UP (ref 1.6–2.6)
MCHC RBC-ENTMCNC: 28.4 PG — SIGNIFICANT CHANGE UP (ref 27–34)
MCHC RBC-ENTMCNC: 32.4 GM/DL — SIGNIFICANT CHANGE UP (ref 32–36)
MCV RBC AUTO: 87.7 FL — SIGNIFICANT CHANGE UP (ref 80–100)
NRBC # BLD: 0 /100 WBCS — SIGNIFICANT CHANGE UP (ref 0–0)
NRBC # FLD: 0 K/UL — SIGNIFICANT CHANGE UP (ref 0–0)
PHOSPHATE SERPL-MCNC: 2.3 MG/DL — LOW (ref 2.5–4.5)
PLATELET # BLD AUTO: 197 K/UL — SIGNIFICANT CHANGE UP (ref 150–400)
POTASSIUM SERPL-MCNC: 4.1 MMOL/L — SIGNIFICANT CHANGE UP (ref 3.5–5.3)
POTASSIUM SERPL-SCNC: 4.1 MMOL/L — SIGNIFICANT CHANGE UP (ref 3.5–5.3)
PROT SERPL-MCNC: 6.6 G/DL — SIGNIFICANT CHANGE UP (ref 6–8.3)
RBC # BLD: 4.86 M/UL — SIGNIFICANT CHANGE UP (ref 4.2–5.8)
RBC # FLD: 14.1 % — SIGNIFICANT CHANGE UP (ref 10.3–14.5)
SODIUM SERPL-SCNC: 137 MMOL/L — SIGNIFICANT CHANGE UP (ref 135–145)
WBC # BLD: 14.15 K/UL — HIGH (ref 3.8–10.5)
WBC # FLD AUTO: 14.15 K/UL — HIGH (ref 3.8–10.5)

## 2024-08-01 RX ORDER — URSODIOL 300 MG
300 CAPSULE ORAL
Refills: 0 | Status: DISCONTINUED | OUTPATIENT
Start: 2024-08-01 | End: 2024-08-01

## 2024-08-01 RX ORDER — URSODIOL 300 MG
300 CAPSULE ORAL
Refills: 0 | Status: DISCONTINUED | OUTPATIENT
Start: 2024-08-01 | End: 2024-08-02

## 2024-08-01 RX ORDER — SOD PHOS DI, MONO/K PHOS MONO 250 MG
2 TABLET ORAL ONCE
Refills: 0 | Status: COMPLETED | OUTPATIENT
Start: 2024-08-01 | End: 2024-08-01

## 2024-08-01 RX ADMIN — Medication 10 UNIT(S): at 12:29

## 2024-08-01 RX ADMIN — Medication 10 UNIT(S): at 23:04

## 2024-08-01 RX ADMIN — Medication 650 MILLIGRAM(S): at 18:06

## 2024-08-01 RX ADMIN — Medication 650 MILLIGRAM(S): at 23:04

## 2024-08-01 RX ADMIN — Medication 650 MILLIGRAM(S): at 11:34

## 2024-08-01 RX ADMIN — Medication 650 MILLIGRAM(S): at 06:00

## 2024-08-01 RX ADMIN — HEPARIN SODIUM 5000 UNIT(S): 1000 INJECTION, SOLUTION INTRAVENOUS; SUBCUTANEOUS at 23:03

## 2024-08-01 RX ADMIN — HEPARIN SODIUM 5000 UNIT(S): 1000 INJECTION, SOLUTION INTRAVENOUS; SUBCUTANEOUS at 13:41

## 2024-08-01 RX ADMIN — IPRATROPIUM BROMIDE AND ALBUTEROL SULFATE 3 MILLILITER(S): 2.5; .5 SOLUTION RESPIRATORY (INHALATION) at 10:56

## 2024-08-01 RX ADMIN — Medication 300 MILLIGRAM(S): at 18:06

## 2024-08-01 RX ADMIN — Medication 2 PACKET(S): at 11:34

## 2024-08-01 RX ADMIN — Medication 1: at 12:28

## 2024-08-01 RX ADMIN — Medication 650 MILLIGRAM(S): at 18:36

## 2024-08-01 RX ADMIN — IPRATROPIUM BROMIDE AND ALBUTEROL SULFATE 3 MILLILITER(S): 2.5; .5 SOLUTION RESPIRATORY (INHALATION) at 02:52

## 2024-08-01 RX ADMIN — CHLORHEXIDINE GLUCONATE 1 APPLICATION(S): 500 CLOTH TOPICAL at 11:43

## 2024-08-01 RX ADMIN — Medication 650 MILLIGRAM(S): at 12:04

## 2024-08-01 RX ADMIN — Medication 650 MILLIGRAM(S): at 05:22

## 2024-08-01 RX ADMIN — IPRATROPIUM BROMIDE AND ALBUTEROL SULFATE 3 MILLILITER(S): 2.5; .5 SOLUTION RESPIRATORY (INHALATION) at 15:38

## 2024-08-01 RX ADMIN — Medication 650 MILLIGRAM(S): at 23:35

## 2024-08-01 RX ADMIN — IPRATROPIUM BROMIDE AND ALBUTEROL SULFATE 3 MILLILITER(S): 2.5; .5 SOLUTION RESPIRATORY (INHALATION) at 21:07

## 2024-08-01 RX ADMIN — HEPARIN SODIUM 5000 UNIT(S): 1000 INJECTION, SOLUTION INTRAVENOUS; SUBCUTANEOUS at 05:22

## 2024-08-01 NOTE — DISCHARGE NOTE NURSING/CASE MANAGEMENT/SOCIAL WORK - PATIENT PORTAL LINK FT
You can access the FollowMyHealth Patient Portal offered by [facility] by registering at the following website: [email or web address]. By joining [employer]’s FollowMyHealth portal, you will also be able to view your health information using other applications (apps) compatible with our system.

## 2024-08-01 NOTE — PROGRESS NOTE ADULT - ASSESSMENT
78M with a PMH of HTN, HLD, dementia, T2DM on insulin, hx CVA vx TIA, hydrocephalus s/p  shunt (2023) who presented to the ED with AMS, fevers and abdominal pain.  Here for acute pancreatitis. Pain improving and now out of the SICU. HIDA was negative. No operation this admission, will f/u for outpatient surgery.     Plan:  - Pain control PRN  - on Pureed diet  - DVT ppx   - start ursodiol  - d/c planning    B team [id number]

## 2024-08-01 NOTE — DISCHARGE NOTE NURSING/CASE MANAGEMENT/SOCIAL WORK - NSSCNAMETXT_GEN_ALL_CORE
[facility] [phone] Nurse to visit on the day following discharge. Other appropriate services to be arranged thereafter. Please contact the home care agency at the above phone number if you have not heard from them by approximately 12 noon on the day after your hospital discharge.

## 2024-08-01 NOTE — DISCHARGE NOTE NURSING/CASE MANAGEMENT/SOCIAL WORK - NSDCFUADDAPPT_GEN_ALL_CORE_FT
Please schedule an appointment with your PCP within 1 week of discharge to go over your recent hospitalization. Please bring your discharge paperwork with you. Adjust your home Lantus dose to deliver 12units daily at bedtime.  your prescription for Admelog at [facility] and deliver 4units before meals daily. Please discuss and follow up on your diabetic medications closely with your PCP since adjustments were made while in the hospital. Will need to adjust medications as needed outpatient. Can resume home Farxiga upon discharge. Do not continue taking home Ozempic for time being in setting of pancreatitis. Refer to your PCP for any additional changes to your diabetes medications. In case of emergency, or any additional questions, you can contact the Endocrinology office at  [phone].    Please call to schedule an appointment with Dr. Espino within 2 weeks of discharge. You will discuss planning for an interval cholecystectomy at that time.     Please call to schedule a follow up Gastroenterology appointment with Dr. Kruse within 2 weeks of discharge.

## 2024-08-01 NOTE — PROGRESS NOTE ADULT - SUBJECTIVE AND OBJECTIVE BOX
Surgery Progress Note     Interval/Subjective:  Patient seen and examined. Having bowel movements, tolerating diet. Pain improved but still present. No n/v.   __________________________________________________________________  Vitals:  T(C): 36.8 (09:51), Max: 37.7 (17:32)  HR: 93 (09:51) (81 - 95)  BP: 161/89 (09:51) (140/78 - 161/89)  RR: 18 (09:51) (17 - 18)  SpO2: 100% (09:51) (96% - 100%)    I & O's:  IN:    IV PiggyBack: 100 mL    Oral Fluid: 660 mL  Total IN: 760 mL    OUT:  Total OUT: 0 mL    Physical Exam:  General: NAD, resting comfortably in bed  HEENT: Normocephalic atraumatic  Respiratory: Nonlabored respirations  Cardio: regular rate, normotensive  Abdomen: soft, mildly tender in epigastric region, distended    Labs:  CBC: 24 @ 06:33          13.8   14.15 >----< 197          42.6    Chemistry: 24 @ 06:33  137|105|16    ------------< 174  4.1|23|0.75    Ca: 7.6 24 @ 06:33  M.20 24 @ 06:33  Phos: 2.3 24 @ 06:33    LFT's: 24 @ 06:33  AST: 24  ALT: 31  ALP: 110  T.Bili: 0.6  D.Bili: --  CBC: 24 @ 06:35          15.0   14.03 >----< 171          45.4    Chemistry: 24 @ 06:35  141|105|17    ------------< 129  4.0|21|0.78    Ca: 7.6 24 @ 06:35  M.20 24 @ 06:35  Phos: 2.2 24 @ 06:35    LFT's: 24 @ 06:35  AST: 31  ALT: 44  ALP: 125  T.Tameka: 0.7  DAbilio: --    __________________________________________________________________

## 2024-08-02 VITALS
OXYGEN SATURATION: 99 % | SYSTOLIC BLOOD PRESSURE: 136 MMHG | TEMPERATURE: 97 F | RESPIRATION RATE: 17 BRPM | DIASTOLIC BLOOD PRESSURE: 62 MMHG | HEART RATE: 95 BPM

## 2024-08-02 DIAGNOSIS — E78.5 HYPERLIPIDEMIA, UNSPECIFIED: ICD-10-CM

## 2024-08-02 DIAGNOSIS — E11.65 TYPE 2 DIABETES MELLITUS WITH HYPERGLYCEMIA: ICD-10-CM

## 2024-08-02 DIAGNOSIS — I10 ESSENTIAL (PRIMARY) HYPERTENSION: ICD-10-CM

## 2024-08-02 LAB
ALBUMIN SERPL ELPH-MCNC: 2.8 G/DL — LOW (ref 3.3–5)
ALP SERPL-CCNC: 109 U/L — SIGNIFICANT CHANGE UP (ref 40–120)
ALT FLD-CCNC: 28 U/L — SIGNIFICANT CHANGE UP (ref 4–41)
ANION GAP SERPL CALC-SCNC: 11 MMOL/L — SIGNIFICANT CHANGE UP (ref 7–14)
AST SERPL-CCNC: 22 U/L — SIGNIFICANT CHANGE UP (ref 4–40)
BILIRUB SERPL-MCNC: 0.5 MG/DL — SIGNIFICANT CHANGE UP (ref 0.2–1.2)
BUN SERPL-MCNC: 14 MG/DL — SIGNIFICANT CHANGE UP (ref 7–23)
CALCIUM SERPL-MCNC: 8 MG/DL — LOW (ref 8.4–10.5)
CHLORIDE SERPL-SCNC: 104 MMOL/L — SIGNIFICANT CHANGE UP (ref 98–107)
CO2 SERPL-SCNC: 24 MMOL/L — SIGNIFICANT CHANGE UP (ref 22–31)
CREAT SERPL-MCNC: 0.74 MG/DL — SIGNIFICANT CHANGE UP (ref 0.5–1.3)
EGFR: 93 ML/MIN/1.73M2 — SIGNIFICANT CHANGE UP
GLUCOSE BLDC GLUCOMTR-MCNC: 100 MG/DL — HIGH (ref 70–99)
GLUCOSE BLDC GLUCOMTR-MCNC: 234 MG/DL — HIGH (ref 70–99)
GLUCOSE BLDC GLUCOMTR-MCNC: 256 MG/DL — HIGH (ref 70–99)
GLUCOSE SERPL-MCNC: 106 MG/DL — HIGH (ref 70–99)
HCT VFR BLD CALC: 40.2 % — SIGNIFICANT CHANGE UP (ref 39–50)
HGB BLD-MCNC: 13.3 G/DL — SIGNIFICANT CHANGE UP (ref 13–17)
MAGNESIUM SERPL-MCNC: 2 MG/DL — SIGNIFICANT CHANGE UP (ref 1.6–2.6)
MCHC RBC-ENTMCNC: 28.6 PG — SIGNIFICANT CHANGE UP (ref 27–34)
MCHC RBC-ENTMCNC: 33.1 GM/DL — SIGNIFICANT CHANGE UP (ref 32–36)
MCV RBC AUTO: 86.5 FL — SIGNIFICANT CHANGE UP (ref 80–100)
NRBC # BLD: 0 /100 WBCS — SIGNIFICANT CHANGE UP (ref 0–0)
NRBC # FLD: 0 K/UL — SIGNIFICANT CHANGE UP (ref 0–0)
PHOSPHATE SERPL-MCNC: 3.1 MG/DL — SIGNIFICANT CHANGE UP (ref 2.5–4.5)
PLATELET # BLD AUTO: 220 K/UL — SIGNIFICANT CHANGE UP (ref 150–400)
POTASSIUM SERPL-MCNC: 4 MMOL/L — SIGNIFICANT CHANGE UP (ref 3.5–5.3)
POTASSIUM SERPL-SCNC: 4 MMOL/L — SIGNIFICANT CHANGE UP (ref 3.5–5.3)
PROT SERPL-MCNC: 6.5 G/DL — SIGNIFICANT CHANGE UP (ref 6–8.3)
RBC # BLD: 4.65 M/UL — SIGNIFICANT CHANGE UP (ref 4.2–5.8)
RBC # FLD: 14.1 % — SIGNIFICANT CHANGE UP (ref 10.3–14.5)
SODIUM SERPL-SCNC: 139 MMOL/L — SIGNIFICANT CHANGE UP (ref 135–145)
WBC # BLD: 16.39 K/UL — HIGH (ref 3.8–10.5)
WBC # FLD AUTO: 16.39 K/UL — HIGH (ref 3.8–10.5)

## 2024-08-02 PROCEDURE — 99222 1ST HOSP IP/OBS MODERATE 55: CPT

## 2024-08-02 PROCEDURE — 74177 CT ABD & PELVIS W/CONTRAST: CPT | Mod: 26

## 2024-08-02 RX ORDER — INSULIN GLARGINE-YFGN 100 [IU]/ML
12 INJECTION, SOLUTION SUBCUTANEOUS AT BEDTIME
Refills: 0 | Status: DISCONTINUED | OUTPATIENT
Start: 2024-08-02 | End: 2024-08-02

## 2024-08-02 RX ORDER — INSULIN LISPRO 100/ML
4 VIAL (ML) SUBCUTANEOUS
Qty: 15 | Refills: 0
Start: 2024-08-02 | End: 2024-08-31

## 2024-08-02 RX ORDER — INSULIN LISPRO 100/ML
4 VIAL (ML) SUBCUTANEOUS
Qty: 1 | Refills: 0
Start: 2024-08-02

## 2024-08-02 RX ORDER — INSULIN LISPRO 100/ML
4 VIAL (ML) SUBCUTANEOUS
Refills: 0 | Status: DISCONTINUED | OUTPATIENT
Start: 2024-08-02 | End: 2024-08-02

## 2024-08-02 RX ORDER — OXYCODONE HYDROCHLORIDE 30 MG/1
1 TABLET ORAL
Qty: 5 | Refills: 0
Start: 2024-08-02

## 2024-08-02 RX ORDER — ACETAMINOPHEN 500 MG
2 TABLET ORAL
Qty: 0 | Refills: 0 | DISCHARGE
Start: 2024-08-02

## 2024-08-02 RX ORDER — TAMSULOSIN HCL 0.4 MG
0.4 CAPSULE ORAL AT BEDTIME
Refills: 0 | Status: DISCONTINUED | OUTPATIENT
Start: 2024-08-02 | End: 2024-08-02

## 2024-08-02 RX ORDER — LOSARTAN POTASSIUM 50 MG/1
100 TABLET, FILM COATED ORAL DAILY
Refills: 0 | Status: DISCONTINUED | OUTPATIENT
Start: 2024-08-02 | End: 2024-08-02

## 2024-08-02 RX ORDER — ASPIRIN 500 MG
81 TABLET ORAL DAILY
Refills: 0 | Status: DISCONTINUED | OUTPATIENT
Start: 2024-08-02 | End: 2024-08-02

## 2024-08-02 RX ORDER — ATORVASTATIN CALCIUM 40 MG/1
20 TABLET, FILM COATED ORAL AT BEDTIME
Refills: 0 | Status: DISCONTINUED | OUTPATIENT
Start: 2024-08-02 | End: 2024-08-02

## 2024-08-02 RX ORDER — URSODIOL 300 MG
1 CAPSULE ORAL
Qty: 28 | Refills: 0
Start: 2024-08-02 | End: 2024-08-15

## 2024-08-02 RX ADMIN — Medication 2: at 12:27

## 2024-08-02 RX ADMIN — Medication 300 MILLIGRAM(S): at 17:15

## 2024-08-02 RX ADMIN — Medication 81 MILLIGRAM(S): at 14:31

## 2024-08-02 RX ADMIN — Medication 300 MILLIGRAM(S): at 05:38

## 2024-08-02 RX ADMIN — LOSARTAN POTASSIUM 100 MILLIGRAM(S): 50 TABLET, FILM COATED ORAL at 14:31

## 2024-08-02 RX ADMIN — Medication 4 UNIT(S): at 17:49

## 2024-08-02 RX ADMIN — Medication 650 MILLIGRAM(S): at 17:15

## 2024-08-02 RX ADMIN — HEPARIN SODIUM 5000 UNIT(S): 1000 INJECTION, SOLUTION INTRAVENOUS; SUBCUTANEOUS at 05:38

## 2024-08-02 RX ADMIN — Medication 650 MILLIGRAM(S): at 17:55

## 2024-08-02 RX ADMIN — Medication 650 MILLIGRAM(S): at 11:25

## 2024-08-02 RX ADMIN — IPRATROPIUM BROMIDE AND ALBUTEROL SULFATE 3 MILLILITER(S): 2.5; .5 SOLUTION RESPIRATORY (INHALATION) at 08:59

## 2024-08-02 RX ADMIN — Medication 3: at 17:49

## 2024-08-02 RX ADMIN — Medication 650 MILLIGRAM(S): at 06:10

## 2024-08-02 RX ADMIN — IPRATROPIUM BROMIDE AND ALBUTEROL SULFATE 3 MILLILITER(S): 2.5; .5 SOLUTION RESPIRATORY (INHALATION) at 15:58

## 2024-08-02 RX ADMIN — Medication 10 UNIT(S): at 12:27

## 2024-08-02 RX ADMIN — Medication 650 MILLIGRAM(S): at 05:38

## 2024-08-02 RX ADMIN — Medication 650 MILLIGRAM(S): at 12:10

## 2024-08-02 RX ADMIN — IPRATROPIUM BROMIDE AND ALBUTEROL SULFATE 3 MILLILITER(S): 2.5; .5 SOLUTION RESPIRATORY (INHALATION) at 03:29

## 2024-08-02 RX ADMIN — HEPARIN SODIUM 5000 UNIT(S): 1000 INJECTION, SOLUTION INTRAVENOUS; SUBCUTANEOUS at 14:31

## 2024-08-02 NOTE — CONSULT NOTE ADULT - SUBJECTIVE AND OBJECTIVE BOX
HPI:    78 year old man with T2DM with hyperglycemia, dementia, admitted with altered mental status and pancreatitis.   He has had diabetes for a few years, managed by his pmd.  HE takes humalog, lantus, ozempic and farxiga.  unsure of doses (per chart lantus 30 and humalog 20, on his last admission in 5/2024 he received lantus 24 and admelog 16).  He reports that he checks glucose daily but does not recall results.  He does not limit carbohydrate intake, reports exercise at home (aerobic) a few times a week.  Has regular optho care, no retinopathy.  Also reports no nephropathy or neuropathy.    He also has hypertension and hyperlipidemia, takes atorvastatin and an ARB      PAST MEDICAL & SURGICAL HISTORY:  Diabetes      TIA (transient ischemic attack)      HTN (hypertension)      Hyperlipidemia      H/O hydrocephalus      Dementia      S/P  shunt          FAMILY HISTORY:  Family history of renal cancer  mother    Family history of prostate cancer in father  father        Social History:Lives with wife    Outpatient Medications:  Home Medications:  acetaminophen 325 mg oral tablet: 2 tab(s) orally every 6 hours (02 Aug 2024 10:42)  aspirin 81 mg oral capsule: 1 cap(s) orally once a day (19 May 2024 22:03)  atorvastatin 20 mg oral tablet: 1 tab(s) orally once a day (19 May 2024 22:03)  Farxiga 10 mg oral tablet: 1 tab(s) orally once a day (19 May 2024 22:03)  HumaLOG 100 units/mL injectable solution: 20 unit(s) injectable 3 times a day (with meals) (19 May 2024 22:02)  insulin glargine 100 units/mL subcutaneous solution: 30 unit(s) subcutaneous once a day (at bedtime) (19 May 2024 22:02)  irbesartan 300 mg oral tablet: 1 tab(s) orally once a day (19 May 2024 22:03)  melatonin 3 mg oral tablet: 1 tab(s) orally once a day (at bedtime) As needed Insomnia (21 May 2024 12:25)  Ozempic 2 mg/1.5 mL (0.25 mg or 0.5 mg dose) subcutaneous solution: 0.25 microcurie subcutaneous every 7 days (19 May 2024 22:03)  tamsulosin 0.4 mg oral capsule: 1 cap(s) orally once a day (at bedtime) (19 May 2024 22:03)      MEDICATIONS  (STANDING):  acetaminophen     Tablet .. 650 milliGRAM(s) Oral every 6 hours  albuterol/ipratropium for Nebulization 3 milliLiter(s) Nebulizer every 6 hours  aspirin  chewable 81 milliGRAM(s) Oral daily  atorvastatin 20 milliGRAM(s) Oral at bedtime  chlorhexidine 2% Cloths 1 Application(s) Topical daily  dextrose 5%. 1000 milliLiter(s) (50 mL/Hr) IV Continuous <Continuous>  dextrose 5%. 1000 milliLiter(s) (100 mL/Hr) IV Continuous <Continuous>  dextrose 50% Injectable 25 Gram(s) IV Push once  dextrose 50% Injectable 12.5 Gram(s) IV Push once  dextrose 50% Injectable 25 Gram(s) IV Push once  glucagon  Injectable 1 milliGRAM(s) IntraMuscular once  heparin   Injectable 5000 Unit(s) SubCutaneous every 8 hours  insulin lispro (ADMELOG) corrective regimen sliding scale   SubCutaneous Before meals and at bedtime  insulin NPH human recombinant 10 Unit(s) SubCutaneous every 12 hours  losartan 100 milliGRAM(s) Oral daily  tamsulosin 0.4 milliGRAM(s) Oral at bedtime  ursodiol Capsule 300 milliGRAM(s) Oral two times a day    MEDICATIONS  (PRN):  dextrose Oral Gel 15 Gram(s) Oral once PRN Blood Glucose LESS THAN 70 milliGRAM(s)/deciliter  oxyCODONE    IR 2.5 milliGRAM(s) Oral every 4 hours PRN Moderate Pain (4 - 6)  oxyCODONE    IR 5 milliGRAM(s) Oral every 6 hours PRN Severe Pain (7 - 10)      Allergies    No Known Allergies    Intolerances      Review of Systems:  Constitutional: No fever  Eyes: No blurry vision  Neuro: No headache  HEENT: No throat pain  Cardiovascular: No chest pain  Respiratory: + SOB,   GI: No abdominal pain  : No dysuria  Skin: no rash  Psych: no depression  Endocrine: as noted in HPI  Hem/lymph: no swelling      PHYSICAL EXAM:  VITALS: T(C): 36.8 (08-02-24 @ 09:54)  T(F): 98.2 (08-02-24 @ 09:54), Max: 99.7 (08-01-24 @ 14:00)  HR: 86 (08-02-24 @ 10:00) (76 - 102)  BP: 152/80 (08-02-24 @ 09:54) (125/75 - 157/83)  RR:  (17 - 18)  SpO2:  (96% - 100%)  Wt(kg): 72.6  GENERAL: NAD,   EYES: No proptosis,  HEENT:  Atraumatic, Normocephalic,   THYROID: Normal size,   RESPIRATORY: Clear to auscultation bilaterally  CARDIOVASCULAR: Regular rhythm; No murmurs; no peripheral edema  GI: Soft, , normal bowel sounds  SKIN: Dry, intact, No rashes or lesions  MUSCULOSKELETAL: normal strength  NEURO: extraocular movements intact, no tremor, normal reflexes  PSYCH: oriented x 2      POCT Blood Glucose.: 234 mg/dL (08-02-24 @ 12:17)  POCT Blood Glucose.: 100 mg/dL (08-02-24 @ 08:14)  POCT Blood Glucose.: 109 mg/dL (08-01-24 @ 22:10)  POCT Blood Glucose.: 121 mg/dL (08-01-24 @ 17:06)  POCT Blood Glucose.: 186 mg/dL (08-01-24 @ 12:14)  POCT Blood Glucose.: 148 mg/dL (08-01-24 @ 08:15)  POCT Blood Glucose.: 181 mg/dL (07-31-24 @ 22:17)  POCT Blood Glucose.: 157 mg/dL (07-31-24 @ 17:15)  POCT Blood Glucose.: 195 mg/dL (07-31-24 @ 12:35)  POCT Blood Glucose.: 119 mg/dL (07-31-24 @ 08:22)  POCT Blood Glucose.: 114 mg/dL (07-31-24 @ 00:42)  POCT Blood Glucose.: 139 mg/dL (07-30-24 @ 20:21)  POCT Blood Glucose.: 129 mg/dL (07-30-24 @ 17:47)  POCT Blood Glucose.: 131 mg/dL (07-30-24 @ 16:17)  POCT Blood Glucose.: 162 mg/dL (07-30-24 @ 14:55)                            13.3   16.39 )-----------( 220      ( 02 Aug 2024 06:16 )             40.2       08-02    139  |  104  |  14  ----------------------------<  106<H>  4.0   |  24  |  0.74    eGFR: 93    Ca    8.0<L>      08-02  Mg     2.00     08-02  Phos  3.1     08-02    TPro  6.5  /  Alb  2.8<L>  /  TBili  0.5  /  DBili  x   /  AST  22  /  ALT  28  /  AlkPhos  109  08-02    Thyroid Function Tests:      A1C with Estimated Average Glucose Result: 7.5 % (07-27-24 @ 00:47)  A1C with Estimated Average Glucose Result: 8.1 % (05-20-24 @ 06:28)      07-26 Chol -- Direct LDL -- LDL calculated -- HDL -- Trig 154<H>, 05-20 Chol 103 Direct LDL -- LDL calculated 46 HDL 40<L> Trig 86  Radiology:

## 2024-08-02 NOTE — PROGRESS NOTE ADULT - SUBJECTIVE AND OBJECTIVE BOX
Surgery Progress Note     Interval/Subjective:  Patient seen and examined. No changes today. Tolerating regular diet.   __________________________________________________________________  Vitals:  T(C): 36.8 (05:35), Max: 37.6 (14:00)  HR: 82 (05:35) (76 - 97)  BP: 147/82 (05:35) (125/75 - 161/89)  RR: 17 (05:35) (17 - 18)  SpO2: 100% (05:35) (96% - 100%)    I & O's:  IN:    Oral Fluid: 660 mL  Total IN: 660 mL    OUT:  Total OUT: 0 mL    Physical Exam:  General: NAD, resting comfortably in bed  HEENT: Normocephalic atraumatic  Respiratory: Nonlabored respirations  Cardio: regular rate, normotensive  Abdomen: soft, nontender, distended (baseline, per family)    Labs:  CBC: 24 @ 06:16          13.3   16.39 >----< 220          40.2    Chemistry: 24 @ 06:16  139|104|14    ------------< 106  4.0|24|0.74    Ca: 8.0 24 @ 06:16  M.00 24 @ 06:16  Phos: 3.1 24 @ 06:16    LFT's: 24 @ 06:16  AST: 22  ALT: 28  ALP: 109  T.Bili: 0.5  D.Bili: --  CBC: 24 @ 06:33          13.8   14.15 >----< 197          42.6    Chemistry: 24 @ 06:33  137|105|16    ------------< 174  4.1|23|0.75    Ca: 7.6 24 @ 06:33  M.20 24 @ 06:33  Phos: 2.3 24 @ 06:33    LFT's: 24 @ 06:33  AST: 24  ALT: 31  ALP: 110  T.Tameka: 0.6  DAbilio: --    __________________________________________________________________

## 2024-08-02 NOTE — PROGRESS NOTE ADULT - PROVIDER SPECIALTY LIST ADULT
Internal Medicine
SICU
SICU
Surgery
SICU
Surgery
Surgery
Internal Medicine
Internal Medicine
SICU
Surgery

## 2024-08-02 NOTE — PROGRESS NOTE ADULT - ASSESSMENT
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 However,  C/O fatigue post yoga session in the absence of CP/SOB/Palpitations/dizziness\par  EKG today Sinus bradycardia with Inc RBBB \par \par At this time will obtain stress test to asses for impaired chronotropic response \par Echocardiogram R/O structural heart disease\par 24 HR holter monitor \par Sleep study and thyroid profile \par complete Labs to include Thyroid profile/Lyme disease\par OV Dr Campos after testing  \par \par OV after testing \par  78M with a PMH of HTN, HLD, dementia, T2DM on insulin, hx CVA vx TIA, hydrocephalus s/p  shunt (2023) who presented to the ED with AMS, fevers and abdominal pain.  Here for acute pancreatitis. Pain improving and now out of the SICU. HIDA was negative. No operation this admission, will f/u for outpatient surgery.     Plan:  - Pain control PRN  - on regular diet  - DVT ppx   - endocrine recs for d/c, as started on ISS while inpatient  - d/c planning    B team [phone]

## 2024-08-02 NOTE — CONSULT NOTE ADULT - ASSESSMENT
78 year old man with T2DM with reasonable control, with history of dementia, admitted with pancreatitis    1) T2DM with hyperglycemia  HOme regimen: lantus 30, humalog 20 qac, ozempic, farxiga  A1c 7.5  INpt glucose target 100 - 180  Values mostly in range.  PT now on regular diet, would stop NPH and transition to basal bolus insulin with lantus 12 unit and admelog 4 units before meals  Will likely need increased doses as po intake increases  If patient is discharged today, recommend discharge on lantus 12 units and humalog 4 units before each meal,  He should follow up with his PMD to titrate doses as insulin requirements increase.  REcommend resuming Farxiga at discharge.  Recommend stopping Ozempic for now given acute pancreatitis.  Can reconsider in the future    HTN  COntinue losartan    Hyperlipidemia  Continue atorvastatin    DIscussed with primary team    Christine Woodward MD  On 8/2/24: via Teams or pager    Other times:  Diabetes team: [phone]    or email [email or web address]

## 2024-08-02 NOTE — CHART NOTE - NSCHARTNOTEFT_GEN_A_CORE
Indication:  Acute hypoxic respiratory failure    Performed by: Carmella Tidwell PGY1    PROCEDURE:  [x] LIMITED ECHO  [x] LIMITED CHEST  [ ] LIMITED RETROPERITONEAL  [ ] LIMITED ABDOMINAL  [ ] LIMITED DVT  [ ] NEEDLE GUIDANCE VASCULAR  [ ] NEEDLE GUIDANCE THORACENTESIS  [ ] NEEDLE GUIDANCE PARACENTESIS  [ ] NEEDLE GUIDANCE PERICARDIOCENTESIS  [ ] OTHER    FINDINGS:  Chest: A-line predominant bilaterally. Air bronchograms present bilaterally in lower lobes. No effusions bilaterally.    ECHO: Grossly normal RV and LV function with no wall motion abnormalities, septal bowing/flattening, or gross valvular pathology   No pericardial effusion  IVC: 1.8 cm with minimal respiratory variation    INTERPRETATION: Lungs with A-lines but consolidations present in bilateral lower lobes. LV and RV function grossly preserved. IVC 1.8cm
Brief update note:     Surgery was planned for OR For [facility], however canceled due to possible pneumonia. Bilirubin has been trending down. No endoscopic interventions indicated at this time.     Thank you for the consult. Please call us back if you have any questions or concerns.     Asiya Vazquez, PGY-6  Gastroenterology/Hepatology Fellow  Available on [employer] Teams  [phone] (Short Range Pager)  [phone] (Long Range Pager)    After 5pm, please contact the on-call GI fellow.
Pt being transferred to general surgical floor. Primary team notified and aware of transfer.  [phone]
SICU Transfer Note    Transfer from: SICU  Transfer to:   Accepting physican:    SICU COURSE:   - POCUS, equivocal  - Azithromycin for CAP  - HIDA negative      ASSESSMENT & PLAN: 78M with a PMH of HTN, HLD, dementia, T2DM on insulin, hx CVA vx TIA, hydrocephalus s/p  shunt (2023) who presented to the ED with AMS, fevers and abdominal pain.  Here for acute pancreatitis. Now with improving pain and AHDS in the SICU. Plan for OR for lap avelina when abdominal pain resolves.       For Follow-Up:   - advance diet given negative HIDA      Vital Signs Last 24 Hrs  T(C): 36.7 (30 Jul 2024 08:00), Max: 37.4 (29 Jul 2024 19:00)  T(F): 98.1 (30 Jul 2024 08:00), Max: 99.3 (29 Jul 2024 19:00)  HR: 76 (30 Jul 2024 09:56) (74 - 96)  BP: 166/86 (30 Jul 2024 09:00) (125/89 - 166/86)  BP(mean): 111 (30 Jul 2024 09:00) (88 - 111)  RR: 25 (30 Jul 2024 09:00) (16 - 31)  SpO2: 98% (30 Jul 2024 09:56) (94% - 100%)    Parameters below as of 30 Jul 2024 09:56  Patient On (Oxygen Delivery Method): room air      I&O's Summary    29 Jul 2024 07:01  -  30 Jul 2024 07:00  --------------------------------------------------------  IN: 2200 mL / OUT: 2335 mL / NET: -135 mL    30 Jul 2024 07:01  -  30 Jul 2024 15:47  --------------------------------------------------------  IN: 200 mL / OUT: 125 mL / NET: 75 mL    MEDICATIONS  (STANDING):  albuterol/ipratropium for Nebulization 3 milliLiter(s) Nebulizer every 6 hours  azithromycin   Tablet 500 milliGRAM(s) Oral every 24 hours  chlorhexidine 2% Cloths 1 Application(s) Topical daily  dextrose 50% Injectable 25 Gram(s) IV Push once  dextrose 50% Injectable 12.5 Gram(s) IV Push once  dextrose 50% Injectable 25 Gram(s) IV Push once  heparin   Injectable 5000 Unit(s) SubCutaneous every 8 hours  insulin lispro (ADMELOG) corrective regimen sliding scale   SubCutaneous every 6 hours  insulin NPH human recombinant 10 Unit(s) SubCutaneous every 12 hours  lactated ringers. 1000 milliLiter(s) (100 mL/Hr) IV Continuous <Continuous>  mupirocin 2% Ointment 1 Application(s) Both Nostrils two times a day  pantoprazole  Injectable 40 milliGRAM(s) IV Push daily  piperacillin/tazobactam IVPB.. 3.375 Gram(s) IV Intermittent every 8 hours    MEDICATIONS  (PRN):  HYDROmorphone  Injectable 0.2 milliGRAM(s) IV Push every 4 hours PRN Moderate Pain (4 - 6)  HYDROmorphone  Injectable 0.5 milliGRAM(s) IV Push every 4 hours PRN Severe Pain (7 - 10)    LABS                                          13.3                  Neurophils% (auto):   x      (07-30 @ 02:00):    14.39)-----------(143          Lymphocytes% (auto):  x                                             40.5                   Eosinphils% (auto):   x        Manual%: Neutrophils x    ; Lymphocytes x    ; Eosinophils x    ; Bands%: x    ; Blasts x                                    139    |  105    |  19                  Calcium: 7.1   / iCa: x      (07-30 @ 02:00)    ----------------------------<  174       Magnesium: 2.20                             3.4     |  21     |  0.84             Phosphorous: 2.1      TPro  5.9    /  Alb  2.6    /  TBili  0.8    /  DBili  x      /  AST  28     /  ALT  48     /  AlkPhos  88     30 Jul 2024 02:00
Source: [X] Patient       [x] EMR        [X] RN        [X] Family at bedside       [] Other: B team during interdisciplinary rounds (IDR)    -If unable to interview patient: [] Trach/Vent/BiPAP  [X] Disoriented/confused/inappropriate to interview as per chart     Nutrition Follow Up Note. Per chart review, 78M with a PMH of HTN, HLD, dementia, T2DM on insulin (A1c 7.5% on 7/27), hx CVA vx TIA, hydrocephalus s/p  shunt (2023) who presented to the ED with AMS, fevers and abdominal pain.  Here for acute pancreatitis.     Met with patient and family at bedside. Collateral obtained from chart review and family. Per family at bedside, assists at home to cook meals for patient. Patient currently with good appetite and had >75% of meals at breakfast per family report. No nausea/vomiting/diarrhea/constipation or difficulty chewing and swallowing reported at this time. +BM. RD provided DM and heart healthy diet education to patient family and written instruction was provided. Family appreciated visit and were receptive to information provided.         Diet Prescription: Diet, Regular:   Consistent Carbohydrate {Evening Snack} (CSTCHOSN) (08-01-24 @ 14:22)    Food Allergy: NKFA    Pertinent Medications: MEDICATIONS  (STANDING):  acetaminophen     Tablet .. 650 milliGRAM(s) Oral every 6 hours  albuterol/ipratropium for Nebulization 3 milliLiter(s) Nebulizer every 6 hours  atorvastatin 20 milliGRAM(s) Oral at bedtime  chlorhexidine 2% Cloths 1 Application(s) Topical daily  dextrose 5%. 1000 milliLiter(s) (100 mL/Hr) IV Continuous <Continuous>  dextrose 5%. 1000 milliLiter(s) (50 mL/Hr) IV Continuous <Continuous>  dextrose 50% Injectable 25 Gram(s) IV Push once  dextrose 50% Injectable 12.5 Gram(s) IV Push once  dextrose 50% Injectable 25 Gram(s) IV Push once  glucagon  Injectable 1 milliGRAM(s) IntraMuscular once  heparin   Injectable 5000 Unit(s) SubCutaneous every 8 hours  insulin lispro (ADMELOG) corrective regimen sliding scale   SubCutaneous Before meals and at bedtime  insulin NPH human recombinant 10 Unit(s) SubCutaneous every 12 hours  losartan 100 milliGRAM(s) Oral daily  tamsulosin 0.4 milliGRAM(s) Oral at bedtime  ursodiol Capsule 300 milliGRAM(s) Oral two times a day    MEDICATIONS  (PRN):  dextrose Oral Gel 15 Gram(s) Oral once PRN Blood Glucose LESS THAN 70 milliGRAM(s)/deciliter  oxyCODONE    IR 2.5 milliGRAM(s) Oral every 4 hours PRN Moderate Pain (4 - 6)  oxyCODONE    IR 5 milliGRAM(s) Oral every 6 hours PRN Severe Pain (7 - 10)    Pertinent Labs: 08-02 Na139 mmol/L Glu 106 mg/dL<H> K+ 4.0 mmol/L Cr  0.74 mg/dL BUN 14 mg/dL 08-02 Phos 3.1 mg/dL 08-02 Alb 2.8 g/dL<L> 07-26 Chol --    LDL --    HDL --    Trig 154 mg/dL<H>    A1C with Estimated Average Glucose Result: 7.5: High Risk (prediabetic)    5.7 - 6.4 %  Diabetic, diagnostic           > 6.5 %  ADA diabetic treatment goal    < 7.0 %  HbA1C values may not accurately reflect mean blood glucose in patients  with Hb variants.  Suggest clinical correlation. % (07.27.24 @ 00:47)        POCT Blood Glucose.: 100 mg/dL (02 Aug 2024 08:14)  POCT Blood Glucose.: 109 mg/dL (01 Aug 2024 22:10)  POCT Blood Glucose.: 121 mg/dL (01 Aug 2024 17:06)  POCT Blood Glucose.: 186 mg/dL (01 Aug 2024 12:14)        Weight: Weight (kg): 74.5kg 7/30   73.9kg 7/28  73.1kg (7/27)  72.6 (07-26 @ 14:59)   Weight Assessment:1.9kg/4lbs weight gain over 4 days per documented weight.   Height: 165.1cm  IBW: 136lbs/61.8kg +/-10%  BMI: 27.2kg/m^2 based on weight 7/30-74.5kg      Edema: no edema per nursing flowsheets.   Pressure Injury: No pressure ulcers/DTI noted in flowsheets.       Estimated Needs:   [] No change since previous assessment, based on weight  lbs / kg    [ X] recalculated, with consideration for, based on IBW-61.8kg  Estimated Energy Needs (kcal/kg):    25-77=[id number]/d  Estimated Protein Needs(g/kg):      1.2-1.4 = 74-87g pro/d    Previous Nutrition Diagnosis:   [ X] Inadequate Protein Energy Intake   Nutrition Diagnosis is [X ] ongoing  [ ] resolved [ ] not applicable     New Nutrition Diagnosis: Altered Nutrition related lab related to endocrine dysfunction (T2DM) As evidenced by elevated blood glucose, HbA1c     Education:  [X ] Provided-as mentioned above  [ ] Provided on previous assessment by RD  [ ] Not applicable secondary to   [ ] Pt refused     Nutrition Interventions:     1) Continue current diet order, which remains appropriate at this time.   2) Please document % PO intake in nursing flowsheet.   3) Monitor weights, labs, BM's, skin integrity, p.o. intake.   4) Honor food and beverage preferences within diet restriction of patient in an effort to maximize level of nutrient intake.  5) Please Encourage po intake, assist with meals and menu selections, provide alternatives PRN.   6) Suggest outpatient follow up with an endocrinologist to ensure long-term DM diet comprehension and compliance.   7) Suggest outpatient follow up with appropriate RD for the purposes of long-term nutrition evaluation and diet education.           Timothy Tello, TAYLOR, MS, CDN pager [phone]  Also available on [employer] Teams

## 2024-08-22 ENCOUNTER — APPOINTMENT (OUTPATIENT)
Dept: TRAUMA SURGERY | Facility: CLINIC | Age: 79
End: 2024-08-22
Payer: MEDICARE

## 2024-08-22 VITALS
OXYGEN SATURATION: 98 % | BODY MASS INDEX: 25.83 KG/M2 | WEIGHT: 155 LBS | HEART RATE: 103 BPM | DIASTOLIC BLOOD PRESSURE: 85 MMHG | HEIGHT: 65 IN | SYSTOLIC BLOOD PRESSURE: 136 MMHG

## 2024-08-22 PROCEDURE — 99214 OFFICE O/P EST MOD 30 MIN: CPT

## 2024-08-24 NOTE — PHYSICAL EXAM
[Normal Breath Sounds] : Normal breath sounds [Normal Heart Sounds] : normal heart sounds [Alert] : alert [No Rash or Lesion] : No rash or lesion [Oriented to Person] : oriented to person [Oriented to Place] : oriented to place [Oriented to Time] : oriented to time [Calm] : calm [de-identified] : NAD, alert, verbal  [de-identified] : Soft, nondistended, nontender [de-identified] : in wheelchair

## 2024-08-24 NOTE — HISTORY OF PRESENT ILLNESS
[de-identified] : 79yo M with h/o DM and  shunt presents for follow up after admission on 8/2 for pancreatitis. Work up at that time revealed cholelithiasis, however he was also taking ozempic. Has been off ozempic since. Reports abdominal pain has improved significantly. Is tolerating regular diet, having regular bowel movements. Denies RUQ pain. Denies fevers/chills, nausea/vomiting.   Pt wheelchair-bound.

## 2024-08-24 NOTE — PLAN
[FreeTextEntry1] : - Follow up as needed - Would plan for cholecystectomy only if recurrent episodes of pancreatitis while off ozempic

## 2024-08-24 NOTE — DATA REVIEWED
[FreeTextEntry1] : CT reviewed - necrotizing pancreatitis. Normal diameter of bile duct.   US from 2021 - gallstones

## 2024-09-09 NOTE — ED ADULT NURSE REASSESSMENT NOTE - PAIN RATING/NUMBER SCALE (0-10): ACTIVITY
0 (no pain/absence of nonverbal indicators of pain)
sterile technique, old cysto removed, new tube inserted

## 2024-09-16 NOTE — DIETITIAN INITIAL EVALUATION ADULT - REASON FOR ADMISSION
Poison ivy dermatitis  Medrol Dosepak as directed  Elocon cream as directed  Follow up with PCP in 3-5 days.  Proceed to  ER if symptoms worsen.  
Traumatic subdural hemorrhage with loss of consciousness of unspecified duration, initial encounter    "77yo M with hx of DM-II with Neuropathy, TIAs, HTN, and HLD was sent to ED by his ophthalmologist for papilledema and HA x 1 month for further evaluation. 

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
## 2024-10-11 NOTE — CONSULT NOTE ADULT - CONSULT REASON
Pancreatitis
same name as above
acute pancreatis
medical management/ SOB
r/o shunt malfunction
diabetes

## 2024-11-14 ENCOUNTER — INPATIENT (INPATIENT)
Facility: HOSPITAL | Age: 79
LOS: 4 days | Discharge: HOME | End: 2024-11-19
Attending: SURGERY | Admitting: SURGERY
Payer: MEDICARE

## 2024-11-14 VITALS
HEART RATE: 101 BPM | RESPIRATION RATE: 18 BRPM | WEIGHT: 158.95 LBS | TEMPERATURE: 98 F | HEIGHT: 65 IN | DIASTOLIC BLOOD PRESSURE: 80 MMHG | OXYGEN SATURATION: 97 % | SYSTOLIC BLOOD PRESSURE: 134 MMHG

## 2024-11-14 DIAGNOSIS — Z98.2 PRESENCE OF CEREBROSPINAL FLUID DRAINAGE DEVICE: Chronic | ICD-10-CM

## 2024-11-14 LAB
ADD ON TEST-SPECIMEN IN LAB: SIGNIFICANT CHANGE UP
ALBUMIN SERPL ELPH-MCNC: 4.2 G/DL — SIGNIFICANT CHANGE UP (ref 3.3–5)
ALP SERPL-CCNC: 142 U/L — HIGH (ref 40–120)
ALT FLD-CCNC: 252 U/L — HIGH (ref 4–41)
ANION GAP SERPL CALC-SCNC: 15 MMOL/L — HIGH (ref 7–14)
APTT BLD: 34.4 SEC — SIGNIFICANT CHANGE UP (ref 24.5–35.6)
AST SERPL-CCNC: 459 U/L — HIGH (ref 4–40)
BASOPHILS # BLD AUTO: 0.05 K/UL — SIGNIFICANT CHANGE UP (ref 0–0.2)
BASOPHILS NFR BLD AUTO: 0.3 % — SIGNIFICANT CHANGE UP (ref 0–2)
BILIRUB SERPL-MCNC: 3 MG/DL — HIGH (ref 0.2–1.2)
BLOOD GAS VENOUS COMPREHENSIVE RESULT: SIGNIFICANT CHANGE UP
BUN SERPL-MCNC: 15 MG/DL — SIGNIFICANT CHANGE UP (ref 7–23)
CALCIUM SERPL-MCNC: 8.7 MG/DL — SIGNIFICANT CHANGE UP (ref 8.4–10.5)
CHLORIDE SERPL-SCNC: 99 MMOL/L — SIGNIFICANT CHANGE UP (ref 98–107)
CHOLEST SERPL-MCNC: 104 MG/DL — SIGNIFICANT CHANGE UP
CO2 SERPL-SCNC: 25 MMOL/L — SIGNIFICANT CHANGE UP (ref 22–31)
CREAT SERPL-MCNC: 0.91 MG/DL — SIGNIFICANT CHANGE UP (ref 0.5–1.3)
EGFR: 86 ML/MIN/1.73M2 — SIGNIFICANT CHANGE UP
EOSINOPHIL # BLD AUTO: 0.02 K/UL — SIGNIFICANT CHANGE UP (ref 0–0.5)
EOSINOPHIL NFR BLD AUTO: 0.1 % — SIGNIFICANT CHANGE UP (ref 0–6)
GLUCOSE SERPL-MCNC: 172 MG/DL — HIGH (ref 70–99)
HCT VFR BLD CALC: 54 % — HIGH (ref 39–50)
HDLC SERPL-MCNC: 45 MG/DL — SIGNIFICANT CHANGE UP
HGB BLD-MCNC: 17.9 G/DL — HIGH (ref 13–17)
IANC: 12.85 K/UL — HIGH (ref 1.8–7.4)
IMM GRANULOCYTES NFR BLD AUTO: 0.5 % — SIGNIFICANT CHANGE UP (ref 0–0.9)
INR BLD: 0.99 RATIO — SIGNIFICANT CHANGE UP (ref 0.85–1.16)
LIDOCAIN IGE QN: >3000 U/L — HIGH (ref 7–60)
LIPID PNL WITH DIRECT LDL SERPL: 38 MG/DL — SIGNIFICANT CHANGE UP
LYMPHOCYTES # BLD AUTO: 0.83 K/UL — LOW (ref 1–3.3)
LYMPHOCYTES # BLD AUTO: 5.5 % — LOW (ref 13–44)
MCHC RBC-ENTMCNC: 28.5 PG — SIGNIFICANT CHANGE UP (ref 27–34)
MCHC RBC-ENTMCNC: 33.1 G/DL — SIGNIFICANT CHANGE UP (ref 32–36)
MCV RBC AUTO: 86 FL — SIGNIFICANT CHANGE UP (ref 80–100)
MONOCYTES # BLD AUTO: 1.21 K/UL — HIGH (ref 0–0.9)
MONOCYTES NFR BLD AUTO: 8.1 % — SIGNIFICANT CHANGE UP (ref 2–14)
NEUTROPHILS # BLD AUTO: 12.85 K/UL — HIGH (ref 1.8–7.4)
NEUTROPHILS NFR BLD AUTO: 85.5 % — HIGH (ref 43–77)
NON HDL CHOLESTEROL: 59 MG/DL — SIGNIFICANT CHANGE UP
NRBC # BLD: 0 /100 WBCS — SIGNIFICANT CHANGE UP (ref 0–0)
NRBC # FLD: 0 K/UL — SIGNIFICANT CHANGE UP (ref 0–0)
PLATELET # BLD AUTO: 242 K/UL — SIGNIFICANT CHANGE UP (ref 150–400)
POTASSIUM SERPL-MCNC: 6 MMOL/L — HIGH (ref 3.5–5.3)
POTASSIUM SERPL-SCNC: 6 MMOL/L — HIGH (ref 3.5–5.3)
PROT SERPL-MCNC: 8.4 G/DL — HIGH (ref 6–8.3)
PROTHROM AB SERPL-ACNC: 11.5 SEC — SIGNIFICANT CHANGE UP (ref 9.9–13.4)
RBC # BLD: 6.28 M/UL — HIGH (ref 4.2–5.8)
RBC # FLD: 13.2 % — SIGNIFICANT CHANGE UP (ref 10.3–14.5)
SODIUM SERPL-SCNC: 139 MMOL/L — SIGNIFICANT CHANGE UP (ref 135–145)
TRIGL SERPL-MCNC: 106 MG/DL — SIGNIFICANT CHANGE UP
TROPONIN T, HIGH SENSITIVITY RESULT: 1130 NG/L — CRITICAL HIGH
WBC # BLD: 15.03 K/UL — HIGH (ref 3.8–10.5)
WBC # FLD AUTO: 15.03 K/UL — HIGH (ref 3.8–10.5)

## 2024-11-14 PROCEDURE — 74018 RADEX ABDOMEN 1 VIEW: CPT | Mod: 26

## 2024-11-14 PROCEDURE — 74177 CT ABD & PELVIS W/CONTRAST: CPT | Mod: 26,MC

## 2024-11-14 PROCEDURE — 70250 X-RAY EXAM OF SKULL: CPT | Mod: 26

## 2024-11-14 PROCEDURE — 71045 X-RAY EXAM CHEST 1 VIEW: CPT | Mod: 26

## 2024-11-14 PROCEDURE — 99285 EMERGENCY DEPT VISIT HI MDM: CPT | Mod: GC

## 2024-11-14 PROCEDURE — 76705 ECHO EXAM OF ABDOMEN: CPT | Mod: 26

## 2024-11-14 RX ORDER — ACETAMINOPHEN 500MG 500 MG/1
1000 TABLET, COATED ORAL ONCE
Refills: 0 | Status: COMPLETED | OUTPATIENT
Start: 2024-11-14 | End: 2024-11-14

## 2024-11-14 RX ORDER — HYDROMORPHONE HYDROCHLORIDE 2 MG/1
0.5 TABLET ORAL ONCE
Refills: 0 | Status: DISCONTINUED | OUTPATIENT
Start: 2024-11-14 | End: 2024-11-14

## 2024-11-14 RX ORDER — 0.9 % SODIUM CHLORIDE 0.9 %
1000 INTRAVENOUS SOLUTION INTRAVENOUS ONCE
Refills: 0 | Status: COMPLETED | OUTPATIENT
Start: 2024-11-14 | End: 2024-11-14

## 2024-11-14 RX ORDER — 0.9 % SODIUM CHLORIDE 0.9 %
1000 INTRAVENOUS SOLUTION INTRAVENOUS
Refills: 0 | Status: DISCONTINUED | OUTPATIENT
Start: 2024-11-14 | End: 2024-11-15

## 2024-11-14 RX ORDER — KETOROLAC TROMETHAMINE 30 MG/ML
15 INJECTION INTRAMUSCULAR; INTRAVENOUS ONCE
Refills: 0 | Status: DISCONTINUED | OUTPATIENT
Start: 2024-11-14 | End: 2024-11-14

## 2024-11-14 RX ORDER — ONDANSETRON HYDROCHLORIDE 4 MG/1
4 TABLET, FILM COATED ORAL ONCE
Refills: 0 | Status: COMPLETED | OUTPATIENT
Start: 2024-11-14 | End: 2024-11-14

## 2024-11-14 RX ADMIN — KETOROLAC TROMETHAMINE 15 MILLIGRAM(S): 30 INJECTION INTRAMUSCULAR; INTRAVENOUS at 20:34

## 2024-11-14 RX ADMIN — ACETAMINOPHEN 500MG 400 MILLIGRAM(S): 500 TABLET, COATED ORAL at 18:41

## 2024-11-14 RX ADMIN — HYDROMORPHONE HYDROCHLORIDE 0.5 MILLIGRAM(S): 2 TABLET ORAL at 21:36

## 2024-11-14 RX ADMIN — Medication 80 MILLILITER(S): at 23:36

## 2024-11-14 RX ADMIN — ONDANSETRON HYDROCHLORIDE 4 MILLIGRAM(S): 4 TABLET, FILM COATED ORAL at 18:41

## 2024-11-14 RX ADMIN — HYDROMORPHONE HYDROCHLORIDE 0.5 MILLIGRAM(S): 2 TABLET ORAL at 20:34

## 2024-11-14 RX ADMIN — KETOROLAC TROMETHAMINE 15 MILLIGRAM(S): 30 INJECTION INTRAMUSCULAR; INTRAVENOUS at 21:36

## 2024-11-14 RX ADMIN — Medication 500 MILLILITER(S): at 20:34

## 2024-11-14 NOTE — ED ADULT NURSE NOTE - OBJECTIVE STATEMENT
pt a&ox3, ambulatory, presents to ED for 1 day of sudden onset abdominal pain associated with nausea and 1 episode of nonbloody vomiting. pt with hx of  shunt. denies fevers, chills, urinary complaints, chest pain sob. no acute distress noted at this time. respirations even and nonlabored on room air. 20G IV placed in LAC, labs drawn and sent. medicated as ordered. pt pending CT. pt a&ox3, ambulatory, presents to ED for 1 day of sudden onset abdominal pain associated with nausea and 1 episode of nonbloody vomiting. pt with hx of  shunt. denies fevers, chills, urinary complaints, chest pain sob. no acute distress noted at this time. respirations even and nonlabored on room air. 20G IV placed in L arm, labs drawn and sent. medicated as ordered. pt pending CT.  CGM noted to L upper arm, Darnell, changed 2 days ago, no signs or symptoms of infection, site clean and dry. pt presents to ED for 1 day of sudden onset abdominal pain associated with nausea and 1 episode of nonbloody vomiting. pt with hx of  shunt. pt at baseline mentation, oriented to self with frequent periods of confusion. denies fevers, chills, urinary complaints, chest pain sob. no acute distress noted at this time. respirations even and nonlabored on room air. 20G IV placed in L arm, labs drawn and sent. medicated as ordered. pt pending CT.  CGM noted to L upper arm, Darnell, changed 2 days ago, no signs or symptoms of infection, site clean and dry.

## 2024-11-14 NOTE — ED PROVIDER NOTE - ATTENDING CONTRIBUTION TO CARE
Referral was faxed to the office for today's appointment to Dr. Olaf Rubin's office to [phone].   Attending MD Leo:  I performed a history and physical exam of the patient and discussed their management with the resident. I reviewed the resident's note and agree with the documented findings and plan of care. My medical decision making and observations are found above.     Attending MD Leo.  Agree with above.  Pt is a 80 yo male with pmhx of HTN, HLD, DM and L subdural in 2022, NPH with  shunt placed 2023 now with 1 day nausea/abd'l pain that began acutely yesterday evening and has not improved.  Vomited once today post-prandial.  No hx of abd'l surg.  No known hx of hernia.  Small periumbilical fullness/induration/discoloration of unclear acuity.  Seen a few mos ago for pancreatitis, no cholecystectomy at that time.  Was on ozempic then, is no longer on ozempic.  No assoc HA or dizziness.

## 2024-11-14 NOTE — ED PROVIDER NOTE - CLINICAL SUMMARY MEDICAL DECISION MAKING FREE TEXT BOX
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 He is hemodynamically stable, afebrile, on exam he is in no apparent distress, abdomen is distended with slight discoloration/protrusion lateral to umbilicus, generalized tenderness to palpation throughout. Recent imaging without any evidence aortic dilation, no hx a fib, makes mesenteric ischemia/AAA unlikely. Differential includes pancreatitis vs incarcerated umbilical hernia vs appendicitis vs hepatobiliary pathology vs  shunt malfunction. Will get labs, CT AP + shunt series, sx control, reassess.

## 2024-11-14 NOTE — ED PROVIDER NOTE - OBJECTIVE STATEMENT
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 The pain started yesterday afternoon while at home fairly acutely, has persisted through today. He had one episode of vomiting today after eating which prompted ED visit. He has not had pain like this before. Denies fever/chills, chest pain, SOB, dysuria/hematuria, diarrhea. Last BM this AM, denies constipation. 40 year smoking history, imaging from 8/2024 with normal caliber aorta. 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 The pain started yesterday afternoon while at home fairly acutely, has persisted through today. He had one episode of vomiting today after eating which prompted ED visit. He has not had pain like this before. Denies fever/chills, chest pain, SOB, dysuria/hematuria, diarrhea. Last BM this AM, denies constipation. 40 year smoking history, imaging from 8/2024 with normal caliber aorta.

## 2024-11-14 NOTE — ED PROVIDER NOTE - PROGRESS NOTE DETAILS
Teresa Hogan) [facility] PGY-3: Patient care transferred to myself at shift change.   Lipase over 3000, combined with presentation, diagnosis of pancreatitis.  However troponin is also over a thousand.  Will add on CT a PE to rule out PE.  reeval patient at bedside, report epigastric pain, no chest pain no shortness of breath.  Will give additional pain medication repeat EKG,  start on fluid, likely admit Teresa Hogan) [facility] PGY-3: EKG does not doctor there were ischemic events.  Suspect elevation troponin likely in the setting of acute pancreatitis,   Which there case report regarding this.  Will repeat troponin, however if it is flatline, will not further kaitlin this. Mick Juarez MD (Attending MD): Patient signed out to me. hemodynamically stable at this time. Pt with pancreatitis on labs/imaging, however also with trops >1000 and >2800. This is likely trop leak 2/2 muscle breakdown. EKGs non-ischemic. cards evaluated patient, also agress likely related to pancreatitis. CT w/ pseudocyst and US w/ pericholecystic fluid/edema, pending surgery consult/eval. Mick Juarez MD (Attending MD): Patient evaluated by surgery team, will admit to surgery team.

## 2024-11-14 NOTE — ED PROVIDER NOTE - PHYSICAL EXAMINATION
General: Alert and Orientated x 3. No apparent distress.  Eyes: No scleral icterus.   ENT: Mucous membranes moist  Cardiac: No murmurs appreciated.   Pulmonary: CTA bilaterally. No increased WOB.   Abdominal: Distended, slight discoloration/protrusion lateral to umbilicus, generalized tenderness to palpation throughout.  Neurologic: Mild cognitive impairment. No focal sensory or motor deficits.  Extremities: No lower extremity edema, bruising, soreness   Skin: Color appropriate for race. Intact, warm, and well-perfused.  Psychiatric: Appropriate mood and affect. No apparent risk to self or others.

## 2024-11-14 NOTE — ED ADULT TRIAGE NOTE - CHIEF COMPLAINT QUOTE
pt c/o abd pain since last night. pt endorses one episode of vomiting. denies fever/chills.  hx- pancreatitis, htn, DM, HLD, hydrocephalous with shunt

## 2024-11-14 NOTE — ED ADULT NURSE NOTE - NSFALLUNIVINTERV_ED_ALL_ED
Bed/Stretcher in lowest position, wheels locked, appropriate side rails in place/Call bell, personal items and telephone in reach/Instruct patient to call for assistance before getting out of bed/chair/stretcher/Non-slip footwear applied when patient is off stretcher/[place] to call system/Physically safe environment - no spills, clutter or unnecessary equipment/Purposeful proactive rounding/Room/bathroom lighting operational, light cord in reach

## 2024-11-15 ENCOUNTER — RESULT REVIEW (OUTPATIENT)
Age: 79
End: 2024-11-15

## 2024-11-15 LAB
A1C WITH ESTIMATED AVERAGE GLUCOSE RESULT: 8.4 % — HIGH (ref 4–5.6)
ADD ON TEST-SPECIMEN IN LAB: SIGNIFICANT CHANGE UP
ALBUMIN SERPL ELPH-MCNC: 3.4 G/DL — SIGNIFICANT CHANGE UP (ref 3.3–5)
ALP SERPL-CCNC: 136 U/L — HIGH (ref 40–120)
ALT FLD-CCNC: 170 U/L — HIGH (ref 4–41)
ANION GAP SERPL CALC-SCNC: 13 MMOL/L — SIGNIFICANT CHANGE UP (ref 7–14)
AST SERPL-CCNC: 299 U/L — HIGH (ref 4–40)
BILIRUB SERPL-MCNC: 1.2 MG/DL — SIGNIFICANT CHANGE UP (ref 0.2–1.2)
BUN SERPL-MCNC: 19 MG/DL — SIGNIFICANT CHANGE UP (ref 7–23)
CALCIUM SERPL-MCNC: 8.4 MG/DL — SIGNIFICANT CHANGE UP (ref 8.4–10.5)
CHLORIDE SERPL-SCNC: 104 MMOL/L — SIGNIFICANT CHANGE UP (ref 98–107)
CHOLEST SERPL-MCNC: 105 MG/DL — SIGNIFICANT CHANGE UP
CK MB BLD-MCNC: 13.7 % — HIGH (ref 0–2.5)
CK MB CFR SERPL CALC: 241.2 NG/ML — HIGH
CK SERPL-CCNC: 1759 U/L — HIGH (ref 30–200)
CO2 SERPL-SCNC: 23 MMOL/L — SIGNIFICANT CHANGE UP (ref 22–31)
CREAT SERPL-MCNC: 0.92 MG/DL — SIGNIFICANT CHANGE UP (ref 0.5–1.3)
EGFR: 85 ML/MIN/1.73M2 — SIGNIFICANT CHANGE UP
ESTIMATED AVERAGE GLUCOSE: 194 — SIGNIFICANT CHANGE UP
GAS PNL BLDV: SIGNIFICANT CHANGE UP
GLUCOSE BLDC GLUCOMTR-MCNC: 111 MG/DL — HIGH (ref 70–99)
GLUCOSE BLDC GLUCOMTR-MCNC: 119 MG/DL — HIGH (ref 70–99)
GLUCOSE BLDC GLUCOMTR-MCNC: 128 MG/DL — HIGH (ref 70–99)
GLUCOSE BLDC GLUCOMTR-MCNC: 133 MG/DL — HIGH (ref 70–99)
GLUCOSE BLDC GLUCOMTR-MCNC: 141 MG/DL — HIGH (ref 70–99)
GLUCOSE BLDC GLUCOMTR-MCNC: 154 MG/DL — HIGH (ref 70–99)
GLUCOSE SERPL-MCNC: 131 MG/DL — HIGH (ref 70–99)
HCT VFR BLD CALC: 46.7 % — SIGNIFICANT CHANGE UP (ref 39–50)
HDLC SERPL-MCNC: 45 MG/DL — SIGNIFICANT CHANGE UP
HGB BLD-MCNC: 15.4 G/DL — SIGNIFICANT CHANGE UP (ref 13–17)
LIPID PNL WITH DIRECT LDL SERPL: 46 MG/DL — SIGNIFICANT CHANGE UP
MCHC RBC-ENTMCNC: 28.4 PG — SIGNIFICANT CHANGE UP (ref 27–34)
MCHC RBC-ENTMCNC: 33 G/DL — SIGNIFICANT CHANGE UP (ref 32–36)
MCV RBC AUTO: 86.2 FL — SIGNIFICANT CHANGE UP (ref 80–100)
NON HDL CHOLESTEROL: 60 MG/DL — SIGNIFICANT CHANGE UP
NRBC # BLD: 0 /100 WBCS — SIGNIFICANT CHANGE UP (ref 0–0)
NRBC # FLD: 0 K/UL — SIGNIFICANT CHANGE UP (ref 0–0)
PLATELET # BLD AUTO: 227 K/UL — SIGNIFICANT CHANGE UP (ref 150–400)
POTASSIUM SERPL-MCNC: 5 MMOL/L — SIGNIFICANT CHANGE UP (ref 3.5–5.3)
POTASSIUM SERPL-SCNC: 5 MMOL/L — SIGNIFICANT CHANGE UP (ref 3.5–5.3)
PROT SERPL-MCNC: 6.6 G/DL — SIGNIFICANT CHANGE UP (ref 6–8.3)
RBC # BLD: 5.42 M/UL — SIGNIFICANT CHANGE UP (ref 4.2–5.8)
RBC # FLD: 13.3 % — SIGNIFICANT CHANGE UP (ref 10.3–14.5)
SODIUM SERPL-SCNC: 140 MMOL/L — SIGNIFICANT CHANGE UP (ref 135–145)
TRIGL SERPL-MCNC: 72 MG/DL — SIGNIFICANT CHANGE UP
TROPONIN T, HIGH SENSITIVITY RESULT: 2973 NG/L — CRITICAL HIGH
TROPONIN T, HIGH SENSITIVITY RESULT: 3895 NG/L — CRITICAL HIGH
TSH SERPL-MCNC: 2.28 UIU/ML — SIGNIFICANT CHANGE UP (ref 0.27–4.2)
WBC # BLD: 11.1 K/UL — HIGH (ref 3.8–10.5)
WBC # FLD AUTO: 11.1 K/UL — HIGH (ref 3.8–10.5)

## 2024-11-15 PROCEDURE — 99222 1ST HOSP IP/OBS MODERATE 55: CPT | Mod: GC

## 2024-11-15 PROCEDURE — 99223 1ST HOSP IP/OBS HIGH 75: CPT | Mod: FS

## 2024-11-15 PROCEDURE — 99222 1ST HOSP IP/OBS MODERATE 55: CPT

## 2024-11-15 PROCEDURE — 71275 CT ANGIOGRAPHY CHEST: CPT | Mod: 26,MC

## 2024-11-15 PROCEDURE — 93306 TTE W/DOPPLER COMPLETE: CPT | Mod: 26

## 2024-11-15 RX ORDER — ACETAMINOPHEN, DIPHENHYDRAMINE HCL, PHENYLEPHRINE HCL 325; 25; 5 MG/1; MG/1; MG/1
3 TABLET ORAL AT BEDTIME
Refills: 0 | Status: DISCONTINUED | OUTPATIENT
Start: 2024-11-15 | End: 2024-11-19

## 2024-11-15 RX ORDER — TAMSULOSIN HYDROCHLORIDE 0.4 MG/1
0.4 CAPSULE ORAL AT BEDTIME
Refills: 0 | Status: DISCONTINUED | OUTPATIENT
Start: 2024-11-15 | End: 2024-11-19

## 2024-11-15 RX ORDER — 0.9 % SODIUM CHLORIDE 0.9 %
1000 INTRAVENOUS SOLUTION INTRAVENOUS
Refills: 0 | Status: DISCONTINUED | OUTPATIENT
Start: 2024-11-15 | End: 2024-11-16

## 2024-11-15 RX ORDER — 0.9 % SODIUM CHLORIDE 0.9 %
1000 INTRAVENOUS SOLUTION INTRAVENOUS
Refills: 0 | Status: DISCONTINUED | OUTPATIENT
Start: 2024-11-15 | End: 2024-11-17

## 2024-11-15 RX ORDER — ACETAMINOPHEN 500MG 500 MG/1
650 TABLET, COATED ORAL EVERY 6 HOURS
Refills: 0 | Status: DISCONTINUED | OUTPATIENT
Start: 2024-11-15 | End: 2024-11-19

## 2024-11-15 RX ORDER — ENOXAPARIN SODIUM 30 MG/.3ML
40 INJECTION SUBCUTANEOUS EVERY 24 HOURS
Refills: 0 | Status: DISCONTINUED | OUTPATIENT
Start: 2024-11-15 | End: 2024-11-19

## 2024-11-15 RX ORDER — GLUCAGON INJECTION, SOLUTION 0.5 MG/.1ML
1 INJECTION, SOLUTION SUBCUTANEOUS ONCE
Refills: 0 | Status: DISCONTINUED | OUTPATIENT
Start: 2024-11-15 | End: 2024-11-17

## 2024-11-15 RX ORDER — INSULIN GLARGINE 100 [IU]/ML
6 INJECTION, SOLUTION SUBCUTANEOUS AT BEDTIME
Refills: 0 | Status: DISCONTINUED | OUTPATIENT
Start: 2024-11-15 | End: 2024-11-17

## 2024-11-15 RX ADMIN — INSULIN GLARGINE 6 UNIT(S): 100 INJECTION, SOLUTION SUBCUTANEOUS at 23:00

## 2024-11-15 RX ADMIN — Medication 81 MILLIGRAM(S): at 12:54

## 2024-11-15 RX ADMIN — ENOXAPARIN SODIUM 40 MILLIGRAM(S): 30 INJECTION SUBCUTANEOUS at 12:54

## 2024-11-15 RX ADMIN — ACETAMINOPHEN, DIPHENHYDRAMINE HCL, PHENYLEPHRINE HCL 3 MILLIGRAM(S): 325; 25; 5 TABLET ORAL at 04:07

## 2024-11-15 RX ADMIN — Medication 110 MILLILITER(S): at 22:50

## 2024-11-15 RX ADMIN — Medication 100 MILLILITER(S): at 04:23

## 2024-11-15 RX ADMIN — TAMSULOSIN HYDROCHLORIDE 0.4 MILLIGRAM(S): 0.4 CAPSULE ORAL at 22:51

## 2024-11-15 RX ADMIN — Medication 110 MILLILITER(S): at 14:48

## 2024-11-15 NOTE — ED ADULT NURSE REASSESSMENT NOTE - NS ED NURSE REASSESS COMMENT FT1
report received from [facility] pastor. Pt returns from imaging at this time, A&Ox4. Pt denies N/V/D, chest pain, headache, dizziness, SOB, palpitations, urinary symptoms verbalized. respirations even and unlabored. urine sample sent. safety maintained, call bell in reach. pending surgery eval
turned and positioned, hygiene care provided. provided with blankets and pillows for comfort. pt in no acute distress at this time. pt awaiting results and dispo. wife at bedside.
Break RN: Pt received in stretcher outside of room 23. Pt resting comfortably. c/o abdominal discomfort but states has improved with pain med and is tolerable. Denies cp, sob, dizziness, and palpitation. Respiration even and non-labored. in NAD. NSR on monitor. Pending CT result
report received from [facility] pastor. Pt A&Ox2-3, oriented to self but periods of intermittent confusion. hx of dementia. Pt wife at bedside. Pt endorsing increased chest pain, midtsternal. Pt labs noted to be elevated, MD Hogan aware. EKG performed, placed on cardiac monitor. NSR noted. respirations even and unlabored. repeat labs sent, medicated per MAR. safety maintained throughout, pending CT
pt resting in stretcher, awaiting admission bed placement. pt requesting medication to help him sleep, medicated as per MAR. pt denies headache, dizziness, SOB, chest pain, N/V/D verbalized. respirations even and unlabored. NSR on monitor. safety maintained

## 2024-11-15 NOTE — CONSULT NOTE ADULT - NS ATTEND AMEND GEN_ALL_CORE FT
troponin elevation in the setting of acute pancreatitis and skeletal myonecrosis  no evidence of significant volume overload. no orthopnea, lungs clear  f/u TTE
Agree with above. CT reviewed, no choledocholithiasis seen. Non-dilated duct. Presenting for recurrent biliary pancreatitis (GB sludge seen on US). GI consulted for elevated LFTs and also pancreatic collections. CT with small collections, no signs of GOO, no indication for drainage at this time. Favor pain related to recurrent acute pancreatitis. No signs of cholangitis, afebrile and HD stable. Recommend MRI/MRCP to eval for choledocholithaisis. Given this is his second attack of pancreatitis, surgical eval for cholecystectomy pending clinical progression and MR.    Total time spent to complete patient's bedside assessment, physical examination, review medical chart including labs & imaging, discuss medical plan of care with housestaff was more than 50 minutes.

## 2024-11-15 NOTE — H&P ADULT - NSHPLABSRESULTS_GEN_ALL_CORE
< from: CT Abdomen and Pelvis w/ IV Cont (11.14.24 @ 19:24) >    ACC: [id number] EXAM:  CT ABDOMEN AND PELVIS IC   ORDERED BY: ADRIAN SOLOMON     PROCEDURE DATE:  11/14/2024          INTERPRETATION:  CLINICAL INFORMATION: Abdominal pain. Concern for   incarcerated umbilical hernia.    COMPARISON: CT of the abdomen and pelvis 8/2/2024    CONTRAST/COMPLICATIONS:  IV Contrast: Omnipaque 350  90 cc administered   10 cc discarded  Oral Contrast: NONE  Complications: None reported at the time the exam.    PROCEDURE:  CT of the Abdomen and Pelvis was performed.  Sagittal and coronal reformats were performed.    FINDINGS:  LOWER CHEST: Coronary artery calcifications.    LIVER: Within normal limits.  BILE DUCTS: Normal caliber.  GALLBLADDER: Within normal limits.  SPLEEN: Within normal limits.  PANCREAS: Moderate peripancreatic fat stranding with multiple loculated   fluid collections along the head/neck, and distal body/tail. Collection   of the head/neck measures 4.2 x 1.6 cm. Collection at the distal   body/tail measures 5.5 x 1.6 cm. Small focus of hypoenhancement in the   pancreatic tail and hypodense material in the peripancreatic collection,   suggesting the presence of undigested fat.  ADRENALS: Stable 1.3 cm left adrenal nodule.  KIDNEYS/URETERS: Within normal limits.    BLADDER: Within normal limits.  REPRODUCTIVE ORGANS: Prostate within normal limits.    BOWEL: No bowel obstruction. Appendix is normal.  PERITONEUM/RETROPERITONEUM: Retroperitoneal shunt catheter tip terminates   in the left lower pelvis. Visualized portions of the shunt catheter are   intact without kinking or discontinuity.  VESSELS: Atherosclerotic changes.  LYMPH NODES: No lymphadenopathy.  ABDOMINAL WALL: Within normal limits.  BONES: Degenerative changes.    IMPRESSION:  Acute pancreatitis with peripancreatic fat stranding. Interval   development of pancreatic pseudocysts in the head/neck and distal   body/tail.    Small focus of hypoenhancement in the pancreatic tail with hypodense   material in the adjacent peripancreatic collection concerning for   pancreatic and peripancreatic necrosis.      < end of copied text >

## 2024-11-15 NOTE — CONSULT NOTE ADULT - SUBJECTIVE AND OBJECTIVE BOX
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 Cardiology consulted for c/f troponinemia. Per patient on exam, of which he is alone at bedside, the pain started yesterday afternoon while at home fairly acutely, has persisted through today. He had one episode of vomiting today after eating which prompted ED visit. He has not had pain like this before. Denies fever/chills, chest pain, SOB, dysuria/hematuria, diarrhea. Last BM this AM, denies constipation. 40 year smoking history, imaging from 8/2024 with normal caliber aorta. Patient poor historian, says he cannot recall names of anti-HTN meds he takes at home and says he does take medications for DM2. Reports smoking history, denies ETOH, denies illicit substance use. Denies history of arrhythmia, ACS, HF, denies family history of cardiac disease. Says he has PCP and does follow with a cardiologist however cannot recall name. From chart review, patient had recent admission in July 2024 at [facility] for management of acute pancreatitis and was admitted to SICU.    HOSPITAL COURSE:  Triage vital signs/vital signs while in ED: SBP 120s-130s, HR 70s-100s, satting >95% on room air, 98F oral temp  Medications/tx received while in ED: LR 1000 cc x2, Toradol x1, Dilaudid x1, Zofran x1, Ofirmev x1  EKG: NSR HR 80s, RBBB, 2nd EKG nondynamic, both EKGs nonischemic, appears similar compared to prior EKG  Telemetry: NSR no significant ectopy  Pertinent labs:  WBC 15.03, H.H 17.9/54, trop 1.1k->2.8k, CK 2400, CKMB 294.4, CPK 12.3, Lipase >3000, VBG lactate 3.7    Imaging: CTA Chest PE protocol negative for PE, CT A/P and RUQ U/S findings as below  CT Abdomen and Pelvis w/ IV Cont (11.14.24 @ 19:24)  FINDINGS:  LOWER CHEST: Coronary artery calcifications.    LIVER: Within normal limits.  BILE DUCTS: Normal caliber.  GALLBLADDER: Within normal limits.  SPLEEN: Within normal limits.  PANCREAS: Moderate peripancreatic fat stranding with multiple loculated   fluid collections along the head/neck, and distal body/tail. Collection   of the head/neck measures 4.2 x 1.6 cm. Collection at the distal   body/tail measures 5.5 x 1.6 cm. Small focus of hypoenhancement in the   pancreatic tail and hypodense material in the peripancreatic collection,   suggesting the presence of undigested fat.  ADRENALS: Stable 1.3 cm left adrenal nodule.  KIDNEYS/URETERS: Within normal limits.    BLADDER: Within normal limits.  REPRODUCTIVE ORGANS: Prostate within normal limits.    BOWEL: No bowel obstruction. Appendix is normal.  PERITONEUM/RETROPERITONEUM: Retroperitoneal shunt catheter tip terminates   in the left lower pelvis. Visualized portions of the shunt catheter are   intact without kinking or discontinuity.  VESSELS: Atherosclerotic changes.  LYMPH NODES: No lymphadenopathy.  ABDOMINAL WALL: Within normal limits.  BONES: Degenerative changes.    IMPRESSION:  Acute pancreatitis with peripancreatic fat stranding. Interval   development of pancreatic pseudocysts in the head/neck and distal   body/tail.    Small focus of hypoenhancement in the pancreatic tail with hypodense   material in the adjacent peripancreatic collection concerning for   pancreatic and peripancreatic necrosis.    --- End of Report ---      US Abdomen Upper Quadrant Right (11.14.24 @ 23:27)  FINDINGS:  Liver: Within normal limits.  Bile ducts: Normal caliber. Common bile duct measures 7 mm.  Gallbladder: Biliary sludge. Gallbladder wall thickening up to 4 mm.   Trace pericholecystic fluid. Negative sonographic Rawls's sign.  Pancreas: Poorly visualized.  Right kidney: 10.3 cm. No hydronephrosis.  Ascites: None.  IVC: Visualized portions are within normal limits.    IMPRESSION:  Nonspecific gallbladder wall thickening and trace pericholecystic fluid.    --- End of Report ---          Allergies  No Known Allergies    	    MEDICATIONS:              lactated ringers. 1000 milliLiter(s) IV Continuous <Continuous>      PAST MEDICAL & SURGICAL HISTORY:  Diabetes      TIA (transient ischemic attack)      HTN (hypertension)      Hyperlipidemia      H/O hydrocephalus      Dementia      S/P  shunt          FAMILY HISTORY:  Family history of renal cancer  mother    Family history of prostate cancer in father  father        SOCIAL HISTORY:    [ ] Non-smoker  [ x ] Smoker- 2-2 cigarettes for several years  [ ] Alcohol  [ x ] Denies Alcohol      REVIEW OF SYSTEMS:  CONSTITUTIONAL: no fevers or chills  EYES/ENT: No visual changes; No vertigo or throat pain  NECK: No JVD  RESPIRATORY:  No cough, wheezing, chills or hemoptysis, SOB  CARDIOVASCULAR: No chest pain, palpitations, passing out, dizziness, or leg swelling  GASTROINTESTINAL: +Abdominal or epigastric pain. +Nausea/vomiting. No melena  Genitourinary: No dysuria, frequency or hematuria  NEUROLOGICAL: No numbness or weakness  SKIN: No itching, burning, rashes or lesions      PHYSICAL EXAM:  T(C): 36.7 (11-15-24 @ 01:38), Max: 36.7 (11-14-24 @ 17:13)  HR: 80 (11-15-24 @ 01:38) (79 - 101)  BP: 128/88 (11-15-24 @ 01:38) (123/80 - 151/92)  RR: 18 (11-15-24 @ 01:38) (18 - 24)  SpO2: 96% (11-15-24 @ 01:38) (96% - 100%)  Wt(kg): --  ICU Vital Signs Last 24 Hrs  T(C): 36.7 (15 Nov 2024 01:38), Max: 36.7 (14 Nov 2024 17:13)  T(F): 98 (15 Nov 2024 01:38), Max: 98 (14 Nov 2024 17:13)  HR: 80 (15 Nov 2024 01:38) (79 - 101)  BP: 128/88 (15 Nov 2024 01:38) (123/80 - 151/92)  BP(mean): --  ABP: --  ABP(mean): --  RR: 18 (15 Nov 2024 01:38) (18 - 24)  SpO2: 96% (15 Nov 2024 01:38) (96% - 100%)    O2 Parameters below as of 15 Nov 2024 01:38  Patient On (Oxygen Delivery Method): room air          I&O's Summary      PHYSICAL EXAM:  Appearance: NAD, laying comfortably in bed, AOx1  HEENT:   Normal oral mucosa	  Cardiovascular: Normal S1 S2, No JVD, No murmurs, No edema  Respiratory: Lungs clear to auscultation	  Psychiatry: A & O x 3, Mood & affect appropriate  Gastrointestinal: Distended, slight discoloration/protrusion lateral to umbilicus, generalized TTP over RUQ and throughout abd  Skin: No rashes, No ecchymoses, No cyanosis	  Neurologic: Some cognitive impairment noted. No focal sensory or motor deficits.  Extremities: Warm and well perfused. 2+ pulses bilaterally        LABS:	 	    CBC Full  -  ( 14 Nov 2024 18:08 )  WBC Count : 15.03 K/uL  Hemoglobin : 17.9 g/dL  Hematocrit : 54.0 %  Platelet Count - Automated : 242 K/uL  Mean Cell Volume : 86.0 fL  Mean Cell Hemoglobin : 28.5 pg  Mean Cell Hemoglobin Concentration : 33.1 g/dL  Auto Neutrophil # : 12.85 K/uL  Auto Lymphocyte # : 0.83 K/uL  Auto Monocyte # : 1.21 K/uL  Auto Eosinophil # : 0.02 K/uL  Auto Basophil # : 0.05 K/uL  Auto Neutrophil % : 85.5 %  Auto Lymphocyte % : 5.5 %  Auto Monocyte % : 8.1 %  Auto Eosinophil % : 0.1 %  Auto Basophil % : 0.3 %    11-14    139  |  99  |  15  ----------------------------<  172[H]  6.0[H]   |  25  |  0.91    Ca    8.7      14 Nov 2024 18:08    TPro  8.4[H]  /  Alb  4.2  /  TBili  3.0[H]  /  DBili  x   /  AST  459[H]  /  ALT  252[H]  /  AlkPhos  142[H]  11-14      proBNP:   Lipid Profile:   HgA1c:   TSH:     CARDIAC MARKERS:  Troponin 1130->2896  CK 2400  CKMB 294.4  CPK 12.3              	    PREVIOUS DIAGNOSTIC TESTING:    [ ] Echocardiogram:  [ ]  Catheterization:  [ ] Stress Test:

## 2024-11-15 NOTE — H&P ADULT - HISTORY OF PRESENT ILLNESS
SURGERY CONSULT NOTE  --------------------------------------------------------------------------------------------    Patient is a 79y old  Male who presents with a chief complaint of abdominal pain     HPI: 79M with hx of MME and VPS, pancreatitis, last admitted 3 months ago for biliary vs ozempic related pancreatitis, required SICU stay at that time.  Now with several days epigastric pain, denies EtOH use for several years, no RUQ pain, 1 episode NBNB vomiting, no fevers, subjective chills. Denies chest pain but ED workup revealed elevated troponins 1100 ->  2800.  Unsure of his medications aside from stating he took ozempic 2 days ago. Otherwise poor historian, per patient has "memory problems" Workup also consistent with pancreatitis, surgery consulted, cardiology consulted in ED.       ROS: 10-system review is otherwise negative except HPI above.      PAST MEDICAL & SURGICAL HISTORY:  Diabetes      TIA (transient ischemic attack)      HTN (hypertension)      Hyperlipidemia      H/O hydrocephalus      Dementia      S/P  shunt        FAMILY HISTORY:  Family history of renal cancer  mother    Family history of prostate cancer in father  father        SOCIAL HISTORY:      ALLERGIES: No Known Allergies      HOME MEDICATIONS:     CURRENT MEDICATIONS  MEDICATIONS (STANDING): lactated ringers. 1000 milliLiter(s) IV Continuous <Continuous>    MEDICATIONS (PRN):  --------------------------------------------------------------------------------------------    Vitals:   T(C): 36.7 (11-15-24 @ 01:38), Max: 36.7 (11-14-24 @ 17:13)  HR: 80 (11-15-24 @ 01:38) (79 - 101)  BP: 128/88 (11-15-24 @ 01:38) (123/80 - 151/92)  RR: 18 (11-15-24 @ 01:38) (18 - 24)  SpO2: 96% (11-15-24 @ 01:38) (96% - 100%)  CAPILLARY BLOOD GLUCOSE      POCT Blood Glucose.: 145 mg/dL (14 Nov 2024 17:17)    Height (cm): 165.1 (11-14 @ 17:13)  Weight (kg): 72.1 (11-14 @ 17:13)  BMI (kg/m2): 26.5 (11-14 @ 17:13)  BSA (m2): 1.79 (11-14 @ 17:13)    PHYSICAL EXAM:   General: NAD, Lying in bed comfortably  Neuro: A+Ox3,  shunt   HEENT: NC/AT, anicteric sclera   Cardio: RRR at radial pulse   Resp: Good effort, room air   GI/Abd: Softly distended tender in epigastrium, no guarding no rebound old port sites well healed   Skin: Intact, no breakdown, nonjaundiced   Musculoskeletal: All 4 extremities moving spontaneously, no limitations.  --------------------------------------------------------------------------------------------    LABS  CBC (11-14 @ 18:08)                              17.9[H]                         15.03[H]  )----------------(  242        85.5[H]% Neutrophils, 5.5[L]% Lymphocytes, ANC: 12.85[H]                              54.0[H]    BMP (11-14 @ 18:08)             139     |  99      |  15    		Ca++ --      Ca 8.7                ---------------------------------( 172[H]		Mg --                 6.0[H]  |  25      |  0.91  			Ph --        LFTs (11-14 @ 18:08)      TPro 8.4[H] / Alb 4.2 / TBili 3.0[H] / DBili -- / [H] / [H] / AlkPhos 142[H]    Coags (11-14 @ 18:08)  aPTT 34.4 / INR 0.99 / PT 11.5        VBG (11-14 @ 18:08)     7.32 / 61[H] / 22[L] / 31[H] / 2.9 / 26.6[L]%     Lactate: 3.7[H]    --------------------------------------------------------------------------------------------    MICROBIOLOGY  Urinalysis (11-14 @ 18:08):     Color:  / Appearance:  / SG:  / pH:  / Gluc: 172[H] / Ketones:  / Bili:  / Urobili:  / Protein : / Nitrites:  / Leuk.Est:  / RBC:  / WBC:  / Sq Epi:  / Non Sq Epi:  / Bacteria

## 2024-11-15 NOTE — CONSULT NOTE ADULT - SUBJECTIVE AND OBJECTIVE BOX
Chief Complaint:  Patient is a 79y old  Male who presents with a chief complaint of pancreatitis (15 Nov 2024 03:31)      HPI:  78yo M with hx of hydrocephalus and VPS, dementia, pancreatitis, last admitted 8/2024 for biliary vs ozempic related pancreatitis, required SICU stay at that time. Patient is AOx1 on evaluation this AM, interacting verbally, pleasant, but not oriented to place or time. Now reporting 1 day of abdominal pain (pointing periumbilical/RUQ), denies EtOH use. Reports 3 episodes NBNB vomiting, no fevers, subjective chills. Last PO was 11/14. ED workup revealed elevated troponins 1100 ->  2800. Per notes patient still on ozempic. CTAP and RUQUS with findings consistent with pancreatitis with small fluid collections. No choledocho identified on imaging. TBili in mixed pattern with hyperbili 3.0. Lipase >3000.      Allergies:  No Known Allergies    Hospital Medications:  acetaminophen     Tablet .. 650 milliGRAM(s) Oral every 6 hours PRN  aspirin  chewable 81 milliGRAM(s) Oral daily  dextrose 5%. 1000 milliLiter(s) IV Continuous <Continuous>  dextrose 5%. 1000 milliLiter(s) IV Continuous <Continuous>  dextrose 50% Injectable 25 Gram(s) IV Push once  dextrose 50% Injectable 12.5 Gram(s) IV Push once  dextrose 50% Injectable 25 Gram(s) IV Push once  dextrose Oral Gel 15 Gram(s) Oral once PRN  enoxaparin Injectable 40 milliGRAM(s) SubCutaneous every 24 hours  glucagon  Injectable 1 milliGRAM(s) IntraMuscular once  insulin glargine Injectable (LANTUS) 6 Unit(s) SubCutaneous at bedtime  insulin lispro (ADMELOG) corrective regimen sliding scale   SubCutaneous three times a day before meals  lactated ringers. 1000 milliLiter(s) IV Continuous <Continuous>  melatonin 3 milliGRAM(s) Oral at bedtime PRN  tamsulosin 0.4 milliGRAM(s) Oral at bedtime      PMHX/PSHX:  Diabetes    TIA (transient ischemic attack)    HTN (hypertension)    Hyperlipidemia    H/O hydrocephalus    Dementia    No significant past surgical history    S/P  shunt      Family history:  Family history of renal cancer    Family history of prostate cancer in father      Social History:     Tob: Denies  EtOH: Denies  Illicit Drugs: Denies    ROS:   General:  No wt loss, fevers, chills, night sweats, fatigue  Eyes:  Good vision, no reported pain  ENT:  No sore throat, pain, runny nose, dysphagia  CV:  No pain, palpitations, hypo/hypertension  Pulm:  No dyspnea, cough, tachypnea, wheezing  GI:  As per HPI  :  No pain, bleeding, incontinence, nocturia  Muscle:  No pain, weakness  Neuro:  No weakness, tingling, memory problems  Psych:  No fatigue, insomnia, mood problems, depression  Endocrine:  No polyuria, polydipsia, cold/heat intolerance  Heme:  No petechiae, ecchymosis, easy bruisability  Skin:  No rash, tattoos, scars, edema    PHYSICAL EXAM:   GENERAL:  No acute distress  HEENT:  Normocephalic/atraumatic, no scleral icterus  CHEST:  No accessory muscle use  HEART:  Regular rate and rhythm  ABDOMEN:  mild distension, soft, tender to palpation RUQ and epigastric regions  EXTREMITIES: No b/l LE edema  SKIN:  No rash, jaundice  NEURO:  Verbally interacting. Pleasasnt. Alert and oriented x 1 (not oriented to place or time)    Vital Signs:  Vital Signs Last 24 Hrs  T(C): 36.4 (15 Nov 2024 06:45), Max: 36.7 (14 Nov 2024 17:13)  T(F): 97.6 (15 Nov 2024 06:45), Max: 98 (14 Nov 2024 17:13)  HR: 91 (15 Nov 2024 06:45) (79 - 101)  BP: 145/86 (15 Nov 2024 06:45) (123/80 - 192/91)  BP(mean): --  RR: 16 (15 Nov 2024 06:45) (15 - 24)  SpO2: 99% (15 Nov 2024 06:45) (96% - 100%)    Parameters below as of 15 Nov 2024 05:15  Patient On (Oxygen Delivery Method): room air      Daily Height in cm: 165.1 (14 Nov 2024 17:13)    Daily     LABS:                        17.9   15.03 )-----------( 242      ( 14 Nov 2024 18:08 )             54.0     Mean Cell Volume: 86.0 fL (11-14-24 @ 18:08)    11-14    139  |  99  |  15  ----------------------------<  172[H]  6.0[H]   |  25  |  0.91    Ca    8.7      14 Nov 2024 18:08    TPro  8.4[H]  /  Alb  4.2  /  TBili  3.0[H]  /  DBili  x   /  AST  459[H]  /  ALT  252[H]  /  AlkPhos  142[H]  11-14    LIVER FUNCTIONS - ( 14 Nov 2024 18:08 )  Alb: 4.2 g/dL / Pro: 8.4 g/dL / ALK PHOS: 142 U/L / ALT: 252 U/L / AST: 459 U/L / GGT: x           PT/INR - ( 14 Nov 2024 18:08 )   PT: 11.5 sec;   INR: 0.99 ratio         PTT - ( 14 Nov 2024 18:08 )  PTT:34.4 sec  Urinalysis Basic - ( 14 Nov 2024 18:08 )    Color: x / Appearance: x / SG: x / pH: x  Gluc: 172 mg/dL / Ketone: x  / Bili: x / Urobili: x   Blood: x / Protein: x / Nitrite: x   Leuk Esterase: x / RBC: x / WBC x   Sq Epi: x / Non Sq Epi: x / Bacteria: x      Amylase Serum--      Lipase serum>3000       Ammonia--                          17.9   15.03 )-----------( 242      ( 14 Nov 2024 18:08 )             54.0       Imaging:  < from: CT Abdomen and Pelvis w/ IV Cont (11.14.24 @ 19:24) >  FINDINGS:  LOWER CHEST: Coronary artery calcifications.    LIVER: Within normal limits.  BILE DUCTS: Normal caliber.  GALLBLADDER: Within normal limits.  SPLEEN: Within normal limits.  PANCREAS: Moderate peripancreatic fat stranding with multiple loculated   fluid collections along the head/neck, and distal body/tail. Collection   of the head/neck measures 4.2 x 1.6 cm. Collection at the distal   body/tail measures 5.5 x 1.6 cm. Small focus of hypoenhancement in the   pancreatic tail and hypodense material in the peripancreatic collection,   suggesting the presence of undigested fat.  ADRENALS: Stable 1.3 cm left adrenal nodule.  KIDNEYS/URETERS: Within normal limits.    BLADDER: Within normal limits.  REPRODUCTIVE ORGANS: Prostate within normal limits.    BOWEL: No bowel obstruction. Appendix is normal.  PERITONEUM/RETROPERITONEUM: Retroperitoneal shunt catheter tip terminates   in the left lower pelvis. Visualized portions of the shunt catheter are   intact without kinking or discontinuity.  VESSELS: Atherosclerotic changes.  LYMPH NODES: No lymphadenopathy.  ABDOMINAL WALL: Within normal limits.  BONES: Degenerative changes.    IMPRESSION:  Acute pancreatitis with peripancreatic fat stranding. Interval   development of pancreatic pseudocysts in the head/neck and distal   body/tail.    Small focus of hypoenhancement in the pancreatic tail with hypodense   material in the adjacent peripancreatic collection concerning for   pancreatic and peripancreatic necrosis.    < end of copied text >  < from: US Abdomen Upper Quadrant Right (11.14.24 @ 23:27) >  FINDINGS:  Liver: Within normal limits.  Bile ducts: Normal caliber. Common bile duct measures 7 mm.  Gallbladder: Biliary sludge. Gallbladder wall thickening up to 4 mm.   Trace pericholecystic fluid. Negative sonographic Rawls's sign.  Pancreas: Poorly visualized.  Right kidney: 10.3 cm. No hydronephrosis.  Ascites: None.  IVC: Visualized portions are within normal limits.    IMPRESSION:  Nonspecific gallbladder wall thickening and trace pericholecystic fluid.    < end of copied text >

## 2024-11-15 NOTE — PATIENT PROFILE ADULT - FALL HARM RISK - HARM RISK INTERVENTIONS
Assistance with ambulation/Assistance OOB with selected safe patient handling equipment/Communicate Risk of Fall with Harm to all staff/Discuss with provider need for PT consult/Monitor for mental status changes/Monitor gait and stability/Move patient closer to nurses' station/Provide patient with walking aids - walker, cane, crutches/Reinforce activity limits and safety measures with patient and family/Reorient to person, place and time as needed/Tailored Fall Risk Interventions/Toileting schedule using arm’s reach rule for commode and bathroom/Use of alarms - bed, chair and/or voice tab/Visual Cue: Yellow wristband and red socks/Bed in lowest position, wheels locked, appropriate side rails in place/Call bell, personal items and telephone in reach/Instruct patient to call for assistance before getting out of bed or chair/Non-slip footwear when patient is out of bed/[place] to call system/Physically safe environment - no spills, clutter or unnecessary equipment/Purposeful Proactive Rounding/Room/bathroom lighting operational, light cord in reach

## 2024-11-15 NOTE — CONSULT NOTE ADULT - ASSESSMENT
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 Cardiology consulted for c/f troponinemia. Patient HDS, EKGs nonischemic, CP free, and cardiac enzymes elevated, highly suspect troponin leak secondary to demand ischemia and muscle breakdown in setting of acute pancreatitis. Lipase significantly elevated to >3k and imaging confirming acute pancreatitis. Low suspicion for ACS given nonischemic, nondynamic EKGs and patient CP free.      PLAN:  #Troponinemia  - Pt admitted to telemetry for monitoring of arrhythmias  - Would not treat with ACS protocol  - Can obtain additional set of troponin, CK-MB, CK, CPK to ensure lack of significant uptrend  - Troponin likely demand ischemia in setting of acute pancreatitis rhabdomyolysis as troponin T also present in skeletal muscle  - EKG significant for NSR w/RBBB (chronic), nonischemic/nondynamic  - Obtain TTE in AM for further assessment of LV and RV function  - F/U risk stratification labs A1C, TSH, lipid profile  - Continue management of acute pancreatitis per medicine team    Should patient HD status change, please call cardiology at #[phone]  Note Incomplete until Attending Attestation 79M with PMHx HTN, HLD, dementia, DM on insulin, 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 Cardiology consulted for c/f troponinemia. Patient HDS, EKGs nonischemic, CP free, and cardiac enzymes elevated, highly suspect troponin leak secondary to demand ischemia and muscle breakdown in setting of acute pancreatitis. Lipase significantly elevated to >3k and imaging confirming acute pancreatitis. Low suspicion for ACS given nonischemic, nondynamic EKGs and patient CP free.      PLAN:  #Troponinemia  - Recommend admission to telemetry for monitoring of arrhythmias  - Would not treat with ACS protocol  - Can obtain additional set of troponin, CK-MB, CK, CPK to ensure lack of significant uptrend   - EKG significant for NSR w/RBBB (chronic), nonischemic/nondynamic  - Obtain TTE in AM for further assessment of LV and RV function  - F/U risk stratification labs A1C, TSH, lipid profile  - Continue management of acute pancreatitis/SICU consult per medicine team      Should patient HD status change, please call cardiology at #[phone]  Note Incomplete until Attending Attestation

## 2024-11-15 NOTE — H&P ADULT - ASSESSMENT
Assessment: 79M with recurrent pancreatitis likely biliary vs ozempic related, with acute pancreatitis and peripancreatic fluid collections/pseudocysts.     Plan:  - admit, Dr. Hamm, floor  - npo bowel rest  - IV fluids   - pain control  - DM, lantus and admelog and sliding scale   - f/u cardiology recs and serial troponin for uptrending cardiac enzymes in ED without ACS symptoms at this time   - consider GI/IR evaluation for pseudocyst drainage when acute pancreatitis symptoms have improved  - d/w Dr. Hansel Jimenez MD, PGY4  B Team Surgery   [id number] Assessment: 79M with recurrent pancreatitis likely biliary vs ozempic related, with acute pancreatitis and peripancreatic fluid collections/pseudocysts.     Plan:  - admit, Dr. Hamm, floor  - npo bowel rest  - IV fluids   - pain control  - DM, lantus and admelog and sliding scale   - f/u cardiology recs and serial troponin for uptrending cardiac enzymes in ED without ACS symptoms at this time   - GI consult for hyperbilirubinemia, 7mm CBD, cholelithiasis (emailed)  - consider IR evaluation for pseudocyst drainage when acute pancreatitis symptoms have improved  - d/w Dr. Hansel Jimenez MD, PGY4  B Team Surgery   [id number]

## 2024-11-15 NOTE — H&P ADULT - ATTENDING COMMENTS
Assessment: 79M with recurrent pancreatitis likely biliary vs ozempic related, with acute pancreatitis and peripancreatic fluid collections/pseudocysts.     Plan:  - admit  - npo bowel rest  - IV fluids   - pain control  - DM, lantus and admelog and sliding scale   - f/u cardiology recs and serial troponin for uptrending cardiac enzymes in ED without ACS symptoms at this time   - GI consult for hyperbilirubinemia, 7mm CBD, cholelithiasis (emailed)  - consider IR evaluation for pseudocyst drainage when acute pancreatitis symptoms have improved

## 2024-11-15 NOTE — PATIENT PROFILE ADULT - FUNCTIONAL ASSESSMENT - BASIC MOBILITY 5.
[FreeTextEntry1] : Patient has had no acute cardiac symptoms.  There have been no symptoms of acute chest pain shortness of breath dizziness lightheadedness or palpitations.\par The patient has had some cognitive difficulties in the past.  However I believe her cognitive acuity has improved over the past 2 years.
2 = A lot of assistance

## 2024-11-15 NOTE — CONSULT NOTE ADULT - ASSESSMENT
80yo M with hx of hydrocephalus and VPS, dementia, pancreatitis, last admitted 8/2024 for biliary vs ozempic related pancreatitis, required SICU stay at that time. Patient is AOx1 on evaluation this AM, interacting verbally, pleasant, but not oriented to place or time. Now reporting 1 day of abdominal pain (pointing periumbilical/RUQ), denies EtOH use. Reports 3 episodes NBNB vomiting, no fevers, subjective chills. Last PO was 11/14. ED workup revealed elevated troponins 1100 ->  2800. Per notes patient still on ozempic. CTAP and RUQUS with findings consistent with pancreatitis with small fluid collections. No choledocho identified on imaging. TBili in mixed pattern with hyperbili 3.0. Lipase >3000.      #Abdominal Pain/Tenderness  #N/V  #Dementia   #Leukocytosis  #Cholelithiasis  #Mixed LFT Pattern with Hyperbili  #Acute Pancreatitis with small fluid collections (abdominal pain, imaging findings, and lipase elevation)  #Prior episode of pancreatitis 8/2024, cholelithiasis noted, did not undergo cholecystectomy    DDx: patient with abdominal pain, concern for pancreatitis based on pain, imaging, and lab (lipase) elevation. Patient without choledocholithiasis identified on imaging thus far (no stone and CBD caliber WNL). Pancreatic fluid collections small, not amenable to endoscopic drainage. RUQUS with concern for cholecystitis, GB wall 4mm and patient with tender RUQ to palpation. Low suspicion for cholangitis at this time, pt w VSS and not jaundiced.    Recommendations:  -obtain collateral from ?wife, patient having difficulty providing details of presentation and PMHx.  -trend CBC, CMP  -obtain MR/MRCP w/wo IVC for evaluation for choledocholithiasis  -1.5mL/kg/hour LR IV fluids. Monitor fluid status closely  -advance diet as patient tolerates  -can consider for ERCP pending MR if with choledocholithiasis. If no stone, would rec surgery eval for cholecystectomy timing  -no plan for pancreatic fluid collection drainage at this time, given small size of collections  -appreciate cards eval  -Diamante as per primary team      All recommendations preliminary until note signed by service attending.    Thank you for involving us in the care of this patient. Please contact should any concern or questions arise.    Ameya Live MD   Gastroenterology/Hepatology Fellow PGY-5  Available on [employer] Teams 7am - 5pm  [phone]

## 2024-11-16 LAB
ALBUMIN SERPL ELPH-MCNC: 3 G/DL — LOW (ref 3.3–5)
ALP SERPL-CCNC: 117 U/L — SIGNIFICANT CHANGE UP (ref 40–120)
ALT FLD-CCNC: 109 U/L — HIGH (ref 4–41)
ANION GAP SERPL CALC-SCNC: 14 MMOL/L — SIGNIFICANT CHANGE UP (ref 7–14)
AST SERPL-CCNC: 128 U/L — HIGH (ref 4–40)
BILIRUB SERPL-MCNC: 1.1 MG/DL — SIGNIFICANT CHANGE UP (ref 0.2–1.2)
BUN SERPL-MCNC: 16 MG/DL — SIGNIFICANT CHANGE UP (ref 7–23)
CALCIUM SERPL-MCNC: 8.1 MG/DL — LOW (ref 8.4–10.5)
CHLORIDE SERPL-SCNC: 102 MMOL/L — SIGNIFICANT CHANGE UP (ref 98–107)
CO2 SERPL-SCNC: 20 MMOL/L — LOW (ref 22–31)
CREAT SERPL-MCNC: 0.75 MG/DL — SIGNIFICANT CHANGE UP (ref 0.5–1.3)
EGFR: 92 ML/MIN/1.73M2 — SIGNIFICANT CHANGE UP
GLUCOSE BLDC GLUCOMTR-MCNC: 105 MG/DL — HIGH (ref 70–99)
GLUCOSE BLDC GLUCOMTR-MCNC: 107 MG/DL — HIGH (ref 70–99)
GLUCOSE BLDC GLUCOMTR-MCNC: 116 MG/DL — HIGH (ref 70–99)
GLUCOSE BLDC GLUCOMTR-MCNC: 122 MG/DL — HIGH (ref 70–99)
GLUCOSE BLDC GLUCOMTR-MCNC: 126 MG/DL — HIGH (ref 70–99)
GLUCOSE SERPL-MCNC: 111 MG/DL — HIGH (ref 70–99)
HCT VFR BLD CALC: 45.4 % — SIGNIFICANT CHANGE UP (ref 39–50)
HGB BLD-MCNC: 14.9 G/DL — SIGNIFICANT CHANGE UP (ref 13–17)
MAGNESIUM SERPL-MCNC: 1.8 MG/DL — SIGNIFICANT CHANGE UP (ref 1.6–2.6)
MCHC RBC-ENTMCNC: 28.5 PG — SIGNIFICANT CHANGE UP (ref 27–34)
MCHC RBC-ENTMCNC: 32.8 G/DL — SIGNIFICANT CHANGE UP (ref 32–36)
MCV RBC AUTO: 86.8 FL — SIGNIFICANT CHANGE UP (ref 80–100)
NRBC # BLD: 0 /100 WBCS — SIGNIFICANT CHANGE UP (ref 0–0)
NRBC # FLD: 0 K/UL — SIGNIFICANT CHANGE UP (ref 0–0)
PHOSPHATE SERPL-MCNC: 2.8 MG/DL — SIGNIFICANT CHANGE UP (ref 2.5–4.5)
PLATELET # BLD AUTO: 189 K/UL — SIGNIFICANT CHANGE UP (ref 150–400)
POTASSIUM SERPL-MCNC: 4.3 MMOL/L — SIGNIFICANT CHANGE UP (ref 3.5–5.3)
POTASSIUM SERPL-SCNC: 4.3 MMOL/L — SIGNIFICANT CHANGE UP (ref 3.5–5.3)
PROT SERPL-MCNC: 6 G/DL — SIGNIFICANT CHANGE UP (ref 6–8.3)
RBC # BLD: 5.23 M/UL — SIGNIFICANT CHANGE UP (ref 4.2–5.8)
RBC # FLD: 13.4 % — SIGNIFICANT CHANGE UP (ref 10.3–14.5)
SODIUM SERPL-SCNC: 136 MMOL/L — SIGNIFICANT CHANGE UP (ref 135–145)
WBC # BLD: 10.99 K/UL — HIGH (ref 3.8–10.5)
WBC # FLD AUTO: 10.99 K/UL — HIGH (ref 3.8–10.5)

## 2024-11-16 PROCEDURE — 99232 SBSQ HOSP IP/OBS MODERATE 35: CPT | Mod: GC

## 2024-11-16 PROCEDURE — 99232 SBSQ HOSP IP/OBS MODERATE 35: CPT

## 2024-11-16 RX ORDER — ELECTROLYTE-M SOLUTION/D5W 5 %
1000 INTRAVENOUS SOLUTION INTRAVENOUS
Refills: 0 | Status: DISCONTINUED | OUTPATIENT
Start: 2024-11-16 | End: 2024-11-18

## 2024-11-16 RX ORDER — ELECTROLYTE-M SOLUTION/D5W 5 %
1000 INTRAVENOUS SOLUTION INTRAVENOUS
Refills: 0 | Status: DISCONTINUED | OUTPATIENT
Start: 2024-11-16 | End: 2024-11-16

## 2024-11-16 RX ADMIN — ACETAMINOPHEN, DIPHENHYDRAMINE HCL, PHENYLEPHRINE HCL 3 MILLIGRAM(S): 325; 25; 5 TABLET ORAL at 22:46

## 2024-11-16 RX ADMIN — TAMSULOSIN HYDROCHLORIDE 0.4 MILLIGRAM(S): 0.4 CAPSULE ORAL at 22:45

## 2024-11-16 RX ADMIN — Medication 81 MILLIGRAM(S): at 12:27

## 2024-11-16 RX ADMIN — INSULIN GLARGINE 6 UNIT(S): 100 INJECTION, SOLUTION SUBCUTANEOUS at 22:45

## 2024-11-16 RX ADMIN — ENOXAPARIN SODIUM 40 MILLIGRAM(S): 30 INJECTION SUBCUTANEOUS at 12:27

## 2024-11-16 RX ADMIN — Medication 50 MILLILITER(S): at 09:20

## 2024-11-16 RX ADMIN — Medication 50 MILLILITER(S): at 19:44

## 2024-11-16 NOTE — PROGRESS NOTE ADULT - SUBJECTIVE AND OBJECTIVE BOX
Surgery Progress Note    SUBJECTIVE: Patient seen and examined at bedside. Patient report mild pain of his stomach. Report no nausea, vomiting.   --------------------------------------------------------------------------------------------------  OBJECTIVE:   Physical Exam:  General: AAOx3, NAD, lying comfortably in bed  Respiratory: nonlabored breathing  Abdomen: non-distended, soft, mildly tender of the epigastrium.   --------------------------------------------------------------------------------------------------  V/S:  Vital Signs Last 24 Hrs  T(C): 36.5 (16 Nov 2024 08:00), Max: 37.2 (15 Nov 2024 20:23)  T(F): 97.7 (16 Nov 2024 08:00), Max: 98.9 (15 Nov 2024 20:23)  HR: 78 (16 Nov 2024 08:00) (78 - 91)  BP: 133/69 (16 Nov 2024 08:00) (130/66 - 147/51)  BP(mean): --  RR: 18 (16 Nov 2024 08:00) (17 - 18)  SpO2: 98% (16 Nov 2024 08:00) (97% - 100%)    Parameters below as of 16 Nov 2024 08:00  Patient On (Oxygen Delivery Method): room air        --------------------------------------------------------------------------------------------------  I/Os:    15 Nov 2024 07:01  -  16 Nov 2024 07:00  --------------------------------------------------------  IN:    Lactated Ringers: 1240 mL  Total IN: 1240 mL    OUT:    Oral Fluid: 0 mL    Voided (mL): 720 mL  Total OUT: 720 mL    Total NET: 520 mL        --------------------------------------------------------------------------------------------------  LABS:                        14.9   10.99 )-----------( 189      ( 16 Nov 2024 07:00 )             45.4     16 Nov 2024 07:00    136    |  102    |  16     ----------------------------<  111    4.3     |  20     |  0.75     Ca    8.1        16 Nov 2024 07:00  Phos  2.8       16 Nov 2024 07:00  Mg     1.80      16 Nov 2024 07:00    TPro  6.0    /  Alb  3.0    /  TBili  1.1    /  DBili  x      /  AST  128    /  ALT  109    /  AlkPhos  117    16 Nov 2024 07:00    PT/INR - ( 14 Nov 2024 18:08 )   PT: 11.5 sec;   INR: 0.99 ratio         PTT - ( 14 Nov 2024 18:08 )  PTT:34.4 sec  CAPILLARY BLOOD GLUCOSE      POCT Blood Glucose.: 105 mg/dL (16 Nov 2024 08:38)  POCT Blood Glucose.: 116 mg/dL (16 Nov 2024 05:20)  POCT Blood Glucose.: 111 mg/dL (15 Nov 2024 23:57)  POCT Blood Glucose.: 119 mg/dL (15 Nov 2024 22:42)  POCT Blood Glucose.: 128 mg/dL (15 Nov 2024 17:23)  POCT Blood Glucose.: 133 mg/dL (15 Nov 2024 12:16)    CARDIAC MARKERS ( 15 Nov 2024 07:39 )  x     / x     / x     / x     / 241.2 ng/mL  CARDIAC MARKERS ( 14 Nov 2024 20:31 )  x     / x     / x     / x     / 294.4 ng/mL      LIVER FUNCTIONS - ( 16 Nov 2024 07:00 )  Alb: 3.0 g/dL / Pro: 6.0 g/dL / ALK PHOS: 117 U/L / ALT: 109 U/L / AST: 128 U/L / GGT: x             Urinalysis Basic - ( 16 Nov 2024 07:00 )    Color: x / Appearance: x / SG: x / pH: x  Gluc: 111 mg/dL / Ketone: x  / Bili: x / Urobili: x   Blood: x / Protein: x / Nitrite: x   Leuk Esterase: x / RBC: x / WBC x   Sq Epi: x / Non Sq Epi: x / Bacteria: x      --------------------------------------------------------------------------------------------------  MEDICATIONS  (STANDING):  aspirin  chewable 81 milliGRAM(s) Oral daily  dextrose 5%. 1000 milliLiter(s) (50 mL/Hr) IV Continuous <Continuous>  dextrose 5%. 1000 milliLiter(s) (100 mL/Hr) IV Continuous <Continuous>  dextrose 50% Injectable 25 Gram(s) IV Push once  dextrose 50% Injectable 12.5 Gram(s) IV Push once  dextrose 50% Injectable 25 Gram(s) IV Push once  enoxaparin Injectable 40 milliGRAM(s) SubCutaneous every 24 hours  glucagon  Injectable 1 milliGRAM(s) IntraMuscular once  insulin glargine Injectable (LANTUS) 6 Unit(s) SubCutaneous at bedtime  insulin lispro (ADMELOG) corrective regimen sliding scale   SubCutaneous three times a day before meals  lactated ringers. 1000 milliLiter(s) (50 mL/Hr) IV Continuous <Continuous>  tamsulosin 0.4 milliGRAM(s) Oral at bedtime    MEDICATIONS  (PRN):  acetaminophen     Tablet .. 650 milliGRAM(s) Oral every 6 hours PRN Mild Pain (1 - 3)  dextrose Oral Gel 15 Gram(s) Oral once PRN Blood Glucose LESS THAN 70 milliGRAM(s)/deciliter  melatonin 3 milliGRAM(s) Oral at bedtime PRN Insomnia    --------------------------------------------------------------------------------------------------

## 2024-11-16 NOTE — PROGRESS NOTE ADULT - ASSESSMENT
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 Cardiology consulted for c/f troponinemia. Patient HDS, EKGs nonischemic, CP free, and cardiac enzymes elevated, highly suspect troponin leak secondary to demand ischemia and muscle breakdown in setting of acute pancreatitis. Lipase significantly elevated to >3k and imaging confirming acute pancreatitis. Low suspicion for ACS given nonischemic, nondynamic EKGs and patient CP free. TTE shows mildly reduced LV systolic function (EF 45–50%) with regional hypokinesis, mild diastolic dysfunction, mild to moderate MR, trace pericardial effusion, and otherwise normal cardiac structures.      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 SICU admission presents with nausea, abdominal pain. Cardiology consulted for c/f troponinemia. Patient HDS, EKGs nonischemic, CP free, and cardiac enzymes elevated, highly suspect troponin leak secondary to demand ischemia and muscle breakdown in setting of acute pancreatitis. Lipase significantly elevated to >3k and imaging confirming acute pancreatitis. Low suspicion for ACS given nonischemic, nondynamic EKGs and patient CP free. TTE shows mildly reduced LV systolic function (EF 45–50%) with regional hypokinesis, mild diastolic dysfunction, mild to moderate MR, trace pericardial effusion, and otherwise normal cardiac structures.      PLAN:  #Troponinemia  #Demand myocardial injury  - hsTn, CK/CKMB have leveled out; EKG significant for NSR w/RBBB (chronic), nonischemic/nondynamic  - TTE w mildly reduced LV systolic function (EF 45–50%) with regional hypokinesis, mild diastolic dysfunction, mild to moderate MR, trace pericardial effusion, and otherwise normal cardiac structures and valves5  - Continue management of acute pancreatitis/SICU consult per medicine team  - F/u with cardiology as outpatient (pt states he has followed with Dr Avalos for years)    Should patient HD status change, please call cardiology at #[phone]  Note Incomplete until Attending Attestation

## 2024-11-16 NOTE — PROGRESS NOTE ADULT - SUBJECTIVE AND OBJECTIVE BOX
*Incomplete Note*    Patient seen and examined at bedside.    Overnight Events: No acute events overnight. Denies chest pain or SOB    REVIEW OF SYSTEMS:  Constitutional:     [ x] negative [ ] fevers [ ] chills [ ] weight loss [ ] weight gain  HEENT:                  [ x] negative [ ] dry eyes [ ] eye irritation [ ] postnasal drip [ ] nasal congestion  CV:                         [x ] negative  [ ] chest pain [ ] orthopnea [ ] palpitations [ ] murmur  Resp:                     [ x] negative [ ] cough [ ] shortness of breath [ ] dyspnea [ ] wheezing [ ] sputum [ ]hemoptysis  GI:                          [ x] negative [ ] nausea [ ] vomiting [ ] diarrhea [ ] constipation [ ] abd pain [ ] dysphagia   :                        [ x] negative [ ] dysuria [ ] nocturia [ ] hematuria [ ] increased urinary frequency  Musculoskeletal: [ x] negative [ ] back pain [ ] myalgias [ ] arthralgias [ ] fracture  Skin:                       [ x] negative [ ] rash [ ] itch  Neurological:        [ x] negative [ ] headache [ ] dizziness [ ] syncope [ ] weakness [ ] numbness  Psychiatric:           [ x] negative [ ] anxiety [ ] depression  Endocrine:            [ x] negative [ ] diabetes [ ] thyroid problem  Heme/Lymph:      [ x] negative [ ] anemia [ ] bleeding problem  Allergic/Immune: [x ] negative [ ] itchy eyes [ ] nasal discharge [ ] hives [ ] angioedema    [x ] All other systems negative  [ ] Unable to assess ROS due to    Current Meds:  acetaminophen     Tablet .. 650 milliGRAM(s) Oral every 6 hours PRN  aspirin  chewable 81 milliGRAM(s) Oral daily  dextrose 5%. 1000 milliLiter(s) IV Continuous <Continuous>  dextrose 5%. 1000 milliLiter(s) IV Continuous <Continuous>  dextrose 50% Injectable 25 Gram(s) IV Push once  dextrose 50% Injectable 12.5 Gram(s) IV Push once  dextrose 50% Injectable 25 Gram(s) IV Push once  dextrose Oral Gel 15 Gram(s) Oral once PRN  enoxaparin Injectable 40 milliGRAM(s) SubCutaneous every 24 hours  glucagon  Injectable 1 milliGRAM(s) IntraMuscular once  insulin glargine Injectable (LANTUS) 6 Unit(s) SubCutaneous at bedtime  insulin lispro (ADMELOG) corrective regimen sliding scale   SubCutaneous three times a day before meals  lactated ringers. 1000 milliLiter(s) IV Continuous <Continuous>  melatonin 3 milliGRAM(s) Oral at bedtime PRN  tamsulosin 0.4 milliGRAM(s) Oral at bedtime      PAST MEDICAL & SURGICAL HISTORY:  Diabetes      TIA (transient ischemic attack)      HTN (hypertension)      Hyperlipidemia      H/O hydrocephalus      Dementia      S/P  shunt          Vitals:  T(F): 98.8 (-), Max: 98.9 (11-15)  HR: 82 () (81 - 91)  BP: 130/66 (-) (130/66 - 147/51)  RR: 17 (-)  SpO2: 99% ()  I&O's Summary    15 Nov 2024 07:01  -  2024 07:00  --------------------------------------------------------  IN: 1240 mL / OUT: 720 mL / NET: 520 mL        Physical Exam:  Appearance: No acute distress; well appearing  Eyes: PERRL, EOMI, pink conjunctiva  HENT: Normal oral mucosa  Cardiovascular: RRR, S1, S2, no murmurs, rubs, or gallops; no edema; no JVD  Respiratory: Clear to auscultation bilaterally  Gastrointestinal: soft, non-tender, non-distended with normal bowel sounds  Musculoskeletal: No clubbing; no joint deformity   Neurologic: Non-focal  Lymphatic: No lymphadenopathy  Psychiatry: AAOx3, mood & affect appropriate  Skin: No rashes, ecchymoses, or cyanosis                          15.4   11.10 )-----------( 227      ( 15 Nov 2024 13:02 )             46.7     11-15    140  |  104  |  19  ----------------------------<  131[H]  5.0   |  23  |  0.92    Ca    8.4      15 Nov 2024 13:02    TPro  6.6  /  Alb  3.4  /  TBili  1.2  /  DBili  x   /  AST  299[H]  /  ALT  170[H]  /  AlkPhos  136[H]  11-15    PT/INR - ( 2024 18:08 )   PT: 11.5 sec;   INR: 0.99 ratio         PTT - ( 2024 18:08 )  PTT:34.4 sec  CARDIAC MARKERS ( 15 Nov 2024 07:39 )  x     / x     / x     / x     / 241.2 ng/mL  CARDIAC MARKERS ( 2024 20:31 )  x     / x     / x     / x     / 294.4 ng/mL        Total Cholesterol: 105  LDL: --  HDL: 45  T      TTE 11/15/24   1. The left ventricular cavity is normal in size. Left ventricular wall thickness is normal. Left ventricular systolic function is mildly decreased with an ejection fraction visually estimated at 45 to 50%. There are regional wall motion abnormalities present. Hypokinesis of the anterolateral and inferolateral walls. There is mild (grade 1) left ventricular diastolic dysfunction.   2. Normal right ventricular cavity size and normal right ventricular systolic function.   3. Structurally normal mitral valve with normal leaflet excursion. There is calcification of the mitral valve annulus. There is mild to moderate mitral regurgitation.    ________________________________________________________________________________________  FINDINGS:     Left Ventricle:  The left ventricular cavity is normal in size. Left ventricular wall thickness is normal. Left ventricular systolic function is mildly decreased with an ejection fraction visually estimated at 45 to 50%. There are regional wall motion abnormalities present. Hypokinesis of the anterolateral and inferolateral walls. There is mild (grade 1) left ventricular diastolic dysfunction.     Right Ventricle:  The right ventricular cavity is normal in size and right ventricular systolic function is normal. Tricuspid annular plane systolic excursion (TAPSE) is 1.8 cm (normal >=1.7 cm).     Left Atrium:  The left atrium is normal in size with an indexed volume of 21.68 ml/m².     Right Atrium:  The right atrium is normal in size with an indexed volume of 7.93 ml/m² and an indexed area of 4.52 cm²/m².     Aortic Valve:  The aortic valve appears trileaflet with normal systolic excursion. There is calcification of the aortic valve leaflets. There is trace aortic regurgitation.     Mitral Valve:  Structurally normal mitral valve with normal leaflet excursion. There is calcification of the mitral valve annulus. There is mild to moderate mitral regurgitation.     Tricuspid Valve:  Structurally normal tricuspid valve with normal leaflet excursion. There is mild to moderate tricuspid regurgitation.     Pulmonic Valve:  Structurally normal pulmonic valve with normal leaflet excursion. There is trace pulmonic regurgitation.     Aorta:  The aortic root at the sinuses of Valsalva is normal in size, measuring 3.30 cm (indexed 1.84 cm/m²). The ascending aorta is normal in size.     Pericardium:  There is a trace pericardial effusion noted adjacent to the right ventricle.     Systemic Veins:  The inferior vena cava is normal in size measuring 1.29 cm in diameter, (normal <2.1cm) with normal inspiratory collapse (normal >50%) consistent with normal right atrial pressure (~3, range 0-5mmHg).         Patient seen and examined at bedside.    Overnight Events: No acute events overnight. Denies chest pain or SOB    REVIEW OF SYSTEMS:  Constitutional:     [ x] negative [ ] fevers [ ] chills [ ] weight loss [ ] weight gain  HEENT:                  [ x] negative [ ] dry eyes [ ] eye irritation [ ] postnasal drip [ ] nasal congestion  CV:                         [x ] negative  [ ] chest pain [ ] orthopnea [ ] palpitations [ ] murmur  Resp:                     [ x] negative [ ] cough [ ] shortness of breath [ ] dyspnea [ ] wheezing [ ] sputum [ ]hemoptysis  GI:                          [ x] negative [ ] nausea [ ] vomiting [ ] diarrhea [ ] constipation [ ] abd pain [ ] dysphagia   :                        [ x] negative [ ] dysuria [ ] nocturia [ ] hematuria [ ] increased urinary frequency  Musculoskeletal: [ x] negative [ ] back pain [ ] myalgias [ ] arthralgias [ ] fracture  Skin:                       [ x] negative [ ] rash [ ] itch  Neurological:        [ x] negative [ ] headache [ ] dizziness [ ] syncope [ ] weakness [ ] numbness  Psychiatric:           [ x] negative [ ] anxiety [ ] depression  Endocrine:            [ x] negative [ ] diabetes [ ] thyroid problem  Heme/Lymph:      [ x] negative [ ] anemia [ ] bleeding problem  Allergic/Immune: [x ] negative [ ] itchy eyes [ ] nasal discharge [ ] hives [ ] angioedema    [x ] All other systems negative  [ ] Unable to assess ROS due to    Current Meds:  acetaminophen     Tablet .. 650 milliGRAM(s) Oral every 6 hours PRN  aspirin  chewable 81 milliGRAM(s) Oral daily  dextrose 5%. 1000 milliLiter(s) IV Continuous <Continuous>  dextrose 5%. 1000 milliLiter(s) IV Continuous <Continuous>  dextrose 50% Injectable 25 Gram(s) IV Push once  dextrose 50% Injectable 12.5 Gram(s) IV Push once  dextrose 50% Injectable 25 Gram(s) IV Push once  dextrose Oral Gel 15 Gram(s) Oral once PRN  enoxaparin Injectable 40 milliGRAM(s) SubCutaneous every 24 hours  glucagon  Injectable 1 milliGRAM(s) IntraMuscular once  insulin glargine Injectable (LANTUS) 6 Unit(s) SubCutaneous at bedtime  insulin lispro (ADMELOG) corrective regimen sliding scale   SubCutaneous three times a day before meals  lactated ringers. 1000 milliLiter(s) IV Continuous <Continuous>  melatonin 3 milliGRAM(s) Oral at bedtime PRN  tamsulosin 0.4 milliGRAM(s) Oral at bedtime      PAST MEDICAL & SURGICAL HISTORY:  Diabetes      TIA (transient ischemic attack)      HTN (hypertension)      Hyperlipidemia      H/O hydrocephalus      Dementia      S/P  shunt          Vitals:  T(F): 98.8 (-), Max: 98.9 (11-15)  HR: 82 () (81 - 91)  BP: 130/66 (-) (130/66 - 147/51)  RR: 17 (-)  SpO2: 99% ()  I&O's Summary    15 Nov 2024 07:01  -  2024 07:00  --------------------------------------------------------  IN: 1240 mL / OUT: 720 mL / NET: 520 mL        Physical Exam:  Appearance: No acute distress; well appearing  Eyes: PERRL, EOMI, pink conjunctiva  HENT: Normal oral mucosa  Cardiovascular: RRR, S1, S2, no murmurs, rubs, or gallops; no edema; no JVD  Respiratory: Clear to auscultation bilaterally  Gastrointestinal: soft, non-tender, non-distended with normal bowel sounds  Musculoskeletal: No clubbing; no joint deformity   Neurologic: Non-focal  Lymphatic: No lymphadenopathy  Psychiatry: AAOx3, mood & affect appropriate  Skin: No rashes, ecchymoses, or cyanosis                          15.4   11.10 )-----------( 227      ( 15 Nov 2024 13:02 )             46.7     11-15    140  |  104  |  19  ----------------------------<  131[H]  5.0   |  23  |  0.92    Ca    8.4      15 Nov 2024 13:02    TPro  6.6  /  Alb  3.4  /  TBili  1.2  /  DBili  x   /  AST  299[H]  /  ALT  170[H]  /  AlkPhos  136[H]  11-15    PT/INR - ( 2024 18:08 )   PT: 11.5 sec;   INR: 0.99 ratio         PTT - ( 2024 18:08 )  PTT:34.4 sec  CARDIAC MARKERS ( 15 Nov 2024 07:39 )  x     / x     / x     / x     / 241.2 ng/mL  CARDIAC MARKERS ( 2024 20:31 )  x     / x     / x     / x     / 294.4 ng/mL        Total Cholesterol: 105  LDL: --  HDL: 45  T      TTE 11/15/24   1. The left ventricular cavity is normal in size. Left ventricular wall thickness is normal. Left ventricular systolic function is mildly decreased with an ejection fraction visually estimated at 45 to 50%. There are regional wall motion abnormalities present. Hypokinesis of the anterolateral and inferolateral walls. There is mild (grade 1) left ventricular diastolic dysfunction.   2. Normal right ventricular cavity size and normal right ventricular systolic function.   3. Structurally normal mitral valve with normal leaflet excursion. There is calcification of the mitral valve annulus. There is mild to moderate mitral regurgitation.    ________________________________________________________________________________________  FINDINGS:     Left Ventricle:  The left ventricular cavity is normal in size. Left ventricular wall thickness is normal. Left ventricular systolic function is mildly decreased with an ejection fraction visually estimated at 45 to 50%. There are regional wall motion abnormalities present. Hypokinesis of the anterolateral and inferolateral walls. There is mild (grade 1) left ventricular diastolic dysfunction.     Right Ventricle:  The right ventricular cavity is normal in size and right ventricular systolic function is normal. Tricuspid annular plane systolic excursion (TAPSE) is 1.8 cm (normal >=1.7 cm).     Left Atrium:  The left atrium is normal in size with an indexed volume of 21.68 ml/m².     Right Atrium:  The right atrium is normal in size with an indexed volume of 7.93 ml/m² and an indexed area of 4.52 cm²/m².     Aortic Valve:  The aortic valve appears trileaflet with normal systolic excursion. There is calcification of the aortic valve leaflets. There is trace aortic regurgitation.     Mitral Valve:  Structurally normal mitral valve with normal leaflet excursion. There is calcification of the mitral valve annulus. There is mild to moderate mitral regurgitation.     Tricuspid Valve:  Structurally normal tricuspid valve with normal leaflet excursion. There is mild to moderate tricuspid regurgitation.     Pulmonic Valve:  Structurally normal pulmonic valve with normal leaflet excursion. There is trace pulmonic regurgitation.     Aorta:  The aortic root at the sinuses of Valsalva is normal in size, measuring 3.30 cm (indexed 1.84 cm/m²). The ascending aorta is normal in size.     Pericardium:  There is a trace pericardial effusion noted adjacent to the right ventricle.     Systemic Veins:  The inferior vena cava is normal in size measuring 1.29 cm in diameter, (normal <2.1cm) with normal inspiratory collapse (normal >50%) consistent with normal right atrial pressure (~3, range 0-5mmHg).       Doxepin Pregnancy And Lactation Text: This medication is Pregnancy Category C and it isn't known if it is safe during pregnancy. It is also excreted in breast milk and breast feeding isn't recommended.

## 2024-11-16 NOTE — PROGRESS NOTE ADULT - ASSESSMENT
Assessment: 79M with recurrent pancreatitis likely biliary vs ozempic related, with acute pancreatitis and peripancreatic fluid collections/pseudocysts.     Plan:  - Advance to [facility]  - Appreciate card recs    - Will reach out if patient's HD status changes  - Appreciated GI recs   - Will follow up once MRCP is complete  - Decrease rate of IVF  - Pain control  - Continue with lantus/SS    B Team Surgery   [id number]

## 2024-11-17 LAB
ALBUMIN SERPL ELPH-MCNC: 3.2 G/DL — LOW (ref 3.3–5)
ALP SERPL-CCNC: 101 U/L — SIGNIFICANT CHANGE UP (ref 40–120)
ALT FLD-CCNC: 73 U/L — HIGH (ref 4–41)
ANION GAP SERPL CALC-SCNC: 12 MMOL/L — SIGNIFICANT CHANGE UP (ref 7–14)
AST SERPL-CCNC: 63 U/L — HIGH (ref 4–40)
BILIRUB DIRECT SERPL-MCNC: 0.2 MG/DL — SIGNIFICANT CHANGE UP (ref 0–0.3)
BILIRUB INDIRECT FLD-MCNC: 0.7 MG/DL — SIGNIFICANT CHANGE UP (ref 0–1)
BILIRUB SERPL-MCNC: 0.9 MG/DL — SIGNIFICANT CHANGE UP (ref 0.2–1.2)
BUN SERPL-MCNC: 13 MG/DL — SIGNIFICANT CHANGE UP (ref 7–23)
CALCIUM SERPL-MCNC: 8.1 MG/DL — LOW (ref 8.4–10.5)
CHLORIDE SERPL-SCNC: 103 MMOL/L — SIGNIFICANT CHANGE UP (ref 98–107)
CO2 SERPL-SCNC: 23 MMOL/L — SIGNIFICANT CHANGE UP (ref 22–31)
CREAT SERPL-MCNC: 0.79 MG/DL — SIGNIFICANT CHANGE UP (ref 0.5–1.3)
EGFR: 90 ML/MIN/1.73M2 — SIGNIFICANT CHANGE UP
GLUCOSE BLDC GLUCOMTR-MCNC: 153 MG/DL — HIGH (ref 70–99)
GLUCOSE BLDC GLUCOMTR-MCNC: 161 MG/DL — HIGH (ref 70–99)
GLUCOSE BLDC GLUCOMTR-MCNC: 176 MG/DL — HIGH (ref 70–99)
GLUCOSE BLDC GLUCOMTR-MCNC: 180 MG/DL — HIGH (ref 70–99)
GLUCOSE SERPL-MCNC: 161 MG/DL — HIGH (ref 70–99)
HCT VFR BLD CALC: 44 % — SIGNIFICANT CHANGE UP (ref 39–50)
HGB BLD-MCNC: 14.9 G/DL — SIGNIFICANT CHANGE UP (ref 13–17)
MAGNESIUM SERPL-MCNC: 1.8 MG/DL — SIGNIFICANT CHANGE UP (ref 1.6–2.6)
MCHC RBC-ENTMCNC: 28.5 PG — SIGNIFICANT CHANGE UP (ref 27–34)
MCHC RBC-ENTMCNC: 33.9 G/DL — SIGNIFICANT CHANGE UP (ref 32–36)
MCV RBC AUTO: 84.3 FL — SIGNIFICANT CHANGE UP (ref 80–100)
NRBC # BLD: 0 /100 WBCS — SIGNIFICANT CHANGE UP (ref 0–0)
NRBC # FLD: 0 K/UL — SIGNIFICANT CHANGE UP (ref 0–0)
PHOSPHATE SERPL-MCNC: 2.4 MG/DL — LOW (ref 2.5–4.5)
PLATELET # BLD AUTO: 180 K/UL — SIGNIFICANT CHANGE UP (ref 150–400)
POTASSIUM SERPL-MCNC: 3.9 MMOL/L — SIGNIFICANT CHANGE UP (ref 3.5–5.3)
POTASSIUM SERPL-SCNC: 3.9 MMOL/L — SIGNIFICANT CHANGE UP (ref 3.5–5.3)
PROT SERPL-MCNC: 6.5 G/DL — SIGNIFICANT CHANGE UP (ref 6–8.3)
RBC # BLD: 5.22 M/UL — SIGNIFICANT CHANGE UP (ref 4.2–5.8)
RBC # FLD: 13 % — SIGNIFICANT CHANGE UP (ref 10.3–14.5)
SODIUM SERPL-SCNC: 138 MMOL/L — SIGNIFICANT CHANGE UP (ref 135–145)
WBC # BLD: 10.59 K/UL — HIGH (ref 3.8–10.5)
WBC # FLD AUTO: 10.59 K/UL — HIGH (ref 3.8–10.5)

## 2024-11-17 PROCEDURE — 74183 MRI ABD W/O CNTR FLWD CNTR: CPT | Mod: 26

## 2024-11-17 PROCEDURE — 99232 SBSQ HOSP IP/OBS MODERATE 35: CPT | Mod: GC

## 2024-11-17 RX ORDER — SODIUM,POTASSIUM PHOSPHATES 278-250MG
1 POWDER IN PACKET (EA) ORAL ONCE
Refills: 0 | Status: COMPLETED | OUTPATIENT
Start: 2024-11-17 | End: 2024-11-17

## 2024-11-17 RX ORDER — GLUCAGON INJECTION, SOLUTION 0.5 MG/.1ML
1 INJECTION, SOLUTION SUBCUTANEOUS ONCE
Refills: 0 | Status: DISCONTINUED | OUTPATIENT
Start: 2024-11-17 | End: 2024-11-19

## 2024-11-17 RX ORDER — 0.9 % SODIUM CHLORIDE 0.9 %
1000 INTRAVENOUS SOLUTION INTRAVENOUS
Refills: 0 | Status: DISCONTINUED | OUTPATIENT
Start: 2024-11-17 | End: 2024-11-18

## 2024-11-17 RX ORDER — POTASSIUM CHLORIDE 600 MG/1
20 TABLET, EXTENDED RELEASE ORAL ONCE
Refills: 0 | Status: COMPLETED | OUTPATIENT
Start: 2024-11-17 | End: 2024-11-17

## 2024-11-17 RX ORDER — INSULIN GLARGINE 100 [IU]/ML
6 INJECTION, SOLUTION SUBCUTANEOUS AT BEDTIME
Refills: 0 | Status: DISCONTINUED | OUTPATIENT
Start: 2024-11-17 | End: 2024-11-18

## 2024-11-17 RX ADMIN — POTASSIUM CHLORIDE 20 MILLIEQUIVALENT(S): 600 TABLET, EXTENDED RELEASE ORAL at 09:27

## 2024-11-17 RX ADMIN — Medication 1: at 17:28

## 2024-11-17 RX ADMIN — Medication 1 PACKET(S): at 09:27

## 2024-11-17 RX ADMIN — INSULIN GLARGINE 6 UNIT(S): 100 INJECTION, SOLUTION SUBCUTANEOUS at 22:27

## 2024-11-17 RX ADMIN — Medication 1: at 06:21

## 2024-11-17 RX ADMIN — Medication 25 GRAM(S): at 09:30

## 2024-11-17 RX ADMIN — Medication 50 MILLILITER(S): at 09:31

## 2024-11-17 RX ADMIN — Medication 50 MILLILITER(S): at 06:20

## 2024-11-17 RX ADMIN — ENOXAPARIN SODIUM 40 MILLIGRAM(S): 30 INJECTION SUBCUTANEOUS at 12:57

## 2024-11-17 RX ADMIN — Medication 1: at 12:57

## 2024-11-17 RX ADMIN — Medication 81 MILLIGRAM(S): at 12:57

## 2024-11-17 RX ADMIN — TAMSULOSIN HYDROCHLORIDE 0.4 MILLIGRAM(S): 0.4 CAPSULE ORAL at 22:22

## 2024-11-17 NOTE — PROGRESS NOTE ADULT - SUBJECTIVE AND OBJECTIVE BOX
TE[facility] Surgery Daily Progress Note  =====================================================    Overnight: Episodes of incontinence.     SUBJECTIVE: Patient seen and examined at bedside on AM rounds. Patient reports that they're feeling better, no complaints of pain. NPO. On RA. Denies fever, chills, nausea, vomiting, diarrhea, chest pain, and SOB.     PMH:   PAST MEDICAL & SURGICAL HISTORY:  Diabetes      TIA (transient ischemic attack)      HTN (hypertension)      Hyperlipidemia      H/O hydrocephalus      Dementia      S/P  shunt          ALLERGIES:  No Known Allergies      --------------------------------------------------------------------------------------    MEDICATIONS:    Neurologic Medications  acetaminophen     Tablet .. 650 milliGRAM(s) Oral every 6 hours PRN Mild Pain (1 - 3)  melatonin 3 milliGRAM(s) Oral at bedtime PRN Insomnia    Respiratory Medications    Cardiovascular Medications    Gastrointestinal Medications  dextrose 5% + sodium chloride 0.45% with potassium chloride 20 mEq/L 1000 milliLiter(s) IV Continuous <Continuous>  dextrose 5%. 1000 milliLiter(s) IV Continuous <Continuous>  dextrose 5%. 1000 milliLiter(s) IV Continuous <Continuous>    Genitourinary Medications  tamsulosin 0.4 milliGRAM(s) Oral at bedtime    Hematologic/Oncologic Medications  aspirin  chewable 81 milliGRAM(s) Oral daily  enoxaparin Injectable 40 milliGRAM(s) SubCutaneous every 24 hours    Antimicrobial/Immunologic Medications    Endocrine/Metabolic Medications  dextrose 50% Injectable 25 Gram(s) IV Push once  dextrose 50% Injectable 12.5 Gram(s) IV Push once  dextrose 50% Injectable 25 Gram(s) IV Push once  glucagon  Injectable 1 milliGRAM(s) IntraMuscular once  insulin glargine Injectable (LANTUS) 6 Unit(s) SubCutaneous at bedtime  insulin lispro (ADMELOG) corrective regimen sliding scale   SubCutaneous three times a day before meals  insulin lispro (ADMELOG) corrective regimen sliding scale   SubCutaneous at bedtime    Topical/Other Medications    --------------------------------------------------------------------------------------    VITAL SIGNS:    T(C): 36.6 (11-17-24 @ 09:10), Max: 37.3 (11-16-24 @ 12:00)  HR: 82 (11-17-24 @ 09:10) (79 - 88)  BP: 145/90 (11-17-24 @ 09:10) (123/71 - 145/90)  RR: 17 (11-17-24 @ 09:10) (17 - 18)  SpO2: 98% (11-17-24 @ 09:10) (96% - 100%)    --------------------------------------------------------------------------------------    EXAM    General: NAD, resting in bed comfortably.  Cardiac: regular rate, warm and well perfused  Respiratory: Nonlabored respirations, normal cw expansion.  Abdomen: soft, mildly tender at epigastric area, nondistended  Extremities: normal strength, FROM, no deformities    --------------------------------------------------------------------------------------    LABS        --------------------------------------      --->>> <<<---------------------------------------------    INS AND OUTS:    11-16-24 @ 07:01  -  11-17-24 @ 07:00  --------------------------------------------------------  IN: 0 mL / OUT: 0 mL / NET: 0 mL    11-17-24 @ 07:01  -  11-17-24 @ 10:33  --------------------------------------------------------  IN: 450 mL / OUT: 0 mL / NET: 450 mL        --------------------------------------------------------------------------------------

## 2024-11-17 NOTE — CHART NOTE - NSCHARTNOTEFT_GEN_A_CORE
NSx    No contraindication for MRI from a neurosurgical standpoint, patient has  a certas valve set at 4, which is MRI compatible.

## 2024-11-17 NOTE — PROGRESS NOTE ADULT - ASSESSMENT
Assessment: 79M with recurrent pancreatitis likely biliary vs ozempic related, with acute pancreatitis and peripancreatic fluid collections/pseudocysts.     Plan:  - DVT ppx: Aspiring 81 mg, Lvx 40 mg  - Advance to [facility]  - Appreciate card recs    - Will reach out if patient's HD status changes  - Appreciated GI recs   - Will follow up once MRCP is complete  - Neurosurgery Consult for  shunt to get MRCP  - Pain control  - Continue with lantus/SS    B Team Surgery   [id number]

## 2024-11-18 LAB
ALBUMIN SERPL ELPH-MCNC: 3.2 G/DL — LOW (ref 3.3–5)
ALP SERPL-CCNC: 94 U/L — SIGNIFICANT CHANGE UP (ref 40–120)
ALT FLD-CCNC: 55 U/L — HIGH (ref 4–41)
ANION GAP SERPL CALC-SCNC: 12 MMOL/L — SIGNIFICANT CHANGE UP (ref 7–14)
AST SERPL-CCNC: 40 U/L — SIGNIFICANT CHANGE UP (ref 4–40)
BILIRUB DIRECT SERPL-MCNC: 0.2 MG/DL — SIGNIFICANT CHANGE UP (ref 0–0.3)
BILIRUB INDIRECT FLD-MCNC: 0.5 MG/DL — SIGNIFICANT CHANGE UP (ref 0–1)
BILIRUB SERPL-MCNC: 0.7 MG/DL — SIGNIFICANT CHANGE UP (ref 0.2–1.2)
BUN SERPL-MCNC: 8 MG/DL — SIGNIFICANT CHANGE UP (ref 7–23)
CALCIUM SERPL-MCNC: 8.3 MG/DL — LOW (ref 8.4–10.5)
CHLORIDE SERPL-SCNC: 102 MMOL/L — SIGNIFICANT CHANGE UP (ref 98–107)
CO2 SERPL-SCNC: 24 MMOL/L — SIGNIFICANT CHANGE UP (ref 22–31)
CREAT SERPL-MCNC: 0.74 MG/DL — SIGNIFICANT CHANGE UP (ref 0.5–1.3)
EGFR: 92 ML/MIN/1.73M2 — SIGNIFICANT CHANGE UP
GLUCOSE BLDC GLUCOMTR-MCNC: 157 MG/DL — HIGH (ref 70–99)
GLUCOSE BLDC GLUCOMTR-MCNC: 162 MG/DL — HIGH (ref 70–99)
GLUCOSE BLDC GLUCOMTR-MCNC: 218 MG/DL — HIGH (ref 70–99)
GLUCOSE BLDC GLUCOMTR-MCNC: 224 MG/DL — HIGH (ref 70–99)
GLUCOSE SERPL-MCNC: 152 MG/DL — HIGH (ref 70–99)
HCT VFR BLD CALC: 44.3 % — SIGNIFICANT CHANGE UP (ref 39–50)
HGB BLD-MCNC: 14.9 G/DL — SIGNIFICANT CHANGE UP (ref 13–17)
MAGNESIUM SERPL-MCNC: 2 MG/DL — SIGNIFICANT CHANGE UP (ref 1.6–2.6)
MCHC RBC-ENTMCNC: 28.7 PG — SIGNIFICANT CHANGE UP (ref 27–34)
MCHC RBC-ENTMCNC: 33.6 G/DL — SIGNIFICANT CHANGE UP (ref 32–36)
MCV RBC AUTO: 85.2 FL — SIGNIFICANT CHANGE UP (ref 80–100)
NRBC # BLD: 0 /100 WBCS — SIGNIFICANT CHANGE UP (ref 0–0)
NRBC # FLD: 0 K/UL — SIGNIFICANT CHANGE UP (ref 0–0)
PHOSPHATE SERPL-MCNC: 2.7 MG/DL — SIGNIFICANT CHANGE UP (ref 2.5–4.5)
PLATELET # BLD AUTO: 181 K/UL — SIGNIFICANT CHANGE UP (ref 150–400)
POTASSIUM SERPL-MCNC: 4.4 MMOL/L — SIGNIFICANT CHANGE UP (ref 3.5–5.3)
POTASSIUM SERPL-SCNC: 4.4 MMOL/L — SIGNIFICANT CHANGE UP (ref 3.5–5.3)
PROT SERPL-MCNC: 6.6 G/DL — SIGNIFICANT CHANGE UP (ref 6–8.3)
RBC # BLD: 5.2 M/UL — SIGNIFICANT CHANGE UP (ref 4.2–5.8)
RBC # FLD: 12.8 % — SIGNIFICANT CHANGE UP (ref 10.3–14.5)
SODIUM SERPL-SCNC: 138 MMOL/L — SIGNIFICANT CHANGE UP (ref 135–145)
WBC # BLD: 10.28 K/UL — SIGNIFICANT CHANGE UP (ref 3.8–10.5)
WBC # FLD AUTO: 10.28 K/UL — SIGNIFICANT CHANGE UP (ref 3.8–10.5)

## 2024-11-18 PROCEDURE — 99232 SBSQ HOSP IP/OBS MODERATE 35: CPT | Mod: GC

## 2024-11-18 RX ORDER — INSULIN GLARGINE 100 [IU]/ML
10 INJECTION, SOLUTION SUBCUTANEOUS AT BEDTIME
Refills: 0 | Status: DISCONTINUED | OUTPATIENT
Start: 2024-11-18 | End: 2024-11-19

## 2024-11-18 RX ADMIN — TAMSULOSIN HYDROCHLORIDE 0.4 MILLIGRAM(S): 0.4 CAPSULE ORAL at 22:10

## 2024-11-18 RX ADMIN — Medication 1: at 08:38

## 2024-11-18 RX ADMIN — Medication 50 MILLILITER(S): at 08:40

## 2024-11-18 RX ADMIN — Medication 2: at 17:12

## 2024-11-18 RX ADMIN — ENOXAPARIN SODIUM 40 MILLIGRAM(S): 30 INJECTION SUBCUTANEOUS at 11:18

## 2024-11-18 RX ADMIN — ACETAMINOPHEN, DIPHENHYDRAMINE HCL, PHENYLEPHRINE HCL 3 MILLIGRAM(S): 325; 25; 5 TABLET ORAL at 22:10

## 2024-11-18 RX ADMIN — INSULIN GLARGINE 10 UNIT(S): 100 INJECTION, SOLUTION SUBCUTANEOUS at 22:09

## 2024-11-18 RX ADMIN — Medication 81 MILLIGRAM(S): at 11:10

## 2024-11-18 RX ADMIN — Medication 1: at 12:12

## 2024-11-18 NOTE — PROGRESS NOTE ADULT - ASSESSMENT
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 Presented with recurrent pancreatitis 2/2 gallstone vs ozempic with acute pancreatitis and peripancreatic fluid collections/pseudocysts.     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 Presented with recurrent pancreatitis 2/2 gallstone vs ozempic with acute pancreatitis and peripancreatic fluid collections/pseudocysts.     Plan:  - Diet: advanced to regular diet   - f/u MRCP read  - IVF: mIVF@50   - Pain control: PRN tylenol   - DM: lantus 10 and low dose ISS (adjust as needed based on diet, currently not on home regimen)  - Appreciate card recs - Will reach out if patient's HD status changes  - Appreciated GI recs - Will follow up once MRCP is complete  - home meds: aspirin, flomax restarted - holding statin, irbesartan, urosidiol and home DM meds  - DVT ppx: Subq lovenox    B Team Surgery   [id number]

## 2024-11-18 NOTE — PROGRESS NOTE ADULT - SUBJECTIVE AND OBJECTIVE BOX
SURGERY DAILY PROGRESS NOTE    Overnight Events: MRCP done pending final read  SUBJECTIVE: Patient seen and evaluated on AM rounds.  -----------------------------------------------------------------------------------------------------------------------------------------------------------------------------------------------------------  OBJECTIVE:  Vital Signs Last 24 Hrs  T(C): 36.8 (18 Nov 2024 06:00), Max: 37 (17 Nov 2024 13:00)  T(F): 98.2 (18 Nov 2024 06:00), Max: 98.6 (17 Nov 2024 13:00)  HR: 79 (18 Nov 2024 06:00) (79 - 90)  BP: 136/77 (18 Nov 2024 06:00) (136/77 - 159/96)  BP(mean): --  RR: 16 (18 Nov 2024 06:00) (16 - 18)  SpO2: 99% (18 Nov 2024 06:00) (98% - 100%)    Parameters below as of 18 Nov 2024 06:00  Patient On (Oxygen Delivery Method): room air      I&O's Detail    17 Nov 2024 07:01  -  18 Nov 2024 07:00  --------------------------------------------------------  IN:    dextrose 5% + sodium chloride 0.45% w/ Additives: 1050 mL    IV PiggyBack: 50 mL    Oral Fluid: 900 mL  Total IN: 2000 mL    OUT:  Total OUT: 0 mL    Total NET: 2000 mL        Daily     Daily   MEDICATIONS  (STANDING):  aspirin  chewable 81 milliGRAM(s) Oral daily  dextrose 5% + sodium chloride 0.45% with potassium chloride 20 mEq/L 1000 milliLiter(s) (50 mL/Hr) IV Continuous <Continuous>  dextrose 5%. 1000 milliLiter(s) (50 mL/Hr) IV Continuous <Continuous>  dextrose 5%. 1000 milliLiter(s) (100 mL/Hr) IV Continuous <Continuous>  dextrose 50% Injectable 25 Gram(s) IV Push once  dextrose 50% Injectable 12.5 Gram(s) IV Push once  dextrose 50% Injectable 25 Gram(s) IV Push once  enoxaparin Injectable 40 milliGRAM(s) SubCutaneous every 24 hours  glucagon  Injectable 1 milliGRAM(s) IntraMuscular once  insulin glargine Injectable (LANTUS) 6 Unit(s) SubCutaneous at bedtime  insulin lispro (ADMELOG) corrective regimen sliding scale   SubCutaneous three times a day before meals  insulin lispro (ADMELOG) corrective regimen sliding scale   SubCutaneous at bedtime  tamsulosin 0.4 milliGRAM(s) Oral at bedtime    MEDICATIONS  (PRN):  acetaminophen     Tablet .. 650 milliGRAM(s) Oral every 6 hours PRN Mild Pain (1 - 3)  melatonin 3 milliGRAM(s) Oral at bedtime PRN Insomnia      PHYSICAL EXAM:  General: NAD, resting comfortably in bed  HEENT: Normocephalic atraumatic  Respiratory: Nonlabored respirations  Abdomen: soft, mildly tender in epigastrium, nondistended   Neuro: awake, alert and answering questions appropriately     LABS:                        14.9   10.59 )-----------( 180      ( 17 Nov 2024 06:15 )             44.0     11-18    138  |  102  |  8   ----------------------------<  152[H]  4.4   |  24  |  0.74    Ca    8.3[L]      18 Nov 2024 05:53  Phos  2.7     11-18  Mg     2.00     11-18    TPro  6.6  /  Alb  3.2[L]  /  TBili  0.7  /  DBili  0.2  /  AST  40  /  ALT  55[H]  /  AlkPhos  94  11-18      Urinalysis Basic - ( 18 Nov 2024 05:53 )    Color: x / Appearance: x / SG: x / pH: x  Gluc: 152 mg/dL / Ketone: x  / Bili: x / Urobili: x   Blood: x / Protein: x / Nitrite: x   Leuk Esterase: x / RBC: x / WBC x   Sq Epi: x / Non Sq Epi: x / Bacteria: x                RADIOLOGY:   SURGERY DAILY PROGRESS NOTE    Overnight Events: MRCP done pending final read  SUBJECTIVE: Patient seen and evaluated on AM rounds.  -----------------------------------------------------------------------------------------------------------------------------------------------------------------------------------------------------------  OBJECTIVE:  Vital Signs Last 24 Hrs  T(C): 36.8 (18 Nov 2024 06:00), Max: 37 (17 Nov 2024 13:00)  T(F): 98.2 (18 Nov 2024 06:00), Max: 98.6 (17 Nov 2024 13:00)  HR: 79 (18 Nov 2024 06:00) (79 - 90)  BP: 136/77 (18 Nov 2024 06:00) (136/77 - 159/96)  BP(mean): --  RR: 16 (18 Nov 2024 06:00) (16 - 18)  SpO2: 99% (18 Nov 2024 06:00) (98% - 100%)    Parameters below as of 18 Nov 2024 06:00  Patient On (Oxygen Delivery Method): room air      I&O's Detail    17 Nov 2024 07:01  -  18 Nov 2024 07:00  --------------------------------------------------------  IN:    dextrose 5% + sodium chloride 0.45% w/ Additives: 1050 mL    IV PiggyBack: 50 mL    Oral Fluid: 900 mL  Total IN: 2000 mL    OUT:  Total OUT: 0 mL    Total NET: 2000 mL        Daily     Daily   MEDICATIONS  (STANDING):  aspirin  chewable 81 milliGRAM(s) Oral daily  dextrose 5% + sodium chloride 0.45% with potassium chloride 20 mEq/L 1000 milliLiter(s) (50 mL/Hr) IV Continuous <Continuous>  dextrose 5%. 1000 milliLiter(s) (50 mL/Hr) IV Continuous <Continuous>  dextrose 5%. 1000 milliLiter(s) (100 mL/Hr) IV Continuous <Continuous>  dextrose 50% Injectable 25 Gram(s) IV Push once  dextrose 50% Injectable 12.5 Gram(s) IV Push once  dextrose 50% Injectable 25 Gram(s) IV Push once  enoxaparin Injectable 40 milliGRAM(s) SubCutaneous every 24 hours  glucagon  Injectable 1 milliGRAM(s) IntraMuscular once  insulin glargine Injectable (LANTUS) 6 Unit(s) SubCutaneous at bedtime  insulin lispro (ADMELOG) corrective regimen sliding scale   SubCutaneous three times a day before meals  insulin lispro (ADMELOG) corrective regimen sliding scale   SubCutaneous at bedtime  tamsulosin 0.4 milliGRAM(s) Oral at bedtime    MEDICATIONS  (PRN):  acetaminophen     Tablet .. 650 milliGRAM(s) Oral every 6 hours PRN Mild Pain (1 - 3)  melatonin 3 milliGRAM(s) Oral at bedtime PRN Insomnia      PHYSICAL EXAM:  General: NAD, resting comfortably in bed  HEENT: Normocephalic atraumatic  Respiratory: Nonlabored respirations  Abdomen: soft, nontender, nondistended   Neuro: awake, alert and answering questions appropriately     LABS:                        14.9   10.59 )-----------( 180      ( 17 Nov 2024 06:15 )             44.0     11-18    138  |  102  |  8   ----------------------------<  152[H]  4.4   |  24  |  0.74    Ca    8.3[L]      18 Nov 2024 05:53  Phos  2.7     11-18  Mg     2.00     11-18    TPro  6.6  /  Alb  3.2[L]  /  TBili  0.7  /  DBili  0.2  /  AST  40  /  ALT  55[H]  /  AlkPhos  94  11-18      Urinalysis Basic - ( 18 Nov 2024 05:53 )    Color: x / Appearance: x / SG: x / pH: x  Gluc: 152 mg/dL / Ketone: x  / Bili: x / Urobili: x   Blood: x / Protein: x / Nitrite: x   Leuk Esterase: x / RBC: x / WBC x   Sq Epi: x / Non Sq Epi: x / Bacteria: x                RADIOLOGY:   SURGERY DAILY PROGRESS NOTE    Overnight Events: MRCP done pending final read  SUBJECTIVE: Patient seen and evaluated on AM rounds.  -----------------------------------------------------------------------------------------------------------------------------------------------------------------------------------------------------------  OBJECTIVE:  Vital Signs Last 24 Hrs  T(C): 36.8 (18 Nov 2024 06:00), Max: 37 (17 Nov 2024 13:00)  T(F): 98.2 (18 Nov 2024 06:00), Max: 98.6 (17 Nov 2024 13:00)  HR: 79 (18 Nov 2024 06:00) (79 - 90)  BP: 136/77 (18 Nov 2024 06:00) (136/77 - 159/96)  BP(mean): --  RR: 16 (18 Nov 2024 06:00) (16 - 18)  SpO2: 99% (18 Nov 2024 06:00) (98% - 100%)    Parameters below as of 18 Nov 2024 06:00  Patient On (Oxygen Delivery Method): room air      I&O's Detail    17 Nov 2024 07:01  -  18 Nov 2024 07:00  --------------------------------------------------------  IN:    dextrose 5% + sodium chloride 0.45% w/ Additives: 1050 mL    IV PiggyBack: 50 mL    Oral Fluid: 900 mL  Total IN: 2000 mL    OUT:  Total OUT: 0 mL    Total NET: 2000 mL        Daily     Daily   MEDICATIONS  (STANDING):  aspirin  chewable 81 milliGRAM(s) Oral daily  dextrose 5% + sodium chloride 0.45% with potassium chloride 20 mEq/L 1000 milliLiter(s) (50 mL/Hr) IV Continuous <Continuous>  dextrose 5%. 1000 milliLiter(s) (50 mL/Hr) IV Continuous <Continuous>  dextrose 5%. 1000 milliLiter(s) (100 mL/Hr) IV Continuous <Continuous>  dextrose 50% Injectable 25 Gram(s) IV Push once  dextrose 50% Injectable 12.5 Gram(s) IV Push once  dextrose 50% Injectable 25 Gram(s) IV Push once  enoxaparin Injectable 40 milliGRAM(s) SubCutaneous every 24 hours  glucagon  Injectable 1 milliGRAM(s) IntraMuscular once  insulin glargine Injectable (LANTUS) 6 Unit(s) SubCutaneous at bedtime  insulin lispro (ADMELOG) corrective regimen sliding scale   SubCutaneous three times a day before meals  insulin lispro (ADMELOG) corrective regimen sliding scale   SubCutaneous at bedtime  tamsulosin 0.4 milliGRAM(s) Oral at bedtime    MEDICATIONS  (PRN):  acetaminophen     Tablet .. 650 milliGRAM(s) Oral every 6 hours PRN Mild Pain (1 - 3)  melatonin 3 milliGRAM(s) Oral at bedtime PRN Insomnia      PHYSICAL EXAM:  General: NAD, resting comfortably in bed  HEENT: Normocephalic atraumatic  Respiratory: Nonlabored respirations  Abdomen: softly distended, nontender  Neuro: awake, alert and answering questions appropriately     LABS:                        14.9   10.59 )-----------( 180      ( 17 Nov 2024 06:15 )             44.0     11-18    138  |  102  |  8   ----------------------------<  152[H]  4.4   |  24  |  0.74    Ca    8.3[L]      18 Nov 2024 05:53  Phos  2.7     11-18  Mg     2.00     11-18    TPro  6.6  /  Alb  3.2[L]  /  TBili  0.7  /  DBili  0.2  /  AST  40  /  ALT  55[H]  /  AlkPhos  94  11-18      Urinalysis Basic - ( 18 Nov 2024 05:53 )    Color: x / Appearance: x / SG: x / pH: x  Gluc: 152 mg/dL / Ketone: x  / Bili: x / Urobili: x   Blood: x / Protein: x / Nitrite: x   Leuk Esterase: x / RBC: x / WBC x   Sq Epi: x / Non Sq Epi: x / Bacteria: x                RADIOLOGY:

## 2024-11-19 ENCOUNTER — TRANSCRIPTION ENCOUNTER (OUTPATIENT)
Age: 79
End: 2024-11-19

## 2024-11-19 VITALS
OXYGEN SATURATION: 99 % | HEART RATE: 89 BPM | TEMPERATURE: 99 F | DIASTOLIC BLOOD PRESSURE: 78 MMHG | SYSTOLIC BLOOD PRESSURE: 124 MMHG | RESPIRATION RATE: 17 BRPM

## 2024-11-19 LAB
ALBUMIN SERPL ELPH-MCNC: 3.5 G/DL — SIGNIFICANT CHANGE UP (ref 3.3–5)
ALP SERPL-CCNC: 85 U/L — SIGNIFICANT CHANGE UP (ref 40–120)
ALT FLD-CCNC: 41 U/L — SIGNIFICANT CHANGE UP (ref 4–41)
ANION GAP SERPL CALC-SCNC: 13 MMOL/L — SIGNIFICANT CHANGE UP (ref 7–14)
AST SERPL-CCNC: 24 U/L — SIGNIFICANT CHANGE UP (ref 4–40)
BILIRUB SERPL-MCNC: 0.6 MG/DL — SIGNIFICANT CHANGE UP (ref 0.2–1.2)
BUN SERPL-MCNC: 15 MG/DL — SIGNIFICANT CHANGE UP (ref 7–23)
CALCIUM SERPL-MCNC: 8.5 MG/DL — SIGNIFICANT CHANGE UP (ref 8.4–10.5)
CHLORIDE SERPL-SCNC: 102 MMOL/L — SIGNIFICANT CHANGE UP (ref 98–107)
CO2 SERPL-SCNC: 21 MMOL/L — LOW (ref 22–31)
CREAT SERPL-MCNC: 0.79 MG/DL — SIGNIFICANT CHANGE UP (ref 0.5–1.3)
EGFR: 90 ML/MIN/1.73M2 — SIGNIFICANT CHANGE UP
GLUCOSE BLDC GLUCOMTR-MCNC: 191 MG/DL — HIGH (ref 70–99)
GLUCOSE BLDC GLUCOMTR-MCNC: 258 MG/DL — HIGH (ref 70–99)
GLUCOSE SERPL-MCNC: 234 MG/DL — HIGH (ref 70–99)
HCT VFR BLD CALC: 46.5 % — SIGNIFICANT CHANGE UP (ref 39–50)
HGB BLD-MCNC: 15.4 G/DL — SIGNIFICANT CHANGE UP (ref 13–17)
MAGNESIUM SERPL-MCNC: 2 MG/DL — SIGNIFICANT CHANGE UP (ref 1.6–2.6)
MCHC RBC-ENTMCNC: 28.6 PG — SIGNIFICANT CHANGE UP (ref 27–34)
MCHC RBC-ENTMCNC: 33.1 G/DL — SIGNIFICANT CHANGE UP (ref 32–36)
MCV RBC AUTO: 86.3 FL — SIGNIFICANT CHANGE UP (ref 80–100)
NRBC # BLD: 0 /100 WBCS — SIGNIFICANT CHANGE UP (ref 0–0)
NRBC # FLD: 0 K/UL — SIGNIFICANT CHANGE UP (ref 0–0)
PHOSPHATE SERPL-MCNC: 3.2 MG/DL — SIGNIFICANT CHANGE UP (ref 2.5–4.5)
PLATELET # BLD AUTO: 200 K/UL — SIGNIFICANT CHANGE UP (ref 150–400)
POTASSIUM SERPL-MCNC: 4 MMOL/L — SIGNIFICANT CHANGE UP (ref 3.5–5.3)
POTASSIUM SERPL-SCNC: 4 MMOL/L — SIGNIFICANT CHANGE UP (ref 3.5–5.3)
PROT SERPL-MCNC: 7.1 G/DL — SIGNIFICANT CHANGE UP (ref 6–8.3)
RBC # BLD: 5.39 M/UL — SIGNIFICANT CHANGE UP (ref 4.2–5.8)
RBC # FLD: 13.2 % — SIGNIFICANT CHANGE UP (ref 10.3–14.5)
SODIUM SERPL-SCNC: 136 MMOL/L — SIGNIFICANT CHANGE UP (ref 135–145)
WBC # BLD: 9.25 K/UL — SIGNIFICANT CHANGE UP (ref 3.8–10.5)
WBC # FLD AUTO: 9.25 K/UL — SIGNIFICANT CHANGE UP (ref 3.8–10.5)

## 2024-11-19 PROCEDURE — 99232 SBSQ HOSP IP/OBS MODERATE 35: CPT

## 2024-11-19 RX ADMIN — ENOXAPARIN SODIUM 40 MILLIGRAM(S): 30 INJECTION SUBCUTANEOUS at 12:24

## 2024-11-19 RX ADMIN — Medication 1: at 08:48

## 2024-11-19 RX ADMIN — Medication 3: at 12:28

## 2024-11-19 RX ADMIN — Medication 81 MILLIGRAM(S): at 12:24

## 2024-11-19 RX ADMIN — Medication 4 UNIT(S): at 12:28

## 2024-11-19 NOTE — PROGRESS NOTE ADULT - ASSESSMENT
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 Presented with recurrent pancreatitis 2/2 gallstone vs ozempic with acute pancreatitis and peripancreatic fluid collections/pseudocysts.     Plan:  - Diet: advanced to regular diet   - MRCP 11/17 demonstrating hemorrhagic peripancreatic collections. Cholelithiasis. Nonspecific gallbladder wall edema. No evidence of choledocholithiasis. No biliary ductal dilatation.  - Pain control: PRN tylenol   - DM: home insulin reg restarted lantus 10qhs, 4u admelog pre-meal  - Appreciate card recs - Will reach out if patient's HD status changes  - Appreciated GI recs - Will follow up once MRCP is complete  - home meds: aspirin, flomax restarted - holding statin, irbesartan, urosidiol and home DM meds  - DVT ppx: Subq lovenox    B Team Surgery   [id number]

## 2024-11-19 NOTE — DISCHARGE NOTE NURSING/CASE MANAGEMENT/SOCIAL WORK - NSDCFUADDAPPT_GEN_ALL_CORE_FT
Please follow up with your cardiologist, endocrinologist and primary care physician upon discharge from hospital.

## 2024-11-19 NOTE — PROGRESS NOTE ADULT - ATTENDING COMMENTS
79M with recurrent pancreatitis likely biliary vs ozempic related, with acute pancreatitis and peripancreatic fluid collections/pseudocysts.     No AMS  Slight TTP in epigastrium improving.     Labs reviewed.   US 11/14: GB sludge. CBD 7mm  MRCP pending NSGY eval of VPS    # Pancreatitis  - Continue nonoperative management.   - Appreciate GI consultation.   - MRCP pending. Would appreciate NSGY evaluation of VPS  - Continue CLD as tolerated.    # Demand Ischemia  - Troponin peaked - discontinue serial checks.   - Appreciate Cardiology evaluation.
79M with recurrent pancreatitis likely biliary vs ozempic related, with acute pancreatitis and peripancreatic fluid collections/pseudocysts.     No AMS  Slight TTP in epigastrium.     Labs reviewed.   US 11/14: GB sludge. CBD 7mm  MRCP pending    # Pancreatitis  - Continue nonoperative management.   - Appreciate GI consultation.   - MRCP pending.  - Continue CLD as tolerated.    # Demand Ischemia  - Troponin peaked - discontinue serial checks.   - Appreciate Cardiology evaluation.
The patient was seen and examined, chart and notes reviewed.  The current diagnosis, plan of care and alternatives have been discussed with the patient.  All questions have been answered and updates have been discussed.  The case was discussed with B team residents/PA's and medical students at morning B surgical rounds and throughout the course of the day.    Symptomatic cholelithiasis  a.  MRCP reviewed.  No CBD stone  b.  Diet as tolerated  c.  Wean IVF resuscitation  d.  Discuss Plan for out-patient laparoscopic cholecystectomy  e.  Trend LFTs
Patient seen and examined during morning rounds.  Assessment and recommendations were reviewed with the Cardiology consult fellow, and are as outlined above.
The patient was seen and examined, chart and notes reviewed.  The current diagnosis, plan of care and alternatives have been discussed with the patient.  All questions have been answered and updates have been discussed.  The case was discussed with B team residents/PA's and medical students at morning B surgical rounds and throughout the course of the day.    Symptomatic cholelithiasis  a.  Await MRI read  b.  Keep NPO at this time, advance as tolerated if - MRI  c.  Wean IVF resuscitation  d.  Discuss Plan for out-patient laparoscopic cholecystectomy  e.  Trend LFTs

## 2024-11-19 NOTE — DISCHARGE NOTE PROVIDER - NSDCMRMEDTOKEN_GEN_ALL_CORE_FT
acetaminophen 325 mg oral tablet: 2 tab(s) orally every 6 hours  Admelog 100 units/mL injectable solution: 4 unit(s) injectable 3 times a day (before meals)  aspirin 81 mg oral capsule: 1 cap(s) orally once a day  atorvastatin 20 mg oral tablet: 1 tab(s) orally once a day  Farxiga 10 mg oral tablet: 1 tab(s) orally once a day  HumaLOG KwikPen 100 units/mL injectable solution: 4 unit(s) injectable 3 times a day (before meals)  insulin glargine 100 units/mL subcutaneous solution: 12 unit(s) subcutaneous once a day (at bedtime)  irbesartan 300 mg oral tablet: 1 tab(s) orally once a day  melatonin 3 mg oral tablet: 1 tab(s) orally once a day (at bedtime) As needed Insomnia  oxyCODONE 5 mg oral tablet: 1 tab(s) orally every 6 hours as needed for Severe Pain (7 - 10) MDD: 4 tabs  tamsulosin 0.4 mg oral capsule: 1 cap(s) orally once a day (at bedtime)  ursodiol 300 mg oral capsule: 1 cap(s) orally 2 times a day   acetaminophen 325 mg oral tablet: 2 tab(s) orally every 6 hours  Admelog 100 units/mL injectable solution: 4 unit(s) injectable 3 times a day (before meals)  aspirin 81 mg oral capsule: 1 cap(s) orally once a day  atorvastatin 20 mg oral tablet: 1 tab(s) orally once a day  insulin glargine 100 units/mL subcutaneous solution: 12 unit(s) subcutaneous once a day (at bedtime)  irbesartan 300 mg oral tablet: 1 tab(s) orally once a day  melatonin 3 mg oral tablet: 1 tab(s) orally once a day (at bedtime) As needed Insomnia  tamsulosin 0.4 mg oral capsule: 1 cap(s) orally once a day (at bedtime)  ursodiol 300 mg oral capsule: 1 cap(s) orally 2 times a day

## 2024-11-19 NOTE — DISCHARGE NOTE PROVIDER - CARE PROVIDER_API CALL
Terrence Gómez  Gastroenterology  [address] [place] [address], [address] [place] [address]  Phone: [phone]  Fax: [phone]  Follow Up Time: 2 weeks    Ivan Melchor Roberts  Surgery  [address] [place] [address]  Phone: [phone]  Fax: [phone]  Follow Up Time: 1 month

## 2024-11-19 NOTE — DISCHARGE NOTE NURSING/CASE MANAGEMENT/SOCIAL WORK - FINANCIAL ASSISTANCE
[facility] provides services at a reduced cost to those who are determined to be eligible through [facility]’s financial assistance program. Information regarding [facility]’s financial assistance program can be found by going to [email or web address] or by calling [phone].

## 2024-11-19 NOTE — DISCHARGE NOTE PROVIDER - CARE PROVIDERS DIRECT ADDRESSES
[email or web address]

## 2024-11-19 NOTE — DISCHARGE NOTE PROVIDER - PROVIDER TOKENS
PROVIDER:[TOKEN:[[id number]],FOLLOWUP:[2 weeks]],PROVIDER:[TOKEN:[[id number]],FOLLOWUP:[1 month]]

## 2024-11-19 NOTE — DISCHARGE NOTE NURSING/CASE MANAGEMENT/SOCIAL WORK - PATIENT PORTAL LINK FT
You can access the FollowMyHealth Patient Portal offered by [facility] by registering at the following website: [email or web address]. By joining [employer]’s FollowMyHealth portal, you will also be able to view your health information using other applications (apps) compatible with our system.

## 2024-11-19 NOTE — PROGRESS NOTE ADULT - SUBJECTIVE AND OBJECTIVE BOX
B Team Surgery Daily Progress Note    SUBJECTIVE: Patient seen and examined by team on morning rounds. No acute events overnight. Patient lying comfortably in bed, voiding, passing flatus, having BM, tolerating diet. Denies chest pain, SOB, dizziness, palpitations, fever, chills, N/V/D.    Vital Signs Last 24 Hrs  T(C): 36.7 (19 Nov 2024 06:00), Max: 37 (18 Nov 2024 21:01)  T(F): 98 (19 Nov 2024 06:00), Max: 98.6 (18 Nov 2024 21:01)  HR: 80 (19 Nov 2024 06:00) (66 - 94)  BP: 127/75 (19 Nov 2024 06:00) (127/75 - 152/92)  RR: 16 (19 Nov 2024 06:00) (16 - 18)  SpO2: 99% (19 Nov 2024 06:00) (99% - 100%)  Parameters below as of 19 Nov 2024 06:00  Patient On (Oxygen Delivery Method): room air    General Appearance: Appears well, NAD  Neck: Supple  Chest: Equal expansion bilaterally, equal breath sounds  CV: Pulse regular presently  Abdomen: Softly distended, nontensder  Extremities: Grossly symmetric, SCD's in place     I&O's Summary    18 Nov 2024 07:01  -  19 Nov 2024 07:00  --------------------------------------------------------  IN: 1420 mL / OUT: 0 mL / NET: 1420 mL      I&O's Detail    18 Nov 2024 07:01  -  19 Nov 2024 07:00  --------------------------------------------------------  IN:    dextrose 5% + sodium chloride 0.45% w/ Additives: 400 mL    Oral Fluid: 1020 mL  Total IN: 1420 mL    OUT:  Total OUT: 0 mL    Total NET: 1420 mL          MEDICATIONS  (STANDING):  aspirin  chewable 81 milliGRAM(s) Oral daily  dextrose 50% Injectable 25 Gram(s) IV Push once  dextrose 50% Injectable 12.5 Gram(s) IV Push once  dextrose 50% Injectable 25 Gram(s) IV Push once  enoxaparin Injectable 40 milliGRAM(s) SubCutaneous every 24 hours  glucagon  Injectable 1 milliGRAM(s) IntraMuscular once  insulin glargine Injectable (LANTUS) 10 Unit(s) SubCutaneous at bedtime  insulin lispro (ADMELOG) corrective regimen sliding scale   SubCutaneous three times a day before meals  insulin lispro (ADMELOG) corrective regimen sliding scale   SubCutaneous at bedtime  tamsulosin 0.4 milliGRAM(s) Oral at bedtime    MEDICATIONS  (PRN):  acetaminophen     Tablet .. 650 milliGRAM(s) Oral every 6 hours PRN Mild Pain (1 - 3)  melatonin 3 milliGRAM(s) Oral at bedtime PRN Insomnia      LABS:                        15.4   9.25  )-----------( 200      ( 19 Nov 2024 05:21 )             46.5     11-19    136  |  102  |  x   ----------------------------<  x   4.0   |  x   |  x     Ca    8.3[L]      18 Nov 2024 05:53  Phos  3.2     11-19  Mg     2.00     11-19    TPro  6.6  /  Alb  3.2[L]  /  TBili  0.7  /  DBili  0.2  /  AST  40  /  ALT  55[H]  /  AlkPhos  94  11-18      Urinalysis Basic - ( 18 Nov 2024 05:53 )    Color: x / Appearance: x / SG: x / pH: x  Gluc: 152 mg/dL / Ketone: x  / Bili: x / Urobili: x   Blood: x / Protein: x / Nitrite: x   Leuk Esterase: x / RBC: x / WBC x   Sq Epi: x / Non Sq Epi: x / Bacteria: x        RADIOLOGY & ADDITIONAL STUDIES:

## 2024-11-19 NOTE — DISCHARGE NOTE PROVIDER - NSDCFUADDAPPT_GEN_ALL_CORE_FT
Please follow up with your cardiologist, endocrinologist and primary care physician upon discharge from hospital.  Please follow up with your cardiologist, endocrinologist and primary care physician upon discharge from hospital. We have stopped your Farxiga on discharge because of your pancreatitis - please follow up with your endocrinologist regarding your diabetes management.

## 2024-11-19 NOTE — DISCHARGE NOTE PROVIDER - HOSPITAL COURSE
79M with recurrent pancreatitis likely biliary vs ozempic related, with acute pancreatitis and peripancreatic fluid collections/pseudocysts.   CT A/P 11/14 demonstrating acute pancreatitis with peripancreatic fat stranding. Interval development of pancreatic pseudocysts in the head/neck and distal body/tail. Small focus of hypoenhancement in the pancreatic tail with hypodense material in the adjacent peripancreatic collection concerning for pancreatic and peripancreatic necrosis.  Patient admitted to surgical service.  GI consulted       79M with recurrent pancreatitis likely biliary vs ozempic related, with acute pancreatitis and peripancreatic fluid collections/pseudocysts.   CT A/P 11/14 demonstrating acute pancreatitis with peripancreatic fat stranding. Interval development of pancreatic pseudocysts in the head/neck and distal body/tail. Small focus of hypoenhancement in the pancreatic tail with hypodense material in the adjacent peripancreatic collection concerning for pancreatic and peripancreatic necrosis.  Patient admitted to surgical service.  GI consulted recommended MRCP to r/o choledocholithiasis, advancing diet as tolerated, no intervention for pancreatic collections at this time.   11/17 MRCP demonstrating Hemorrhagic peripancreatic collections. Cholelithiasis. Nonspecific gallbladder wall edema. No evidence of choledocholithiasis. No biliary ductal dilatation.  The patient's pain resolved. The patient was advanced to a regular diet and tolerated it well. The patient was placed on home medications. At the time of discharge, the patient was hemodynamically stable, was tolerating PO diet, was voiding urine and passing stool, was ambulating, and was comfortable with adequate pain control. The patient was instructed to follow up with Dr. Gómez and Dr. Melchor within 2 weeks after discharge from the hospital. The patient and wife felt comfortable with discharge.

## 2024-11-19 NOTE — DISCHARGE NOTE PROVIDER - NSDCCPCAREPLAN_GEN_ALL_CORE_FT
PRINCIPAL DISCHARGE DIAGNOSIS  Diagnosis: Pancreatitis  Assessment and Plan of Treatment:   ACTIVITY: you may return to your usual level of physical activity. If you are taking narcotic pain medication (such as Percocet) DO NOT drive a car, operate machinery or make important decisions.  DIET: Return to your usual diet.  NOTIFY YOUR SURGEON IF: you have any fever (over 100.4 F) or chills, persistent nausea/vomiting, persistent diarrhea, or if your pain is not controlled on your discharge pain medications.  FOLLOW-UP: Please follow up with your primary care physician in one week regarding your hospitalization

## 2024-11-19 NOTE — DISCHARGE NOTE NURSING/CASE MANAGEMENT/SOCIAL WORK - NSDCPNINST_GEN_ALL_CORE
No driving while taking pain medication, it causes drowsiness & constipation. Drink 6-8 glasses of fluids daily to promote hydration. No heavy lifting, pulling or pushing heavy objects. Follow up with the MD

## 2024-12-10 NOTE — CHART NOTE - NSCHARTNOTESELECT_GEN_ALL_CORE
Brief Ophthalmology Note Libtayo Counseling- I discussed with the patient the risks of Libtayo including but not limited to nausea, vomiting, diarrhea, and bone or muscle pain.  The patient verbalized understanding of the proper use and possible adverse effects of Libtayo.  All of the patient's questions and concerns were addressed.

## 2024-12-16 ENCOUNTER — INPATIENT (INPATIENT)
Facility: HOSPITAL | Age: 79
LOS: 1 days | Discharge: HOME HEALTH | End: 2024-12-18
Attending: INTERNAL MEDICINE | Admitting: INTERNAL MEDICINE
Payer: MEDICARE

## 2024-12-16 VITALS
HEIGHT: 65 IN | SYSTOLIC BLOOD PRESSURE: 138 MMHG | DIASTOLIC BLOOD PRESSURE: 91 MMHG | WEIGHT: 149.91 LBS | TEMPERATURE: 100 F | RESPIRATION RATE: 20 BRPM | HEART RATE: 104 BPM | OXYGEN SATURATION: 96 %

## 2024-12-16 DIAGNOSIS — Z79.899 OTHER LONG TERM (CURRENT) DRUG THERAPY: ICD-10-CM

## 2024-12-16 DIAGNOSIS — J10.1 INFLUENZA DUE TO OTHER IDENTIFIED INFLUENZA VIRUS WITH OTHER RESPIRATORY MANIFESTATIONS: ICD-10-CM

## 2024-12-16 DIAGNOSIS — Z29.9 ENCOUNTER FOR PROPHYLACTIC MEASURES, UNSPECIFIED: ICD-10-CM

## 2024-12-16 DIAGNOSIS — R41.82 ALTERED MENTAL STATUS, UNSPECIFIED: ICD-10-CM

## 2024-12-16 DIAGNOSIS — Z98.2 PRESENCE OF CEREBROSPINAL FLUID DRAINAGE DEVICE: Chronic | ICD-10-CM

## 2024-12-16 DIAGNOSIS — I10 ESSENTIAL (PRIMARY) HYPERTENSION: ICD-10-CM

## 2024-12-16 DIAGNOSIS — R53.1 WEAKNESS: ICD-10-CM

## 2024-12-16 DIAGNOSIS — E11.9 TYPE 2 DIABETES MELLITUS WITHOUT COMPLICATIONS: ICD-10-CM

## 2024-12-16 LAB
ALBUMIN SERPL ELPH-MCNC: 3.7 G/DL — SIGNIFICANT CHANGE UP (ref 3.3–5)
ALP SERPL-CCNC: 52 U/L — SIGNIFICANT CHANGE UP (ref 40–120)
ALT FLD-CCNC: 16 U/L — SIGNIFICANT CHANGE UP (ref 4–41)
ANION GAP SERPL CALC-SCNC: 13 MMOL/L — SIGNIFICANT CHANGE UP (ref 7–14)
APPEARANCE UR: CLEAR — SIGNIFICANT CHANGE UP
APTT BLD: 34.4 SEC — SIGNIFICANT CHANGE UP (ref 24.5–35.6)
AST SERPL-CCNC: 23 U/L — SIGNIFICANT CHANGE UP (ref 4–40)
BACTERIA # UR AUTO: NEGATIVE /HPF — SIGNIFICANT CHANGE UP
BASOPHILS # BLD AUTO: 0.11 K/UL — SIGNIFICANT CHANGE UP (ref 0–0.2)
BASOPHILS NFR BLD AUTO: 0.9 % — SIGNIFICANT CHANGE UP (ref 0–2)
BILIRUB SERPL-MCNC: 0.6 MG/DL — SIGNIFICANT CHANGE UP (ref 0.2–1.2)
BILIRUB UR-MCNC: NEGATIVE — SIGNIFICANT CHANGE UP
BLOOD GAS VENOUS COMPREHENSIVE RESULT: SIGNIFICANT CHANGE UP
BUN SERPL-MCNC: 23 MG/DL — SIGNIFICANT CHANGE UP (ref 7–23)
CALCIUM SERPL-MCNC: 8.6 MG/DL — SIGNIFICANT CHANGE UP (ref 8.4–10.5)
CAST: 2 /LPF — SIGNIFICANT CHANGE UP (ref 0–4)
CHLORIDE SERPL-SCNC: 98 MMOL/L — SIGNIFICANT CHANGE UP (ref 98–107)
CO2 SERPL-SCNC: 22 MMOL/L — SIGNIFICANT CHANGE UP (ref 22–31)
COLOR SPEC: SIGNIFICANT CHANGE UP
CREAT SERPL-MCNC: 1.06 MG/DL — SIGNIFICANT CHANGE UP (ref 0.5–1.3)
DIFF PNL FLD: NEGATIVE — SIGNIFICANT CHANGE UP
EGFR: 71 ML/MIN/1.73M2 — SIGNIFICANT CHANGE UP
EOSINOPHIL # BLD AUTO: 0 K/UL — SIGNIFICANT CHANGE UP (ref 0–0.5)
EOSINOPHIL NFR BLD AUTO: 0 % — SIGNIFICANT CHANGE UP (ref 0–6)
FLUAV AG NPH QL: DETECTED
FLUBV AG NPH QL: SIGNIFICANT CHANGE UP
GLUCOSE BLDC GLUCOMTR-MCNC: 226 MG/DL — HIGH (ref 70–99)
GLUCOSE BLDC GLUCOMTR-MCNC: 342 MG/DL — HIGH (ref 70–99)
GLUCOSE SERPL-MCNC: 139 MG/DL — HIGH (ref 70–99)
GLUCOSE UR QL: NEGATIVE MG/DL — SIGNIFICANT CHANGE UP
HCT VFR BLD CALC: 49 % — SIGNIFICANT CHANGE UP (ref 39–50)
HGB BLD-MCNC: 15.7 G/DL — SIGNIFICANT CHANGE UP (ref 13–17)
IANC: 9.53 K/UL — HIGH (ref 1.8–7.4)
INR BLD: 1.03 RATIO — SIGNIFICANT CHANGE UP (ref 0.85–1.16)
KETONES UR-MCNC: ABNORMAL MG/DL
LACTATE SERPL-SCNC: 2.1 MMOL/L — HIGH (ref 0.5–2)
LEUKOCYTE ESTERASE UR-ACNC: NEGATIVE — SIGNIFICANT CHANGE UP
LIDOCAIN IGE QN: 19 U/L — SIGNIFICANT CHANGE UP (ref 7–60)
LYMPHOCYTES # BLD AUTO: 0.66 K/UL — LOW (ref 1–3.3)
LYMPHOCYTES # BLD AUTO: 5.2 % — LOW (ref 13–44)
MCHC RBC-ENTMCNC: 27.7 PG — SIGNIFICANT CHANGE UP (ref 27–34)
MCHC RBC-ENTMCNC: 32 G/DL — SIGNIFICANT CHANGE UP (ref 32–36)
MCV RBC AUTO: 86.6 FL — SIGNIFICANT CHANGE UP (ref 80–100)
MONOCYTES # BLD AUTO: 1.2 K/UL — HIGH (ref 0–0.9)
MONOCYTES NFR BLD AUTO: 9.5 % — SIGNIFICANT CHANGE UP (ref 2–14)
NEUTROPHILS # BLD AUTO: 10.24 K/UL — HIGH (ref 1.8–7.4)
NEUTROPHILS NFR BLD AUTO: 80.9 % — HIGH (ref 43–77)
NITRITE UR-MCNC: NEGATIVE — SIGNIFICANT CHANGE UP
PH UR: 5.5 — SIGNIFICANT CHANGE UP (ref 5–8)
PLAT MORPH BLD: ABNORMAL
PLATELET # BLD AUTO: 143 K/UL — LOW (ref 150–400)
PLATELET COUNT - ESTIMATE: ABNORMAL
POTASSIUM SERPL-MCNC: 4 MMOL/L — SIGNIFICANT CHANGE UP (ref 3.5–5.3)
POTASSIUM SERPL-SCNC: 4 MMOL/L — SIGNIFICANT CHANGE UP (ref 3.5–5.3)
PROT SERPL-MCNC: 7.2 G/DL — SIGNIFICANT CHANGE UP (ref 6–8.3)
PROT UR-MCNC: 30 MG/DL
PROTHROM AB SERPL-ACNC: 12.2 SEC — SIGNIFICANT CHANGE UP (ref 9.9–13.4)
RBC # BLD: 5.66 M/UL — SIGNIFICANT CHANGE UP (ref 4.2–5.8)
RBC # FLD: 13.7 % — SIGNIFICANT CHANGE UP (ref 10.3–14.5)
RBC BLD AUTO: NORMAL — SIGNIFICANT CHANGE UP
RBC CASTS # UR COMP ASSIST: 1 /HPF — SIGNIFICANT CHANGE UP (ref 0–4)
RSV RNA NPH QL NAA+NON-PROBE: SIGNIFICANT CHANGE UP
SARS-COV-2 RNA SPEC QL NAA+PROBE: SIGNIFICANT CHANGE UP
SODIUM SERPL-SCNC: 133 MMOL/L — LOW (ref 135–145)
SP GR SPEC: 1.02 — SIGNIFICANT CHANGE UP (ref 1–1.03)
SQUAMOUS # UR AUTO: 1 /HPF — SIGNIFICANT CHANGE UP (ref 0–5)
UROBILINOGEN FLD QL: 0.2 MG/DL — SIGNIFICANT CHANGE UP (ref 0.2–1)
VARIANT LYMPHS # BLD: 3.5 % — SIGNIFICANT CHANGE UP (ref 0–6)
WBC # BLD: 12.66 K/UL — HIGH (ref 3.8–10.5)
WBC # FLD AUTO: 12.66 K/UL — HIGH (ref 3.8–10.5)
WBC UR QL: 1 /HPF — SIGNIFICANT CHANGE UP (ref 0–5)

## 2024-12-16 PROCEDURE — 70250 X-RAY EXAM OF SKULL: CPT | Mod: 26

## 2024-12-16 PROCEDURE — 99223 1ST HOSP IP/OBS HIGH 75: CPT

## 2024-12-16 PROCEDURE — 74018 RADEX ABDOMEN 1 VIEW: CPT | Mod: 26

## 2024-12-16 PROCEDURE — 71045 X-RAY EXAM CHEST 1 VIEW: CPT | Mod: 26

## 2024-12-16 PROCEDURE — 99285 EMERGENCY DEPT VISIT HI MDM: CPT | Mod: GC

## 2024-12-16 RX ORDER — ACETAMINOPHEN 500MG 500 MG/1
650 TABLET, COATED ORAL EVERY 6 HOURS
Refills: 0 | Status: COMPLETED | OUTPATIENT
Start: 2024-12-16 | End: 2024-12-18

## 2024-12-16 RX ORDER — METHYLPREDNISOLONE SOD SUCC 125 MG
70 VIAL (EA) INJECTION ONCE
Refills: 0 | Status: COMPLETED | OUTPATIENT
Start: 2024-12-16 | End: 2024-12-16

## 2024-12-16 RX ORDER — 0.9 % SODIUM CHLORIDE 0.9 %
1000 INTRAVENOUS SOLUTION INTRAVENOUS
Refills: 0 | Status: DISCONTINUED | OUTPATIENT
Start: 2024-12-16 | End: 2024-12-18

## 2024-12-16 RX ORDER — IPRATROPIUM BROMIDE AND ALBUTEROL SULFATE 2.5; .5 MG/3ML; MG/3ML
3 SOLUTION RESPIRATORY (INHALATION) EVERY 6 HOURS
Refills: 0 | Status: COMPLETED | OUTPATIENT
Start: 2024-12-16 | End: 2024-12-17

## 2024-12-16 RX ORDER — ACETAMINOPHEN 500MG 500 MG/1
1000 TABLET, COATED ORAL ONCE
Refills: 0 | Status: COMPLETED | OUTPATIENT
Start: 2024-12-16 | End: 2024-12-16

## 2024-12-16 RX ORDER — PIPERACILLIN SODIUM AND TAZOBACTAM SODIUM 4; .5 G/20ML; G/20ML
3.38 INJECTION, POWDER, LYOPHILIZED, FOR SOLUTION INTRAVENOUS ONCE
Refills: 0 | Status: COMPLETED | OUTPATIENT
Start: 2024-12-16 | End: 2024-12-16

## 2024-12-16 RX ORDER — SODIUM CHLORIDE 9 MG/ML
1000 INJECTION, SOLUTION INTRAMUSCULAR; INTRAVENOUS; SUBCUTANEOUS ONCE
Refills: 0 | Status: DISCONTINUED | OUTPATIENT
Start: 2024-12-16 | End: 2024-12-16

## 2024-12-16 RX ORDER — TAMSULOSIN HYDROCHLORIDE 0.4 MG/1
0.4 CAPSULE ORAL AT BEDTIME
Refills: 0 | Status: DISCONTINUED | OUTPATIENT
Start: 2024-12-16 | End: 2024-12-18

## 2024-12-16 RX ORDER — INSULIN GLARGINE 100 [IU]/ML
24 INJECTION, SOLUTION SUBCUTANEOUS AT BEDTIME
Refills: 0 | Status: DISCONTINUED | OUTPATIENT
Start: 2024-12-16 | End: 2024-12-17

## 2024-12-16 RX ORDER — OSELTAMIVIR PHOSPHATE 75 MG
30 CAPSULE ORAL
Refills: 0 | Status: DISCONTINUED | OUTPATIENT
Start: 2024-12-17 | End: 2024-12-18

## 2024-12-16 RX ORDER — LOSARTAN POTASSIUM 100 MG/1
100 TABLET, FILM COATED ORAL DAILY
Refills: 0 | Status: DISCONTINUED | OUTPATIENT
Start: 2024-12-16 | End: 2024-12-18

## 2024-12-16 RX ORDER — SODIUM CHLORIDE 9 MG/ML
1000 INJECTION, SOLUTION INTRAMUSCULAR; INTRAVENOUS; SUBCUTANEOUS ONCE
Refills: 0 | Status: COMPLETED | OUTPATIENT
Start: 2024-12-16 | End: 2024-12-16

## 2024-12-16 RX ORDER — IPRATROPIUM BROMIDE AND ALBUTEROL SULFATE 2.5; .5 MG/3ML; MG/3ML
3 SOLUTION RESPIRATORY (INHALATION) ONCE
Refills: 0 | Status: COMPLETED | OUTPATIENT
Start: 2024-12-16 | End: 2024-12-16

## 2024-12-16 RX ORDER — VANCOMYCIN HCL 900 MCG/MG
1000 POWDER (GRAM) MISCELLANEOUS ONCE
Refills: 0 | Status: COMPLETED | OUTPATIENT
Start: 2024-12-16 | End: 2024-12-16

## 2024-12-16 RX ORDER — ENOXAPARIN SODIUM 30 MG/.3ML
40 INJECTION SUBCUTANEOUS EVERY 24 HOURS
Refills: 0 | Status: DISCONTINUED | OUTPATIENT
Start: 2024-12-16 | End: 2024-12-18

## 2024-12-16 RX ORDER — SODIUM CHLORIDE 9 MG/ML
1000 INJECTION, SOLUTION INTRAMUSCULAR; INTRAVENOUS; SUBCUTANEOUS
Refills: 0 | Status: DISCONTINUED | OUTPATIENT
Start: 2024-12-16 | End: 2024-12-18

## 2024-12-16 RX ORDER — KETOROLAC TROMETHAMINE 30 MG/ML
15 INJECTION INTRAMUSCULAR; INTRAVENOUS EVERY 6 HOURS
Refills: 0 | Status: DISCONTINUED | OUTPATIENT
Start: 2024-12-16 | End: 2024-12-16

## 2024-12-16 RX ORDER — ACETAMINOPHEN, DIPHENHYDRAMINE HCL, PHENYLEPHRINE HCL 325; 25; 5 MG/1; MG/1; MG/1
3 TABLET ORAL AT BEDTIME
Refills: 0 | Status: DISCONTINUED | OUTPATIENT
Start: 2024-12-16 | End: 2024-12-18

## 2024-12-16 RX ORDER — OSELTAMIVIR PHOSPHATE 75 MG
75 CAPSULE ORAL ONCE
Refills: 0 | Status: COMPLETED | OUTPATIENT
Start: 2024-12-16 | End: 2024-12-16

## 2024-12-16 RX ORDER — GLUCAGON INJECTION, SOLUTION 0.5 MG/.1ML
1 INJECTION, SOLUTION SUBCUTANEOUS ONCE
Refills: 0 | Status: DISCONTINUED | OUTPATIENT
Start: 2024-12-16 | End: 2024-12-18

## 2024-12-16 RX ORDER — GUAIFENESIN 400 MG
100 TABLET ORAL EVERY 6 HOURS
Refills: 0 | Status: DISCONTINUED | OUTPATIENT
Start: 2024-12-16 | End: 2024-12-18

## 2024-12-16 RX ADMIN — Medication 100 MILLIGRAM(S): at 23:39

## 2024-12-16 RX ADMIN — ACETAMINOPHEN 500MG 400 MILLIGRAM(S): 500 TABLET, COATED ORAL at 12:02

## 2024-12-16 RX ADMIN — ENOXAPARIN SODIUM 40 MILLIGRAM(S): 30 INJECTION SUBCUTANEOUS at 23:39

## 2024-12-16 RX ADMIN — PIPERACILLIN SODIUM AND TAZOBACTAM SODIUM 200 GRAM(S): 4; .5 INJECTION, POWDER, LYOPHILIZED, FOR SOLUTION INTRAVENOUS at 12:03

## 2024-12-16 RX ADMIN — Medication 70 MILLIGRAM(S): at 10:54

## 2024-12-16 RX ADMIN — Medication 75 MILLIGRAM(S): at 13:09

## 2024-12-16 RX ADMIN — INSULIN GLARGINE 24 UNIT(S): 100 INJECTION, SOLUTION SUBCUTANEOUS at 23:38

## 2024-12-16 RX ADMIN — IPRATROPIUM BROMIDE AND ALBUTEROL SULFATE 3 MILLILITER(S): 2.5; .5 SOLUTION RESPIRATORY (INHALATION) at 23:44

## 2024-12-16 RX ADMIN — ACETAMINOPHEN 500MG 650 MILLIGRAM(S): 500 TABLET, COATED ORAL at 23:39

## 2024-12-16 RX ADMIN — SODIUM CHLORIDE 100 MILLILITER(S): 9 INJECTION, SOLUTION INTRAMUSCULAR; INTRAVENOUS; SUBCUTANEOUS at 23:39

## 2024-12-16 RX ADMIN — IPRATROPIUM BROMIDE AND ALBUTEROL SULFATE 3 MILLILITER(S): 2.5; .5 SOLUTION RESPIRATORY (INHALATION) at 10:54

## 2024-12-16 RX ADMIN — Medication 250 MILLIGRAM(S): at 13:09

## 2024-12-16 RX ADMIN — SODIUM CHLORIDE 1000 MILLILITER(S): 9 INJECTION, SOLUTION INTRAMUSCULAR; INTRAVENOUS; SUBCUTANEOUS at 12:02

## 2024-12-16 NOTE — H&P ADULT - NSHPLABSRESULTS_GEN_ALL_CORE
15.7   12.66 )-----------( 143      ( 16 Dec 2024 10:45 )             49.0     12-16    133[L]  |  98  |  23  ----------------------------<  139[H]  4.0   |  22  |  1.06    Ca    8.6      16 Dec 2024 10:45    TPro  7.2  /  Alb  3.7  /  TBili  0.6  /  DBili  x   /  AST  23  /  ALT  16  /  AlkPhos  52  12-16    CAPILLARY BLOOD GLUCOSE      POCT Blood Glucose.: 226 mg/dL (16 Dec 2024 16:27)  POCT Blood Glucose.: 151 mg/dL (16 Dec 2024 09:45)    PT/INR - ( 16 Dec 2024 10:45 )   PT: 12.2 sec;   INR: 1.03 ratio         PTT - ( 16 Dec 2024 10:45 )  PTT:34.4 sec  Urinalysis Basic - ( 16 Dec 2024 11:00 )    Color: Dark Yellow / Appearance: Clear / S.021 / pH: x  Gluc: x / Ketone: Trace mg/dL  / Bili: Negative / Urobili: 0.2 mg/dL   Blood: x / Protein: 30 mg/dL / Nitrite: Negative   Leuk Esterase: Negative / RBC: 1 /HPF / WBC 1 /HPF   Sq Epi: x / Non Sq Epi: 1 /HPF / Bacteria: Negative /HPF      Vital Signs Last 24 Hrs  T(C): 36.7 (16 Dec 2024 19:36), Max: 38.6 (16 Dec 2024 11:00)  T(F): 98.1 (16 Dec 2024 19:36), Max: 101.5 (16 Dec 2024 11:00)  HR: 93 (16 Dec 2024 19:36) (85 - 104)  BP: 114/65 (16 Dec 2024 19:36) (114/65 - 138/91)  BP(mean): --  RR: 17 (16 Dec 2024 19:36) (17 - 25)  SpO2: 94% (16 Dec 2024 19:36) (94% - 98%)    Parameters below as of 16 Dec 2024 19:36  Patient On (Oxygen Delivery Method): room air

## 2024-12-16 NOTE — H&P ADULT - PROBLEM SELECTOR PLAN 2
-acute ams likely 2/2 flu  -less pronounced ams over last 2 months s/p  MRI /neurosurgery eval, shunt found to be functioning appropriately per wife. Shunt series performed here today unremarkable.  -c/w  outpt followup

## 2024-12-16 NOTE — PROCEDURE NOTE - ADDITIONAL PROCEDURE DETAILS
Urology consulted for Reduction of paraphimosis  Able to reduce foreskin successfully  Recommend minimal retraction when possible  Ice to affected area for swelling  Tylenol/Ibuprofen as needed for pain  Follow up as needed    [facility]  [address] [place] [address] [id number] [phone]

## 2024-12-16 NOTE — H&P ADULT - PROBLEM SELECTOR PLAN 1
-c/w tamiflu  -supportive care; ivf, prn pain meds, prn Robitussin, nebs, chest PT  -ct chest, procal to evaluate for superimposed bact infection

## 2024-12-16 NOTE — ED PROVIDER NOTE - OBJECTIVE STATEMENT
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  Wife reports that for the past 3 days he has had congestion, cough and worsening shortness of breath.  She has noticed subjective fevers at home however does not have a thermometer.  He does have albuterol which he uses intermittently for history of asthma.  Reports that the patient did smoke when he was younger however unable to provide further details.  Wife additionally noticed that over the past 2 to 3 days he has had increased confusion, weakness with ambulating and changes in behavior.  No difficulty tolerating p.o. Pt endorses coughing and denies any pain at this time.

## 2024-12-16 NOTE — ED PROVIDER NOTE - CARE PLAN
1 Principal Discharge DX:	Weakness   Principal Discharge DX:	Weakness  Secondary Diagnosis:	Paraphimosis  Secondary Diagnosis:	Folliculitis

## 2024-12-16 NOTE — ED PROVIDER NOTE - PHYSICAL EXAMINATION
Physical Exam:  General: conversive  Eyes: EOMI, Conjunctiva and sclera clear. Pupils equal and reactive  Neck: No JVD  Lungs: slight tachypnea, speaking in short sentences, expiratory wheezing on exam  Heart: Normal peripheral perfusion  Abdomen: Soft, nontender, nondistended, no CVA tenderness  Extremities: 2+ peripheral pulses, no edema  Psych: AAO X1 to name  Neurologic: Non-focal, ambulating, CN I-XII intact, generalized weakness Physical Exam:  General: conversive  Skin: three red patching with central pustular lesion 1-2xm   Eyes: EOMI, Conjunctiva and sclera clear. Pupils equal and reactive  Neck: No JVD  Lungs: slight tachypnea, speaking in short sentences, expiratory wheezing on exam  Heart: Normal peripheral perfusion  Abdomen: Soft, nontender, nondistended, no CVA tenderness  ----paraphimosis no cyanosis of the glans however foreskin rapidly   Extremities: 2+ peripheral pulses, no edema  Psych: AAO X1 to name  Neurologic: Non-focal, ambulating, CN I-XII intact, generalized weakness

## 2024-12-16 NOTE — H&P ADULT - NSHPADDITIONALINFOADULT_GEN_ALL_CORE
>75 minutes devoted to chart review, h&p, med rec and documentation
No, the patient is not being discharged from [facility]

## 2024-12-16 NOTE — PATIENT PROFILE ADULT - FALL HARM RISK - RISK INTERVENTIONS
Assistance with ambulation/Communicate Fall Risk and Risk Factors to all staff, patient, and family/Reinforce activity limits and safety measures with patient and family/Visual Cue: Yellow wristband/Bed in lowest position, wheels locked, appropriate side rails in place/Call bell, personal items and telephone in reach/Instruct patient to call for assistance before getting out of bed or chair/Non-slip footwear when patient is out of bed/[place] to call system/Physically safe environment - no spills, clutter or unnecessary equipment/Purposeful Proactive Rounding/Room/bathroom lighting operational, light cord in reach

## 2024-12-16 NOTE — ED PROVIDER NOTE - CLINICAL SUMMARY MEDICAL DECISION MAKING FREE TEXT BOX
79-year-old male with multiple medical comorbidities presenting with 3 days of cough, congestion, weakness.  Of note on physical exam patient has expiratory wheezing, slight tachypnea, slight redness of the skin in the suprapubic region and paraphimosis.  Able to reduce foreskin however immediately returns to original position popped back.  Will obtain chest x-ray, DuoNebs, neurology consult.  Bedside echo patient has collapsible IVC, no B-lines.

## 2024-12-16 NOTE — ED ADULT NURSE NOTE - NSFALLUNIVINTERV_ED_ALL_ED
Bed/Stretcher in lowest position, wheels locked, appropriate side rails in place/Call bell, personal items and telephone in reach/Instruct patient to call for assistance before getting out of bed/chair/stretcher/Non-slip footwear applied when patient is off stretcher/[place] to call system/Physically safe environment - no spills, clutter or unnecessary equipment/Purposeful proactive rounding/Room/bathroom lighting operational, light cord in reach

## 2024-12-16 NOTE — ED PROVIDER NOTE - ATTENDING CONTRIBUTION TO CARE
DR. PABLO, ATTENDING MD-  I performed a face to face bedside interview with the patient regarding history of present illness, review of symptoms and past medical history. I completed an independent physical exam.  I have discussed the patient's plan of care with the fellow.  Documentation as above in the note.    78 y/o male h/o dementia  shunt panc cyst tia bib family for cough fever sob wheeze, additional c/o penile swelling at foreskin x 7 days, skin infection in groin region.  On exam pt has wheezing all lung fields, paraphimosis with edema, no cyanosis, small area of folliculitis without fluctuance at groin region approx 2x2cm.  Will consult uro for paraphimosis reduction, give abx, copd tx, eval for pna viral syndrome.  Obtain cbc cmp cxr viral swab ua ucx give ivf bolus duoneb steroid abx admit for further care and evaluation.

## 2024-12-16 NOTE — H&P ADULT - NSHPPHYSICALEXAM_GEN_ALL_CORE
PHYSICAL EXAM:      Constitutional: NAD, well-groomed, well-developed  HEENT:  EOMI, Normal Hearing  Neck: No LAD, No JVD  Back: Normal spine flexure, No CVA tenderness  Respiratory: coarse breath sounds   Cardiovascular: S1 and S2, RRR  Gastrointestinal: BS+, soft, NT/ND  Extremities: No peripheral edema  Vascular: 2+ peripheral pulses  Neurological: A/O x 1-2, chronic left leg weakness   Psychiatric: Normal mood, normal affect  Musculoskeletal: 5/5 strength b/l upper and lower extremities save for chronic left hip flexion weakness   Skin: No rashes

## 2024-12-16 NOTE — H&P ADULT - PROBLEM SELECTOR PLAN 3
wife and other family member at bedside report since Ozempic and Farxiga, stopped, lantus increased to 30, premeal to 16. will reduce here, place on 24 lantus, 12 premeal

## 2024-12-16 NOTE — H&P ADULT - HISTORY OF PRESENT ILLNESS
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  Wife reports that for the past 3 days he has had congestion, cough and worsening shortness of breath.  She has noticed subjective fevers at home however does not have a thermometer.  He does have albuterol which he uses intermittently for history of asthma.  Reports that the patient did smoke when he was younger however unable to provide further details.  Wife additionally noticed that over the past 2 to 3 days he has had increased confusion,   Notes some degree of confusion over last 2 months , subsequently   pt underwent MRI /neurosurgery eval, shunt found to be functioning appropriately per wife. Shunt series performed here today unremarkable.    pt reports cough, diffuse body aches;  family reports increased gen weakness,  no difficulty tolerating po  Founf to be fluA + here, wbc 12, afebrile. UA unremarkable   79M Hx HFpEF (EF 45-50%), HTN, HLD, dementia DM on insulin, L SDH s/p L MMAE -middle meningeal artery embolization (8/2022),  NPH s/p  shunt (2023), recurrent pancreatitis (recent hospitalization with peripancreatic collection) presenting for cough, weakness.  Wife reports that for the past 3 days he has had congestion, cough and worsening shortness of breath.  She has noticed subjective fevers at home however does not have a thermometer.  He does have albuterol which he uses intermittently for history of asthma.  Reports that the patient did smoke when he was younger however unable to provide further details.  Wife additionally noticed that over the past 2 to 3 days he has had increased confusion,   Notes some degree of confusion over last 2 months , subsequently   pt underwent MRI /neurosurgery eval, shunt found to be functioning appropriately per wife. Shunt series performed here today unremarkable.    pt reports cough, diffuse body aches;  family reports increased gen weakness,  no difficulty tolerating po  Found to be fluA + here, wbc 12, afebrile. UA unremarkable

## 2024-12-16 NOTE — ED PROVIDER NOTE - PROGRESS NOTE DETAILS
paged urology urology at bedside and reduced foreskin  -will plan for admission to medicine spoke with neurosurgery who is awre of the patient and clear shunt series urology at bedside and reduced foreskin  -gave abx for fever, held 30cc/kg due to hx of HFpEF  -will plan for admission to medicine

## 2024-12-16 NOTE — ED ADULT NURSE NOTE - OBJECTIVE STATEMENT
pt received to room 17 pt received to room 17, lethargic and weak in appearance, arouses to tactile stimuli. per wife at bedside pt has had congestion, sob, cough x 3 days with increased confusion and lethargy starting today. wife reports pt minimally walks with walker at baseline but has been unable to get up last 2 days. pt is rectally febrile, NSR on cardiac monitor. does not appear to be in pain. mildly tachypneic. pt with distended abdomen, nontender to palpation, small pimple-like blisters noted to lower abdomen and penis noted to be swollen, foreskin is retracted, red, swollen - unable to put back in place (urology consulted by MD)- wife reports noticing this yesterday. 18G IV placed in R wrist, 20G IV placed in L hand. medicated as ordered. comfort and safety maintained. pt pending XR.

## 2024-12-16 NOTE — ED ADULT TRIAGE NOTE - CHIEF COMPLAINT QUOTE
fever, cough and SOB for couple days, appears weak, information obtained by family, h/o type 2 DM, dementia, TIA,  shunt

## 2024-12-16 NOTE — H&P ADULT - NSHPREVIEWOFSYSTEMS_GEN_ALL_CORE
Review of Systems:   CONSTITUTIONAL: No fever, + fatigue  EYES: No eye pain, visual disturbances, or discharge  ENMT:  No difficulty hearing, tinnitus, vertigo; No sinus or throat pain  NECK: No pain or stiffness  RESPIRATORY: + productive  cough; No shortness of breath  CARDIOVASCULAR: No chest pain, palpitations, dizziness, or leg swelling  GASTROINTESTINAL: No abdominal or epigastric pain.  NEUROLOGICAL: No headache  MUSCULOSKELETAL: aches all over body

## 2024-12-17 ENCOUNTER — TRANSCRIPTION ENCOUNTER (OUTPATIENT)
Age: 79
End: 2024-12-17

## 2024-12-17 LAB
ADD ON TEST-SPECIMEN IN LAB: SIGNIFICANT CHANGE UP
ALBUMIN SERPL ELPH-MCNC: 3.4 G/DL — SIGNIFICANT CHANGE UP (ref 3.3–5)
ALP SERPL-CCNC: 46 U/L — SIGNIFICANT CHANGE UP (ref 40–120)
ALT FLD-CCNC: 16 U/L — SIGNIFICANT CHANGE UP (ref 4–41)
ANION GAP SERPL CALC-SCNC: 17 MMOL/L — HIGH (ref 7–14)
AST SERPL-CCNC: 26 U/L — SIGNIFICANT CHANGE UP (ref 4–40)
BASOPHILS # BLD AUTO: 0.01 K/UL — SIGNIFICANT CHANGE UP (ref 0–0.2)
BASOPHILS NFR BLD AUTO: 0.1 % — SIGNIFICANT CHANGE UP (ref 0–2)
BILIRUB SERPL-MCNC: 0.4 MG/DL — SIGNIFICANT CHANGE UP (ref 0.2–1.2)
BUN SERPL-MCNC: 33 MG/DL — HIGH (ref 7–23)
CALCIUM SERPL-MCNC: 8.6 MG/DL — SIGNIFICANT CHANGE UP (ref 8.4–10.5)
CHLORIDE SERPL-SCNC: 101 MMOL/L — SIGNIFICANT CHANGE UP (ref 98–107)
CK MB BLD-MCNC: 7.6 % — HIGH (ref 0–2.5)
CK MB CFR SERPL CALC: 28.2 NG/ML — HIGH
CK SERPL-CCNC: 372 U/L — HIGH (ref 30–200)
CO2 SERPL-SCNC: 19 MMOL/L — LOW (ref 22–31)
CREAT SERPL-MCNC: 1.1 MG/DL — SIGNIFICANT CHANGE UP (ref 0.5–1.3)
CULTURE RESULTS: NO GROWTH — SIGNIFICANT CHANGE UP
EGFR: 68 ML/MIN/1.73M2 — SIGNIFICANT CHANGE UP
EOSINOPHIL # BLD AUTO: 0 K/UL — SIGNIFICANT CHANGE UP (ref 0–0.5)
EOSINOPHIL NFR BLD AUTO: 0 % — SIGNIFICANT CHANGE UP (ref 0–6)
GLUCOSE BLDC GLUCOMTR-MCNC: 156 MG/DL — HIGH (ref 70–99)
GLUCOSE BLDC GLUCOMTR-MCNC: 181 MG/DL — HIGH (ref 70–99)
GLUCOSE BLDC GLUCOMTR-MCNC: 239 MG/DL — HIGH (ref 70–99)
GLUCOSE BLDC GLUCOMTR-MCNC: 309 MG/DL — HIGH (ref 70–99)
GLUCOSE BLDC GLUCOMTR-MCNC: 372 MG/DL — HIGH (ref 70–99)
GLUCOSE SERPL-MCNC: 358 MG/DL — HIGH (ref 70–99)
HCT VFR BLD CALC: 44.3 % — SIGNIFICANT CHANGE UP (ref 39–50)
HGB BLD-MCNC: 14.2 G/DL — SIGNIFICANT CHANGE UP (ref 13–17)
IANC: 8.37 K/UL — HIGH (ref 1.8–7.4)
IMM GRANULOCYTES NFR BLD AUTO: 0.5 % — SIGNIFICANT CHANGE UP (ref 0–0.9)
LYMPHOCYTES # BLD AUTO: 0.78 K/UL — LOW (ref 1–3.3)
LYMPHOCYTES # BLD AUTO: 7.5 % — LOW (ref 13–44)
MAGNESIUM SERPL-MCNC: 2.2 MG/DL — SIGNIFICANT CHANGE UP (ref 1.6–2.6)
MCHC RBC-ENTMCNC: 27.5 PG — SIGNIFICANT CHANGE UP (ref 27–34)
MCHC RBC-ENTMCNC: 32.1 G/DL — SIGNIFICANT CHANGE UP (ref 32–36)
MCV RBC AUTO: 85.7 FL — SIGNIFICANT CHANGE UP (ref 80–100)
MONOCYTES # BLD AUTO: 1.23 K/UL — HIGH (ref 0–0.9)
MONOCYTES NFR BLD AUTO: 11.8 % — SIGNIFICANT CHANGE UP (ref 2–14)
MRSA PCR RESULT.: DETECTED
NEUTROPHILS # BLD AUTO: 8.37 K/UL — HIGH (ref 1.8–7.4)
NEUTROPHILS NFR BLD AUTO: 80.1 % — HIGH (ref 43–77)
NRBC # BLD: 0 /100 WBCS — SIGNIFICANT CHANGE UP (ref 0–0)
NRBC # FLD: 0 K/UL — SIGNIFICANT CHANGE UP (ref 0–0)
PHOSPHATE SERPL-MCNC: 3.6 MG/DL — SIGNIFICANT CHANGE UP (ref 2.5–4.5)
PLATELET # BLD AUTO: 122 K/UL — LOW (ref 150–400)
POTASSIUM SERPL-MCNC: 4.7 MMOL/L — SIGNIFICANT CHANGE UP (ref 3.5–5.3)
POTASSIUM SERPL-SCNC: 4.7 MMOL/L — SIGNIFICANT CHANGE UP (ref 3.5–5.3)
PROT SERPL-MCNC: 6.8 G/DL — SIGNIFICANT CHANGE UP (ref 6–8.3)
RBC # BLD: 5.17 M/UL — SIGNIFICANT CHANGE UP (ref 4.2–5.8)
RBC # FLD: 13.7 % — SIGNIFICANT CHANGE UP (ref 10.3–14.5)
S AUREUS DNA NOSE QL NAA+PROBE: DETECTED
SODIUM SERPL-SCNC: 137 MMOL/L — SIGNIFICANT CHANGE UP (ref 135–145)
SPECIMEN SOURCE: SIGNIFICANT CHANGE UP
TROPONIN T, HIGH SENSITIVITY RESULT: 170 NG/L — CRITICAL HIGH
TROPONIN T, HIGH SENSITIVITY RESULT: 263 NG/L — CRITICAL HIGH
TROPONIN T, HIGH SENSITIVITY RESULT: 77 NG/L — CRITICAL HIGH
WBC # BLD: 10.44 K/UL — SIGNIFICANT CHANGE UP (ref 3.8–10.5)
WBC # FLD AUTO: 10.44 K/UL — SIGNIFICANT CHANGE UP (ref 3.8–10.5)

## 2024-12-17 PROCEDURE — 71250 CT THORAX DX C-: CPT | Mod: 26

## 2024-12-17 PROCEDURE — 93010 ELECTROCARDIOGRAM REPORT: CPT

## 2024-12-17 RX ORDER — URSODIOL 300 MG/1
1 CAPSULE ORAL
Refills: 0 | DISCHARGE

## 2024-12-17 RX ORDER — INSULIN GLARGINE 100 [IU]/ML
30 INJECTION, SOLUTION SUBCUTANEOUS AT BEDTIME
Refills: 0 | Status: DISCONTINUED | OUTPATIENT
Start: 2024-12-17 | End: 2024-12-18

## 2024-12-17 RX ADMIN — ACETAMINOPHEN 500MG 650 MILLIGRAM(S): 500 TABLET, COATED ORAL at 05:58

## 2024-12-17 RX ADMIN — ACETAMINOPHEN 500MG 650 MILLIGRAM(S): 500 TABLET, COATED ORAL at 23:20

## 2024-12-17 RX ADMIN — Medication 20 MILLIGRAM(S): at 21:50

## 2024-12-17 RX ADMIN — IPRATROPIUM BROMIDE AND ALBUTEROL SULFATE 3 MILLILITER(S): 2.5; .5 SOLUTION RESPIRATORY (INHALATION) at 09:29

## 2024-12-17 RX ADMIN — LOSARTAN POTASSIUM 100 MILLIGRAM(S): 100 TABLET, FILM COATED ORAL at 05:58

## 2024-12-17 RX ADMIN — ACETAMINOPHEN 500MG 650 MILLIGRAM(S): 500 TABLET, COATED ORAL at 06:40

## 2024-12-17 RX ADMIN — ACETAMINOPHEN 500MG 650 MILLIGRAM(S): 500 TABLET, COATED ORAL at 17:57

## 2024-12-17 RX ADMIN — Medication 81 MILLIGRAM(S): at 12:44

## 2024-12-17 RX ADMIN — IPRATROPIUM BROMIDE AND ALBUTEROL SULFATE 3 MILLILITER(S): 2.5; .5 SOLUTION RESPIRATORY (INHALATION) at 03:53

## 2024-12-17 RX ADMIN — INSULIN GLARGINE 30 UNIT(S): 100 INJECTION, SOLUTION SUBCUTANEOUS at 22:21

## 2024-12-17 RX ADMIN — ACETAMINOPHEN 500MG 650 MILLIGRAM(S): 500 TABLET, COATED ORAL at 00:40

## 2024-12-17 RX ADMIN — ENOXAPARIN SODIUM 40 MILLIGRAM(S): 30 INJECTION SUBCUTANEOUS at 23:20

## 2024-12-17 RX ADMIN — Medication 30 MILLIGRAM(S): at 05:59

## 2024-12-17 RX ADMIN — TAMSULOSIN HYDROCHLORIDE 0.4 MILLIGRAM(S): 0.4 CAPSULE ORAL at 21:50

## 2024-12-17 RX ADMIN — Medication 12 UNIT(S): at 12:45

## 2024-12-17 RX ADMIN — Medication 12 UNIT(S): at 08:47

## 2024-12-17 RX ADMIN — ACETAMINOPHEN 500MG 650 MILLIGRAM(S): 500 TABLET, COATED ORAL at 12:44

## 2024-12-17 RX ADMIN — ACETAMINOPHEN, DIPHENHYDRAMINE HCL, PHENYLEPHRINE HCL 3 MILLIGRAM(S): 325; 25; 5 TABLET ORAL at 00:33

## 2024-12-17 RX ADMIN — Medication 30 MILLIGRAM(S): at 17:59

## 2024-12-17 RX ADMIN — Medication 3: at 00:34

## 2024-12-17 RX ADMIN — Medication 12 UNIT(S): at 17:57

## 2024-12-17 NOTE — DISCHARGE NOTE PROVIDER - CARE PROVIDER_API CALL
Your, PCP  Phone: (   )    -  Fax: (   )    -  Follow Up Time: 2 weeks    Your, Neurologist  Phone: (   )    -  Fax: (   )    -  Follow Up Time: 2 weeks

## 2024-12-17 NOTE — DIETITIAN INITIAL EVALUATION ADULT - REASON FOR ADMISSION
Per chart, Pt is 79M Hx recurrent pancreatitis, HTN, HLD, dementia DM on insulin, L SDH s/p L MMAE (8/2022), NPH s/p  shunt (2023), pancreatitis 7/2023 requiring SICU stay. Presented with recurrent pancreatitis 2/2 gallstone vs ozempic with acute pancreatitis and peripancreatic fluid collections/pseudocysts.

## 2024-12-17 NOTE — DISCHARGE NOTE PROVIDER - NSDCCPCAREPLAN_GEN_ALL_CORE_FT
PRINCIPAL DISCHARGE DIAGNOSIS  Diagnosis: Influenza A  Assessment and Plan of Treatment: Likely the cause of your cough and weakness. You are being treated with Tamiflu. Complete this as prescribed. Follow up with your PCP in 1-2 weeks.      SECONDARY DISCHARGE DIAGNOSES  Diagnosis: Paraphimosis  Assessment and Plan of Treatment: Foreskin was reduced successfully by the urologist. Follow up as needed   [facility]  [address] [place] [address]  [phone]    Diagnosis: Folliculitis  Assessment and Plan of Treatment: This was evaluated by the doctors in the emergency department. Keep the area clean and dry. If the bumps continue to grow in size, become painful, the redness spreads or it starts to drain pus, follow up with your doctor.    Diagnosis: AMS (altered mental status)  Assessment and Plan of Treatment: Resolved, Follow up with your neurologist in 1-2 weeks.    Diagnosis: DM (diabetes mellitus)  Assessment and Plan of Treatment: Monitor blood glucose levels throughout the day before meals and at bedtime. Record blood sugars and bring to outpatient providers appointment in order to be reviewed by your doctor for management modifications. If your sugars are more than 400 or less than 70 you should contact your PCP immediately. Monitor for signs/symptoms of low blood glucose, such as, dizziness, altered mental status, or cool/clammy skin. In addition, monitor for signs/symptoms of high blood glucose, such as, feeling hot, dry, fatigued, or with increased thirst/urination. Make regular podiatry appointments in order to have feet checked for wounds and uncontrolled toe nail growth to prevent infections, as well as, appointments with an ophthalmologist to monitor your vision.       PRINCIPAL DISCHARGE DIAGNOSIS  Diagnosis: Influenza A  Assessment and Plan of Treatment: Likely the cause of your cough and weakness. You are being treated with Tamiflu. Complete this as prescribed. Follow up with your PCP in 1-2 weeks.      SECONDARY DISCHARGE DIAGNOSES  Diagnosis: Paraphimosis  Assessment and Plan of Treatment: Foreskin was reduced successfully by the urologist. Follow up as needed   [facility]  [address] [place] [address]  [phone]    Diagnosis: Folliculitis  Assessment and Plan of Treatment: This was evaluated by the doctors in the emergency department. Keep the area clean and dry. If the bumps continue to grow in size, become painful, the redness spreads or it starts to drain pus, follow up with your doctor.    Diagnosis: AMS (altered mental status)  Assessment and Plan of Treatment: Resolved, Follow up with your neurologist in 1-2 weeks.    Diagnosis: DM (diabetes mellitus)  Assessment and Plan of Treatment: Monitor blood glucose levels throughout the day before meals and at bedtime. Record blood sugars and bring to outpatient providers appointment in order to be reviewed by your doctor for management modifications. If your sugars are more than 400 or less than 70 you should contact your PCP immediately. Monitor for signs/symptoms of low blood glucose, such as, dizziness, altered mental status, or cool/clammy skin. In addition, monitor for signs/symptoms of high blood glucose, such as, feeling hot, dry, fatigued, or with increased thirst/urination. Make regular podiatry appointments in order to have feet checked for wounds and uncontrolled toe nail growth to prevent infections, as well as, appointments with an ophthalmologist to monitor your vision.      Diagnosis: Thrombocytopenia  Assessment and Plan of Treatment: Follow up with your PCP Dr. Avalos within 1 week to recheck your platelet levels.    Diagnosis: Paratracheal lymphadenopathy  Assessment and Plan of Treatment: You will need a repeat Chest CT in 6-8 weeks to follow up.

## 2024-12-17 NOTE — PROVIDER CONTACT NOTE (OTHER) - REASON
pt blood sugar is high do you want a sliding scale for the pt along with lantus
py has been sinus tachy hr 101

## 2024-12-17 NOTE — DISCHARGE NOTE PROVIDER - NSDCMRMEDTOKEN_GEN_ALL_CORE_FT
acetaminophen 325 mg oral tablet: 2 tab(s) orally every 6 hours  Admelog 100 units/mL injectable solution: 4 unit(s) injectable 3 times a day (before meals)  aspirin 81 mg oral capsule: 1 cap(s) orally once a day  atorvastatin 20 mg oral tablet: 1 tab(s) orally once a day  insulin glargine 100 units/mL subcutaneous solution: 12 unit(s) subcutaneous once a day (at bedtime)  irbesartan 300 mg oral tablet: 1 tab(s) orally once a day  melatonin 3 mg oral tablet: 1 tab(s) orally once a day (at bedtime) As needed Insomnia  tamsulosin 0.4 mg oral capsule: 1 cap(s) orally once a day (at bedtime)  ursodiol 300 mg oral capsule: 1 cap(s) orally 2 times a day   aspirin 81 mg oral capsule: 1 cap(s) orally once a day  atorvastatin 20 mg oral tablet: 1 tab(s) orally once a day  HumaLOG KwikPen 100 units/mL injectable solution: 16 unit(s) injectable 3 times a day  insulin glargine 100 units/mL subcutaneous solution: 30 unit(s) subcutaneous once a day (at bedtime)  irbesartan 300 mg oral tablet: 1 tab(s) orally once a day  tamsulosin 0.4 mg oral capsule: 1 cap(s) orally once a day (at bedtime)  ursodiol 250 mg oral tablet: 1 tab(s) orally 3 times a day   aspirin 81 mg oral capsule: 1 cap(s) orally once a day  atorvastatin 20 mg oral tablet: 1 tab(s) orally once a day  guaiFENesin 100 mg/5 mL oral liquid: 5 milliliter(s) orally every 6 hours as needed for Cough and Congestion  HumaLOG KwikPen 100 units/mL injectable solution: 16 unit(s) injectable 3 times a day  insulin glargine 100 units/mL subcutaneous solution: 30 unit(s) subcutaneous once a day (at bedtime)  ipratropium-albuterol 0.5 mg-2.5 mg/3 mL inhalation solution: 3 milliliter(s) by nebulizer every 6 hours as needed for  shortness of breath and/or wheezing  irbesartan 300 mg oral tablet: 1 tab(s) orally once a day  metoprolol succinate 25 mg oral tablet, extended release: 1 tab(s) orally once a day  mupirocin 2% topical ointment: Apply topically to affected area 2 times a day to bilateral nostrils through 12/22.  oseltamivir 30 mg oral capsule: 1 cap(s) orally 2 times a day  sodium chloride 3% inhalation solution: 4 milliliter(s) inhaled every 12 hours  tamsulosin 0.4 mg oral capsule: 1 cap(s) orally once a day (at bedtime)  ursodiol 250 mg oral tablet: 1 tab(s) orally 3 times a day

## 2024-12-17 NOTE — DISCHARGE NOTE PROVIDER - HOSPITAL COURSE
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  Wife reports that for the past 3 days he has had congestion, cough and worsening shortness of breath.    Problem: Influenza A.   ·  Plan: -c/w tamiflu  -supportive care; ivf, prn pain meds, prn Robitussin, nebs, chest PT  -ct chest, procal to evaluate for superimposed bact infection.    Problem: AMS (altered mental status).   ·  Plan: -acute ams likely 2/2 flu  -less pronounced ams over last 2 months s/p  MRI /neurosurgery eval, shunt found to be functioning appropriately per wife. Shunt series performed here today unremarkable.  -c/w  outpt followup.  -improved, back to baseline     Problem: DM (diabetes mellitus).   ·  Plan: wife and other family member at bedside report since Ozempic and Farxiga, stopped, lantus increased to 30, premeal to 16. will reduce here, place on 24 lantus, 12 premeal.    Problem: HTN (hypertension).   ·  Plan: c/w arb.    Problem: Encounter for deep vein thrombosis (DVT) prophylaxis.   ·  Plan: lovenox.    Problem: Medication management.   ·  Plan: pharmacy emailed for med rec; meds ordered currently based on wife's recall.    On ___ this case was reviewed with  ____, the patient is medically stable and optimized for discharge. All medications were reviewed and prescriptions were sent to mutually agreed upon pharmacy. The patient agrees to follow up with providers as recommended.     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  Wife reports that for the past 3 days he has had congestion, cough and worsening shortness of breath.    Problem: Influenza A.   ·  Plan: -c/w tamiflu  -supportive care; ivf, prn pain meds, prn Robitussin, nebs, chest PT  -ct chest, procal to evaluate for superimposed bact infection.  -Pulm evaluated pt. recommended dc on Sodium chloride inhalation q12 for 7-10d standing and duoneb PRN for same amount of time for airway clearance and to facilitate secretions.     Problem: AMS (altered mental status).   ·  Plan: -acute ams likely 2/2 flu  -less pronounced ams over last 2 months s/p  MRI /neurosurgery eval, shunt found to be functioning appropriately per wife. Shunt series performed here today unremarkable.  -c/w  outpt followup.  -improved, back to baseline     Problem: DM (diabetes mellitus).   ·  Plan: wife and other family member at bedside report since Ozempic and Farxiga, stopped, lantus increased to 30, premeal to 16. will reduce here, place on 24 lantus, 12 premeal.    Problem: HTN (hypertension).   ·  Plan: c/w arb.    Problem: Encounter for deep vein thrombosis (DVT) prophylaxis.   ·  Plan: lovenox.    Problem: Medication management.   ·  Plan: pharmacy emailed for med rec; meds ordered currently based on wife's recall.    Thrombocytopenia  -discussed with Dr. Martins who covered Dr. Riddle in PM, ok to dc patient, recommend outpatient follow up in 1 week.     On ___ this case was reviewed with  ____, the patient is medically stable and optimized for discharge. All medications were reviewed and prescriptions were sent to mutually agreed upon pharmacy. The patient agrees to follow up with providers as recommended.

## 2024-12-17 NOTE — PROVIDER CONTACT NOTE (CRITICAL VALUE NOTIFICATION) - BACKGROUND
Pt admitted d/t weakness. Pt has a PMH of HFpEF (EF 45-50%), HTN, HLD, dementia, DM on insulin, and TIA.

## 2024-12-17 NOTE — DIETITIAN INITIAL EVALUATION ADULT - PERSON TAUGHT/METHOD
Pt provided with written and verbal nutrition education on type 2 DM diet. Topics discussed include: MyPlate healthy eating guidelines, avoidance of sugar sweetened beverages and recommended alternatives, label reading, sources of carbohydrates, complex vs simple carbohydrates, inclusion of whole grains and fiber, mixed meals (pairing protein with carbohydrate for optimal blood glucose response), hypoglycemia prevention/management and timing of meals/snacks. Discussed importance of self monitoring blood glucose and encouraged outpatient endocrinology follow up. Pt verbalized understanding of nutrition education./verbal instruction/written material/patient instructed/spouse instructed

## 2024-12-17 NOTE — DISCHARGE NOTE PROVIDER - NSDCCPTREATMENT_GEN_ALL_CORE_FT
PRINCIPAL PROCEDURE  Procedure: CT chest wo con  Findings and Treatment: Tracheobronchial secretions seen in the right and left mainstem bronchi with few collapsed bilateral lower lobe distal airways. New slightly prominent left paratracheal lymph node measuring up to 1 cm in caliber. Small pericardial effusion

## 2024-12-17 NOTE — DISCHARGE NOTE PROVIDER - PROVIDER TOKENS
FREE:[LAST:[Your],FIRST:[PCP],PHONE:[(   )    -],FAX:[(   )    -],FOLLOWUP:[2 weeks]],FREE:[LAST:[Your],FIRST:[Neurologist],PHONE:[(   )    -],FAX:[(   )    -],FOLLOWUP:[2 weeks]]

## 2024-12-17 NOTE — CHART NOTE - NSCHARTNOTEFT_GEN_A_CORE
Overnight ACP Coverage    Late Entry  Trop28->77->170->263    Discussed with Dr. Riddle, continue to trend. Cardiology to see patient in AM. Patient currently without chest pain.     Delfino Queen PA-C  Department of Medicine  [facility]  [phone]

## 2024-12-17 NOTE — DISCHARGE NOTE PROVIDER - NSFOLLOWUPCLINICS_GEN_ALL_ED_FT
[facility]  [address] [place] [address], [place] [address]  Phone: [phone]  Fax:

## 2024-12-17 NOTE — DIETITIAN INITIAL EVALUATION ADULT - PERTINENT LABORATORY DATA
12-17    137  |  101  |  33[H]  ----------------------------<  358[H]  4.7   |  19[L]  |  1.10    Ca    8.6      17 Dec 2024 07:55  Phos  3.6     12-17  Mg     2.20     12-17    TPro  6.8  /  Alb  3.4  /  TBili  0.4  /  DBili  x   /  AST  26  /  ALT  16  /  AlkPhos  46  12-17  CAPILLARY BLOOD GLUCOSE  POCT Blood Glucose.: 239 mg/dL (17 Dec 2024 12:36)  POCT Blood Glucose.: 309 mg/dL (17 Dec 2024 08:40)  POCT Blood Glucose.: 372 mg/dL (17 Dec 2024 00:30)  POCT Blood Glucose.: 342 mg/dL (16 Dec 2024 22:52)  POCT Blood Glucose.: 226 mg/dL (16 Dec 2024 16:27)    A1C with Estimated Average Glucose Result: 8.4 % (11-15-24 @ 13:02)  A1C with Estimated Average Glucose Result: 7.5 % (07-27-24 @ 00:47)  A1C with Estimated Average Glucose Result: 8.1 % (05-20-24 @ 06:28)

## 2024-12-17 NOTE — PHARMACOTHERAPY INTERVENTION NOTE - COMMENTS
Medication history is incomplete. Unable to verify patient's medication list with two sources. Discrepancies noted in provider handoff. OMR updated as per [facility], [facility].   Unable to contact family member to confirm, will continue to reach out.

## 2024-12-17 NOTE — DIETITIAN INITIAL EVALUATION ADULT - OTHER INFO
Patient seen at bedside. Pt reported good PO intake in house, verified by nursing at bedside. No intake reported per RN flowsheets. Continue to monitor and document PO in flowsheets. Noted provision of IV hydration (sodium chloride 0.9%) per medication list. No difficulty chewing or swallowing reported at this time. No GI distress noted at present; fecal incontinence with last BM today 12/16 per RN. Labs noted for elevated POCT 239 - 226 mg/dL and elevated HgA1c 8.4%. Pt and wife educated on Consistent Carbohydrate diet and understanding.

## 2024-12-17 NOTE — PROVIDER CONTACT NOTE (OTHER) - ASSESSMENT
pt is A&o1-2 confused, pt was just put on a diet and given applesauce and jello, his sugar is in 300s
pt is A&ox1-2 has been moving around a lot very restless, no signs or symptoms of a fever

## 2024-12-17 NOTE — DIETITIAN INITIAL EVALUATION ADULT - PERTINENT MEDS FT
MEDICATIONS  (STANDING):  acetaminophen     Tablet .. 650 milliGRAM(s) Oral every 6 hours  aspirin enteric coated 81 milliGRAM(s) Oral daily  atorvastatin 20 milliGRAM(s) Oral at bedtime  dextrose 5%. 1000 milliLiter(s) (100 mL/Hr) IV Continuous <Continuous>  dextrose 5%. 1000 milliLiter(s) (50 mL/Hr) IV Continuous <Continuous>  dextrose 50% Injectable 25 Gram(s) IV Push once  dextrose 50% Injectable 12.5 Gram(s) IV Push once  dextrose 50% Injectable 25 Gram(s) IV Push once  enoxaparin Injectable 40 milliGRAM(s) SubCutaneous every 24 hours  glucagon  Injectable 1 milliGRAM(s) IntraMuscular once  insulin glargine Injectable (LANTUS) 30 Unit(s) SubCutaneous at bedtime  insulin lispro (ADMELOG) corrective regimen sliding scale   SubCutaneous at bedtime  insulin lispro Injectable (ADMELOG) 12 Unit(s) SubCutaneous before breakfast  insulin lispro Injectable (ADMELOG) 12 Unit(s) SubCutaneous before lunch  insulin lispro Injectable (ADMELOG) 12 Unit(s) SubCutaneous before dinner  losartan 100 milliGRAM(s) Oral daily  oseltamivir 30 milliGRAM(s) Oral two times a day  sodium chloride 0.9%. 1000 milliLiter(s) (100 mL/Hr) IV Continuous <Continuous>  tamsulosin 0.4 milliGRAM(s) Oral at bedtime    MEDICATIONS  (PRN):  dextrose Oral Gel 15 Gram(s) Oral once PRN Blood Glucose LESS THAN 70 milliGRAM(s)/deciliter  guaiFENesin Oral Liquid (Sugar-Free) 100 milliGRAM(s) Oral every 6 hours PRN Cough  ketorolac   Injectable 15 milliGRAM(s) IV Push every 6 hours PRN Severe Pain (7 - 10)  melatonin 3 milliGRAM(s) Oral at bedtime PRN Insomnia

## 2024-12-17 NOTE — DIETITIAN INITIAL EVALUATION ADULT - ADD RECOMMEND
- Continue current diet order, which remains appropriate at this time. Defer food and fluid consistency to SLP/Team.   - Please consistently document % PO intake in nursing flowsheets to assess adequacy of nutritional intake/monitor need for further nutritional intervention(s).   - Monitor weights, diet tolerance, skin integrity, BMs, pertinent labs.   - RDN services remain available as needed.

## 2024-12-17 NOTE — DIETITIAN INITIAL EVALUATION ADULT - OTHER CALCULATIONS
Defer fluid needs to MD/Team.  Ideal Body Weight: 136lbs / 61.6kg +/-10%.  Wt hx as per [facility] HIE: 68.9kg (dosing wt 12/16), 72.1kg (11/14), 72.6kg (6/26). Wt decreased x1 month (4.4%) unknown etiology. Obtain daily weight to monitor weight trend.

## 2024-12-18 ENCOUNTER — TRANSCRIPTION ENCOUNTER (OUTPATIENT)
Age: 79
End: 2024-12-18

## 2024-12-18 LAB
ANION GAP SERPL CALC-SCNC: 11 MMOL/L — SIGNIFICANT CHANGE UP (ref 7–14)
BUN SERPL-MCNC: 28 MG/DL — HIGH (ref 7–23)
CALCIUM SERPL-MCNC: 8.1 MG/DL — LOW (ref 8.4–10.5)
CHLORIDE SERPL-SCNC: 108 MMOL/L — HIGH (ref 98–107)
CK MB BLD-MCNC: 6.2 % — HIGH (ref 0–2.5)
CK MB BLD-MCNC: 6.7 % — HIGH (ref 0–2.5)
CK MB CFR SERPL CALC: 13.2 NG/ML — HIGH
CK MB CFR SERPL CALC: 20.8 NG/ML — HIGH
CK SERPL-CCNC: 196 U/L — SIGNIFICANT CHANGE UP (ref 30–200)
CK SERPL-CCNC: 334 U/L — HIGH (ref 30–200)
CO2 SERPL-SCNC: 24 MMOL/L — SIGNIFICANT CHANGE UP (ref 22–31)
CREAT SERPL-MCNC: 0.84 MG/DL — SIGNIFICANT CHANGE UP (ref 0.5–1.3)
EGFR: 89 ML/MIN/1.73M2 — SIGNIFICANT CHANGE UP
GLUCOSE BLDC GLUCOMTR-MCNC: 156 MG/DL — HIGH (ref 70–99)
GLUCOSE BLDC GLUCOMTR-MCNC: 190 MG/DL — HIGH (ref 70–99)
GLUCOSE BLDC GLUCOMTR-MCNC: 84 MG/DL — SIGNIFICANT CHANGE UP (ref 70–99)
GLUCOSE SERPL-MCNC: 158 MG/DL — HIGH (ref 70–99)
HCT VFR BLD CALC: 44.5 % — SIGNIFICANT CHANGE UP (ref 39–50)
HGB BLD-MCNC: 14.1 G/DL — SIGNIFICANT CHANGE UP (ref 13–17)
MAGNESIUM SERPL-MCNC: 2.1 MG/DL — SIGNIFICANT CHANGE UP (ref 1.6–2.6)
MCHC RBC-ENTMCNC: 27.5 PG — SIGNIFICANT CHANGE UP (ref 27–34)
MCHC RBC-ENTMCNC: 31.7 G/DL — LOW (ref 32–36)
MCV RBC AUTO: 86.7 FL — SIGNIFICANT CHANGE UP (ref 80–100)
NRBC # BLD: 0 /100 WBCS — SIGNIFICANT CHANGE UP (ref 0–0)
NRBC # FLD: 0 K/UL — SIGNIFICANT CHANGE UP (ref 0–0)
PHOSPHATE SERPL-MCNC: 3.3 MG/DL — SIGNIFICANT CHANGE UP (ref 2.5–4.5)
PLATELET # BLD AUTO: 116 K/UL — LOW (ref 150–400)
POTASSIUM SERPL-MCNC: 4.2 MMOL/L — SIGNIFICANT CHANGE UP (ref 3.5–5.3)
POTASSIUM SERPL-SCNC: 4.2 MMOL/L — SIGNIFICANT CHANGE UP (ref 3.5–5.3)
RBC # BLD: 5.13 M/UL — SIGNIFICANT CHANGE UP (ref 4.2–5.8)
RBC # FLD: 13.9 % — SIGNIFICANT CHANGE UP (ref 10.3–14.5)
SODIUM SERPL-SCNC: 143 MMOL/L — SIGNIFICANT CHANGE UP (ref 135–145)
TROPONIN T, HIGH SENSITIVITY RESULT: 286 NG/L — CRITICAL HIGH
TROPONIN T, HIGH SENSITIVITY RESULT: 299 NG/L — CRITICAL HIGH
WBC # BLD: 9.34 K/UL — SIGNIFICANT CHANGE UP (ref 3.8–10.5)
WBC # FLD AUTO: 9.34 K/UL — SIGNIFICANT CHANGE UP (ref 3.8–10.5)

## 2024-12-18 PROCEDURE — 99232 SBSQ HOSP IP/OBS MODERATE 35: CPT

## 2024-12-18 RX ORDER — GUAIFENESIN 400 MG
5 TABLET ORAL
Qty: 200 | Refills: 0
Start: 2024-12-18 | End: 2024-12-27

## 2024-12-18 RX ORDER — IPRATROPIUM BROMIDE AND ALBUTEROL SULFATE 2.5; .5 MG/3ML; MG/3ML
3 SOLUTION RESPIRATORY (INHALATION)
Qty: 40 | Refills: 0
Start: 2024-12-18 | End: 2024-12-27

## 2024-12-18 RX ORDER — SODIUM CHLORIDE 9 MG/ML
4 INJECTION, SOLUTION INTRAMUSCULAR; INTRAVENOUS; SUBCUTANEOUS EVERY 12 HOURS
Refills: 0 | Status: DISCONTINUED | OUTPATIENT
Start: 2024-12-18 | End: 2024-12-18

## 2024-12-18 RX ORDER — OSELTAMIVIR PHOSPHATE 75 MG
1 CAPSULE ORAL
Qty: 4 | Refills: 0
Start: 2024-12-18 | End: 2024-12-19

## 2024-12-18 RX ORDER — CHLORHEXIDINE GLUCONATE 1.2 MG/ML
1 RINSE ORAL
Refills: 0 | Status: DISCONTINUED | OUTPATIENT
Start: 2024-12-18 | End: 2024-12-18

## 2024-12-18 RX ORDER — GUAIFENESIN 400 MG
100 TABLET ORAL EVERY 6 HOURS
Refills: 0 | Status: DISCONTINUED | OUTPATIENT
Start: 2024-12-18 | End: 2024-12-18

## 2024-12-18 RX ORDER — METOPROLOL TARTRATE 100 MG/1
1 TABLET, FILM COATED ORAL
Qty: 30 | Refills: 0
Start: 2024-12-18 | End: 2025-01-16

## 2024-12-18 RX ORDER — IPRATROPIUM BROMIDE AND ALBUTEROL SULFATE 2.5; .5 MG/3ML; MG/3ML
3 SOLUTION RESPIRATORY (INHALATION) EVERY 6 HOURS
Refills: 0 | Status: DISCONTINUED | OUTPATIENT
Start: 2024-12-18 | End: 2024-12-18

## 2024-12-18 RX ORDER — METOPROLOL TARTRATE 100 MG/1
25 TABLET, FILM COATED ORAL DAILY
Refills: 0 | Status: DISCONTINUED | OUTPATIENT
Start: 2024-12-18 | End: 2024-12-18

## 2024-12-18 RX ORDER — SODIUM CHLORIDE 9 MG/ML
4 INJECTION, SOLUTION INTRAMUSCULAR; INTRAVENOUS; SUBCUTANEOUS
Qty: 80 | Refills: 0
Start: 2024-12-18 | End: 2024-12-27

## 2024-12-18 RX ADMIN — Medication 30 MILLIGRAM(S): at 05:25

## 2024-12-18 RX ADMIN — Medication 81 MILLIGRAM(S): at 12:35

## 2024-12-18 RX ADMIN — CHLORHEXIDINE GLUCONATE 1 APPLICATION(S): 1.2 RINSE ORAL at 12:35

## 2024-12-18 RX ADMIN — Medication 100 MILLIGRAM(S): at 15:04

## 2024-12-18 RX ADMIN — LOSARTAN POTASSIUM 100 MILLIGRAM(S): 100 TABLET, FILM COATED ORAL at 05:25

## 2024-12-18 RX ADMIN — Medication 12 UNIT(S): at 09:11

## 2024-12-18 RX ADMIN — Medication 1 APPLICATION(S): at 17:51

## 2024-12-18 RX ADMIN — IPRATROPIUM BROMIDE AND ALBUTEROL SULFATE 3 MILLILITER(S): 2.5; .5 SOLUTION RESPIRATORY (INHALATION) at 16:27

## 2024-12-18 RX ADMIN — ACETAMINOPHEN 500MG 650 MILLIGRAM(S): 500 TABLET, COATED ORAL at 12:35

## 2024-12-18 RX ADMIN — ACETAMINOPHEN 500MG 650 MILLIGRAM(S): 500 TABLET, COATED ORAL at 05:25

## 2024-12-18 RX ADMIN — METOPROLOL TARTRATE 25 MILLIGRAM(S): 100 TABLET, FILM COATED ORAL at 07:29

## 2024-12-18 RX ADMIN — Medication 12 UNIT(S): at 12:36

## 2024-12-18 RX ADMIN — Medication 6 UNIT(S): at 17:45

## 2024-12-18 RX ADMIN — ACETAMINOPHEN 500MG 650 MILLIGRAM(S): 500 TABLET, COATED ORAL at 17:44

## 2024-12-18 RX ADMIN — Medication 1 APPLICATION(S): at 05:39

## 2024-12-18 RX ADMIN — Medication 30 MILLIGRAM(S): at 17:44

## 2024-12-18 NOTE — DISCHARGE NOTE NURSING/CASE MANAGEMENT/SOCIAL WORK - PATIENT PORTAL LINK FT
You can access the FollowMyHealth Patient Portal offered by [facility] by registering at the following website: [email or web address]. By joining [employer]’s FollowMyHealth portal, you will also be able to view your health information using other applications (apps) compatible with our system.

## 2024-12-18 NOTE — PROGRESS NOTE ADULT - ASSESSMENT
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     # COUGH-  Presents with cough weakness  Influenza A Result: Detected  CT Chest no focal consolidation  Improved  On Tamiflu    # ELEVATED TROPONINS  Noted troponin elevation likely demand ischemia rather than acute plaque rupture  No chest pain no new ECG changes  No evidence for fluid overload  Recent TTE-EF 45-50% with WMA  Troponin T, High Sensitivity Result: 299 <--263 <--170 <--77 <--28 ng/L  In view of Hx prior CVA with AMS would treat medically  Continue Aspirin Statin add Metoprolol        
80 yo m with flu
80 yo m with flu

## 2024-12-18 NOTE — PHYSICAL THERAPY INITIAL EVALUATION ADULT - GENERAL OBSERVATIONS, REHAB EVAL
Patient found semi-reclined in bed NAD, A&Ox3, wife at bedside. +tele monitor, spO2 100% on room air, patient OK for PT per RN German, patient agreeable to PT evaluation

## 2024-12-18 NOTE — PROGRESS NOTE ADULT - PROBLEM SELECTOR PLAN 3
iss  adjust insulin for optimal blood sugar control
iss  adjust insulin for optimal blood sugar control

## 2024-12-18 NOTE — PHYSICAL THERAPY INITIAL EVALUATION ADULT - TRANSFER SAFETY CONCERNS NOTED: SIT/STAND, REHAB EVAL
completed 3 repetitions, unable to progress due to retropulsion, improvements in sitting balance with verbal and tactile cues/decreased balance during turns/decreased weight-shifting ability

## 2024-12-18 NOTE — PHYSICAL THERAPY INITIAL EVALUATION ADULT - ADDITIONAL COMMENTS
Patient lives in a private home with 5 steps to enter, no further steps to negotiate. Patient is a household ambulator short distances with a rolling walker and assistance, requires assistance to get out of bed and transfer. Patient utilizes a wheelchair for community mobility. Denies any falls. Has 24/7 home health aides.    Patient left in bed, NAD, all lines and tubes intact, bed alarm on, call bell within reach, head of bed elevated >30 degrees, RN German aware of PT

## 2024-12-18 NOTE — PHYSICAL THERAPY INITIAL EVALUATION ADULT - MANUAL MUSCLE TESTING RESULTS, REHAB EVAL
Patients bilateral upper and lower extremity strength grossly 4-/5 upon MMT and functional assessment

## 2024-12-18 NOTE — PROGRESS NOTE ADULT - PROBLEM SELECTOR PLAN 6
pharmacy emailed for med rec; meds ordered currently based on wife's recall
pharmacy emailed for med rec; meds ordered currently based on wife's recall

## 2024-12-18 NOTE — PROGRESS NOTE ADULT - PROBLEM SELECTOR PLAN 1
-c/w tamiflu  -supportive care; ivf, prn pain meds, prn Robitussin, nebs, chest PT  < from: CT Chest No Cont (12.17.24 @ 20:28) >    No focal airspace consolidations.    Tracheobronchial secretions seen in the right and left mainstem bronchi   with few collapsed bilateral lower lobe distal airways.    New slightly prominent left paratracheal lymph node measuring up to 1 cm   in caliber.    < end of copied text >    pulm / ID eval
-c/w tamiflu  -supportive care; ivf, prn pain meds, prn Robitussin, nebs, chest PT  < from: CT Chest No Cont (12.17.24 @ 20:28) >    No focal airspace consolidations.    Tracheobronchial secretions seen in the right and left mainstem bronchi   with few collapsed bilateral lower lobe distal airways.    New slightly prominent left paratracheal lymph node measuring up to 1 cm   in caliber.    < end of copied text >    pulm / ID eval

## 2024-12-18 NOTE — PHYSICAL THERAPY INITIAL EVALUATION ADULT - NSPTDISCHREC_GEN_A_CORE
home with home PT services to address current functional limitations to optimize safety within the home environment/Home PT

## 2024-12-18 NOTE — DISCHARGE NOTE NURSING/CASE MANAGEMENT/SOCIAL WORK - FINANCIAL ASSISTANCE
[facility] provides services at a reduced cost to those who are determined to be eligible through [facility]’s financial assistance program. Information regarding [facility]’s financial assistance program can be found by going to [email or web address] or by calling [phone].

## 2024-12-18 NOTE — PHYSICAL THERAPY INITIAL EVALUATION ADULT - PERTINENT HX OF CURRENT PROBLEM, REHAB EVAL
Patient is a 79 year old male presenting for cough, weakness. Wife reports he had congestion, cough and worsening shortness of breath.  She has noticed subjective fevers at home, admitted for the flu. CT chest revealed tracheobronchial secretions

## 2024-12-18 NOTE — PROGRESS NOTE ADULT - PROBLEM SELECTOR PLAN 2
-acute ams likely 2/2 flu  -less pronounced ams over last 2 months s/p  MRI /neurosurgery eval, shunt found to be functioning appropriately per wife. Shunt series performed here today unremarkable.  -c/w  outpt followup
-acute ams likely 2/2 flu  -less pronounced ams over last 2 months s/p  MRI /neurosurgery eval, shunt found to be functioning appropriately per wife. Shunt series performed here today unremarkable.  -c/w  outpt followup

## 2024-12-18 NOTE — PROGRESS NOTE ADULT - TIME BILLING
- Review of records, telemetry, vital signs and daily labs.   - General and cardiovascular physical examination.  - Generation of cardiovascular treatment plan and completion of note .  - Coordination of care.      Patient was seen and examined by me on  12/18/2024 ,interim events noted,labs and radiology studies reviewed.  Memo Avalos MD,FACC.  [address] [facility].  [phone]  Availabe to call or text on [employer].

## 2024-12-19 VITALS
SYSTOLIC BLOOD PRESSURE: 119 MMHG | TEMPERATURE: 98 F | DIASTOLIC BLOOD PRESSURE: 88 MMHG | HEART RATE: 63 BPM | RESPIRATION RATE: 18 BRPM | OXYGEN SATURATION: 98 %

## 2024-12-21 LAB
CULTURE RESULTS: SIGNIFICANT CHANGE UP
CULTURE RESULTS: SIGNIFICANT CHANGE UP
SPECIMEN SOURCE: SIGNIFICANT CHANGE UP
SPECIMEN SOURCE: SIGNIFICANT CHANGE UP

## 2025-01-21 NOTE — PATIENT PROFILE ADULT - NSTOBACCONEVERSMOKERY/N_GEN_A
[facility] staff member name: Ty  Message from [facility] staff: Urgent report   Callback #:  [phone], option 1    ESC messages sent to on-call provider.  Provider confirmed receipt of message.  
No

## 2025-02-05 NOTE — PRE-OP CHECKLIST - ORDERS/MEDICATION ADMINISTRATION RECORD ON CHART
ESOPHAGOGASTRODUODENOSCOPY (EGD) & COLONOSCOPY REPORT    Celina Ariza     8/15/1990 Age 34 year old   PCP Ramesh Lee DO Endoscopist Herve Sanchez MD     Date of procedure: 25    Procedure: EGD w/cold biopsy & Colonoscopy w/cold snare polypectomy    Pre-operative diagnosis: Change in bowels, iron deficiency anemia    Post-operative diagnosis: Normal EGD, colon polyps x2, internal hemorrhoids    Medications: MAC    Withdrawal time: 10 minutes    Complications: none    Procedure: Informed consent was obtained from the patient after the risks of the procedure were discussed, including but not limited to bleeding, perforation, aspiration, infection, or possibility of a missed lesion. We discussed the risks/benefits and alternatives to this procedure, as well as the planned sedation. EGD procedure: The patient was placed in the left lateral decubitus position and begun on continuous blood pressure pulse oximetry and EKG monitoring and this was maintained throughout the procedure. Once an adequate level of sedation was obtained a bite block was placed. Then the lubricated tip of the [id number] diagnostic video upper endoscope was inserted and advanced using direct visualization into the posterior pharynx and ultimately into the esophagus. Colonoscopy procedure: Once an adequate level of sedation was obtained a digital rectal exam was completed. Then the lubricated tip of the [id number] diagnostic video colonoscope was inserted and advanced without difficulty to the cecum using the CO2 insufflation technique. The cecum was identified by localizing the trifold, the appendix and the ileocecal valve. A routine second examination of the cecum/ascending colon was performed. Withdrawal was begun with thorough washing and careful examination of the colonic walls and folds. Photodocumentation was obtained. The bowel prep was good. Views of the colon were good with washing. I then carefully withdrew 
the instrument from the patient who tolerated the procedure well.     Complications: None    EGD findings:      1. Esophagus: The squamocolumnar junction was noted at 36 cm and appeared regular. The diaphragmatic pinch was noted noted at 36 cm from the incisors. The esophageal mucosa appeared normal. There was no evidence of esophagitis, stricture or endoscopic evidence of Aguilar's esophagus.  2. Stomach: The stomach distended normally. Normal rugal folds were seen. The pylorus was patent. The gastric mucosa appeared normal s/p random biopsies. Retroflexion revealed a normal fundus and a non-patulous cardia.   3. Duodenum: The duodenal mucosa appeared normal in the 1st and 2nd portion of the duodenum. S/p random biopsies.    Colonoscopy findings:    1. Two polyp(s) noted as follows:      A. 7 mm polyp in the ascending colon; flat morphology; cold snare polypectomy and retrieved.      B. 10 mm polyp in the ascending colon; sessile morphology; cold snare polypectomy and retrieved.  2. Diverticulosis: none.  3. Terminal ileum: the visualized mucosa appeared normal.  4. The colonic mucosa throughout the colon showed normal vascular pattern, without evidence of angioectasias or inflammation.   5. A retroflexed view of the rectum revealed small internal hemorrhoids.  6. ALFRED: normal rectal tone, no masses palpated.     Impression:  EGD shows no mass or ulcer, normal appearing endoscopy. No findings to explain anemia.  CLN shows two polyps, largest was 10mm in size. Small internal hemorrhoids. Suspect chronic idiopathic constipation causing change in bowels.    Recommend:  Await pathology. Repeat CLN in 3 years. If new signs or symptoms develop, colonoscopy may need to be repeated sooner.   High fiber diet.  Monitor for blood in the stool. If having more than just tinge of blood, call office or go to the ER.  Trend Hgb.    >>>If tissue was obtained and you have not received your pathology results either by phone or letter 
within 2 weeks, please call our office at [phone].    Specimens: gastric, duodenal, colon    Blood loss: <1 ml    
done
done

## 2025-02-20 NOTE — ED ADULT NURSE NOTE - NS ED NURSE DISCH DISPOSITION
The following labs were labeled with appropriate pt sticker and tubed to lab:     [] Blue     [] Lavender   [] on ice  [x] Green/yellow  [] Green/black [] on ice  [] Grey  [] on ice  [] Yellow  [] Red  [] Pink  [] Type/ Screen  [] ABG  [] VBG    [] COVID-19 swab    [] Rapid  [] PCR  [] Flu swab  [] Peds Viral Panel     [] Urine Sample  [] Fecal Sample  [] Pelvic Cultures  [] Blood Cultures  [] X 2  [] STREP Cultures    Discharged

## 2025-02-25 NOTE — ASSESSMENT
[FreeTextEntry1] : IMPRESSION: \par 77M with PMH of HTN, HLD, DM 2, diabetic neuropathy, TIAs on Aggrenox, s/p fall due to chronic imbalance and gait issues p/w R sided HA, found to have acute on chronic L SDH, R frontotemporal SDH, enrolled into EMBOLISE trial, randomized into intervention arm s/p L MMA embo 8/5 resulting in complete resolution of the SDH, now with worsening gait and urinary incontinence with concern for NPH. \par \par At the last visit, patient and family noted that he has had progression of his gait instability and urinary incontinence. As the SDH has decreased in size and the mass effect resolved, it has become apparent that there is underlying moderate hydrocephalus. It is possible that he has underlying NPH which could explain his symptoms, and why they aren't getting better despite resolution of the SDH. Dr. Cunha performed a high volume LP (30cc) and upon his evaluation he thought there was notable improvement in gait. However the patient and family state they did not appreciate any improvement. It is therefore unclear at this time whether he would benefit from VPS. I discussed with Dr. Cunha who felt while at first the patient's gait appeared wide based, consistent with NPH, on his next assessment it was more magnetic and ataxic. Trial of Sinemet with no relief or improvement.\par \par Patient presents in wheelchair to the office for follow up, reports worsening bouts of confusion, incontinence, \par From a neurosurgical standpoint from the MMA embolization procedure, this symptom is likely not related to the SDH given that there is near complete resolution of L SDH. \par \par Repeat CT head non con done 1/31/23 shows complete resolution of left frontal chronic subdural collection, evidence of atrophy and reports of possible enlarged ventricles. \par \par \par \par PLAN:\par Will send patient for outpatient labs - PT/INR, PTT, CBC, CMP\par COVID PCR to be scheduled by surgical coordinating team\par Hospital admission at [facility] on Monday, 2/6/23 for lumbar drain trial\par Physical therapy to be present for evaluations while in hospital\par if clear improvement after 3 day lumbar drain trial, would then proceed with  shunt placement while patient is inpatient\par Will reach out to Dr. Cunha regarding the plan\par 
101

## 2025-03-06 NOTE — ASSESSMENT
[facility] Quality Flow/Interdisciplinary Rounds Progress Note        Quality Flow Rounds held on March 6, 2025    Disciplines Attending:  Bedside Nurse, , , and Nursing Unit Leadership    Hardik Gray was admitted on 3/4/2025  5:00 PM    Anticipated Discharge Date:       Disposition:    Sandeep Score:  Sandeep Scale Score: 17    BS RISK OF UNPLANNED READMISSION 2.0             33.2 Total Score        Discussed patient goal for the day, patient clinical progression, and barriers to discharge.  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 Recently presented to [facility] ED 8/21 as code stroke for transient episode of dysarthria which rapidly resolved, no vessel occlusion seen on CTA. Keppra increased to 750mg BID. CT scan 8/21 shows stable L subacute to chronic SDH.\par \par On his post-hospitalization visit, he is doing clinically well. Denies any acute neurologic symptoms of motor, sensory, speech, or visual abnormalities. Still has headaches but has greatly improved. Has unsteadiness, which has been a chronic issue, may be result of spinal stenosis. Besides that episode that prompted recent ED visit which was unrelated to the procedure, denies other speech impairments. \par Approximately CT scan 8/21 shows stable left frontoparietal subacute to chronic SDH, slightly decreased in size, measuring 10mm, previously 12mm, no acute hemorrhage. \par Although we cannot definitively say it was a seizure that prompted the recent ED visit, it is possible. Recommend increasing Keppra to 750mg BID as instructed upon discharge. \par \par \par PLAN:\par Continue Keppra 750mg BID as instructed\par Continue holding Aggrenox\par Continue following up with ophthalmologist \par 30 day EMBOLISE phone call in 2 weeks \par Repeat CT head non con in 1 month (6 week follow up CT scan post-MMA embolization) - September 2022\par Follow up after to discuss results

## 2025-03-27 NOTE — PROVIDER CONTACT NOTE (OTHER) - BACKGROUND
03/27/25 1736   RT Protocol   History Pulmonary Disease 2   Respiratory pattern 0   Breath sounds 2   Cough 0   Indications for Bronchodilator Therapy On home bronchodilators   Bronchodilator Assessment Score 4       
Pt chronic+SDH, lumbar drain in place since 2/6
s/p lumbar drain, removed 2/10.
Pt with blood sugar 194 this am /Pt NPO for Or this am

## 2025-07-15 ENCOUNTER — INPATIENT (INPATIENT)
Facility: HOSPITAL | Age: 80
LOS: 5 days | Discharge: HOME | End: 2025-07-21
Attending: INTERNAL MEDICINE | Admitting: INTERNAL MEDICINE
Payer: MEDICARE

## 2025-07-15 VITALS
DIASTOLIC BLOOD PRESSURE: 73 MMHG | RESPIRATION RATE: 22 BRPM | HEART RATE: 110 BPM | OXYGEN SATURATION: 95 % | WEIGHT: 160.06 LBS | SYSTOLIC BLOOD PRESSURE: 120 MMHG | HEIGHT: 65 IN | TEMPERATURE: 99 F

## 2025-07-15 DIAGNOSIS — F03.90 UNSPECIFIED DEMENTIA, UNSPECIFIED SEVERITY, WITHOUT BEHAVIORAL DISTURBANCE, PSYCHOTIC DISTURBANCE, MOOD DISTURBANCE, AND ANXIETY: ICD-10-CM

## 2025-07-15 DIAGNOSIS — Z98.2 PRESENCE OF CEREBROSPINAL FLUID DRAINAGE DEVICE: Chronic | ICD-10-CM

## 2025-07-15 DIAGNOSIS — R05.8 OTHER SPECIFIED COUGH: ICD-10-CM

## 2025-07-15 DIAGNOSIS — E11.65 TYPE 2 DIABETES MELLITUS WITH HYPERGLYCEMIA: ICD-10-CM

## 2025-07-15 DIAGNOSIS — J45.901 UNSPECIFIED ASTHMA WITH (ACUTE) EXACERBATION: ICD-10-CM

## 2025-07-15 DIAGNOSIS — I10 ESSENTIAL (PRIMARY) HYPERTENSION: ICD-10-CM

## 2025-07-15 DIAGNOSIS — Z29.9 ENCOUNTER FOR PROPHYLACTIC MEASURES, UNSPECIFIED: ICD-10-CM

## 2025-07-15 DIAGNOSIS — I50.9 HEART FAILURE, UNSPECIFIED: ICD-10-CM

## 2025-07-15 LAB
ADD ON TEST-SPECIMEN IN LAB: SIGNIFICANT CHANGE UP
ADD ON TEST-SPECIMEN IN LAB: SIGNIFICANT CHANGE UP
ALBUMIN SERPL ELPH-MCNC: 4 G/DL — SIGNIFICANT CHANGE UP (ref 3.3–5)
ALP SERPL-CCNC: 63 U/L — SIGNIFICANT CHANGE UP (ref 40–120)
ALT FLD-CCNC: 16 U/L — SIGNIFICANT CHANGE UP (ref 4–41)
ANION GAP SERPL CALC-SCNC: 14 MMOL/L — SIGNIFICANT CHANGE UP (ref 7–14)
ANISOCYTOSIS BLD QL: SLIGHT — SIGNIFICANT CHANGE UP
APTT BLD: 32.2 SEC — SIGNIFICANT CHANGE UP (ref 26.1–36.8)
AST SERPL-CCNC: 20 U/L — SIGNIFICANT CHANGE UP (ref 4–40)
B PERT DNA SPEC QL NAA+PROBE: SIGNIFICANT CHANGE UP
B PERT+PARAPERT DNA PNL SPEC NAA+PROBE: SIGNIFICANT CHANGE UP
BASOPHILS # BLD AUTO: 0.05 K/UL — SIGNIFICANT CHANGE UP (ref 0–0.2)
BASOPHILS # BLD MANUAL: 0 K/UL — SIGNIFICANT CHANGE UP (ref 0–0.2)
BASOPHILS NFR BLD AUTO: 0.5 % — SIGNIFICANT CHANGE UP (ref 0–2)
BASOPHILS NFR BLD MANUAL: 0 % — SIGNIFICANT CHANGE UP (ref 0–2)
BILIRUB SERPL-MCNC: 0.4 MG/DL — SIGNIFICANT CHANGE UP (ref 0.2–1.2)
BUN SERPL-MCNC: 17 MG/DL — SIGNIFICANT CHANGE UP (ref 7–23)
C PNEUM DNA SPEC QL NAA+PROBE: SIGNIFICANT CHANGE UP
CALCIUM SERPL-MCNC: 9 MG/DL — SIGNIFICANT CHANGE UP (ref 8.4–10.5)
CHLORIDE SERPL-SCNC: 100 MMOL/L — SIGNIFICANT CHANGE UP (ref 98–107)
CO2 SERPL-SCNC: 24 MMOL/L — SIGNIFICANT CHANGE UP (ref 22–31)
CREAT SERPL-MCNC: 1.02 MG/DL — SIGNIFICANT CHANGE UP (ref 0.5–1.3)
EGFR: 75 ML/MIN/1.73M2 — SIGNIFICANT CHANGE UP
EGFR: 75 ML/MIN/1.73M2 — SIGNIFICANT CHANGE UP
EOSINOPHIL # BLD AUTO: 0.38 K/UL — SIGNIFICANT CHANGE UP (ref 0–0.5)
EOSINOPHIL # BLD MANUAL: 0.24 K/UL — SIGNIFICANT CHANGE UP (ref 0–0.5)
EOSINOPHIL NFR BLD AUTO: 4.1 % — SIGNIFICANT CHANGE UP (ref 0–6)
EOSINOPHIL NFR BLD MANUAL: 2.6 % — SIGNIFICANT CHANGE UP (ref 0–6)
FLUAV AG NPH QL: SIGNIFICANT CHANGE UP
FLUAV SUBTYP SPEC NAA+PROBE: SIGNIFICANT CHANGE UP
FLUBV AG NPH QL: SIGNIFICANT CHANGE UP
FLUBV RNA SPEC QL NAA+PROBE: SIGNIFICANT CHANGE UP
GAS PNL BLDV: SIGNIFICANT CHANGE UP
GLUCOSE BLDC GLUCOMTR-MCNC: 171 MG/DL — HIGH (ref 70–99)
GLUCOSE BLDC GLUCOMTR-MCNC: 197 MG/DL — HIGH (ref 70–99)
GLUCOSE SERPL-MCNC: 172 MG/DL — HIGH (ref 70–99)
HADV DNA SPEC QL NAA+PROBE: SIGNIFICANT CHANGE UP
HCOV 229E RNA SPEC QL NAA+PROBE: SIGNIFICANT CHANGE UP
HCOV HKU1 RNA SPEC QL NAA+PROBE: SIGNIFICANT CHANGE UP
HCOV NL63 RNA SPEC QL NAA+PROBE: SIGNIFICANT CHANGE UP
HCOV OC43 RNA SPEC QL NAA+PROBE: SIGNIFICANT CHANGE UP
HCT VFR BLD CALC: 49.4 % — SIGNIFICANT CHANGE UP (ref 39–50)
HGB BLD-MCNC: 16.2 G/DL — SIGNIFICANT CHANGE UP (ref 13–17)
HMPV RNA SPEC QL NAA+PROBE: SIGNIFICANT CHANGE UP
HPIV1 RNA SPEC QL NAA+PROBE: SIGNIFICANT CHANGE UP
HPIV2 RNA SPEC QL NAA+PROBE: SIGNIFICANT CHANGE UP
HPIV3 RNA SPEC QL NAA+PROBE: SIGNIFICANT CHANGE UP
HPIV4 RNA SPEC QL NAA+PROBE: SIGNIFICANT CHANGE UP
IMM GRANULOCYTES # BLD AUTO: 0.02 K/UL — SIGNIFICANT CHANGE UP (ref 0–0.07)
IMM GRANULOCYTES NFR BLD AUTO: 0.2 % — SIGNIFICANT CHANGE UP (ref 0–0.9)
INR BLD: 0.94 RATIO — SIGNIFICANT CHANGE UP (ref 0.85–1.16)
LYMPHOCYTES # BLD AUTO: 1.31 K/UL — SIGNIFICANT CHANGE UP (ref 1–3.3)
LYMPHOCYTES # BLD MANUAL: 0.8 K/UL — LOW (ref 1–3.3)
LYMPHOCYTES NFR BLD AUTO: 14.1 % — SIGNIFICANT CHANGE UP (ref 13–44)
LYMPHOCYTES NFR BLD MANUAL: 8.6 % — LOW (ref 13–44)
M PNEUMO DNA SPEC QL NAA+PROBE: SIGNIFICANT CHANGE UP
MACROCYTES BLD QL: SLIGHT — SIGNIFICANT CHANGE UP
MANUAL METAMYELOCYTE #: 0.08 K/UL — HIGH (ref 0–0)
MANUAL NEUTROPHIL BANDS #: 0.32 K/UL — SIGNIFICANT CHANGE UP (ref 0–0.84)
MCHC RBC-ENTMCNC: 29 PG — SIGNIFICANT CHANGE UP (ref 27–34)
MCHC RBC-ENTMCNC: 32.8 G/DL — SIGNIFICANT CHANGE UP (ref 32–36)
MCV RBC AUTO: 88.5 FL — SIGNIFICANT CHANGE UP (ref 80–100)
METAMYELOCYTES # FLD: 0.9 % — HIGH (ref 0–0)
METAMYELOCYTES NFR BLD: 0.9 % — HIGH (ref 0–0)
MONOCYTES # BLD AUTO: 1.65 K/UL — HIGH (ref 0–0.9)
MONOCYTES # BLD MANUAL: 1.2 K/UL — HIGH (ref 0–0.9)
MONOCYTES NFR BLD AUTO: 17.8 % — HIGH (ref 2–14)
MONOCYTES NFR BLD MANUAL: 12.9 % — SIGNIFICANT CHANGE UP (ref 2–14)
NEUTROPHILS # BLD AUTO: 5.88 K/UL — SIGNIFICANT CHANGE UP (ref 1.8–7.4)
NEUTROPHILS # BLD MANUAL: 6.65 K/UL — SIGNIFICANT CHANGE UP (ref 1.8–7.4)
NEUTROPHILS NFR BLD AUTO: 63.3 % — SIGNIFICANT CHANGE UP (ref 43–77)
NEUTROPHILS NFR BLD MANUAL: 71.6 % — SIGNIFICANT CHANGE UP (ref 43–77)
NEUTS BAND # BLD: 3.4 % — SIGNIFICANT CHANGE UP (ref 0–8)
NEUTS BAND NFR BLD: 3.4 % — SIGNIFICANT CHANGE UP (ref 0–8)
NRBC # BLD AUTO: 0 K/UL — SIGNIFICANT CHANGE UP (ref 0–0)
NRBC # FLD: 0 K/UL — SIGNIFICANT CHANGE UP (ref 0–0)
NRBC BLD AUTO-RTO: 0 /100 WBCS — SIGNIFICANT CHANGE UP (ref 0–0)
NT-PROBNP SERPL-SCNC: 210 PG/ML — SIGNIFICANT CHANGE UP
OVALOCYTES BLD QL SMEAR: SLIGHT — SIGNIFICANT CHANGE UP
PLAT MORPH BLD: NORMAL — SIGNIFICANT CHANGE UP
PLATELET # BLD AUTO: 192 K/UL — SIGNIFICANT CHANGE UP (ref 150–400)
PLATELET COUNT - ESTIMATE: NORMAL — SIGNIFICANT CHANGE UP
PMV BLD: 11.2 FL — SIGNIFICANT CHANGE UP (ref 7–13)
POIKILOCYTOSIS BLD QL AUTO: SLIGHT — SIGNIFICANT CHANGE UP
POLYCHROMASIA BLD QL SMEAR: SLIGHT — SIGNIFICANT CHANGE UP
POTASSIUM SERPL-MCNC: 4.6 MMOL/L — SIGNIFICANT CHANGE UP (ref 3.5–5.3)
POTASSIUM SERPL-SCNC: 4.6 MMOL/L — SIGNIFICANT CHANGE UP (ref 3.5–5.3)
PROCALCITONIN SERPL-MCNC: 0.19 NG/ML — HIGH (ref 0.02–0.1)
PROT SERPL-MCNC: 7.8 G/DL — SIGNIFICANT CHANGE UP (ref 6–8.3)
PROTHROM AB SERPL-ACNC: 11.2 SEC — SIGNIFICANT CHANGE UP (ref 9.9–13.4)
RAPID RVP RESULT: DETECTED
RBC # BLD: 5.58 M/UL — SIGNIFICANT CHANGE UP (ref 4.2–5.8)
RBC # FLD: 13.2 % — SIGNIFICANT CHANGE UP (ref 10.3–14.5)
RBC BLD AUTO: ABNORMAL
RSV RNA NPH QL NAA+NON-PROBE: SIGNIFICANT CHANGE UP
RSV RNA SPEC QL NAA+PROBE: SIGNIFICANT CHANGE UP
RV+EV RNA SPEC QL NAA+PROBE: DETECTED
SARS-COV-2 RNA SPEC QL NAA+PROBE: SIGNIFICANT CHANGE UP
SARS-COV-2 RNA SPEC QL NAA+PROBE: SIGNIFICANT CHANGE UP
SMUDGE CELLS # BLD: PRESENT
SODIUM SERPL-SCNC: 138 MMOL/L — SIGNIFICANT CHANGE UP (ref 135–145)
SOURCE RESPIRATORY: SIGNIFICANT CHANGE UP
TROPONIN T, HIGH SENSITIVITY RESULT: 22 NG/L — SIGNIFICANT CHANGE UP
WBC # BLD: 9.29 K/UL — SIGNIFICANT CHANGE UP (ref 3.8–10.5)
WBC # FLD AUTO: 9.29 K/UL — SIGNIFICANT CHANGE UP (ref 3.8–10.5)

## 2025-07-15 PROCEDURE — 71046 X-RAY EXAM CHEST 2 VIEWS: CPT | Mod: 26

## 2025-07-15 PROCEDURE — 99223 1ST HOSP IP/OBS HIGH 75: CPT

## 2025-07-15 PROCEDURE — 99285 EMERGENCY DEPT VISIT HI MDM: CPT | Mod: GC

## 2025-07-15 RX ORDER — CEFTRIAXONE 500 MG/1
1000 INJECTION, POWDER, FOR SOLUTION INTRAMUSCULAR; INTRAVENOUS EVERY 24 HOURS
Refills: 0 | Status: COMPLETED | OUTPATIENT
Start: 2025-07-15 | End: 2025-07-19

## 2025-07-15 RX ORDER — PIPERACILLIN-TAZO-DEXTROSE,ISO 3.375G/5
3.38 IV SOLUTION, PIGGYBACK PREMIX FROZEN(ML) INTRAVENOUS ONCE
Refills: 0 | Status: COMPLETED | OUTPATIENT
Start: 2025-07-15 | End: 2025-07-15

## 2025-07-15 RX ORDER — IPRATROPIUM BROMIDE AND ALBUTEROL SULFATE .5; 2.5 MG/3ML; MG/3ML
3 SOLUTION RESPIRATORY (INHALATION) ONCE
Refills: 0 | Status: COMPLETED | OUTPATIENT
Start: 2025-07-15 | End: 2025-07-15

## 2025-07-15 RX ORDER — IPRATROPIUM BROMIDE AND ALBUTEROL SULFATE .5; 2.5 MG/3ML; MG/3ML
3 SOLUTION RESPIRATORY (INHALATION) EVERY 6 HOURS
Refills: 0 | Status: DISCONTINUED | OUTPATIENT
Start: 2025-07-15 | End: 2025-07-20

## 2025-07-15 RX ORDER — SODIUM CHLORIDE 9 G/1000ML
1000 INJECTION, SOLUTION INTRAVENOUS
Refills: 0 | Status: DISCONTINUED | OUTPATIENT
Start: 2025-07-15 | End: 2025-07-21

## 2025-07-15 RX ORDER — SODIUM CHLORIDE 9 G/1000ML
1000 INJECTION, SOLUTION INTRAVENOUS ONCE
Refills: 0 | Status: COMPLETED | OUTPATIENT
Start: 2025-07-15 | End: 2025-07-15

## 2025-07-15 RX ORDER — TAMSULOSIN HYDROCHLORIDE 0.4 MG/1
0.4 CAPSULE ORAL AT BEDTIME
Refills: 0 | Status: DISCONTINUED | OUTPATIENT
Start: 2025-07-15 | End: 2025-07-21

## 2025-07-15 RX ORDER — DEXTROSE 50 % IN WATER 50 %
12.5 SYRINGE (ML) INTRAVENOUS ONCE
Refills: 0 | Status: DISCONTINUED | OUTPATIENT
Start: 2025-07-15 | End: 2025-07-21

## 2025-07-15 RX ORDER — ACETAMINOPHEN 500 MG/5ML
650 LIQUID (ML) ORAL EVERY 6 HOURS
Refills: 0 | Status: DISCONTINUED | OUTPATIENT
Start: 2025-07-15 | End: 2025-07-21

## 2025-07-15 RX ORDER — IPRATROPIUM BROMIDE AND ALBUTEROL SULFATE .5; 2.5 MG/3ML; MG/3ML
3 SOLUTION RESPIRATORY (INHALATION)
Refills: 0 | Status: COMPLETED | OUTPATIENT
Start: 2025-07-15 | End: 2025-07-15

## 2025-07-15 RX ORDER — LOSARTAN POTASSIUM 100 MG/1
100 TABLET, FILM COATED ORAL DAILY
Refills: 0 | Status: DISCONTINUED | OUTPATIENT
Start: 2025-07-16 | End: 2025-07-21

## 2025-07-15 RX ORDER — VANCOMYCIN HCL IN 5 % DEXTROSE 1.5G/250ML
1000 PLASTIC BAG, INJECTION (ML) INTRAVENOUS ONCE
Refills: 0 | Status: COMPLETED | OUTPATIENT
Start: 2025-07-15 | End: 2025-07-15

## 2025-07-15 RX ORDER — INSULIN LISPRO 100 U/ML
20 INJECTION, SOLUTION INTRAVENOUS; SUBCUTANEOUS
Refills: 0 | DISCHARGE

## 2025-07-15 RX ORDER — INSULIN LISPRO 100 U/ML
15 INJECTION, SOLUTION INTRAVENOUS; SUBCUTANEOUS
Refills: 0 | Status: DISCONTINUED | OUTPATIENT
Start: 2025-07-15 | End: 2025-07-21

## 2025-07-15 RX ORDER — DEXTROSE 50 % IN WATER 50 %
15 SYRINGE (ML) INTRAVENOUS ONCE
Refills: 0 | Status: DISCONTINUED | OUTPATIENT
Start: 2025-07-15 | End: 2025-07-21

## 2025-07-15 RX ORDER — BENZONATATE 100 MG
100 CAPSULE ORAL EVERY 8 HOURS
Refills: 0 | Status: DISCONTINUED | OUTPATIENT
Start: 2025-07-15 | End: 2025-07-21

## 2025-07-15 RX ORDER — INSULIN GLARGINE-YFGN 100 [IU]/ML
30 INJECTION, SOLUTION SUBCUTANEOUS
Refills: 0 | DISCHARGE

## 2025-07-15 RX ORDER — ATORVASTATIN CALCIUM 80 MG/1
20 TABLET, FILM COATED ORAL AT BEDTIME
Refills: 0 | Status: DISCONTINUED | OUTPATIENT
Start: 2025-07-15 | End: 2025-07-21

## 2025-07-15 RX ORDER — INSULIN LISPRO 100 U/ML
INJECTION, SOLUTION INTRAVENOUS; SUBCUTANEOUS AT BEDTIME
Refills: 0 | Status: DISCONTINUED | OUTPATIENT
Start: 2025-07-15 | End: 2025-07-21

## 2025-07-15 RX ORDER — DEXTROSE 50 % IN WATER 50 %
25 SYRINGE (ML) INTRAVENOUS ONCE
Refills: 0 | Status: DISCONTINUED | OUTPATIENT
Start: 2025-07-15 | End: 2025-07-21

## 2025-07-15 RX ORDER — AZITHROMYCIN 250 MG
CAPSULE ORAL
Refills: 0 | Status: COMPLETED | OUTPATIENT
Start: 2025-07-15 | End: 2025-07-18

## 2025-07-15 RX ORDER — AZITHROMYCIN 250 MG
500 CAPSULE ORAL ONCE
Refills: 0 | Status: COMPLETED | OUTPATIENT
Start: 2025-07-15 | End: 2025-07-15

## 2025-07-15 RX ORDER — INSULIN LISPRO 100 U/ML
INJECTION, SOLUTION INTRAVENOUS; SUBCUTANEOUS
Refills: 0 | Status: DISCONTINUED | OUTPATIENT
Start: 2025-07-15 | End: 2025-07-21

## 2025-07-15 RX ORDER — PREDNISONE 20 MG/1
40 TABLET ORAL DAILY
Refills: 0 | Status: DISCONTINUED | OUTPATIENT
Start: 2025-07-15 | End: 2025-07-16

## 2025-07-15 RX ORDER — DEXTROMETHORPHAN HBR, GUAIFENESIN 200 MG/10ML
600 LIQUID ORAL EVERY 12 HOURS
Refills: 0 | Status: DISCONTINUED | OUTPATIENT
Start: 2025-07-15 | End: 2025-07-21

## 2025-07-15 RX ORDER — AZITHROMYCIN 250 MG
500 CAPSULE ORAL EVERY 24 HOURS
Refills: 0 | Status: COMPLETED | OUTPATIENT
Start: 2025-07-16 | End: 2025-07-17

## 2025-07-15 RX ORDER — DAPAGLIFLOZIN 5 MG/1
1 TABLET, FILM COATED ORAL
Refills: 0 | DISCHARGE

## 2025-07-15 RX ORDER — INSULIN GLARGINE-YFGN 100 [IU]/ML
30 INJECTION, SOLUTION SUBCUTANEOUS AT BEDTIME
Refills: 0 | Status: DISCONTINUED | OUTPATIENT
Start: 2025-07-15 | End: 2025-07-21

## 2025-07-15 RX ORDER — MELATONIN 5 MG
3 TABLET ORAL AT BEDTIME
Refills: 0 | Status: DISCONTINUED | OUTPATIENT
Start: 2025-07-15 | End: 2025-07-21

## 2025-07-15 RX ORDER — GLUCAGON 3 MG/1
1 POWDER NASAL ONCE
Refills: 0 | Status: DISCONTINUED | OUTPATIENT
Start: 2025-07-15 | End: 2025-07-21

## 2025-07-15 RX ADMIN — TAMSULOSIN HYDROCHLORIDE 0.4 MILLIGRAM(S): 0.4 CAPSULE ORAL at 23:23

## 2025-07-15 RX ADMIN — IPRATROPIUM BROMIDE AND ALBUTEROL SULFATE 3 MILLILITER(S): .5; 2.5 SOLUTION RESPIRATORY (INHALATION) at 19:40

## 2025-07-15 RX ADMIN — Medication 250 MILLIGRAM(S): at 18:35

## 2025-07-15 RX ADMIN — IPRATROPIUM BROMIDE AND ALBUTEROL SULFATE 3 MILLILITER(S): .5; 2.5 SOLUTION RESPIRATORY (INHALATION) at 23:35

## 2025-07-15 RX ADMIN — PREDNISONE 40 MILLIGRAM(S): 20 TABLET ORAL at 23:22

## 2025-07-15 RX ADMIN — Medication 250 MILLIGRAM(S): at 23:56

## 2025-07-15 RX ADMIN — SODIUM CHLORIDE 1000 MILLILITER(S): 9 INJECTION, SOLUTION INTRAVENOUS at 17:42

## 2025-07-15 RX ADMIN — INSULIN GLARGINE-YFGN 30 UNIT(S): 100 INJECTION, SOLUTION SUBCUTANEOUS at 23:23

## 2025-07-15 RX ADMIN — IPRATROPIUM BROMIDE AND ALBUTEROL SULFATE 3 MILLILITER(S): .5; 2.5 SOLUTION RESPIRATORY (INHALATION) at 15:48

## 2025-07-15 RX ADMIN — IPRATROPIUM BROMIDE AND ALBUTEROL SULFATE 3 MILLILITER(S): .5; 2.5 SOLUTION RESPIRATORY (INHALATION) at 19:20

## 2025-07-15 RX ADMIN — Medication 3 MILLIGRAM(S): at 23:23

## 2025-07-15 RX ADMIN — ATORVASTATIN CALCIUM 20 MILLIGRAM(S): 80 TABLET, FILM COATED ORAL at 23:22

## 2025-07-15 RX ADMIN — IPRATROPIUM BROMIDE AND ALBUTEROL SULFATE 3 MILLILITER(S): .5; 2.5 SOLUTION RESPIRATORY (INHALATION) at 20:04

## 2025-07-15 RX ADMIN — IPRATROPIUM BROMIDE AND ALBUTEROL SULFATE 3 MILLILITER(S): .5; 2.5 SOLUTION RESPIRATORY (INHALATION) at 16:21

## 2025-07-15 RX ADMIN — Medication 100 MILLIGRAM(S): at 23:22

## 2025-07-15 RX ADMIN — CEFTRIAXONE 100 MILLIGRAM(S): 500 INJECTION, POWDER, FOR SOLUTION INTRAMUSCULAR; INTRAVENOUS at 23:23

## 2025-07-15 RX ADMIN — IPRATROPIUM BROMIDE AND ALBUTEROL SULFATE 3 MILLILITER(S): .5; 2.5 SOLUTION RESPIRATORY (INHALATION) at 16:41

## 2025-07-15 RX ADMIN — Medication 200 GRAM(S): at 16:45

## 2025-07-16 LAB
A1C WITH ESTIMATED AVERAGE GLUCOSE RESULT: 8.7 % — HIGH (ref 4–5.6)
ALBUMIN SERPL ELPH-MCNC: 3.7 G/DL — SIGNIFICANT CHANGE UP (ref 3.3–5)
ALP SERPL-CCNC: 54 U/L — SIGNIFICANT CHANGE UP (ref 40–120)
ALT FLD-CCNC: 13 U/L — SIGNIFICANT CHANGE UP (ref 4–41)
ANION GAP SERPL CALC-SCNC: 18 MMOL/L — HIGH (ref 7–14)
AST SERPL-CCNC: 22 U/L — SIGNIFICANT CHANGE UP (ref 4–40)
BASOPHILS # BLD AUTO: 0.04 K/UL — SIGNIFICANT CHANGE UP (ref 0–0.2)
BASOPHILS NFR BLD AUTO: 0.5 % — SIGNIFICANT CHANGE UP (ref 0–2)
BILIRUB SERPL-MCNC: 0.5 MG/DL — SIGNIFICANT CHANGE UP (ref 0.2–1.2)
BLOOD GAS VENOUS COMPREHENSIVE RESULT: SIGNIFICANT CHANGE UP
BUN SERPL-MCNC: 17 MG/DL — SIGNIFICANT CHANGE UP (ref 7–23)
CALCIUM SERPL-MCNC: 8.5 MG/DL — SIGNIFICANT CHANGE UP (ref 8.4–10.5)
CHLORIDE SERPL-SCNC: 101 MMOL/L — SIGNIFICANT CHANGE UP (ref 98–107)
CO2 SERPL-SCNC: 19 MMOL/L — LOW (ref 22–31)
CREAT SERPL-MCNC: 0.94 MG/DL — SIGNIFICANT CHANGE UP (ref 0.5–1.3)
EGFR: 82 ML/MIN/1.73M2 — SIGNIFICANT CHANGE UP
EGFR: 82 ML/MIN/1.73M2 — SIGNIFICANT CHANGE UP
EOSINOPHIL # BLD AUTO: 0 K/UL — SIGNIFICANT CHANGE UP (ref 0–0.5)
EOSINOPHIL NFR BLD AUTO: 0 % — SIGNIFICANT CHANGE UP (ref 0–6)
ESTIMATED AVERAGE GLUCOSE: 203 — SIGNIFICANT CHANGE UP
GAS PNL BLDV: SIGNIFICANT CHANGE UP
GLUCOSE BLDC GLUCOMTR-MCNC: 233 MG/DL — HIGH (ref 70–99)
GLUCOSE BLDC GLUCOMTR-MCNC: 244 MG/DL — HIGH (ref 70–99)
GLUCOSE BLDC GLUCOMTR-MCNC: 245 MG/DL — HIGH (ref 70–99)
GLUCOSE BLDC GLUCOMTR-MCNC: 262 MG/DL — HIGH (ref 70–99)
GLUCOSE BLDC GLUCOMTR-MCNC: 290 MG/DL — HIGH (ref 70–99)
GLUCOSE SERPL-MCNC: 318 MG/DL — HIGH (ref 70–99)
HCT VFR BLD CALC: 46.5 % — SIGNIFICANT CHANGE UP (ref 39–50)
HGB BLD-MCNC: 15.2 G/DL — SIGNIFICANT CHANGE UP (ref 13–17)
IMM GRANULOCYTES # BLD AUTO: 0.03 K/UL — SIGNIFICANT CHANGE UP (ref 0–0.07)
IMM GRANULOCYTES NFR BLD AUTO: 0.4 % — SIGNIFICANT CHANGE UP (ref 0–0.9)
LYMPHOCYTES # BLD AUTO: 0.56 K/UL — LOW (ref 1–3.3)
LYMPHOCYTES NFR BLD AUTO: 6.7 % — LOW (ref 13–44)
MCHC RBC-ENTMCNC: 28.8 PG — SIGNIFICANT CHANGE UP (ref 27–34)
MCHC RBC-ENTMCNC: 32.7 G/DL — SIGNIFICANT CHANGE UP (ref 32–36)
MCV RBC AUTO: 88.1 FL — SIGNIFICANT CHANGE UP (ref 80–100)
MONOCYTES # BLD AUTO: 0.18 K/UL — SIGNIFICANT CHANGE UP (ref 0–0.9)
MONOCYTES NFR BLD AUTO: 2.2 % — SIGNIFICANT CHANGE UP (ref 2–14)
NEUTROPHILS # BLD AUTO: 7.53 K/UL — HIGH (ref 1.8–7.4)
NEUTROPHILS NFR BLD AUTO: 90.2 % — HIGH (ref 43–77)
NRBC # BLD AUTO: 0 K/UL — SIGNIFICANT CHANGE UP (ref 0–0)
NRBC # FLD: 0 K/UL — SIGNIFICANT CHANGE UP (ref 0–0)
NRBC BLD AUTO-RTO: 0 /100 WBCS — SIGNIFICANT CHANGE UP (ref 0–0)
PLATELET # BLD AUTO: 184 K/UL — SIGNIFICANT CHANGE UP (ref 150–400)
PMV BLD: 11.4 FL — SIGNIFICANT CHANGE UP (ref 7–13)
POTASSIUM SERPL-MCNC: 5 MMOL/L — SIGNIFICANT CHANGE UP (ref 3.5–5.3)
POTASSIUM SERPL-SCNC: 5 MMOL/L — SIGNIFICANT CHANGE UP (ref 3.5–5.3)
PROT SERPL-MCNC: 7.2 G/DL — SIGNIFICANT CHANGE UP (ref 6–8.3)
RBC # BLD: 5.28 M/UL — SIGNIFICANT CHANGE UP (ref 4.2–5.8)
RBC # FLD: 13.1 % — SIGNIFICANT CHANGE UP (ref 10.3–14.5)
SODIUM SERPL-SCNC: 138 MMOL/L — SIGNIFICANT CHANGE UP (ref 135–145)
TROPONIN T, HIGH SENSITIVITY RESULT: 46 NG/L — SIGNIFICANT CHANGE UP
WBC # BLD: 8.34 K/UL — SIGNIFICANT CHANGE UP (ref 3.8–10.5)
WBC # FLD AUTO: 8.34 K/UL — SIGNIFICANT CHANGE UP (ref 3.8–10.5)

## 2025-07-16 PROCEDURE — 71045 X-RAY EXAM CHEST 1 VIEW: CPT | Mod: 26

## 2025-07-16 PROCEDURE — 99223 1ST HOSP IP/OBS HIGH 75: CPT | Mod: GC

## 2025-07-16 PROCEDURE — 71250 CT THORAX DX C-: CPT | Mod: 26

## 2025-07-16 RX ORDER — METHYLPREDNISOLONE ACETATE 80 MG/ML
40 INJECTION, SUSPENSION INTRA-ARTICULAR; INTRALESIONAL; INTRAMUSCULAR; SOFT TISSUE DAILY
Refills: 0 | Status: DISCONTINUED | OUTPATIENT
Start: 2025-07-16 | End: 2025-07-17

## 2025-07-16 RX ORDER — IPRATROPIUM BROMIDE AND ALBUTEROL SULFATE .5; 2.5 MG/3ML; MG/3ML
3 SOLUTION RESPIRATORY (INHALATION) EVERY 6 HOURS
Refills: 0 | Status: DISCONTINUED | OUTPATIENT
Start: 2025-07-16 | End: 2025-07-17

## 2025-07-16 RX ORDER — MAGNESIUM SULFATE 500 MG/ML
2 SYRINGE (ML) INJECTION ONCE
Refills: 0 | Status: COMPLETED | OUTPATIENT
Start: 2025-07-16 | End: 2025-07-16

## 2025-07-16 RX ORDER — METHYLPREDNISOLONE ACETATE 80 MG/ML
40 INJECTION, SUSPENSION INTRA-ARTICULAR; INTRALESIONAL; INTRAMUSCULAR; SOFT TISSUE ONCE
Refills: 0 | Status: COMPLETED | OUTPATIENT
Start: 2025-07-16 | End: 2025-07-16

## 2025-07-16 RX ADMIN — Medication 1 LOZENGE: at 20:38

## 2025-07-16 RX ADMIN — Medication 150 GRAM(S): at 16:12

## 2025-07-16 RX ADMIN — Medication 100 MILLIGRAM(S): at 13:35

## 2025-07-16 RX ADMIN — DEXTROMETHORPHAN HBR, GUAIFENESIN 600 MILLIGRAM(S): 200 LIQUID ORAL at 17:35

## 2025-07-16 RX ADMIN — DEXTROMETHORPHAN HBR, GUAIFENESIN 600 MILLIGRAM(S): 200 LIQUID ORAL at 06:33

## 2025-07-16 RX ADMIN — ATORVASTATIN CALCIUM 20 MILLIGRAM(S): 80 TABLET, FILM COATED ORAL at 21:15

## 2025-07-16 RX ADMIN — Medication 100 MILLIGRAM(S): at 21:14

## 2025-07-16 RX ADMIN — Medication 3 MILLIGRAM(S): at 21:15

## 2025-07-16 RX ADMIN — INSULIN LISPRO 3: 100 INJECTION, SOLUTION INTRAVENOUS; SUBCUTANEOUS at 08:48

## 2025-07-16 RX ADMIN — Medication 1000 MILLILITER(S): at 08:23

## 2025-07-16 RX ADMIN — LOSARTAN POTASSIUM 100 MILLIGRAM(S): 100 TABLET, FILM COATED ORAL at 06:33

## 2025-07-16 RX ADMIN — CEFTRIAXONE 100 MILLIGRAM(S): 500 INJECTION, POWDER, FOR SOLUTION INTRAMUSCULAR; INTRAVENOUS at 21:14

## 2025-07-16 RX ADMIN — Medication 250 MILLIGRAM(S): at 21:14

## 2025-07-16 RX ADMIN — IPRATROPIUM BROMIDE AND ALBUTEROL SULFATE 3 MILLILITER(S): .5; 2.5 SOLUTION RESPIRATORY (INHALATION) at 09:25

## 2025-07-16 RX ADMIN — IPRATROPIUM BROMIDE AND ALBUTEROL SULFATE 3 MILLILITER(S): .5; 2.5 SOLUTION RESPIRATORY (INHALATION) at 03:36

## 2025-07-16 RX ADMIN — TAMSULOSIN HYDROCHLORIDE 0.4 MILLIGRAM(S): 0.4 CAPSULE ORAL at 21:14

## 2025-07-16 RX ADMIN — INSULIN LISPRO 2: 100 INJECTION, SOLUTION INTRAVENOUS; SUBCUTANEOUS at 17:35

## 2025-07-16 RX ADMIN — Medication 1 DOSE(S): at 16:30

## 2025-07-16 RX ADMIN — INSULIN LISPRO 3: 100 INJECTION, SOLUTION INTRAVENOUS; SUBCUTANEOUS at 12:20

## 2025-07-16 RX ADMIN — INSULIN GLARGINE-YFGN 30 UNIT(S): 100 INJECTION, SOLUTION SUBCUTANEOUS at 22:42

## 2025-07-16 RX ADMIN — IPRATROPIUM BROMIDE AND ALBUTEROL SULFATE 3 MILLILITER(S): .5; 2.5 SOLUTION RESPIRATORY (INHALATION) at 22:55

## 2025-07-16 RX ADMIN — METHYLPREDNISOLONE ACETATE 40 MILLIGRAM(S): 80 INJECTION, SUSPENSION INTRA-ARTICULAR; INTRALESIONAL; INTRAMUSCULAR; SOFT TISSUE at 16:12

## 2025-07-16 RX ADMIN — INSULIN LISPRO 15 UNIT(S): 100 INJECTION, SOLUTION INTRAVENOUS; SUBCUTANEOUS at 12:21

## 2025-07-16 RX ADMIN — INSULIN LISPRO 15 UNIT(S): 100 INJECTION, SOLUTION INTRAVENOUS; SUBCUTANEOUS at 08:49

## 2025-07-16 RX ADMIN — IPRATROPIUM BROMIDE AND ALBUTEROL SULFATE 3 MILLILITER(S): .5; 2.5 SOLUTION RESPIRATORY (INHALATION) at 15:40

## 2025-07-16 RX ADMIN — Medication 100 MILLIGRAM(S): at 06:33

## 2025-07-16 RX ADMIN — INSULIN LISPRO 15 UNIT(S): 100 INJECTION, SOLUTION INTRAVENOUS; SUBCUTANEOUS at 17:36

## 2025-07-17 ENCOUNTER — RESULT REVIEW (OUTPATIENT)
Age: 80
End: 2025-07-17

## 2025-07-17 LAB
ADD ON TEST-SPECIMEN IN LAB: SIGNIFICANT CHANGE UP
ALBUMIN SERPL ELPH-MCNC: 3.5 G/DL — SIGNIFICANT CHANGE UP (ref 3.3–5)
ALP SERPL-CCNC: 51 U/L — SIGNIFICANT CHANGE UP (ref 40–120)
ALT FLD-CCNC: 15 U/L — SIGNIFICANT CHANGE UP (ref 4–41)
ANION GAP SERPL CALC-SCNC: 12 MMOL/L — SIGNIFICANT CHANGE UP (ref 7–14)
AST SERPL-CCNC: 27 U/L — SIGNIFICANT CHANGE UP (ref 4–40)
BASOPHILS # BLD AUTO: 0.02 K/UL — SIGNIFICANT CHANGE UP (ref 0–0.2)
BASOPHILS NFR BLD AUTO: 0.1 % — SIGNIFICANT CHANGE UP (ref 0–2)
BILIRUB SERPL-MCNC: 0.4 MG/DL — SIGNIFICANT CHANGE UP (ref 0.2–1.2)
BLOOD GAS VENOUS COMPREHENSIVE RESULT: SIGNIFICANT CHANGE UP
BUN SERPL-MCNC: 22 MG/DL — SIGNIFICANT CHANGE UP (ref 7–23)
CALCIUM SERPL-MCNC: 8.3 MG/DL — LOW (ref 8.4–10.5)
CHLORIDE SERPL-SCNC: 104 MMOL/L — SIGNIFICANT CHANGE UP (ref 98–107)
CO2 SERPL-SCNC: 21 MMOL/L — LOW (ref 22–31)
CREAT SERPL-MCNC: 0.89 MG/DL — SIGNIFICANT CHANGE UP (ref 0.5–1.3)
EGFR: 87 ML/MIN/1.73M2 — SIGNIFICANT CHANGE UP
EGFR: 87 ML/MIN/1.73M2 — SIGNIFICANT CHANGE UP
EOSINOPHIL # BLD AUTO: 0 K/UL — SIGNIFICANT CHANGE UP (ref 0–0.5)
EOSINOPHIL NFR BLD AUTO: 0 % — SIGNIFICANT CHANGE UP (ref 0–6)
GLUCOSE BLDC GLUCOMTR-MCNC: 168 MG/DL — HIGH (ref 70–99)
GLUCOSE BLDC GLUCOMTR-MCNC: 172 MG/DL — HIGH (ref 70–99)
GLUCOSE BLDC GLUCOMTR-MCNC: 258 MG/DL — HIGH (ref 70–99)
GLUCOSE BLDC GLUCOMTR-MCNC: 282 MG/DL — HIGH (ref 70–99)
GLUCOSE BLDC GLUCOMTR-MCNC: 285 MG/DL — HIGH (ref 70–99)
GLUCOSE SERPL-MCNC: 255 MG/DL — HIGH (ref 70–99)
HCT VFR BLD CALC: 44.2 % — SIGNIFICANT CHANGE UP (ref 39–50)
HGB BLD-MCNC: 14.4 G/DL — SIGNIFICANT CHANGE UP (ref 13–17)
IMM GRANULOCYTES # BLD AUTO: 0.07 K/UL — SIGNIFICANT CHANGE UP (ref 0–0.07)
IMM GRANULOCYTES NFR BLD AUTO: 0.5 % — SIGNIFICANT CHANGE UP (ref 0–0.9)
LYMPHOCYTES # BLD AUTO: 0.86 K/UL — LOW (ref 1–3.3)
LYMPHOCYTES NFR BLD AUTO: 6 % — LOW (ref 13–44)
MAGNESIUM SERPL-MCNC: 2.6 MG/DL — SIGNIFICANT CHANGE UP (ref 1.6–2.6)
MCHC RBC-ENTMCNC: 28.7 PG — SIGNIFICANT CHANGE UP (ref 27–34)
MCHC RBC-ENTMCNC: 32.6 G/DL — SIGNIFICANT CHANGE UP (ref 32–36)
MCV RBC AUTO: 88 FL — SIGNIFICANT CHANGE UP (ref 80–100)
MONOCYTES # BLD AUTO: 0.61 K/UL — SIGNIFICANT CHANGE UP (ref 0–0.9)
MONOCYTES NFR BLD AUTO: 4.3 % — SIGNIFICANT CHANGE UP (ref 2–14)
NEUTROPHILS # BLD AUTO: 12.78 K/UL — HIGH (ref 1.8–7.4)
NEUTROPHILS NFR BLD AUTO: 89.1 % — HIGH (ref 43–77)
NRBC # BLD AUTO: 0 K/UL — SIGNIFICANT CHANGE UP (ref 0–0)
NRBC # FLD: 0 K/UL — SIGNIFICANT CHANGE UP (ref 0–0)
NRBC BLD AUTO-RTO: 0 /100 WBCS — SIGNIFICANT CHANGE UP (ref 0–0)
PHOSPHATE SERPL-MCNC: 3.3 MG/DL — SIGNIFICANT CHANGE UP (ref 2.5–4.5)
PLATELET # BLD AUTO: 201 K/UL — SIGNIFICANT CHANGE UP (ref 150–400)
PMV BLD: 11.8 FL — SIGNIFICANT CHANGE UP (ref 7–13)
POTASSIUM SERPL-MCNC: 4.8 MMOL/L — SIGNIFICANT CHANGE UP (ref 3.5–5.3)
POTASSIUM SERPL-SCNC: 4.8 MMOL/L — SIGNIFICANT CHANGE UP (ref 3.5–5.3)
PROCALCITONIN SERPL-MCNC: 0.18 NG/ML — HIGH (ref 0.02–0.1)
PROT SERPL-MCNC: 6.9 G/DL — SIGNIFICANT CHANGE UP (ref 6–8.3)
RBC # BLD: 5.02 M/UL — SIGNIFICANT CHANGE UP (ref 4.2–5.8)
RBC # FLD: 13.3 % — SIGNIFICANT CHANGE UP (ref 10.3–14.5)
SODIUM SERPL-SCNC: 137 MMOL/L — SIGNIFICANT CHANGE UP (ref 135–145)
WBC # BLD: 14.34 K/UL — HIGH (ref 3.8–10.5)
WBC # FLD AUTO: 14.34 K/UL — HIGH (ref 3.8–10.5)

## 2025-07-17 PROCEDURE — 99233 SBSQ HOSP IP/OBS HIGH 50: CPT

## 2025-07-17 PROCEDURE — 93306 TTE W/DOPPLER COMPLETE: CPT | Mod: 26

## 2025-07-17 RX ORDER — METHYLPREDNISOLONE ACETATE 80 MG/ML
30 INJECTION, SUSPENSION INTRA-ARTICULAR; INTRALESIONAL; INTRAMUSCULAR; SOFT TISSUE ONCE
Refills: 0 | Status: COMPLETED | OUTPATIENT
Start: 2025-07-18 | End: 2025-07-18

## 2025-07-17 RX ADMIN — Medication 100 MILLIGRAM(S): at 13:07

## 2025-07-17 RX ADMIN — TAMSULOSIN HYDROCHLORIDE 0.4 MILLIGRAM(S): 0.4 CAPSULE ORAL at 22:03

## 2025-07-17 RX ADMIN — INSULIN LISPRO 3: 100 INJECTION, SOLUTION INTRAVENOUS; SUBCUTANEOUS at 13:04

## 2025-07-17 RX ADMIN — Medication 1 DOSE(S): at 09:09

## 2025-07-17 RX ADMIN — Medication 3 MILLIGRAM(S): at 22:03

## 2025-07-17 RX ADMIN — INSULIN LISPRO 15 UNIT(S): 100 INJECTION, SOLUTION INTRAVENOUS; SUBCUTANEOUS at 09:08

## 2025-07-17 RX ADMIN — Medication 1 DOSE(S): at 22:03

## 2025-07-17 RX ADMIN — LOSARTAN POTASSIUM 100 MILLIGRAM(S): 100 TABLET, FILM COATED ORAL at 06:33

## 2025-07-17 RX ADMIN — Medication 100 MILLIGRAM(S): at 06:33

## 2025-07-17 RX ADMIN — INSULIN LISPRO 1: 100 INJECTION, SOLUTION INTRAVENOUS; SUBCUTANEOUS at 17:43

## 2025-07-17 RX ADMIN — Medication 250 MILLIGRAM(S): at 22:02

## 2025-07-17 RX ADMIN — Medication 100 MILLIGRAM(S): at 22:03

## 2025-07-17 RX ADMIN — METHYLPREDNISOLONE ACETATE 40 MILLIGRAM(S): 80 INJECTION, SUSPENSION INTRA-ARTICULAR; INTRALESIONAL; INTRAMUSCULAR; SOFT TISSUE at 06:32

## 2025-07-17 RX ADMIN — ATORVASTATIN CALCIUM 20 MILLIGRAM(S): 80 TABLET, FILM COATED ORAL at 22:03

## 2025-07-17 RX ADMIN — IPRATROPIUM BROMIDE AND ALBUTEROL SULFATE 3 MILLILITER(S): .5; 2.5 SOLUTION RESPIRATORY (INHALATION) at 04:31

## 2025-07-17 RX ADMIN — DEXTROMETHORPHAN HBR, GUAIFENESIN 600 MILLIGRAM(S): 200 LIQUID ORAL at 06:33

## 2025-07-17 RX ADMIN — INSULIN GLARGINE-YFGN 30 UNIT(S): 100 INJECTION, SOLUTION SUBCUTANEOUS at 22:43

## 2025-07-17 RX ADMIN — INSULIN LISPRO 15 UNIT(S): 100 INJECTION, SOLUTION INTRAVENOUS; SUBCUTANEOUS at 13:06

## 2025-07-17 RX ADMIN — INSULIN LISPRO 3: 100 INJECTION, SOLUTION INTRAVENOUS; SUBCUTANEOUS at 09:07

## 2025-07-17 RX ADMIN — DEXTROMETHORPHAN HBR, GUAIFENESIN 600 MILLIGRAM(S): 200 LIQUID ORAL at 17:42

## 2025-07-17 RX ADMIN — CEFTRIAXONE 100 MILLIGRAM(S): 500 INJECTION, POWDER, FOR SOLUTION INTRAMUSCULAR; INTRAVENOUS at 22:02

## 2025-07-17 RX ADMIN — INSULIN LISPRO 15 UNIT(S): 100 INJECTION, SOLUTION INTRAVENOUS; SUBCUTANEOUS at 17:42

## 2025-07-17 RX ADMIN — IPRATROPIUM BROMIDE AND ALBUTEROL SULFATE 3 MILLILITER(S): .5; 2.5 SOLUTION RESPIRATORY (INHALATION) at 22:27

## 2025-07-18 LAB
ALBUMIN SERPL ELPH-MCNC: 3.4 G/DL — SIGNIFICANT CHANGE UP (ref 3.3–5)
ALP SERPL-CCNC: 45 U/L — SIGNIFICANT CHANGE UP (ref 40–120)
ALT FLD-CCNC: 18 U/L — SIGNIFICANT CHANGE UP (ref 4–41)
ANION GAP SERPL CALC-SCNC: 12 MMOL/L — SIGNIFICANT CHANGE UP (ref 7–14)
AST SERPL-CCNC: 30 U/L — SIGNIFICANT CHANGE UP (ref 4–40)
BASOPHILS # BLD AUTO: 0.02 K/UL — SIGNIFICANT CHANGE UP (ref 0–0.2)
BASOPHILS NFR BLD AUTO: 0.1 % — SIGNIFICANT CHANGE UP (ref 0–2)
BILIRUB SERPL-MCNC: 0.4 MG/DL — SIGNIFICANT CHANGE UP (ref 0.2–1.2)
BLOOD GAS VENOUS COMPREHENSIVE RESULT: SIGNIFICANT CHANGE UP
BUN SERPL-MCNC: 26 MG/DL — HIGH (ref 7–23)
CALCIUM SERPL-MCNC: 8 MG/DL — LOW (ref 8.4–10.5)
CHLORIDE SERPL-SCNC: 106 MMOL/L — SIGNIFICANT CHANGE UP (ref 98–107)
CO2 SERPL-SCNC: 19 MMOL/L — LOW (ref 22–31)
CREAT SERPL-MCNC: 0.82 MG/DL — SIGNIFICANT CHANGE UP (ref 0.5–1.3)
EGFR: 89 ML/MIN/1.73M2 — SIGNIFICANT CHANGE UP
EGFR: 89 ML/MIN/1.73M2 — SIGNIFICANT CHANGE UP
EOSINOPHIL # BLD AUTO: 0 K/UL — SIGNIFICANT CHANGE UP (ref 0–0.5)
EOSINOPHIL NFR BLD AUTO: 0 % — SIGNIFICANT CHANGE UP (ref 0–6)
GLUCOSE BLDC GLUCOMTR-MCNC: 169 MG/DL — HIGH (ref 70–99)
GLUCOSE BLDC GLUCOMTR-MCNC: 175 MG/DL — HIGH (ref 70–99)
GLUCOSE BLDC GLUCOMTR-MCNC: 208 MG/DL — HIGH (ref 70–99)
GLUCOSE BLDC GLUCOMTR-MCNC: 214 MG/DL — HIGH (ref 70–99)
GLUCOSE SERPL-MCNC: 228 MG/DL — HIGH (ref 70–99)
HCT VFR BLD CALC: 40.6 % — SIGNIFICANT CHANGE UP (ref 39–50)
HGB BLD-MCNC: 13.9 G/DL — SIGNIFICANT CHANGE UP (ref 13–17)
IMM GRANULOCYTES # BLD AUTO: 0.07 K/UL — SIGNIFICANT CHANGE UP (ref 0–0.07)
IMM GRANULOCYTES NFR BLD AUTO: 0.4 % — SIGNIFICANT CHANGE UP (ref 0–0.9)
LYMPHOCYTES # BLD AUTO: 1.02 K/UL — SIGNIFICANT CHANGE UP (ref 1–3.3)
LYMPHOCYTES NFR BLD AUTO: 5.9 % — LOW (ref 13–44)
MAGNESIUM SERPL-MCNC: 2.3 MG/DL — SIGNIFICANT CHANGE UP (ref 1.6–2.6)
MCHC RBC-ENTMCNC: 29.5 PG — SIGNIFICANT CHANGE UP (ref 27–34)
MCHC RBC-ENTMCNC: 34.2 G/DL — SIGNIFICANT CHANGE UP (ref 32–36)
MCV RBC AUTO: 86.2 FL — SIGNIFICANT CHANGE UP (ref 80–100)
MONOCYTES # BLD AUTO: 1.27 K/UL — HIGH (ref 0–0.9)
MONOCYTES NFR BLD AUTO: 7.4 % — SIGNIFICANT CHANGE UP (ref 2–14)
NEUTROPHILS # BLD AUTO: 14.84 K/UL — HIGH (ref 1.8–7.4)
NEUTROPHILS NFR BLD AUTO: 86.2 % — HIGH (ref 43–77)
NRBC # BLD AUTO: 0 K/UL — SIGNIFICANT CHANGE UP (ref 0–0)
NRBC # FLD: 0 K/UL — SIGNIFICANT CHANGE UP (ref 0–0)
NRBC BLD AUTO-RTO: 0 /100 WBCS — SIGNIFICANT CHANGE UP (ref 0–0)
PHOSPHATE SERPL-MCNC: 2.9 MG/DL — SIGNIFICANT CHANGE UP (ref 2.5–4.5)
PLATELET # BLD AUTO: 193 K/UL — SIGNIFICANT CHANGE UP (ref 150–400)
PMV BLD: 11.3 FL — SIGNIFICANT CHANGE UP (ref 7–13)
POTASSIUM SERPL-MCNC: 4.2 MMOL/L — SIGNIFICANT CHANGE UP (ref 3.5–5.3)
POTASSIUM SERPL-SCNC: 4.2 MMOL/L — SIGNIFICANT CHANGE UP (ref 3.5–5.3)
PROT SERPL-MCNC: 6.4 G/DL — SIGNIFICANT CHANGE UP (ref 6–8.3)
RBC # BLD: 4.71 M/UL — SIGNIFICANT CHANGE UP (ref 4.2–5.8)
RBC # FLD: 13.2 % — SIGNIFICANT CHANGE UP (ref 10.3–14.5)
SODIUM SERPL-SCNC: 137 MMOL/L — SIGNIFICANT CHANGE UP (ref 135–145)
WBC # BLD: 17.22 K/UL — HIGH (ref 3.8–10.5)
WBC # FLD AUTO: 17.22 K/UL — HIGH (ref 3.8–10.5)

## 2025-07-18 PROCEDURE — 99232 SBSQ HOSP IP/OBS MODERATE 35: CPT

## 2025-07-18 PROCEDURE — 70450 CT HEAD/BRAIN W/O DYE: CPT | Mod: 26

## 2025-07-18 RX ORDER — METHYLPREDNISOLONE ACETATE 80 MG/ML
30 INJECTION, SUSPENSION INTRA-ARTICULAR; INTRALESIONAL; INTRAMUSCULAR; SOFT TISSUE ONCE
Refills: 0 | Status: DISCONTINUED | OUTPATIENT
Start: 2025-07-18 | End: 2025-07-18

## 2025-07-18 RX ORDER — METHYLPREDNISOLONE ACETATE 80 MG/ML
30 INJECTION, SUSPENSION INTRA-ARTICULAR; INTRALESIONAL; INTRAMUSCULAR; SOFT TISSUE DAILY
Refills: 0 | Status: DISCONTINUED | OUTPATIENT
Start: 2025-07-18 | End: 2025-07-18

## 2025-07-18 RX ORDER — METHYLPREDNISOLONE ACETATE 80 MG/ML
30 INJECTION, SUSPENSION INTRA-ARTICULAR; INTRALESIONAL; INTRAMUSCULAR; SOFT TISSUE DAILY
Refills: 0 | Status: COMPLETED | OUTPATIENT
Start: 2025-07-19 | End: 2025-07-20

## 2025-07-18 RX ORDER — ENOXAPARIN SODIUM 100 MG/ML
40 INJECTION SUBCUTANEOUS EVERY 24 HOURS
Refills: 0 | Status: DISCONTINUED | OUTPATIENT
Start: 2025-07-18 | End: 2025-07-18

## 2025-07-18 RX ADMIN — IPRATROPIUM BROMIDE AND ALBUTEROL SULFATE 3 MILLILITER(S): .5; 2.5 SOLUTION RESPIRATORY (INHALATION) at 22:08

## 2025-07-18 RX ADMIN — INSULIN LISPRO 15 UNIT(S): 100 INJECTION, SOLUTION INTRAVENOUS; SUBCUTANEOUS at 12:46

## 2025-07-18 RX ADMIN — Medication 100 MILLIGRAM(S): at 14:14

## 2025-07-18 RX ADMIN — INSULIN LISPRO 15 UNIT(S): 100 INJECTION, SOLUTION INTRAVENOUS; SUBCUTANEOUS at 17:56

## 2025-07-18 RX ADMIN — INSULIN LISPRO 15 UNIT(S): 100 INJECTION, SOLUTION INTRAVENOUS; SUBCUTANEOUS at 08:17

## 2025-07-18 RX ADMIN — DEXTROMETHORPHAN HBR, GUAIFENESIN 600 MILLIGRAM(S): 200 LIQUID ORAL at 18:42

## 2025-07-18 RX ADMIN — INSULIN LISPRO 2: 100 INJECTION, SOLUTION INTRAVENOUS; SUBCUTANEOUS at 08:17

## 2025-07-18 RX ADMIN — Medication 1 DOSE(S): at 08:18

## 2025-07-18 RX ADMIN — Medication 100 MILLIGRAM(S): at 05:36

## 2025-07-18 RX ADMIN — INSULIN GLARGINE-YFGN 30 UNIT(S): 100 INJECTION, SOLUTION SUBCUTANEOUS at 22:05

## 2025-07-18 RX ADMIN — LOSARTAN POTASSIUM 100 MILLIGRAM(S): 100 TABLET, FILM COATED ORAL at 05:37

## 2025-07-18 RX ADMIN — Medication 1 DOSE(S): at 22:04

## 2025-07-18 RX ADMIN — DEXTROMETHORPHAN HBR, GUAIFENESIN 600 MILLIGRAM(S): 200 LIQUID ORAL at 05:36

## 2025-07-18 RX ADMIN — ATORVASTATIN CALCIUM 20 MILLIGRAM(S): 80 TABLET, FILM COATED ORAL at 22:04

## 2025-07-18 RX ADMIN — METHYLPREDNISOLONE ACETATE 30 MILLIGRAM(S): 80 INJECTION, SUSPENSION INTRA-ARTICULAR; INTRALESIONAL; INTRAMUSCULAR; SOFT TISSUE at 05:36

## 2025-07-18 RX ADMIN — INSULIN LISPRO 2: 100 INJECTION, SOLUTION INTRAVENOUS; SUBCUTANEOUS at 12:44

## 2025-07-18 RX ADMIN — IPRATROPIUM BROMIDE AND ALBUTEROL SULFATE 3 MILLILITER(S): .5; 2.5 SOLUTION RESPIRATORY (INHALATION) at 10:15

## 2025-07-18 RX ADMIN — IPRATROPIUM BROMIDE AND ALBUTEROL SULFATE 3 MILLILITER(S): .5; 2.5 SOLUTION RESPIRATORY (INHALATION) at 04:21

## 2025-07-18 RX ADMIN — TAMSULOSIN HYDROCHLORIDE 0.4 MILLIGRAM(S): 0.4 CAPSULE ORAL at 22:04

## 2025-07-18 RX ADMIN — Medication 3 MILLIGRAM(S): at 22:04

## 2025-07-18 RX ADMIN — Medication 100 MILLIGRAM(S): at 22:04

## 2025-07-18 RX ADMIN — CEFTRIAXONE 100 MILLIGRAM(S): 500 INJECTION, POWDER, FOR SOLUTION INTRAMUSCULAR; INTRAVENOUS at 22:04

## 2025-07-18 RX ADMIN — INSULIN LISPRO 1: 100 INJECTION, SOLUTION INTRAVENOUS; SUBCUTANEOUS at 17:55

## 2025-07-19 LAB
ALBUMIN SERPL ELPH-MCNC: 3.5 G/DL — SIGNIFICANT CHANGE UP (ref 3.3–5)
ALP SERPL-CCNC: 54 U/L — SIGNIFICANT CHANGE UP (ref 40–120)
ALT FLD-CCNC: 21 U/L — SIGNIFICANT CHANGE UP (ref 4–41)
ANION GAP SERPL CALC-SCNC: 14 MMOL/L — SIGNIFICANT CHANGE UP (ref 7–14)
AST SERPL-CCNC: 26 U/L — SIGNIFICANT CHANGE UP (ref 4–40)
BASOPHILS # BLD AUTO: 0.01 K/UL — SIGNIFICANT CHANGE UP (ref 0–0.2)
BASOPHILS NFR BLD AUTO: 0.1 % — SIGNIFICANT CHANGE UP (ref 0–2)
BILIRUB SERPL-MCNC: 0.4 MG/DL — SIGNIFICANT CHANGE UP (ref 0.2–1.2)
BLOOD GAS VENOUS COMPREHENSIVE RESULT: SIGNIFICANT CHANGE UP
BUN SERPL-MCNC: 25 MG/DL — HIGH (ref 7–23)
CALCIUM SERPL-MCNC: 8.2 MG/DL — LOW (ref 8.4–10.5)
CHLORIDE SERPL-SCNC: 104 MMOL/L — SIGNIFICANT CHANGE UP (ref 98–107)
CO2 SERPL-SCNC: 21 MMOL/L — LOW (ref 22–31)
CREAT SERPL-MCNC: 0.78 MG/DL — SIGNIFICANT CHANGE UP (ref 0.5–1.3)
EGFR: 91 ML/MIN/1.73M2 — SIGNIFICANT CHANGE UP
EGFR: 91 ML/MIN/1.73M2 — SIGNIFICANT CHANGE UP
EOSINOPHIL # BLD AUTO: 0 K/UL — SIGNIFICANT CHANGE UP (ref 0–0.5)
EOSINOPHIL NFR BLD AUTO: 0 % — SIGNIFICANT CHANGE UP (ref 0–6)
GLUCOSE BLDC GLUCOMTR-MCNC: 198 MG/DL — HIGH (ref 70–99)
GLUCOSE BLDC GLUCOMTR-MCNC: 204 MG/DL — HIGH (ref 70–99)
GLUCOSE BLDC GLUCOMTR-MCNC: 213 MG/DL — HIGH (ref 70–99)
GLUCOSE BLDC GLUCOMTR-MCNC: 255 MG/DL — HIGH (ref 70–99)
GLUCOSE SERPL-MCNC: 217 MG/DL — HIGH (ref 70–99)
HCT VFR BLD CALC: 42 % — SIGNIFICANT CHANGE UP (ref 39–50)
HGB BLD-MCNC: 14.3 G/DL — SIGNIFICANT CHANGE UP (ref 13–17)
IMM GRANULOCYTES # BLD AUTO: 0.1 K/UL — HIGH (ref 0–0.07)
IMM GRANULOCYTES NFR BLD AUTO: 0.7 % — SIGNIFICANT CHANGE UP (ref 0–0.9)
LYMPHOCYTES # BLD AUTO: 1.15 K/UL — SIGNIFICANT CHANGE UP (ref 1–3.3)
LYMPHOCYTES NFR BLD AUTO: 8 % — LOW (ref 13–44)
MAGNESIUM SERPL-MCNC: 2.3 MG/DL — SIGNIFICANT CHANGE UP (ref 1.6–2.6)
MCHC RBC-ENTMCNC: 29.1 PG — SIGNIFICANT CHANGE UP (ref 27–34)
MCHC RBC-ENTMCNC: 34 G/DL — SIGNIFICANT CHANGE UP (ref 32–36)
MCV RBC AUTO: 85.4 FL — SIGNIFICANT CHANGE UP (ref 80–100)
MONOCYTES # BLD AUTO: 0.94 K/UL — HIGH (ref 0–0.9)
MONOCYTES NFR BLD AUTO: 6.5 % — SIGNIFICANT CHANGE UP (ref 2–14)
NEUTROPHILS # BLD AUTO: 12.17 K/UL — HIGH (ref 1.8–7.4)
NEUTROPHILS NFR BLD AUTO: 84.7 % — HIGH (ref 43–77)
NRBC # BLD AUTO: 0 K/UL — SIGNIFICANT CHANGE UP (ref 0–0)
NRBC # FLD: 0 K/UL — SIGNIFICANT CHANGE UP (ref 0–0)
NRBC BLD AUTO-RTO: 0 /100 WBCS — SIGNIFICANT CHANGE UP (ref 0–0)
PHOSPHATE SERPL-MCNC: 3.5 MG/DL — SIGNIFICANT CHANGE UP (ref 2.5–4.5)
PLATELET # BLD AUTO: 185 K/UL — SIGNIFICANT CHANGE UP (ref 150–400)
PMV BLD: 11.4 FL — SIGNIFICANT CHANGE UP (ref 7–13)
POTASSIUM SERPL-MCNC: 4.3 MMOL/L — SIGNIFICANT CHANGE UP (ref 3.5–5.3)
POTASSIUM SERPL-SCNC: 4.3 MMOL/L — SIGNIFICANT CHANGE UP (ref 3.5–5.3)
PROT SERPL-MCNC: 6.7 G/DL — SIGNIFICANT CHANGE UP (ref 6–8.3)
RBC # BLD: 4.92 M/UL — SIGNIFICANT CHANGE UP (ref 4.2–5.8)
RBC # FLD: 13.1 % — SIGNIFICANT CHANGE UP (ref 10.3–14.5)
SODIUM SERPL-SCNC: 139 MMOL/L — SIGNIFICANT CHANGE UP (ref 135–145)
WBC # BLD: 14.37 K/UL — HIGH (ref 3.8–10.5)
WBC # FLD AUTO: 14.37 K/UL — HIGH (ref 3.8–10.5)

## 2025-07-19 PROCEDURE — 99223 1ST HOSP IP/OBS HIGH 75: CPT

## 2025-07-19 RX ADMIN — INSULIN LISPRO 15 UNIT(S): 100 INJECTION, SOLUTION INTRAVENOUS; SUBCUTANEOUS at 13:07

## 2025-07-19 RX ADMIN — Medication 1 DOSE(S): at 21:58

## 2025-07-19 RX ADMIN — IPRATROPIUM BROMIDE AND ALBUTEROL SULFATE 3 MILLILITER(S): .5; 2.5 SOLUTION RESPIRATORY (INHALATION) at 04:57

## 2025-07-19 RX ADMIN — Medication 100 MILLIGRAM(S): at 13:15

## 2025-07-19 RX ADMIN — INSULIN GLARGINE-YFGN 30 UNIT(S): 100 INJECTION, SOLUTION SUBCUTANEOUS at 22:58

## 2025-07-19 RX ADMIN — IPRATROPIUM BROMIDE AND ALBUTEROL SULFATE 3 MILLILITER(S): .5; 2.5 SOLUTION RESPIRATORY (INHALATION) at 09:55

## 2025-07-19 RX ADMIN — DEXTROMETHORPHAN HBR, GUAIFENESIN 600 MILLIGRAM(S): 200 LIQUID ORAL at 18:04

## 2025-07-19 RX ADMIN — IPRATROPIUM BROMIDE AND ALBUTEROL SULFATE 3 MILLILITER(S): .5; 2.5 SOLUTION RESPIRATORY (INHALATION) at 21:12

## 2025-07-19 RX ADMIN — Medication 100 MILLIGRAM(S): at 21:58

## 2025-07-19 RX ADMIN — METHYLPREDNISOLONE ACETATE 30 MILLIGRAM(S): 80 INJECTION, SUSPENSION INTRA-ARTICULAR; INTRALESIONAL; INTRAMUSCULAR; SOFT TISSUE at 05:19

## 2025-07-19 RX ADMIN — LOSARTAN POTASSIUM 100 MILLIGRAM(S): 100 TABLET, FILM COATED ORAL at 05:22

## 2025-07-19 RX ADMIN — CEFTRIAXONE 100 MILLIGRAM(S): 500 INJECTION, POWDER, FOR SOLUTION INTRAMUSCULAR; INTRAVENOUS at 21:58

## 2025-07-19 RX ADMIN — INSULIN LISPRO 3: 100 INJECTION, SOLUTION INTRAVENOUS; SUBCUTANEOUS at 13:07

## 2025-07-19 RX ADMIN — IPRATROPIUM BROMIDE AND ALBUTEROL SULFATE 3 MILLILITER(S): .5; 2.5 SOLUTION RESPIRATORY (INHALATION) at 16:02

## 2025-07-19 RX ADMIN — INSULIN LISPRO 2: 100 INJECTION, SOLUTION INTRAVENOUS; SUBCUTANEOUS at 08:48

## 2025-07-19 RX ADMIN — INSULIN LISPRO 1: 100 INJECTION, SOLUTION INTRAVENOUS; SUBCUTANEOUS at 18:00

## 2025-07-19 RX ADMIN — Medication 1 DOSE(S): at 08:50

## 2025-07-19 RX ADMIN — ATORVASTATIN CALCIUM 20 MILLIGRAM(S): 80 TABLET, FILM COATED ORAL at 21:58

## 2025-07-19 RX ADMIN — INSULIN LISPRO 15 UNIT(S): 100 INJECTION, SOLUTION INTRAVENOUS; SUBCUTANEOUS at 18:01

## 2025-07-19 RX ADMIN — TAMSULOSIN HYDROCHLORIDE 0.4 MILLIGRAM(S): 0.4 CAPSULE ORAL at 21:59

## 2025-07-19 RX ADMIN — Medication 3 MILLIGRAM(S): at 21:58

## 2025-07-19 RX ADMIN — DEXTROMETHORPHAN HBR, GUAIFENESIN 600 MILLIGRAM(S): 200 LIQUID ORAL at 05:21

## 2025-07-19 RX ADMIN — INSULIN LISPRO 15 UNIT(S): 100 INJECTION, SOLUTION INTRAVENOUS; SUBCUTANEOUS at 08:49

## 2025-07-19 RX ADMIN — Medication 100 MILLIGRAM(S): at 05:23

## 2025-07-20 LAB
ALBUMIN SERPL ELPH-MCNC: 3.3 G/DL — SIGNIFICANT CHANGE UP (ref 3.3–5)
ALP SERPL-CCNC: 51 U/L — SIGNIFICANT CHANGE UP (ref 40–120)
ALT FLD-CCNC: 20 U/L — SIGNIFICANT CHANGE UP (ref 4–41)
ANION GAP SERPL CALC-SCNC: 11 MMOL/L — SIGNIFICANT CHANGE UP (ref 7–14)
AST SERPL-CCNC: 20 U/L — SIGNIFICANT CHANGE UP (ref 4–40)
BASOPHILS # BLD AUTO: 0.02 K/UL — SIGNIFICANT CHANGE UP (ref 0–0.2)
BASOPHILS NFR BLD AUTO: 0.2 % — SIGNIFICANT CHANGE UP (ref 0–2)
BILIRUB SERPL-MCNC: 0.5 MG/DL — SIGNIFICANT CHANGE UP (ref 0.2–1.2)
BLOOD GAS VENOUS COMPREHENSIVE RESULT: SIGNIFICANT CHANGE UP
BUN SERPL-MCNC: 22 MG/DL — SIGNIFICANT CHANGE UP (ref 7–23)
CALCIUM SERPL-MCNC: 7.9 MG/DL — LOW (ref 8.4–10.5)
CHLORIDE SERPL-SCNC: 103 MMOL/L — SIGNIFICANT CHANGE UP (ref 98–107)
CO2 SERPL-SCNC: 23 MMOL/L — SIGNIFICANT CHANGE UP (ref 22–31)
CREAT SERPL-MCNC: 0.75 MG/DL — SIGNIFICANT CHANGE UP (ref 0.5–1.3)
CULTURE RESULTS: SIGNIFICANT CHANGE UP
CULTURE RESULTS: SIGNIFICANT CHANGE UP
EGFR: 92 ML/MIN/1.73M2 — SIGNIFICANT CHANGE UP
EGFR: 92 ML/MIN/1.73M2 — SIGNIFICANT CHANGE UP
EOSINOPHIL # BLD AUTO: 0.01 K/UL — SIGNIFICANT CHANGE UP (ref 0–0.5)
EOSINOPHIL NFR BLD AUTO: 0.1 % — SIGNIFICANT CHANGE UP (ref 0–6)
GLUCOSE BLDC GLUCOMTR-MCNC: 153 MG/DL — HIGH (ref 70–99)
GLUCOSE BLDC GLUCOMTR-MCNC: 177 MG/DL — HIGH (ref 70–99)
GLUCOSE BLDC GLUCOMTR-MCNC: 195 MG/DL — HIGH (ref 70–99)
GLUCOSE BLDC GLUCOMTR-MCNC: 218 MG/DL — HIGH (ref 70–99)
GLUCOSE SERPL-MCNC: 214 MG/DL — HIGH (ref 70–99)
HCT VFR BLD CALC: 41.8 % — SIGNIFICANT CHANGE UP (ref 39–50)
HGB BLD-MCNC: 14.2 G/DL — SIGNIFICANT CHANGE UP (ref 13–17)
IMM GRANULOCYTES # BLD AUTO: 0.09 K/UL — HIGH (ref 0–0.07)
IMM GRANULOCYTES NFR BLD AUTO: 0.7 % — SIGNIFICANT CHANGE UP (ref 0–0.9)
LYMPHOCYTES # BLD AUTO: 1.36 K/UL — SIGNIFICANT CHANGE UP (ref 1–3.3)
LYMPHOCYTES NFR BLD AUTO: 11.3 % — LOW (ref 13–44)
MAGNESIUM SERPL-MCNC: 2.2 MG/DL — SIGNIFICANT CHANGE UP (ref 1.6–2.6)
MCHC RBC-ENTMCNC: 28.9 PG — SIGNIFICANT CHANGE UP (ref 27–34)
MCHC RBC-ENTMCNC: 34 G/DL — SIGNIFICANT CHANGE UP (ref 32–36)
MCV RBC AUTO: 85.1 FL — SIGNIFICANT CHANGE UP (ref 80–100)
MONOCYTES # BLD AUTO: 0.97 K/UL — HIGH (ref 0–0.9)
MONOCYTES NFR BLD AUTO: 8.1 % — SIGNIFICANT CHANGE UP (ref 2–14)
NEUTROPHILS # BLD AUTO: 9.59 K/UL — HIGH (ref 1.8–7.4)
NEUTROPHILS NFR BLD AUTO: 79.6 % — HIGH (ref 43–77)
NRBC # BLD AUTO: 0 K/UL — SIGNIFICANT CHANGE UP (ref 0–0)
NRBC # FLD: 0 K/UL — SIGNIFICANT CHANGE UP (ref 0–0)
NRBC BLD AUTO-RTO: 0 /100 WBCS — SIGNIFICANT CHANGE UP (ref 0–0)
PHOSPHATE SERPL-MCNC: 3.7 MG/DL — SIGNIFICANT CHANGE UP (ref 2.5–4.5)
PLATELET # BLD AUTO: 180 K/UL — SIGNIFICANT CHANGE UP (ref 150–400)
PMV BLD: 11.3 FL — SIGNIFICANT CHANGE UP (ref 7–13)
POTASSIUM SERPL-MCNC: 4 MMOL/L — SIGNIFICANT CHANGE UP (ref 3.5–5.3)
POTASSIUM SERPL-SCNC: 4 MMOL/L — SIGNIFICANT CHANGE UP (ref 3.5–5.3)
PROT SERPL-MCNC: 6.3 G/DL — SIGNIFICANT CHANGE UP (ref 6–8.3)
RBC # BLD: 4.91 M/UL — SIGNIFICANT CHANGE UP (ref 4.2–5.8)
RBC # FLD: 13 % — SIGNIFICANT CHANGE UP (ref 10.3–14.5)
SODIUM SERPL-SCNC: 137 MMOL/L — SIGNIFICANT CHANGE UP (ref 135–145)
SPECIMEN SOURCE: SIGNIFICANT CHANGE UP
SPECIMEN SOURCE: SIGNIFICANT CHANGE UP
WBC # BLD: 12.04 K/UL — HIGH (ref 3.8–10.5)
WBC # FLD AUTO: 12.04 K/UL — HIGH (ref 3.8–10.5)

## 2025-07-20 PROCEDURE — 99223 1ST HOSP IP/OBS HIGH 75: CPT | Mod: FS

## 2025-07-20 RX ORDER — IPRATROPIUM BROMIDE AND ALBUTEROL SULFATE .5; 2.5 MG/3ML; MG/3ML
3 SOLUTION RESPIRATORY (INHALATION) EVERY 6 HOURS
Refills: 0 | Status: DISCONTINUED | OUTPATIENT
Start: 2025-07-20 | End: 2025-07-21

## 2025-07-20 RX ADMIN — DEXTROMETHORPHAN HBR, GUAIFENESIN 600 MILLIGRAM(S): 200 LIQUID ORAL at 17:35

## 2025-07-20 RX ADMIN — INSULIN LISPRO 15 UNIT(S): 100 INJECTION, SOLUTION INTRAVENOUS; SUBCUTANEOUS at 17:32

## 2025-07-20 RX ADMIN — LOSARTAN POTASSIUM 100 MILLIGRAM(S): 100 TABLET, FILM COATED ORAL at 05:23

## 2025-07-20 RX ADMIN — IPRATROPIUM BROMIDE AND ALBUTEROL SULFATE 3 MILLILITER(S): .5; 2.5 SOLUTION RESPIRATORY (INHALATION) at 15:32

## 2025-07-20 RX ADMIN — IPRATROPIUM BROMIDE AND ALBUTEROL SULFATE 3 MILLILITER(S): .5; 2.5 SOLUTION RESPIRATORY (INHALATION) at 03:33

## 2025-07-20 RX ADMIN — IPRATROPIUM BROMIDE AND ALBUTEROL SULFATE 3 MILLILITER(S): .5; 2.5 SOLUTION RESPIRATORY (INHALATION) at 09:54

## 2025-07-20 RX ADMIN — TAMSULOSIN HYDROCHLORIDE 0.4 MILLIGRAM(S): 0.4 CAPSULE ORAL at 21:14

## 2025-07-20 RX ADMIN — Medication 1 DOSE(S): at 08:25

## 2025-07-20 RX ADMIN — INSULIN LISPRO 15 UNIT(S): 100 INJECTION, SOLUTION INTRAVENOUS; SUBCUTANEOUS at 12:30

## 2025-07-20 RX ADMIN — INSULIN LISPRO 1: 100 INJECTION, SOLUTION INTRAVENOUS; SUBCUTANEOUS at 17:31

## 2025-07-20 RX ADMIN — Medication 100 MILLIGRAM(S): at 12:34

## 2025-07-20 RX ADMIN — INSULIN LISPRO 1: 100 INJECTION, SOLUTION INTRAVENOUS; SUBCUTANEOUS at 12:29

## 2025-07-20 RX ADMIN — INSULIN LISPRO 15 UNIT(S): 100 INJECTION, SOLUTION INTRAVENOUS; SUBCUTANEOUS at 08:25

## 2025-07-20 RX ADMIN — METHYLPREDNISOLONE ACETATE 30 MILLIGRAM(S): 80 INJECTION, SUSPENSION INTRA-ARTICULAR; INTRALESIONAL; INTRAMUSCULAR; SOFT TISSUE at 05:23

## 2025-07-20 RX ADMIN — INSULIN GLARGINE-YFGN 30 UNIT(S): 100 INJECTION, SOLUTION SUBCUTANEOUS at 22:22

## 2025-07-20 RX ADMIN — INSULIN LISPRO 2: 100 INJECTION, SOLUTION INTRAVENOUS; SUBCUTANEOUS at 08:24

## 2025-07-20 RX ADMIN — Medication 100 MILLIGRAM(S): at 05:22

## 2025-07-20 RX ADMIN — Medication 100 MILLIGRAM(S): at 21:14

## 2025-07-20 RX ADMIN — ATORVASTATIN CALCIUM 20 MILLIGRAM(S): 80 TABLET, FILM COATED ORAL at 21:14

## 2025-07-20 RX ADMIN — Medication 1 DOSE(S): at 20:29

## 2025-07-20 RX ADMIN — DEXTROMETHORPHAN HBR, GUAIFENESIN 600 MILLIGRAM(S): 200 LIQUID ORAL at 05:23

## 2025-07-20 RX ADMIN — Medication 3 MILLIGRAM(S): at 21:14

## 2025-07-21 ENCOUNTER — TRANSCRIPTION ENCOUNTER (OUTPATIENT)
Age: 80
End: 2025-07-21

## 2025-07-21 VITALS
DIASTOLIC BLOOD PRESSURE: 64 MMHG | RESPIRATION RATE: 19 BRPM | SYSTOLIC BLOOD PRESSURE: 128 MMHG | OXYGEN SATURATION: 98 % | HEART RATE: 84 BPM | TEMPERATURE: 98 F

## 2025-07-21 LAB
ALBUMIN SERPL ELPH-MCNC: 3.6 G/DL — SIGNIFICANT CHANGE UP (ref 3.3–5)
ALP SERPL-CCNC: 51 U/L — SIGNIFICANT CHANGE UP (ref 40–120)
ALT FLD-CCNC: 21 U/L — SIGNIFICANT CHANGE UP (ref 4–41)
ANION GAP SERPL CALC-SCNC: 11 MMOL/L — SIGNIFICANT CHANGE UP (ref 7–14)
AST SERPL-CCNC: 16 U/L — SIGNIFICANT CHANGE UP (ref 4–40)
BASOPHILS # BLD AUTO: 0.03 K/UL — SIGNIFICANT CHANGE UP (ref 0–0.2)
BASOPHILS NFR BLD AUTO: 0.2 % — SIGNIFICANT CHANGE UP (ref 0–2)
BILIRUB SERPL-MCNC: 0.5 MG/DL — SIGNIFICANT CHANGE UP (ref 0.2–1.2)
BLOOD GAS VENOUS COMPREHENSIVE RESULT: SIGNIFICANT CHANGE UP
BUN SERPL-MCNC: 26 MG/DL — HIGH (ref 7–23)
CALCIUM SERPL-MCNC: 8.2 MG/DL — LOW (ref 8.4–10.5)
CHLORIDE SERPL-SCNC: 102 MMOL/L — SIGNIFICANT CHANGE UP (ref 98–107)
CO2 SERPL-SCNC: 23 MMOL/L — SIGNIFICANT CHANGE UP (ref 22–31)
CREAT SERPL-MCNC: 0.83 MG/DL — SIGNIFICANT CHANGE UP (ref 0.5–1.3)
EGFR: 89 ML/MIN/1.73M2 — SIGNIFICANT CHANGE UP
EGFR: 89 ML/MIN/1.73M2 — SIGNIFICANT CHANGE UP
EOSINOPHIL # BLD AUTO: 0.02 K/UL — SIGNIFICANT CHANGE UP (ref 0–0.5)
EOSINOPHIL NFR BLD AUTO: 0.1 % — SIGNIFICANT CHANGE UP (ref 0–6)
GLUCOSE BLDC GLUCOMTR-MCNC: 158 MG/DL — HIGH (ref 70–99)
GLUCOSE BLDC GLUCOMTR-MCNC: 167 MG/DL — HIGH (ref 70–99)
GLUCOSE BLDC GLUCOMTR-MCNC: 82 MG/DL — SIGNIFICANT CHANGE UP (ref 70–99)
GLUCOSE SERPL-MCNC: 192 MG/DL — HIGH (ref 70–99)
HCT VFR BLD CALC: 45 % — SIGNIFICANT CHANGE UP (ref 39–50)
HGB BLD-MCNC: 15.1 G/DL — SIGNIFICANT CHANGE UP (ref 13–17)
IMM GRANULOCYTES # BLD AUTO: 0.19 K/UL — HIGH (ref 0–0.07)
IMM GRANULOCYTES NFR BLD AUTO: 1.3 % — HIGH (ref 0–0.9)
LYMPHOCYTES # BLD AUTO: 1.91 K/UL — SIGNIFICANT CHANGE UP (ref 1–3.3)
LYMPHOCYTES NFR BLD AUTO: 13.2 % — SIGNIFICANT CHANGE UP (ref 13–44)
MAGNESIUM SERPL-MCNC: 2.2 MG/DL — SIGNIFICANT CHANGE UP (ref 1.6–2.6)
MCHC RBC-ENTMCNC: 28.4 PG — SIGNIFICANT CHANGE UP (ref 27–34)
MCHC RBC-ENTMCNC: 33.6 G/DL — SIGNIFICANT CHANGE UP (ref 32–36)
MCV RBC AUTO: 84.6 FL — SIGNIFICANT CHANGE UP (ref 80–100)
MONOCYTES # BLD AUTO: 1.35 K/UL — HIGH (ref 0–0.9)
MONOCYTES NFR BLD AUTO: 9.3 % — SIGNIFICANT CHANGE UP (ref 2–14)
NEUTROPHILS # BLD AUTO: 10.94 K/UL — HIGH (ref 1.8–7.4)
NEUTROPHILS NFR BLD AUTO: 75.9 % — SIGNIFICANT CHANGE UP (ref 43–77)
NRBC # BLD AUTO: 0 K/UL — SIGNIFICANT CHANGE UP (ref 0–0)
NRBC # FLD: 0 K/UL — SIGNIFICANT CHANGE UP (ref 0–0)
NRBC BLD AUTO-RTO: 0 /100 WBCS — SIGNIFICANT CHANGE UP (ref 0–0)
PHOSPHATE SERPL-MCNC: 3.8 MG/DL — SIGNIFICANT CHANGE UP (ref 2.5–4.5)
PLATELET # BLD AUTO: 216 K/UL — SIGNIFICANT CHANGE UP (ref 150–400)
PMV BLD: 11.2 FL — SIGNIFICANT CHANGE UP (ref 7–13)
POTASSIUM SERPL-MCNC: 4 MMOL/L — SIGNIFICANT CHANGE UP (ref 3.5–5.3)
POTASSIUM SERPL-SCNC: 4 MMOL/L — SIGNIFICANT CHANGE UP (ref 3.5–5.3)
PROT SERPL-MCNC: 6.6 G/DL — SIGNIFICANT CHANGE UP (ref 6–8.3)
RBC # BLD: 5.32 M/UL — SIGNIFICANT CHANGE UP (ref 4.2–5.8)
RBC # FLD: 13 % — SIGNIFICANT CHANGE UP (ref 10.3–14.5)
SODIUM SERPL-SCNC: 136 MMOL/L — SIGNIFICANT CHANGE UP (ref 135–145)
WBC # BLD: 14.44 K/UL — HIGH (ref 3.8–10.5)
WBC # FLD AUTO: 14.44 K/UL — HIGH (ref 3.8–10.5)

## 2025-07-21 RX ORDER — ACETAMINOPHEN 500 MG/5ML
2 LIQUID (ML) ORAL
Qty: 0 | Refills: 0 | DISCHARGE
Start: 2025-07-21

## 2025-07-21 RX ADMIN — INSULIN LISPRO 15 UNIT(S): 100 INJECTION, SOLUTION INTRAVENOUS; SUBCUTANEOUS at 12:23

## 2025-07-21 RX ADMIN — DEXTROMETHORPHAN HBR, GUAIFENESIN 600 MILLIGRAM(S): 200 LIQUID ORAL at 18:32

## 2025-07-21 RX ADMIN — DEXTROMETHORPHAN HBR, GUAIFENESIN 600 MILLIGRAM(S): 200 LIQUID ORAL at 06:03

## 2025-07-21 RX ADMIN — INSULIN LISPRO 15 UNIT(S): 100 INJECTION, SOLUTION INTRAVENOUS; SUBCUTANEOUS at 18:31

## 2025-07-21 RX ADMIN — Medication 100 MILLIGRAM(S): at 13:34

## 2025-07-21 RX ADMIN — LOSARTAN POTASSIUM 100 MILLIGRAM(S): 100 TABLET, FILM COATED ORAL at 06:03

## 2025-07-21 RX ADMIN — Medication 100 MILLIGRAM(S): at 06:03

## 2025-07-21 RX ADMIN — Medication 1 DOSE(S): at 08:41

## 2025-07-21 RX ADMIN — INSULIN LISPRO 1: 100 INJECTION, SOLUTION INTRAVENOUS; SUBCUTANEOUS at 08:42

## 2025-07-21 RX ADMIN — INSULIN LISPRO 1: 100 INJECTION, SOLUTION INTRAVENOUS; SUBCUTANEOUS at 12:23

## 2025-07-21 RX ADMIN — INSULIN LISPRO 15 UNIT(S): 100 INJECTION, SOLUTION INTRAVENOUS; SUBCUTANEOUS at 08:42

## 2025-07-22 ENCOUNTER — TRANSCRIPTION ENCOUNTER (OUTPATIENT)
Age: 80
End: 2025-07-22

## 2025-07-30 ENCOUNTER — APPOINTMENT (OUTPATIENT)
Age: 80
End: 2025-07-30
Payer: MEDICARE

## 2025-07-30 DIAGNOSIS — F03.90 UNSPECIFIED DEMENTIA W/OUT BEHAVIORAL DISTURBANCE: ICD-10-CM

## 2025-07-30 DIAGNOSIS — J45.901 UNSPECIFIED ASTHMA WITH (ACUTE) EXACERBATION: ICD-10-CM

## 2025-07-30 DIAGNOSIS — E78.5 HYPERLIPIDEMIA, UNSPECIFIED: ICD-10-CM

## 2025-07-30 DIAGNOSIS — I50.9 HEART FAILURE, UNSPECIFIED: ICD-10-CM

## 2025-07-30 DIAGNOSIS — E11.9 TYPE 2 DIABETES MELLITUS W/OUT COMPLICATIONS: ICD-10-CM

## 2025-07-30 DIAGNOSIS — I10 ESSENTIAL (PRIMARY) HYPERTENSION: ICD-10-CM

## 2025-07-30 PROCEDURE — 99349 HOME/RES VST EST MOD MDM 40: CPT | Mod: 93

## 2025-07-30 RX ORDER — IRBESARTAN 300 MG/1
300 TABLET ORAL
Refills: 0 | Status: ACTIVE | COMMUNITY
Start: 2025-07-30

## 2025-07-30 RX ORDER — IPRATROPIUM BROMIDE AND ALBUTEROL SULFATE 2.5; .5 MG/3ML; MG/3ML
0.5-2.5 (3) SOLUTION RESPIRATORY (INHALATION) EVERY 6 HOURS
Qty: 1 | Refills: 0 | Status: ACTIVE | COMMUNITY
Start: 2025-07-30 | End: 1900-01-01

## 2025-07-30 RX ORDER — TAMSULOSIN HYDROCHLORIDE 0.4 MG/1
0.4 CAPSULE ORAL
Refills: 0 | Status: ACTIVE | COMMUNITY
Start: 2025-07-30

## 2025-07-30 RX ORDER — FLUTICASONE PROPIONATE AND SALMETEROL 50; 500 UG/1; UG/1
500-50 POWDER RESPIRATORY (INHALATION) TWICE DAILY
Refills: 0 | Status: ACTIVE | COMMUNITY
Start: 2025-07-30

## 2025-07-30 RX ORDER — SODIUM CHLORIDE FOR INHALATION 0.9 %
0.9 VIAL, NEBULIZER (ML) INHALATION
Qty: 1 | Refills: 0 | Status: ACTIVE | COMMUNITY
Start: 2025-07-30 | End: 1900-01-01

## 2025-08-04 ENCOUNTER — TRANSCRIPTION ENCOUNTER (OUTPATIENT)
Age: 80
End: 2025-08-04

## 2025-08-12 ENCOUNTER — TRANSCRIPTION ENCOUNTER (OUTPATIENT)
Age: 80
End: 2025-08-12

## 2025-08-15 ENCOUNTER — TRANSCRIPTION ENCOUNTER (OUTPATIENT)
Age: 80
End: 2025-08-15

## (undated) DEVICE — DRSG STERISTRIPS 0.5 X 4"

## (undated) DEVICE — ELCTR BIPOLAR PROBE

## (undated) DEVICE — GLV 6.5 PROTEXIS (WHITE)

## (undated) DEVICE — WARMING BLANKET LOWER ADULT

## (undated) DEVICE — ELCTR MONOPOLAR STIMULATOR PROBE FLUSH-TIP

## (undated) DEVICE — TROCAR CODMAN SPLIT DISP

## (undated) DEVICE — PREP DURAPREP 26CC

## (undated) DEVICE — DRSG TAPE HYPAFIX 4"

## (undated) DEVICE — SYR LUER LOK 10CC

## (undated) DEVICE — SUT VICRYL 2-0 18" CP-2 UNDYED (POP-OFF)

## (undated) DEVICE — ELCTR BOVIE TIP BLADE INSULATED 2.75" EDGE

## (undated) DEVICE — SPECIMEN CONTAINER 100ML

## (undated) DEVICE — LAP PAD 18 X 18"

## (undated) DEVICE — DRAPE MAYO STAND 30"

## (undated) DEVICE — VENODYNE/SCD SLEEVE CALF LARGE

## (undated) DEVICE — MEDTRONIC TRACKER BUNDLE NI

## (undated) DEVICE — Device

## (undated) DEVICE — GLV 7.5 PROTEXIS (WHITE)

## (undated) DEVICE — MEDTRONIC AXIEM PATIENT TRACKER NON-INVASIVE

## (undated) DEVICE — DRAPE 3/4 SHEET W REINFORCEMENT 56X77"

## (undated) DEVICE — PREP CHLORAPREP HI-LITE ORANGE 26ML

## (undated) DEVICE — ELCTR SUBDERMAL NDL CLASSIC 1.5M X 59" (6 COLOR)

## (undated) DEVICE — SYR LUER LOK 20CC

## (undated) DEVICE — DRSG TELFA 3 X 8

## (undated) DEVICE — POSITIONER FOAM EGG CRATE ULNAR 2PCS (PINK)

## (undated) DEVICE — GLV 7.5 PROTEXIS (BLUE)

## (undated) DEVICE — INSUFFLATION NDL COVIDIEN SURGINEEDLE VERESS 120MM

## (undated) DEVICE — SUT SOFSILK 2-0 18" TIES

## (undated) DEVICE — TROCAR COVIDIEN VERSAONE FIXATION CANNULA 5MM

## (undated) DEVICE — STAPLER SKIN VISI-STAT 35 WIDE

## (undated) DEVICE — INTRO SHEATH INTEGRA PD 61CM

## (undated) DEVICE — D HELP - CLEARVIEW CLEARIFY SYSTEM

## (undated) DEVICE — DRAPE 1/2 SHEET 40X57"

## (undated) DEVICE — SHEATH INTRODUCER PERITONEAL 46CM

## (undated) DEVICE — SUT BOOT STANDARD (ASSORTED) 5 PAIR

## (undated) DEVICE — MEDTRONIC AXIEM STYLET 23CM

## (undated) DEVICE — MIDAS REX LEGEND LUBRICANT DIFFUSER CARTRIDGE

## (undated) DEVICE — SUT VICRYL 3-0 18" X-1 (POP-OFF)

## (undated) DEVICE — SOL IRR POUR NS 0.9% 500ML

## (undated) DEVICE — CODMAN PERFORATOR 14MM (BLUE)

## (undated) DEVICE — PACK VP SHUNT

## (undated) DEVICE — ELCTR SUBDERMAL NDL 27G X 1/2" WITH TWISTED PAIR

## (undated) DEVICE — VISITEC 4X4

## (undated) DEVICE — DRSG CURITY GAUZE SPONGE 4 X 4" 12-PLY

## (undated) DEVICE — DRSG MASTISOL

## (undated) DEVICE — MEDTRONIC AXIEM TRACER POINTER

## (undated) DEVICE — BIPOLAR FORCEP CODMAN VERSATRU 8" X 0.5MM (BLUE)

## (undated) DEVICE — SOL IRR POUR H2O 250ML

## (undated) DEVICE — SOLIDIFIER ISOLYZER 2000 CC

## (undated) DEVICE — ELCTR PEDICLE SCREW PROBE 3MM BALL 1.8MM X 100MM

## (undated) DEVICE — DRAPE CAMERA VIDEO 7"X96"

## (undated) DEVICE — MARKING PEN W RULER

## (undated) DEVICE — GLV 8 PROTEXIS (WHITE)

## (undated) DEVICE — MEDICATION LABELS W MARKER

## (undated) DEVICE — DRAPE TOWEL BLUE 17" X 24"

## (undated) DEVICE — GLV 8.5 PROTEXIS (WHITE)

## (undated) DEVICE — GLV 7 PROTEXIS (WHITE)

## (undated) DEVICE — DRSG XEROFORM 1 X 8"

## (undated) DEVICE — SUT BIOSYN 4-0 18" P-12

## (undated) DEVICE — SUT CHROMIC 3-0 30" V-20

## (undated) DEVICE — TUBING INSUFFLATION LAP FILTER 10FT